# Patient Record
Sex: MALE | Race: BLACK OR AFRICAN AMERICAN | NOT HISPANIC OR LATINO | Employment: FULL TIME | URBAN - METROPOLITAN AREA
[De-identification: names, ages, dates, MRNs, and addresses within clinical notes are randomized per-mention and may not be internally consistent; named-entity substitution may affect disease eponyms.]

---

## 2017-01-11 ENCOUNTER — LAB VISIT (OUTPATIENT)
Dept: LAB | Facility: HOSPITAL | Age: 62
End: 2017-01-11
Attending: FAMILY MEDICINE
Payer: COMMERCIAL

## 2017-01-11 ENCOUNTER — OFFICE VISIT (OUTPATIENT)
Dept: FAMILY MEDICINE | Facility: CLINIC | Age: 62
End: 2017-01-11
Payer: COMMERCIAL

## 2017-01-11 VITALS
HEART RATE: 55 BPM | SYSTOLIC BLOOD PRESSURE: 130 MMHG | BODY MASS INDEX: 26.2 KG/M2 | DIASTOLIC BLOOD PRESSURE: 70 MMHG | WEIGHT: 204.13 LBS | OXYGEN SATURATION: 98 % | HEIGHT: 74 IN

## 2017-01-11 DIAGNOSIS — Z12.5 SCREENING PSA (PROSTATE SPECIFIC ANTIGEN): ICD-10-CM

## 2017-01-11 DIAGNOSIS — E78.5 HYPERLIPIDEMIA, UNSPECIFIED HYPERLIPIDEMIA TYPE: ICD-10-CM

## 2017-01-11 DIAGNOSIS — E55.9 VITAMIN D DEFICIENCY DISEASE: ICD-10-CM

## 2017-01-11 DIAGNOSIS — N52.9 ERECTILE DYSFUNCTION, UNSPECIFIED ERECTILE DYSFUNCTION TYPE: ICD-10-CM

## 2017-01-11 DIAGNOSIS — I10 ESSENTIAL HYPERTENSION: ICD-10-CM

## 2017-01-11 DIAGNOSIS — Z11.59 NEED FOR HEPATITIS C SCREENING TEST: ICD-10-CM

## 2017-01-11 DIAGNOSIS — E11.9 TYPE 2 DIABETES MELLITUS WITHOUT COMPLICATION, WITHOUT LONG-TERM CURRENT USE OF INSULIN: ICD-10-CM

## 2017-01-11 DIAGNOSIS — J01.01 ACUTE RECURRENT MAXILLARY SINUSITIS: ICD-10-CM

## 2017-01-11 DIAGNOSIS — E11.9 CONTROLLED TYPE 2 DIABETES MELLITUS WITHOUT COMPLICATION, WITHOUT LONG-TERM CURRENT USE OF INSULIN: Primary | ICD-10-CM

## 2017-01-11 DIAGNOSIS — E11.9 CONTROLLED TYPE 2 DIABETES MELLITUS WITHOUT COMPLICATION, WITHOUT LONG-TERM CURRENT USE OF INSULIN: ICD-10-CM

## 2017-01-11 LAB
25(OH)D3+25(OH)D2 SERPL-MCNC: 31 NG/ML
ALBUMIN SERPL BCP-MCNC: 3.8 G/DL
ALP SERPL-CCNC: 50 U/L
ALT SERPL W/O P-5'-P-CCNC: 10 U/L
ANION GAP SERPL CALC-SCNC: 5 MMOL/L
AST SERPL-CCNC: 15 U/L
BASOPHILS # BLD AUTO: 0.03 K/UL
BASOPHILS NFR BLD: 0.5 %
BILIRUB SERPL-MCNC: 0.6 MG/DL
BUN SERPL-MCNC: 18 MG/DL
CALCIUM SERPL-MCNC: 9.4 MG/DL
CHLORIDE SERPL-SCNC: 108 MMOL/L
CHOLEST/HDLC SERPL: 3.3 {RATIO}
CO2 SERPL-SCNC: 28 MMOL/L
COMPLEXED PSA SERPL-MCNC: 3.3 NG/ML
CREAT SERPL-MCNC: 1.4 MG/DL
DIFFERENTIAL METHOD: ABNORMAL
EOSINOPHIL # BLD AUTO: 0.2 K/UL
EOSINOPHIL NFR BLD: 4.1 %
ERYTHROCYTE [DISTWIDTH] IN BLOOD BY AUTOMATED COUNT: 14.5 %
EST. GFR  (AFRICAN AMERICAN): >60 ML/MIN/1.73 M^2
EST. GFR  (NON AFRICAN AMERICAN): 53 ML/MIN/1.73 M^2
GLUCOSE SERPL-MCNC: 122 MG/DL
HCT VFR BLD AUTO: 37.4 %
HDL/CHOLESTEROL RATIO: 29.9 %
HDLC SERPL-MCNC: 144 MG/DL
HDLC SERPL-MCNC: 43 MG/DL
HGB BLD-MCNC: 12.5 G/DL
LDLC SERPL CALC-MCNC: 90 MG/DL
LYMPHOCYTES # BLD AUTO: 1.9 K/UL
LYMPHOCYTES NFR BLD: 33.3 %
MCH RBC QN AUTO: 28.7 PG
MCHC RBC AUTO-ENTMCNC: 33.4 %
MCV RBC AUTO: 86 FL
MONOCYTES # BLD AUTO: 0.5 K/UL
MONOCYTES NFR BLD: 8.5 %
NEUTROPHILS # BLD AUTO: 3 K/UL
NEUTROPHILS NFR BLD: 53.4 %
NONHDLC SERPL-MCNC: 101 MG/DL
PLATELET # BLD AUTO: 257 K/UL
PMV BLD AUTO: 10.4 FL
POTASSIUM SERPL-SCNC: 4.7 MMOL/L
PROT SERPL-MCNC: 6.9 G/DL
RBC # BLD AUTO: 4.36 M/UL
SODIUM SERPL-SCNC: 141 MMOL/L
TRIGL SERPL-MCNC: 55 MG/DL
WBC # BLD AUTO: 5.67 K/UL

## 2017-01-11 PROCEDURE — 3044F HG A1C LEVEL LT 7.0%: CPT | Mod: S$GLB,,, | Performed by: FAMILY MEDICINE

## 2017-01-11 PROCEDURE — 3078F DIAST BP <80 MM HG: CPT | Mod: S$GLB,,, | Performed by: FAMILY MEDICINE

## 2017-01-11 PROCEDURE — 36415 COLL VENOUS BLD VENIPUNCTURE: CPT

## 2017-01-11 PROCEDURE — 99214 OFFICE O/P EST MOD 30 MIN: CPT | Mod: S$GLB,,, | Performed by: FAMILY MEDICINE

## 2017-01-11 PROCEDURE — 4010F ACE/ARB THERAPY RXD/TAKEN: CPT | Mod: S$GLB,,, | Performed by: FAMILY MEDICINE

## 2017-01-11 PROCEDURE — 83036 HEMOGLOBIN GLYCOSYLATED A1C: CPT

## 2017-01-11 PROCEDURE — 84153 ASSAY OF PSA TOTAL: CPT

## 2017-01-11 PROCEDURE — 85025 COMPLETE CBC W/AUTO DIFF WBC: CPT

## 2017-01-11 PROCEDURE — 3075F SYST BP GE 130 - 139MM HG: CPT | Mod: S$GLB,,, | Performed by: FAMILY MEDICINE

## 2017-01-11 PROCEDURE — 80053 COMPREHEN METABOLIC PANEL: CPT

## 2017-01-11 PROCEDURE — 86803 HEPATITIS C AB TEST: CPT

## 2017-01-11 PROCEDURE — 99999 PR PBB SHADOW E&M-EST. PATIENT-LVL III: CPT | Mod: PBBFAC,,, | Performed by: FAMILY MEDICINE

## 2017-01-11 PROCEDURE — 80061 LIPID PANEL: CPT

## 2017-01-11 PROCEDURE — 2022F DILAT RTA XM EVC RTNOPTHY: CPT | Mod: S$GLB,,, | Performed by: FAMILY MEDICINE

## 2017-01-11 PROCEDURE — 82306 VITAMIN D 25 HYDROXY: CPT

## 2017-01-11 PROCEDURE — 1159F MED LIST DOCD IN RCRD: CPT | Mod: S$GLB,,, | Performed by: FAMILY MEDICINE

## 2017-01-11 RX ORDER — ATORVASTATIN CALCIUM 40 MG/1
40 TABLET, FILM COATED ORAL DAILY
Qty: 90 TABLET | Refills: 3 | Status: SHIPPED | OUTPATIENT
Start: 2017-01-11 | End: 2017-04-03 | Stop reason: SDUPTHER

## 2017-01-11 RX ORDER — LISINOPRIL 10 MG/1
10 TABLET ORAL DAILY
Qty: 90 TABLET | Refills: 3 | Status: SHIPPED | OUTPATIENT
Start: 2017-01-11 | End: 2017-04-03 | Stop reason: SDUPTHER

## 2017-01-11 RX ORDER — METFORMIN HYDROCHLORIDE 1000 MG/1
1000 TABLET ORAL 2 TIMES DAILY
Qty: 180 TABLET | Refills: 3 | Status: SHIPPED | OUTPATIENT
Start: 2017-01-11 | End: 2017-04-03 | Stop reason: SDUPTHER

## 2017-01-11 RX ORDER — TADALAFIL 20 MG/1
20 TABLET ORAL DAILY
Qty: 30 TABLET | Refills: 4 | Status: SHIPPED | OUTPATIENT
Start: 2017-01-11 | End: 2018-08-28

## 2017-01-11 RX ORDER — FLUTICASONE PROPIONATE 50 MCG
1 SPRAY, SUSPENSION (ML) NASAL DAILY
Qty: 1 BOTTLE | Refills: 1 | Status: SHIPPED | OUTPATIENT
Start: 2017-01-11 | End: 2017-04-03

## 2017-01-11 RX ORDER — AMOXICILLIN 875 MG/1
875 TABLET, FILM COATED ORAL 2 TIMES DAILY
Qty: 20 TABLET | Refills: 0 | Status: SHIPPED | OUTPATIENT
Start: 2017-01-11 | End: 2017-01-21

## 2017-01-11 NOTE — PROGRESS NOTES
Subjective:       Patient ID: Addy Redding is a 61 y.o. male.    Chief Complaint: No chief complaint on file.    HPI Comments: 61 yr old black male with DM II, HTN, HLD, GERD, presents today for 3-6 month follow up, medication refill. C/o sinus congestion. Has not seen MD since 2/16.    DM II - improved -   HGBA1C                   6.3 (H)             02/08/2016             - due for today - brought log and it shows uncontrolled sugars fasting ranging from 150-220 - denies any hypoglycemic symptoms - on metformin - not  up to date with eye and foot screen - stopped glipizide since itw as making his sugars low    HTN - uncontrolled but did not took med today - on lisinopril 5 mg daily - no side effects    HLD - uncontrolled but improving -    LDLCALC                  79.0                08/25/2015                 - on statin - compliant - need refill - not fully compliant with diet    GERD - stable - takes prilosec as needed    Health maintenance  -Flu and Pneumovax - up to date  -adacel reports up to date 2005  -colonoscopy due  -PSA due      Medication Refill   This is a chronic problem. The current episode started more than 1 year ago. The problem occurs constantly. The problem has been gradually improving. Associated symptoms include congestion, coughing and headaches. Pertinent negatives include no abdominal pain, anorexia, arthralgias, change in bowel habit, chest pain, chills, diaphoresis, fatigue, fever, joint swelling, myalgias, nausea, neck pain, numbness, rash, sore throat, swollen glands, urinary symptoms, vertigo, visual change, vomiting or weakness.   Diabetes   Hypoglycemia symptoms include headaches. Pertinent negatives for hypoglycemia include no dizziness, nervousness/anxiousness, speech difficulty or sweats. Pertinent negatives for diabetes include no blurred vision, no chest pain, no fatigue, no polyuria, no visual change and no weakness. Pertinent negatives for diabetic complications include no  CVA, PVD or retinopathy.   Hypertension   This is a chronic problem. The current episode started more than 1 year ago. The problem has been gradually worsening since onset. The problem is uncontrolled. Associated symptoms include headaches. Pertinent negatives include no anxiety, blurred vision, chest pain, malaise/fatigue, neck pain, orthopnea, palpitations, peripheral edema, shortness of breath or sweats. There are no associated agents to hypertension. Risk factors for coronary artery disease include dyslipidemia, diabetes mellitus, male gender and obesity. Past treatments include ACE inhibitors. The current treatment provides no improvement. There are no compliance problems.  There is no history of angina, CAD/MI, CVA, left ventricular hypertrophy, PVD, renovascular disease, retinopathy or a thyroid problem. There is no history of chronic renal disease, coarctation of the aorta, hypercortisolism or pheochromocytoma.   Hyperlipidemia   This is a chronic problem. The current episode started more than 1 year ago. The problem is uncontrolled. Recent lipid tests were reviewed and are high. Exacerbating diseases include diabetes and obesity. He has no history of chronic renal disease, liver disease or nephrotic syndrome. There are no known factors aggravating his hyperlipidemia. Pertinent negatives include no chest pain, focal sensory loss, focal weakness, leg pain, myalgias or shortness of breath. Current antihyperlipidemic treatment includes statins. There are no compliance problems.  Risk factors for coronary artery disease include diabetes mellitus, dyslipidemia, male sex and hypertension.   Sinusitis   This is a new problem. The current episode started more than 1 month ago. The problem is unchanged. His pain is at a severity of 3/10. The pain is mild. Associated symptoms include congestion, coughing, headaches and sinus pressure. Pertinent negatives include no chills, diaphoresis, ear pain, neck pain, shortness  of breath, sore throat or swollen glands. Past treatments include acetaminophen and oral decongestants. The treatment provided no relief.     Review of Systems   Constitutional: Negative.  Negative for activity change, chills, diaphoresis, fatigue, fever, malaise/fatigue and unexpected weight change.   HENT: Positive for congestion, postnasal drip and sinus pressure. Negative for ear pain, mouth sores, rhinorrhea, sore throat and voice change.    Eyes: Negative.  Negative for blurred vision, pain, discharge and visual disturbance.   Respiratory: Positive for cough. Negative for apnea, shortness of breath and wheezing.    Cardiovascular: Negative.  Negative for chest pain, palpitations and orthopnea.   Gastrointestinal: Negative.  Negative for abdominal distention, abdominal pain, anal bleeding, anorexia, change in bowel habit, diarrhea, nausea and vomiting.   Endocrine: Negative.  Negative for cold intolerance and polyuria.   Genitourinary: Negative.  Negative for decreased urine volume, difficulty urinating, discharge, frequency and scrotal swelling.   Musculoskeletal: Negative.  Negative for arthralgias, back pain, joint swelling, myalgias, neck pain and neck stiffness.   Skin: Negative.  Negative for color change and rash.   Allergic/Immunologic: Negative.  Negative for environmental allergies and immunocompromised state.   Neurological: Positive for headaches. Negative for dizziness, vertigo, focal weakness, speech difficulty, weakness, light-headedness and numbness.   Hematological: Negative.    Psychiatric/Behavioral: Negative.  Negative for agitation, dysphoric mood and suicidal ideas. The patient is not nervous/anxious.        PMH/PSH/FH/SH/MED/ALLERGY reviewed    Objective:       Vitals:    01/11/17 0843   BP: 130/70   Pulse:        Physical Exam   Constitutional: He is oriented to person, place, and time. He appears well-developed and well-nourished. No distress.   HENT:   Head: Normocephalic and  atraumatic.   Right Ear: External ear normal.   Left Ear: External ear normal.   Nose: Mucosal edema and rhinorrhea present. Right sinus exhibits maxillary sinus tenderness. Right sinus exhibits no frontal sinus tenderness. Left sinus exhibits maxillary sinus tenderness. Left sinus exhibits no frontal sinus tenderness.   Mouth/Throat: Oropharynx is clear and moist. No oropharyngeal exudate.   Eyes: Conjunctivae and EOM are normal. Pupils are equal, round, and reactive to light. Right eye exhibits no discharge. Left eye exhibits no discharge. No scleral icterus.   Neck: Normal range of motion. Neck supple. No JVD present. No tracheal deviation present. No thyromegaly present.   Cardiovascular: Normal rate, regular rhythm, normal heart sounds and intact distal pulses.  Exam reveals no gallop and no friction rub.    No murmur heard.  Pulmonary/Chest: Effort normal and breath sounds normal. No stridor. No respiratory distress. He has no wheezes. He has no rales. He exhibits no tenderness.   Abdominal: Soft. Bowel sounds are normal. He exhibits no distension and no mass. There is no tenderness. There is no rebound and no guarding. No hernia.   Musculoskeletal: Normal range of motion. He exhibits no edema or tenderness.   Lymphadenopathy:     He has no cervical adenopathy.   Neurological: He is alert and oriented to person, place, and time. He has normal reflexes. He displays normal reflexes. No cranial nerve deficit. He exhibits normal muscle tone. Coordination normal.   Skin: Skin is warm and dry. No rash noted. He is not diaphoretic. No erythema. No pallor.   Psychiatric: He has a normal mood and affect. His behavior is normal. Judgment and thought content normal.       Protective Sensation (w/ 10 gram monofilament):  Right: Intact  Left: Intact    Visual Inspection:  Normal -  Bilateral    Pedal Pulses:   Right: Present  Left: Present    Posterior tibialis:   Right:Present  Left: Present  ,  Assessment:       1.  Controlled type 2 diabetes mellitus without complication, without long-term current use of insulin    2. Hyperlipidemia, unspecified hyperlipidemia type    3. Erectile dysfunction, unspecified erectile dysfunction type    4. Essential hypertension    5. Need for hepatitis C screening test    6. Vitamin D deficiency disease    7. Type 2 diabetes mellitus without complication, without long-term current use of insulin    8. Acute recurrent maxillary sinusitis        Plan:       Diagnoses and all orders for this visit:    Controlled type 2 diabetes mellitus without complication, without long-term current use of insulin  -     CBC auto differential; Future  -     Comprehensive metabolic panel; Future  -     Lipid panel; Future  -     Hemoglobin A1c; Future  -     Microalbumin/creatinine urine ratio; Future  -     metformin (GLUCOPHAGE) 1000 MG tablet; Take 1 tablet (1,000 mg total) by mouth 2 (two) times daily.    Hyperlipidemia, unspecified hyperlipidemia type  -     CBC auto differential; Future  -     Comprehensive metabolic panel; Future  -     Lipid panel; Future  -     atorvastatin (LIPITOR) 40 MG tablet; Take 1 tablet (40 mg total) by mouth once daily.    Erectile dysfunction, unspecified erectile dysfunction type  -     tadalafil (CIALIS) 20 MG Tab; Take 1 tablet (20 mg total) by mouth once daily.    Essential hypertension  -     CBC auto differential; Future  -     Comprehensive metabolic panel; Future  -     Lipid panel; Future  -     lisinopril 10 MG tablet; Take 1 tablet (10 mg total) by mouth once daily.    Need for hepatitis C screening test  -     HEPATITIS C ANTIBODY; Future    Vitamin D deficiency disease  -     Vitamin D; Future    Type 2 diabetes mellitus without complication, without long-term current use of insulin  -     metformin (GLUCOPHAGE) 1000 MG tablet; Take 1 tablet (1,000 mg total) by mouth 2 (two) times daily.    Acute recurrent maxillary sinusitis  -     fluticasone (FLONASE) 50  mcg/actuation nasal spray; 1 spray by Each Nare route once daily.  -     amoxicillin (AMOXIL) 875 MG tablet; Take 1 tablet (875 mg total) by mouth 2 (two) times daily.      Spent adequate time in obtaining history and explaining differentials     HTN   -not at goal  -increase lisinopril to 10 mg/day    DM II   -controlled   -continue metformin and labs    HLD   -improving  -increased lipitor to 40     GERD   -stable   -takes OTC PPI     Ac sinusitis  -Advised saline irrigation, warm humidifier, steam inhalation, vicks vaporub, claritin D for congestion  -z ashok and flonase as directed    Colon and prostate ca screening due     40 minutes spent during this visit of which greater than 50% devoted to face-face counseling and coordination of care regarding diagnosis and management plan     RTC 3 months

## 2017-01-11 NOTE — MR AVS SNAPSHOT
[address]  [place] [address]  Phone: [phone]  Fax: [phone]                  Addy Redding   2017 9:40 AM   Office Visit    Description:  Male : 1955   Provider:  Stephan Ramey MD   Department:  [facility]           Diagnoses this Visit        Comments    Controlled type 2 diabetes mellitus without complication, without long-term current use of insulin    -  Primary     Hyperlipidemia, unspecified hyperlipidemia type         Erectile dysfunction, unspecified erectile dysfunction type         Essential hypertension         Need for hepatitis C screening test         Vitamin D deficiency disease                To Do List           Future Appointments        Provider Department Dept Phone    2017 9:40 AM Stephan Ramey MD [facility] [phone]      Goals (5 Years of Data)     None      Follow-Up and Disposition     Return in about 3 months (around 2017), or if symptoms worsen or fail to improve.      [facility]     [facility] -  Assistance  Registered nurses in the [facility] provide clinical advisement, health education, appointment booking, and other advisory services.  Call for this free service at [phone].             Medications           Message regarding Medications     Verify the changes and/or additions to your medication regime listed below are the same as discussed with your clinician today.  If any of these changes or additions are incorrect, please notify your healthcare provider.             Verify that the below list of medications is an accurate representation of the medications you are currently taking.  If none reported, the list may be blank. If incorrect, please contact your healthcare provider. Carry this list with you in case of emergency.           Current Medications     atorvastatin (LIPITOR) 40 MG tablet TAKE ONE TABLET BY MOUTH  "ONCE DAILY.    BLOOD GLUCOSE TEST test strip USE TO TEST BLOOD SUGAR 4 TIMES DAILY.    hydrocodone-acetaminophen 5-325mg (NORCO) 5-325 mg per tablet Take 1 tablet by mouth 2 (two) times daily as needed for Pain.    INSULIN DETEMIR (LEVEMIR FLEXTOUCH SUBQ) Inject 10 Units into the skin every evening.    insulin needles, disposable, (BD ULTRA-FINE ARIA PEN NEEDLES) 32 x 5/32 " Ndle Use with flex pens    lisinopril (PRINIVIL,ZESTRIL) 5 MG tablet Take 1 tablet (5 mg total) by mouth once daily.    metformin (GLUCOPHAGE) 1000 MG tablet TAKE ONE TABLET BY MOUTH TWICE DAILY    sildenafil (VIAGRA) 50 MG tablet Take 1 tablet (50 mg total) by mouth daily as needed for Erectile Dysfunction.    aspirin (ECOTRIN) 81 MG EC tablet Take 81 mg by mouth once daily.      atorvastatin (LIPITOR) 40 MG tablet TAKE ONE TABLET BY MOUTH ONCE DAILY.    atorvastatin (LIPITOR) 80 MG tablet Take 1 tablet (80 mg total) by mouth once daily.    blood-glucose meter kit Use as instructed    lisinopril (PRINIVIL,ZESTRIL) 5 MG tablet TAKE ONE TABLET BY MOUTH ONCE DAILY.    lisinopril (PRINIVIL,ZESTRIL) 5 MG tablet TAKE ONE TABLET BY MOUTH ONCE DAILY.    lisinopril (PRINIVIL,ZESTRIL) 5 MG tablet TAKE ONE TABLET BY MOUTH ONCE DAILY.    lisinopril (PRINIVIL,ZESTRIL) 5 MG tablet TAKE ONE TABLET BY MOUTH ONCE DAILY.    lisinopril (PRINIVIL,ZESTRIL) 5 MG tablet TAKE ONE TABLET BY MOUTH ONCE DAILY.    lisinopril (PRINIVIL,ZESTRIL) 5 MG tablet TAKE ONE TABLET BY MOUTH ONCE DAILY.    metformin (GLUCOPHAGE) 1000 MG tablet TAKE ONE TABLET BY MOUTH TWICE DAILY    metformin (GLUCOPHAGE) 1000 MG tablet TAKE ONE TABLET BY MOUTH TWICE DAILY    metformin (GLUCOPHAGE) 1000 MG tablet TAKE ONE TABLET BY MOUTH TWICE DAILY    metformin (GLUCOPHAGE) 1000 MG tablet TAKE ONE TABLET BY MOUTH TWICE DAILY    metformin (GLUCOPHAGE) 1000 MG tablet TAKE ONE TABLET BY MOUTH TWICE DAILY           Clinical Reference Information           Vital Signs - Last Recorded  Most recent update: " "1/11/2017  8:21 AM by Danica Carlos MA    BP Pulse Ht Wt SpO2 BMI    (!) 154/72 (!) 55 6' 2" (1.88 m) 92.6 kg (204 lb 2.3 oz) 98% 26.21 kg/m2      Blood Pressure          Most Recent Value    BP  (!)  154/72      Allergies as of 1/11/2017     No Known Allergies      Immunizations Administered on Date of Encounter - 1/11/2017     None      Orders Placed During Today's Visit     Future Labs/Procedures Expected by Expires    CBC auto differential  1/11/2017 3/12/2018    Comprehensive metabolic panel  1/11/2017 3/12/2018    Hemoglobin A1c  1/11/2017 3/12/2018    HEPATITIS C ANTIBODY  1/11/2017 3/12/2018    Lipid panel  1/11/2017 3/12/2018    Microalbumin/creatinine urine ratio  1/11/2017 3/12/2018    Vitamin D  1/11/2017 3/12/2018      "

## 2017-01-12 LAB
ESTIMATED AVG GLUCOSE: 140 MG/DL
HBA1C MFR BLD HPLC: 6.5 %
HCV AB SERPL QL IA: NEGATIVE

## 2017-01-28 RX ORDER — LISINOPRIL 5 MG/1
TABLET ORAL
Qty: 30 TABLET | Refills: 0 | Status: SHIPPED | OUTPATIENT
Start: 2017-01-28 | End: 2017-04-03

## 2017-04-03 ENCOUNTER — OFFICE VISIT (OUTPATIENT)
Dept: FAMILY MEDICINE | Facility: CLINIC | Age: 62
End: 2017-04-03
Payer: COMMERCIAL

## 2017-04-03 ENCOUNTER — LAB VISIT (OUTPATIENT)
Dept: LAB | Facility: HOSPITAL | Age: 62
End: 2017-04-03
Attending: FAMILY MEDICINE
Payer: COMMERCIAL

## 2017-04-03 VITALS
OXYGEN SATURATION: 98 % | HEART RATE: 50 BPM | SYSTOLIC BLOOD PRESSURE: 125 MMHG | BODY MASS INDEX: 25.8 KG/M2 | HEIGHT: 74 IN | WEIGHT: 201.06 LBS | DIASTOLIC BLOOD PRESSURE: 67 MMHG

## 2017-04-03 DIAGNOSIS — Z23 IMMUNIZATION DUE: ICD-10-CM

## 2017-04-03 DIAGNOSIS — E78.5 HYPERLIPIDEMIA, UNSPECIFIED HYPERLIPIDEMIA TYPE: ICD-10-CM

## 2017-04-03 DIAGNOSIS — N52.9 ERECTILE DYSFUNCTION, UNSPECIFIED ERECTILE DYSFUNCTION TYPE: ICD-10-CM

## 2017-04-03 DIAGNOSIS — I10 ESSENTIAL HYPERTENSION: ICD-10-CM

## 2017-04-03 DIAGNOSIS — E11.9 TYPE 2 DIABETES MELLITUS WITHOUT COMPLICATION, WITHOUT LONG-TERM CURRENT USE OF INSULIN: ICD-10-CM

## 2017-04-03 DIAGNOSIS — E11.9 CONTROLLED TYPE 2 DIABETES MELLITUS WITHOUT COMPLICATION, WITHOUT LONG-TERM CURRENT USE OF INSULIN: Primary | ICD-10-CM

## 2017-04-03 DIAGNOSIS — E11.9 CONTROLLED TYPE 2 DIABETES MELLITUS WITHOUT COMPLICATION, WITHOUT LONG-TERM CURRENT USE OF INSULIN: ICD-10-CM

## 2017-04-03 LAB
ALBUMIN SERPL BCP-MCNC: 3.8 G/DL
ALP SERPL-CCNC: 41 U/L
ALT SERPL W/O P-5'-P-CCNC: 11 U/L
ANION GAP SERPL CALC-SCNC: 6 MMOL/L
AST SERPL-CCNC: 16 U/L
BILIRUB SERPL-MCNC: 0.4 MG/DL
BUN SERPL-MCNC: 23 MG/DL
CALCIUM SERPL-MCNC: 9.3 MG/DL
CHLORIDE SERPL-SCNC: 107 MMOL/L
CO2 SERPL-SCNC: 26 MMOL/L
CREAT SERPL-MCNC: 1.3 MG/DL
EST. GFR  (AFRICAN AMERICAN): >60 ML/MIN/1.73 M^2
EST. GFR  (NON AFRICAN AMERICAN): 58 ML/MIN/1.73 M^2
GLUCOSE SERPL-MCNC: 127 MG/DL
POTASSIUM SERPL-SCNC: 4.5 MMOL/L
PROT SERPL-MCNC: 7.2 G/DL
SODIUM SERPL-SCNC: 139 MMOL/L

## 2017-04-03 PROCEDURE — 3074F SYST BP LT 130 MM HG: CPT | Mod: S$GLB,,, | Performed by: FAMILY MEDICINE

## 2017-04-03 PROCEDURE — 99999 PR PBB SHADOW E&M-EST. PATIENT-LVL III: CPT | Mod: PBBFAC,,, | Performed by: FAMILY MEDICINE

## 2017-04-03 PROCEDURE — 83036 HEMOGLOBIN GLYCOSYLATED A1C: CPT

## 2017-04-03 PROCEDURE — 4010F ACE/ARB THERAPY RXD/TAKEN: CPT | Mod: S$GLB,,, | Performed by: FAMILY MEDICINE

## 2017-04-03 PROCEDURE — 1160F RVW MEDS BY RX/DR IN RCRD: CPT | Mod: S$GLB,,, | Performed by: FAMILY MEDICINE

## 2017-04-03 PROCEDURE — 99214 OFFICE O/P EST MOD 30 MIN: CPT | Mod: 25,S$GLB,, | Performed by: FAMILY MEDICINE

## 2017-04-03 PROCEDURE — 36415 COLL VENOUS BLD VENIPUNCTURE: CPT

## 2017-04-03 PROCEDURE — 90471 IMMUNIZATION ADMIN: CPT | Mod: S$GLB,,, | Performed by: FAMILY MEDICINE

## 2017-04-03 PROCEDURE — 3078F DIAST BP <80 MM HG: CPT | Mod: S$GLB,,, | Performed by: FAMILY MEDICINE

## 2017-04-03 PROCEDURE — 80053 COMPREHEN METABOLIC PANEL: CPT

## 2017-04-03 PROCEDURE — 90715 TDAP VACCINE 7 YRS/> IM: CPT | Mod: S$GLB,,, | Performed by: FAMILY MEDICINE

## 2017-04-03 PROCEDURE — 3044F HG A1C LEVEL LT 7.0%: CPT | Mod: S$GLB,,, | Performed by: FAMILY MEDICINE

## 2017-04-03 RX ORDER — ATORVASTATIN CALCIUM 40 MG/1
40 TABLET, FILM COATED ORAL DAILY
Qty: 90 TABLET | Refills: 3 | Status: SHIPPED | OUTPATIENT
Start: 2017-04-03 | End: 2018-04-06 | Stop reason: SDUPTHER

## 2017-04-03 RX ORDER — METFORMIN HYDROCHLORIDE 1000 MG/1
1000 TABLET ORAL 2 TIMES DAILY
Qty: 180 TABLET | Refills: 3 | Status: SHIPPED | OUTPATIENT
Start: 2017-04-03 | End: 2018-04-06 | Stop reason: SDUPTHER

## 2017-04-03 RX ORDER — LISINOPRIL 10 MG/1
10 TABLET ORAL DAILY
Qty: 90 TABLET | Refills: 3 | Status: SHIPPED | OUTPATIENT
Start: 2017-04-03 | End: 2018-04-06 | Stop reason: SDUPTHER

## 2017-04-03 NOTE — MR AVS SNAPSHOT
[facility]  [address]  [place] [address]  Phone: [phone]  Fax: [phone]                  Addy Redding   4/3/2017 8:20 AM   Office Visit    Description:  Male : 1955   Provider:  Stephan Ramey MD   Department:  [facility]           Reason for Visit     Follow-up           Diagnoses this Visit        Comments    Controlled type 2 diabetes mellitus without complication, without long-term current use of insulin    -  Primary     Essential hypertension         Hyperlipidemia, unspecified hyperlipidemia type         Erectile dysfunction, unspecified erectile dysfunction type         Type 2 diabetes mellitus without complication, without long-term current use of insulin         Immunization due                To Do List           Future Appointments        Provider Department Dept Phone    4/3/2017 8:50 AM APPOINTMENT LAB, [place] [facility] [phone]    2017 8:20 AM Stephan Ramey MD [facility] [phone]      Goals (5 Years of Data)     None      Follow-Up and Disposition     Return in about 3 months (around 7/3/2017), or if symptoms worsen or fail to improve.       These Medications        Disp Refills Start End    metformin (GLUCOPHAGE) 1000 MG tablet 180 tablet 3 4/3/2017     Take 1 tablet (1,000 mg total) by mouth 2 (two) times daily. - Oral    Pharmacy: [facility]/pharmacy #[id number] - [place]  [address] AT [facility] Ph #: [phone]       lisinopril 10 MG tablet 90 tablet 3 4/3/2017     Take 1 tablet (10 mg total) by mouth once daily. - Oral    Pharmacy: [facility]/pharmacy #[id number] - [place]  [address] AT [facility] Ph #: [phone]       atorvastatin (LIPITOR) 40 MG tablet 90 tablet 3 4/3/2017     Take 1 tablet (40 mg total) by mouth once daily. - Oral    Pharmacy: [facility]/pharmacy #[id number] - [place]  [address] AT [facility] #: [phone]       blood sugar diagnostic (BLOOD GLUCOSE TEST) Strp 100 each 9 4/3/2017     USE TO TEST BLOOD SUGAR 4 TIMES DAILY.    Pharmacy: [facility]/pharmacy #[id number] - [place] [address] AT [facility] [address]  #: [phone]         [facility]     [facility] On Call Nurse Care Line - 24/7 Assistance  Unless otherwise directed by your provider, please contact [facility], our nurse care line that is available for 24/7 assistance.     Registered nurses in the [facility] provide: appointment scheduling, clinical advisement, health education, and other advisory services.  Call: [phone] (toll free)               Medications           Message regarding Medications     Verify the changes and/or additions to your medication regime listed below are the same as discussed with your clinician today.  If any of these changes or additions are incorrect, please notify your healthcare provider.        CHANGE how you are taking these medications     Start Taking Instead of    blood sugar diagnostic (BLOOD GLUCOSE TEST) Strp BLOOD GLUCOSE TEST test strip    Dosage:  USE TO TEST BLOOD SUGAR 4 TIMES DAILY. Dosage:  USE TO TEST BLOOD SUGAR 4 TIMES DAILY.    Reason for Change:  Reorder       STOP taking these medications     fluticasone (FLONASE) 50 mcg/actuation nasal spray 1 spray by Each Nare route once daily.    hydrocodone-acetaminophen 5-325mg (NORCO) 5-325 mg per tablet Take 1 tablet by mouth 2 (two) times daily as needed for Pain.    INSULIN DETEMIR (LEVEMIR FLEXTOUCH SUBQ) Inject 10 Units into the skin every evening.    sildenafil (VIAGRA) 50 MG tablet Take 1 tablet (50 mg total) by mouth daily as needed for Erectile Dysfunction.           Verify that the below list of medications is an accurate representation of the medications you are currently taking.  If none reported, the list may be blank. If incorrect, please contact your healthcare provider. Carry this list  "with you in case of emergency.           Current Medications     aspirin (ECOTRIN) 81 MG EC tablet Take 81 mg by mouth once daily.      atorvastatin (LIPITOR) 40 MG tablet Take 1 tablet (40 mg total) by mouth once daily.    blood sugar diagnostic (BLOOD GLUCOSE TEST) Strp USE TO TEST BLOOD SUGAR 4 TIMES DAILY.    insulin needles, disposable, (BD ULTRA-FINE ARIA PEN NEEDLES) 32 x 5/32 " Ndle Use with flex pens    lisinopril 10 MG tablet Take 1 tablet (10 mg total) by mouth once daily.    metformin (GLUCOPHAGE) 1000 MG tablet Take 1 tablet (1,000 mg total) by mouth 2 (two) times daily.    tadalafil (CIALIS) 20 MG Tab Take 1 tablet (20 mg total) by mouth once daily.    blood-glucose meter kit Use as instructed           Clinical Reference Information           Your Vitals Were     BP Pulse Height Weight SpO2 BMI    125/67 50 6' 2" (1.88 m) 91.2 kg (201 lb 1 oz) 98% 25.81 kg/m2      Blood Pressure          Most Recent Value    BP  125/67      Allergies as of 4/3/2017     No Known Allergies      Immunizations Administered on Date of Encounter - 4/3/2017     Name Date Dose VIS Date Route    TDAP  Incomplete 0.5 mL 2/24/2015 Intramuscular      Orders Placed During Today's Visit      Normal Orders This Visit    Tdap Vaccine     Future Labs/Procedures Expected by Expires    Comprehensive metabolic panel  4/3/2017 6/2/2018    Hemoglobin A1c  4/3/2017 6/2/2018      Language Assistance Services     ATTENTION: Language assistance services are available, free of charge. Please call [phone].      ATENCIÓN: Si habla español, tiene a villa disposición servicios gratuitos de asistencia lingüística. Llame al [phone].     [facility] Ý: N?u b?n nói Ti?ng Vi?t, có các d?ch v? h? tr? ngôn ng? mi?n phí dành cho b?n. G?i s? [phone].         [facility] complies with applicable Federal civil rights laws and does not discriminate on the basis of race, color, national origin, age, disability, or sex.        "

## 2017-04-03 NOTE — PROGRESS NOTES
Subjective:       Patient ID: Addy Redding is a 61 y.o. male.    Chief Complaint: Follow-up    HPI Comments: 61 yr old black male with DM II, HTN, HLD, GERD, presents today for 3 month follow up, medication refill. No new complaints today.    DM II - improved -   HGBA1C                   6.5 (H)             01/11/2017          - denies any hypoglycemic symptoms - on metformin - up to date with eye and foot screen - stopped glipizide since itw as making his sugars low    HTN - uncontrolled but did not took med today - on lisinopril 5 mg daily - no side effects    HLD - controlled and improving -     LDLCALC                  90.0                01/11/2017                       - on statin - compliant - need refill - not fully compliant with diet    GERD - stable - takes prilosec as needed    Health maintenance  -Flu and Pneumovax - up to date  -adacel due and done today  -colonoscopy due - wants to wait  -PSA UTD      Medication Refill   This is a chronic problem. The current episode started more than 1 year ago. The problem occurs constantly. The problem has been gradually improving. Pertinent negatives include no abdominal pain, anorexia, arthralgias, change in bowel habit, chest pain, fatigue, fever, joint swelling, myalgias, nausea, numbness, rash, urinary symptoms, vertigo, visual change, vomiting or weakness.   Diabetes   Pertinent negatives for hypoglycemia include no dizziness, nervousness/anxiousness, speech difficulty or sweats. Pertinent negatives for diabetes include no blurred vision, no chest pain, no fatigue, no polyuria, no visual change and no weakness. Pertinent negatives for diabetic complications include no CVA, PVD or retinopathy.   Hypertension   This is a chronic problem. The current episode started more than 1 year ago. The problem has been gradually worsening since onset. The problem is uncontrolled. Pertinent negatives include no anxiety, blurred vision, chest pain, malaise/fatigue, orthopnea,  palpitations, peripheral edema or sweats. There are no associated agents to hypertension. Risk factors for coronary artery disease include dyslipidemia, diabetes mellitus, male gender and obesity. Past treatments include ACE inhibitors. The current treatment provides no improvement. There are no compliance problems.  There is no history of angina, CAD/MI, CVA, left ventricular hypertrophy, PVD, renovascular disease, retinopathy or a thyroid problem. There is no history of chronic renal disease, coarctation of the aorta, hypercortisolism or pheochromocytoma.   Hyperlipidemia   This is a chronic problem. The current episode started more than 1 year ago. The problem is uncontrolled. Recent lipid tests were reviewed and are high. Exacerbating diseases include diabetes and obesity. He has no history of chronic renal disease, liver disease or nephrotic syndrome. There are no known factors aggravating his hyperlipidemia. Pertinent negatives include no chest pain, focal sensory loss, focal weakness, leg pain or myalgias. Current antihyperlipidemic treatment includes statins. There are no compliance problems.  Risk factors for coronary artery disease include diabetes mellitus, dyslipidemia, male sex and hypertension.     Review of Systems   Constitutional: Negative.  Negative for activity change, fatigue, fever, malaise/fatigue and unexpected weight change.   HENT: Negative for mouth sores, rhinorrhea and voice change.    Eyes: Negative.  Negative for blurred vision, pain, discharge and visual disturbance.   Respiratory: Negative for apnea and wheezing.    Cardiovascular: Negative.  Negative for chest pain, palpitations and orthopnea.   Gastrointestinal: Negative.  Negative for abdominal distention, abdominal pain, anal bleeding, anorexia, change in bowel habit, diarrhea, nausea and vomiting.   Endocrine: Negative.  Negative for cold intolerance and polyuria.   Genitourinary: Negative.  Negative for decreased urine volume,  difficulty urinating, discharge, frequency and scrotal swelling.   Musculoskeletal: Negative.  Negative for arthralgias, back pain, joint swelling, myalgias and neck stiffness.   Skin: Negative.  Negative for color change and rash.   Allergic/Immunologic: Negative.  Negative for environmental allergies and immunocompromised state.   Neurological: Negative for dizziness, vertigo, focal weakness, speech difficulty, weakness, light-headedness and numbness.   Hematological: Negative.    Psychiatric/Behavioral: Negative.  Negative for agitation, dysphoric mood and suicidal ideas. The patient is not nervous/anxious.        PMH/PSH/FH/SH/MED/ALLERGY reviewed    Objective:       Vitals:    04/03/17 0812   BP: 125/67   Pulse: (!) 50       Physical Exam   Constitutional: He is oriented to person, place, and time. He appears well-developed and well-nourished.   HENT:   Head: Normocephalic and atraumatic.   Right Ear: External ear normal.   Left Ear: External ear normal.   Nose: Nose normal.   Mouth/Throat: Oropharynx is clear and moist. No oropharyngeal exudate.   Eyes: Conjunctivae and EOM are normal. Pupils are equal, round, and reactive to light. Right eye exhibits no discharge. Left eye exhibits no discharge. No scleral icterus.   Neck: Normal range of motion. Neck supple. No JVD present. No tracheal deviation present. No thyromegaly present.   Cardiovascular: Normal rate, regular rhythm, normal heart sounds and intact distal pulses.  Exam reveals no gallop and no friction rub.    No murmur heard.  Pulmonary/Chest: Effort normal and breath sounds normal. No stridor. No respiratory distress. He has no wheezes. He has no rales. He exhibits no tenderness.   Abdominal: Soft. Bowel sounds are normal. He exhibits no distension and no mass. There is no tenderness. There is no rebound and no guarding. No hernia.   Musculoskeletal: Normal range of motion. He exhibits no edema or tenderness.   Lymphadenopathy:     He has no cervical  adenopathy.   Neurological: He is alert and oriented to person, place, and time. He has normal reflexes. He displays normal reflexes. No cranial nerve deficit. He exhibits normal muscle tone. Coordination normal.   Skin: Skin is warm and dry. No rash noted. No erythema. No pallor.   Psychiatric: He has a normal mood and affect. His behavior is normal. Judgment and thought content normal.       Assessment:       1. Controlled type 2 diabetes mellitus without complication, without long-term current use of insulin    2. Essential hypertension    3. Hyperlipidemia, unspecified hyperlipidemia type    4. Erectile dysfunction, unspecified erectile dysfunction type    5. Type 2 diabetes mellitus without complication, without long-term current use of insulin    6. Immunization due        Plan:       Addy was seen today for follow-up.    Diagnoses and all orders for this visit:    Controlled type 2 diabetes mellitus without complication, without long-term current use of insulin  -     Comprehensive metabolic panel; Future  -     Hemoglobin A1c; Future  -     metformin (GLUCOPHAGE) 1000 MG tablet; Take 1 tablet (1,000 mg total) by mouth 2 (two) times daily.    Essential hypertension  -     Hemoglobin A1c; Future  -     lisinopril 10 MG tablet; Take 1 tablet (10 mg total) by mouth once daily.    Hyperlipidemia, unspecified hyperlipidemia type  -     Hemoglobin A1c; Future  -     atorvastatin (LIPITOR) 40 MG tablet; Take 1 tablet (40 mg total) by mouth once daily.    Erectile dysfunction, unspecified erectile dysfunction type    Type 2 diabetes mellitus without complication, without long-term current use of insulin  -     Comprehensive metabolic panel; Future  -     Hemoglobin A1c; Future  -     metformin (GLUCOPHAGE) 1000 MG tablet; Take 1 tablet (1,000 mg total) by mouth 2 (two) times daily.    Immunization due  -     Tdap Vaccine    Other orders  -     blood sugar diagnostic (BLOOD GLUCOSE TEST) Strp; USE TO TEST BLOOD SUGAR  4 TIMES DAILY.        Spent adequate time in obtaining history and explaining differentials     HTN   -at goal  -continue lisinopril to 10 mg/day    DM II   -controlled   -continue metformin and labs    HLD   -improving  -continue lipitor    GERD   -stable   -takes OTC PPI     Mild renal insufficiency  -labs today      40 minutes spent during this visit of which greater than 50% devoted to face-face counseling and coordination of care regarding diagnosis and management plan     RTC 3 months

## 2017-04-04 LAB
ESTIMATED AVG GLUCOSE: 140 MG/DL
HBA1C MFR BLD HPLC: 6.5 %

## 2017-08-22 DIAGNOSIS — Z12.11 COLON CANCER SCREENING: ICD-10-CM

## 2017-09-27 ENCOUNTER — OFFICE VISIT (OUTPATIENT)
Dept: FAMILY MEDICINE | Facility: CLINIC | Age: 62
End: 2017-09-27
Attending: FAMILY MEDICINE
Payer: COMMERCIAL

## 2017-09-27 ENCOUNTER — LAB VISIT (OUTPATIENT)
Dept: LAB | Facility: HOSPITAL | Age: 62
End: 2017-09-27
Attending: FAMILY MEDICINE
Payer: COMMERCIAL

## 2017-09-27 VITALS
HEIGHT: 74 IN | SYSTOLIC BLOOD PRESSURE: 126 MMHG | HEART RATE: 60 BPM | BODY MASS INDEX: 26.31 KG/M2 | OXYGEN SATURATION: 98 % | WEIGHT: 205 LBS | DIASTOLIC BLOOD PRESSURE: 77 MMHG

## 2017-09-27 DIAGNOSIS — N52.9 ERECTILE DYSFUNCTION, UNSPECIFIED ERECTILE DYSFUNCTION TYPE: ICD-10-CM

## 2017-09-27 DIAGNOSIS — I10 ESSENTIAL HYPERTENSION: ICD-10-CM

## 2017-09-27 DIAGNOSIS — J32.0 CHRONIC MAXILLARY SINUSITIS: ICD-10-CM

## 2017-09-27 DIAGNOSIS — Z12.11 SCREEN FOR COLON CANCER: ICD-10-CM

## 2017-09-27 DIAGNOSIS — E78.5 HYPERLIPIDEMIA, UNSPECIFIED HYPERLIPIDEMIA TYPE: ICD-10-CM

## 2017-09-27 DIAGNOSIS — E11.9 CONTROLLED TYPE 2 DIABETES MELLITUS WITHOUT COMPLICATION, WITHOUT LONG-TERM CURRENT USE OF INSULIN: ICD-10-CM

## 2017-09-27 DIAGNOSIS — E11.9 CONTROLLED TYPE 2 DIABETES MELLITUS WITHOUT COMPLICATION, WITHOUT LONG-TERM CURRENT USE OF INSULIN: Primary | ICD-10-CM

## 2017-09-27 DIAGNOSIS — G44.209 TENSION HEADACHE: ICD-10-CM

## 2017-09-27 LAB
ALBUMIN SERPL BCP-MCNC: 3.5 G/DL
ALP SERPL-CCNC: 61 U/L
ALT SERPL W/O P-5'-P-CCNC: 10 U/L
ANION GAP SERPL CALC-SCNC: 7 MMOL/L
AST SERPL-CCNC: 14 U/L
BASOPHILS # BLD AUTO: 0.02 K/UL
BASOPHILS NFR BLD: 0.3 %
BILIRUB SERPL-MCNC: 0.5 MG/DL
BUN SERPL-MCNC: 15 MG/DL
CALCIUM SERPL-MCNC: 9.3 MG/DL
CHLORIDE SERPL-SCNC: 105 MMOL/L
CHOLEST SERPL-MCNC: 144 MG/DL
CHOLEST/HDLC SERPL: 3.4 {RATIO}
CO2 SERPL-SCNC: 28 MMOL/L
CREAT SERPL-MCNC: 1.4 MG/DL
DIFFERENTIAL METHOD: ABNORMAL
EOSINOPHIL # BLD AUTO: 0.2 K/UL
EOSINOPHIL NFR BLD: 3.6 %
ERYTHROCYTE [DISTWIDTH] IN BLOOD BY AUTOMATED COUNT: 14.1 %
EST. GFR  (AFRICAN AMERICAN): >60 ML/MIN/1.73 M^2
EST. GFR  (NON AFRICAN AMERICAN): 53 ML/MIN/1.73 M^2
ESTIMATED AVG GLUCOSE: 137 MG/DL
GLUCOSE SERPL-MCNC: 123 MG/DL
HBA1C MFR BLD HPLC: 6.4 %
HCT VFR BLD AUTO: 36.7 %
HDLC SERPL-MCNC: 42 MG/DL
HDLC SERPL: 29.2 %
HGB BLD-MCNC: 12.4 G/DL
LDLC SERPL CALC-MCNC: 90.4 MG/DL
LYMPHOCYTES # BLD AUTO: 1.9 K/UL
LYMPHOCYTES NFR BLD: 28.8 %
MCH RBC QN AUTO: 28.9 PG
MCHC RBC AUTO-ENTMCNC: 33.8 G/DL
MCV RBC AUTO: 86 FL
MONOCYTES # BLD AUTO: 0.5 K/UL
MONOCYTES NFR BLD: 7.1 %
NEUTROPHILS # BLD AUTO: 4 K/UL
NEUTROPHILS NFR BLD: 60 %
NONHDLC SERPL-MCNC: 102 MG/DL
PLATELET # BLD AUTO: 267 K/UL
PMV BLD AUTO: 9.9 FL
POTASSIUM SERPL-SCNC: 4 MMOL/L
PROT SERPL-MCNC: 7.1 G/DL
RBC # BLD AUTO: 4.29 M/UL
SODIUM SERPL-SCNC: 140 MMOL/L
TRIGL SERPL-MCNC: 58 MG/DL
WBC # BLD AUTO: 6.63 K/UL

## 2017-09-27 PROCEDURE — 80061 LIPID PANEL: CPT

## 2017-09-27 PROCEDURE — 80053 COMPREHEN METABOLIC PANEL: CPT

## 2017-09-27 PROCEDURE — 3074F SYST BP LT 130 MM HG: CPT | Mod: S$GLB,,, | Performed by: FAMILY MEDICINE

## 2017-09-27 PROCEDURE — 36415 COLL VENOUS BLD VENIPUNCTURE: CPT

## 2017-09-27 PROCEDURE — 3008F BODY MASS INDEX DOCD: CPT | Mod: S$GLB,,, | Performed by: FAMILY MEDICINE

## 2017-09-27 PROCEDURE — 99999 PR PBB SHADOW E&M-EST. PATIENT-LVL III: CPT | Mod: PBBFAC,,, | Performed by: FAMILY MEDICINE

## 2017-09-27 PROCEDURE — 3078F DIAST BP <80 MM HG: CPT | Mod: S$GLB,,, | Performed by: FAMILY MEDICINE

## 2017-09-27 PROCEDURE — 99214 OFFICE O/P EST MOD 30 MIN: CPT | Mod: S$GLB,,, | Performed by: FAMILY MEDICINE

## 2017-09-27 PROCEDURE — 83036 HEMOGLOBIN GLYCOSYLATED A1C: CPT

## 2017-09-27 PROCEDURE — 85025 COMPLETE CBC W/AUTO DIFF WBC: CPT

## 2017-09-27 RX ORDER — BUTALBITAL, ACETAMINOPHEN AND CAFFEINE 50; 325; 40 MG/1; MG/1; MG/1
1 TABLET ORAL EVERY 6 HOURS PRN
Qty: 20 TABLET | Refills: 0 | Status: SHIPPED | OUTPATIENT
Start: 2017-09-27 | End: 2017-10-27

## 2017-09-27 NOTE — PROGRESS NOTES
Subjective:       Patient ID: Addy Redding is a 61 y.o. male.    Chief Complaint: Headache; Sinusitis; Shoulder Pain (Left shoulder); and Hypertension    61 yr old black male with DM II, HTN, HLD, GERD, presents today for 3 month follow up, medication refill. C/o daily headaches x 1 month. He is very stressful at work and the headache is going in neck as well. Tried NSAIDs and did not help. He also suffers from sinus congestion for years. Never saw ENT.      DM II - improved -  HGBA1C                   6.5 (H)             04/03/2017               - denies any hypoglycemic symptoms - on metformin - up to date with eye and foot screen - stopped glipizide since itw as making his sugars low    HTN - controlled  - on lisinopril 10 mg daily - no side effects    HLD - controlled and improving -     LDLCALC                  90.0                01/11/2017                       - on statin - compliant - need refill - not fully compliant with diet    GERD - stable - takes prilosec as needed    Health maintenance  -Flu and Pneumovax - up to date  -adacel due and done today  -colonoscopy due - wants to wait  -PSA UTD        Headache    This is a new problem. The current episode started more than 1 month ago. The problem occurs constantly. The problem has been unchanged. The pain is located in the bilateral region. The pain does not radiate. The pain quality is not similar to prior headaches. The quality of the pain is described as aching. The pain is at a severity of 7/10. The pain is moderate. Associated symptoms include back pain and neck pain. Pertinent negatives include no abdominal pain, anorexia, blurred vision, coughing, dizziness, ear pain, eye pain, eye watering, facial sweating, fever, hearing loss, nausea, numbness, rhinorrhea, sore throat, tingling, visual change, vomiting, weakness or weight loss. Nothing aggravates the symptoms. He has tried NSAIDs for the symptoms. The treatment provided no relief. His past medical  history is significant for hypertension and obesity. There is no history of cancer, immunosuppression, migraines in the family, pseudotumor cerebri, recent head traumas or TMJ.   Medication Refill   This is a chronic problem. The current episode started more than 1 year ago. The problem occurs constantly. The problem has been gradually improving. Associated symptoms include arthralgias, headaches, myalgias and neck pain. Pertinent negatives include no abdominal pain, anorexia, change in bowel habit, chest pain, congestion, coughing, diaphoresis, fatigue, fever, joint swelling, nausea, numbness, rash, sore throat, urinary symptoms, vertigo, visual change, vomiting or weakness.   Diabetes   Hypoglycemia symptoms include headaches. Pertinent negatives for hypoglycemia include no confusion, dizziness, nervousness/anxiousness, speech difficulty or sweats. Pertinent negatives for diabetes include no blurred vision, no chest pain, no fatigue, no polydipsia, no polyuria, no visual change, no weakness and no weight loss. Pertinent negatives for diabetic complications include no CVA, PVD or retinopathy.   Hypertension   This is a chronic problem. The current episode started more than 1 year ago. The problem has been gradually worsening since onset. The problem is uncontrolled. Associated symptoms include headaches and neck pain. Pertinent negatives include no anxiety, blurred vision, chest pain, malaise/fatigue, orthopnea, palpitations, peripheral edema or sweats. There are no associated agents to hypertension. Risk factors for coronary artery disease include dyslipidemia, diabetes mellitus, male gender and obesity. Past treatments include ACE inhibitors. The current treatment provides no improvement. There are no compliance problems.  There is no history of angina, CAD/MI, CVA, left ventricular hypertrophy, PVD, renovascular disease, retinopathy or a thyroid problem. There is no history of chronic renal disease, coarctation  of the aorta, hypercortisolism or pheochromocytoma.   Hyperlipidemia   This is a chronic problem. The current episode started more than 1 year ago. The problem is uncontrolled. Recent lipid tests were reviewed and are high. Exacerbating diseases include diabetes and obesity. He has no history of chronic renal disease, liver disease or nephrotic syndrome. There are no known factors aggravating his hyperlipidemia. Associated symptoms include myalgias. Pertinent negatives include no chest pain, focal sensory loss, focal weakness or leg pain. Current antihyperlipidemic treatment includes statins. There are no compliance problems.  Risk factors for coronary artery disease include diabetes mellitus, dyslipidemia, male sex and hypertension.     Review of Systems   Constitutional: Negative.  Negative for activity change, diaphoresis, fatigue, fever, malaise/fatigue, unexpected weight change and weight loss.   HENT: Negative.  Negative for congestion, ear pain, hearing loss, mouth sores, rhinorrhea, sore throat, trouble swallowing and voice change.    Eyes: Negative.  Negative for blurred vision, pain, discharge and visual disturbance.   Respiratory: Negative.  Negative for apnea, cough, chest tightness and wheezing.    Cardiovascular: Negative.  Negative for chest pain, palpitations and orthopnea.   Gastrointestinal: Negative.  Negative for abdominal distention, abdominal pain, anal bleeding, anorexia, blood in stool, change in bowel habit, constipation, diarrhea, nausea and vomiting.   Endocrine: Negative.  Negative for cold intolerance, polydipsia and polyuria.   Genitourinary: Negative.  Negative for decreased urine volume, difficulty urinating, discharge, frequency, hematuria and scrotal swelling.   Musculoskeletal: Positive for arthralgias, back pain, myalgias and neck pain. Negative for joint swelling and neck stiffness.   Skin: Negative.  Negative for color change and rash.   Allergic/Immunologic: Negative.  Negative  for environmental allergies and immunocompromised state.   Neurological: Positive for headaches. Negative for dizziness, vertigo, tingling, focal weakness, speech difficulty, weakness, light-headedness and numbness.   Hematological: Negative.    Psychiatric/Behavioral: Negative.  Negative for agitation, confusion, dysphoric mood and suicidal ideas. The patient is not nervous/anxious.        PMH/PSH/FH/SH/MED/ALLERGY reviewed    Objective:       Vitals:    09/27/17 0811   BP: 126/77   Pulse: 60       Physical Exam   Constitutional: He is oriented to person, place, and time. He appears well-developed and well-nourished.   HENT:   Head: Normocephalic and atraumatic.   Right Ear: External ear normal.   Left Ear: External ear normal.   Nose: Nose normal.   Mouth/Throat: Oropharynx is clear and moist. No oropharyngeal exudate.   Eyes: Conjunctivae and EOM are normal. Pupils are equal, round, and reactive to light. Right eye exhibits no discharge. Left eye exhibits no discharge. No scleral icterus.   Neck: Normal range of motion. Neck supple. No JVD present. No tracheal deviation present. No thyromegaly present.   Cardiovascular: Normal rate, regular rhythm, normal heart sounds and intact distal pulses.  Exam reveals no gallop and no friction rub.    No murmur heard.  Pulmonary/Chest: Effort normal and breath sounds normal. No stridor. No respiratory distress. He has no wheezes. He has no rales. He exhibits no tenderness.   Abdominal: Soft. Bowel sounds are normal. He exhibits no distension and no mass. There is no tenderness. There is no rebound and no guarding. No hernia.   Musculoskeletal: Normal range of motion. He exhibits no edema or tenderness.   Lymphadenopathy:     He has no cervical adenopathy.   Neurological: He is alert and oriented to person, place, and time. He has normal reflexes. He displays normal reflexes. No cranial nerve deficit. He exhibits normal muscle tone. Coordination normal.   Skin: Skin is warm  and dry. No rash noted. No erythema. No pallor.   Psychiatric: He has a normal mood and affect. His behavior is normal. Judgment and thought content normal.       Assessment:       1. Controlled type 2 diabetes mellitus without complication, without long-term current use of insulin    2. Essential hypertension    3. Hyperlipidemia, unspecified hyperlipidemia type    4. Screen for colon cancer    5. Chronic maxillary sinusitis    6. Erectile dysfunction, unspecified erectile dysfunction type    7. Tension headache        Plan:       Addy was seen today for headache, sinusitis, shoulder pain and hypertension.    Diagnoses and all orders for this visit:    Controlled type 2 diabetes mellitus without complication, without long-term current use of insulin  -     CBC auto differential; Future  -     Comprehensive metabolic panel; Future  -     Lipid panel; Future  -     Hemoglobin A1c; Future    Essential hypertension  -     CBC auto differential; Future  -     Comprehensive metabolic panel; Future  -     Lipid panel; Future    Hyperlipidemia, unspecified hyperlipidemia type  -     CBC auto differential; Future  -     Comprehensive metabolic panel; Future  -     Lipid panel; Future    Screen for colon cancer  -     Case request GI: COLONOSCOPY    Chronic maxillary sinusitis  -     Ambulatory referral to ENT    Erectile dysfunction, unspecified erectile dysfunction type    Tension headache  -     butalbital-acetaminophen-caffeine -40 mg (FIORICET, ESGIC) -40 mg per tablet; Take 1 tablet by mouth every 6 (six) hours as needed for Pain.      Spent adequate time in obtaining history and explaining differentials     HTN   -at goal  -continue lisinopril to 10 mg/day    DM II   -controlled   -continue metformin and labs    HLD   -improving  -continue lipitor    GERD   -stable   -takes OTC PPI     Mild renal insufficiency  -labs today    Tension headache  -fioricet prn  -lifestyle modification and heat  pads          40 minutes spent during this visit of which greater than 50% devoted to face-face counseling and coordination of care regarding diagnosis and management plan     RTC 3 months

## 2018-04-05 DIAGNOSIS — E11.9 TYPE 2 DIABETES MELLITUS WITHOUT COMPLICATION: ICD-10-CM

## 2018-04-06 DIAGNOSIS — I10 ESSENTIAL HYPERTENSION: ICD-10-CM

## 2018-04-06 DIAGNOSIS — E11.9 CONTROLLED TYPE 2 DIABETES MELLITUS WITHOUT COMPLICATION, WITHOUT LONG-TERM CURRENT USE OF INSULIN: ICD-10-CM

## 2018-04-06 DIAGNOSIS — E78.5 HYPERLIPIDEMIA, UNSPECIFIED HYPERLIPIDEMIA TYPE: ICD-10-CM

## 2018-04-06 DIAGNOSIS — E11.9 TYPE 2 DIABETES MELLITUS WITHOUT COMPLICATION, WITHOUT LONG-TERM CURRENT USE OF INSULIN: ICD-10-CM

## 2018-04-06 RX ORDER — METFORMIN HYDROCHLORIDE 1000 MG/1
1000 TABLET ORAL 2 TIMES DAILY
Qty: 180 TABLET | Refills: 3 | Status: SHIPPED | OUTPATIENT
Start: 2018-04-06 | End: 2019-03-29 | Stop reason: SDUPTHER

## 2018-04-06 RX ORDER — LISINOPRIL 10 MG/1
10 TABLET ORAL DAILY
Qty: 90 TABLET | Refills: 3 | Status: SHIPPED | OUTPATIENT
Start: 2018-04-06 | End: 2019-03-29 | Stop reason: SDUPTHER

## 2018-04-06 RX ORDER — ATORVASTATIN CALCIUM 40 MG/1
40 TABLET, FILM COATED ORAL DAILY
Qty: 90 TABLET | Refills: 3 | Status: SHIPPED | OUTPATIENT
Start: 2018-04-06 | End: 2019-03-29 | Stop reason: SDUPTHER

## 2018-04-16 ENCOUNTER — TELEPHONE (OUTPATIENT)
Dept: ADMINISTRATIVE | Facility: HOSPITAL | Age: 63
End: 2018-04-16

## 2018-06-04 ENCOUNTER — TELEPHONE (OUTPATIENT)
Dept: FAMILY MEDICINE | Facility: CLINIC | Age: 63
End: 2018-06-04

## 2018-06-04 NOTE — TELEPHONE ENCOUNTER
Spoke to pt's wife and scheduled an appt for pt for weight loss and diabetes on 6/18. Pt requested 6/18. Appt was scheduled with Dr. Mansfield on 6/18.

## 2018-06-04 NOTE — TELEPHONE ENCOUNTER
----- Message from Irma Patton sent at 6/4/2018  7:46 AM CDT -----  Contact: Self [phone]  Patient is requesting to be seen on June 18 for a 6 month follow up. Please advice

## 2018-06-15 DIAGNOSIS — Z13.5 DIABETIC RETINOPATHY SCREENING: ICD-10-CM

## 2018-07-13 RX ORDER — IBUPROFEN 200 MG
CAPSULE ORAL
Qty: 100 EACH | Refills: 8 | Status: SHIPPED | OUTPATIENT
Start: 2018-07-13 | End: 2019-12-10 | Stop reason: SDUPTHER

## 2018-07-13 RX ORDER — SENNOSIDES/DOCUSATE SODIUM 8.6MG-50MG
TABLET ORAL
Qty: 100 STRIP | Refills: 0 | Status: SHIPPED | OUTPATIENT
Start: 2018-07-13

## 2018-07-16 RX ORDER — SENNOSIDES/DOCUSATE SODIUM 8.6MG-50MG
TABLET ORAL
Qty: 100 STRIP | Refills: 0 | Status: SHIPPED | OUTPATIENT
Start: 2018-07-16 | End: 2019-04-02 | Stop reason: SDUPTHER

## 2018-07-17 RX ORDER — SENNOSIDES/DOCUSATE SODIUM 8.6MG-50MG
TABLET ORAL
Qty: 100 STRIP | Refills: 0 | Status: SHIPPED | OUTPATIENT
Start: 2018-07-17 | End: 2019-04-02 | Stop reason: SDUPTHER

## 2018-07-18 RX ORDER — SENNOSIDES/DOCUSATE SODIUM 8.6MG-50MG
TABLET ORAL
Qty: 100 STRIP | Refills: 0 | Status: SHIPPED | OUTPATIENT
Start: 2018-07-18 | End: 2019-04-02 | Stop reason: SDUPTHER

## 2018-08-28 ENCOUNTER — OFFICE VISIT (OUTPATIENT)
Dept: FAMILY MEDICINE | Facility: CLINIC | Age: 63
End: 2018-08-28
Attending: FAMILY MEDICINE
Payer: COMMERCIAL

## 2018-08-28 ENCOUNTER — LAB VISIT (OUTPATIENT)
Dept: LAB | Facility: HOSPITAL | Age: 63
End: 2018-08-28
Attending: FAMILY MEDICINE
Payer: COMMERCIAL

## 2018-08-28 VITALS
HEART RATE: 55 BPM | DIASTOLIC BLOOD PRESSURE: 70 MMHG | HEIGHT: 74 IN | OXYGEN SATURATION: 98 % | BODY MASS INDEX: 25.92 KG/M2 | SYSTOLIC BLOOD PRESSURE: 130 MMHG | WEIGHT: 201.94 LBS

## 2018-08-28 DIAGNOSIS — M54.50 CHRONIC BILATERAL LOW BACK PAIN WITHOUT SCIATICA: ICD-10-CM

## 2018-08-28 DIAGNOSIS — E78.5 HYPERLIPIDEMIA, UNSPECIFIED HYPERLIPIDEMIA TYPE: ICD-10-CM

## 2018-08-28 DIAGNOSIS — Z12.5 ENCOUNTER FOR SCREENING FOR MALIGNANT NEOPLASM OF PROSTATE: ICD-10-CM

## 2018-08-28 DIAGNOSIS — N52.9 ERECTILE DYSFUNCTION, UNSPECIFIED ERECTILE DYSFUNCTION TYPE: ICD-10-CM

## 2018-08-28 DIAGNOSIS — I10 ESSENTIAL HYPERTENSION: ICD-10-CM

## 2018-08-28 DIAGNOSIS — Z12.11 ENCOUNTER FOR FIT (FECAL IMMUNOCHEMICAL TEST) SCREENING: ICD-10-CM

## 2018-08-28 DIAGNOSIS — E11.9 CONTROLLED TYPE 2 DIABETES MELLITUS WITHOUT COMPLICATION, WITHOUT LONG-TERM CURRENT USE OF INSULIN: Primary | ICD-10-CM

## 2018-08-28 DIAGNOSIS — E11.9 CONTROLLED TYPE 2 DIABETES MELLITUS WITHOUT COMPLICATION, WITHOUT LONG-TERM CURRENT USE OF INSULIN: ICD-10-CM

## 2018-08-28 DIAGNOSIS — G89.29 CHRONIC BILATERAL LOW BACK PAIN WITHOUT SCIATICA: ICD-10-CM

## 2018-08-28 LAB
ALBUMIN/CREAT UR: 2.7 UG/MG
BILIRUB UR QL STRIP: NEGATIVE
CLARITY UR: CLEAR
COLOR UR: YELLOW
CREAT UR-MCNC: 259 MG/DL
GLUCOSE UR QL STRIP: NEGATIVE
HGB UR QL STRIP: ABNORMAL
KETONES UR QL STRIP: NEGATIVE
LEUKOCYTE ESTERASE UR QL STRIP: NEGATIVE
MICROALBUMIN UR DL<=1MG/L-MCNC: 7 UG/ML
NITRITE UR QL STRIP: NEGATIVE
PH UR STRIP: 6 [PH] (ref 5–8)
PROT UR QL STRIP: NEGATIVE
SP GR UR STRIP: >=1.03 (ref 1–1.03)
URN SPEC COLLECT METH UR: ABNORMAL
UROBILINOGEN UR STRIP-ACNC: NEGATIVE EU/DL

## 2018-08-28 PROCEDURE — 3008F BODY MASS INDEX DOCD: CPT | Mod: CPTII,S$GLB,, | Performed by: FAMILY MEDICINE

## 2018-08-28 PROCEDURE — 99999 PR PBB SHADOW E&M-EST. PATIENT-LVL III: CPT | Mod: PBBFAC,,, | Performed by: FAMILY MEDICINE

## 2018-08-28 PROCEDURE — 3075F SYST BP GE 130 - 139MM HG: CPT | Mod: CPTII,S$GLB,, | Performed by: FAMILY MEDICINE

## 2018-08-28 PROCEDURE — 3044F HG A1C LEVEL LT 7.0%: CPT | Mod: CPTII,S$GLB,, | Performed by: FAMILY MEDICINE

## 2018-08-28 PROCEDURE — 82043 UR ALBUMIN QUANTITATIVE: CPT

## 2018-08-28 PROCEDURE — 3078F DIAST BP <80 MM HG: CPT | Mod: CPTII,S$GLB,, | Performed by: FAMILY MEDICINE

## 2018-08-28 PROCEDURE — 81003 URINALYSIS AUTO W/O SCOPE: CPT

## 2018-08-28 PROCEDURE — 99214 OFFICE O/P EST MOD 30 MIN: CPT | Mod: S$GLB,,, | Performed by: FAMILY MEDICINE

## 2018-08-28 RX ORDER — SILDENAFIL CITRATE 20 MG/1
20 TABLET ORAL DAILY PRN
Qty: 30 TABLET | Refills: 2 | Status: SHIPPED | OUTPATIENT
Start: 2018-08-28 | End: 2019-11-25

## 2018-08-28 NOTE — PROGRESS NOTES
Subjective:       Patient ID: Addy Redding is a 62 y.o. male.    Chief Complaint: Follow-up (diabetic follow up) and Medication Refill (requesting paper copy)    62 yr old black male with DM II, HTN, HLD, GERD, presents today for 3 month follow up, medication refill. No new concerns today.      DM II - improved -  A1C                   6.4 (H)             09/27/2017                       - denies any hypoglycemic symptoms - on metformin - up to date with eye and foot screen - stopped glipizide since itw as making his sugars low    HTN - controlled  - on lisinopril 10 mg daily - no side effects    HLD - controlled and improving - LDLCALC                  90.4                09/27/2017                             - on statin - compliant - need refill - not fully compliant with diet    GERD - stable - takes prilosec as needed    ED - stable - takes viagra as needed    Health maintenance  -labs due  -Flu and Pneumovax - up to date  -adacel due and done today  -colonoscopy due - wants to wait  -PSA due        Medication Refill   This is a chronic problem. The current episode started more than 1 year ago. The problem occurs constantly. The problem has been gradually improving. Associated symptoms include arthralgias and myalgias. Pertinent negatives include no change in bowel habit, chest pain, congestion, diaphoresis, fatigue, joint swelling, rash, urinary symptoms or vertigo.   Diabetes   Pertinent negatives for hypoglycemia include no confusion, nervousness/anxiousness, speech difficulty or sweats. Pertinent negatives for diabetes include no chest pain, no fatigue, no polydipsia and no polyuria. Pertinent negatives for diabetic complications include no CVA, PVD or retinopathy.   Hypertension   This is a chronic problem. The current episode started more than 1 year ago. The problem has been gradually worsening since onset. The problem is uncontrolled. Pertinent negatives include no anxiety, chest pain, malaise/fatigue,  orthopnea, palpitations, peripheral edema or sweats. There are no associated agents to hypertension. Risk factors for coronary artery disease include dyslipidemia, diabetes mellitus, male gender and obesity. Past treatments include ACE inhibitors. The current treatment provides no improvement. There are no compliance problems.  There is no history of angina, CAD/MI, CVA, left ventricular hypertrophy, PVD or retinopathy. There is no history of chronic renal disease, coarctation of the aorta, hypercortisolism, pheochromocytoma, renovascular disease or a thyroid problem.   Hyperlipidemia   This is a chronic problem. The current episode started more than 1 year ago. The problem is uncontrolled. Recent lipid tests were reviewed and are high. Exacerbating diseases include diabetes. He has no history of chronic renal disease, liver disease or nephrotic syndrome. There are no known factors aggravating his hyperlipidemia. Associated symptoms include myalgias. Pertinent negatives include no chest pain, focal sensory loss, focal weakness or leg pain. Current antihyperlipidemic treatment includes statins. There are no compliance problems.  Risk factors for coronary artery disease include diabetes mellitus, dyslipidemia, male sex and hypertension.     Review of Systems   Constitutional: Negative.  Negative for activity change, diaphoresis, fatigue, malaise/fatigue and unexpected weight change.   HENT: Negative.  Negative for congestion, mouth sores and voice change.    Eyes: Negative.  Negative for discharge and visual disturbance.   Respiratory: Negative.  Negative for apnea and wheezing.    Cardiovascular: Negative.  Negative for chest pain, palpitations and orthopnea.   Gastrointestinal: Negative.  Negative for abdominal distention, anal bleeding, change in bowel habit and diarrhea.   Endocrine: Negative.  Negative for cold intolerance, polydipsia and polyuria.   Genitourinary: Negative.  Negative for decreased urine volume,  difficulty urinating, discharge, frequency and scrotal swelling.   Musculoskeletal: Positive for arthralgias and myalgias. Negative for joint swelling and neck stiffness.   Skin: Negative.  Negative for color change and rash.   Allergic/Immunologic: Negative.  Negative for environmental allergies and immunocompromised state.   Neurological: Negative for vertigo, focal weakness, speech difficulty and light-headedness.   Hematological: Negative.    Psychiatric/Behavioral: Negative.  Negative for agitation, confusion, dysphoric mood and suicidal ideas. The patient is not nervous/anxious.        Active Ambulatory Problems     Diagnosis Date Noted    Hyperlipidemia     HTN (hypertension) 11/04/2014    ED (erectile dysfunction)     Diabetes type 2, controlled 02/22/2016    Bilateral low back pain without sciatica 02/22/2016     Resolved Ambulatory Problems     Diagnosis Date Noted    Type II or unspecified type diabetes mellitus without mention of complication, not stated as uncontrolled     GERD (gastroesophageal reflux disease)     Costochondritis 11/05/2012    Atypical chest pain 11/05/2012    Chest pain 11/05/2012    Colon cancer screening 10/23/2013    Prostate cancer screening 10/23/2013    Renal insufficiency 10/23/2013    Need for pneumococcal vaccination 10/23/2013    Influenza vaccine needed 10/23/2013    Diabetes mellitus, type 2 11/04/2014    Type II or unspecified type diabetes mellitus with renal manifestations, uncontrolled(250.42)     Diabetic nephropathy associated with type 2 diabetes mellitus     BMI 27.0-27.9,adult 02/22/2016     Past Medical History:   Diagnosis Date    Costochondritis     Diabetic nephropathy associated with type 2 diabetes mellitus     Diabetic retinopathy     ED (erectile dysfunction)     GERD (gastroesophageal reflux disease)     Hyperlipidemia     Hypertension     Type II or unspecified type diabetes mellitus with renal manifestations,  uncontrolled(250.42)      Past Surgical History:   Procedure Laterality Date    Bone biopsy right femur       Family History   Problem Relation Age of Onset    Hypertension Mother     Diabetes Mother     Hypertension Sister     Hypertension Brother     Diabetes Sister     Diabetes Brother     Lung cancer Brother     Stomach cancer Sister     Coronary artery disease Father          of an MI at age 60     Social History     Socioeconomic History    Marital status:      Spouse name: Not on file    Number of children: Not on file    Years of education: Not on file    Highest education level: Not on file   Social Needs    Financial resource strain: Not on file    Food insecurity - worry: Not on file    Food insecurity - inability: Not on file    Transportation needs - medical: Not on file    Transportation needs - non-medical: Not on file   Occupational History    Not on file   Tobacco Use    Smoking status: Former Smoker     Packs/day: 0.50     Years: 12.00     Pack years: 6.00     Types: Cigarettes    Smokeless tobacco: Never Used   Substance and Sexual Activity    Alcohol use: No    Drug use: No    Sexual activity: Yes     Partners: Female   Other Topics Concern    Not on file   Social History Narrative    Not on file     Review of patient's allergies indicates:  No Known Allergies  Current Outpatient Medications on File Prior to Visit   Medication Sig Dispense Refill    ADVANCED GLUC METER TEST STRIP Strp USE TO TEST BLOOD SUGAR 4 TIMES DAILY. 100 strip 0    ADVANCED GLUC METER TEST STRIP Strp USE TO TEST BLOOD SUGAR 4 TIMES DAILY. 100 strip 0    ADVANCED GLUC METER TEST STRIP Strp USE TO TEST BLOOD SUGAR 4 TIMES DAILY. 100 strip 0    ADVANCED GLUC METER TEST STRIP Strp USE TO TEST BLOOD SUGAR 4 TIMES DAILY. 100 strip 0    aspirin (ECOTRIN) 81 MG EC tablet Take 81 mg by mouth once daily.        atorvastatin (LIPITOR) 40 MG tablet TAKE 1 TABLET (40 MG TOTAL) BY MOUTH ONCE  "DAILY. 90 tablet 3    BLOOD GLUCOSE TEST test strip USE TO TEST BLOOD SUGAR 4 TIMES DAILY. 100 each 8    blood-glucose meter kit Use as instructed      insulin needles, disposable, (BD ULTRA-FINE ARIA PEN NEEDLES) 32 x 5/32 " Ndle Use with flex pens 100 each 3    lisinopril 10 MG tablet TAKE 1 TABLET (10 MG TOTAL) BY MOUTH ONCE DAILY. 90 tablet 3    metFORMIN (GLUCOPHAGE) 1000 MG tablet TAKE 1 TABLET (1,000 MG TOTAL) BY MOUTH 2 (TWO) TIMES DAILY. 180 tablet 3    [DISCONTINUED] tadalafil (CIALIS) 20 MG Tab Take 1 tablet (20 mg total) by mouth once daily. 30 tablet 4     No current facility-administered medications on file prior to visit.        Objective:       Vitals:    08/28/18 0811   BP: 130/70   Pulse: (!) 55       Physical Exam   Constitutional: He is oriented to person, place, and time. He appears well-developed and well-nourished.   HENT:   Head: Normocephalic and atraumatic.   Right Ear: External ear normal.   Left Ear: External ear normal.   Nose: Nose normal.   Mouth/Throat: Oropharynx is clear and moist. No oropharyngeal exudate.   Eyes: Conjunctivae and EOM are normal. Pupils are equal, round, and reactive to light. Right eye exhibits no discharge. Left eye exhibits no discharge. No scleral icterus.   Neck: Normal range of motion. Neck supple. No JVD present. No tracheal deviation present. No thyromegaly present.   Cardiovascular: Normal rate, regular rhythm, normal heart sounds and intact distal pulses. Exam reveals no gallop and no friction rub.   No murmur heard.  Pulmonary/Chest: Effort normal and breath sounds normal. No stridor. No respiratory distress. He has no wheezes. He has no rales. He exhibits no tenderness.   Abdominal: Soft. Bowel sounds are normal. He exhibits no distension and no mass. There is no tenderness. There is no rebound and no guarding. No hernia.   Musculoskeletal: Normal range of motion. He exhibits no edema or tenderness.   Lymphadenopathy:     He has no cervical " adenopathy.   Neurological: He is alert and oriented to person, place, and time. He has normal reflexes. He displays normal reflexes. No cranial nerve deficit. He exhibits normal muscle tone. Coordination normal.   Skin: Skin is warm and dry. No rash noted. No erythema. No pallor.   Psychiatric: He has a normal mood and affect. His behavior is normal. Judgment and thought content normal.       Protective Sensation (w/ 10 gram monofilament):  Right: Intact  Left: Intact    Visual Inspection:  Normal -  Bilateral    Pedal Pulses:   Right: Present  Left: Present    Posterior tibialis:   Right:Present  Left: Present      Assessment:       1. Controlled type 2 diabetes mellitus without complication, without long-term current use of insulin    2. Essential hypertension    3. Hyperlipidemia, unspecified hyperlipidemia type    4. Erectile dysfunction, unspecified erectile dysfunction type    5. Chronic bilateral low back pain without sciatica        Plan:       Addy was seen today for follow-up and medication refill.    Diagnoses and all orders for this visit:    Controlled type 2 diabetes mellitus without complication, without long-term current use of insulin  -     CBC auto differential; Future  -     Comprehensive metabolic panel; Future  -     Lipid panel; Future  -     Hemoglobin A1c; Future  -     Urinalysis; Future  -     Microalbumin/creatinine urine ratio; Future    Essential hypertension  -     CBC auto differential; Future  -     Comprehensive metabolic panel; Future  -     Lipid panel; Future  -     Urinalysis; Future    Hyperlipidemia, unspecified hyperlipidemia type  -     CBC auto differential; Future  -     Comprehensive metabolic panel; Future  -     Lipid panel; Future    Erectile dysfunction, unspecified erectile dysfunction type  -     sildenafil (REVATIO) 20 mg Tab; Take 1 tablet (20 mg total) by mouth daily as needed.    Chronic bilateral low back pain without sciatica    Encounter for screening for  malignant neoplasm of prostate  -     PSA, Screening; Future    Encounter for FIT (fecal immunochemical test) screening  -     Fecal Immunochemical Test (iFOBT); Future      HTN   -at goal  -continue lisinopril to 10 mg/day    DM II   -controlled   -continue metformin and labs    HLD   -improving  -continue lipitor    GERD   -stable   -takes OTC PPI     ED'  -controlled      Spent adequate time in obtaining history and explaining differentials      40 minutes spent during this visit of which greater than 50% devoted to face-face counseling and coordination of care regarding diagnosis and management plan       Follow-up in about 6 months (around 2/28/2019), or if symptoms worsen or fail to improve.

## 2018-08-29 ENCOUNTER — TELEPHONE (OUTPATIENT)
Dept: FAMILY MEDICINE | Facility: CLINIC | Age: 63
End: 2018-08-29

## 2018-08-29 DIAGNOSIS — R97.20 ELEVATED PSA: Primary | ICD-10-CM

## 2018-08-29 NOTE — TELEPHONE ENCOUNTER
Left msg for CB to inform pt his labs fine except slightly elevated PSA which may be normal however since the jump happened recently - I do recommend second opinion from urology and referral will be done

## 2018-08-29 NOTE — TELEPHONE ENCOUNTER
Informed pt his labs were fine, except slightly elevated PSA which may be normal. However, since the jump happened recently. Dr. Ramey recommends a second opinion from urology and referral will be done. Pt verbalized understanding.

## 2018-08-29 NOTE — TELEPHONE ENCOUNTER
Labs fine except slightly elevated PSA which may be normal however since the jump happened recently - I do recommend second opinion from urology and referral will be done

## 2018-09-04 ENCOUNTER — LAB VISIT (OUTPATIENT)
Dept: LAB | Facility: HOSPITAL | Age: 63
End: 2018-09-04
Attending: FAMILY MEDICINE
Payer: COMMERCIAL

## 2018-09-04 DIAGNOSIS — Z12.11 ENCOUNTER FOR FIT (FECAL IMMUNOCHEMICAL TEST) SCREENING: ICD-10-CM

## 2018-09-04 PROCEDURE — 82274 ASSAY TEST FOR BLOOD FECAL: CPT

## 2018-09-05 LAB — HEMOCCULT STL QL IA: NEGATIVE

## 2018-09-11 ENCOUNTER — OFFICE VISIT (OUTPATIENT)
Dept: UROLOGY | Facility: CLINIC | Age: 63
End: 2018-09-11
Attending: FAMILY MEDICINE
Payer: COMMERCIAL

## 2018-09-11 VITALS
DIASTOLIC BLOOD PRESSURE: 70 MMHG | TEMPERATURE: 98 F | SYSTOLIC BLOOD PRESSURE: 156 MMHG | HEART RATE: 53 BPM | WEIGHT: 202.38 LBS | HEIGHT: 74 IN | BODY MASS INDEX: 25.97 KG/M2

## 2018-09-11 DIAGNOSIS — R31.29 MICROSCOPIC HEMATURIA: ICD-10-CM

## 2018-09-11 DIAGNOSIS — R97.20 ELEVATED PSA: Primary | ICD-10-CM

## 2018-09-11 LAB
BACTERIA #/AREA URNS AUTO: NORMAL /HPF
BILIRUB SERPL-MCNC: ABNORMAL MG/DL
BILIRUB UR QL STRIP: NEGATIVE
BLOOD URINE, POC: ABNORMAL
CLARITY UR REFRACT.AUTO: CLEAR
COLOR UR AUTO: YELLOW
COLOR, POC UA: YELLOW
GLUCOSE UR QL STRIP: NEGATIVE
GLUCOSE UR QL STRIP: NORMAL
HGB UR QL STRIP: ABNORMAL
KETONES UR QL STRIP: ABNORMAL
KETONES UR QL STRIP: NEGATIVE
LEUKOCYTE ESTERASE UR QL STRIP: NEGATIVE
LEUKOCYTE ESTERASE URINE, POC: ABNORMAL
MICROSCOPIC COMMENT: NORMAL
NITRITE UR QL STRIP: NEGATIVE
NITRITE, POC UA: ABNORMAL
PH UR STRIP: 5 [PH] (ref 5–8)
PH, POC UA: 5
PROT UR QL STRIP: ABNORMAL
PROTEIN, POC: ABNORMAL
RBC #/AREA URNS AUTO: 1 /HPF (ref 0–4)
SP GR UR STRIP: 1.02 (ref 1–1.03)
SPECIFIC GRAVITY, POC UA: 1.01
SQUAMOUS #/AREA URNS AUTO: 0 /HPF
URN SPEC COLLECT METH UR: ABNORMAL
UROBILINOGEN UR STRIP-ACNC: NEGATIVE EU/DL
UROBILINOGEN, POC UA: NORMAL
WBC #/AREA URNS AUTO: 1 /HPF (ref 0–5)

## 2018-09-11 PROCEDURE — 3077F SYST BP >= 140 MM HG: CPT | Mod: CPTII,S$GLB,, | Performed by: STUDENT IN AN ORGANIZED HEALTH CARE EDUCATION/TRAINING PROGRAM

## 2018-09-11 PROCEDURE — 87086 URINE CULTURE/COLONY COUNT: CPT

## 2018-09-11 PROCEDURE — 3008F BODY MASS INDEX DOCD: CPT | Mod: CPTII,S$GLB,, | Performed by: STUDENT IN AN ORGANIZED HEALTH CARE EDUCATION/TRAINING PROGRAM

## 2018-09-11 PROCEDURE — 99999 PR PBB SHADOW E&M-EST. PATIENT-LVL IV: CPT | Mod: PBBFAC,,, | Performed by: STUDENT IN AN ORGANIZED HEALTH CARE EDUCATION/TRAINING PROGRAM

## 2018-09-11 PROCEDURE — 99204 OFFICE O/P NEW MOD 45 MIN: CPT | Mod: 25,S$GLB,, | Performed by: STUDENT IN AN ORGANIZED HEALTH CARE EDUCATION/TRAINING PROGRAM

## 2018-09-11 PROCEDURE — 81002 URINALYSIS NONAUTO W/O SCOPE: CPT | Mod: S$GLB,,, | Performed by: STUDENT IN AN ORGANIZED HEALTH CARE EDUCATION/TRAINING PROGRAM

## 2018-09-11 PROCEDURE — 3078F DIAST BP <80 MM HG: CPT | Mod: CPTII,S$GLB,, | Performed by: STUDENT IN AN ORGANIZED HEALTH CARE EDUCATION/TRAINING PROGRAM

## 2018-09-11 PROCEDURE — 81001 URINALYSIS AUTO W/SCOPE: CPT

## 2018-09-11 RX ORDER — CIPROFLOXACIN 500 MG/1
500 TABLET ORAL EVERY 12 HOURS
Qty: 6 TABLET | Refills: 0 | Status: SHIPPED | OUTPATIENT
Start: 2018-09-11 | End: 2018-09-14

## 2018-09-11 NOTE — PROGRESS NOTES
Subjective:       Patient ID: Addy Redding is a 62 y.o. male.    Chief Complaint: Elevated PSA  This is a 62 y.o.  male patient that is new to me.  The patient is referred to me by his PCP Dr. Ramey for an elevated PSA. He states he was unaware of the reason for the visit until it was explained to him this morning. I showed him the PSA blood tests on the screen.     Race:   Age: 62 y.o.   Previously abnormal PSA: no  Previous biopsy or recommended a biopsy: no  Previous abnormal MARGARITA: no  Family history of prostate cancer no    LAST PSA  Lab Results   Component Value Date    PSA 4.7 (H) 2018    PSA 3.3 2017    PSA 1.6 2014    PSA 1.7 10/23/2013    PSA 1.5 2012    PSA 0.68 2009    PSA 0.6 2007       Lab Results   Component Value Date    CREATININE 1.5 (H) 2018          ---  Past Medical History:   Diagnosis Date    Costochondritis     Diabetic nephropathy associated with type 2 diabetes mellitus     Diabetic retinopathy     ED (erectile dysfunction)     GERD (gastroesophageal reflux disease)     Hyperlipidemia     Hypertension     Type II or unspecified type diabetes mellitus with renal manifestations, uncontrolled(250.42)        Past Surgical History:   Procedure Laterality Date    Bone biopsy right femur         Family History   Problem Relation Age of Onset    Hypertension Mother     Diabetes Mother     Hypertension Sister     Hypertension Brother     Diabetes Sister     Diabetes Brother     Lung cancer Brother     Stomach cancer Sister     Coronary artery disease Father          of an MI at age 60       Social History     Tobacco Use    Smoking status: Former Smoker     Packs/day: 0.50     Years: 12.00     Pack years: 6.00     Types: Cigarettes    Smokeless tobacco: Never Used   Substance Use Topics    Alcohol use: No    Drug use: No       Current Outpatient Medications on File Prior to Visit   Medication Sig Dispense Refill    ADVANCED GLUC  "METER TEST STRIP Strp USE TO TEST BLOOD SUGAR 4 TIMES DAILY. 100 strip 0    ADVANCED GLUC METER TEST STRIP Strp USE TO TEST BLOOD SUGAR 4 TIMES DAILY. 100 strip 0    ADVANCED GLUC METER TEST STRIP Strp USE TO TEST BLOOD SUGAR 4 TIMES DAILY. 100 strip 0    ADVANCED GLUC METER TEST STRIP Strp USE TO TEST BLOOD SUGAR 4 TIMES DAILY. 100 strip 0    aspirin (ECOTRIN) 81 MG EC tablet Take 81 mg by mouth once daily.        atorvastatin (LIPITOR) 40 MG tablet TAKE 1 TABLET (40 MG TOTAL) BY MOUTH ONCE DAILY. 90 tablet 3    BLOOD GLUCOSE TEST test strip USE TO TEST BLOOD SUGAR 4 TIMES DAILY. 100 each 8    blood-glucose meter kit Use as instructed      insulin needles, disposable, (BD ULTRA-FINE ARIA PEN NEEDLES) 32 x 5/32 " Ndle Use with flex pens 100 each 3    lisinopril 10 MG tablet TAKE 1 TABLET (10 MG TOTAL) BY MOUTH ONCE DAILY. 90 tablet 3    metFORMIN (GLUCOPHAGE) 1000 MG tablet TAKE 1 TABLET (1,000 MG TOTAL) BY MOUTH 2 (TWO) TIMES DAILY. 180 tablet 3    sildenafil (REVATIO) 20 mg Tab Take 1 tablet (20 mg total) by mouth daily as needed. 30 tablet 2     No current facility-administered medications on file prior to visit.        Review of patient's allergies indicates:  No Known Allergies    Review of Systems   Constitutional: Negative for chills.   HENT: Negative for congestion.    Eyes: Negative for visual disturbance.   Respiratory: Negative for shortness of breath.    Cardiovascular: Negative for chest pain.   Gastrointestinal: Negative for abdominal distention.   Musculoskeletal: Negative for gait problem.   Skin: Negative for color change.   Neurological: Negative for dizziness.   Psychiatric/Behavioral: Negative for agitation.       Objective:      Physical Exam   Constitutional: He appears well-developed and well-nourished.   HENT:   Head: Normocephalic.   Eyes: Pupils are equal, round, and reactive to light.   Neck: Normal range of motion.   Cardiovascular: Intact distal pulses.   Pulmonary/Chest: " Effort normal.   Abdominal: Soft.   Genitourinary:   Genitourinary Comments: Will perform MARGARITA at TRUS biopsy   Musculoskeletal: Normal range of motion.   Neurological: He is alert.   Skin: Skin is warm and dry.   Psychiatric: He has a normal mood and affect.       Assessment:       1. Elevated PSA    2. Microscopic hematuria        Plan:         1. Elevated PSA -The patient was counseled on the implications of an elevated PSA and velocity. It was explained in detail that this is a screening test and does not indicate any known presence of prostate cancer but that he may be at an increased risk given that he has an elevated PSA. The PSA is a nonspecific test and may be elevated due to prostatitis, BPH, recent urologic instrumentation, or prostate cancer.      The patient will be scheduled for a prostate biopsy.  The risks and benefits of the procedure were discussed with the patient in detail.  The risks include but are not limited to bleeding, infection (including rare incidence of severe infection and sepsis, pain, erectile dysfunction, urinary retention, and the need for further procedures.  The patient was told to stop all blood thinners at least one week prior to the procedure.  The patient will do a fleets enema the AM of the biopsy and begin a course of ciprofloxacin the evening prior to the procedure and complete as prescribed.  All questions were answered by me fully.   2. He was informed he must stop aspirin 7 days prior.  3. Fleets and cipro sent to pharmacy.  4. Microscopic hematuria - will send urine for UA and culture. If clinically significant hematuria in the absence of infection, will message patient and recommend CT urogram and flexible cystoscopy.         Elevated PSA  -     POCT URINE DIPSTICK WITHOUT MICROSCOPE  -     Urinalysis  -     Urinalysis Microscopic  -     Urine culture  -     Transrectal Ultrasound w/ Biopsy; Future; Expected date: 09/11/2018    Microscopic hematuria    Other orders  -      ciprofloxacin HCl (CIPRO) 500 MG tablet; Take 1 tablet (500 mg total) by mouth every 12 (twelve) hours. for 3 days  Dispense: 6 tablet; Refill: 0  -     bisacodyl (FLEET) 10 mg/30 mL Enem; Place 30 mLs (10 mg total) rectally once. for 1 dose  Dispense: 1 enema; Refill: 0

## 2018-09-11 NOTE — LETTER
September 11, 2018      Stephan Ramey MD  [address]  [address]  [facility] [address]           [place] [facility]  [address]  [place] [address]  Phone: [phone]          Patient: Addy Redding   MR Number: [id number]   YOB: 1955   Date of Visit: 9/11/2018       Dear Dr. Stephan Ramey:    Thank you for referring Addy Redding to me for evaluation. Attached you will find relevant portions of my assessment and plan of care.    If you have questions, please do not hesitate to call me. I look forward to following Addy Redding along with you.    Sincerely,    Janice Knet MD    Enclosure  CC:  No Recipients    If you would like to receive this communication electronically, please contact [email or web address] or [phone] to request more information on [employer] access.    For providers and/or their staff who would like to refer a patient to [facility], please contact us through our one-stop-shop provider referral line, [facility] , at [phone].    If you feel you have received this communication in error or would no longer like to receive these types of communications, please e-mail [email or web address]

## 2018-09-12 LAB — BACTERIA UR CULT: NO GROWTH

## 2018-09-18 ENCOUNTER — PROCEDURE VISIT (OUTPATIENT)
Dept: UROLOGY | Facility: CLINIC | Age: 63
End: 2018-09-18
Payer: COMMERCIAL

## 2018-09-18 VITALS
BODY MASS INDEX: 25.93 KG/M2 | HEIGHT: 74 IN | WEIGHT: 202 LBS | HEART RATE: 50 BPM | DIASTOLIC BLOOD PRESSURE: 82 MMHG | SYSTOLIC BLOOD PRESSURE: 144 MMHG | TEMPERATURE: 99 F

## 2018-09-18 DIAGNOSIS — R97.20 ELEVATED PSA: ICD-10-CM

## 2018-09-18 PROCEDURE — 88342 IMHCHEM/IMCYTCHM 1ST ANTB: CPT | Mod: 26,,, | Performed by: PATHOLOGY

## 2018-09-18 PROCEDURE — 76872 US TRANSRECTAL: CPT | Mod: S$GLB,,, | Performed by: STUDENT IN AN ORGANIZED HEALTH CARE EDUCATION/TRAINING PROGRAM

## 2018-09-18 PROCEDURE — 88341 IMHCHEM/IMCYTCHM EA ADD ANTB: CPT | Mod: 26,,, | Performed by: PATHOLOGY

## 2018-09-18 PROCEDURE — 88305 TISSUE EXAM BY PATHOLOGIST: CPT | Mod: 26,,, | Performed by: PATHOLOGY

## 2018-09-18 PROCEDURE — 76942 ECHO GUIDE FOR BIOPSY: CPT | Mod: 59,S$GLB,, | Performed by: STUDENT IN AN ORGANIZED HEALTH CARE EDUCATION/TRAINING PROGRAM

## 2018-09-18 PROCEDURE — 55700 PR BIOPSY OF PROSTATE,NEEDLE/PUNCH: CPT | Mod: S$GLB,,, | Performed by: STUDENT IN AN ORGANIZED HEALTH CARE EDUCATION/TRAINING PROGRAM

## 2018-09-18 PROCEDURE — 88342 IMHCHEM/IMCYTCHM 1ST ANTB: CPT | Performed by: PATHOLOGY

## 2018-09-18 PROCEDURE — 88305 TISSUE EXAM BY PATHOLOGIST: CPT | Performed by: PATHOLOGY

## 2018-09-18 NOTE — PROCEDURES
Prostate Biopsy Procedure Note  Date of procedure:  09/18/2018    Pre-procedure diagnosis: Elevated PSA  Lab Results   Component Value Date    PSA 4.7 (H) 08/28/2018    PSA 3.3 01/11/2017    PSA 1.6 11/04/2014    PSA 1.7 10/23/2013    PSA 1.5 08/07/2012    PSA 0.68 07/25/2009    PSA 0.6 12/27/2007       Post-procedure diagnosis: same    Procedure: Transrectal ultrasound of the prostate, pudendal nerve injection for numbing, ultrasound guided needle biopsy of prostate      Complications: none    Blood loss: minimal    Surgeon: Janice Kent MD    Anesthesia: 10cc lidocaine jelly, 10cc 1% lidocaine for prostatic block    MARGARITA findings: 35-40  TRUS size: 43.6cc    Procedure: The risks and benefits of the procedure were explained to the patient and consent was obtained.  The patient laid in the lateral decubitus position.  10cc of lidocaine jelly was used for local analgesia in the rectum.  To perform the transrectal ultrasound, the ultrasound probe was advanced into the rectum. The bladder was noted to be partially full.  The prostate was visualized. A median lobe was present. Mildly enlarged    10cc of 1% lidocaine without epi was used for a prostatic nerve block by injecting the numbing medication into the pudendal nerve. Using the ultrasound image as guidance, 14 biopsies were then taken, 2 from the lateral and mid apex, mid, base, and transition zones from the right and left side using a needle punch to perform the biopsies.     The patient tolerated the procedure well. He will finish up the antibiotics today and tomorrow. He will follow-up in 2 weeks for a discussion of the pathology results.

## 2018-10-04 ENCOUNTER — OFFICE VISIT (OUTPATIENT)
Dept: UROLOGY | Facility: CLINIC | Age: 63
End: 2018-10-04
Payer: COMMERCIAL

## 2018-10-04 VITALS
TEMPERATURE: 99 F | DIASTOLIC BLOOD PRESSURE: 71 MMHG | WEIGHT: 202 LBS | HEART RATE: 54 BPM | SYSTOLIC BLOOD PRESSURE: 177 MMHG | BODY MASS INDEX: 25.93 KG/M2 | HEIGHT: 74 IN

## 2018-10-04 DIAGNOSIS — R97.20 ELEVATED PSA: Primary | ICD-10-CM

## 2018-10-04 LAB
BILIRUB SERPL-MCNC: ABNORMAL MG/DL
BILIRUB UR QL STRIP: NEGATIVE
BLOOD URINE, POC: ABNORMAL
CLARITY UR REFRACT.AUTO: CLEAR
COLOR UR AUTO: ABNORMAL
COLOR, POC UA: YELLOW
GLUCOSE UR QL STRIP: NEGATIVE
GLUCOSE UR QL STRIP: NORMAL
HGB UR QL STRIP: ABNORMAL
KETONES UR QL STRIP: ABNORMAL
KETONES UR QL STRIP: NEGATIVE
LEUKOCYTE ESTERASE UR QL STRIP: NEGATIVE
LEUKOCYTE ESTERASE URINE, POC: ABNORMAL
MICROSCOPIC COMMENT: NORMAL
NITRITE UR QL STRIP: NEGATIVE
NITRITE, POC UA: ABNORMAL
PH UR STRIP: 6 [PH] (ref 5–8)
PH, POC UA: 5
PROT UR QL STRIP: NEGATIVE
PROTEIN, POC: ABNORMAL
RBC #/AREA URNS AUTO: 2 /HPF (ref 0–4)
SP GR UR STRIP: 1 (ref 1–1.03)
SPECIFIC GRAVITY, POC UA: 1.01
URN SPEC COLLECT METH UR: ABNORMAL
UROBILINOGEN UR STRIP-ACNC: NEGATIVE EU/DL
UROBILINOGEN, POC UA: NORMAL
WBC #/AREA URNS AUTO: 1 /HPF (ref 0–5)

## 2018-10-04 PROCEDURE — 99213 OFFICE O/P EST LOW 20 MIN: CPT | Mod: 25,S$GLB,, | Performed by: STUDENT IN AN ORGANIZED HEALTH CARE EDUCATION/TRAINING PROGRAM

## 2018-10-04 PROCEDURE — 3008F BODY MASS INDEX DOCD: CPT | Mod: CPTII,S$GLB,, | Performed by: STUDENT IN AN ORGANIZED HEALTH CARE EDUCATION/TRAINING PROGRAM

## 2018-10-04 PROCEDURE — 87086 URINE CULTURE/COLONY COUNT: CPT

## 2018-10-04 PROCEDURE — 99999 PR PBB SHADOW E&M-EST. PATIENT-LVL IV: CPT | Mod: PBBFAC,,, | Performed by: STUDENT IN AN ORGANIZED HEALTH CARE EDUCATION/TRAINING PROGRAM

## 2018-10-04 PROCEDURE — 3078F DIAST BP <80 MM HG: CPT | Mod: CPTII,S$GLB,, | Performed by: STUDENT IN AN ORGANIZED HEALTH CARE EDUCATION/TRAINING PROGRAM

## 2018-10-04 PROCEDURE — 81001 URINALYSIS AUTO W/SCOPE: CPT

## 2018-10-04 PROCEDURE — 3077F SYST BP >= 140 MM HG: CPT | Mod: CPTII,S$GLB,, | Performed by: STUDENT IN AN ORGANIZED HEALTH CARE EDUCATION/TRAINING PROGRAM

## 2018-10-04 PROCEDURE — 81002 URINALYSIS NONAUTO W/O SCOPE: CPT | Mod: S$GLB,,, | Performed by: STUDENT IN AN ORGANIZED HEALTH CARE EDUCATION/TRAINING PROGRAM

## 2018-10-04 RX ORDER — SULFAMETHOXAZOLE AND TRIMETHOPRIM 800; 160 MG/1; MG/1
1 TABLET ORAL 2 TIMES DAILY
Qty: 60 TABLET | Refills: 0 | Status: SHIPPED | OUTPATIENT
Start: 2018-10-04 | End: 2018-11-03

## 2018-10-04 NOTE — PROGRESS NOTES
Subjective:       Patient ID: Addy Redding is a 62 y.o. male.    Chief Complaint: Follow-up (Bx results)  This is a 62 y.o.  male patient that is an established patient of mine.  The patient is referred to me by his PCP Dr. Ramey for an elevated PSA. He states he was unaware of the reason for the visit until it was explained to him this morning. I showed him the PSA blood tests on the screen.     Race:   Age: 62 y.o.   Previously abnormal PSA: no  Previous biopsy or recommended a biopsy: no  Previous abnormal MARGARITA: no  Family history of prostate cancer no     He is s/p a TRUS biopsy with me on 18. MARGARITA findings: 35-40. TRUS size: 43.6cc. Biopsy results demonstrated no cancer. He has noticed blood in the urine and semen as well as recent urinary hesitancy. He denies fevers chills or any other symptoms.   LAST PSA  Lab Results   Component Value Date    PSA 4.7 (H) 2018    PSA 3.3 2017    PSA 1.6 2014    PSA 1.7 10/23/2013    PSA 1.5 2012    PSA 0.68 2009    PSA 0.6 2007       Lab Results   Component Value Date    CREATININE 1.5 (H) 2018      ---  Past Medical History:   Diagnosis Date    Costochondritis     Diabetic nephropathy associated with type 2 diabetes mellitus     Diabetic retinopathy     ED (erectile dysfunction)     GERD (gastroesophageal reflux disease)     Hyperlipidemia     Hypertension     Type II or unspecified type diabetes mellitus with renal manifestations, uncontrolled(250.42)        Past Surgical History:   Procedure Laterality Date    Bone biopsy right femur         Family History   Problem Relation Age of Onset    Hypertension Mother     Diabetes Mother     Hypertension Sister     Hypertension Brother     Diabetes Sister     Diabetes Brother     Lung cancer Brother     Stomach cancer Sister     Coronary artery disease Father          of an MI at age 60       Social History     Tobacco Use    Smoking status: Former Smoker      "Packs/day: 0.50     Years: 12.00     Pack years: 6.00     Types: Cigarettes    Smokeless tobacco: Never Used   Substance Use Topics    Alcohol use: No    Drug use: No       Current Outpatient Medications on File Prior to Visit   Medication Sig Dispense Refill    ADVANCED GLUC METER TEST STRIP Strp USE TO TEST BLOOD SUGAR 4 TIMES DAILY. 100 strip 0    ADVANCED GLUC METER TEST STRIP Strp USE TO TEST BLOOD SUGAR 4 TIMES DAILY. 100 strip 0    ADVANCED GLUC METER TEST STRIP Strp USE TO TEST BLOOD SUGAR 4 TIMES DAILY. 100 strip 0    ADVANCED GLUC METER TEST STRIP Strp USE TO TEST BLOOD SUGAR 4 TIMES DAILY. 100 strip 0    aspirin (ECOTRIN) 81 MG EC tablet Take 81 mg by mouth once daily.        atorvastatin (LIPITOR) 40 MG tablet TAKE 1 TABLET (40 MG TOTAL) BY MOUTH ONCE DAILY. 90 tablet 3    BLOOD GLUCOSE TEST test strip USE TO TEST BLOOD SUGAR 4 TIMES DAILY. 100 each 8    insulin needles, disposable, ([employer] ULTRA-FINE ARIA PEN NEEDLES) 32 x 5/32 " Ndle Use with flex pens 100 each 3    lisinopril 10 MG tablet TAKE 1 TABLET (10 MG TOTAL) BY MOUTH ONCE DAILY. 90 tablet 3    metFORMIN (GLUCOPHAGE) 1000 MG tablet TAKE 1 TABLET (1,000 MG TOTAL) BY MOUTH 2 (TWO) TIMES DAILY. 180 tablet 3    sildenafil (REVATIO) 20 mg Tab Take 1 tablet (20 mg total) by mouth daily as needed. 30 tablet 2    blood-glucose meter kit Use as instructed       No current facility-administered medications on file prior to visit.        Review of patient's allergies indicates:  No Known Allergies    Review of Systems   Constitutional: Negative for chills.   HENT: Negative for congestion.    Eyes: Negative for visual disturbance.   Respiratory: Negative for shortness of breath.    Cardiovascular: Negative for chest pain.   Gastrointestinal: Negative for abdominal distention.   Musculoskeletal: Negative for gait problem.   Skin: Negative for color change.   Neurological: Negative for dizziness.   Psychiatric/Behavioral: Negative for agitation. "       Objective:      Physical Exam   Constitutional: He appears well-developed and well-nourished.   HENT:   Head: Normocephalic.   Eyes: Pupils are equal, round, and reactive to light.   Neck: Normal range of motion.   Cardiovascular: Intact distal pulses.   Pulmonary/Chest: Effort normal.   Abdominal: Soft. He exhibits no distension. There is no tenderness.   Musculoskeletal: Normal range of motion.   Neurological: He is alert.   Skin: Skin is warm and dry.   Psychiatric: He has a normal mood and affect.       Assessment:       1. Elevated PSA        Plan:       1. Prostate biopsy results demonstrated no cancer. We will continue to follow the PSA q6 months. Will send patient a reminder to see me in 6 months for MARGARITA with a PSA checked prior.  2. Dysuria, hematuria, hematospermia - counseled these are normal sequelae of the prostate biopsy. He will continue to hydrate. Will send urine for culture and UA and start him on empiric bactrim for UTI/prostatitis x 30 days.        Elevated PSA  -     POCT URINE DIPSTICK WITHOUT MICROSCOPE  -     Urinalysis  -     Urinalysis Microscopic  -     Urine culture  -     PSA, Screening; Future; Expected date: 04/04/2019    Other orders  -     sulfamethoxazole-trimethoprim 800-160mg (BACTRIM DS) 800-160 mg Tab; Take 1 tablet by mouth 2 (two) times daily.  Dispense: 60 tablet; Refill: 0

## 2018-10-06 LAB — BACTERIA UR CULT: NO GROWTH

## 2018-12-31 ENCOUNTER — HOSPITAL ENCOUNTER (EMERGENCY)
Facility: HOSPITAL | Age: 63
Discharge: HOME | End: 2018-12-31
Attending: EMERGENCY MEDICINE
Payer: MEDICARE

## 2018-12-31 VITALS
HEIGHT: 74 IN | WEIGHT: 204 LBS | DIASTOLIC BLOOD PRESSURE: 89 MMHG | TEMPERATURE: 98 F | OXYGEN SATURATION: 98 % | HEART RATE: 84 BPM | RESPIRATION RATE: 16 BRPM | SYSTOLIC BLOOD PRESSURE: 187 MMHG | BODY MASS INDEX: 26.18 KG/M2

## 2018-12-31 DIAGNOSIS — E11.8 TYPE 2 DIABETES MELLITUS WITH COMPLICATION, UNSPECIFIED WHETHER LONG TERM INSULIN USE: ICD-10-CM

## 2018-12-31 DIAGNOSIS — I10 HYPERTENSION, UNSPECIFIED TYPE: ICD-10-CM

## 2018-12-31 DIAGNOSIS — J02.9 PHARYNGITIS, UNSPECIFIED ETIOLOGY: Primary | ICD-10-CM

## 2018-12-31 LAB
DEPRECATED S PYO AG THROAT QL EIA: NEGATIVE
FLUAV AG SPEC QL IA: NEGATIVE
FLUBV AG SPEC QL IA: NEGATIVE
SPECIMEN SOURCE: NORMAL

## 2018-12-31 PROCEDURE — 99283 EMERGENCY DEPT VISIT LOW MDM: CPT

## 2018-12-31 PROCEDURE — 87081 CULTURE SCREEN ONLY: CPT

## 2018-12-31 PROCEDURE — 63600175 PHARM REV CODE 636 W HCPCS: Performed by: EMERGENCY MEDICINE

## 2018-12-31 PROCEDURE — 87400 INFLUENZA A/B EACH AG IA: CPT

## 2018-12-31 PROCEDURE — 87880 STREP A ASSAY W/OPTIC: CPT

## 2018-12-31 RX ORDER — DEXAMETHASONE 4 MG/1
4 TABLET ORAL EVERY 12 HOURS
Status: DISCONTINUED | OUTPATIENT
Start: 2018-12-31 | End: 2018-12-31 | Stop reason: HOSPADM

## 2018-12-31 RX ORDER — CETIRIZINE HYDROCHLORIDE 10 MG/1
10 TABLET ORAL DAILY
Qty: 30 TABLET | Refills: 0 | Status: SHIPPED | OUTPATIENT
Start: 2018-12-31 | End: 2019-01-07

## 2018-12-31 RX ADMIN — DEXAMETHASONE 4 MG: 4 TABLET ORAL at 08:12

## 2019-01-03 LAB — BACTERIA THROAT CULT: NORMAL

## 2019-01-07 ENCOUNTER — OFFICE VISIT (OUTPATIENT)
Dept: INTERNAL MEDICINE | Facility: CLINIC | Age: 64
End: 2019-01-07
Payer: MEDICARE

## 2019-01-07 VITALS
WEIGHT: 204.56 LBS | DIASTOLIC BLOOD PRESSURE: 70 MMHG | OXYGEN SATURATION: 98 % | BODY MASS INDEX: 26.25 KG/M2 | TEMPERATURE: 98 F | SYSTOLIC BLOOD PRESSURE: 112 MMHG | HEART RATE: 60 BPM | HEIGHT: 74 IN

## 2019-01-07 DIAGNOSIS — E11.9 TYPE 2 DIABETES MELLITUS WITHOUT COMPLICATION, WITHOUT LONG-TERM CURRENT USE OF INSULIN: ICD-10-CM

## 2019-01-07 DIAGNOSIS — J40 BRONCHITIS: Primary | ICD-10-CM

## 2019-01-07 DIAGNOSIS — I15.2 HYPERTENSION ASSOCIATED WITH DIABETES: ICD-10-CM

## 2019-01-07 DIAGNOSIS — R51.9 NONINTRACTABLE HEADACHE, UNSPECIFIED CHRONICITY PATTERN, UNSPECIFIED HEADACHE TYPE: ICD-10-CM

## 2019-01-07 DIAGNOSIS — E11.59 HYPERTENSION ASSOCIATED WITH DIABETES: ICD-10-CM

## 2019-01-07 PROCEDURE — 99214 OFFICE O/P EST MOD 30 MIN: CPT | Mod: PBBFAC,PO | Performed by: INTERNAL MEDICINE

## 2019-01-07 PROCEDURE — 99999 PR PBB SHADOW E&M-EST. PATIENT-LVL IV: ICD-10-PCS | Mod: PBBFAC,,, | Performed by: INTERNAL MEDICINE

## 2019-01-07 PROCEDURE — 99214 OFFICE O/P EST MOD 30 MIN: CPT | Mod: S$GLB,,, | Performed by: INTERNAL MEDICINE

## 2019-01-07 PROCEDURE — 99999 PR PBB SHADOW E&M-EST. PATIENT-LVL IV: CPT | Mod: PBBFAC,,, | Performed by: INTERNAL MEDICINE

## 2019-01-07 PROCEDURE — 99214 PR OFFICE/OUTPT VISIT, EST, LEVL IV, 30-39 MIN: ICD-10-PCS | Mod: S$GLB,,, | Performed by: INTERNAL MEDICINE

## 2019-01-07 RX ORDER — AZITHROMYCIN 1 G/1
1 POWDER, FOR SUSPENSION ORAL ONCE
COMMUNITY
End: 2019-04-02

## 2019-01-07 RX ORDER — PROMETHAZINE HYDROCHLORIDE AND CODEINE PHOSPHATE 6.25; 1 MG/5ML; MG/5ML
5 SOLUTION ORAL 2 TIMES DAILY PRN
Qty: 118 ML | Refills: 0 | Status: SHIPPED | OUTPATIENT
Start: 2019-01-07 | End: 2019-01-17

## 2019-01-07 RX ORDER — BENZONATATE 200 MG/1
200 CAPSULE ORAL 3 TIMES DAILY PRN
COMMUNITY
End: 2019-01-07 | Stop reason: ALTCHOICE

## 2019-01-07 RX ORDER — PREDNISONE 20 MG/1
20 TABLET ORAL DAILY
COMMUNITY
End: 2019-04-02

## 2019-01-07 RX ORDER — FLUTICASONE PROPIONATE 50 MCG
1 SPRAY, SUSPENSION (ML) NASAL DAILY PRN
Qty: 1 BOTTLE | Refills: 0 | Status: SHIPPED | OUTPATIENT
Start: 2019-01-07 | End: 2019-04-02

## 2019-01-07 NOTE — LETTER
January 7, 2019    Addy Redding  [address]  [place] [address]         [facility]  [address]  [place] [address]  Phone: [phone]  Fax: [phone] January 7, 2019     Patient: Addy Redding   YOB: 1955   Date of Visit: 1/7/2019       To Whom It May Concern:    It is my medical opinion that Addy Redding should be excused from work on 1/7/18 and 1/18/18. If you have any questions or concerns, please don't hesitate to call.    Sincerely,        Jonah Beebe MD

## 2019-01-07 NOTE — PROGRESS NOTES
Pt. ID: Addy Redding is a 63 y.o. male      Chief complaint:   Chief Complaint   Patient presents with    Cough     x one week    Nasal Congestion     x one week    Headache     x one week    Sore Throat     x one week        HPI: pt. Here for cough, headache and congestion for past 1 week; he has had flu shot; he went to ER 12/31/18 and flu screen was negative; rapid strep was negative and he was given oral steroids and released; he then went to [facility] urgent care and was given z-pack and tessalon which he is taking; tessalon is not helping and he would like something stronger; he is compliant with meds and HGBA1C dated 8/28/18 was 6.3      Review of Systems   Constitutional: Negative for chills and fever.   HENT: Positive for congestion. Negative for sore throat.    Respiratory: Positive for cough. Negative for shortness of breath.    Cardiovascular: Negative for chest pain.   Gastrointestinal: Negative for nausea and vomiting.   Genitourinary: Negative for dysuria.   Neurological: Positive for headaches.         Objective:    Physical Exam   Constitutional: He is oriented to person, place, and time.   HENT:   Mouth/Throat: Oropharynx is clear and moist.   Eyes: EOM are normal.   Neck: Normal range of motion.   Cardiovascular: Normal rate, regular rhythm and normal heart sounds.   Pulmonary/Chest: Effort normal and breath sounds normal. No respiratory distress. He has no wheezes. He has no rales.   Abdominal: Soft. There is no tenderness. There is no rebound and no guarding.   Musculoskeletal: Normal range of motion.   Neurological: He is alert and oriented to person, place, and time.   Skin: No rash noted.   Vitals reviewed.        Health Maintenance   Topic Date Due    Zoster Vaccine  11/22/2013    Influenza Vaccine  08/01/2018    Pneumococcal Vaccine (65+ Low/Medium Risk) (1 of 2 - PCV13) 11/22/2018    Abdominal Aortic Aneurysm Screening  11/22/2018    Hemoglobin A1c  02/28/2019    Foot Exam  08/28/2019     Lipid Panel  08/28/2019    Eye Exam  08/28/2019    Fecal Occult Blood Test (FOBT)/[employer]  09/05/2019    TETANUS VACCINE  04/03/2027    Hepatitis C Screening  Completed         Assessment:     1. Bronchitis Active   2. Nonintractable headache, unspecified chronicity pattern, unspecified headache type Active   3. Type 2 diabetes mellitus without complication, without long-term current use of insulin Well controlled   4. Hypertension associated with diabetes Well controlled         Plan: Bronchitis  Comments:  continue z-pack and change tessalon to phenergan with codeine; complete tapering steroids   Orders:  -     fluticasone (FLONASE) 50 mcg/actuation nasal spray; 1 spray (50 mcg total) by Each Nare route daily as needed.  Dispense: 1 Bottle; Refill: 0  -     promethazine-codeine 6.25-10 mg/5 ml (PHENERGAN WITH CODEINE) 6.25-10 mg/5 mL syrup; Take 5 mLs by mouth 2 (two) times daily as needed for Cough.  Dispense: 118 mL; Refill: 0    Nonintractable headache, unspecified chronicity pattern, unspecified headache type  Comments:  no focal neurologic deficits; tyelenol prn     Type 2 diabetes mellitus without complication, without long-term current use of insulin  Comments:  continue current regimen and encouraged ADA diet     Hypertension associated with diabetes  Comments:  continue current regimen and encouraged low Na diet         Problem List Items Addressed This Visit        Neuro    Nonintractable headache       Pulmonary    Bronchitis - Primary    Relevant Medications    fluticasone (FLONASE) 50 mcg/actuation nasal spray    promethazine-codeine 6.25-10 mg/5 ml (PHENERGAN WITH CODEINE) 6.25-10 mg/5 mL syrup       Cardiac/Vascular    Hypertension associated with diabetes       Endocrine    Type 2 diabetes mellitus without complication, without long-term current use of insulin

## 2019-02-25 ENCOUNTER — TELEPHONE (OUTPATIENT)
Dept: UROLOGY | Facility: CLINIC | Age: 64
End: 2019-02-25

## 2019-02-25 NOTE — TELEPHONE ENCOUNTER
Returned call, spoke with wife.  Informed recall letter is for follow up in April.  Offered to make appointment, she stated she will call back after checking patient's work schedule.

## 2019-02-25 NOTE — TELEPHONE ENCOUNTER
----- Message from Sarah Batres sent at 2/25/2019  7:15 AM CST -----  Patient's wife, Carmelita, called.   [phone]    Patient received a letter to make an appointment for a follow up.  Patient would like to come in today for 10:00.   Please call.

## 2019-03-29 DIAGNOSIS — E11.9 CONTROLLED TYPE 2 DIABETES MELLITUS WITHOUT COMPLICATION, WITHOUT LONG-TERM CURRENT USE OF INSULIN: ICD-10-CM

## 2019-03-29 DIAGNOSIS — E78.5 HYPERLIPIDEMIA, UNSPECIFIED HYPERLIPIDEMIA TYPE: ICD-10-CM

## 2019-03-29 DIAGNOSIS — I10 ESSENTIAL HYPERTENSION: ICD-10-CM

## 2019-03-29 DIAGNOSIS — E11.9 TYPE 2 DIABETES MELLITUS WITHOUT COMPLICATION, WITHOUT LONG-TERM CURRENT USE OF INSULIN: ICD-10-CM

## 2019-03-29 RX ORDER — METFORMIN HYDROCHLORIDE 1000 MG/1
1000 TABLET ORAL 2 TIMES DAILY
Qty: 180 TABLET | Refills: 3 | Status: SHIPPED | OUTPATIENT
Start: 2019-03-29 | End: 2019-04-03 | Stop reason: SDUPTHER

## 2019-03-29 RX ORDER — LISINOPRIL 10 MG/1
10 TABLET ORAL DAILY
Qty: 90 TABLET | Refills: 3 | Status: SHIPPED | OUTPATIENT
Start: 2019-03-29 | End: 2019-04-03 | Stop reason: SDUPTHER

## 2019-03-29 RX ORDER — ATORVASTATIN CALCIUM 40 MG/1
40 TABLET, FILM COATED ORAL DAILY
Qty: 90 TABLET | Refills: 3 | Status: SHIPPED | OUTPATIENT
Start: 2019-03-29 | End: 2019-04-03 | Stop reason: SDUPTHER

## 2019-04-02 ENCOUNTER — OFFICE VISIT (OUTPATIENT)
Dept: FAMILY MEDICINE | Facility: CLINIC | Age: 64
End: 2019-04-02
Attending: FAMILY MEDICINE
Payer: MEDICARE

## 2019-04-02 ENCOUNTER — LAB VISIT (OUTPATIENT)
Dept: LAB | Facility: HOSPITAL | Age: 64
End: 2019-04-02
Attending: FAMILY MEDICINE
Payer: MEDICARE

## 2019-04-02 ENCOUNTER — OFFICE VISIT (OUTPATIENT)
Dept: UROLOGY | Facility: CLINIC | Age: 64
End: 2019-04-02
Payer: MEDICARE

## 2019-04-02 VITALS
OXYGEN SATURATION: 98 % | HEIGHT: 74 IN | BODY MASS INDEX: 26.28 KG/M2 | WEIGHT: 204.81 LBS | SYSTOLIC BLOOD PRESSURE: 130 MMHG | DIASTOLIC BLOOD PRESSURE: 74 MMHG | HEART RATE: 54 BPM

## 2019-04-02 VITALS
BODY MASS INDEX: 26.18 KG/M2 | TEMPERATURE: 98 F | WEIGHT: 204 LBS | HEIGHT: 74 IN | DIASTOLIC BLOOD PRESSURE: 80 MMHG | SYSTOLIC BLOOD PRESSURE: 151 MMHG | HEART RATE: 79 BPM

## 2019-04-02 DIAGNOSIS — I15.2 HYPERTENSION ASSOCIATED WITH DIABETES: ICD-10-CM

## 2019-04-02 DIAGNOSIS — E78.5 DYSLIPIDEMIA ASSOCIATED WITH TYPE 2 DIABETES MELLITUS: ICD-10-CM

## 2019-04-02 DIAGNOSIS — R97.20 ELEVATED PSA: Primary | ICD-10-CM

## 2019-04-02 DIAGNOSIS — Z00.00 ROUTINE GENERAL MEDICAL EXAMINATION AT A HEALTH CARE FACILITY: Primary | ICD-10-CM

## 2019-04-02 DIAGNOSIS — E11.9 TYPE 2 DIABETES MELLITUS WITHOUT COMPLICATION, WITHOUT LONG-TERM CURRENT USE OF INSULIN: ICD-10-CM

## 2019-04-02 DIAGNOSIS — R97.20 ABNORMAL PSA: ICD-10-CM

## 2019-04-02 DIAGNOSIS — Z12.5 ENCOUNTER FOR SCREENING FOR MALIGNANT NEOPLASM OF PROSTATE: ICD-10-CM

## 2019-04-02 DIAGNOSIS — Z00.00 ROUTINE GENERAL MEDICAL EXAMINATION AT A HEALTH CARE FACILITY: ICD-10-CM

## 2019-04-02 DIAGNOSIS — E11.59 HYPERTENSION ASSOCIATED WITH DIABETES: ICD-10-CM

## 2019-04-02 DIAGNOSIS — E11.69 DYSLIPIDEMIA ASSOCIATED WITH TYPE 2 DIABETES MELLITUS: ICD-10-CM

## 2019-04-02 DIAGNOSIS — K64.9 HEMORRHOIDS, UNSPECIFIED HEMORRHOID TYPE: ICD-10-CM

## 2019-04-02 DIAGNOSIS — Z13.6 SCREENING FOR AAA (ABDOMINAL AORTIC ANEURYSM): ICD-10-CM

## 2019-04-02 LAB
ALBUMIN SERPL BCP-MCNC: 4 G/DL (ref 3.5–5.2)
ALP SERPL-CCNC: 58 U/L (ref 55–135)
ALT SERPL W/O P-5'-P-CCNC: 9 U/L (ref 10–44)
ANION GAP SERPL CALC-SCNC: 7 MMOL/L (ref 8–16)
AST SERPL-CCNC: 14 U/L (ref 10–40)
BILIRUB SERPL-MCNC: 0.6 MG/DL (ref 0.1–1)
BILIRUB SERPL-MCNC: ABNORMAL MG/DL
BLOOD URINE, POC: ABNORMAL
BUN SERPL-MCNC: 17 MG/DL (ref 8–23)
CALCIUM SERPL-MCNC: 9.8 MG/DL (ref 8.7–10.5)
CHLORIDE SERPL-SCNC: 105 MMOL/L (ref 95–110)
CHOLEST SERPL-MCNC: 146 MG/DL (ref 120–199)
CHOLEST/HDLC SERPL: 3.6 {RATIO} (ref 2–5)
CO2 SERPL-SCNC: 27 MMOL/L (ref 23–29)
COLOR, POC UA: YELLOW
CREAT SERPL-MCNC: 1.5 MG/DL (ref 0.5–1.4)
EST. GFR  (AFRICAN AMERICAN): 56 ML/MIN/1.73 M^2
EST. GFR  (NON AFRICAN AMERICAN): 48 ML/MIN/1.73 M^2
ESTIMATED AVG GLUCOSE: 148 MG/DL (ref 68–131)
GLUCOSE SERPL-MCNC: 126 MG/DL (ref 70–110)
GLUCOSE UR QL STRIP: ABNORMAL
HBA1C MFR BLD HPLC: 6.8 % (ref 4–5.6)
HDLC SERPL-MCNC: 41 MG/DL (ref 40–75)
HDLC SERPL: 28.1 % (ref 20–50)
KETONES UR QL STRIP: ABNORMAL
LDLC SERPL CALC-MCNC: 88 MG/DL (ref 63–159)
LEUKOCYTE ESTERASE URINE, POC: ABNORMAL
NITRITE, POC UA: ABNORMAL
NONHDLC SERPL-MCNC: 105 MG/DL
PH, POC UA: 5
POTASSIUM SERPL-SCNC: 4.1 MMOL/L (ref 3.5–5.1)
PROT SERPL-MCNC: 7.1 G/DL (ref 6–8.4)
PROTEIN, POC: ABNORMAL
SODIUM SERPL-SCNC: 139 MMOL/L (ref 136–145)
SPECIFIC GRAVITY, POC UA: 1.02
TRIGL SERPL-MCNC: 85 MG/DL (ref 30–150)
UROBILINOGEN, POC UA: ABNORMAL

## 2019-04-02 PROCEDURE — 3044F HG A1C LEVEL LT 7.0%: CPT | Mod: CPTII,S$GLB,, | Performed by: FAMILY MEDICINE

## 2019-04-02 PROCEDURE — 99214 PR OFFICE/OUTPT VISIT, EST, LEVL IV, 30-39 MIN: ICD-10-PCS | Mod: 25,S$GLB,, | Performed by: STUDENT IN AN ORGANIZED HEALTH CARE EDUCATION/TRAINING PROGRAM

## 2019-04-02 PROCEDURE — 83036 HEMOGLOBIN GLYCOSYLATED A1C: CPT

## 2019-04-02 PROCEDURE — 3074F PR MOST RECENT SYSTOLIC BLOOD PRESSURE < 130 MM HG: ICD-10-PCS | Mod: CPTII,S$GLB,, | Performed by: STUDENT IN AN ORGANIZED HEALTH CARE EDUCATION/TRAINING PROGRAM

## 2019-04-02 PROCEDURE — 99999 PR PBB SHADOW E&M-EST. PATIENT-LVL IV: CPT | Mod: PBBFAC,,, | Performed by: STUDENT IN AN ORGANIZED HEALTH CARE EDUCATION/TRAINING PROGRAM

## 2019-04-02 PROCEDURE — 3078F DIAST BP <80 MM HG: CPT | Mod: CPTII,S$GLB,, | Performed by: FAMILY MEDICINE

## 2019-04-02 PROCEDURE — 81002 POCT URINE DIPSTICK WITHOUT MICROSCOPE: ICD-10-PCS | Mod: S$GLB,,, | Performed by: STUDENT IN AN ORGANIZED HEALTH CARE EDUCATION/TRAINING PROGRAM

## 2019-04-02 PROCEDURE — 3078F PR MOST RECENT DIASTOLIC BLOOD PRESSURE < 80 MM HG: ICD-10-PCS | Mod: CPTII,S$GLB,, | Performed by: FAMILY MEDICINE

## 2019-04-02 PROCEDURE — 3075F SYST BP GE 130 - 139MM HG: CPT | Mod: CPTII,S$GLB,, | Performed by: FAMILY MEDICINE

## 2019-04-02 PROCEDURE — 1101F PT FALLS ASSESS-DOCD LE1/YR: CPT | Mod: CPTII,S$GLB,, | Performed by: STUDENT IN AN ORGANIZED HEALTH CARE EDUCATION/TRAINING PROGRAM

## 2019-04-02 PROCEDURE — 3008F BODY MASS INDEX DOCD: CPT | Mod: CPTII,S$GLB,, | Performed by: STUDENT IN AN ORGANIZED HEALTH CARE EDUCATION/TRAINING PROGRAM

## 2019-04-02 PROCEDURE — 84154 ASSAY OF PSA FREE: CPT

## 2019-04-02 PROCEDURE — 99214 OFFICE O/P EST MOD 30 MIN: CPT | Mod: 25,S$GLB,, | Performed by: STUDENT IN AN ORGANIZED HEALTH CARE EDUCATION/TRAINING PROGRAM

## 2019-04-02 PROCEDURE — 1101F PR PT FALLS ASSESS DOC 0-1 FALLS W/OUT INJ PAST YR: ICD-10-PCS | Mod: CPTII,S$GLB,, | Performed by: STUDENT IN AN ORGANIZED HEALTH CARE EDUCATION/TRAINING PROGRAM

## 2019-04-02 PROCEDURE — 99214 PR OFFICE/OUTPT VISIT, EST, LEVL IV, 30-39 MIN: ICD-10-PCS | Mod: S$GLB,,, | Performed by: FAMILY MEDICINE

## 2019-04-02 PROCEDURE — 3075F PR MOST RECENT SYSTOLIC BLOOD PRESS GE 130-139MM HG: ICD-10-PCS | Mod: CPTII,S$GLB,, | Performed by: FAMILY MEDICINE

## 2019-04-02 PROCEDURE — 99499 UNLISTED E&M SERVICE: CPT | Mod: S$GLB,,, | Performed by: FAMILY MEDICINE

## 2019-04-02 PROCEDURE — 99999 PR PBB SHADOW E&M-EST. PATIENT-LVL III: CPT | Mod: PBBFAC,,, | Performed by: FAMILY MEDICINE

## 2019-04-02 PROCEDURE — 99499 RISK ADDL DX/OHS AUDIT: ICD-10-PCS | Mod: S$GLB,,, | Performed by: FAMILY MEDICINE

## 2019-04-02 PROCEDURE — 3079F PR MOST RECENT DIASTOLIC BLOOD PRESSURE 80-89 MM HG: ICD-10-PCS | Mod: CPTII,S$GLB,, | Performed by: STUDENT IN AN ORGANIZED HEALTH CARE EDUCATION/TRAINING PROGRAM

## 2019-04-02 PROCEDURE — 80053 COMPREHEN METABOLIC PANEL: CPT

## 2019-04-02 PROCEDURE — 99999 PR PBB SHADOW E&M-EST. PATIENT-LVL IV: ICD-10-PCS | Mod: PBBFAC,,, | Performed by: STUDENT IN AN ORGANIZED HEALTH CARE EDUCATION/TRAINING PROGRAM

## 2019-04-02 PROCEDURE — 80061 LIPID PANEL: CPT

## 2019-04-02 PROCEDURE — 3074F SYST BP LT 130 MM HG: CPT | Mod: CPTII,S$GLB,, | Performed by: STUDENT IN AN ORGANIZED HEALTH CARE EDUCATION/TRAINING PROGRAM

## 2019-04-02 PROCEDURE — 3044F PR MOST RECENT HEMOGLOBIN A1C LEVEL <7.0%: ICD-10-PCS | Mod: CPTII,S$GLB,, | Performed by: FAMILY MEDICINE

## 2019-04-02 PROCEDURE — 99999 PR PBB SHADOW E&M-EST. PATIENT-LVL III: ICD-10-PCS | Mod: PBBFAC,,, | Performed by: FAMILY MEDICINE

## 2019-04-02 PROCEDURE — 3079F DIAST BP 80-89 MM HG: CPT | Mod: CPTII,S$GLB,, | Performed by: STUDENT IN AN ORGANIZED HEALTH CARE EDUCATION/TRAINING PROGRAM

## 2019-04-02 PROCEDURE — 36415 COLL VENOUS BLD VENIPUNCTURE: CPT

## 2019-04-02 PROCEDURE — 3008F PR BODY MASS INDEX (BMI) DOCUMENTED: ICD-10-PCS | Mod: CPTII,S$GLB,, | Performed by: STUDENT IN AN ORGANIZED HEALTH CARE EDUCATION/TRAINING PROGRAM

## 2019-04-02 PROCEDURE — 81002 URINALYSIS NONAUTO W/O SCOPE: CPT | Mod: S$GLB,,, | Performed by: STUDENT IN AN ORGANIZED HEALTH CARE EDUCATION/TRAINING PROGRAM

## 2019-04-02 PROCEDURE — 99214 OFFICE O/P EST MOD 30 MIN: CPT | Mod: S$GLB,,, | Performed by: FAMILY MEDICINE

## 2019-04-02 NOTE — PROGRESS NOTES
Subjective:       Patient ID: Addy Redding is a 63 y.o. male.    Chief Complaint: Follow-up (6 month ) and Medication Refill    63 yr old black male with DM II, HTN, HLD, GERD, presents today for his annual wellness check, lab work, 3 month follow up, medication refill. No new concerns today.      DM II - improved - A1C                   6.3 (H)             08/28/2018                   - denies any hypoglycemic symptoms - on metformin - up to date with eye and foot screen - stopped glipizide since itw as making his sugars low    HTN - controlled  - on lisinopril 10 mg daily - no side effects    HLD - controlled and improving -   LDLCALC                  76.4                08/28/2018                                  - on statin - compliant - need refill - not fully compliant with diet    GERD - stable - takes prilosec as needed    ED - stable - takes viagra as needed    Health maintenance  -labs due  -Flu and Pneumovax - up to date  -adacel due and done today  -colonoscopy due - wants to wait  -PSA due      Medication Refill   This is a chronic problem. The current episode started more than 1 year ago. The problem occurs constantly. The problem has been gradually improving. Associated symptoms include arthralgias and myalgias. Pertinent negatives include no change in bowel habit, chest pain, congestion, coughing, diaphoresis, fatigue, joint swelling, rash, urinary symptoms, vertigo, vomiting or weakness.   Diabetes   Pertinent negatives for hypoglycemia include no confusion, dizziness, nervousness/anxiousness, speech difficulty or sweats. Pertinent negatives for diabetes include no chest pain, no fatigue, no polydipsia, no polyuria and no weakness. Pertinent negatives for diabetic complications include no CVA, PVD or retinopathy.   Hypertension   This is a chronic problem. The current episode started more than 1 year ago. The problem has been gradually worsening since onset. The problem is uncontrolled. Pertinent  negatives include no anxiety, chest pain, malaise/fatigue, orthopnea, palpitations, peripheral edema or sweats. There are no associated agents to hypertension. Risk factors for coronary artery disease include dyslipidemia, diabetes mellitus, male gender and obesity. Past treatments include ACE inhibitors. The current treatment provides no improvement. There are no compliance problems.  There is no history of angina, CAD/MI, CVA, left ventricular hypertrophy, PVD or retinopathy. There is no history of chronic renal disease, coarctation of the aorta, hypercortisolism, pheochromocytoma, renovascular disease or a thyroid problem.   Hyperlipidemia   This is a chronic problem. The current episode started more than 1 year ago. The problem is uncontrolled. Recent lipid tests were reviewed and are high. Exacerbating diseases include diabetes. He has no history of chronic renal disease, liver disease or nephrotic syndrome. There are no known factors aggravating his hyperlipidemia. Associated symptoms include myalgias. Pertinent negatives include no chest pain, focal sensory loss, focal weakness or leg pain. Current antihyperlipidemic treatment includes statins. There are no compliance problems.  Risk factors for coronary artery disease include diabetes mellitus, dyslipidemia, male sex and hypertension.     Review of Systems   Constitutional: Negative.  Negative for activity change, diaphoresis, fatigue, malaise/fatigue and unexpected weight change.   HENT: Negative.  Negative for congestion, ear pain, mouth sores, rhinorrhea and voice change.    Eyes: Negative.  Negative for pain, discharge and visual disturbance.   Respiratory: Negative.  Negative for apnea, cough and wheezing.    Cardiovascular: Negative.  Negative for chest pain, palpitations and orthopnea.   Gastrointestinal: Negative.  Negative for abdominal distention, anal bleeding, change in bowel habit, diarrhea and vomiting.   Endocrine: Negative.  Negative for cold  intolerance, polydipsia and polyuria.   Genitourinary: Negative.  Negative for decreased urine volume, difficulty urinating, discharge, frequency and scrotal swelling.   Musculoskeletal: Positive for arthralgias and myalgias. Negative for back pain, joint swelling and neck stiffness.   Skin: Negative.  Negative for color change and rash.   Allergic/Immunologic: Negative.  Negative for environmental allergies and immunocompromised state.   Neurological: Negative.  Negative for dizziness, vertigo, focal weakness, speech difficulty, weakness and light-headedness.   Hematological: Negative.    Psychiatric/Behavioral: Negative.  Negative for agitation, confusion, dysphoric mood and suicidal ideas. The patient is not nervous/anxious.        PMH/PSH/FH/SH/MED/ALLERGY reviewed    Objective:       Vitals:    04/02/19 1112   BP: 130/74   Pulse: (!) 54       Physical Exam   Constitutional: He is oriented to person, place, and time. He appears well-developed and well-nourished.   HENT:   Head: Normocephalic and atraumatic.   Right Ear: External ear normal.   Left Ear: External ear normal.   Nose: Nose normal.   Mouth/Throat: Oropharynx is clear and moist. No oropharyngeal exudate.   Eyes: Pupils are equal, round, and reactive to light. Conjunctivae and EOM are normal. Right eye exhibits no discharge. Left eye exhibits no discharge. No scleral icterus.   Neck: Normal range of motion. Neck supple. No JVD present. No tracheal deviation present. No thyromegaly present.   Cardiovascular: Normal rate, regular rhythm, normal heart sounds and intact distal pulses. Exam reveals no gallop and no friction rub.   No murmur heard.  Pulmonary/Chest: Effort normal and breath sounds normal. No stridor. No respiratory distress. He has no wheezes. He has no rales. He exhibits no tenderness.   Abdominal: Soft. Bowel sounds are normal. He exhibits no distension and no mass. There is no tenderness. There is no rebound and no guarding. No hernia.    Musculoskeletal: Normal range of motion. He exhibits no edema or tenderness.   Lymphadenopathy:     He has no cervical adenopathy.   Neurological: He is alert and oriented to person, place, and time. He has normal reflexes. He displays normal reflexes. No cranial nerve deficit. He exhibits normal muscle tone. Coordination normal.   Skin: Skin is warm and dry. No rash noted. No erythema. No pallor.   Psychiatric: He has a normal mood and affect. His behavior is normal. Judgment and thought content normal.       Assessment:       1. Routine general medical examination at a health care facility    2. Hypertension associated with diabetes    3. Type 2 diabetes mellitus without complication, without long-term current use of insulin    4. Dyslipidemia associated with type 2 diabetes mellitus    5. Abnormal PSA    6. Screening for AAA (abdominal aortic aneurysm)        Plan:           Addy was seen today for follow-up and medication refill.    Diagnoses and all orders for this visit:    Routine general medical examination at a health care facility  -     Comprehensive metabolic panel; Future  -     Lipid panel; Future    Hypertension associated with diabetes  -     Comprehensive metabolic panel; Future  -     Lipid panel; Future    Type 2 diabetes mellitus without complication, without long-term current use of insulin  -     Comprehensive metabolic panel; Future  -     Hemoglobin A1c; Future  -     Lipid panel; Future    Dyslipidemia associated with type 2 diabetes mellitus  -     Comprehensive metabolic panel; Future  -     Lipid panel; Future    Abnormal PSA  -     PSA, total and free; Future    Screening for AAA (abdominal aortic aneurysm)  -     US AAA Screening; Future      Wellness check  -normal   Labs      HTN   -at goal  -continue lisinopril to 10 mg/day    DM II   -controlled   -continue metformin and labs    HLD   -improving  -continue lipitor    GERD   -stable   -takes OTC PPI     ED'  -controlled      Spent  adequate time in obtaining history and explaining differentials      40 minutes spent during this visit of which greater than 50% devoted to face-face counseling and coordination of care regarding diagnosis and management plan       RTC 6 MONTHS

## 2019-04-02 NOTE — Clinical Note
Placed referral for GI per patient request for hemorrhoids.Would you mind letting me know when Mr. Daly's PSA results? Thank you!

## 2019-04-02 NOTE — PROGRESS NOTES
"Subjective:       Patient ID: Addy Redding is a 63 y.o. male.    Chief Complaint: Follow-up  This is a 63 y.o.  male patient that is an established patient of mine.  The patient is referred to me by his PCP Dr. Ramey for an elevated PSA. He states he was unaware of the reason for the visit until it was explained to him this morning. I showed him the PSA blood tests on the screen.   Race:   Age: 62 y.o.   Previously abnormal PSA: no  Previous biopsy or recommended a biopsy: no  Previous abnormal MARGARITA: no  Family history of prostate cancer no     He is s/p a TRUS biopsy with me on 9/18/18. MARGARITA findings: 35-40. TRUS size: 43.6cc. Biopsy results demonstrated no cancer. He has noticed blood in the urine and semen as well as recent urinary hesitancy. He denies fevers chills or any other symptoms.     4/2/19  He returns back today for a followup. His PSA was drawn this morning and was pending. He notes no changes in his urination, denies any hospitalizations. Denies seeing blood in stool. He is concerned about possible hemorrhoids as he has felt extra "tissue" near his anus when he wipes after a bowel movement.     LAST PSA  Lab Results   Component Value Date    PSA 4.7 (H) 08/28/2018    PSA 3.3 01/11/2017    PSA 1.6 11/04/2014    PSA 1.7 10/23/2013    PSA 1.5 08/07/2012    PSA 0.68 07/25/2009    PSA 0.6 12/27/2007       Lab Results   Component Value Date    CREATININE 1.5 (H) 04/02/2019        ---  Past Medical History:   Diagnosis Date    Costochondritis     Diabetic nephropathy associated with type 2 diabetes mellitus     Diabetic retinopathy     ED (erectile dysfunction)     GERD (gastroesophageal reflux disease)     Hyperlipidemia     Hypertension     Type II or unspecified type diabetes mellitus with renal manifestations, uncontrolled(250.42)        Past Surgical History:   Procedure Laterality Date    Bone biopsy right femur         Family History   Problem Relation Age of Onset    Hypertension Mother  " "   Diabetes Mother     Hypertension Sister     Hypertension Brother     Diabetes Sister     Diabetes Brother     Lung cancer Brother     Stomach cancer Sister     Coronary artery disease Father          of an MI at age 60       Social History     Tobacco Use    Smoking status: Former Smoker     Packs/day: 0.50     Years: 12.00     Pack years: 6.00     Types: Cigarettes    Smokeless tobacco: Never Used   Substance Use Topics    Alcohol use: No    Drug use: No       Current Outpatient Medications on File Prior to Visit   Medication Sig Dispense Refill    ADVANCED GLUC METER TEST STRIP Strp USE TO TEST BLOOD SUGAR 4 TIMES DAILY. 100 strip 0    atorvastatin (LIPITOR) 40 MG tablet TAKE 1 TABLET (40 MG TOTAL) BY MOUTH ONCE DAILY. 90 tablet 3    BLOOD GLUCOSE TEST test strip USE TO TEST BLOOD SUGAR 4 TIMES DAILY. 100 each 8    blood-glucose meter kit Use as instructed      lisinopril 10 MG tablet TAKE 1 TABLET (10 MG TOTAL) BY MOUTH ONCE DAILY. 90 tablet 3    metFORMIN (GLUCOPHAGE) 1000 MG tablet TAKE 1 TABLET (1,000 MG TOTAL) BY MOUTH 2 (TWO) TIMES DAILY. 180 tablet 3    [DISCONTINUED] ADVANCED GLUC METER TEST STRIP Strp USE TO TEST BLOOD SUGAR 4 TIMES DAILY. 100 strip 0    [DISCONTINUED] ADVANCED GLUC METER TEST STRIP Strp USE TO TEST BLOOD SUGAR 4 TIMES DAILY. 100 strip 0    [DISCONTINUED] ADVANCED GLUC METER TEST STRIP Strp USE TO TEST BLOOD SUGAR 4 TIMES DAILY. 100 strip 0    [DISCONTINUED] aspirin (ECOTRIN) 81 MG EC tablet Take 81 mg by mouth once daily.        [DISCONTINUED] azithromycin (ZITHROMAX) 1 gram Pack Take 1 g by mouth once.      [DISCONTINUED] fluticasone (FLONASE) 50 mcg/actuation nasal spray 1 spray (50 mcg total) by Each Nare route daily as needed. 1 Bottle 0    [DISCONTINUED] insulin needles, disposable, (BD ULTRA-FINE ARIA PEN NEEDLES) 32 x 5/32 " Ndle Use with flex pens 100 each 3    [DISCONTINUED] predniSONE (DELTASONE) 20 MG tablet Take 20 mg by mouth once daily.   "     No current facility-administered medications on file prior to visit.        Review of patient's allergies indicates:  No Known Allergies    Review of Systems   Constitutional: Negative for chills.   HENT: Negative for congestion.    Eyes: Negative for visual disturbance.   Respiratory: Negative for shortness of breath.    Cardiovascular: Negative for chest pain.   Gastrointestinal: Negative for abdominal distention.   Musculoskeletal: Negative for gait problem.   Skin: Negative for color change.   Neurological: Negative for dizziness.   Psychiatric/Behavioral: Negative for agitation.       Objective:      Physical Exam   Constitutional: He appears well-developed and well-nourished.   HENT:   Head: Normocephalic.   Eyes: Pupils are equal, round, and reactive to light.   Neck: Normal range of motion.   Cardiovascular: Intact distal pulses.   Pulmonary/Chest: Effort normal.   Abdominal: Soft. He exhibits no distension.   Genitourinary:   Genitourinary Comments: MARGARITA - 40g smooth benign, no hard nodules  External inspection, possible external hemorrhoid at the 6 o'clock position; no bleeding noted   Musculoskeletal: Normal range of motion.   Neurological: He is alert.   Skin: Skin is warm and dry.   Psychiatric: He has a normal mood and affect.       Assessment:       1. Elevated PSA    2. Encounter for screening for malignant neoplasm of prostate     3. Hemorrhoids, unspecified hemorrhoid type        Plan:       1. Will message Dr. Ramey his PCP to alert me with PSA results from earlier today, currently pending. Patient desires phone call after PSA results are reviewed.  2. Will check next PSA in 6 months. Will set up reminder for patient to get next PSA drawn before coming to see me in clinic in 6 months.   3. External hemorrhoids - Will place referral for GI doctor, also will alert Dr. Ramey.    Elevated PSA  -     POCT URINE DIPSTICK WITHOUT MICROSCOPE  -     PSA, Screening; Future; Expected date:  10/02/2019    Encounter for screening for malignant neoplasm of prostate   -     PSA, Screening; Future; Expected date: 10/02/2019    Hemorrhoids, unspecified hemorrhoid type  -     Ambulatory Referral to Gastroenterology

## 2019-04-03 DIAGNOSIS — E11.9 CONTROLLED TYPE 2 DIABETES MELLITUS WITHOUT COMPLICATION, WITHOUT LONG-TERM CURRENT USE OF INSULIN: ICD-10-CM

## 2019-04-03 DIAGNOSIS — I10 ESSENTIAL HYPERTENSION: ICD-10-CM

## 2019-04-03 DIAGNOSIS — E11.9 TYPE 2 DIABETES MELLITUS WITHOUT COMPLICATION, WITHOUT LONG-TERM CURRENT USE OF INSULIN: ICD-10-CM

## 2019-04-03 DIAGNOSIS — E78.5 HYPERLIPIDEMIA, UNSPECIFIED HYPERLIPIDEMIA TYPE: ICD-10-CM

## 2019-04-03 LAB
PROSTATE SPECIFIC ANTIGEN, TOTAL: 5.6 NG/ML (ref 0–4)
PSA FREE MFR SERPL: 10.18 %
PSA FREE SERPL-MCNC: 0.57 NG/ML (ref 0.01–1.5)

## 2019-04-03 RX ORDER — METFORMIN HYDROCHLORIDE 1000 MG/1
1000 TABLET ORAL 2 TIMES DAILY
Qty: 180 TABLET | Refills: 0 | Status: SHIPPED | OUTPATIENT
Start: 2019-04-03 | End: 2019-07-03 | Stop reason: SDUPTHER

## 2019-04-03 RX ORDER — LISINOPRIL 10 MG/1
10 TABLET ORAL DAILY
Qty: 90 TABLET | Refills: 0 | Status: SHIPPED | OUTPATIENT
Start: 2019-04-03 | End: 2019-07-03 | Stop reason: SDUPTHER

## 2019-04-03 RX ORDER — ATORVASTATIN CALCIUM 40 MG/1
40 TABLET, FILM COATED ORAL DAILY
Qty: 90 TABLET | Refills: 0 | Status: SHIPPED | OUTPATIENT
Start: 2019-04-03 | End: 2019-07-03 | Stop reason: SDUPTHER

## 2019-04-03 NOTE — TELEPHONE ENCOUNTER
----- Message from Ember Fernandez sent at 4/3/2019  1:30 PM CDT -----  Contact: self / [phone]  Patient is requesting a refill on the below. Please advise          metFORMIN (GLUCOPHAGE) 1000 MG tablet  atorvastatin (LIPITOR) 40 MG tablet  lisinopril 10 MG tablet      Pharmacy     [id number] - [place], [place] - [phone]

## 2019-04-04 ENCOUNTER — TELEPHONE (OUTPATIENT)
Dept: FAMILY MEDICINE | Facility: CLINIC | Age: 64
End: 2019-04-04

## 2019-04-05 ENCOUNTER — TELEPHONE (OUTPATIENT)
Dept: FAMILY MEDICINE | Facility: CLINIC | Age: 64
End: 2019-04-05

## 2019-04-05 NOTE — TELEPHONE ENCOUNTER
Called patient to notify Lab showed slightly elevated PSA and diabetes - follow urology and strict diabetes diet.  No answer, left voicemail.

## 2019-04-16 ENCOUNTER — PES CALL (OUTPATIENT)
Dept: ADMINISTRATIVE | Facility: CLINIC | Age: 64
End: 2019-04-16

## 2019-04-23 ENCOUNTER — HOSPITAL ENCOUNTER (OUTPATIENT)
Dept: RADIOLOGY | Facility: HOSPITAL | Age: 64
Discharge: HOME | End: 2019-04-23
Attending: FAMILY MEDICINE
Payer: MEDICARE

## 2019-04-23 DIAGNOSIS — Z13.6 SCREENING FOR AAA (ABDOMINAL AORTIC ANEURYSM): ICD-10-CM

## 2019-04-23 PROCEDURE — 76706 US ABDL AORTA SCREEN AAA: CPT | Mod: 26,,, | Performed by: RADIOLOGY

## 2019-04-23 PROCEDURE — 76706 US AAA SCREENING: ICD-10-PCS | Mod: 26,,, | Performed by: RADIOLOGY

## 2019-04-23 PROCEDURE — 76706 US ABDL AORTA SCREEN AAA: CPT | Mod: TC

## 2019-05-11 ENCOUNTER — OFFICE VISIT (OUTPATIENT)
Dept: FAMILY MEDICINE | Facility: CLINIC | Age: 64
End: 2019-05-11
Payer: MEDICARE

## 2019-05-11 VITALS
HEIGHT: 74 IN | WEIGHT: 203.69 LBS | HEART RATE: 60 BPM | SYSTOLIC BLOOD PRESSURE: 138 MMHG | TEMPERATURE: 98 F | OXYGEN SATURATION: 99 % | BODY MASS INDEX: 26.14 KG/M2 | DIASTOLIC BLOOD PRESSURE: 62 MMHG

## 2019-05-11 DIAGNOSIS — E11.59 HYPERTENSION ASSOCIATED WITH DIABETES: ICD-10-CM

## 2019-05-11 DIAGNOSIS — E11.9 TYPE 2 DIABETES MELLITUS WITHOUT COMPLICATION, WITHOUT LONG-TERM CURRENT USE OF INSULIN: ICD-10-CM

## 2019-05-11 DIAGNOSIS — I15.2 HYPERTENSION ASSOCIATED WITH DIABETES: ICD-10-CM

## 2019-05-11 DIAGNOSIS — J01.90 ACUTE BACTERIAL SINUSITIS: ICD-10-CM

## 2019-05-11 DIAGNOSIS — B96.89 ACUTE BACTERIAL SINUSITIS: ICD-10-CM

## 2019-05-11 DIAGNOSIS — J06.9 UPPER RESPIRATORY INFECTION WITH COUGH AND CONGESTION: Primary | ICD-10-CM

## 2019-05-11 PROCEDURE — 3008F PR BODY MASS INDEX (BMI) DOCUMENTED: ICD-10-PCS | Mod: CPTII,S$GLB,, | Performed by: FAMILY MEDICINE

## 2019-05-11 PROCEDURE — 3044F HG A1C LEVEL LT 7.0%: CPT | Mod: CPTII,S$GLB,, | Performed by: FAMILY MEDICINE

## 2019-05-11 PROCEDURE — 99214 OFFICE O/P EST MOD 30 MIN: CPT | Mod: 25,S$GLB,, | Performed by: FAMILY MEDICINE

## 2019-05-11 PROCEDURE — 3075F PR MOST RECENT SYSTOLIC BLOOD PRESS GE 130-139MM HG: ICD-10-PCS | Mod: CPTII,S$GLB,, | Performed by: FAMILY MEDICINE

## 2019-05-11 PROCEDURE — 3078F PR MOST RECENT DIASTOLIC BLOOD PRESSURE < 80 MM HG: ICD-10-PCS | Mod: CPTII,S$GLB,, | Performed by: FAMILY MEDICINE

## 2019-05-11 PROCEDURE — 1101F PR PT FALLS ASSESS DOC 0-1 FALLS W/OUT INJ PAST YR: ICD-10-PCS | Mod: CPTII,S$GLB,, | Performed by: FAMILY MEDICINE

## 2019-05-11 PROCEDURE — 1101F PT FALLS ASSESS-DOCD LE1/YR: CPT | Mod: CPTII,S$GLB,, | Performed by: FAMILY MEDICINE

## 2019-05-11 PROCEDURE — 96372 PR INJECTION,THERAP/PROPH/DIAG2ST, IM OR SUBCUT: ICD-10-PCS | Mod: 59,S$GLB,, | Performed by: FAMILY MEDICINE

## 2019-05-11 PROCEDURE — 99999 PR PBB SHADOW E&M-EST. PATIENT-LVL III: ICD-10-PCS | Mod: PBBFAC,,, | Performed by: FAMILY MEDICINE

## 2019-05-11 PROCEDURE — 3008F BODY MASS INDEX DOCD: CPT | Mod: CPTII,S$GLB,, | Performed by: FAMILY MEDICINE

## 2019-05-11 PROCEDURE — 3078F DIAST BP <80 MM HG: CPT | Mod: CPTII,S$GLB,, | Performed by: FAMILY MEDICINE

## 2019-05-11 PROCEDURE — 99999 PR PBB SHADOW E&M-EST. PATIENT-LVL III: CPT | Mod: PBBFAC,,, | Performed by: FAMILY MEDICINE

## 2019-05-11 PROCEDURE — 99499 UNLISTED E&M SERVICE: CPT | Mod: S$GLB,,, | Performed by: FAMILY MEDICINE

## 2019-05-11 PROCEDURE — 99499 RISK ADDL DX/OHS AUDIT: ICD-10-PCS | Mod: S$GLB,,, | Performed by: FAMILY MEDICINE

## 2019-05-11 PROCEDURE — 3075F SYST BP GE 130 - 139MM HG: CPT | Mod: CPTII,S$GLB,, | Performed by: FAMILY MEDICINE

## 2019-05-11 PROCEDURE — 96372 THER/PROPH/DIAG INJ SC/IM: CPT | Mod: 59,S$GLB,, | Performed by: FAMILY MEDICINE

## 2019-05-11 PROCEDURE — 3044F PR MOST RECENT HEMOGLOBIN A1C LEVEL <7.0%: ICD-10-PCS | Mod: CPTII,S$GLB,, | Performed by: FAMILY MEDICINE

## 2019-05-11 PROCEDURE — 99214 PR OFFICE/OUTPT VISIT, EST, LEVL IV, 30-39 MIN: ICD-10-PCS | Mod: 25,S$GLB,, | Performed by: FAMILY MEDICINE

## 2019-05-11 RX ORDER — DOXYCYCLINE 100 MG/1
100 CAPSULE ORAL EVERY 12 HOURS
Qty: 14 CAPSULE | Refills: 0 | Status: SHIPPED | OUTPATIENT
Start: 2019-05-11 | End: 2019-05-18

## 2019-05-11 RX ORDER — CODEINE PHOSPHATE AND GUAIFENESIN 10; 100 MG/5ML; MG/5ML
10 SOLUTION ORAL NIGHTLY PRN
Qty: 118 ML | Refills: 0 | Status: SHIPPED | OUTPATIENT
Start: 2019-05-11 | End: 2019-05-21

## 2019-05-11 RX ORDER — CODEINE PHOSPHATE AND GUAIFENESIN 10; 100 MG/5ML; MG/5ML
10 SOLUTION ORAL NIGHTLY PRN
Qty: 118 ML | Refills: 0 | Status: SHIPPED | OUTPATIENT
Start: 2019-05-11 | End: 2019-05-11 | Stop reason: SDUPTHER

## 2019-05-11 RX ORDER — TRIAMCINOLONE ACETONIDE 40 MG/ML
60 INJECTION, SUSPENSION INTRA-ARTICULAR; INTRAMUSCULAR ONCE
Status: COMPLETED | OUTPATIENT
Start: 2019-05-11 | End: 2019-05-11

## 2019-05-11 RX ORDER — CETIRIZINE HYDROCHLORIDE 10 MG/1
10 TABLET ORAL DAILY
Refills: 0 | COMMUNITY
Start: 2019-05-11 | End: 2020-11-09

## 2019-05-11 RX ORDER — GUAIFENESIN 1200 MG/1
1200 TABLET, EXTENDED RELEASE ORAL 2 TIMES DAILY
Qty: 30 TABLET | Refills: 1 | COMMUNITY
Start: 2019-05-11 | End: 2019-05-21

## 2019-05-11 RX ADMIN — TRIAMCINOLONE ACETONIDE 60 MG: 40 INJECTION, SUSPENSION INTRA-ARTICULAR; INTRAMUSCULAR at 10:05

## 2019-05-11 NOTE — PROGRESS NOTES
Office Visit    Patient Name: Addy Redding    : 1955  MRN: [id number]    Subjective:  Addy is a 63 y.o. male who presents today for:    Sore Throat (x5d); Cough (x5d); and Headache (x5d)    64 yo pt of Dr Ramey's with controlled type 2 diabetes and controlled htn and no h/o lung disease or current tobacco abuse here with sore throat, cough, HA for 5 days. Generally tends to get sinus issues around this time of year-- wonders if he has allergies.     Seen at  3 days ago and treated with prednisone burst 40 mg daily for 3 day, ipratropium nasal spray and tessalon perles with no relief.     His sinus pressure is worsening and headaches are worse, mucus is thicker, and cough is worsening and productive. No fevers/chills no shortness of breath.     His grandson was recently ill with upper resp symptoms-- kept him last week.     Past Medical History  Past Medical History:   Diagnosis Date    Costochondritis     Diabetic nephropathy associated with type 2 diabetes mellitus     Diabetic retinopathy     ED (erectile dysfunction)     GERD (gastroesophageal reflux disease)     Hyperlipidemia     Hypertension     Type II or unspecified type diabetes mellitus with renal manifestations, uncontrolled(250.42)        Past Surgical History  Past Surgical History:   Procedure Laterality Date    Bone biopsy right femur         Family History  Family History   Problem Relation Age of Onset    Hypertension Mother     Diabetes Mother     Hypertension Sister     Hypertension Brother     Diabetes Sister     Diabetes Brother     Lung cancer Brother     Stomach cancer Sister     Coronary artery disease Father          of an MI at age 60       Social History  Social History     Socioeconomic History    Marital status:      Spouse name: Not on file    Number of children: Not on file    Years of education: Not on file    Highest education level: Not on file   Occupational History    Not on file   Social  "Needs    Financial resource strain: Not on file    Food insecurity:     Worry: Not on file     Inability: Not on file    Transportation needs:     Medical: Not on file     Non-medical: Not on file   Tobacco Use    Smoking status: Former Smoker     Packs/day: 0.50     Years: 12.00     Pack years: 6.00     Types: Cigarettes    Smokeless tobacco: Never Used   Substance and Sexual Activity    Alcohol use: No    Drug use: No    Sexual activity: Yes     Partners: Female   Lifestyle    Physical activity:     Days per week: Not on file     Minutes per session: Not on file    Stress: Not on file   Relationships    Social connections:     Talks on phone: Not on file     Gets together: Not on file     Attends Jainism service: Not on file     Active member of club or organization: Not on file     Attends meetings of clubs or organizations: Not on file     Relationship status: Not on file   Other Topics Concern    Not on file   Social History Narrative    Not on file       Current Medications  Medications reviewed and updated.     Allergies   Review of patient's allergies indicates:  No Known Allergies    Review of Systems (Pertinent positives)  Review of Systems   Constitutional: Negative for fever.   HENT: Positive for congestion and sore throat (scratchy). Negative for sinus pressure and sinus pain.    Respiratory: Positive for cough. Negative for shortness of breath.        /62 (BP Location: Left arm, Patient Position: Sitting, BP Method: Large (Manual))   Pulse 60   Temp 98.2 °F (36.8 °C) (Oral)   Ht 6' 2" (1.88 m)   Wt 92.4 kg (203 lb 11.3 oz)   SpO2 99%   BMI 26.15 kg/m²     Physical Exam   Constitutional: He is oriented to person, place, and time. He appears well-developed and well-nourished. No distress.   HENT:   Right Ear: Tympanic membrane is not erythematous and not bulging.   Left Ear: Tympanic membrane is not erythematous and not bulging.   Nose: Mucosal edema and rhinorrhea present. " Right sinus exhibits maxillary sinus tenderness. Left sinus exhibits maxillary sinus tenderness.   Mouth/Throat: Posterior oropharyngeal erythema present. No oropharyngeal exudate or posterior oropharyngeal edema.   Cardiovascular: Normal rate, regular rhythm and normal heart sounds.   Pulmonary/Chest: Effort normal and breath sounds normal.   Musculoskeletal: He exhibits no edema.   Neurological: He is alert and oriented to person, place, and time.   Psychiatric: He has a normal mood and affect.   Vitals reviewed.        Assessment/Plan:  Addy Redding is a 63 y.o. male who presents today for :    Addy was seen today for sore throat, cough and headache.    Diagnoses and all orders for this visit:    Upper respiratory infection with cough and congestion  -     cetirizine (ZYRTEC) 10 MG tablet; Take 1 tablet (10 mg total) by mouth once daily.  -     guaiFENesin (MUCINEX) 1,200 mg Ta12; Take 1,200 mg by mouth 2 (two) times daily. for 10 days  -     triamcinolone acetonide injection 60 mg  -     Discontinue: guaifenesin-codeine 100-10 mg/5 ml (TUSSI-ORGANIDIN NR)  mg/5 mL syrup; Take 10 mLs by mouth nightly as needed for Cough.  -     guaifenesin-codeine 100-10 mg/5 ml (TUSSI-ORGANIDIN NR)  mg/5 mL syrup; Take 10 mLs by mouth nightly as needed for Cough.    Acute bacterial sinusitis  Comments:  worsening sinus pressure and congestion despite conservative tx for about 1 week. maxillary pressure. treat with doxycycline ABX, steroids  Orders:  -     guaiFENesin (MUCINEX) 1,200 mg Ta12; Take 1,200 mg by mouth 2 (two) times daily. for 10 days  -     triamcinolone acetonide injection 60 mg  -     doxycycline (VIBRAMYCIN) 100 MG Cap; Take 1 capsule (100 mg total) by mouth every 12 (twelve) hours. for 7 days    Hypertension associated with diabetes  Comments:  controlled    Type 2 diabetes mellitus without complication, without long-term current use of insulin  Comments:  controlled on Metfomin. advised monitoring of  sugars post steroid injection--- given 2/2 no improvement s/p 3 days oral steroids            ICD-10-CM ICD-9-CM    1. Upper respiratory infection with cough and congestion J06.9 465.9 cetirizine (ZYRTEC) 10 MG tablet      guaiFENesin (MUCINEX) 1,200 mg Ta12      triamcinolone acetonide injection 60 mg      guaifenesin-codeine 100-10 mg/5 ml (TUSSI-ORGANIDIN NR)  mg/5 mL syrup      DISCONTINUED: guaifenesin-codeine 100-10 mg/5 ml (TUSSI-ORGANIDIN NR)  mg/5 mL syrup   2. Acute bacterial sinusitis J01.90 461.9 guaiFENesin (MUCINEX) 1,200 mg Ta12    B96.89  triamcinolone acetonide injection 60 mg      doxycycline (VIBRAMYCIN) 100 MG Cap    worsening sinus pressure and congestion despite conservative tx for about 1 week. maxillary pressure. treat with doxycycline ABX, steroids   3. Hypertension associated with diabetes E11.59 250.80     I10 401.9     controlled   4. Type 2 diabetes mellitus without complication, without long-term current use of insulin E11.9 250.00     controlled on Metfomin. advised monitoring of sugars post steroid injection--- given 2/2 no improvement s/p 3 days oral steroids       There are no Patient Instructions on file for this visit.      Follow up for return as needed for new concerns.

## 2019-06-06 ENCOUNTER — OFFICE VISIT (OUTPATIENT)
Dept: FAMILY MEDICINE | Facility: CLINIC | Age: 64
End: 2019-06-06
Payer: MEDICARE

## 2019-06-06 VITALS
OXYGEN SATURATION: 98 % | SYSTOLIC BLOOD PRESSURE: 124 MMHG | HEART RATE: 50 BPM | WEIGHT: 199.31 LBS | BODY MASS INDEX: 25.58 KG/M2 | TEMPERATURE: 98 F | DIASTOLIC BLOOD PRESSURE: 66 MMHG | HEIGHT: 74 IN

## 2019-06-06 DIAGNOSIS — E78.5 DYSLIPIDEMIA ASSOCIATED WITH TYPE 2 DIABETES MELLITUS: ICD-10-CM

## 2019-06-06 DIAGNOSIS — E11.69 DYSLIPIDEMIA ASSOCIATED WITH TYPE 2 DIABETES MELLITUS: ICD-10-CM

## 2019-06-06 DIAGNOSIS — N18.30 TYPE 2 DIABETES MELLITUS WITH STAGE 3 CHRONIC KIDNEY DISEASE, WITHOUT LONG-TERM CURRENT USE OF INSULIN: ICD-10-CM

## 2019-06-06 DIAGNOSIS — N52.9 ERECTILE DYSFUNCTION, UNSPECIFIED ERECTILE DYSFUNCTION TYPE: ICD-10-CM

## 2019-06-06 DIAGNOSIS — I15.2 HYPERTENSION ASSOCIATED WITH DIABETES: ICD-10-CM

## 2019-06-06 DIAGNOSIS — Z00.00 ENCOUNTER FOR PREVENTIVE HEALTH EXAMINATION: Primary | ICD-10-CM

## 2019-06-06 DIAGNOSIS — E11.59 HYPERTENSION ASSOCIATED WITH DIABETES: ICD-10-CM

## 2019-06-06 DIAGNOSIS — E11.22 TYPE 2 DIABETES MELLITUS WITH STAGE 3 CHRONIC KIDNEY DISEASE, WITHOUT LONG-TERM CURRENT USE OF INSULIN: ICD-10-CM

## 2019-06-06 PROCEDURE — 3074F SYST BP LT 130 MM HG: CPT | Mod: CPTII,S$GLB,, | Performed by: NURSE PRACTITIONER

## 2019-06-06 PROCEDURE — 99499 RISK ADDL DX/OHS AUDIT: ICD-10-PCS | Mod: S$GLB,,, | Performed by: NURSE PRACTITIONER

## 2019-06-06 PROCEDURE — 3074F PR MOST RECENT SYSTOLIC BLOOD PRESSURE < 130 MM HG: ICD-10-PCS | Mod: CPTII,S$GLB,, | Performed by: NURSE PRACTITIONER

## 2019-06-06 PROCEDURE — 3044F HG A1C LEVEL LT 7.0%: CPT | Mod: CPTII,S$GLB,, | Performed by: NURSE PRACTITIONER

## 2019-06-06 PROCEDURE — 3078F PR MOST RECENT DIASTOLIC BLOOD PRESSURE < 80 MM HG: ICD-10-PCS | Mod: CPTII,S$GLB,, | Performed by: NURSE PRACTITIONER

## 2019-06-06 PROCEDURE — 99999 PR PBB SHADOW E&M-EST. PATIENT-LVL IV: CPT | Mod: PBBFAC,,, | Performed by: NURSE PRACTITIONER

## 2019-06-06 PROCEDURE — G0439 PR MEDICARE ANNUAL WELLNESS SUBSEQUENT VISIT: ICD-10-PCS | Mod: S$GLB,,, | Performed by: NURSE PRACTITIONER

## 2019-06-06 PROCEDURE — 99999 PR PBB SHADOW E&M-EST. PATIENT-LVL IV: ICD-10-PCS | Mod: PBBFAC,,, | Performed by: NURSE PRACTITIONER

## 2019-06-06 PROCEDURE — 3078F DIAST BP <80 MM HG: CPT | Mod: CPTII,S$GLB,, | Performed by: NURSE PRACTITIONER

## 2019-06-06 PROCEDURE — 3044F PR MOST RECENT HEMOGLOBIN A1C LEVEL <7.0%: ICD-10-PCS | Mod: CPTII,S$GLB,, | Performed by: NURSE PRACTITIONER

## 2019-06-06 PROCEDURE — G0439 PPPS, SUBSEQ VISIT: HCPCS | Mod: S$GLB,,, | Performed by: NURSE PRACTITIONER

## 2019-06-06 PROCEDURE — 99499 UNLISTED E&M SERVICE: CPT | Mod: S$GLB,,, | Performed by: NURSE PRACTITIONER

## 2019-06-06 NOTE — PATIENT INSTRUCTIONS
Long-Term Complications of Diabetes    Diabetes can cause health problems over time. These are called complications. They are more likely to happen if your blood sugar is often too high. Over time, high blood sugar can damage blood vessels in your body. It is important to keep your blood sugar in your target range. This can help prevent or delay complications from diabetes.  Possible complications  Complications of diabetes include:  · Eye problems, including damage to the blood vessels in the eyes (retinopathy), pressure in the eye (glaucoma), and clouding of the eyes lens (a cataract). Eye problems can eventually lead to irreversible blindness.   · Tooth and gum problems (periodontal disease), causing loss of teeth and bone  · Blood vessel (vascular) disease leading to circulation problems, heart attack or stroke, or a need for amputation of a limb   · Problems with sexual function leading to erectile dysfunction in men and sexual discomfort in women   · Kidney disease (nephropathy) can eventually lead to kidney failure, which may require dialysis or kidney transplant   · Nerve problems (neuropathy), causing pain or loss of feeling in your feet and other parts of your body, potentially leading to an amputation of a limb   · High blood pressure (hypertension), putting strain on your heart and blood vessels  · Serious infections, possibly leading to loss of toes, feet, or limbs  How to avoid complications  The serious consequences of these complications may be avoidable for most people with diabetes by managing your blood glucose, blood pressure, and cholesterol levels. This can help you feel better and stay healthy. You can manage diabetes by tracking your blood sugar. You can also eat healthy and exercise to avoid gaining weight. And you should take medicine if directed by your healthcare provider.  Date Last Reviewed: 5/1/2016  © 4921-5460 [employer]. [address] [place] [address], [place] [address].  All rights reserved. This information is not intended as a substitute for professional medical care. Always follow your healthcare professional's instructions.        Counseling and Referral of Other Preventative  (Italic type indicates deductible and co-insurance are waived)    Patient Name: Addy Redding  Today's Date: 6/6/2019    Health Maintenance       Date Due Completion Date    Shingles Vaccine (1 of 2) 11/22/2003 ---    Pneumococcal Vaccine (65+ Low/Medium Risk) (1 of 2 - PCV13) 11/22/2018 10/23/2013    Influenza Vaccine 08/01/2019 10/9/2018    Override on 10/3/2016: Done    Foot Exam 08/28/2019 8/28/2018    Override on 8/28/2018: Done    Override on 1/11/2017: Done    Override on 5/27/2015: Done    Eye Exam 08/28/2019 8/28/2018 (Done)    Override on 8/28/2018: Done    Override on 12/23/2016: Done    Override on 9/23/2013: Done    Fecal Occult Blood Test (FOBT)/[employer]Kit 09/05/2019 9/5/2018 (Done)    Override on 9/5/2018: Done    Hemoglobin A1c 10/02/2019 4/2/2019    Lipid Panel 04/02/2020 4/2/2019    Low Dose Statin 06/06/2020 6/6/2019    TETANUS VACCINE 04/03/2027 4/3/2017        No orders of the defined types were placed in this encounter.    The following information is provided to all patients.  This information is to help you find resources for any of the problems found today that may be affecting your health:                Living healthy guide: www.[facility].[place].gov      Understanding Diabetes: www.diabetes.org      Eating healthy: www.cdc.gov/healthyweight      CDC home safety checklist: www.cdc.gov/steadi/patient.html      Agency on Aging: www.goea.[place].gov      Alcoholics anonymous (AA): www.aa.org      Physical Activity: www.adeola.nih.gov/ul2gfap      Tobacco use: www.quitwithusla.org

## 2019-06-06 NOTE — PROGRESS NOTES
"Addy Redding presented for a  Medicare AWV and comprehensive Health Risk Assessment today. The following components were reviewed and updated:    · Medical history  · Family History  · Social history  · Allergies and Current Medications  · Health Risk Assessment  · Health Maintenance  · Care Team     ** See Completed Assessments for Annual Wellness Visit within the encounter summary.**       The following assessments were completed:  · Living Situation  · CAGE  · Depression Screening  · Timed Get Up and Go  · Whisper Test  · Cognitive Function Screening  · Nutrition Screening  · ADL Screening  · PAQ Screening    Vitals:    06/06/19 0809   BP: 124/66   Pulse: (!) 50   Temp: 98.2 °F (36.8 °C)   SpO2: 98%   Weight: 90.4 kg (199 lb 4.7 oz)   Height: 6' 2" (1.88 m)     Body mass index is 25.59 kg/m².  Physical Exam   Constitutional: He is oriented to person, place, and time. He appears well-developed and well-nourished. No distress.   HENT:   Head: Normocephalic.   Right Ear: Tympanic membrane and ear canal normal.   Left Ear: Tympanic membrane and ear canal normal.   Nose: Nose normal.   Mouth/Throat: Uvula is midline and oropharynx is clear and moist.   Eyes: Pupils are equal, round, and reactive to light. Conjunctivae are normal.   Neck: Normal range of motion. Neck supple. No thyromegaly present.   Cardiovascular: Normal rate and regular rhythm.   No murmur heard.  Pulmonary/Chest: Effort normal and breath sounds normal.   Abdominal: Soft. Bowel sounds are normal. There is no tenderness.   Musculoskeletal: Normal range of motion.   Neurological: He is alert and oriented to person, place, and time. He has normal strength. Gait normal.   Skin: Skin is warm and dry. Capillary refill takes less than 2 seconds.   Psychiatric: He has a normal mood and affect. His behavior is normal.             Diagnoses and health risks identified today and associated recommendations/orders:    1. Encounter for preventive health " examination  -referred patient to local pharmacy for pneumococcal and shingles vaccination    2. Hypertension associated with diabetes  - Chronic, stable, continue current medication therapy  - Followed by PCP    3. Type 2 diabetes mellitus with stage 3 chronic kidney disease, without long-term current use of insulin  - Chronic, stable, continue current medication therapy  - Followed by PCP    4. Dyslipidemia associated with type 2 diabetes mellitus  - Chronic, stable, continue current medication therapy  - Followed by PCP    5. Erectile dysfunction, unspecified erectile dysfunction type  - Chronic, stable, continue current medication therapy  - Followed by PCP      Provided Addy with a 5-10 year written screening schedule and personal prevention plan. Recommendations were developed using the USPSTF age appropriate recommendations. Education, counseling, and referrals were provided as needed. After Visit Summary printed and given to patient which includes a list of additional screenings\tests needed.    Follow up with PCP in October 2019 for 6 month chronic disease management visit.    Follow up for as scheduled with PCP and Annual Medicare Wellness exam in 1 year.    DOMINGO Howe  I offered to discuss end of life issues, including information on how to make advance directives that the patient could use to name someone who would make medical decisions on their behalf if they became too ill to make themselves.    ___Patient declined  _X_Patient is interested, I provided paper work and offered to discuss.

## 2019-06-07 ENCOUNTER — IMMUNIZATION (OUTPATIENT)
Dept: PHARMACY | Facility: CLINIC | Age: 64
End: 2019-06-07
Payer: MEDICARE

## 2019-07-03 DIAGNOSIS — I10 ESSENTIAL HYPERTENSION: ICD-10-CM

## 2019-07-03 DIAGNOSIS — E78.5 HYPERLIPIDEMIA, UNSPECIFIED HYPERLIPIDEMIA TYPE: ICD-10-CM

## 2019-07-03 DIAGNOSIS — E11.9 CONTROLLED TYPE 2 DIABETES MELLITUS WITHOUT COMPLICATION, WITHOUT LONG-TERM CURRENT USE OF INSULIN: ICD-10-CM

## 2019-07-03 DIAGNOSIS — E11.9 TYPE 2 DIABETES MELLITUS WITHOUT COMPLICATION, WITHOUT LONG-TERM CURRENT USE OF INSULIN: ICD-10-CM

## 2019-07-03 RX ORDER — LISINOPRIL 10 MG/1
10 TABLET ORAL DAILY
Qty: 90 TABLET | Refills: 0 | Status: SHIPPED | OUTPATIENT
Start: 2019-07-03 | End: 2019-09-29 | Stop reason: SDUPTHER

## 2019-07-03 RX ORDER — ATORVASTATIN CALCIUM 40 MG/1
40 TABLET, FILM COATED ORAL DAILY
Qty: 90 TABLET | Refills: 0 | Status: SHIPPED | OUTPATIENT
Start: 2019-07-03 | End: 2019-09-29 | Stop reason: SDUPTHER

## 2019-07-03 RX ORDER — METFORMIN HYDROCHLORIDE 1000 MG/1
1000 TABLET ORAL 2 TIMES DAILY
Qty: 180 TABLET | Refills: 0 | Status: SHIPPED | OUTPATIENT
Start: 2019-07-03 | End: 2019-09-29 | Stop reason: SDUPTHER

## 2019-07-29 ENCOUNTER — IMMUNIZATION (OUTPATIENT)
Dept: PHARMACY | Facility: CLINIC | Age: 64
End: 2019-07-29
Payer: MEDICARE

## 2019-09-29 DIAGNOSIS — E11.9 CONTROLLED TYPE 2 DIABETES MELLITUS WITHOUT COMPLICATION, WITHOUT LONG-TERM CURRENT USE OF INSULIN: ICD-10-CM

## 2019-09-29 DIAGNOSIS — E11.9 TYPE 2 DIABETES MELLITUS WITHOUT COMPLICATION, WITHOUT LONG-TERM CURRENT USE OF INSULIN: ICD-10-CM

## 2019-09-29 DIAGNOSIS — I10 ESSENTIAL HYPERTENSION: ICD-10-CM

## 2019-09-29 DIAGNOSIS — E78.5 HYPERLIPIDEMIA, UNSPECIFIED HYPERLIPIDEMIA TYPE: ICD-10-CM

## 2019-09-29 RX ORDER — METFORMIN HYDROCHLORIDE 1000 MG/1
TABLET ORAL
Qty: 180 TABLET | Refills: 0 | Status: SHIPPED | OUTPATIENT
Start: 2019-09-29 | End: 2019-12-10 | Stop reason: SDUPTHER

## 2019-09-29 RX ORDER — ATORVASTATIN CALCIUM 40 MG/1
TABLET, FILM COATED ORAL
Qty: 90 TABLET | Refills: 0 | Status: SHIPPED | OUTPATIENT
Start: 2019-09-29 | End: 2019-12-10 | Stop reason: SDUPTHER

## 2019-09-29 RX ORDER — LISINOPRIL 10 MG/1
TABLET ORAL
Qty: 90 TABLET | Refills: 0 | Status: SHIPPED | OUTPATIENT
Start: 2019-09-29 | End: 2019-12-10

## 2019-10-08 ENCOUNTER — IMMUNIZATION (OUTPATIENT)
Dept: PHARMACY | Facility: CLINIC | Age: 64
End: 2019-10-08

## 2019-10-08 ENCOUNTER — IMMUNIZATION (OUTPATIENT)
Dept: PHARMACY | Facility: CLINIC | Age: 64
End: 2019-10-08
Payer: MEDICARE

## 2019-10-18 DIAGNOSIS — E11.9 TYPE 2 DIABETES MELLITUS WITHOUT COMPLICATION: ICD-10-CM

## 2019-10-22 DIAGNOSIS — Z12.11 COLON CANCER SCREENING: ICD-10-CM

## 2019-10-31 ENCOUNTER — PATIENT OUTREACH (OUTPATIENT)
Dept: ADMINISTRATIVE | Facility: HOSPITAL | Age: 64
End: 2019-10-31

## 2019-11-25 DIAGNOSIS — N52.9 ERECTILE DYSFUNCTION, UNSPECIFIED ERECTILE DYSFUNCTION TYPE: ICD-10-CM

## 2019-11-25 RX ORDER — SILDENAFIL CITRATE 20 MG/1
20 TABLET ORAL DAILY PRN
Qty: 30 TABLET | Refills: 0 | Status: SHIPPED | OUTPATIENT
Start: 2019-11-25 | End: 2020-06-15

## 2019-12-02 ENCOUNTER — PATIENT OUTREACH (OUTPATIENT)
Dept: ADMINISTRATIVE | Facility: OTHER | Age: 64
End: 2019-12-02

## 2019-12-04 ENCOUNTER — HOSPITAL ENCOUNTER (EMERGENCY)
Facility: HOSPITAL | Age: 64
Discharge: HOME | End: 2019-12-04
Attending: EMERGENCY MEDICINE
Payer: MEDICARE

## 2019-12-04 VITALS
DIASTOLIC BLOOD PRESSURE: 69 MMHG | RESPIRATION RATE: 16 BRPM | HEIGHT: 74 IN | SYSTOLIC BLOOD PRESSURE: 137 MMHG | WEIGHT: 108 LBS | TEMPERATURE: 99 F | HEART RATE: 57 BPM | OXYGEN SATURATION: 100 % | BODY MASS INDEX: 13.86 KG/M2

## 2019-12-04 DIAGNOSIS — R03.0 ELEVATED BLOOD PRESSURE READING: ICD-10-CM

## 2019-12-04 DIAGNOSIS — S39.012A LUMBAR STRAIN, INITIAL ENCOUNTER: Primary | ICD-10-CM

## 2019-12-04 LAB — POCT GLUCOSE: 95 MG/DL (ref 70–110)

## 2019-12-04 PROCEDURE — 25000003 PHARM REV CODE 250: Performed by: NURSE PRACTITIONER

## 2019-12-04 PROCEDURE — 82962 GLUCOSE BLOOD TEST: CPT

## 2019-12-04 PROCEDURE — 99284 EMERGENCY DEPT VISIT MOD MDM: CPT

## 2019-12-04 RX ORDER — METHOCARBAMOL 500 MG/1
1000 TABLET, FILM COATED ORAL 3 TIMES DAILY
Qty: 30 TABLET | Refills: 0 | Status: SHIPPED | OUTPATIENT
Start: 2019-12-04 | End: 2019-12-09

## 2019-12-04 RX ORDER — ACETAMINOPHEN 325 MG/1
650 TABLET ORAL
Status: COMPLETED | OUTPATIENT
Start: 2019-12-04 | End: 2019-12-04

## 2019-12-04 RX ORDER — DOCUSATE SODIUM 100 MG/1
100 CAPSULE, LIQUID FILLED ORAL 2 TIMES DAILY
Qty: 10 CAPSULE | Refills: 0 | Status: SHIPPED | OUTPATIENT
Start: 2019-12-04 | End: 2020-06-15

## 2019-12-04 RX ORDER — TRAMADOL HYDROCHLORIDE 50 MG/1
50 TABLET ORAL EVERY 8 HOURS PRN
Qty: 8 TABLET | Refills: 0 | Status: SHIPPED | OUTPATIENT
Start: 2019-12-04 | End: 2019-12-10 | Stop reason: SDUPTHER

## 2019-12-04 RX ADMIN — ACETAMINOPHEN 650 MG: 325 TABLET ORAL at 03:12

## 2019-12-04 NOTE — ED NOTES
APPEARANCE: Alert, oriented and in no acute distress.  CARDIAC: Normal rate and rhythm, no murmur heard.   PERIPHERAL VASCULAR: peripheral pulses present. Normal cap refill. No edema. Warm to touch.    RESPIRATORY:Normal rate and effort, breath sounds clear bilaterally throughout chest. Respirations are equal and unlabored no obvious signs of distress.  GASTRO: soft, bowel sounds normal, no tenderness, no abdominal distention.  MUSC: Full ROM. No bony tenderness or soft tissue tenderness. No obvious deformity.  SKIN: Skin is warm and dry, normal skin turgor, mucous membranes moist.  NEURO: 5/5 strength major flexors/extensors bilaterally. Sensory intact to light touch bilaterally. [place] coma scale: eyes open spontaneously-4, oriented & converses-5, obeys commands-6. No neurological abnormalities.   MENTAL STATUS: awake, alert and aware of environment.  EYE: PERRL, both eyes: pupils brisk and reactive to light. Normal size.  ENT: EARS: no obvious drainage. NOSE: no active bleeding.   .

## 2019-12-04 NOTE — ED PROVIDER NOTES
"Encounter Date: 2019       History     Chief Complaint   Patient presents with    Back Pain     lifting something heavy  flet something pull to low mid back denies pain radiation.     65yo male here for low back pain after lifting a heavy object on .  Pt reports that he felt something "pull' in his low back upon lifting.  Pt denies urinary symptoms, abd pain, weakness, numbness/tinging, CP, SOB, loss of bowel or bladder control, and saddle anesthesia.  He has had no medications to help with pain today.  Pt drove himself and was ambulatory in to the ED.  He denies history of cancer and IV drug use.  Pain is worse with movements such as bending forward and certain positions.  No other complaints at this time.      The history is provided by the patient.     Review of patient's allergies indicates:  No Known Allergies  Past Medical History:   Diagnosis Date    Costochondritis     Diabetic nephropathy associated with type 2 diabetes mellitus     Diabetic retinopathy     ED (erectile dysfunction)     GERD (gastroesophageal reflux disease)     Hyperlipidemia     Hypertension     Type II or unspecified type diabetes mellitus with renal manifestations, uncontrolled(250.42)      Past Surgical History:   Procedure Laterality Date    Bone biopsy right femur       Family History   Problem Relation Age of Onset    Hypertension Mother     Diabetes Mother     Hypertension Sister     Hypertension Brother     Diabetes Sister     Diabetes Brother     Lung cancer Brother     Stomach cancer Sister     Coronary artery disease Father          of an MI at age 60     Social History     Tobacco Use    Smoking status: Former Smoker     Packs/day: 0.50     Years: 12.00     Pack years: 6.00     Types: Cigarettes    Smokeless tobacco: Never Used   Substance Use Topics    Alcohol use: No    Drug use: No     Review of Systems   Constitutional: Negative for activity change, fatigue and fever.   HENT: " Negative for facial swelling, nosebleeds and trouble swallowing.    Respiratory: Negative for chest tightness and shortness of breath.    Cardiovascular: Negative for chest pain.   Gastrointestinal: Negative for abdominal pain, diarrhea, nausea and vomiting.   Genitourinary: Negative for difficulty urinating and dysuria.   Musculoskeletal: Positive for back pain. Negative for joint swelling, myalgias and neck pain.   Skin: Negative.    Neurological: Negative for weakness and headaches.   Psychiatric/Behavioral: Negative for confusion.   All other systems reviewed and are negative.      Physical Exam     Initial Vitals   BP Pulse Resp Temp SpO2   -- -- -- -- --      MAP       --         Physical Exam    Nursing note and vitals reviewed.  Constitutional: He appears well-developed and well-nourished. He is active and cooperative. He is easily aroused.  Non-toxic appearance. He does not have a sickly appearance. He does not appear ill. No distress.   HENT:   Head: Normocephalic and atraumatic.   Eyes: Conjunctivae are normal.   Neck: Normal range of motion.   Cardiovascular: Normal rate, regular rhythm and normal heart sounds.   Pulses:       Posterior tibial pulses are 2+ on the right side, and 2+ on the left side.   Pulmonary/Chest: Effort normal and breath sounds normal.   Abdominal: Soft. Normal appearance and bowel sounds are normal. He exhibits no distension. There is no tenderness. There is no rigidity, no guarding and no CVA tenderness.   Musculoskeletal:        Cervical back: Normal.        Thoracic back: Normal.        Lumbar back: He exhibits decreased range of motion (in flexion due to pain), tenderness, pain and spasm. He exhibits no bony tenderness, no swelling, no edema, no deformity, no laceration and normal pulse.        Back:    No midline tenderness. No deformity or crepitus.    Neurological: He is alert, oriented to person, place, and time and easily aroused. He has normal strength. Coordination and  gait normal. GCS eye subscore is 4. GCS verbal subscore is 5. GCS motor subscore is 6.   SLR positive on left. 5/5 strength BLE.  Pt walks with steady gait.     Skin: Skin is warm, dry and intact. No rash noted.   Psychiatric: He has a normal mood and affect. His speech is normal and behavior is normal. Judgment and thought content normal. Cognition and memory are normal.         ED Course   Procedures  Labs Reviewed - No data to display       Imaging Results    None          Medical Decision Making:   History:   Old Medical Records: I decided to obtain old medical records.  Old Records Summarized: other records.       <> Summary of Records: Creat 1.5 and GFR 56 on 4/2/19  ED Management:  PO tylenol  SUBJECTIVE:   Addy Redding is a 64 y.o. male who complains of an injury causing low back pain after lifting heavy objects on Sunday. The pain is positional with bending or lifting,without radiation down the legs.  Symptoms have been constant since that time. Prior history of back problems: recurrent self limited episodes of low back pain in the past. There is no numbness in the legs.    OBJECTIVE:  Vital signs as noted above. Patient appears to be in mild to moderate pain, antalgic gait noted. Lumbosacral spine area reveals no local tenderness or mass. Painful and reduced LS ROM noted. Straight leg raise is positive at 30 degrees on left. DTR's, motor strength and sensation normal, including heel and toe gait.  Peripheral pulses are palpable. Lumbar spine X-Ray: not indicated.     ASSESSMENT:   lumbar strain    PLAN:  For acute pain, rest, intermittent application of cold packs (later, may switch to heat, but do not sleep on heating pad), analgesics and muscle relaxants are recommended. Pt's GFR on previous CMP mildly decreased and therefore NSAIDs deferred.  Discussed longer term treatment plan of prn NSAID's and discussed a home back care exercise program with flexion exercise routine. Proper lifting with avoidance of  heavy lifting discussed. Consider Physical Therapy and XRay studies if not improving. Call or return to ED prn if these symptoms worsen or fail to improve as anticipated.  The patient's blood pressure was noted to be elevated while in the ED today.  The patient has no associated signs or symptoms of hypertension.  Patient's blood pressure is likely elevated due to situation.  Advised blood pressure recheck by PCP within 1 week.                                   Clinical Impression:       ICD-10-CM ICD-9-CM   1. Lumbar strain, initial encounter S39.012A 847.2   2. Elevated blood pressure reading R03.0 796.2                             DOMINGO Simmons  12/04/19 1536       [facility]  12/04/19 1537

## 2019-12-10 ENCOUNTER — LAB VISIT (OUTPATIENT)
Dept: LAB | Facility: HOSPITAL | Age: 64
End: 2019-12-10
Attending: FAMILY MEDICINE
Payer: MEDICARE

## 2019-12-10 ENCOUNTER — OFFICE VISIT (OUTPATIENT)
Dept: FAMILY MEDICINE | Facility: CLINIC | Age: 64
End: 2019-12-10
Attending: FAMILY MEDICINE
Payer: MEDICARE

## 2019-12-10 VITALS
TEMPERATURE: 98 F | SYSTOLIC BLOOD PRESSURE: 130 MMHG | OXYGEN SATURATION: 99 % | HEIGHT: 74 IN | BODY MASS INDEX: 25.29 KG/M2 | HEART RATE: 67 BPM | DIASTOLIC BLOOD PRESSURE: 80 MMHG | WEIGHT: 197.06 LBS

## 2019-12-10 DIAGNOSIS — N18.30 TYPE 2 DIABETES MELLITUS WITH STAGE 3 CHRONIC KIDNEY DISEASE, WITHOUT LONG-TERM CURRENT USE OF INSULIN: ICD-10-CM

## 2019-12-10 DIAGNOSIS — I10 ESSENTIAL HYPERTENSION: ICD-10-CM

## 2019-12-10 DIAGNOSIS — Z12.11 ENCOUNTER FOR FIT (FECAL IMMUNOCHEMICAL TEST) SCREENING: ICD-10-CM

## 2019-12-10 DIAGNOSIS — E11.9 CONTROLLED TYPE 2 DIABETES MELLITUS WITHOUT COMPLICATION, WITHOUT LONG-TERM CURRENT USE OF INSULIN: ICD-10-CM

## 2019-12-10 DIAGNOSIS — I15.2 HYPERTENSION ASSOCIATED WITH DIABETES: ICD-10-CM

## 2019-12-10 DIAGNOSIS — R97.20 ELEVATED PSA: ICD-10-CM

## 2019-12-10 DIAGNOSIS — E11.22 TYPE 2 DIABETES MELLITUS WITH STAGE 3 CHRONIC KIDNEY DISEASE, WITHOUT LONG-TERM CURRENT USE OF INSULIN: ICD-10-CM

## 2019-12-10 DIAGNOSIS — E78.5 DYSLIPIDEMIA ASSOCIATED WITH TYPE 2 DIABETES MELLITUS: ICD-10-CM

## 2019-12-10 DIAGNOSIS — E78.5 HYPERLIPIDEMIA, UNSPECIFIED HYPERLIPIDEMIA TYPE: ICD-10-CM

## 2019-12-10 DIAGNOSIS — E11.9 TYPE 2 DIABETES MELLITUS WITHOUT COMPLICATION, WITHOUT LONG-TERM CURRENT USE OF INSULIN: ICD-10-CM

## 2019-12-10 DIAGNOSIS — E11.69 DYSLIPIDEMIA ASSOCIATED WITH TYPE 2 DIABETES MELLITUS: ICD-10-CM

## 2019-12-10 DIAGNOSIS — E11.59 HYPERTENSION ASSOCIATED WITH DIABETES: ICD-10-CM

## 2019-12-10 DIAGNOSIS — M54.42 ACUTE LEFT-SIDED LOW BACK PAIN WITH LEFT-SIDED SCIATICA: Primary | ICD-10-CM

## 2019-12-10 LAB
ALBUMIN/CREAT UR: 11.8 UG/MG (ref 0–30)
BILIRUB UR QL STRIP: NEGATIVE
CLARITY UR: CLEAR
COLOR UR: YELLOW
CREAT UR-MCNC: 348 MG/DL (ref 23–375)
GLUCOSE UR QL STRIP: NEGATIVE
HGB UR QL STRIP: ABNORMAL
KETONES UR QL STRIP: NEGATIVE
LEUKOCYTE ESTERASE UR QL STRIP: NEGATIVE
MICROALBUMIN UR DL<=1MG/L-MCNC: 41 UG/ML
MICROSCOPIC COMMENT: NORMAL
NITRITE UR QL STRIP: NEGATIVE
PH UR STRIP: 6 [PH] (ref 5–8)
PROT UR QL STRIP: NEGATIVE
RBC #/AREA URNS HPF: 3 /HPF (ref 0–4)
SP GR UR STRIP: >=1.03 (ref 1–1.03)
URN SPEC COLLECT METH UR: ABNORMAL
UROBILINOGEN UR STRIP-ACNC: NEGATIVE EU/DL

## 2019-12-10 PROCEDURE — 82043 UR ALBUMIN QUANTITATIVE: CPT

## 2019-12-10 PROCEDURE — 99499 RISK ADDL DX/OHS AUDIT: ICD-10-PCS | Mod: S$GLB,,, | Performed by: FAMILY MEDICINE

## 2019-12-10 PROCEDURE — 1159F MED LIST DOCD IN RCRD: CPT | Mod: S$GLB,,, | Performed by: FAMILY MEDICINE

## 2019-12-10 PROCEDURE — 1101F PT FALLS ASSESS-DOCD LE1/YR: CPT | Mod: CPTII,S$GLB,, | Performed by: FAMILY MEDICINE

## 2019-12-10 PROCEDURE — 3075F PR MOST RECENT SYSTOLIC BLOOD PRESS GE 130-139MM HG: ICD-10-PCS | Mod: CPTII,S$GLB,, | Performed by: FAMILY MEDICINE

## 2019-12-10 PROCEDURE — 3075F SYST BP GE 130 - 139MM HG: CPT | Mod: CPTII,S$GLB,, | Performed by: FAMILY MEDICINE

## 2019-12-10 PROCEDURE — 99499 UNLISTED E&M SERVICE: CPT | Mod: S$GLB,,, | Performed by: FAMILY MEDICINE

## 2019-12-10 PROCEDURE — 1101F PR PT FALLS ASSESS DOC 0-1 FALLS W/OUT INJ PAST YR: ICD-10-PCS | Mod: CPTII,S$GLB,, | Performed by: FAMILY MEDICINE

## 2019-12-10 PROCEDURE — 99999 PR PBB SHADOW E&M-EST. PATIENT-LVL IV: CPT | Mod: PBBFAC,,, | Performed by: FAMILY MEDICINE

## 2019-12-10 PROCEDURE — 3044F PR MOST RECENT HEMOGLOBIN A1C LEVEL <7.0%: ICD-10-PCS | Mod: CPTII,S$GLB,, | Performed by: FAMILY MEDICINE

## 2019-12-10 PROCEDURE — 3079F DIAST BP 80-89 MM HG: CPT | Mod: CPTII,S$GLB,, | Performed by: FAMILY MEDICINE

## 2019-12-10 PROCEDURE — 81000 URINALYSIS NONAUTO W/SCOPE: CPT

## 2019-12-10 PROCEDURE — 99214 OFFICE O/P EST MOD 30 MIN: CPT | Mod: S$GLB,,, | Performed by: FAMILY MEDICINE

## 2019-12-10 PROCEDURE — 1159F PR MEDICATION LIST DOCUMENTED IN MEDICAL RECORD: ICD-10-PCS | Mod: S$GLB,,, | Performed by: FAMILY MEDICINE

## 2019-12-10 PROCEDURE — 99999 PR PBB SHADOW E&M-EST. PATIENT-LVL IV: ICD-10-PCS | Mod: PBBFAC,,, | Performed by: FAMILY MEDICINE

## 2019-12-10 PROCEDURE — 3044F HG A1C LEVEL LT 7.0%: CPT | Mod: CPTII,S$GLB,, | Performed by: FAMILY MEDICINE

## 2019-12-10 PROCEDURE — 3079F PR MOST RECENT DIASTOLIC BLOOD PRESSURE 80-89 MM HG: ICD-10-PCS | Mod: CPTII,S$GLB,, | Performed by: FAMILY MEDICINE

## 2019-12-10 PROCEDURE — 99214 PR OFFICE/OUTPT VISIT, EST, LEVL IV, 30-39 MIN: ICD-10-PCS | Mod: S$GLB,,, | Performed by: FAMILY MEDICINE

## 2019-12-10 RX ORDER — METHOCARBAMOL 500 MG/1
500 TABLET, FILM COATED ORAL
COMMUNITY
Start: 2019-12-09 | End: 2019-12-19

## 2019-12-10 RX ORDER — TRAMADOL HYDROCHLORIDE 50 MG/1
50 TABLET ORAL EVERY 12 HOURS PRN
Qty: 14 TABLET | Refills: 0 | Status: SHIPPED | OUTPATIENT
Start: 2019-12-10 | End: 2020-06-15

## 2019-12-10 RX ORDER — METFORMIN HYDROCHLORIDE 1000 MG/1
1000 TABLET ORAL 2 TIMES DAILY
Qty: 180 TABLET | Refills: 3 | Status: SHIPPED | OUTPATIENT
Start: 2019-12-10 | End: 2019-12-30

## 2019-12-10 RX ORDER — ATORVASTATIN CALCIUM 40 MG/1
40 TABLET, FILM COATED ORAL DAILY
Qty: 90 TABLET | Refills: 3 | Status: SHIPPED | OUTPATIENT
Start: 2019-12-10 | End: 2019-12-30

## 2019-12-10 RX ORDER — LISINOPRIL 20 MG/1
20 TABLET ORAL DAILY
Qty: 90 TABLET | Refills: 3 | Status: SHIPPED | OUTPATIENT
Start: 2019-12-10 | End: 2020-11-09

## 2019-12-10 NOTE — PROGRESS NOTES
Subjective:       Patient ID: Addy Redding is a 64 y.o. male.    Chief Complaint: Hospital Follow Up    64 yr old black male with DM II, HTN, HLD, GERD, presents today for his annual wellness check, lab work, 3 month follow up, medication refill. C/o low back pain from work and also had ER visit.      DM II - improved - A1C                   6.8 (H)             04/02/2019                   - denies any hypoglycemic symptoms - on metformin - up to date with eye and foot screen - stopped glipizide since itw as making his sugars low    HTN - controlled  - on lisinopril 10 mg daily - no side effects    HLD - controlled and improving -   LDLCALC                  76.4                08/28/2018                                  - on statin - compliant - need refill - not fully compliant with diet    GERD - stable - takes prilosec as needed    ED - stable - takes viagra as needed    Health maintenance  -labs due  -Flu and Pneumovax - up to date  -adacel due and done today  -colonoscopy due - wants to wait  -PSA due      Medication Refill   This is a chronic problem. The current episode started more than 1 year ago. The problem occurs constantly. The problem has been gradually improving. Associated symptoms include arthralgias and myalgias. Pertinent negatives include no change in bowel habit, chest pain, congestion, coughing, diaphoresis, fatigue, joint swelling, rash, urinary symptoms, vertigo, vomiting or weakness.   Diabetes   Pertinent negatives for hypoglycemia include no confusion, dizziness, nervousness/anxiousness, speech difficulty or sweats. Pertinent negatives for diabetes include no chest pain, no fatigue, no polydipsia, no polyuria and no weakness. Pertinent negatives for diabetic complications include no CVA, PVD or retinopathy.   Hypertension   This is a chronic problem. The current episode started more than 1 year ago. The problem has been gradually worsening since onset. The problem is uncontrolled. Pertinent  negatives include no anxiety, chest pain, malaise/fatigue, orthopnea, palpitations, peripheral edema or sweats. There are no associated agents to hypertension. Risk factors for coronary artery disease include dyslipidemia, diabetes mellitus, male gender and obesity. Past treatments include ACE inhibitors. The current treatment provides no improvement. There are no compliance problems.  There is no history of angina, CAD/MI, CVA, left ventricular hypertrophy, PVD or retinopathy. There is no history of chronic renal disease, coarctation of the aorta, hypercortisolism, pheochromocytoma, renovascular disease or a thyroid problem.   Hyperlipidemia   This is a chronic problem. The current episode started more than 1 year ago. The problem is uncontrolled. Recent lipid tests were reviewed and are high. Exacerbating diseases include diabetes. He has no history of chronic renal disease, liver disease or nephrotic syndrome. There are no known factors aggravating his hyperlipidemia. Associated symptoms include myalgias. Pertinent negatives include no chest pain, focal sensory loss, focal weakness or leg pain. Current antihyperlipidemic treatment includes statins. There are no compliance problems.  Risk factors for coronary artery disease include diabetes mellitus, dyslipidemia, male sex and hypertension.     Review of Systems   Constitutional: Negative.  Negative for activity change, diaphoresis, fatigue, malaise/fatigue and unexpected weight change.   HENT: Negative.  Negative for congestion, ear pain, mouth sores, rhinorrhea and voice change.    Eyes: Negative.  Negative for pain, discharge and visual disturbance.   Respiratory: Negative.  Negative for apnea, cough and wheezing.    Cardiovascular: Negative.  Negative for chest pain, palpitations and orthopnea.   Gastrointestinal: Negative.  Negative for abdominal distention, anal bleeding, change in bowel habit, diarrhea and vomiting.   Endocrine: Negative.  Negative for cold  intolerance, polydipsia and polyuria.   Genitourinary: Negative.  Negative for decreased urine volume, difficulty urinating, discharge, frequency and scrotal swelling.   Musculoskeletal: Positive for arthralgias and myalgias. Negative for back pain, joint swelling and neck stiffness.   Skin: Negative.  Negative for color change and rash.   Allergic/Immunologic: Negative.  Negative for environmental allergies and immunocompromised state.   Neurological: Negative.  Negative for dizziness, vertigo, focal weakness, speech difficulty, weakness and light-headedness.   Hematological: Negative.    Psychiatric/Behavioral: Negative.  Negative for agitation, confusion, dysphoric mood and suicidal ideas. The patient is not nervous/anxious.        PMH/PSH/FH/SH/MED/ALLERGY reviewed    Objective:       Vitals:    12/10/19 1008   BP: 130/80   Pulse: 67   Temp: 98.3 °F (36.8 °C)       Physical Exam   Constitutional: He is oriented to person, place, and time. He appears well-developed and well-nourished.   HENT:   Head: Normocephalic and atraumatic.   Right Ear: External ear normal.   Left Ear: External ear normal.   Nose: Nose normal.   Mouth/Throat: Oropharynx is clear and moist. No oropharyngeal exudate.   Eyes: Pupils are equal, round, and reactive to light. Conjunctivae and EOM are normal. Right eye exhibits no discharge. Left eye exhibits no discharge. No scleral icterus.   Neck: Normal range of motion. Neck supple. No JVD present. No tracheal deviation present. No thyromegaly present.   Cardiovascular: Normal rate, regular rhythm, normal heart sounds and intact distal pulses. Exam reveals no gallop and no friction rub.   No murmur heard.  Pulmonary/Chest: Effort normal and breath sounds normal. No stridor. No respiratory distress. He has no wheezes. He has no rales. He exhibits no tenderness.   Abdominal: Soft. Bowel sounds are normal. He exhibits no distension and no mass. There is no tenderness. There is no rebound and no  guarding. No hernia.   Musculoskeletal: Normal range of motion. He exhibits tenderness (TTP LEFT PARALUMBAR AND si JOINT. slrt NEGATIVE.). He exhibits no edema.   Lymphadenopathy:     He has no cervical adenopathy.   Neurological: He is alert and oriented to person, place, and time. He has normal reflexes. He displays normal reflexes. No cranial nerve deficit. He exhibits normal muscle tone. Coordination normal.   Skin: Skin is warm and dry. No rash noted. No erythema. No pallor.   Psychiatric: He has a normal mood and affect. His behavior is normal. Judgment and thought content normal.       Assessment:       1. Acute left-sided low back pain with left-sided sciatica    2. Hypertension associated with diabetes    3. Type 2 diabetes mellitus with stage 3 chronic kidney disease, without long-term current use of insulin    4. Dyslipidemia associated with type 2 diabetes mellitus    5. Essential hypertension    6. Hyperlipidemia, unspecified hyperlipidemia type    7. Type 2 diabetes mellitus without complication, without long-term current use of insulin    8. Controlled type 2 diabetes mellitus without complication, without long-term current use of insulin    9. Encounter for FIT (fecal immunochemical test) screening    10. Elevated PSA        Plan:           Addy was seen today for hospital follow up.    Diagnoses and all orders for this visit:    Acute left-sided low back pain with left-sided sciatica    Hypertension associated with diabetes  -     CBC auto differential; Future  -     Comprehensive metabolic panel; Future    Type 2 diabetes mellitus with stage 3 chronic kidney disease, without long-term current use of insulin  -     blood sugar diagnostic (BLOOD GLUCOSE TEST) Strp; Check sugar once daily  -     CBC auto differential; Future  -     Comprehensive metabolic panel; Future  -     Hemoglobin A1c; Future  -     Urinalysis; Future  -     Microalbumin/creatinine urine ratio; Future    Dyslipidemia associated  with type 2 diabetes mellitus    Essential hypertension  -     lisinopril (PRINIVIL,ZESTRIL) 20 MG tablet; Take 1 tablet (20 mg total) by mouth once daily.    Hyperlipidemia, unspecified hyperlipidemia type  -     atorvastatin (LIPITOR) 40 MG tablet; Take 1 tablet (40 mg total) by mouth once daily.  -     CBC auto differential; Future  -     Comprehensive metabolic panel; Future    Type 2 diabetes mellitus without complication, without long-term current use of insulin  -     metFORMIN (GLUCOPHAGE) 1000 MG tablet; Take 1 tablet (1,000 mg total) by mouth 2 (two) times daily.  -     CBC auto differential; Future  -     Comprehensive metabolic panel; Future    Controlled type 2 diabetes mellitus without complication, without long-term current use of insulin  -     metFORMIN (GLUCOPHAGE) 1000 MG tablet; Take 1 tablet (1,000 mg total) by mouth 2 (two) times daily.  -     CBC auto differential; Future  -     Comprehensive metabolic panel; Future  -     Hemoglobin A1c; Future  -     Urinalysis; Future  -     Microalbumin/creatinine urine ratio; Future    Encounter for FIT (fecal immunochemical test) screening  -     Fecal Immunochemical Test (iFOBT); Future    Elevated PSA  -     Ambulatory referral to Urology  -     PSA, total and free; Future    Other orders  -     traMADol (ULTRAM) 50 mg tablet; Take 1 tablet (50 mg total) by mouth every 12 (twelve) hours as needed for Pain (No driving. May be sedating. Do not use at work.).        LOW BACK PAIN   -HEAT PADS  -NSAIDS AND TRAMADOL PRN      HTN   -at goal  -continue lisinopril to 10 mg/day    DM II   -controlled   -continue metformin and labs    HLD   -improving  -continue lipitor    GERD   -stable   -takes OTC PPI     ED'  -controlled      Spent adequate time in obtaining history and explaining differentials      25 minutes spent during this visit of which greater than 50% devoted to face-face counseling and coordination of care regarding diagnosis and management plan      Follow up in about 4 months (around 4/10/2020), or if symptoms worsen or fail to improve.

## 2019-12-11 ENCOUNTER — LAB VISIT (OUTPATIENT)
Dept: LAB | Facility: HOSPITAL | Age: 64
End: 2019-12-11
Attending: FAMILY MEDICINE
Payer: MEDICARE

## 2019-12-11 DIAGNOSIS — Z12.11 ENCOUNTER FOR FIT (FECAL IMMUNOCHEMICAL TEST) SCREENING: ICD-10-CM

## 2019-12-11 PROCEDURE — 82274 ASSAY TEST FOR BLOOD FECAL: CPT

## 2019-12-17 LAB — HEMOCCULT STL QL IA: NEGATIVE

## 2019-12-23 ENCOUNTER — PATIENT OUTREACH (OUTPATIENT)
Dept: ADMINISTRATIVE | Facility: OTHER | Age: 64
End: 2019-12-23

## 2019-12-27 ENCOUNTER — TELEPHONE (OUTPATIENT)
Dept: FAMILY MEDICINE | Facility: CLINIC | Age: 64
End: 2019-12-27

## 2019-12-27 ENCOUNTER — OFFICE VISIT (OUTPATIENT)
Dept: UROLOGY | Facility: CLINIC | Age: 64
End: 2019-12-27
Payer: MEDICARE

## 2019-12-27 VITALS
SYSTOLIC BLOOD PRESSURE: 145 MMHG | WEIGHT: 197 LBS | HEART RATE: 104 BPM | DIASTOLIC BLOOD PRESSURE: 76 MMHG | BODY MASS INDEX: 25.28 KG/M2 | HEIGHT: 74 IN

## 2019-12-27 DIAGNOSIS — N18.30 TYPE 2 DIABETES MELLITUS WITH STAGE 3 CHRONIC KIDNEY DISEASE, WITHOUT LONG-TERM CURRENT USE OF INSULIN: ICD-10-CM

## 2019-12-27 DIAGNOSIS — R97.20 ELEVATED PSA: Primary | ICD-10-CM

## 2019-12-27 DIAGNOSIS — R31.29 MICROSCOPIC HEMATURIA: ICD-10-CM

## 2019-12-27 DIAGNOSIS — Z12.5 ENCOUNTER FOR PROSTATE CANCER SCREENING: ICD-10-CM

## 2019-12-27 DIAGNOSIS — E11.22 TYPE 2 DIABETES MELLITUS WITH STAGE 3 CHRONIC KIDNEY DISEASE, WITHOUT LONG-TERM CURRENT USE OF INSULIN: ICD-10-CM

## 2019-12-27 PROCEDURE — 99214 PR OFFICE/OUTPT VISIT, EST, LEVL IV, 30-39 MIN: ICD-10-PCS | Mod: S$GLB,,, | Performed by: STUDENT IN AN ORGANIZED HEALTH CARE EDUCATION/TRAINING PROGRAM

## 2019-12-27 PROCEDURE — 99999 PR PBB SHADOW E&M-EST. PATIENT-LVL IV: ICD-10-PCS | Mod: PBBFAC,,, | Performed by: STUDENT IN AN ORGANIZED HEALTH CARE EDUCATION/TRAINING PROGRAM

## 2019-12-27 PROCEDURE — 1101F PR PT FALLS ASSESS DOC 0-1 FALLS W/OUT INJ PAST YR: ICD-10-PCS | Mod: CPTII,S$GLB,, | Performed by: STUDENT IN AN ORGANIZED HEALTH CARE EDUCATION/TRAINING PROGRAM

## 2019-12-27 PROCEDURE — 3077F PR MOST RECENT SYSTOLIC BLOOD PRESSURE >= 140 MM HG: ICD-10-PCS | Mod: CPTII,S$GLB,, | Performed by: STUDENT IN AN ORGANIZED HEALTH CARE EDUCATION/TRAINING PROGRAM

## 2019-12-27 PROCEDURE — 1101F PT FALLS ASSESS-DOCD LE1/YR: CPT | Mod: CPTII,S$GLB,, | Performed by: STUDENT IN AN ORGANIZED HEALTH CARE EDUCATION/TRAINING PROGRAM

## 2019-12-27 PROCEDURE — 1159F MED LIST DOCD IN RCRD: CPT | Mod: S$GLB,,, | Performed by: STUDENT IN AN ORGANIZED HEALTH CARE EDUCATION/TRAINING PROGRAM

## 2019-12-27 PROCEDURE — 3078F DIAST BP <80 MM HG: CPT | Mod: CPTII,S$GLB,, | Performed by: STUDENT IN AN ORGANIZED HEALTH CARE EDUCATION/TRAINING PROGRAM

## 2019-12-27 PROCEDURE — 99214 OFFICE O/P EST MOD 30 MIN: CPT | Mod: S$GLB,,, | Performed by: STUDENT IN AN ORGANIZED HEALTH CARE EDUCATION/TRAINING PROGRAM

## 2019-12-27 PROCEDURE — 3078F PR MOST RECENT DIASTOLIC BLOOD PRESSURE < 80 MM HG: ICD-10-PCS | Mod: CPTII,S$GLB,, | Performed by: STUDENT IN AN ORGANIZED HEALTH CARE EDUCATION/TRAINING PROGRAM

## 2019-12-27 PROCEDURE — 1126F AMNT PAIN NOTED NONE PRSNT: CPT | Mod: S$GLB,,, | Performed by: STUDENT IN AN ORGANIZED HEALTH CARE EDUCATION/TRAINING PROGRAM

## 2019-12-27 PROCEDURE — 99999 PR PBB SHADOW E&M-EST. PATIENT-LVL IV: CPT | Mod: PBBFAC,,, | Performed by: STUDENT IN AN ORGANIZED HEALTH CARE EDUCATION/TRAINING PROGRAM

## 2019-12-27 PROCEDURE — 1126F PR PAIN SEVERITY QUANTIFIED, NO PAIN PRESENT: ICD-10-PCS | Mod: S$GLB,,, | Performed by: STUDENT IN AN ORGANIZED HEALTH CARE EDUCATION/TRAINING PROGRAM

## 2019-12-27 PROCEDURE — 3077F SYST BP >= 140 MM HG: CPT | Mod: CPTII,S$GLB,, | Performed by: STUDENT IN AN ORGANIZED HEALTH CARE EDUCATION/TRAINING PROGRAM

## 2019-12-27 PROCEDURE — 1159F PR MEDICATION LIST DOCUMENTED IN MEDICAL RECORD: ICD-10-PCS | Mod: S$GLB,,, | Performed by: STUDENT IN AN ORGANIZED HEALTH CARE EDUCATION/TRAINING PROGRAM

## 2019-12-27 RX ORDER — ETODOLAC 200 MG/1
200 CAPSULE ORAL EVERY 8 HOURS
COMMUNITY
Start: 2019-12-17 | End: 2020-06-15

## 2019-12-27 RX ORDER — METHOCARBAMOL 500 MG/1
TABLET, FILM COATED ORAL
COMMUNITY
Start: 2019-12-17 | End: 2020-06-15

## 2019-12-27 NOTE — LETTER
December 27, 2019      Stephan Ramey MD  [address]  [address]  [facility] [address]           [facility]  [address]  [facility] [address]  Phone: [phone]          Patient: Addy Redding   MR Number: [id number]   YOB: 1955   Date of Visit: 12/27/2019       Dear Dr. Stephan Ramey:    Thank you for referring Addy Redding to me for evaluation. Attached you will find relevant portions of my assessment and plan of care.    If you have questions, please do not hesitate to call me. I look forward to following Addy Redding along with you.    Sincerely,    Janice Kent MD    Enclosure  CC:  No Recipients    If you would like to receive this communication electronically, please contact [email or web address] or [phone] to request more information on [employer] Link access.    For providers and/or their staff who would like to refer a patient to [facility], please contact us through our one-stop-shop provider referral line, [facility] , at [phone].    If you feel you have received this communication in error or would no longer like to receive these types of communications, please e-mail [email or web address]

## 2019-12-27 NOTE — TELEPHONE ENCOUNTER
Spoke to Lesley at [email or web address] and stated that [facility] need a prescription for pt's diabetic strips. Informed Lesley that Dr. Ramey was out of the office until January 6th. Informed her that the message will be sent to Dr. Ramey's inbox.

## 2019-12-27 NOTE — TELEPHONE ENCOUNTER
----- Message from Irma Howe sent at 12/27/2019  1:35 PM CST -----  Contact: Carmelita Calling from [facility] [phone]  Patient is calling to talk to nurse in regards to getting patients diabetics supplies and needs a medical necessity form and current clinic chart notes faxed to [phone]

## 2019-12-27 NOTE — PROGRESS NOTES
Subjective:       Patient ID: Addy Redding is a 64 y.o. male.    Chief Complaint:  followup / elevated PSA  This is a 64 y.o.  male patient that is an established patient of mine.  The patient is referred to me by his PCP Dr. Ramey for an elevated PSA. He states he was unaware of the reason for the visit until it was explained to him this morning. I showed him the PSA blood tests on the screen.   Race:   Age: 62 y.o.   Previously abnormal PSA: no  Previous biopsy or recommended a biopsy: no  Previous abnormal MARGARITA: no  Family history of prostate cancer no     He is s/p a TRUS biopsy with me on 9/18/18 for a PSA of 4.7. MARGARITA findings: 35-40. TRUS size: 43.6cc. Biopsy results demonstrated no cancer. We have been monitoring his PSA. His PSA in 2019 has demonstrated a rise to 5.6 on 4/2/19 and then again to 6.4 on 12/10/19 more recently.  Also on a routine urinalysis on 12/10/19 obtained in his PCP clinic demonstrated 3 RBCs on micro.    Recently injured on the job - going to undergo ortho workup.    LAST PSA  Lab Results   Component Value Date    PSA 4.7 (H) 08/28/2018    PSA 3.3 01/11/2017    PSA 1.6 11/04/2014    PSA 1.7 10/23/2013    PSA 1.5 08/07/2012    PSA 0.68 07/25/2009    PSA 0.6 12/27/2007    PSATOTAL 6.4 (H) 12/10/2019    PSATOTAL 5.6 (H) 04/02/2019    PSAFREE 0.63 12/10/2019    PSAFREE 0.57 04/02/2019       Lab Results   Component Value Date    CREATININE 1.5 (H) 12/10/2019     ---  Past Medical History:   Diagnosis Date    Costochondritis     Diabetic nephropathy associated with type 2 diabetes mellitus     Diabetic retinopathy     ED (erectile dysfunction)     GERD (gastroesophageal reflux disease)     Hyperlipidemia     Hypertension     Type II or unspecified type diabetes mellitus with renal manifestations, uncontrolled(250.42)        Past Surgical History:   Procedure Laterality Date    Bone biopsy right femur         Family History   Problem Relation Age of Onset    Hypertension Mother      Diabetes Mother     Hypertension Sister     Hypertension Brother     Diabetes Sister     Diabetes Brother     Lung cancer Brother     Stomach cancer Sister     Coronary artery disease Father          of an MI at age 60       Social History     Tobacco Use    Smoking status: Former Smoker     Packs/day: 0.50     Years: 12.00     Pack years: 6.00     Types: Cigarettes    Smokeless tobacco: Never Used   Substance Use Topics    Alcohol use: No    Drug use: No       Current Outpatient Medications on File Prior to Visit   Medication Sig Dispense Refill    ADVANCED GLUC METER TEST STRIP Strp USE TO TEST BLOOD SUGAR 4 TIMES DAILY. 100 strip 0    atorvastatin (LIPITOR) 40 MG tablet Take 1 tablet (40 mg total) by mouth once daily. 90 tablet 3    blood sugar diagnostic (BLOOD GLUCOSE TEST) Strp Check sugar once daily 100 each 8    cetirizine (ZYRTEC) 10 MG tablet Take 1 tablet (10 mg total) by mouth once daily.  0    docusate sodium (COLACE) 100 MG capsule Take 1 capsule (100 mg total) by mouth 2 (two) times daily. 10 capsule 0    etodolac (LODINE) 200 MG Cap Take 200 mg by mouth every 8 (eight) hours.      lisinopril (PRINIVIL,ZESTRIL) 20 MG tablet Take 1 tablet (20 mg total) by mouth once daily. 90 tablet 3    metFORMIN (GLUCOPHAGE) 1000 MG tablet Take 1 tablet (1,000 mg total) by mouth 2 (two) times daily. 180 tablet 3    methocarbamol (ROBAXIN) 500 MG Tab 1-2 po bid prn spasm      sildenafil (REVATIO) 20 mg Tab Take 1 tablet (20 mg total) by mouth daily as needed. 30 tablet 0    traMADol (ULTRAM) 50 mg tablet Take 1 tablet (50 mg total) by mouth every 12 (twelve) hours as needed for Pain (No driving. May be sedating. Do not use at work.). 14 tablet 0    blood-glucose meter kit Use as instructed       No current facility-administered medications on file prior to visit.        Review of patient's allergies indicates:  No Known Allergies    Review of Systems   Constitutional: Negative for  chills.   HENT: Negative for congestion.    Eyes: Negative for visual disturbance.   Respiratory: Negative for shortness of breath.    Cardiovascular: Negative for chest pain.   Gastrointestinal: Negative for abdominal distention.   Musculoskeletal: Negative for gait problem.   Skin: Negative for color change.   Neurological: Negative for dizziness.   Psychiatric/Behavioral: Negative for agitation.       Objective:      Physical Exam   Constitutional: He appears well-developed and well-nourished.   HENT:   Head: Normocephalic.   Eyes: Pupils are equal, round, and reactive to light.   Neck: Normal range of motion.   Cardiovascular: Intact distal pulses.   Pulmonary/Chest: Effort normal.   Abdominal: Soft.   Musculoskeletal: Normal range of motion.   Neurological: He is alert.   Skin: Skin is warm and dry.   Psychiatric: He has a normal mood and affect.       Assessment:       1. Elevated PSA    2. Microscopic hematuria    3. Encounter for prostate cancer screening        Plan:       1. Elevated PSA - he is s/p a neg TRUS biopsy but the two PSA values checked afterwards has demonstrated persistent rises. Will obtain an MRI and BMP first. If PIRADS 3 lesion or greater, will refer to my colleague Dr. Frias for uronav biopsy.  2. The patient has microscopic hematuria  I counseled the patient on the American Urology Guidelines for a hematuria workup.  The criteria for microscopic hematuria is 3 red blood cells on microscopic urinalysis and the workup is recommended for any patient experiencing gross hematuria. The workup includes a CT urogram and a flexible cystoscopy performed here in the clinic. After answering all of the questions about the workup the patient was not amenable in proceeding. He would like to have the MRI workup first and hold off on microscopic hematuria workup.     Elevated PSA  -     Basic metabolic panel; Future; Expected date: 12/27/2019  -     MRI Prostate W W/O Contrast; Future; Expected date:  12/27/2019    Microscopic hematuria    Encounter for prostate cancer screening

## 2019-12-30 DIAGNOSIS — E78.5 HYPERLIPIDEMIA, UNSPECIFIED HYPERLIPIDEMIA TYPE: ICD-10-CM

## 2019-12-30 DIAGNOSIS — E11.9 TYPE 2 DIABETES MELLITUS WITHOUT COMPLICATION, WITHOUT LONG-TERM CURRENT USE OF INSULIN: ICD-10-CM

## 2019-12-30 DIAGNOSIS — E11.9 CONTROLLED TYPE 2 DIABETES MELLITUS WITHOUT COMPLICATION, WITHOUT LONG-TERM CURRENT USE OF INSULIN: ICD-10-CM

## 2019-12-30 RX ORDER — METFORMIN HYDROCHLORIDE 1000 MG/1
TABLET ORAL
Qty: 180 TABLET | Refills: 0 | Status: SHIPPED | OUTPATIENT
Start: 2019-12-30 | End: 2020-06-15 | Stop reason: SDUPTHER

## 2019-12-30 RX ORDER — LISINOPRIL 10 MG/1
TABLET ORAL
Qty: 90 TABLET | Refills: 0 | OUTPATIENT
Start: 2019-12-30

## 2019-12-30 RX ORDER — ATORVASTATIN CALCIUM 40 MG/1
TABLET, FILM COATED ORAL
Qty: 90 TABLET | Refills: 0 | Status: SHIPPED | OUTPATIENT
Start: 2019-12-30 | End: 2020-06-15 | Stop reason: SDUPTHER

## 2019-12-31 ENCOUNTER — PATIENT MESSAGE (OUTPATIENT)
Dept: FAMILY MEDICINE | Facility: CLINIC | Age: 64
End: 2019-12-31

## 2019-12-31 NOTE — TELEPHONE ENCOUNTER
----- Message from Irma Howe sent at 12/31/2019  8:13 AM CST -----  Contact: [phone]  [facility] representative is calling to talk to nurse in regards to patients diabetic supplies. Please advice

## 2019-12-31 NOTE — TELEPHONE ENCOUNTER
Spoke to pt and states that he's having lower back pain and requesting a MRI. Pt stated that he was hurt at the job. Informed pt that this is a Workman's Comp injury and he would have to see his company doctor. Pt verbalized understanding.

## 2020-01-03 ENCOUNTER — LAB VISIT (OUTPATIENT)
Dept: LAB | Facility: HOSPITAL | Age: 65
End: 2020-01-03
Attending: STUDENT IN AN ORGANIZED HEALTH CARE EDUCATION/TRAINING PROGRAM
Payer: MEDICARE

## 2020-01-03 DIAGNOSIS — R97.20 ELEVATED PSA: ICD-10-CM

## 2020-01-03 LAB
ANION GAP SERPL CALC-SCNC: 10 MMOL/L (ref 8–16)
BUN SERPL-MCNC: 19 MG/DL (ref 8–23)
CALCIUM SERPL-MCNC: 9.2 MG/DL (ref 8.7–10.5)
CHLORIDE SERPL-SCNC: 106 MMOL/L (ref 95–110)
CO2 SERPL-SCNC: 25 MMOL/L (ref 23–29)
CREAT SERPL-MCNC: 1.6 MG/DL (ref 0.5–1.4)
EST. GFR  (AFRICAN AMERICAN): 51 ML/MIN/1.73 M^2
EST. GFR  (NON AFRICAN AMERICAN): 44 ML/MIN/1.73 M^2
GLUCOSE SERPL-MCNC: 142 MG/DL (ref 70–110)
POTASSIUM SERPL-SCNC: 4.2 MMOL/L (ref 3.5–5.1)
SODIUM SERPL-SCNC: 141 MMOL/L (ref 136–145)

## 2020-01-03 PROCEDURE — 80048 BASIC METABOLIC PNL TOTAL CA: CPT

## 2020-01-03 PROCEDURE — 36415 COLL VENOUS BLD VENIPUNCTURE: CPT

## 2020-01-06 NOTE — TELEPHONE ENCOUNTER
----- Message from Sally Ibarra sent at 1/6/2020 12:13 PM CST -----  Contact: [email or web address]-[phone]  [email or web address]-[phone] is requesting a callback regarding needing a prescription for Diabetic Lancets. Please call

## 2020-01-07 RX ORDER — LANCETS
1 EACH MISCELLANEOUS 2 TIMES DAILY
Qty: 200 EACH | Refills: 1 | Status: SHIPPED | OUTPATIENT
Start: 2020-01-07 | End: 2024-03-05

## 2020-01-28 ENCOUNTER — HOSPITAL ENCOUNTER (OUTPATIENT)
Dept: RADIOLOGY | Facility: HOSPITAL | Age: 65
Discharge: HOME | End: 2020-01-28
Attending: STUDENT IN AN ORGANIZED HEALTH CARE EDUCATION/TRAINING PROGRAM
Payer: MEDICARE

## 2020-01-28 DIAGNOSIS — R97.20 ELEVATED PSA: ICD-10-CM

## 2020-01-28 PROCEDURE — 72197 MRI PELVIS W/O & W/DYE: CPT | Mod: TC

## 2020-01-28 PROCEDURE — 72197 MRI PROSTATE W W/O CONTRAST: ICD-10-PCS | Mod: 26,,, | Performed by: RADIOLOGY

## 2020-01-28 PROCEDURE — 72197 MRI PELVIS W/O & W/DYE: CPT | Mod: 26,,, | Performed by: RADIOLOGY

## 2020-01-28 PROCEDURE — 25500020 PHARM REV CODE 255: Performed by: STUDENT IN AN ORGANIZED HEALTH CARE EDUCATION/TRAINING PROGRAM

## 2020-01-28 PROCEDURE — A9585 GADOBUTROL INJECTION: HCPCS | Performed by: STUDENT IN AN ORGANIZED HEALTH CARE EDUCATION/TRAINING PROGRAM

## 2020-01-28 RX ORDER — GADOBUTROL 604.72 MG/ML
10 INJECTION INTRAVENOUS
Status: COMPLETED | OUTPATIENT
Start: 2020-01-28 | End: 2020-01-28

## 2020-01-28 RX ADMIN — GADOBUTROL 10 ML: 604.72 INJECTION INTRAVENOUS at 11:01

## 2020-02-04 ENCOUNTER — TELEPHONE (OUTPATIENT)
Dept: UROLOGY | Facility: CLINIC | Age: 65
End: 2020-02-04

## 2020-02-04 NOTE — TELEPHONE ENCOUNTER
Spoke with patient, he would like to see Dr Kent and discuss results further before being referred to Dr Frias.  He stated the day he is available would be 2/18, which his wife scheduled the appointment via the portal.

## 2020-02-04 NOTE — TELEPHONE ENCOUNTER
----- Message from Ember Fernandez sent at 2/4/2020  8:32 AM CST -----  Contact: self / [phone]  Patient is returning a call from your office. Please advise

## 2020-02-17 ENCOUNTER — PATIENT OUTREACH (OUTPATIENT)
Dept: ADMINISTRATIVE | Facility: OTHER | Age: 65
End: 2020-02-17

## 2020-02-18 ENCOUNTER — OFFICE VISIT (OUTPATIENT)
Dept: UROLOGY | Facility: CLINIC | Age: 65
End: 2020-02-18
Payer: MEDICARE

## 2020-02-18 VITALS
HEIGHT: 74 IN | SYSTOLIC BLOOD PRESSURE: 142 MMHG | HEART RATE: 60 BPM | DIASTOLIC BLOOD PRESSURE: 65 MMHG | TEMPERATURE: 99 F | BODY MASS INDEX: 25.28 KG/M2 | WEIGHT: 197 LBS

## 2020-02-18 DIAGNOSIS — Z12.5 ENCOUNTER FOR PROSTATE CANCER SCREENING: ICD-10-CM

## 2020-02-18 DIAGNOSIS — Z12.5 ENCOUNTER FOR SCREENING FOR MALIGNANT NEOPLASM OF PROSTATE: ICD-10-CM

## 2020-02-18 DIAGNOSIS — R97.20 ELEVATED PSA: Primary | ICD-10-CM

## 2020-02-18 LAB
BILIRUB SERPL-MCNC: ABNORMAL MG/DL
BLOOD URINE, POC: ABNORMAL
COLOR, POC UA: YELLOW
GLUCOSE UR QL STRIP: 50
KETONES UR QL STRIP: ABNORMAL
LEUKOCYTE ESTERASE URINE, POC: ABNORMAL
NITRITE, POC UA: ABNORMAL
PH, POC UA: 5
PROTEIN, POC: ABNORMAL
SPECIFIC GRAVITY, POC UA: 1.01
UROBILINOGEN, POC UA: ABNORMAL

## 2020-02-18 PROCEDURE — 99999 PR PBB SHADOW E&M-EST. PATIENT-LVL III: ICD-10-PCS | Mod: PBBFAC,,, | Performed by: STUDENT IN AN ORGANIZED HEALTH CARE EDUCATION/TRAINING PROGRAM

## 2020-02-18 PROCEDURE — 81002 URINALYSIS NONAUTO W/O SCOPE: CPT | Mod: S$GLB,,, | Performed by: STUDENT IN AN ORGANIZED HEALTH CARE EDUCATION/TRAINING PROGRAM

## 2020-02-18 PROCEDURE — 3077F PR MOST RECENT SYSTOLIC BLOOD PRESSURE >= 140 MM HG: ICD-10-PCS | Mod: CPTII,S$GLB,, | Performed by: STUDENT IN AN ORGANIZED HEALTH CARE EDUCATION/TRAINING PROGRAM

## 2020-02-18 PROCEDURE — 99214 OFFICE O/P EST MOD 30 MIN: CPT | Mod: 25,S$GLB,, | Performed by: STUDENT IN AN ORGANIZED HEALTH CARE EDUCATION/TRAINING PROGRAM

## 2020-02-18 PROCEDURE — 99214 PR OFFICE/OUTPT VISIT, EST, LEVL IV, 30-39 MIN: ICD-10-PCS | Mod: 25,S$GLB,, | Performed by: STUDENT IN AN ORGANIZED HEALTH CARE EDUCATION/TRAINING PROGRAM

## 2020-02-18 PROCEDURE — 1159F PR MEDICATION LIST DOCUMENTED IN MEDICAL RECORD: ICD-10-PCS | Mod: S$GLB,,, | Performed by: STUDENT IN AN ORGANIZED HEALTH CARE EDUCATION/TRAINING PROGRAM

## 2020-02-18 PROCEDURE — 3078F DIAST BP <80 MM HG: CPT | Mod: CPTII,S$GLB,, | Performed by: STUDENT IN AN ORGANIZED HEALTH CARE EDUCATION/TRAINING PROGRAM

## 2020-02-18 PROCEDURE — 3077F SYST BP >= 140 MM HG: CPT | Mod: CPTII,S$GLB,, | Performed by: STUDENT IN AN ORGANIZED HEALTH CARE EDUCATION/TRAINING PROGRAM

## 2020-02-18 PROCEDURE — 1126F AMNT PAIN NOTED NONE PRSNT: CPT | Mod: S$GLB,,, | Performed by: STUDENT IN AN ORGANIZED HEALTH CARE EDUCATION/TRAINING PROGRAM

## 2020-02-18 PROCEDURE — 1101F PR PT FALLS ASSESS DOC 0-1 FALLS W/OUT INJ PAST YR: ICD-10-PCS | Mod: CPTII,S$GLB,, | Performed by: STUDENT IN AN ORGANIZED HEALTH CARE EDUCATION/TRAINING PROGRAM

## 2020-02-18 PROCEDURE — 1126F PR PAIN SEVERITY QUANTIFIED, NO PAIN PRESENT: ICD-10-PCS | Mod: S$GLB,,, | Performed by: STUDENT IN AN ORGANIZED HEALTH CARE EDUCATION/TRAINING PROGRAM

## 2020-02-18 PROCEDURE — 1101F PT FALLS ASSESS-DOCD LE1/YR: CPT | Mod: CPTII,S$GLB,, | Performed by: STUDENT IN AN ORGANIZED HEALTH CARE EDUCATION/TRAINING PROGRAM

## 2020-02-18 PROCEDURE — 3078F PR MOST RECENT DIASTOLIC BLOOD PRESSURE < 80 MM HG: ICD-10-PCS | Mod: CPTII,S$GLB,, | Performed by: STUDENT IN AN ORGANIZED HEALTH CARE EDUCATION/TRAINING PROGRAM

## 2020-02-18 PROCEDURE — 81002 POCT URINE DIPSTICK WITHOUT MICROSCOPE: ICD-10-PCS | Mod: S$GLB,,, | Performed by: STUDENT IN AN ORGANIZED HEALTH CARE EDUCATION/TRAINING PROGRAM

## 2020-02-18 PROCEDURE — 99999 PR PBB SHADOW E&M-EST. PATIENT-LVL III: CPT | Mod: PBBFAC,,, | Performed by: STUDENT IN AN ORGANIZED HEALTH CARE EDUCATION/TRAINING PROGRAM

## 2020-02-18 PROCEDURE — 1159F MED LIST DOCD IN RCRD: CPT | Mod: S$GLB,,, | Performed by: STUDENT IN AN ORGANIZED HEALTH CARE EDUCATION/TRAINING PROGRAM

## 2020-02-18 NOTE — PROGRESS NOTES
Subjective:       Patient ID: Addy Redding is a 64 y.o. male.    Chief Complaint: Follow-up; Other (test results); and Elevated PSA  This is a 64 y.o.  male patient that is an established patient of mine. The patient is referred to me by his PCP Dr. Ramey for an elevated PSA.   Race:   Age: 62 y.o.   Previously abnormal PSA: no  Previous biopsy or recommended a biopsy: no  Previous abnormal MARGARITA: no  Family history of prostate cancer no     He is s/p a TRUS biopsy with me on 9/18/18 for a PSA of 4.7. MARGARITA findings: 35-40. TRUS size: 43.6cc. Biopsy results demonstrated no cancer. We have been monitoring his PSA. His PSA in 2019 has demonstrated a rise to 5.6 on 4/2/19 and then again to 6.4 on 12/10/19 more recently. For the two persistent rises, we investigated further with an MRI. MRI was performed on 1/28/20 - Prostate: 5.6 x 3.5 x 3.4 cm corresponding to a computed volume of 32.7 cc. Left posterior transition zone PIRADS 3 lesion (equivocal for cancer), as above.    Recently injured on the job - going to undergo ortho workup.    LAST PSA  Lab Results   Component Value Date    PSA 4.7 (H) 08/28/2018    PSA 3.3 01/11/2017    PSA 1.6 11/04/2014    PSA 1.7 10/23/2013    PSA 1.5 08/07/2012    PSA 0.68 07/25/2009    PSA 0.6 12/27/2007    PSATOTAL 6.4 (H) 12/10/2019    PSATOTAL 5.6 (H) 04/02/2019    PSAFREE 0.63 12/10/2019    PSAFREE 0.57 04/02/2019       Lab Results   Component Value Date    CREATININE 1.6 (H) 01/03/2020      ---  Past Medical History:   Diagnosis Date    Costochondritis     Diabetic nephropathy associated with type 2 diabetes mellitus     Diabetic retinopathy     ED (erectile dysfunction)     GERD (gastroesophageal reflux disease)     Hyperlipidemia     Hypertension     Type II or unspecified type diabetes mellitus with renal manifestations, uncontrolled(250.42)        Past Surgical History:   Procedure Laterality Date    Bone biopsy right femur         Family History   Problem Relation Age  of Onset    Hypertension Mother     Diabetes Mother     Hypertension Sister     Hypertension Brother     Diabetes Sister     Diabetes Brother     Lung cancer Brother     Stomach cancer Sister     Coronary artery disease Father          of an MI at age 60       Social History     Tobacco Use    Smoking status: Former Smoker     Packs/day: 0.50     Years: 12.00     Pack years: 6.00     Types: Cigarettes    Smokeless tobacco: Never Used   Substance Use Topics    Alcohol use: No    Drug use: No       Current Outpatient Medications on File Prior to Visit   Medication Sig Dispense Refill    ADVANCED GLUC METER TEST STRIP Strp USE TO TEST BLOOD SUGAR 4 TIMES DAILY. 100 strip 0    atorvastatin (LIPITOR) 40 MG tablet TAKE ONE TABLET BY MOUTH ONCE DAILY. 90 tablet 0    blood sugar diagnostic (BLOOD GLUCOSE TEST) Strp Check sugar once daily 100 each 11    cetirizine (ZYRTEC) 10 MG tablet Take 1 tablet (10 mg total) by mouth once daily.  0    docusate sodium (COLACE) 100 MG capsule Take 1 capsule (100 mg total) by mouth 2 (two) times daily. 10 capsule 0    etodolac (LODINE) 200 MG Cap Take 200 mg by mouth every 8 (eight) hours.      lancets (ACCU-CHEK SOFTCLIX LANCETS) Misc 1 lancet by Misc.(Non-Drug; Combo Route) route 2 (two) times daily. 200 each 1    lisinopril (PRINIVIL,ZESTRIL) 20 MG tablet Take 1 tablet (20 mg total) by mouth once daily. 90 tablet 3    metFORMIN (GLUCOPHAGE) 1000 MG tablet TAKE ONE TABLET BY MOUTH TWICE DAILY 180 tablet 0    methocarbamol (ROBAXIN) 500 MG Tab 1-2 po bid prn spasm      sildenafil (REVATIO) 20 mg Tab Take 1 tablet (20 mg total) by mouth daily as needed. 30 tablet 0    traMADol (ULTRAM) 50 mg tablet Take 1 tablet (50 mg total) by mouth every 12 (twelve) hours as needed for Pain (No driving. May be sedating. Do not use at work.). 14 tablet 0    blood-glucose meter kit Use as instructed       No current facility-administered medications on file prior to visit.         Review of patient's allergies indicates:  No Known Allergies    Review of Systems   Constitutional: Negative for chills.   HENT: Negative for congestion.    Eyes: Negative for visual disturbance.   Respiratory: Negative for shortness of breath.    Cardiovascular: Negative for chest pain.   Gastrointestinal: Negative for abdominal distention.   Musculoskeletal: Negative for gait problem.   Skin: Negative for color change.   Neurological: Negative for dizziness.   Psychiatric/Behavioral: Negative for agitation.       Objective:      Physical Exam   Constitutional: He appears well-developed and well-nourished.   HENT:   Head: Normocephalic.   Eyes: Pupils are equal, round, and reactive to light.   Neck: Normal range of motion.   Cardiovascular: Intact distal pulses.   Pulmonary/Chest: Effort normal.   Abdominal: Soft. He exhibits no distension. There is no tenderness.   Musculoskeletal: Normal range of motion.   Neurological: He is alert.   Skin: Skin is warm and dry.   Psychiatric: He has a normal mood and affect.       Assessment:       1. Elevated PSA    2. Encounter for screening for malignant neoplasm of prostate     3. Encounter for prostate cancer screening        Plan:         1. MRI was performed on 1/28/20 - Prostate: 5.6 x 3.5 x 3.4 cm corresponding to a computed volume of 32.7 cc. Left posterior transition zone PIRADS 3 lesion (equivocal for cancer), as above.  2. Discussed referring for a uronav biopsy based off of the MRI results. Discussed that PIRADS 3 is suspicious but it is not diagnostic of cancer. He would like to monitor his PSA again in 6 months. If still persistently elevated at 6.4 or above in 6 months, he will proceed with uronav biopsy.        Elevated PSA  -     POCT URINE DIPSTICK WITHOUT MICROSCOPE  -     PSA, Screening; Future; Expected date: 08/18/2020    Encounter for screening for malignant neoplasm of prostate   -     PSA, Screening; Future; Expected date: 08/18/2020    Encounter  for prostate cancer screening

## 2020-02-26 LAB
LEFT EYE DM RETINOPATHY: NEGATIVE
RIGHT EYE DM RETINOPATHY: NEGATIVE

## 2020-03-30 ENCOUNTER — PATIENT OUTREACH (OUTPATIENT)
Dept: ADMINISTRATIVE | Facility: HOSPITAL | Age: 65
End: 2020-03-30

## 2020-04-08 ENCOUNTER — TELEPHONE (OUTPATIENT)
Dept: FAMILY MEDICINE | Facility: CLINIC | Age: 65
End: 2020-04-08

## 2020-04-08 NOTE — TELEPHONE ENCOUNTER
----- Message from Sally Ibarra sent at 4/8/2020  1:02 PM CDT -----  Contact: [phone]-self  Patient is requesting a call back concerning pt would like to know about his appt on 4/13, would like to know if the Dr needs to see him in the office or can he schedule a Virtual appt. Please call

## 2020-05-08 DIAGNOSIS — E11.9 TYPE 2 DIABETES MELLITUS WITHOUT COMPLICATION: ICD-10-CM

## 2020-06-15 ENCOUNTER — OFFICE VISIT (OUTPATIENT)
Dept: FAMILY MEDICINE | Facility: CLINIC | Age: 65
End: 2020-06-15
Attending: FAMILY MEDICINE
Payer: MEDICARE

## 2020-06-15 ENCOUNTER — LAB VISIT (OUTPATIENT)
Dept: LAB | Facility: HOSPITAL | Age: 65
End: 2020-06-15
Attending: FAMILY MEDICINE
Payer: MEDICARE

## 2020-06-15 VITALS
SYSTOLIC BLOOD PRESSURE: 130 MMHG | BODY MASS INDEX: 25.69 KG/M2 | HEIGHT: 74 IN | WEIGHT: 200.19 LBS | HEART RATE: 50 BPM | DIASTOLIC BLOOD PRESSURE: 68 MMHG | TEMPERATURE: 99 F | OXYGEN SATURATION: 99 %

## 2020-06-15 DIAGNOSIS — E11.9 TYPE 2 DIABETES MELLITUS WITHOUT COMPLICATION, WITHOUT LONG-TERM CURRENT USE OF INSULIN: ICD-10-CM

## 2020-06-15 DIAGNOSIS — E78.5 HYPERLIPIDEMIA, UNSPECIFIED HYPERLIPIDEMIA TYPE: ICD-10-CM

## 2020-06-15 DIAGNOSIS — E11.22 TYPE 2 DIABETES MELLITUS WITH STAGE 3 CHRONIC KIDNEY DISEASE, WITHOUT LONG-TERM CURRENT USE OF INSULIN: ICD-10-CM

## 2020-06-15 DIAGNOSIS — E11.59 HYPERTENSION ASSOCIATED WITH DIABETES: ICD-10-CM

## 2020-06-15 DIAGNOSIS — N18.30 TYPE 2 DIABETES MELLITUS WITH STAGE 3 CHRONIC KIDNEY DISEASE, WITHOUT LONG-TERM CURRENT USE OF INSULIN: ICD-10-CM

## 2020-06-15 DIAGNOSIS — E11.69 DYSLIPIDEMIA ASSOCIATED WITH TYPE 2 DIABETES MELLITUS: Primary | ICD-10-CM

## 2020-06-15 DIAGNOSIS — I15.2 HYPERTENSION ASSOCIATED WITH DIABETES: ICD-10-CM

## 2020-06-15 DIAGNOSIS — E11.9 CONTROLLED TYPE 2 DIABETES MELLITUS WITHOUT COMPLICATION, WITHOUT LONG-TERM CURRENT USE OF INSULIN: ICD-10-CM

## 2020-06-15 DIAGNOSIS — Z12.5 ENCOUNTER FOR SCREENING FOR MALIGNANT NEOPLASM OF PROSTATE: ICD-10-CM

## 2020-06-15 DIAGNOSIS — E78.5 DYSLIPIDEMIA ASSOCIATED WITH TYPE 2 DIABETES MELLITUS: Primary | ICD-10-CM

## 2020-06-15 DIAGNOSIS — N52.9 ERECTILE DYSFUNCTION, UNSPECIFIED ERECTILE DYSFUNCTION TYPE: ICD-10-CM

## 2020-06-15 DIAGNOSIS — E78.5 DYSLIPIDEMIA ASSOCIATED WITH TYPE 2 DIABETES MELLITUS: ICD-10-CM

## 2020-06-15 DIAGNOSIS — E11.69 DYSLIPIDEMIA ASSOCIATED WITH TYPE 2 DIABETES MELLITUS: ICD-10-CM

## 2020-06-15 LAB
ALBUMIN SERPL BCP-MCNC: 3.6 G/DL (ref 3.5–5.2)
ALP SERPL-CCNC: 60 U/L (ref 55–135)
ALT SERPL W/O P-5'-P-CCNC: 7 U/L (ref 10–44)
ANION GAP SERPL CALC-SCNC: 5 MMOL/L (ref 8–16)
AST SERPL-CCNC: 13 U/L (ref 10–40)
BASOPHILS # BLD AUTO: 0.04 K/UL (ref 0–0.2)
BASOPHILS NFR BLD: 0.6 % (ref 0–1.9)
BILIRUB SERPL-MCNC: 0.4 MG/DL (ref 0.1–1)
BUN SERPL-MCNC: 16 MG/DL (ref 8–23)
CALCIUM SERPL-MCNC: 9.6 MG/DL (ref 8.7–10.5)
CHLORIDE SERPL-SCNC: 108 MMOL/L (ref 95–110)
CHOLEST SERPL-MCNC: 159 MG/DL (ref 120–199)
CHOLEST/HDLC SERPL: 3.9 {RATIO} (ref 2–5)
CO2 SERPL-SCNC: 27 MMOL/L (ref 23–29)
COMPLEXED PSA SERPL-MCNC: 7.2 NG/ML (ref 0–4)
CREAT SERPL-MCNC: 1.5 MG/DL (ref 0.5–1.4)
DIFFERENTIAL METHOD: ABNORMAL
EOSINOPHIL # BLD AUTO: 0.3 K/UL (ref 0–0.5)
EOSINOPHIL NFR BLD: 4.2 % (ref 0–8)
ERYTHROCYTE [DISTWIDTH] IN BLOOD BY AUTOMATED COUNT: 13.5 % (ref 11.5–14.5)
EST. GFR  (AFRICAN AMERICAN): 55 ML/MIN/1.73 M^2
EST. GFR  (NON AFRICAN AMERICAN): 48 ML/MIN/1.73 M^2
ESTIMATED AVG GLUCOSE: 146 MG/DL (ref 68–131)
GLUCOSE SERPL-MCNC: 136 MG/DL (ref 70–110)
HBA1C MFR BLD HPLC: 6.7 % (ref 4–5.6)
HCT VFR BLD AUTO: 37.3 % (ref 40–54)
HDLC SERPL-MCNC: 41 MG/DL (ref 40–75)
HDLC SERPL: 25.8 % (ref 20–50)
HGB BLD-MCNC: 12.4 G/DL (ref 14–18)
IMM GRANULOCYTES # BLD AUTO: 0.01 K/UL (ref 0–0.04)
IMM GRANULOCYTES NFR BLD AUTO: 0.1 % (ref 0–0.5)
LDLC SERPL CALC-MCNC: 99.6 MG/DL (ref 63–159)
LYMPHOCYTES # BLD AUTO: 1.7 K/UL (ref 1–4.8)
LYMPHOCYTES NFR BLD: 23.6 % (ref 18–48)
MCH RBC QN AUTO: 29 PG (ref 27–31)
MCHC RBC AUTO-ENTMCNC: 33.2 G/DL (ref 32–36)
MCV RBC AUTO: 87 FL (ref 82–98)
MONOCYTES # BLD AUTO: 0.7 K/UL (ref 0.3–1)
MONOCYTES NFR BLD: 9.9 % (ref 4–15)
NEUTROPHILS # BLD AUTO: 4.4 K/UL (ref 1.8–7.7)
NEUTROPHILS NFR BLD: 61.6 % (ref 38–73)
NONHDLC SERPL-MCNC: 118 MG/DL
NRBC BLD-RTO: 0 /100 WBC
PLATELET # BLD AUTO: 282 K/UL (ref 150–350)
PMV BLD AUTO: 10 FL (ref 9.2–12.9)
POTASSIUM SERPL-SCNC: 4.4 MMOL/L (ref 3.5–5.1)
PROT SERPL-MCNC: 7.3 G/DL (ref 6–8.4)
RBC # BLD AUTO: 4.28 M/UL (ref 4.6–6.2)
SODIUM SERPL-SCNC: 140 MMOL/L (ref 136–145)
TRIGL SERPL-MCNC: 92 MG/DL (ref 30–150)
WBC # BLD AUTO: 7.08 K/UL (ref 3.9–12.7)

## 2020-06-15 PROCEDURE — 85025 COMPLETE CBC W/AUTO DIFF WBC: CPT | Mod: HCNC

## 2020-06-15 PROCEDURE — 3075F SYST BP GE 130 - 139MM HG: CPT | Mod: HCNC,CPTII,S$GLB, | Performed by: FAMILY MEDICINE

## 2020-06-15 PROCEDURE — 36415 COLL VENOUS BLD VENIPUNCTURE: CPT | Mod: HCNC

## 2020-06-15 PROCEDURE — 80061 LIPID PANEL: CPT | Mod: HCNC

## 2020-06-15 PROCEDURE — 1159F PR MEDICATION LIST DOCUMENTED IN MEDICAL RECORD: ICD-10-PCS | Mod: HCNC,S$GLB,, | Performed by: FAMILY MEDICINE

## 2020-06-15 PROCEDURE — 99214 PR OFFICE/OUTPT VISIT, EST, LEVL IV, 30-39 MIN: ICD-10-PCS | Mod: HCNC,S$GLB,, | Performed by: FAMILY MEDICINE

## 2020-06-15 PROCEDURE — 3078F DIAST BP <80 MM HG: CPT | Mod: HCNC,CPTII,S$GLB, | Performed by: FAMILY MEDICINE

## 2020-06-15 PROCEDURE — 3075F PR MOST RECENT SYSTOLIC BLOOD PRESS GE 130-139MM HG: ICD-10-PCS | Mod: HCNC,CPTII,S$GLB, | Performed by: FAMILY MEDICINE

## 2020-06-15 PROCEDURE — 1126F AMNT PAIN NOTED NONE PRSNT: CPT | Mod: HCNC,S$GLB,, | Performed by: FAMILY MEDICINE

## 2020-06-15 PROCEDURE — 99999 PR PBB SHADOW E&M-EST. PATIENT-LVL III: ICD-10-PCS | Mod: PBBFAC,HCNC,, | Performed by: FAMILY MEDICINE

## 2020-06-15 PROCEDURE — 1101F PT FALLS ASSESS-DOCD LE1/YR: CPT | Mod: HCNC,CPTII,S$GLB, | Performed by: FAMILY MEDICINE

## 2020-06-15 PROCEDURE — 1159F MED LIST DOCD IN RCRD: CPT | Mod: HCNC,S$GLB,, | Performed by: FAMILY MEDICINE

## 2020-06-15 PROCEDURE — 3044F PR MOST RECENT HEMOGLOBIN A1C LEVEL <7.0%: ICD-10-PCS | Mod: HCNC,CPTII,S$GLB, | Performed by: FAMILY MEDICINE

## 2020-06-15 PROCEDURE — 1101F PR PT FALLS ASSESS DOC 0-1 FALLS W/OUT INJ PAST YR: ICD-10-PCS | Mod: HCNC,CPTII,S$GLB, | Performed by: FAMILY MEDICINE

## 2020-06-15 PROCEDURE — 84153 ASSAY OF PSA TOTAL: CPT | Mod: HCNC

## 2020-06-15 PROCEDURE — 99214 OFFICE O/P EST MOD 30 MIN: CPT | Mod: HCNC,S$GLB,, | Performed by: FAMILY MEDICINE

## 2020-06-15 PROCEDURE — 3078F PR MOST RECENT DIASTOLIC BLOOD PRESSURE < 80 MM HG: ICD-10-PCS | Mod: HCNC,CPTII,S$GLB, | Performed by: FAMILY MEDICINE

## 2020-06-15 PROCEDURE — 99999 PR PBB SHADOW E&M-EST. PATIENT-LVL III: CPT | Mod: PBBFAC,HCNC,, | Performed by: FAMILY MEDICINE

## 2020-06-15 PROCEDURE — 3044F HG A1C LEVEL LT 7.0%: CPT | Mod: HCNC,CPTII,S$GLB, | Performed by: FAMILY MEDICINE

## 2020-06-15 PROCEDURE — 83036 HEMOGLOBIN GLYCOSYLATED A1C: CPT | Mod: HCNC

## 2020-06-15 PROCEDURE — 1126F PR PAIN SEVERITY QUANTIFIED, NO PAIN PRESENT: ICD-10-PCS | Mod: HCNC,S$GLB,, | Performed by: FAMILY MEDICINE

## 2020-06-15 PROCEDURE — 80053 COMPREHEN METABOLIC PANEL: CPT | Mod: HCNC

## 2020-06-15 RX ORDER — METFORMIN HYDROCHLORIDE 1000 MG/1
1000 TABLET ORAL 2 TIMES DAILY
Qty: 180 TABLET | Refills: 3 | Status: SHIPPED | OUTPATIENT
Start: 2020-06-15 | End: 2021-06-04 | Stop reason: SDUPTHER

## 2020-06-15 RX ORDER — ATORVASTATIN CALCIUM 40 MG/1
40 TABLET, FILM COATED ORAL DAILY
Qty: 90 TABLET | Refills: 3 | Status: SHIPPED | OUTPATIENT
Start: 2020-06-15 | End: 2021-06-04 | Stop reason: SDUPTHER

## 2020-06-15 RX ORDER — SILDENAFIL CITRATE 20 MG/1
20 TABLET ORAL DAILY PRN
Qty: 30 TABLET | Refills: 2 | Status: SHIPPED | OUTPATIENT
Start: 2020-06-15 | End: 2020-11-09 | Stop reason: SDUPTHER

## 2020-06-15 NOTE — PROGRESS NOTES
Subjective:       Patient ID: Addy Redding is a 64 y.o. male.    Chief Complaint: Follow-up    64 yr old black male with DM II, HTN, HLD, GERD, presents today for his annual wellness check, lab work, 3 month follow up, medication refill. No new complaints today.      DM II - improved -A1C                   6.4 (H)             12/10/2019                       - denies any hypoglycemic symptoms - on metformin - up to date with eye and foot screen - stopped glipizide since itw as making his sugars low    HTN - controlled  - on lisinopril 10 mg daily - no side effects    HLD - controlled and improving -     LDLCALC                  88.0                04/02/2019                                - on statin - compliant - need refill - not fully compliant with diet    GERD - stable - takes prilosec as needed    ED - stable - takes viagra as needed    Health maintenance  -labs due  -Flu and Pneumovax - up to date  -adacel due and done today  -colonoscopy due - wants to wait  -PSA due      Follow-up  Associated symptoms include arthralgias and myalgias. Pertinent negatives include no change in bowel habit, chest pain, congestion, coughing, diaphoresis, fatigue, joint swelling, rash, urinary symptoms, vertigo, vomiting or weakness.   Medication Refill  This is a chronic problem. The current episode started more than 1 year ago. The problem occurs constantly. The problem has been gradually improving. Associated symptoms include arthralgias and myalgias. Pertinent negatives include no change in bowel habit, chest pain, congestion, coughing, diaphoresis, fatigue, joint swelling, rash, urinary symptoms, vertigo, vomiting or weakness.   Diabetes  Pertinent negatives for hypoglycemia include no confusion, dizziness, nervousness/anxiousness, speech difficulty or sweats. Pertinent negatives for diabetes include no chest pain, no fatigue, no polydipsia, no polyuria and no weakness. Pertinent negatives for diabetic complications include  no CVA, PVD or retinopathy.   Hypertension  This is a chronic problem. The current episode started more than 1 year ago. The problem has been gradually worsening since onset. The problem is uncontrolled. Pertinent negatives include no anxiety, chest pain, malaise/fatigue, orthopnea, palpitations, peripheral edema or sweats. There are no associated agents to hypertension. Risk factors for coronary artery disease include dyslipidemia, diabetes mellitus, male gender and obesity. Past treatments include ACE inhibitors. The current treatment provides no improvement. There are no compliance problems.  There is no history of angina, CAD/MI, CVA, left ventricular hypertrophy, PVD or retinopathy. There is no history of chronic renal disease, coarctation of the aorta, hypercortisolism, pheochromocytoma, renovascular disease or a thyroid problem.   Hyperlipidemia  This is a chronic problem. The current episode started more than 1 year ago. The problem is uncontrolled. Recent lipid tests were reviewed and are high. Exacerbating diseases include diabetes. He has no history of chronic renal disease, liver disease or nephrotic syndrome. There are no known factors aggravating his hyperlipidemia. Associated symptoms include myalgias. Pertinent negatives include no chest pain, focal sensory loss, focal weakness or leg pain. Current antihyperlipidemic treatment includes statins. There are no compliance problems.  Risk factors for coronary artery disease include diabetes mellitus, dyslipidemia, male sex and hypertension.     Review of Systems   Constitutional: Negative.  Negative for activity change, diaphoresis, fatigue, malaise/fatigue and unexpected weight change.   HENT: Negative.  Negative for congestion, ear pain, mouth sores, rhinorrhea and voice change.    Eyes: Negative.  Negative for pain, discharge and visual disturbance.   Respiratory: Negative.  Negative for apnea, cough and wheezing.    Cardiovascular: Negative.  Negative  for chest pain, palpitations and orthopnea.   Gastrointestinal: Negative.  Negative for abdominal distention, anal bleeding, change in bowel habit, diarrhea and vomiting.   Endocrine: Negative.  Negative for cold intolerance, polydipsia and polyuria.   Genitourinary: Negative.  Negative for decreased urine volume, difficulty urinating, discharge, frequency and scrotal swelling.   Musculoskeletal: Positive for arthralgias and myalgias. Negative for back pain, joint swelling and neck stiffness.   Skin: Negative.  Negative for color change and rash.   Allergic/Immunologic: Negative.  Negative for environmental allergies and immunocompromised state.   Neurological: Negative.  Negative for dizziness, vertigo, focal weakness, speech difficulty, weakness and light-headedness.   Hematological: Negative.    Psychiatric/Behavioral: Negative.  Negative for agitation, confusion, dysphoric mood and suicidal ideas. The patient is not nervous/anxious.        PMH/PSH/FH/SH/MED/ALLERGY reviewed    Objective:       Vitals:    06/15/20 0827   BP: 130/68   Pulse: (!) 50   Temp: 98.5 °F (36.9 °C)       Physical Exam  Constitutional:       Appearance: He is well-developed.   HENT:      Head: Normocephalic and atraumatic.      Right Ear: External ear normal.      Left Ear: External ear normal.      Nose: Nose normal.      Mouth/Throat:      Pharynx: No oropharyngeal exudate.   Eyes:      General: No scleral icterus.        Right eye: No discharge.         Left eye: No discharge.      Conjunctiva/sclera: Conjunctivae normal.      Pupils: Pupils are equal, round, and reactive to light.   Neck:      Musculoskeletal: Normal range of motion and neck supple.      Thyroid: No thyromegaly.      Vascular: No JVD.      Trachea: No tracheal deviation.   Cardiovascular:      Rate and Rhythm: Normal rate and regular rhythm.      Heart sounds: Normal heart sounds. No murmur. No friction rub. No gallop.    Pulmonary:      Effort: Pulmonary effort is  normal. No respiratory distress.      Breath sounds: Normal breath sounds. No stridor. No wheezing or rales.   Chest:      Chest wall: No tenderness.   Abdominal:      General: Bowel sounds are normal. There is no distension.      Palpations: Abdomen is soft. There is no mass.      Tenderness: There is no abdominal tenderness. There is no guarding or rebound.      Hernia: No hernia is present.   Musculoskeletal: Normal range of motion.         General: No tenderness.   Lymphadenopathy:      Cervical: No cervical adenopathy.   Skin:     General: Skin is warm and dry.      Coloration: Skin is not pale.      Findings: No erythema or rash.   Neurological:      Mental Status: He is alert and oriented to person, place, and time.      Cranial Nerves: No cranial nerve deficit.      Motor: No abnormal muscle tone.      Coordination: Coordination normal.      Deep Tendon Reflexes: Reflexes are normal and symmetric. Reflexes normal.   Psychiatric:         Behavior: Behavior normal.         Thought Content: Thought content normal.         Judgment: Judgment normal.         Assessment:       1. Dyslipidemia associated with type 2 diabetes mellitus    2. Hypertension associated with diabetes    3. Type 2 diabetes mellitus with stage 3 chronic kidney disease, without long-term current use of insulin    4. Encounter for screening for malignant neoplasm of prostate    5. Type 2 diabetes mellitus without complication, without long-term current use of insulin    6. Controlled type 2 diabetes mellitus without complication, without long-term current use of insulin    7. Hyperlipidemia, unspecified hyperlipidemia type    8. Erectile dysfunction, unspecified erectile dysfunction type        Plan:           Addy was seen today for follow-up.    Diagnoses and all orders for this visit:    Dyslipidemia associated with type 2 diabetes mellitus  -     CBC auto differential; Future  -     Comprehensive metabolic panel; Future  -     Lipid  Panel; Future    Hypertension associated with diabetes  -     CBC auto differential; Future  -     Comprehensive metabolic panel; Future  -     Lipid Panel; Future    Type 2 diabetes mellitus with stage 3 chronic kidney disease, without long-term current use of insulin  -     CBC auto differential; Future  -     Comprehensive metabolic panel; Future  -     Lipid Panel; Future  -     Hemoglobin A1C; Future    Encounter for screening for malignant neoplasm of prostate  -     PSA, Screening; Future    Type 2 diabetes mellitus without complication, without long-term current use of insulin  -     metFORMIN (GLUCOPHAGE) 1000 MG tablet; Take 1 tablet (1,000 mg total) by mouth 2 (two) times daily.    Controlled type 2 diabetes mellitus without complication, without long-term current use of insulin  -     metFORMIN (GLUCOPHAGE) 1000 MG tablet; Take 1 tablet (1,000 mg total) by mouth 2 (two) times daily.    Hyperlipidemia, unspecified hyperlipidemia type  -     atorvastatin (LIPITOR) 40 MG tablet; Take 1 tablet (40 mg total) by mouth once daily.    Erectile dysfunction, unspecified erectile dysfunction type  -     sildenafil (REVATIO) 20 mg Tab; Take 1 tablet (20 mg total) by mouth daily as needed.    HTN   -at goal  -continue lisinopril to 10 mg/day    DM II   -controlled   -continue metformin and labs    HLD   -improving  -continue lipitor    GERD   -stable   -takes OTC PPI     ED'  -controlled      Spent adequate time in obtaining history and explaining differentials      25 minutes spent during this visit of which greater than 50% devoted to face-face counseling and coordination of care regarding diagnosis and management plan       RTC 6 MONTHS

## 2020-09-29 ENCOUNTER — PATIENT MESSAGE (OUTPATIENT)
Dept: OTHER | Facility: OTHER | Age: 65
End: 2020-09-29

## 2020-11-09 ENCOUNTER — OFFICE VISIT (OUTPATIENT)
Dept: FAMILY MEDICINE | Facility: CLINIC | Age: 65
End: 2020-11-09
Attending: FAMILY MEDICINE
Payer: MEDICARE

## 2020-11-09 DIAGNOSIS — N18.31 TYPE 2 DIABETES MELLITUS WITH STAGE 3A CHRONIC KIDNEY DISEASE, WITHOUT LONG-TERM CURRENT USE OF INSULIN: ICD-10-CM

## 2020-11-09 DIAGNOSIS — N52.9 ERECTILE DYSFUNCTION, UNSPECIFIED ERECTILE DYSFUNCTION TYPE: ICD-10-CM

## 2020-11-09 DIAGNOSIS — E78.5 DYSLIPIDEMIA ASSOCIATED WITH TYPE 2 DIABETES MELLITUS: ICD-10-CM

## 2020-11-09 DIAGNOSIS — I15.2 HYPERTENSION ASSOCIATED WITH DIABETES: ICD-10-CM

## 2020-11-09 DIAGNOSIS — K21.9 GASTROESOPHAGEAL REFLUX DISEASE, UNSPECIFIED WHETHER ESOPHAGITIS PRESENT: Primary | ICD-10-CM

## 2020-11-09 DIAGNOSIS — E11.22 TYPE 2 DIABETES MELLITUS WITH STAGE 3A CHRONIC KIDNEY DISEASE, WITHOUT LONG-TERM CURRENT USE OF INSULIN: ICD-10-CM

## 2020-11-09 DIAGNOSIS — I10 ESSENTIAL HYPERTENSION: ICD-10-CM

## 2020-11-09 DIAGNOSIS — E11.59 HYPERTENSION ASSOCIATED WITH DIABETES: ICD-10-CM

## 2020-11-09 DIAGNOSIS — E11.69 DYSLIPIDEMIA ASSOCIATED WITH TYPE 2 DIABETES MELLITUS: ICD-10-CM

## 2020-11-09 PROCEDURE — 3079F DIAST BP 80-89 MM HG: CPT | Mod: HCNC,CPTII,S$GLB, | Performed by: FAMILY MEDICINE

## 2020-11-09 PROCEDURE — 99214 PR OFFICE/OUTPT VISIT, EST, LEVL IV, 30-39 MIN: ICD-10-PCS | Mod: HCNC,S$GLB,, | Performed by: FAMILY MEDICINE

## 2020-11-09 PROCEDURE — 3008F PR BODY MASS INDEX (BMI) DOCUMENTED: ICD-10-PCS | Mod: HCNC,CPTII,S$GLB, | Performed by: FAMILY MEDICINE

## 2020-11-09 PROCEDURE — 3044F HG A1C LEVEL LT 7.0%: CPT | Mod: HCNC,CPTII,S$GLB, | Performed by: FAMILY MEDICINE

## 2020-11-09 PROCEDURE — 3044F PR MOST RECENT HEMOGLOBIN A1C LEVEL <7.0%: ICD-10-PCS | Mod: HCNC,CPTII,S$GLB, | Performed by: FAMILY MEDICINE

## 2020-11-09 PROCEDURE — 99499 RISK ADDL DX/OHS AUDIT: ICD-10-PCS | Mod: S$GLB,,, | Performed by: FAMILY MEDICINE

## 2020-11-09 PROCEDURE — 1101F PT FALLS ASSESS-DOCD LE1/YR: CPT | Mod: HCNC,CPTII,S$GLB, | Performed by: FAMILY MEDICINE

## 2020-11-09 PROCEDURE — 3008F BODY MASS INDEX DOCD: CPT | Mod: HCNC,CPTII,S$GLB, | Performed by: FAMILY MEDICINE

## 2020-11-09 PROCEDURE — 99214 OFFICE O/P EST MOD 30 MIN: CPT | Mod: HCNC,S$GLB,, | Performed by: FAMILY MEDICINE

## 2020-11-09 PROCEDURE — 99999 PR PBB SHADOW E&M-EST. PATIENT-LVL III: CPT | Mod: PBBFAC,HCNC,, | Performed by: FAMILY MEDICINE

## 2020-11-09 PROCEDURE — 1101F PR PT FALLS ASSESS DOC 0-1 FALLS W/OUT INJ PAST YR: ICD-10-PCS | Mod: HCNC,CPTII,S$GLB, | Performed by: FAMILY MEDICINE

## 2020-11-09 PROCEDURE — 99999 PR PBB SHADOW E&M-EST. PATIENT-LVL III: ICD-10-PCS | Mod: PBBFAC,HCNC,, | Performed by: FAMILY MEDICINE

## 2020-11-09 PROCEDURE — 3075F SYST BP GE 130 - 139MM HG: CPT | Mod: HCNC,CPTII,S$GLB, | Performed by: FAMILY MEDICINE

## 2020-11-09 PROCEDURE — 1159F PR MEDICATION LIST DOCUMENTED IN MEDICAL RECORD: ICD-10-PCS | Mod: HCNC,S$GLB,, | Performed by: FAMILY MEDICINE

## 2020-11-09 PROCEDURE — 3075F PR MOST RECENT SYSTOLIC BLOOD PRESS GE 130-139MM HG: ICD-10-PCS | Mod: HCNC,CPTII,S$GLB, | Performed by: FAMILY MEDICINE

## 2020-11-09 PROCEDURE — 99499 UNLISTED E&M SERVICE: CPT | Mod: S$GLB,,, | Performed by: FAMILY MEDICINE

## 2020-11-09 PROCEDURE — 3079F PR MOST RECENT DIASTOLIC BLOOD PRESSURE 80-89 MM HG: ICD-10-PCS | Mod: HCNC,CPTII,S$GLB, | Performed by: FAMILY MEDICINE

## 2020-11-09 PROCEDURE — 1159F MED LIST DOCD IN RCRD: CPT | Mod: HCNC,S$GLB,, | Performed by: FAMILY MEDICINE

## 2020-11-09 RX ORDER — SILDENAFIL CITRATE 20 MG/1
20 TABLET ORAL DAILY PRN
Qty: 30 TABLET | Refills: 2 | Status: SHIPPED | OUTPATIENT
Start: 2020-11-09 | End: 2021-02-04

## 2020-11-09 RX ORDER — LISINOPRIL 20 MG/1
20 TABLET ORAL 2 TIMES DAILY
Qty: 180 TABLET | Refills: 3 | Status: SHIPPED | OUTPATIENT
Start: 2020-11-09 | End: 2021-12-08 | Stop reason: SDUPTHER

## 2020-11-09 RX ORDER — PANTOPRAZOLE SODIUM 40 MG/1
40 TABLET, DELAYED RELEASE ORAL DAILY
Qty: 30 TABLET | Refills: 2 | Status: SHIPPED | OUTPATIENT
Start: 2020-11-09 | End: 2022-03-21 | Stop reason: SDUPTHER

## 2020-11-09 NOTE — PROGRESS NOTES
Subjective:       Patient ID: Addy Redding is a 64 y.o. male.    Chief Complaint: Gastroesophageal Reflux    64 yr old black male with DM II, HTN, HLD, GERD, presents today for follow up and c/o GERD symptoms x 1 month. Heartburn and chest discomfort - worse with empty stomach.      DM II - improved -  HGBA1C                   6.7 (H)             06/15/2020                         - denies any hypoglycemic symptoms - on metformin - up to date with eye and foot screen - stopped glipizide since itw as making his sugars low    HTN - controlled  - on lisinopril 10 mg daily - no side effects    HLD - controlled and improving -      LDLCALC                  99.6                06/15/2020                                 - on statin - compliant - need refill - not fully compliant with diet    GERD - stable - takes prilosec as needed    ED - stable - takes viagra as needed    Health maintenance  -labs due  -Flu and Pneumovax - up to date  -adacel due and done today  -colonoscopy due - wants to wait  -PSA due      Gastroesophageal Reflux  He complains of abdominal pain and heartburn. He reports no chest pain, no coughing or no wheezing. This is a new problem. The current episode started 1 to 4 weeks ago. The problem occurs constantly. The problem has been unchanged. The heartburn duration is several minutes. The heartburn is located in the substernum. The symptoms are aggravated by certain foods. Pertinent negatives include no anemia, fatigue or melena. There are no known risk factors. He has tried nothing for the symptoms. The treatment provided mild relief. Past procedures do not include an abdominal ultrasound, esophageal pH monitoring or a UGI. Past invasive treatments do not include gastroplasty or reflux surgery.   Follow-up  Associated symptoms include abdominal pain, arthralgias and myalgias. Pertinent negatives include no change in bowel habit, chest pain, congestion, coughing, diaphoresis, fatigue, joint swelling,  rash, urinary symptoms, vertigo, vomiting or weakness.   Medication Refill  This is a chronic problem. The current episode started more than 1 year ago. The problem occurs constantly. The problem has been gradually improving. Associated symptoms include abdominal pain, arthralgias and myalgias. Pertinent negatives include no change in bowel habit, chest pain, congestion, coughing, diaphoresis, fatigue, joint swelling, rash, urinary symptoms, vertigo, vomiting or weakness.   Diabetes  Pertinent negatives for hypoglycemia include no confusion, dizziness, nervousness/anxiousness, speech difficulty or sweats. Pertinent negatives for diabetes include no chest pain, no fatigue, no polydipsia, no polyuria and no weakness. Pertinent negatives for diabetic complications include no CVA, PVD or retinopathy.   Hypertension  This is a chronic problem. The current episode started more than 1 year ago. The problem has been gradually worsening since onset. The problem is uncontrolled. Pertinent negatives include no anxiety, chest pain, malaise/fatigue, orthopnea, palpitations, peripheral edema or sweats. There are no associated agents to hypertension. Risk factors for coronary artery disease include dyslipidemia, diabetes mellitus, male gender and obesity. Past treatments include ACE inhibitors. The current treatment provides no improvement. There are no compliance problems.  There is no history of angina, CAD/MI, CVA, left ventricular hypertrophy, PVD or retinopathy. There is no history of chronic renal disease, coarctation of the aorta, hypercortisolism, pheochromocytoma, renovascular disease or a thyroid problem.   Hyperlipidemia  This is a chronic problem. The current episode started more than 1 year ago. The problem is uncontrolled. Recent lipid tests were reviewed and are high. Exacerbating diseases include diabetes. He has no history of chronic renal disease, liver disease or nephrotic syndrome. There are no known factors  aggravating his hyperlipidemia. Associated symptoms include myalgias. Pertinent negatives include no chest pain, focal sensory loss, focal weakness or leg pain. Current antihyperlipidemic treatment includes statins. There are no compliance problems.  Risk factors for coronary artery disease include diabetes mellitus, dyslipidemia, male sex and hypertension.     Review of Systems   Constitutional: Negative.  Negative for activity change, diaphoresis, fatigue, malaise/fatigue and unexpected weight change.   HENT: Negative.  Negative for nasal congestion, ear pain, mouth sores, rhinorrhea and voice change.    Eyes: Negative.  Negative for pain, discharge and visual disturbance.   Respiratory: Negative.  Negative for apnea, cough and wheezing.    Cardiovascular: Negative.  Negative for chest pain, palpitations and orthopnea.   Gastrointestinal: Positive for abdominal pain and heartburn. Negative for abdominal distention, anal bleeding, change in bowel habit, diarrhea, melena, vomiting and change in bowel habit.   Endocrine: Negative.  Negative for cold intolerance, polydipsia and polyuria.   Genitourinary: Negative.  Negative for decreased urine volume, difficulty urinating, discharge, frequency and scrotal swelling.   Musculoskeletal: Positive for arthralgias and myalgias. Negative for back pain, joint swelling, leg pain and neck stiffness.   Integumentary:  Negative for color change and rash. Negative.   Allergic/Immunologic: Negative.  Negative for environmental allergies.   Neurological: Negative.  Negative for dizziness, vertigo, focal weakness, speech difficulty, weakness and light-headedness.   Hematological: Negative.    Psychiatric/Behavioral: Negative.  Negative for agitation, confusion, dysphoric mood and suicidal ideas. The patient is not nervous/anxious.          PMH/PSH/FH/SH/MED/ALLERGY reviewed    Objective:       Vitals:    11/09/20 1541   BP: 130/80   Pulse: (!) 55   Temp: 98.5 °F (36.9 °C)        Physical Exam  Constitutional:       Appearance: He is well-developed.   HENT:      Head: Normocephalic and atraumatic.      Right Ear: External ear normal.      Left Ear: External ear normal.      Nose: Nose normal.      Mouth/Throat:      Pharynx: No oropharyngeal exudate.   Eyes:      General: No scleral icterus.        Right eye: No discharge.         Left eye: No discharge.      Conjunctiva/sclera: Conjunctivae normal.      Pupils: Pupils are equal, round, and reactive to light.   Neck:      Musculoskeletal: Normal range of motion and neck supple.      Thyroid: No thyromegaly.      Vascular: No JVD.      Trachea: No tracheal deviation.   Cardiovascular:      Rate and Rhythm: Normal rate and regular rhythm.      Heart sounds: Normal heart sounds. No murmur. No friction rub. No gallop.    Pulmonary:      Effort: Pulmonary effort is normal. No respiratory distress.      Breath sounds: Normal breath sounds. No stridor. No wheezing or rales.   Chest:      Chest wall: No tenderness.   Abdominal:      General: Bowel sounds are normal. There is no distension.      Palpations: Abdomen is soft. There is no mass.      Tenderness: There is no abdominal tenderness. There is no guarding or rebound.      Hernia: No hernia is present.   Musculoskeletal: Normal range of motion.         General: No tenderness.   Lymphadenopathy:      Cervical: No cervical adenopathy.   Skin:     General: Skin is warm and dry.      Coloration: Skin is not pale.      Findings: No erythema or rash.   Neurological:      Mental Status: He is alert and oriented to person, place, and time.      Cranial Nerves: No cranial nerve deficit.      Motor: No abnormal muscle tone.      Coordination: Coordination normal.      Deep Tendon Reflexes: Reflexes are normal and symmetric. Reflexes normal.   Psychiatric:         Behavior: Behavior normal.         Thought Content: Thought content normal.         Judgment: Judgment normal.         Assessment:        1. Gastroesophageal reflux disease, unspecified whether esophagitis present    2. Dyslipidemia associated with type 2 diabetes mellitus    3. Hypertension associated with diabetes    4. Type 2 diabetes mellitus with stage 3a chronic kidney disease, without long-term current use of insulin    5. Essential hypertension    6. Erectile dysfunction, unspecified erectile dysfunction type        Plan:           Addy was seen today for gastroesophageal reflux.    Diagnoses and all orders for this visit:    Gastroesophageal reflux disease, unspecified whether esophagitis present  -     pantoprazole (PROTONIX) 40 MG tablet; Take 1 tablet (40 mg total) by mouth once daily.    Dyslipidemia associated with type 2 diabetes mellitus  -     Comprehensive Metabolic Panel; Future    Hypertension associated with diabetes  -     Comprehensive Metabolic Panel; Future    Type 2 diabetes mellitus with stage 3a chronic kidney disease, without long-term current use of insulin  -     Comprehensive Metabolic Panel; Future  -     Hemoglobin A1C; Future    Essential hypertension  -     lisinopriL (PRINIVIL,ZESTRIL) 20 MG tablet; Take 1 tablet (20 mg total) by mouth 2 (two) times daily.    Erectile dysfunction, unspecified erectile dysfunction type  -     sildenafil (REVATIO) 20 mg Tab; Take 1 tablet (20 mg total) by mouth daily as needed.      HTN   -at goal  -continue lisinopril to 10 mg/day    DM II   -controlled   -continue metformin and labs    HLD   -improving  -continue lipitor    GERD   -stable   -takes OTC PPI     ED'  -controlled      Spent adequate time in obtaining history and explaining differentials      25 minutes spent during this visit of which greater than 50% devoted to face-face counseling and coordination of care regarding diagnosis and management plan       Follow up in about 5 weeks (around 12/15/2020), or if symptoms worsen or fail to improve.

## 2020-11-10 VITALS
BODY MASS INDEX: 24.84 KG/M2 | DIASTOLIC BLOOD PRESSURE: 80 MMHG | SYSTOLIC BLOOD PRESSURE: 130 MMHG | HEIGHT: 74 IN | TEMPERATURE: 99 F | WEIGHT: 193.56 LBS | OXYGEN SATURATION: 98 % | HEART RATE: 55 BPM

## 2020-12-15 ENCOUNTER — PATIENT MESSAGE (OUTPATIENT)
Dept: FAMILY MEDICINE | Facility: CLINIC | Age: 65
End: 2020-12-15

## 2020-12-15 ENCOUNTER — LAB VISIT (OUTPATIENT)
Dept: LAB | Facility: HOSPITAL | Age: 65
End: 2020-12-15
Attending: FAMILY MEDICINE
Payer: MEDICARE

## 2020-12-15 ENCOUNTER — OFFICE VISIT (OUTPATIENT)
Dept: FAMILY MEDICINE | Facility: CLINIC | Age: 65
End: 2020-12-15
Attending: FAMILY MEDICINE
Payer: MEDICARE

## 2020-12-15 VITALS
BODY MASS INDEX: 24.62 KG/M2 | TEMPERATURE: 98 F | SYSTOLIC BLOOD PRESSURE: 130 MMHG | WEIGHT: 191.81 LBS | OXYGEN SATURATION: 99 % | HEART RATE: 52 BPM | HEIGHT: 74 IN | DIASTOLIC BLOOD PRESSURE: 70 MMHG

## 2020-12-15 DIAGNOSIS — R97.20 ELEVATED PSA: ICD-10-CM

## 2020-12-15 DIAGNOSIS — E11.22 TYPE 2 DIABETES MELLITUS WITH STAGE 3A CHRONIC KIDNEY DISEASE, WITHOUT LONG-TERM CURRENT USE OF INSULIN: ICD-10-CM

## 2020-12-15 DIAGNOSIS — R31.29 MICROSCOPIC HEMATURIA: Primary | ICD-10-CM

## 2020-12-15 DIAGNOSIS — N18.31 TYPE 2 DIABETES MELLITUS WITH STAGE 3A CHRONIC KIDNEY DISEASE, WITHOUT LONG-TERM CURRENT USE OF INSULIN: ICD-10-CM

## 2020-12-15 DIAGNOSIS — E11.59 HYPERTENSION ASSOCIATED WITH DIABETES: ICD-10-CM

## 2020-12-15 DIAGNOSIS — E78.5 DYSLIPIDEMIA ASSOCIATED WITH TYPE 2 DIABETES MELLITUS: ICD-10-CM

## 2020-12-15 DIAGNOSIS — I15.2 HYPERTENSION ASSOCIATED WITH DIABETES: ICD-10-CM

## 2020-12-15 DIAGNOSIS — E11.69 DYSLIPIDEMIA ASSOCIATED WITH TYPE 2 DIABETES MELLITUS: ICD-10-CM

## 2020-12-15 DIAGNOSIS — I10 ESSENTIAL HYPERTENSION: ICD-10-CM

## 2020-12-15 DIAGNOSIS — Z00.00 ROUTINE GENERAL MEDICAL EXAMINATION AT HEALTH CARE FACILITY: Primary | ICD-10-CM

## 2020-12-15 LAB
ALBUMIN SERPL BCP-MCNC: 4 G/DL (ref 3.5–5.2)
ALP SERPL-CCNC: 44 U/L (ref 55–135)
ALT SERPL W/O P-5'-P-CCNC: 8 U/L (ref 10–44)
ANION GAP SERPL CALC-SCNC: 7 MMOL/L (ref 8–16)
AST SERPL-CCNC: 15 U/L (ref 10–40)
BILIRUB SERPL-MCNC: 0.4 MG/DL (ref 0.1–1)
BUN SERPL-MCNC: 19 MG/DL (ref 8–23)
CALCIUM SERPL-MCNC: 8.9 MG/DL (ref 8.7–10.5)
CHLORIDE SERPL-SCNC: 108 MMOL/L (ref 95–110)
CO2 SERPL-SCNC: 26 MMOL/L (ref 23–29)
CREAT SERPL-MCNC: 1.5 MG/DL (ref 0.5–1.4)
EST. GFR  (AFRICAN AMERICAN): 55 ML/MIN/1.73 M^2
EST. GFR  (NON AFRICAN AMERICAN): 47 ML/MIN/1.73 M^2
ESTIMATED AVG GLUCOSE: 131 MG/DL (ref 68–131)
GLUCOSE SERPL-MCNC: 98 MG/DL (ref 70–110)
HBA1C MFR BLD HPLC: 6.2 % (ref 4–5.6)
POTASSIUM SERPL-SCNC: 4.5 MMOL/L (ref 3.5–5.1)
PROT SERPL-MCNC: 6.9 G/DL (ref 6–8.4)
SODIUM SERPL-SCNC: 141 MMOL/L (ref 136–145)

## 2020-12-15 PROCEDURE — 3008F BODY MASS INDEX DOCD: CPT | Mod: HCNC,CPTII,S$GLB, | Performed by: FAMILY MEDICINE

## 2020-12-15 PROCEDURE — 1126F PR PAIN SEVERITY QUANTIFIED, NO PAIN PRESENT: ICD-10-PCS | Mod: HCNC,S$GLB,, | Performed by: FAMILY MEDICINE

## 2020-12-15 PROCEDURE — 1126F AMNT PAIN NOTED NONE PRSNT: CPT | Mod: HCNC,S$GLB,, | Performed by: FAMILY MEDICINE

## 2020-12-15 PROCEDURE — 3044F PR MOST RECENT HEMOGLOBIN A1C LEVEL <7.0%: ICD-10-PCS | Mod: HCNC,CPTII,S$GLB, | Performed by: FAMILY MEDICINE

## 2020-12-15 PROCEDURE — 99397 PER PM REEVAL EST PAT 65+ YR: CPT | Mod: HCNC,S$GLB,, | Performed by: FAMILY MEDICINE

## 2020-12-15 PROCEDURE — 80053 COMPREHEN METABOLIC PANEL: CPT | Mod: HCNC

## 2020-12-15 PROCEDURE — 99999 PR PBB SHADOW E&M-EST. PATIENT-LVL IV: CPT | Mod: PBBFAC,HCNC,, | Performed by: FAMILY MEDICINE

## 2020-12-15 PROCEDURE — 3008F PR BODY MASS INDEX (BMI) DOCUMENTED: ICD-10-PCS | Mod: HCNC,CPTII,S$GLB, | Performed by: FAMILY MEDICINE

## 2020-12-15 PROCEDURE — 1101F PR PT FALLS ASSESS DOC 0-1 FALLS W/OUT INJ PAST YR: ICD-10-PCS | Mod: HCNC,CPTII,S$GLB, | Performed by: FAMILY MEDICINE

## 2020-12-15 PROCEDURE — 3078F DIAST BP <80 MM HG: CPT | Mod: HCNC,CPTII,S$GLB, | Performed by: FAMILY MEDICINE

## 2020-12-15 PROCEDURE — 1101F PT FALLS ASSESS-DOCD LE1/YR: CPT | Mod: HCNC,CPTII,S$GLB, | Performed by: FAMILY MEDICINE

## 2020-12-15 PROCEDURE — 99999 PR PBB SHADOW E&M-EST. PATIENT-LVL IV: ICD-10-PCS | Mod: PBBFAC,HCNC,, | Performed by: FAMILY MEDICINE

## 2020-12-15 PROCEDURE — 36415 COLL VENOUS BLD VENIPUNCTURE: CPT | Mod: HCNC

## 2020-12-15 PROCEDURE — 3078F PR MOST RECENT DIASTOLIC BLOOD PRESSURE < 80 MM HG: ICD-10-PCS | Mod: HCNC,CPTII,S$GLB, | Performed by: FAMILY MEDICINE

## 2020-12-15 PROCEDURE — 3075F PR MOST RECENT SYSTOLIC BLOOD PRESS GE 130-139MM HG: ICD-10-PCS | Mod: HCNC,CPTII,S$GLB, | Performed by: FAMILY MEDICINE

## 2020-12-15 PROCEDURE — 3288F PR FALLS RISK ASSESSMENT DOCUMENTED: ICD-10-PCS | Mod: HCNC,CPTII,S$GLB, | Performed by: FAMILY MEDICINE

## 2020-12-15 PROCEDURE — 83036 HEMOGLOBIN GLYCOSYLATED A1C: CPT | Mod: HCNC

## 2020-12-15 PROCEDURE — 99397 PR PREVENTIVE VISIT,EST,65 & OVER: ICD-10-PCS | Mod: HCNC,S$GLB,, | Performed by: FAMILY MEDICINE

## 2020-12-15 PROCEDURE — 3288F FALL RISK ASSESSMENT DOCD: CPT | Mod: HCNC,CPTII,S$GLB, | Performed by: FAMILY MEDICINE

## 2020-12-15 PROCEDURE — 3044F HG A1C LEVEL LT 7.0%: CPT | Mod: HCNC,CPTII,S$GLB, | Performed by: FAMILY MEDICINE

## 2020-12-15 PROCEDURE — 3075F SYST BP GE 130 - 139MM HG: CPT | Mod: HCNC,CPTII,S$GLB, | Performed by: FAMILY MEDICINE

## 2020-12-15 NOTE — PROGRESS NOTES
Subjective:       Patient ID: Addy Redding is a 65 y.o. male.    Chief Complaint: Annual check up    64 yr old black male with DM II, HTN, HLD, GERD, presents today for follow up and med refills.      DM II - improved -  HGBA1C                   6.7 (H)             06/15/2020                         - denies any hypoglycemic symptoms - on metformin - up to date with eye and foot screen - stopped glipizide since itw as making his sugars low    HTN - controlled  - on lisinopril 10 mg daily - no side effects    HLD - controlled and improving -      LDLCALC                  99.6                06/15/2020                                 - on statin - compliant - need refill - not fully compliant with diet    GERD - stable - takes prilosec as needed    ED - stable - takes viagra as needed    Health maintenance  -labs due  -Flu and Pneumovax - up to date  -adacel due and done today  -colonoscopy due - wants to wait  -PSA UTD      Gastroesophageal Reflux  He complains of heartburn. He reports no abdominal pain, no chest pain, no coughing or no wheezing. This is a new problem. The current episode started 1 to 4 weeks ago. The problem occurs constantly. The problem has been unchanged. The heartburn duration is several minutes. The heartburn is located in the substernum. The symptoms are aggravated by certain foods. Pertinent negatives include no anemia, fatigue or melena. There are no known risk factors. He has tried nothing for the symptoms. The treatment provided mild relief. Past procedures do not include an abdominal ultrasound, esophageal pH monitoring or a UGI. Past invasive treatments do not include gastroplasty or reflux surgery.   Follow-up  Associated symptoms include arthralgias and myalgias. Pertinent negatives include no abdominal pain, change in bowel habit, chest pain, congestion, coughing, diaphoresis, fatigue, joint swelling, rash, urinary symptoms, vertigo, vomiting or weakness.   Medication Refill  This is a  chronic problem. The current episode started more than 1 year ago. The problem occurs constantly. The problem has been gradually improving. Associated symptoms include arthralgias and myalgias. Pertinent negatives include no abdominal pain, change in bowel habit, chest pain, congestion, coughing, diaphoresis, fatigue, joint swelling, rash, urinary symptoms, vertigo, vomiting or weakness.   Diabetes  Pertinent negatives for hypoglycemia include no confusion, dizziness, nervousness/anxiousness, speech difficulty or sweats. Pertinent negatives for diabetes include no chest pain, no fatigue, no polydipsia, no polyuria and no weakness. Pertinent negatives for diabetic complications include no CVA, PVD or retinopathy.   Hypertension  This is a chronic problem. The current episode started more than 1 year ago. The problem has been gradually worsening since onset. The problem is uncontrolled. Pertinent negatives include no anxiety, chest pain, malaise/fatigue, orthopnea, palpitations, peripheral edema or sweats. There are no associated agents to hypertension. Risk factors for coronary artery disease include dyslipidemia, diabetes mellitus, male gender and obesity. Past treatments include ACE inhibitors. The current treatment provides no improvement. There are no compliance problems.  There is no history of angina, CAD/MI, CVA, left ventricular hypertrophy, PVD or retinopathy. There is no history of chronic renal disease, coarctation of the aorta, hypercortisolism, pheochromocytoma, renovascular disease or a thyroid problem.   Hyperlipidemia  This is a chronic problem. The current episode started more than 1 year ago. The problem is uncontrolled. Recent lipid tests were reviewed and are high. Exacerbating diseases include diabetes. He has no history of chronic renal disease, liver disease or nephrotic syndrome. There are no known factors aggravating his hyperlipidemia. Associated symptoms include myalgias. Pertinent  negatives include no chest pain, focal sensory loss, focal weakness or leg pain. Current antihyperlipidemic treatment includes statins. There are no compliance problems.  Risk factors for coronary artery disease include diabetes mellitus, dyslipidemia, male sex and hypertension.     Review of Systems   Constitutional: Negative.  Negative for activity change, diaphoresis, fatigue, malaise/fatigue and unexpected weight change.   HENT: Negative.  Negative for nasal congestion, ear pain, mouth sores, rhinorrhea and voice change.    Eyes: Negative.  Negative for pain, discharge and visual disturbance.   Respiratory: Negative.  Negative for apnea, cough and wheezing.    Cardiovascular: Negative.  Negative for chest pain, palpitations and orthopnea.   Gastrointestinal: Positive for heartburn. Negative for abdominal distention, abdominal pain, anal bleeding, change in bowel habit, diarrhea, melena, vomiting and change in bowel habit.   Endocrine: Negative.  Negative for cold intolerance, polydipsia and polyuria.   Genitourinary: Negative.  Negative for decreased urine volume, difficulty urinating, discharge, frequency and scrotal swelling.   Musculoskeletal: Positive for arthralgias and myalgias. Negative for back pain, joint swelling, leg pain and neck stiffness.   Integumentary:  Negative for color change and rash. Negative.   Allergic/Immunologic: Negative.  Negative for environmental allergies.   Neurological: Negative.  Negative for dizziness, vertigo, focal weakness, speech difficulty, weakness and light-headedness.   Hematological: Negative.    Psychiatric/Behavioral: Negative.  Negative for agitation, confusion, dysphoric mood and suicidal ideas. The patient is not nervous/anxious.          PMH/PSH/FH/SH/MED/ALLERGY reviewed    Objective:       Vitals:    12/15/20 0807   BP: 130/70   Pulse: (!) 52   Temp: 98 °F (36.7 °C)       Physical Exam  Constitutional:       Appearance: He is well-developed.   HENT:      Head:  Normocephalic and atraumatic.      Right Ear: External ear normal.      Left Ear: External ear normal.      Nose: Nose normal.      Mouth/Throat:      Pharynx: No oropharyngeal exudate.   Eyes:      General: No scleral icterus.        Right eye: No discharge.         Left eye: No discharge.      Conjunctiva/sclera: Conjunctivae normal.      Pupils: Pupils are equal, round, and reactive to light.   Neck:      Musculoskeletal: Normal range of motion and neck supple.      Thyroid: No thyromegaly.      Vascular: No JVD.      Trachea: No tracheal deviation.   Cardiovascular:      Rate and Rhythm: Normal rate and regular rhythm.      Heart sounds: Normal heart sounds. No murmur. No friction rub. No gallop.    Pulmonary:      Effort: Pulmonary effort is normal. No respiratory distress.      Breath sounds: Normal breath sounds. No stridor. No wheezing or rales.   Chest:      Chest wall: No tenderness.   Abdominal:      General: Bowel sounds are normal. There is no distension.      Palpations: Abdomen is soft. There is no mass.      Tenderness: There is no abdominal tenderness. There is no guarding or rebound.      Hernia: No hernia is present.   Musculoskeletal: Normal range of motion.         General: No tenderness.   Lymphadenopathy:      Cervical: No cervical adenopathy.   Skin:     General: Skin is warm and dry.      Coloration: Skin is not pale.      Findings: No erythema or rash.   Neurological:      Mental Status: He is alert and oriented to person, place, and time.      Cranial Nerves: No cranial nerve deficit.      Motor: No abnormal muscle tone.      Coordination: Coordination normal.      Deep Tendon Reflexes: Reflexes are normal and symmetric. Reflexes normal.   Psychiatric:         Behavior: Behavior normal.         Thought Content: Thought content normal.         Judgment: Judgment normal.         Assessment:       1. Routine general medical examination at health care facility    2. Hypertension associated  with diabetes    3. Type 2 diabetes mellitus with stage 3a chronic kidney disease, without long-term current use of insulin    4. Dyslipidemia associated with type 2 diabetes mellitus    5. Essential hypertension    6. Elevated PSA        Plan:           Addy was seen today for annual check up.    Diagnoses and all orders for this visit:    Routine general medical examination at health care facility    Hypertension associated with diabetes  -     Comprehensive Metabolic Panel; Future    Type 2 diabetes mellitus with stage 3a chronic kidney disease, without long-term current use of insulin  -     Comprehensive Metabolic Panel; Future  -     Hemoglobin A1C; Future  -     Urinalysis; Future  -     Microalbumin/Creatinine Ratio, Urine; Future    Dyslipidemia associated with type 2 diabetes mellitus  -     Comprehensive Metabolic Panel; Future    Essential hypertension  -     Comprehensive Metabolic Panel; Future    Elevated PSA  -     Ambulatory referral/consult to Urology; Future      HTN   -at goal  -continue lisinopril to 20 mg/ twice day    DM II   -controlled   -continue metformin and labs    HLD   -improving  -continue lipitor    GERD   -stable   -takes OTC PPI     ED'  -controlled      Spent adequate time in obtaining history and explaining differentials      25 minutes spent during this visit of which greater than 50% devoted to face-face counseling and coordination of care regarding diagnosis and management plan       Follow up in about 6 months (around 6/15/2021), or if symptoms worsen or fail to improve.

## 2020-12-15 NOTE — LETTER
December 15, 2020    Addy Redding  [address]  [place] [address]         [facility]  [address]  [facility] [address]  Phone: [phone]  Fax: [phone] December 15, 2020     Patient: Addy Redding   YOB: 1955   Date of Visit: 12/15/2020       To Whom It May Concern:    This is to certify that Mr Addy Redding is my patient and has controlled diabetes type 2 with A1C 6.7 and controlled blood pressure with readings 130/70. He is on appropriate treatment regimen.    It is my medical opinion that Addy Redding is medically cleared for dental work.    If you have any questions or concerns, please don't hesitate to call.    Sincerely,        Stephan Ramey MD

## 2020-12-16 ENCOUNTER — LAB VISIT (OUTPATIENT)
Dept: PRIMARY CARE CLINIC | Facility: OTHER | Age: 65
End: 2020-12-16
Attending: INTERNAL MEDICINE
Payer: MEDICARE

## 2020-12-16 DIAGNOSIS — Z03.818 ENCOUNTER FOR OBSERVATION FOR SUSPECTED EXPOSURE TO OTHER BIOLOGICAL AGENTS RULED OUT: ICD-10-CM

## 2020-12-16 PROCEDURE — U0003 INFECTIOUS AGENT DETECTION BY NUCLEIC ACID (DNA OR RNA); SEVERE ACUTE RESPIRATORY SYNDROME CORONAVIRUS 2 (SARS-COV-2) (CORONAVIRUS DISEASE [COVID-19]), AMPLIFIED PROBE TECHNIQUE, MAKING USE OF HIGH THROUGHPUT TECHNOLOGIES AS DESCRIBED BY CMS-2020-01-R: HCPCS | Mod: HCNC

## 2020-12-17 LAB — SARS-COV-2 RNA RESP QL NAA+PROBE: NOT DETECTED

## 2021-01-29 ENCOUNTER — PATIENT MESSAGE (OUTPATIENT)
Dept: ADMINISTRATIVE | Facility: HOSPITAL | Age: 66
End: 2021-01-29

## 2021-02-02 ENCOUNTER — PATIENT OUTREACH (OUTPATIENT)
Dept: ADMINISTRATIVE | Facility: OTHER | Age: 66
End: 2021-02-02

## 2021-02-02 DIAGNOSIS — Z12.11 ENCOUNTER FOR FIT (FECAL IMMUNOCHEMICAL TEST) SCREENING: Primary | ICD-10-CM

## 2021-02-04 ENCOUNTER — OFFICE VISIT (OUTPATIENT)
Dept: UROLOGY | Facility: CLINIC | Age: 66
End: 2021-02-04
Attending: FAMILY MEDICINE
Payer: MEDICARE

## 2021-02-04 ENCOUNTER — LAB VISIT (OUTPATIENT)
Dept: LAB | Facility: HOSPITAL | Age: 66
End: 2021-02-04
Attending: STUDENT IN AN ORGANIZED HEALTH CARE EDUCATION/TRAINING PROGRAM
Payer: MEDICARE

## 2021-02-04 VITALS — WEIGHT: 191.38 LBS | BODY MASS INDEX: 24.57 KG/M2

## 2021-02-04 DIAGNOSIS — Z12.11 SPECIAL SCREENING FOR MALIGNANT NEOPLASM OF COLON: Primary | ICD-10-CM

## 2021-02-04 DIAGNOSIS — Z12.5 ENCOUNTER FOR PROSTATE CANCER SCREENING: ICD-10-CM

## 2021-02-04 DIAGNOSIS — R31.29 MICROSCOPIC HEMATURIA: ICD-10-CM

## 2021-02-04 DIAGNOSIS — R97.20 ELEVATED PSA: Primary | ICD-10-CM

## 2021-02-04 DIAGNOSIS — R97.20 ELEVATED PSA: ICD-10-CM

## 2021-02-04 LAB
ANION GAP SERPL CALC-SCNC: 7 MMOL/L (ref 8–16)
BILIRUB SERPL-MCNC: ABNORMAL MG/DL
BLOOD URINE, POC: ABNORMAL
BUN SERPL-MCNC: 22 MG/DL (ref 8–23)
CALCIUM SERPL-MCNC: 8.9 MG/DL (ref 8.7–10.5)
CHLORIDE SERPL-SCNC: 107 MMOL/L (ref 95–110)
CLARITY, POC UA: ABNORMAL
CO2 SERPL-SCNC: 26 MMOL/L (ref 23–29)
COLOR, POC UA: YELLOW
COMPLEXED PSA SERPL-MCNC: 6.7 NG/ML (ref 0–4)
CREAT SERPL-MCNC: 1.5 MG/DL (ref 0.5–1.4)
EST. GFR  (AFRICAN AMERICAN): 55 ML/MIN/1.73 M^2
EST. GFR  (NON AFRICAN AMERICAN): 47 ML/MIN/1.73 M^2
GLUCOSE SERPL-MCNC: 95 MG/DL (ref 70–110)
GLUCOSE UR QL STRIP: ABNORMAL
KETONES UR QL STRIP: ABNORMAL
LEUKOCYTE ESTERASE URINE, POC: ABNORMAL
NITRITE, POC UA: ABNORMAL
PH, POC UA: 5
POTASSIUM SERPL-SCNC: 4.7 MMOL/L (ref 3.5–5.1)
PROTEIN, POC: ABNORMAL
SODIUM SERPL-SCNC: 140 MMOL/L (ref 136–145)
SPECIFIC GRAVITY, POC UA: 1.01
UROBILINOGEN, POC UA: ABNORMAL

## 2021-02-04 PROCEDURE — 99999 PR PBB SHADOW E&M-EST. PATIENT-LVL III: ICD-10-PCS | Mod: PBBFAC,,, | Performed by: STUDENT IN AN ORGANIZED HEALTH CARE EDUCATION/TRAINING PROGRAM

## 2021-02-04 PROCEDURE — 1126F PR PAIN SEVERITY QUANTIFIED, NO PAIN PRESENT: ICD-10-PCS | Mod: S$GLB,,, | Performed by: STUDENT IN AN ORGANIZED HEALTH CARE EDUCATION/TRAINING PROGRAM

## 2021-02-04 PROCEDURE — 3008F BODY MASS INDEX DOCD: CPT | Mod: CPTII,S$GLB,, | Performed by: STUDENT IN AN ORGANIZED HEALTH CARE EDUCATION/TRAINING PROGRAM

## 2021-02-04 PROCEDURE — 99999 PR PBB SHADOW E&M-EST. PATIENT-LVL III: CPT | Mod: PBBFAC,,, | Performed by: STUDENT IN AN ORGANIZED HEALTH CARE EDUCATION/TRAINING PROGRAM

## 2021-02-04 PROCEDURE — 99214 OFFICE O/P EST MOD 30 MIN: CPT | Mod: 25,S$GLB,, | Performed by: STUDENT IN AN ORGANIZED HEALTH CARE EDUCATION/TRAINING PROGRAM

## 2021-02-04 PROCEDURE — 1126F AMNT PAIN NOTED NONE PRSNT: CPT | Mod: S$GLB,,, | Performed by: STUDENT IN AN ORGANIZED HEALTH CARE EDUCATION/TRAINING PROGRAM

## 2021-02-04 PROCEDURE — 80048 BASIC METABOLIC PNL TOTAL CA: CPT

## 2021-02-04 PROCEDURE — 81002 URINALYSIS NONAUTO W/O SCOPE: CPT | Mod: S$GLB,,, | Performed by: STUDENT IN AN ORGANIZED HEALTH CARE EDUCATION/TRAINING PROGRAM

## 2021-02-04 PROCEDURE — 81002 POCT URINE DIPSTICK WITHOUT MICROSCOPE: ICD-10-PCS | Mod: S$GLB,,, | Performed by: STUDENT IN AN ORGANIZED HEALTH CARE EDUCATION/TRAINING PROGRAM

## 2021-02-04 PROCEDURE — 84153 ASSAY OF PSA TOTAL: CPT

## 2021-02-04 PROCEDURE — 1159F MED LIST DOCD IN RCRD: CPT | Mod: S$GLB,,, | Performed by: STUDENT IN AN ORGANIZED HEALTH CARE EDUCATION/TRAINING PROGRAM

## 2021-02-04 PROCEDURE — 1159F PR MEDICATION LIST DOCUMENTED IN MEDICAL RECORD: ICD-10-PCS | Mod: S$GLB,,, | Performed by: STUDENT IN AN ORGANIZED HEALTH CARE EDUCATION/TRAINING PROGRAM

## 2021-02-04 PROCEDURE — 99214 PR OFFICE/OUTPT VISIT, EST, LEVL IV, 30-39 MIN: ICD-10-PCS | Mod: 25,S$GLB,, | Performed by: STUDENT IN AN ORGANIZED HEALTH CARE EDUCATION/TRAINING PROGRAM

## 2021-02-04 PROCEDURE — 3008F PR BODY MASS INDEX (BMI) DOCUMENTED: ICD-10-PCS | Mod: CPTII,S$GLB,, | Performed by: STUDENT IN AN ORGANIZED HEALTH CARE EDUCATION/TRAINING PROGRAM

## 2021-02-04 PROCEDURE — 36415 COLL VENOUS BLD VENIPUNCTURE: CPT

## 2021-02-04 RX ORDER — A/SINGAPORE/GP1908/2015 IVR-180 (AN A/MICHIGAN/45/2015 (H1N1)PDM09-LIKE VIRUS, A/HONG KONG/4801/2014, NYMC X-263B (H3N2) (AN A/HONG KONG/4801/2014-LIKE VIRUS), AND B/BRISBANE/60/2008, WILD TYPE (A B/BRISBANE/60/2008-LIKE VIRUS) 15; 15; 15 UG/.5ML; UG/.5ML; UG/.5ML
INJECTION, SUSPENSION INTRAMUSCULAR
COMMUNITY
Start: 2020-10-28 | End: 2022-02-18

## 2021-02-05 DIAGNOSIS — R97.20 ELEVATED PSA: Primary | ICD-10-CM

## 2021-02-14 ENCOUNTER — PATIENT MESSAGE (OUTPATIENT)
Dept: UROLOGY | Facility: CLINIC | Age: 66
End: 2021-02-14

## 2021-02-15 ENCOUNTER — PES CALL (OUTPATIENT)
Dept: ADMINISTRATIVE | Facility: CLINIC | Age: 66
End: 2021-02-15

## 2021-02-24 ENCOUNTER — TELEPHONE (OUTPATIENT)
Dept: GASTROENTEROLOGY | Facility: CLINIC | Age: 66
End: 2021-02-24

## 2021-03-01 ENCOUNTER — PATIENT OUTREACH (OUTPATIENT)
Dept: ADMINISTRATIVE | Facility: HOSPITAL | Age: 66
End: 2021-03-01

## 2021-04-01 ENCOUNTER — PATIENT MESSAGE (OUTPATIENT)
Dept: ADMINISTRATIVE | Facility: HOSPITAL | Age: 66
End: 2021-04-01

## 2021-04-07 DIAGNOSIS — N52.9 ERECTILE DYSFUNCTION, UNSPECIFIED ERECTILE DYSFUNCTION TYPE: ICD-10-CM

## 2021-04-07 RX ORDER — SILDENAFIL CITRATE 20 MG/1
20 TABLET ORAL DAILY PRN
Qty: 30 TABLET | Refills: 2 | Status: SHIPPED | OUTPATIENT
Start: 2021-04-07 | End: 2021-07-06 | Stop reason: SDUPTHER

## 2021-04-16 ENCOUNTER — PATIENT OUTREACH (OUTPATIENT)
Dept: ADMINISTRATIVE | Facility: HOSPITAL | Age: 66
End: 2021-04-16

## 2021-04-29 ENCOUNTER — PATIENT OUTREACH (OUTPATIENT)
Dept: ADMINISTRATIVE | Facility: HOSPITAL | Age: 66
End: 2021-04-29

## 2021-05-19 DIAGNOSIS — E11.9 TYPE 2 DIABETES MELLITUS WITHOUT COMPLICATION, UNSPECIFIED WHETHER LONG TERM INSULIN USE: ICD-10-CM

## 2021-05-25 ENCOUNTER — OFFICE VISIT (OUTPATIENT)
Dept: URGENT CARE | Facility: CLINIC | Age: 66
End: 2021-05-25
Payer: MEDICARE

## 2021-05-25 VITALS
HEART RATE: 53 BPM | WEIGHT: 195 LBS | HEIGHT: 74 IN | DIASTOLIC BLOOD PRESSURE: 76 MMHG | BODY MASS INDEX: 25.03 KG/M2 | TEMPERATURE: 98 F | RESPIRATION RATE: 16 BRPM | OXYGEN SATURATION: 96 % | SYSTOLIC BLOOD PRESSURE: 162 MMHG

## 2021-05-25 DIAGNOSIS — J30.2 SEASONAL ALLERGIES: Primary | ICD-10-CM

## 2021-05-25 DIAGNOSIS — J02.9 SORE THROAT: ICD-10-CM

## 2021-05-25 LAB
CTP QC/QA: YES
CTP QC/QA: YES
MOLECULAR STREP A: NEGATIVE
SARS-COV-2 RDRP RESP QL NAA+PROBE: NEGATIVE

## 2021-05-25 PROCEDURE — 3008F PR BODY MASS INDEX (BMI) DOCUMENTED: ICD-10-PCS | Mod: CPTII,S$GLB,, | Performed by: PHYSICIAN ASSISTANT

## 2021-05-25 PROCEDURE — U0002 COVID-19 LAB TEST NON-CDC: HCPCS | Mod: QW,S$GLB,, | Performed by: PHYSICIAN ASSISTANT

## 2021-05-25 PROCEDURE — 99213 OFFICE O/P EST LOW 20 MIN: CPT | Mod: S$GLB,CS,, | Performed by: PHYSICIAN ASSISTANT

## 2021-05-25 PROCEDURE — 87651 POCT STREP A MOLECULAR: ICD-10-PCS | Mod: QW,S$GLB,, | Performed by: PHYSICIAN ASSISTANT

## 2021-05-25 PROCEDURE — U0002: ICD-10-PCS | Mod: QW,S$GLB,, | Performed by: PHYSICIAN ASSISTANT

## 2021-05-25 PROCEDURE — 3008F BODY MASS INDEX DOCD: CPT | Mod: CPTII,S$GLB,, | Performed by: PHYSICIAN ASSISTANT

## 2021-05-25 PROCEDURE — 99213 PR OFFICE/OUTPT VISIT, EST, LEVL III, 20-29 MIN: ICD-10-PCS | Mod: S$GLB,CS,, | Performed by: PHYSICIAN ASSISTANT

## 2021-05-25 PROCEDURE — 87651 STREP A DNA AMP PROBE: CPT | Mod: QW,S$GLB,, | Performed by: PHYSICIAN ASSISTANT

## 2021-05-25 RX ORDER — LEVOCETIRIZINE DIHYDROCHLORIDE 5 MG/1
5 TABLET, FILM COATED ORAL NIGHTLY
Qty: 30 TABLET | Refills: 0 | Status: SHIPPED | OUTPATIENT
Start: 2021-05-25 | End: 2022-02-18

## 2021-05-25 RX ORDER — AZELASTINE 1 MG/ML
1 SPRAY, METERED NASAL 2 TIMES DAILY
Qty: 30 ML | Refills: 0 | Status: SHIPPED | OUTPATIENT
Start: 2021-05-25 | End: 2022-02-18

## 2021-07-06 ENCOUNTER — OFFICE VISIT (OUTPATIENT)
Dept: FAMILY MEDICINE | Facility: CLINIC | Age: 66
End: 2021-07-06
Attending: FAMILY MEDICINE
Payer: MEDICARE

## 2021-07-06 ENCOUNTER — PATIENT MESSAGE (OUTPATIENT)
Dept: ADMINISTRATIVE | Facility: HOSPITAL | Age: 66
End: 2021-07-06

## 2021-07-06 VITALS
SYSTOLIC BLOOD PRESSURE: 118 MMHG | HEART RATE: 58 BPM | WEIGHT: 189.13 LBS | HEIGHT: 74 IN | BODY MASS INDEX: 24.27 KG/M2 | OXYGEN SATURATION: 99 % | DIASTOLIC BLOOD PRESSURE: 62 MMHG

## 2021-07-06 DIAGNOSIS — N18.31 TYPE 2 DIABETES MELLITUS WITH STAGE 3A CHRONIC KIDNEY DISEASE, WITHOUT LONG-TERM CURRENT USE OF INSULIN: ICD-10-CM

## 2021-07-06 DIAGNOSIS — I15.2 HYPERTENSION ASSOCIATED WITH DIABETES: Primary | ICD-10-CM

## 2021-07-06 DIAGNOSIS — E11.22 TYPE 2 DIABETES MELLITUS WITH STAGE 3A CHRONIC KIDNEY DISEASE, WITHOUT LONG-TERM CURRENT USE OF INSULIN: ICD-10-CM

## 2021-07-06 DIAGNOSIS — N52.9 ERECTILE DYSFUNCTION, UNSPECIFIED ERECTILE DYSFUNCTION TYPE: ICD-10-CM

## 2021-07-06 DIAGNOSIS — E11.69 DYSLIPIDEMIA ASSOCIATED WITH TYPE 2 DIABETES MELLITUS: ICD-10-CM

## 2021-07-06 DIAGNOSIS — M79.601 RIGHT ARM PAIN: ICD-10-CM

## 2021-07-06 DIAGNOSIS — E11.59 HYPERTENSION ASSOCIATED WITH DIABETES: Primary | ICD-10-CM

## 2021-07-06 DIAGNOSIS — E78.5 DYSLIPIDEMIA ASSOCIATED WITH TYPE 2 DIABETES MELLITUS: ICD-10-CM

## 2021-07-06 DIAGNOSIS — Z12.11 ENCOUNTER FOR FIT (FECAL IMMUNOCHEMICAL TEST) SCREENING: ICD-10-CM

## 2021-07-06 PROCEDURE — 3288F PR FALLS RISK ASSESSMENT DOCUMENTED: ICD-10-PCS | Mod: CPTII,S$GLB,, | Performed by: FAMILY MEDICINE

## 2021-07-06 PROCEDURE — 3078F PR MOST RECENT DIASTOLIC BLOOD PRESSURE < 80 MM HG: ICD-10-PCS | Mod: CPTII,S$GLB,, | Performed by: FAMILY MEDICINE

## 2021-07-06 PROCEDURE — 99999 PR PBB SHADOW E&M-EST. PATIENT-LVL IV: ICD-10-PCS | Mod: PBBFAC,,, | Performed by: FAMILY MEDICINE

## 2021-07-06 PROCEDURE — 3288F FALL RISK ASSESSMENT DOCD: CPT | Mod: CPTII,S$GLB,, | Performed by: FAMILY MEDICINE

## 2021-07-06 PROCEDURE — 1101F PR PT FALLS ASSESS DOC 0-1 FALLS W/OUT INJ PAST YR: ICD-10-PCS | Mod: CPTII,S$GLB,, | Performed by: FAMILY MEDICINE

## 2021-07-06 PROCEDURE — 3078F DIAST BP <80 MM HG: CPT | Mod: CPTII,S$GLB,, | Performed by: FAMILY MEDICINE

## 2021-07-06 PROCEDURE — 1125F PR PAIN SEVERITY QUANTIFIED, PAIN PRESENT: ICD-10-PCS | Mod: S$GLB,,, | Performed by: FAMILY MEDICINE

## 2021-07-06 PROCEDURE — 3074F PR MOST RECENT SYSTOLIC BLOOD PRESSURE < 130 MM HG: ICD-10-PCS | Mod: CPTII,S$GLB,, | Performed by: FAMILY MEDICINE

## 2021-07-06 PROCEDURE — 1159F MED LIST DOCD IN RCRD: CPT | Mod: S$GLB,,, | Performed by: FAMILY MEDICINE

## 2021-07-06 PROCEDURE — 99499 RISK ADDL DX/OHS AUDIT: ICD-10-PCS | Mod: S$GLB,,, | Performed by: FAMILY MEDICINE

## 2021-07-06 PROCEDURE — 99999 PR PBB SHADOW E&M-EST. PATIENT-LVL IV: CPT | Mod: PBBFAC,,, | Performed by: FAMILY MEDICINE

## 2021-07-06 PROCEDURE — 3074F SYST BP LT 130 MM HG: CPT | Mod: CPTII,S$GLB,, | Performed by: FAMILY MEDICINE

## 2021-07-06 PROCEDURE — 1159F PR MEDICATION LIST DOCUMENTED IN MEDICAL RECORD: ICD-10-PCS | Mod: S$GLB,,, | Performed by: FAMILY MEDICINE

## 2021-07-06 PROCEDURE — 99214 OFFICE O/P EST MOD 30 MIN: CPT | Mod: S$GLB,,, | Performed by: FAMILY MEDICINE

## 2021-07-06 PROCEDURE — 1101F PT FALLS ASSESS-DOCD LE1/YR: CPT | Mod: CPTII,S$GLB,, | Performed by: FAMILY MEDICINE

## 2021-07-06 PROCEDURE — 3008F BODY MASS INDEX DOCD: CPT | Mod: CPTII,S$GLB,, | Performed by: FAMILY MEDICINE

## 2021-07-06 PROCEDURE — 99499 UNLISTED E&M SERVICE: CPT | Mod: S$GLB,,, | Performed by: FAMILY MEDICINE

## 2021-07-06 PROCEDURE — 3008F PR BODY MASS INDEX (BMI) DOCUMENTED: ICD-10-PCS | Mod: CPTII,S$GLB,, | Performed by: FAMILY MEDICINE

## 2021-07-06 PROCEDURE — 1125F AMNT PAIN NOTED PAIN PRSNT: CPT | Mod: S$GLB,,, | Performed by: FAMILY MEDICINE

## 2021-07-06 PROCEDURE — 99214 PR OFFICE/OUTPT VISIT, EST, LEVL IV, 30-39 MIN: ICD-10-PCS | Mod: S$GLB,,, | Performed by: FAMILY MEDICINE

## 2021-07-06 RX ORDER — IBUPROFEN 600 MG/1
600 TABLET ORAL EVERY 8 HOURS PRN
Qty: 15 TABLET | Refills: 0 | Status: SHIPPED | OUTPATIENT
Start: 2021-07-06 | End: 2021-10-10 | Stop reason: SDUPTHER

## 2021-07-06 RX ORDER — SILDENAFIL CITRATE 20 MG/1
20 TABLET ORAL DAILY PRN
Qty: 30 TABLET | Refills: 2 | Status: SHIPPED | OUTPATIENT
Start: 2021-07-06 | End: 2022-02-07 | Stop reason: SDUPTHER

## 2021-07-07 ENCOUNTER — PATIENT MESSAGE (OUTPATIENT)
Dept: ADMINISTRATIVE | Facility: HOSPITAL | Age: 66
End: 2021-07-07

## 2021-07-07 ENCOUNTER — LAB VISIT (OUTPATIENT)
Dept: LAB | Facility: HOSPITAL | Age: 66
End: 2021-07-07
Attending: FAMILY MEDICINE
Payer: MEDICARE

## 2021-07-07 DIAGNOSIS — I15.2 HYPERTENSION ASSOCIATED WITH DIABETES: ICD-10-CM

## 2021-07-07 DIAGNOSIS — E11.69 DYSLIPIDEMIA ASSOCIATED WITH TYPE 2 DIABETES MELLITUS: ICD-10-CM

## 2021-07-07 DIAGNOSIS — E11.59 HYPERTENSION ASSOCIATED WITH DIABETES: ICD-10-CM

## 2021-07-07 DIAGNOSIS — E11.22 TYPE 2 DIABETES MELLITUS WITH STAGE 3A CHRONIC KIDNEY DISEASE, WITHOUT LONG-TERM CURRENT USE OF INSULIN: ICD-10-CM

## 2021-07-07 DIAGNOSIS — E78.5 DYSLIPIDEMIA ASSOCIATED WITH TYPE 2 DIABETES MELLITUS: ICD-10-CM

## 2021-07-07 DIAGNOSIS — N18.31 TYPE 2 DIABETES MELLITUS WITH STAGE 3A CHRONIC KIDNEY DISEASE, WITHOUT LONG-TERM CURRENT USE OF INSULIN: ICD-10-CM

## 2021-07-07 LAB
ALBUMIN SERPL BCP-MCNC: 3.8 G/DL (ref 3.5–5.2)
ALP SERPL-CCNC: 45 U/L (ref 55–135)
ALT SERPL W/O P-5'-P-CCNC: 12 U/L (ref 10–44)
ANION GAP SERPL CALC-SCNC: 9 MMOL/L (ref 8–16)
AST SERPL-CCNC: 14 U/L (ref 10–40)
BASOPHILS # BLD AUTO: 0.04 K/UL (ref 0–0.2)
BASOPHILS NFR BLD: 0.6 % (ref 0–1.9)
BILIRUB SERPL-MCNC: 0.2 MG/DL (ref 0.1–1)
BUN SERPL-MCNC: 26 MG/DL (ref 8–23)
CALCIUM SERPL-MCNC: 8.6 MG/DL (ref 8.7–10.5)
CHLORIDE SERPL-SCNC: 109 MMOL/L (ref 95–110)
CHOLEST SERPL-MCNC: 146 MG/DL (ref 120–199)
CHOLEST/HDLC SERPL: 3.5 {RATIO} (ref 2–5)
CO2 SERPL-SCNC: 23 MMOL/L (ref 23–29)
CREAT SERPL-MCNC: 1.6 MG/DL (ref 0.5–1.4)
DIFFERENTIAL METHOD: ABNORMAL
EOSINOPHIL # BLD AUTO: 0.3 K/UL (ref 0–0.5)
EOSINOPHIL NFR BLD: 5 % (ref 0–8)
ERYTHROCYTE [DISTWIDTH] IN BLOOD BY AUTOMATED COUNT: 14.4 % (ref 11.5–14.5)
EST. GFR  (AFRICAN AMERICAN): 51 ML/MIN/1.73 M^2
EST. GFR  (NON AFRICAN AMERICAN): 44 ML/MIN/1.73 M^2
ESTIMATED AVG GLUCOSE: 126 MG/DL (ref 68–131)
GLUCOSE SERPL-MCNC: 126 MG/DL (ref 70–110)
HBA1C MFR BLD: 6 % (ref 4–5.6)
HCT VFR BLD AUTO: 37 % (ref 40–54)
HDLC SERPL-MCNC: 42 MG/DL (ref 40–75)
HDLC SERPL: 28.8 % (ref 20–50)
HGB BLD-MCNC: 12.4 G/DL (ref 14–18)
IMM GRANULOCYTES # BLD AUTO: 0.01 K/UL (ref 0–0.04)
IMM GRANULOCYTES NFR BLD AUTO: 0.2 % (ref 0–0.5)
LDLC SERPL CALC-MCNC: 90 MG/DL (ref 63–159)
LYMPHOCYTES # BLD AUTO: 2.4 K/UL (ref 1–4.8)
LYMPHOCYTES NFR BLD: 37.3 % (ref 18–48)
MCH RBC QN AUTO: 29.6 PG (ref 27–31)
MCHC RBC AUTO-ENTMCNC: 33.5 G/DL (ref 32–36)
MCV RBC AUTO: 88 FL (ref 82–98)
MONOCYTES # BLD AUTO: 0.6 K/UL (ref 0.3–1)
MONOCYTES NFR BLD: 9.4 % (ref 4–15)
NEUTROPHILS # BLD AUTO: 3 K/UL (ref 1.8–7.7)
NEUTROPHILS NFR BLD: 47.5 % (ref 38–73)
NONHDLC SERPL-MCNC: 104 MG/DL
NRBC BLD-RTO: 0 /100 WBC
PLATELET # BLD AUTO: 246 K/UL (ref 150–450)
PMV BLD AUTO: 10.7 FL (ref 9.2–12.9)
POTASSIUM SERPL-SCNC: 4.6 MMOL/L (ref 3.5–5.1)
PROT SERPL-MCNC: 6.7 G/DL (ref 6–8.4)
RBC # BLD AUTO: 4.19 M/UL (ref 4.6–6.2)
SODIUM SERPL-SCNC: 141 MMOL/L (ref 136–145)
TRIGL SERPL-MCNC: 70 MG/DL (ref 30–150)
WBC # BLD AUTO: 6.36 K/UL (ref 3.9–12.7)

## 2021-07-07 PROCEDURE — 80061 LIPID PANEL: CPT | Performed by: FAMILY MEDICINE

## 2021-07-07 PROCEDURE — 83036 HEMOGLOBIN GLYCOSYLATED A1C: CPT | Performed by: FAMILY MEDICINE

## 2021-07-07 PROCEDURE — 80053 COMPREHEN METABOLIC PANEL: CPT | Performed by: FAMILY MEDICINE

## 2021-07-07 PROCEDURE — 36415 COLL VENOUS BLD VENIPUNCTURE: CPT | Performed by: FAMILY MEDICINE

## 2021-07-07 PROCEDURE — 85025 COMPLETE CBC W/AUTO DIFF WBC: CPT | Performed by: FAMILY MEDICINE

## 2021-07-10 ENCOUNTER — LAB VISIT (OUTPATIENT)
Dept: LAB | Facility: HOSPITAL | Age: 66
End: 2021-07-10
Attending: FAMILY MEDICINE
Payer: MEDICARE

## 2021-07-10 DIAGNOSIS — Z12.11 ENCOUNTER FOR FIT (FECAL IMMUNOCHEMICAL TEST) SCREENING: ICD-10-CM

## 2021-07-10 PROCEDURE — 82274 ASSAY TEST FOR BLOOD FECAL: CPT | Performed by: FAMILY MEDICINE

## 2021-07-16 LAB — HEMOCCULT STL QL IA: NEGATIVE

## 2021-10-10 ENCOUNTER — HOSPITAL ENCOUNTER (EMERGENCY)
Facility: HOSPITAL | Age: 66
Discharge: HOME | End: 2021-10-10
Attending: EMERGENCY MEDICINE
Payer: MEDICARE

## 2021-10-10 VITALS
HEART RATE: 49 BPM | DIASTOLIC BLOOD PRESSURE: 74 MMHG | OXYGEN SATURATION: 100 % | TEMPERATURE: 98 F | WEIGHT: 198 LBS | SYSTOLIC BLOOD PRESSURE: 159 MMHG | BODY MASS INDEX: 26.24 KG/M2 | HEIGHT: 73 IN | RESPIRATION RATE: 16 BRPM

## 2021-10-10 DIAGNOSIS — M62.838 NECK MUSCLE SPASM: Primary | ICD-10-CM

## 2021-10-10 DIAGNOSIS — M79.601 RIGHT ARM PAIN: ICD-10-CM

## 2021-10-10 PROCEDURE — 96375 TX/PRO/DX INJ NEW DRUG ADDON: CPT

## 2021-10-10 PROCEDURE — 96374 THER/PROPH/DIAG INJ IV PUSH: CPT

## 2021-10-10 PROCEDURE — 99284 EMERGENCY DEPT VISIT MOD MDM: CPT | Mod: 25

## 2021-10-10 PROCEDURE — 63600175 PHARM REV CODE 636 W HCPCS: Performed by: EMERGENCY MEDICINE

## 2021-10-10 RX ORDER — METHOCARBAMOL 500 MG/1
500 TABLET, FILM COATED ORAL 3 TIMES DAILY PRN
Qty: 20 TABLET | Refills: 0 | Status: SHIPPED | OUTPATIENT
Start: 2021-10-10 | End: 2021-10-15

## 2021-10-10 RX ORDER — METHOCARBAMOL 100 MG/ML
1000 INJECTION, SOLUTION INTRAMUSCULAR; INTRAVENOUS ONCE
Status: DISCONTINUED | OUTPATIENT
Start: 2021-10-10 | End: 2021-10-10

## 2021-10-10 RX ORDER — IBUPROFEN 600 MG/1
600 TABLET ORAL EVERY 8 HOURS PRN
Qty: 15 TABLET | Refills: 0 | Status: SHIPPED | OUTPATIENT
Start: 2021-10-10 | End: 2021-10-19

## 2021-10-10 RX ORDER — METHOCARBAMOL 100 MG/ML
1000 INJECTION, SOLUTION INTRAMUSCULAR; INTRAVENOUS ONCE
Status: COMPLETED | OUTPATIENT
Start: 2021-10-10 | End: 2021-10-10

## 2021-10-10 RX ORDER — KETOROLAC TROMETHAMINE 30 MG/ML
15 INJECTION, SOLUTION INTRAMUSCULAR; INTRAVENOUS
Status: COMPLETED | OUTPATIENT
Start: 2021-10-10 | End: 2021-10-10

## 2021-10-10 RX ADMIN — KETOROLAC TROMETHAMINE 15 MG: 30 INJECTION, SOLUTION INTRAMUSCULAR at 06:10

## 2021-10-10 RX ADMIN — METHOCARBAMOL 1000 MG: 100 INJECTION INTRAMUSCULAR; INTRAVENOUS at 07:10

## 2021-10-19 ENCOUNTER — OFFICE VISIT (OUTPATIENT)
Dept: FAMILY MEDICINE | Facility: CLINIC | Age: 66
End: 2021-10-19
Attending: FAMILY MEDICINE
Payer: MEDICARE

## 2021-10-19 VITALS
DIASTOLIC BLOOD PRESSURE: 78 MMHG | OXYGEN SATURATION: 97 % | WEIGHT: 191.81 LBS | BODY MASS INDEX: 25.42 KG/M2 | SYSTOLIC BLOOD PRESSURE: 139 MMHG | HEART RATE: 61 BPM | HEIGHT: 73 IN

## 2021-10-19 DIAGNOSIS — S16.1XXA ACUTE STRAIN OF NECK MUSCLE, INITIAL ENCOUNTER: Primary | ICD-10-CM

## 2021-10-19 DIAGNOSIS — M54.2 NECK PAIN ON RIGHT SIDE: ICD-10-CM

## 2021-10-19 PROCEDURE — 99214 PR OFFICE/OUTPT VISIT, EST, LEVL IV, 30-39 MIN: ICD-10-PCS | Mod: 25,S$GLB,, | Performed by: FAMILY MEDICINE

## 2021-10-19 PROCEDURE — 3008F BODY MASS INDEX DOCD: CPT | Mod: CPTII,S$GLB,, | Performed by: FAMILY MEDICINE

## 2021-10-19 PROCEDURE — 3075F PR MOST RECENT SYSTOLIC BLOOD PRESS GE 130-139MM HG: ICD-10-PCS | Mod: CPTII,S$GLB,, | Performed by: FAMILY MEDICINE

## 2021-10-19 PROCEDURE — 3078F DIAST BP <80 MM HG: CPT | Mod: CPTII,S$GLB,, | Performed by: FAMILY MEDICINE

## 2021-10-19 PROCEDURE — 4010F ACE/ARB THERAPY RXD/TAKEN: CPT | Mod: CPTII,S$GLB,, | Performed by: FAMILY MEDICINE

## 2021-10-19 PROCEDURE — 1125F PR PAIN SEVERITY QUANTIFIED, PAIN PRESENT: ICD-10-PCS | Mod: CPTII,S$GLB,, | Performed by: FAMILY MEDICINE

## 2021-10-19 PROCEDURE — 99214 OFFICE O/P EST MOD 30 MIN: CPT | Mod: 25,S$GLB,, | Performed by: FAMILY MEDICINE

## 2021-10-19 PROCEDURE — 3078F PR MOST RECENT DIASTOLIC BLOOD PRESSURE < 80 MM HG: ICD-10-PCS | Mod: CPTII,S$GLB,, | Performed by: FAMILY MEDICINE

## 2021-10-19 PROCEDURE — 1160F PR REVIEW ALL MEDS BY PRESCRIBER/CLIN PHARMACIST DOCUMENTED: ICD-10-PCS | Mod: CPTII,S$GLB,, | Performed by: FAMILY MEDICINE

## 2021-10-19 PROCEDURE — 1160F RVW MEDS BY RX/DR IN RCRD: CPT | Mod: CPTII,S$GLB,, | Performed by: FAMILY MEDICINE

## 2021-10-19 PROCEDURE — 3288F FALL RISK ASSESSMENT DOCD: CPT | Mod: CPTII,S$GLB,, | Performed by: FAMILY MEDICINE

## 2021-10-19 PROCEDURE — 3044F HG A1C LEVEL LT 7.0%: CPT | Mod: CPTII,S$GLB,, | Performed by: FAMILY MEDICINE

## 2021-10-19 PROCEDURE — 1125F AMNT PAIN NOTED PAIN PRSNT: CPT | Mod: CPTII,S$GLB,, | Performed by: FAMILY MEDICINE

## 2021-10-19 PROCEDURE — 1101F PT FALLS ASSESS-DOCD LE1/YR: CPT | Mod: CPTII,S$GLB,, | Performed by: FAMILY MEDICINE

## 2021-10-19 PROCEDURE — 1159F MED LIST DOCD IN RCRD: CPT | Mod: CPTII,S$GLB,, | Performed by: FAMILY MEDICINE

## 2021-10-19 PROCEDURE — 99999 PR PBB SHADOW E&M-EST. PATIENT-LVL V: ICD-10-PCS | Mod: PBBFAC,,, | Performed by: FAMILY MEDICINE

## 2021-10-19 PROCEDURE — 3044F PR MOST RECENT HEMOGLOBIN A1C LEVEL <7.0%: ICD-10-PCS | Mod: CPTII,S$GLB,, | Performed by: FAMILY MEDICINE

## 2021-10-19 PROCEDURE — 3075F SYST BP GE 130 - 139MM HG: CPT | Mod: CPTII,S$GLB,, | Performed by: FAMILY MEDICINE

## 2021-10-19 PROCEDURE — 96372 PR INJECTION,THERAP/PROPH/DIAG2ST, IM OR SUBCUT: ICD-10-PCS | Mod: S$GLB,,, | Performed by: FAMILY MEDICINE

## 2021-10-19 PROCEDURE — 96372 THER/PROPH/DIAG INJ SC/IM: CPT | Mod: S$GLB,,, | Performed by: FAMILY MEDICINE

## 2021-10-19 PROCEDURE — 99999 PR PBB SHADOW E&M-EST. PATIENT-LVL V: CPT | Mod: PBBFAC,,, | Performed by: FAMILY MEDICINE

## 2021-10-19 PROCEDURE — 4010F PR ACE/ARB THEARPY RXD/TAKEN: ICD-10-PCS | Mod: CPTII,S$GLB,, | Performed by: FAMILY MEDICINE

## 2021-10-19 PROCEDURE — 3288F PR FALLS RISK ASSESSMENT DOCUMENTED: ICD-10-PCS | Mod: CPTII,S$GLB,, | Performed by: FAMILY MEDICINE

## 2021-10-19 PROCEDURE — 1159F PR MEDICATION LIST DOCUMENTED IN MEDICAL RECORD: ICD-10-PCS | Mod: CPTII,S$GLB,, | Performed by: FAMILY MEDICINE

## 2021-10-19 PROCEDURE — 1101F PR PT FALLS ASSESS DOC 0-1 FALLS W/OUT INJ PAST YR: ICD-10-PCS | Mod: CPTII,S$GLB,, | Performed by: FAMILY MEDICINE

## 2021-10-19 PROCEDURE — 3008F PR BODY MASS INDEX (BMI) DOCUMENTED: ICD-10-PCS | Mod: CPTII,S$GLB,, | Performed by: FAMILY MEDICINE

## 2021-10-19 RX ORDER — IBUPROFEN 800 MG/1
800 TABLET ORAL EVERY 8 HOURS PRN
Qty: 30 TABLET | Refills: 2 | Status: SHIPPED | OUTPATIENT
Start: 2021-10-19 | End: 2022-11-08

## 2021-10-19 RX ORDER — TIZANIDINE 4 MG/1
4 TABLET ORAL EVERY 8 HOURS
Qty: 30 TABLET | Refills: 0 | Status: SHIPPED | OUTPATIENT
Start: 2021-10-19 | End: 2021-10-29

## 2021-10-19 RX ORDER — TRIAMCINOLONE ACETONIDE 40 MG/ML
40 INJECTION, SUSPENSION INTRA-ARTICULAR; INTRAMUSCULAR
Status: COMPLETED | OUTPATIENT
Start: 2021-10-19 | End: 2021-10-19

## 2021-10-19 RX ORDER — IBUPROFEN 800 MG/1
800 TABLET ORAL EVERY 8 HOURS PRN
Qty: 30 TABLET | Refills: 2 | Status: SHIPPED | OUTPATIENT
Start: 2021-10-19 | End: 2021-10-19 | Stop reason: SDUPTHER

## 2021-10-19 RX ADMIN — TRIAMCINOLONE ACETONIDE 40 MG: 40 INJECTION, SUSPENSION INTRA-ARTICULAR; INTRAMUSCULAR at 03:10

## 2021-10-22 ENCOUNTER — CLINICAL SUPPORT (OUTPATIENT)
Dept: REHABILITATION | Facility: HOSPITAL | Age: 66
End: 2021-10-22
Attending: FAMILY MEDICINE
Payer: MEDICARE

## 2021-10-22 DIAGNOSIS — M54.2 NECK PAIN ON RIGHT SIDE: ICD-10-CM

## 2021-10-22 DIAGNOSIS — M54.2 NECK PAIN: ICD-10-CM

## 2021-10-22 DIAGNOSIS — M25.60 DECREASED RANGE OF MOTION: ICD-10-CM

## 2021-10-22 DIAGNOSIS — S16.1XXA ACUTE STRAIN OF NECK MUSCLE, INITIAL ENCOUNTER: ICD-10-CM

## 2021-10-22 PROCEDURE — 97161 PT EVAL LOW COMPLEX 20 MIN: CPT | Mod: PN

## 2021-11-24 ENCOUNTER — PES CALL (OUTPATIENT)
Dept: ADMINISTRATIVE | Facility: CLINIC | Age: 66
End: 2021-11-24
Payer: MEDICARE

## 2021-12-02 ENCOUNTER — CLINICAL SUPPORT (OUTPATIENT)
Dept: REHABILITATION | Facility: HOSPITAL | Age: 66
End: 2021-12-02
Payer: MEDICARE

## 2021-12-02 ENCOUNTER — DOCUMENTATION ONLY (OUTPATIENT)
Dept: REHABILITATION | Facility: HOSPITAL | Age: 66
End: 2021-12-02
Payer: MEDICARE

## 2021-12-02 DIAGNOSIS — M25.60 DECREASED RANGE OF MOTION: ICD-10-CM

## 2021-12-02 DIAGNOSIS — M54.2 NECK PAIN: ICD-10-CM

## 2021-12-02 PROCEDURE — 97110 THERAPEUTIC EXERCISES: CPT | Mod: PN

## 2021-12-02 PROCEDURE — 97140 MANUAL THERAPY 1/> REGIONS: CPT | Mod: PN

## 2021-12-07 ENCOUNTER — TELEPHONE (OUTPATIENT)
Dept: REHABILITATION | Facility: HOSPITAL | Age: 66
End: 2021-12-07
Payer: MEDICARE

## 2021-12-13 ENCOUNTER — CLINICAL SUPPORT (OUTPATIENT)
Dept: REHABILITATION | Facility: HOSPITAL | Age: 66
End: 2021-12-13
Payer: MEDICARE

## 2021-12-13 DIAGNOSIS — M54.2 NECK PAIN: ICD-10-CM

## 2021-12-13 DIAGNOSIS — M25.60 DECREASED RANGE OF MOTION: ICD-10-CM

## 2021-12-13 PROCEDURE — 97140 MANUAL THERAPY 1/> REGIONS: CPT | Mod: PN

## 2021-12-13 PROCEDURE — 97110 THERAPEUTIC EXERCISES: CPT | Mod: PN

## 2021-12-15 ENCOUNTER — CLINICAL SUPPORT (OUTPATIENT)
Dept: REHABILITATION | Facility: HOSPITAL | Age: 66
End: 2021-12-15
Payer: MEDICARE

## 2021-12-15 DIAGNOSIS — M54.2 NECK PAIN: ICD-10-CM

## 2021-12-15 DIAGNOSIS — M25.60 DECREASED RANGE OF MOTION: ICD-10-CM

## 2021-12-15 PROCEDURE — 97110 THERAPEUTIC EXERCISES: CPT | Mod: PN,CQ

## 2021-12-15 PROCEDURE — 97140 MANUAL THERAPY 1/> REGIONS: CPT | Mod: PN,CQ

## 2021-12-30 DIAGNOSIS — N18.9 CHRONIC KIDNEY DISEASE, UNSPECIFIED CKD STAGE: ICD-10-CM

## 2022-01-04 ENCOUNTER — CLINICAL SUPPORT (OUTPATIENT)
Dept: REHABILITATION | Facility: HOSPITAL | Age: 67
End: 2022-01-04
Payer: MEDICARE

## 2022-01-04 DIAGNOSIS — M54.2 NECK PAIN: ICD-10-CM

## 2022-01-04 DIAGNOSIS — M25.60 DECREASED RANGE OF MOTION: ICD-10-CM

## 2022-01-04 PROCEDURE — 97140 MANUAL THERAPY 1/> REGIONS: CPT | Mod: PN,CQ

## 2022-01-04 PROCEDURE — 97110 THERAPEUTIC EXERCISES: CPT | Mod: PN,CQ

## 2022-01-04 NOTE — PROGRESS NOTES
[facility]   Physical Therapy Treatment Note     Name: [facility] Number: [id number]    Therapy Diagnosis:   Encounter Diagnoses   Name Primary?    Neck pain     Decreased range of motion      Physician: Stephan Ramey MD    Visit Date: 1/4/2022      Physician Orders: PT Eval and Treat Eval Only   Medical Diagnosis from Referral:   S16.1XXA (ICD-10-CM) - Acute strain of neck muscle, initial encounter   M54.2 (ICD-10-CM) - Neck pain on right side      Evaluation Date: 10/22/2021  Authorization Period Expiration: 10/19/2022  Plan of Care Expiration: 12/13/2021 to 1/13/2022  Visit # / Visits authorized: 5/ 12  FOTO#: 5/5 Completed 1/4/2022  PTA Visit #: 2/5     Time In: 7:30 am  Time Out: 8:15 am  Total Billable Time: 45 minutes (2TE, 1MT)    Precautions: Diabetes    SUBJECTIVE     Pt reports: pain with turning his head right<>left.  Headaches not as bad.  States he was able to do HEP while out of town.  Less difficulty driving.  He was compliant with home exercise program.  Response to previous treatment: no adverse effects.   Functional change: less difficulty driving    Pain: 5/10  Location: bilateral neck      OBJECTIVE     Objective Measures updated at progress report unless specified.    Treatment     Addy received the treatments listed below:      Addy received the following manual therapy techniques: Joint mobilizations, Manual traction, Myofacial release, Soft tissue Mobilization and Friction Massage were applied to the: Neck/shoulder for 18 minutes, including:    B up glide mobilization C3-6 Not today  AP glide of OA grade 2 Bilat  Not today  STM cervical paraspinals with elongation  Suboccipital release - reduced pain  STM/MFR B Upper trap   Lumbar lock manual thoracic rotation - x15 B  Thoracic CPA mobilization T1-T6  Not today    Addy received therapeutic exercises to develop strength, endurance, ROM, flexibility and posture for 25 minutes including:    Chin tuck -  "5"x20 - improved technique - minimal verbal cues  Quadruped serratus push: 1x10  Rotational SNAG - gaze follows head turn - x20 B - improved technique minimal verbal cues  Isometric cervical rotation:  5" hold x 5 B  Spotted cervical rotation seated - B 2 x 15 end range exhalation  Upper Trap seated stretch c hand behind back - B [id number]   Scapular retraction - 5"x20    Patient Education and Home Exercises     Home Exercises Provided and Patient Education Provided     Education provided:   - Pt educated on POC  - Pt educated on anatomy and physiology of current conditions as it relates to signs and symptoms  - Pt educated on HEP -was not performing correctly, educated on proper form   - Pt educated on importance and benefits of daily HEP for best outcome of therapy    Written Home Exercises Provided: Patient instructed to cont prior HEP. Exercises were reviewed and Addy was able to demonstrate them prior to the end of the session.  Addy demonstrated fair  understanding of the education provided. See EMR under Patient Instructions for exercises provided during therapy sessions    ASSESSMENT   Continued myofascial restrictions noted B cervical paraspinals and upper traps. Reports some relief after manual interventions.  He was able to demonstrate much improved technique with therex with only minimal verbal cues required today.  Subjective reports of compliance even when out of town. States decreased pain after treatment.  Rates "3/10".    Addy is progressing well towards his goals.   Pt prognosis is Good.     Pt will continue to benefit from skilled outpatient physical therapy to address the deficits listed in the problem list box on initial evaluation, provide pt/family education and to maximize pt's level of independence in the home and community environment.     Pt's spiritual, cultural and educational needs considered and pt agreeable to plan of care and goals.     Anticipated barriers to physical therapy: fear " avoidance    Goals:   STG (3 Weeks)  1. 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 Pt will reduce worse pain to 3/10 in order to drive car for work  3. Pt will be able to sleep for 6 hours without being woken up from pain in order to get rest at night  4. Pt will be independent c initial HEP in order to facilitate PT     LTG(6weeks)  1. Pt will improve FOTO limitation to 36% indicating overall improvement in function  2.Pt will be able to sleep for 8 hours without being woken up from pain in order to get rest at night  3.Pt will reduce worst pain to 1/10 in order to drive for work  4. Pt will be independent c final HEP in order to DC to home program     Previous Short Term Goals Status:   See above  New Short Term Goals Status:   No new STG  Long Term Goal Status:   continue per initial plan of care.  Reasons for Recertification of Therapy:   Limited cervical ROM and pain       PLAN     Continue PT per POC, progress as tolerated.    Shonna Henao, PTA

## 2022-01-06 ENCOUNTER — LAB VISIT (OUTPATIENT)
Dept: LAB | Facility: HOSPITAL | Age: 67
End: 2022-01-06
Attending: FAMILY MEDICINE
Payer: MEDICARE

## 2022-01-06 ENCOUNTER — CLINICAL SUPPORT (OUTPATIENT)
Dept: REHABILITATION | Facility: HOSPITAL | Age: 67
End: 2022-01-06
Payer: MEDICARE

## 2022-01-06 ENCOUNTER — OFFICE VISIT (OUTPATIENT)
Dept: FAMILY MEDICINE | Facility: CLINIC | Age: 67
End: 2022-01-06
Attending: FAMILY MEDICINE
Payer: MEDICARE

## 2022-01-06 ENCOUNTER — PES CALL (OUTPATIENT)
Dept: ADMINISTRATIVE | Facility: CLINIC | Age: 67
End: 2022-01-06
Payer: MEDICARE

## 2022-01-06 VITALS
TEMPERATURE: 98 F | HEART RATE: 45 BPM | SYSTOLIC BLOOD PRESSURE: 130 MMHG | DIASTOLIC BLOOD PRESSURE: 68 MMHG | OXYGEN SATURATION: 99 % | WEIGHT: 193.13 LBS | BODY MASS INDEX: 25.6 KG/M2 | HEIGHT: 73 IN

## 2022-01-06 DIAGNOSIS — Z00.00 ROUTINE GENERAL MEDICAL EXAMINATION AT HEALTH CARE FACILITY: Primary | ICD-10-CM

## 2022-01-06 DIAGNOSIS — M25.60 DECREASED RANGE OF MOTION: ICD-10-CM

## 2022-01-06 DIAGNOSIS — I15.2 HYPERTENSION ASSOCIATED WITH DIABETES: ICD-10-CM

## 2022-01-06 DIAGNOSIS — E78.5 DYSLIPIDEMIA ASSOCIATED WITH TYPE 2 DIABETES MELLITUS: ICD-10-CM

## 2022-01-06 DIAGNOSIS — E11.59 HYPERTENSION ASSOCIATED WITH DIABETES: ICD-10-CM

## 2022-01-06 DIAGNOSIS — N18.31 TYPE 2 DIABETES MELLITUS WITH STAGE 3A CHRONIC KIDNEY DISEASE, WITHOUT LONG-TERM CURRENT USE OF INSULIN: ICD-10-CM

## 2022-01-06 DIAGNOSIS — R00.1 BRADYCARDIA: ICD-10-CM

## 2022-01-06 DIAGNOSIS — I77.1 STRICTURE OF ARTERY: ICD-10-CM

## 2022-01-06 DIAGNOSIS — E11.22 TYPE 2 DIABETES MELLITUS WITH STAGE 3A CHRONIC KIDNEY DISEASE, WITHOUT LONG-TERM CURRENT USE OF INSULIN: ICD-10-CM

## 2022-01-06 DIAGNOSIS — E11.69 DYSLIPIDEMIA ASSOCIATED WITH TYPE 2 DIABETES MELLITUS: ICD-10-CM

## 2022-01-06 DIAGNOSIS — Z00.00 ROUTINE GENERAL MEDICAL EXAMINATION AT HEALTH CARE FACILITY: ICD-10-CM

## 2022-01-06 DIAGNOSIS — M54.2 NECK PAIN: ICD-10-CM

## 2022-01-06 LAB
ALBUMIN/CREAT UR: 6.1 UG/MG (ref 0–30)
BILIRUB UR QL STRIP: NEGATIVE
CLARITY UR: CLEAR
COLOR UR: YELLOW
CREAT UR-MCNC: 165 MG/DL (ref 23–375)
GLUCOSE UR QL STRIP: NEGATIVE
HGB UR QL STRIP: ABNORMAL
KETONES UR QL STRIP: NEGATIVE
LEUKOCYTE ESTERASE UR QL STRIP: NEGATIVE
MICROALBUMIN UR DL<=1MG/L-MCNC: 10 UG/ML
NITRITE UR QL STRIP: NEGATIVE
PH UR STRIP: 5 [PH] (ref 5–8)
PROT UR QL STRIP: NEGATIVE
SP GR UR STRIP: 1.02 (ref 1–1.03)
URN SPEC COLLECT METH UR: ABNORMAL
UROBILINOGEN UR STRIP-ACNC: NEGATIVE EU/DL

## 2022-01-06 PROCEDURE — 1160F RVW MEDS BY RX/DR IN RCRD: CPT | Mod: CPTII,S$GLB,, | Performed by: FAMILY MEDICINE

## 2022-01-06 PROCEDURE — 3075F PR MOST RECENT SYSTOLIC BLOOD PRESS GE 130-139MM HG: ICD-10-PCS | Mod: CPTII,S$GLB,, | Performed by: FAMILY MEDICINE

## 2022-01-06 PROCEDURE — 1101F PR PT FALLS ASSESS DOC 0-1 FALLS W/OUT INJ PAST YR: ICD-10-PCS | Mod: CPTII,S$GLB,, | Performed by: FAMILY MEDICINE

## 2022-01-06 PROCEDURE — 97140 MANUAL THERAPY 1/> REGIONS: CPT | Mod: PN

## 2022-01-06 PROCEDURE — 82043 UR ALBUMIN QUANTITATIVE: CPT | Performed by: FAMILY MEDICINE

## 2022-01-06 PROCEDURE — 1160F PR REVIEW ALL MEDS BY PRESCRIBER/CLIN PHARMACIST DOCUMENTED: ICD-10-PCS | Mod: CPTII,S$GLB,, | Performed by: FAMILY MEDICINE

## 2022-01-06 PROCEDURE — 1126F PR PAIN SEVERITY QUANTIFIED, NO PAIN PRESENT: ICD-10-PCS | Mod: CPTII,S$GLB,, | Performed by: FAMILY MEDICINE

## 2022-01-06 PROCEDURE — 99999 PR PBB SHADOW E&M-EST. PATIENT-LVL IV: ICD-10-PCS | Mod: PBBFAC,,, | Performed by: FAMILY MEDICINE

## 2022-01-06 PROCEDURE — 93005 ELECTROCARDIOGRAM TRACING: CPT

## 2022-01-06 PROCEDURE — 3288F PR FALLS RISK ASSESSMENT DOCUMENTED: ICD-10-PCS | Mod: CPTII,S$GLB,, | Performed by: FAMILY MEDICINE

## 2022-01-06 PROCEDURE — 3008F PR BODY MASS INDEX (BMI) DOCUMENTED: ICD-10-PCS | Mod: CPTII,S$GLB,, | Performed by: FAMILY MEDICINE

## 2022-01-06 PROCEDURE — 3078F PR MOST RECENT DIASTOLIC BLOOD PRESSURE < 80 MM HG: ICD-10-PCS | Mod: CPTII,S$GLB,, | Performed by: FAMILY MEDICINE

## 2022-01-06 PROCEDURE — 99214 OFFICE O/P EST MOD 30 MIN: CPT | Mod: S$GLB,,, | Performed by: FAMILY MEDICINE

## 2022-01-06 PROCEDURE — 99999 PR PBB SHADOW E&M-EST. PATIENT-LVL IV: CPT | Mod: PBBFAC,,, | Performed by: FAMILY MEDICINE

## 2022-01-06 PROCEDURE — 93010 ELECTROCARDIOGRAM REPORT: CPT | Mod: ,,, | Performed by: INTERNAL MEDICINE

## 2022-01-06 PROCEDURE — 81003 URINALYSIS AUTO W/O SCOPE: CPT | Performed by: FAMILY MEDICINE

## 2022-01-06 PROCEDURE — 1126F AMNT PAIN NOTED NONE PRSNT: CPT | Mod: CPTII,S$GLB,, | Performed by: FAMILY MEDICINE

## 2022-01-06 PROCEDURE — 1159F MED LIST DOCD IN RCRD: CPT | Mod: CPTII,S$GLB,, | Performed by: FAMILY MEDICINE

## 2022-01-06 PROCEDURE — 3288F FALL RISK ASSESSMENT DOCD: CPT | Mod: CPTII,S$GLB,, | Performed by: FAMILY MEDICINE

## 2022-01-06 PROCEDURE — 3078F DIAST BP <80 MM HG: CPT | Mod: CPTII,S$GLB,, | Performed by: FAMILY MEDICINE

## 2022-01-06 PROCEDURE — 97110 THERAPEUTIC EXERCISES: CPT | Mod: PN

## 2022-01-06 PROCEDURE — 1101F PT FALLS ASSESS-DOCD LE1/YR: CPT | Mod: CPTII,S$GLB,, | Performed by: FAMILY MEDICINE

## 2022-01-06 PROCEDURE — 99214 PR OFFICE/OUTPT VISIT, EST, LEVL IV, 30-39 MIN: ICD-10-PCS | Mod: S$GLB,,, | Performed by: FAMILY MEDICINE

## 2022-01-06 PROCEDURE — 3008F BODY MASS INDEX DOCD: CPT | Mod: CPTII,S$GLB,, | Performed by: FAMILY MEDICINE

## 2022-01-06 PROCEDURE — 3075F SYST BP GE 130 - 139MM HG: CPT | Mod: CPTII,S$GLB,, | Performed by: FAMILY MEDICINE

## 2022-01-06 PROCEDURE — 93010 EKG 12-LEAD: ICD-10-PCS | Mod: ,,, | Performed by: INTERNAL MEDICINE

## 2022-01-06 PROCEDURE — 82570 ASSAY OF URINE CREATININE: CPT | Performed by: FAMILY MEDICINE

## 2022-01-06 PROCEDURE — 1159F PR MEDICATION LIST DOCUMENTED IN MEDICAL RECORD: ICD-10-PCS | Mod: CPTII,S$GLB,, | Performed by: FAMILY MEDICINE

## 2022-01-06 NOTE — PROGRESS NOTES
"[facility] OUTPATIENT THERAPY AND WELLNESS   Physical Therapy Treatment Note     [id number]    Therapy Diagnosis:   Encounter Diagnoses   Name Primary?    Neck pain     Decreased range of motion      Physician: Stephan Ramey MD    Visit Date: 1/6/2022      Physician Orders: PT Eval and Treat Eval Only   Medical Diagnosis from Referral:   S16.1XXA (ICD-10-CM) - Acute strain of neck muscle, initial encounter   M54.2 (ICD-10-CM) - Neck pain on right side   Evaluation Date: 10/22/2021  Authorization Period Expiration: 10/19/2022  Plan of Care Expiration: 12/13/2021 to 1/13/2022  Visit # / Visits authorized: 6/12  FOTO#: 6/10 - completed 1/4/2022  PTA Visit #: 2/5     Time In: 7:35 am  Time Out: 8:15 am  Total Billable Time: 40 minutes (1 TE, 2 MT)    Precautions: Diabetes    SUBJECTIVE     Pt reports: he's feeling a little stiff this morning. Still having pain when he rotates to the left and to the right, but he is seeing improvements.    He was compliant with home exercise program.  Response to previous treatment: no adverse effects.   Functional change: less difficulty driving    Pain: 2/10  Location: bilateral neck      OBJECTIVE     Objective Measures updated at progress report unless specified.    Treatment     Addy received the treatments listed below:      Addy received the following manual therapy techniques: Joint mobilizations, Manual traction, Myofacial release, Soft tissue Mobilization and Friction Massage were applied to the: Neck/shoulder for 25 minutes, including:  B C3-C6 up glides  B C3-C6 down glides  B lower and mid cervical transverse glides   Suboccipital release   STM/MFR B Upper trap - emphasis on R  AP glide of OA grade 2 - NOT TODAY    Addy received therapeutic exercises to develop strength, endurance, ROM, flexibility and posture for 15 minutes including:    Chin tuck - 5"x20 - improved technique - minimal verbal cues  Rotational SNAG - gaze follows head turn - " "x20 B - improved technique minimal verbal cues  Modified downward dog at wall: 5" x 15  Seated B shoulder ER - RTB: 2x10    NOT TODAY:  Spotted cervical rotation seated - B 2 x 15 end range exhalation  Upper Trap seated stretch c hand behind back - B [id number]   Scapular retraction - 5"x20  Quadruped serratus push: 1x10    Patient Education and Home Exercises     Home Exercises Provided and Patient Education Provided     Education provided:   - Pt educated on POC  - Pt educated on anatomy and physiology of current conditions as it relates to signs and symptoms  - Pt educated on HEP -was not performing correctly, educated on proper form   - Pt educated on importance and benefits of daily HEP for best outcome of therapy    Written Home Exercises Provided: Patient instructed to cont prior HEP. Exercises were reviewed and Addy was able to demonstrate them prior to the end of the session.  Addy demonstrated fair  understanding of the education provided. See EMR under Patient Instructions for exercises provided during therapy sessions    ASSESSMENT   Addy is a 66 year old male referred to physical therapy for an acute strain of neck muscle. Patient presents with forward head posture, rotator cuff and periscapular strength deficits, and UT hypertonicity. Range of motion and subjective report improved with manual therapy. At the end of the session, patient reported that he thought today was his last visit and he plans to see what his MD says at his appointment today. Discussed with patient about options and plan moving forward with plan of care. Plan is to have patient finish last two visits and let us know what MD suggests as far as discharge vs. continuing.    Addy is progressing well towards his goals.   Pt prognosis is Good.     Pt will continue to benefit from skilled outpatient physical therapy to address the deficits listed in the problem list box on initial evaluation, provide pt/family education and to maximize " pt's level of independence in the home and community environment.     Pt's spiritual, cultural and educational needs considered and pt agreeable to plan of care and goals.     Anticipated barriers to physical therapy: fear avoidance    Goals:   STG (3 Weeks)  1. 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 Pt will reduce worse pain to 3/10 in order to drive car for work  3. Pt will be able to sleep for 6 hours without being woken up from pain in order to get rest at night  4. Pt will be independent c initial HEP in order to facilitate PT     LTG(6weeks)  1. Pt will improve FOTO limitation to 36% indicating overall improvement in function  2.Pt will be able to sleep for 8 hours without being woken up from pain in order to get rest at night  3.Pt will reduce worst pain to 1/10 in order to drive for work  4. Pt will be independent c final HEP in order to DC to home program      PLAN     Continue PT per POC, progress as tolerated.    Tyrese Steele, PT

## 2022-01-06 NOTE — PROGRESS NOTES
Subjective:       Patient ID: Addy Redding is a 66 y.o. male.    Chief Complaint: No chief complaint on file.    65 yr old black male with DM II, HTN, HLD, GERD, CKD III, presents today for his annual wellness check, lab work. No new complaints today.        DM II - improved -   HGBA1C                   6.0 (H)             07/07/2021            - complicated by CKD III                        - denies any hypoglycemic symptoms - on metformin - up to date with eye and foot screen - stopped glipizide since itw as making his sugars low    HTN - controlled  - on lisinopril 10 mg daily - no side effects    HLD - controlled and improving -  LDLCALC                  90.0                07/07/2021                 - on statin - compliant - need refill - not fully compliant with diet    GERD - stable - takes prilosec as needed    ED - stable - takes viagra as needed    Health maintenance  -labs due  -Flu and Pneumovax - up to date  -adacel due and done today  -colonoscopy due - wants to wait  -PSA UTD      Arm Pain   There was no injury mechanism. The pain is present in the right shoulder and upper right arm. The quality of the pain is described as aching. The pain does not radiate. The pain is at a severity of 5/10. The pain is moderate. The pain has been constant since the incident. Pertinent negatives include no chest pain or numbness. The symptoms are aggravated by movement, lifting and palpation. He has tried nothing for the symptoms. The treatment provided mild relief.   Shoulder Pain   The pain is present in the right shoulder. This is a new problem. The current episode started 1 to 4 weeks ago. There has been no history of extremity trauma. The problem occurs constantly. The problem has been unchanged. The quality of the pain is described as aching. The pain is at a severity of 5/10. The pain is moderate. Pertinent negatives include no headaches, itching, joint swelling or numbness. The symptoms are aggravated by  activity. He has tried nothing for the symptoms. The treatment provided no relief. His past medical history is significant for diabetes.   Gastroesophageal Reflux  He complains of heartburn. He reports no abdominal pain, no chest pain, no coughing or no wheezing. This is a new problem. The current episode started 1 to 4 weeks ago. The problem occurs constantly. The problem has been unchanged. The heartburn duration is several minutes. The heartburn is located in the substernum. The symptoms are aggravated by certain foods. Pertinent negatives include no anemia, fatigue or melena. There are no known risk factors. He has tried nothing for the symptoms. The treatment provided mild relief. Past procedures do not include an abdominal ultrasound, esophageal pH monitoring or a UGI. Past invasive treatments do not include gastroplasty or reflux surgery.   Follow-up  Associated symptoms include arthralgias and myalgias. Pertinent negatives include no abdominal pain, change in bowel habit, chest pain, congestion, coughing, diaphoresis, fatigue, headaches, joint swelling, numbness, rash, urinary symptoms, vertigo, vomiting or weakness.   Medication Refill  This is a chronic problem. The current episode started more than 1 year ago. The problem occurs constantly. The problem has been gradually improving. Associated symptoms include arthralgias and myalgias. Pertinent negatives include no abdominal pain, change in bowel habit, chest pain, congestion, coughing, diaphoresis, fatigue, headaches, joint swelling, numbness, rash, urinary symptoms, vertigo, vomiting or weakness.   Diabetes  Pertinent negatives for hypoglycemia include no confusion, dizziness, headaches, nervousness/anxiousness, speech difficulty or sweats. Pertinent negatives for diabetes include no chest pain, no fatigue, no polydipsia, no polyuria and no weakness. Pertinent negatives for diabetic complications include no CVA, PVD or retinopathy.   Hypertension  This  is a chronic problem. The current episode started more than 1 year ago. The problem has been gradually worsening since onset. The problem is uncontrolled. Pertinent negatives include no anxiety, chest pain, headaches, malaise/fatigue, orthopnea, palpitations, peripheral edema or sweats. There are no associated agents to hypertension. Risk factors for coronary artery disease include dyslipidemia, diabetes mellitus, male gender and obesity. Past treatments include ACE inhibitors. The current treatment provides no improvement. There are no compliance problems.  There is no history of angina, CAD/MI, CVA, left ventricular hypertrophy, PVD or retinopathy. There is no history of chronic renal disease, coarctation of the aorta, hypercortisolism, pheochromocytoma, renovascular disease or a thyroid problem.   Hyperlipidemia  This is a chronic problem. The current episode started more than 1 year ago. The problem is uncontrolled. Recent lipid tests were reviewed and are high. Exacerbating diseases include diabetes. He has no history of chronic renal disease, liver disease or nephrotic syndrome. There are no known factors aggravating his hyperlipidemia. Associated symptoms include myalgias. Pertinent negatives include no chest pain, focal sensory loss, focal weakness or leg pain. Current antihyperlipidemic treatment includes statins. There are no compliance problems.  Risk factors for coronary artery disease include diabetes mellitus, dyslipidemia, male sex and hypertension.     Review of Systems   Constitutional: Negative.  Negative for activity change, diaphoresis, fatigue, malaise/fatigue and unexpected weight change.   HENT: Negative.  Negative for nasal congestion, ear pain, mouth sores, rhinorrhea and voice change.    Eyes: Negative.  Negative for pain, discharge and visual disturbance.   Respiratory: Negative.  Negative for apnea, cough and wheezing.    Cardiovascular: Negative.  Negative for chest pain, palpitations  and orthopnea.   Gastrointestinal: Positive for heartburn. Negative for abdominal distention, abdominal pain, anal bleeding, change in bowel habit, diarrhea, melena, vomiting and change in bowel habit.   Endocrine: Negative.  Negative for cold intolerance, polydipsia and polyuria.   Genitourinary: Negative.  Negative for decreased urine volume, difficulty urinating, discharge, frequency and scrotal swelling.   Musculoskeletal: Positive for arthralgias and myalgias. Negative for back pain, joint swelling, leg pain and neck stiffness.   Integumentary:  Negative for color change, itching and rash. Negative.   Allergic/Immunologic: Negative.  Negative for environmental allergies.   Neurological: Negative.  Negative for dizziness, vertigo, focal weakness, speech difficulty, weakness, light-headedness, numbness and headaches.   Hematological: Negative.    Psychiatric/Behavioral: Negative.  Negative for agitation, confusion, dysphoric mood and suicidal ideas. The patient is not nervous/anxious.          Active Ambulatory Problems     Diagnosis Date Noted    Dyslipidemia associated with type 2 diabetes mellitus     Hypertension associated with diabetes 11/04/2014    ED (erectile dysfunction)     Type 2 diabetes mellitus with stage 3a chronic kidney disease, without long-term current use of insulin 02/22/2016    Left-sided low back pain with sciatica 02/22/2016    Neck pain 10/22/2021    Decreased range of motion 10/22/2021    Stricture of artery 01/06/2022     Resolved Ambulatory Problems     Diagnosis Date Noted    Type II or unspecified type diabetes mellitus without mention of complication, not stated as uncontrolled     GERD (gastroesophageal reflux disease)     Costochondritis 11/05/2012    Atypical chest pain 11/05/2012    Chest pain 11/05/2012    Colon cancer screening 10/23/2013    Prostate cancer screening 10/23/2013    Renal insufficiency 10/23/2013    Need for pneumococcal vaccination 10/23/2013     Influenza vaccine needed 10/23/2013    Diabetes mellitus, type 2 2014    Type II or unspecified type diabetes mellitus with renal manifestations, uncontrolled(250.42)     Diabetic nephropathy associated with type 2 diabetes mellitus     BMI 27.0-27.9,adult 2016     Past Medical History:   Diagnosis Date    Diabetic retinopathy     Hyperlipidemia     Hypertension      Past Surgical History:   Procedure Laterality Date    Bone biopsy right femur       Family History   Problem Relation Age of Onset    Hypertension Mother     Diabetes Mother     Hypertension Sister     Hypertension Brother     Diabetes Sister     Diabetes Brother     Lung cancer Brother     Stomach cancer Sister     Coronary artery disease Father          of an MI at age 60     Social History     Socioeconomic History    Marital status:    Tobacco Use    Smoking status: Former Smoker     Packs/day: 0.50     Years: 12.00     Pack years: 6.00     Types: Cigarettes    Smokeless tobacco: Never Used   Substance and Sexual Activity    Alcohol use: No    Drug use: No    Sexual activity: Yes     Partners: Female     Review of patient's allergies indicates:  No Known Allergies  Current Outpatient Medications on File Prior to Visit   Medication Sig Dispense Refill    ADVANCED GLUC METER TEST STRIP Strp USE TO TEST BLOOD SUGAR 4 TIMES DAILY. 100 strip 0    atorvastatin (LIPITOR) 40 MG tablet TAKE ONE TABLET BY MOUTH ONCE DAILY.  90 tablet 3    blood sugar diagnostic (BLOOD GLUCOSE TEST) Strp Check sugar once daily 100 each 11    FLUAD QUAD 2020-21,65Y UP,,PF, 60 mcg (15 mcg x 4)/0.5 mL Syrg       ibuprofen (ADVIL,MOTRIN) 800 MG tablet Take 1 tablet (800 mg total) by mouth every 8 (eight) hours as needed for Pain. 30 tablet 2    lancets (ACCU-CHEK SOFTCLIX LANCETS) Misc 1 lancet by Misc.(Non-Drug; Combo Route) route 2 (two) times daily. 200 each 1    lisinopriL (PRINIVIL,ZESTRIL) 20 MG tablet TAKE ONE TABLET  BY MOUTH TWICE DAILY 180 tablet 3    metFORMIN (GLUCOPHAGE) 1000 MG tablet TAKE ONE TABLET BY MOUTH TWICE DAILY  180 tablet 3    pantoprazole (PROTONIX) 40 MG tablet Take 1 tablet (40 mg total) by mouth once daily. 30 tablet 2    sildenafil (REVATIO) 20 mg Tab Take 1 tablet (20 mg total) by mouth daily as needed (ed). 30 tablet 2    azelastine (ASTELIN) 137 mcg (0.1 %) nasal spray 1 spray (137 mcg total) by Nasal route 2 (two) times daily. for 5 days 30 mL 0    blood-glucose meter kit Use as instructed      levocetirizine (XYZAL) 5 MG tablet Take 1 tablet (5 mg total) by mouth every evening. 30 tablet 0     No current facility-administered medications on file prior to visit.       Objective:       Vitals:    01/06/22 0903   BP: 130/68   Pulse: (!) 45   Temp: 97.6 °F (36.4 °C)       Physical Exam  Constitutional:       Appearance: He is well-developed.   HENT:      Head: Normocephalic and atraumatic.      Right Ear: External ear normal.      Left Ear: External ear normal.      Nose: Nose normal.      Mouth/Throat:      Pharynx: No oropharyngeal exudate.   Eyes:      General: No scleral icterus.        Right eye: No discharge.         Left eye: No discharge.      Conjunctiva/sclera: Conjunctivae normal.      Pupils: Pupils are equal, round, and reactive to light.   Neck:      Thyroid: No thyromegaly.      Vascular: No JVD.      Trachea: No tracheal deviation.   Cardiovascular:      Rate and Rhythm: Normal rate and regular rhythm.      Heart sounds: Normal heart sounds. No murmur heard.  No friction rub. No gallop.    Pulmonary:      Effort: Pulmonary effort is normal. No respiratory distress.      Breath sounds: Normal breath sounds. No stridor. No wheezing or rales.   Chest:      Chest wall: No tenderness.   Abdominal:      General: Bowel sounds are normal. There is no distension.      Palpations: Abdomen is soft. There is no mass.      Tenderness: There is no abdominal tenderness. There is no guarding or  rebound.      Hernia: No hernia is present.   Musculoskeletal:         General: Tenderness (mild TTP right shoulder but no impingemet and neer and rodriguez negative) present. Normal range of motion.      Cervical back: Normal range of motion and neck supple.   Lymphadenopathy:      Cervical: No cervical adenopathy.   Skin:     General: Skin is warm and dry.      Coloration: Skin is not pale.      Findings: No erythema or rash.   Neurological:      Mental Status: He is alert and oriented to person, place, and time.      Cranial Nerves: No cranial nerve deficit.      Motor: No abnormal muscle tone.      Coordination: Coordination normal.      Deep Tendon Reflexes: Reflexes are normal and symmetric. Reflexes normal.   Psychiatric:         Behavior: Behavior normal.         Thought Content: Thought content normal.         Judgment: Judgment normal.         Assessment:       Problem List Items Addressed This Visit     Type 2 diabetes mellitus with stage 3a chronic kidney disease, without long-term current use of insulin    Relevant Orders    CBC Auto Differential    Comprehensive Metabolic Panel    Lipid Panel    Hemoglobin A1C    Urinalysis    Microalbumin/Creatinine Ratio, Urine    Stricture of artery    Relevant Orders    CBC Auto Differential    Comprehensive Metabolic Panel    Lipid Panel    Hemoglobin A1C    Urinalysis    Microalbumin/Creatinine Ratio, Urine    Hypertension associated with diabetes    Relevant Orders    CBC Auto Differential    Comprehensive Metabolic Panel    Lipid Panel    Hemoglobin A1C    Urinalysis    Microalbumin/Creatinine Ratio, Urine    Dyslipidemia associated with type 2 diabetes mellitus    Relevant Orders    CBC Auto Differential    Comprehensive Metabolic Panel    Lipid Panel    Hemoglobin A1C    Urinalysis    Microalbumin/Creatinine Ratio, Urine      Other Visit Diagnoses     Routine general medical examination at health care facility    -  Primary    Relevant Orders    CBC Auto  Differential    Comprehensive Metabolic Panel    Lipid Panel    Hemoglobin A1C    Urinalysis    Microalbumin/Creatinine Ratio, Urine    Bradycardia        Relevant Orders    SCHEDULED EKG 12-LEAD (to [facility])          Plan:           Diagnoses and all orders for this visit:    Routine general medical examination at health care facility  -     CBC Auto Differential; Future  -     Comprehensive Metabolic Panel; Future  -     Lipid Panel; Future  -     Hemoglobin A1C; Future  -     Urinalysis; Future  -     Microalbumin/Creatinine Ratio, Urine; Future    Stricture of artery  -     CBC Auto Differential; Future  -     Comprehensive Metabolic Panel; Future  -     Lipid Panel; Future  -     Hemoglobin A1C; Future  -     Urinalysis; Future  -     Microalbumin/Creatinine Ratio, Urine; Future    Hypertension associated with diabetes  -     CBC Auto Differential; Future  -     Comprehensive Metabolic Panel; Future  -     Lipid Panel; Future  -     Hemoglobin A1C; Future  -     Urinalysis; Future  -     Microalbumin/Creatinine Ratio, Urine; Future    Type 2 diabetes mellitus with stage 3a chronic kidney disease, without long-term current use of insulin  -     CBC Auto Differential; Future  -     Comprehensive Metabolic Panel; Future  -     Lipid Panel; Future  -     Hemoglobin A1C; Future  -     Urinalysis; Future  -     Microalbumin/Creatinine Ratio, Urine; Future    Dyslipidemia associated with type 2 diabetes mellitus  -     CBC Auto Differential; Future  -     Comprehensive Metabolic Panel; Future  -     Lipid Panel; Future  -     Hemoglobin A1C; Future  -     Urinalysis; Future  -     Microalbumin/Creatinine Ratio, Urine; Future    Bradycardia  -     SCHEDULED EKG 12-LEAD (to [facility]); Future      Wellness check  -normal exam  -labs    Bradycardia  -EKG for surveillance      HTN   -at goal  -continue lisinopril to 20 mg/ twice day    DM II   -controlled   -continue metformin and labs    HLD   -improving  -continue  lipitor    GERD   -stable   -takes OTC PPI     ED'  -controlled      Spent adequate time in obtaining history and explaining differentials      Follow up in about 6 months (around 7/6/2022), or if symptoms worsen or fail to improve.

## 2022-02-07 DIAGNOSIS — N52.9 ERECTILE DYSFUNCTION, UNSPECIFIED ERECTILE DYSFUNCTION TYPE: ICD-10-CM

## 2022-02-07 RX ORDER — SILDENAFIL CITRATE 20 MG/1
20 TABLET ORAL DAILY PRN
Qty: 30 TABLET | Refills: 2 | Status: SHIPPED | OUTPATIENT
Start: 2022-02-07 | End: 2023-06-16 | Stop reason: SDUPTHER

## 2022-02-07 NOTE — TELEPHONE ENCOUNTER
No new care gaps identified.  Powered by [employer] ( [employer] ). Reference number: [id number].   2/07/2022 10:46:13 AM CST

## 2022-02-10 ENCOUNTER — TELEPHONE (OUTPATIENT)
Dept: FAMILY MEDICINE | Facility: CLINIC | Age: 67
End: 2022-02-10
Payer: MEDICARE

## 2022-02-18 ENCOUNTER — OFFICE VISIT (OUTPATIENT)
Dept: FAMILY MEDICINE | Facility: CLINIC | Age: 67
End: 2022-02-18
Payer: MEDICARE

## 2022-02-18 ENCOUNTER — LAB VISIT (OUTPATIENT)
Dept: LAB | Facility: HOSPITAL | Age: 67
End: 2022-02-18
Attending: STUDENT IN AN ORGANIZED HEALTH CARE EDUCATION/TRAINING PROGRAM
Payer: MEDICARE

## 2022-02-18 VITALS
SYSTOLIC BLOOD PRESSURE: 152 MMHG | HEART RATE: 50 BPM | WEIGHT: 198.63 LBS | DIASTOLIC BLOOD PRESSURE: 80 MMHG | TEMPERATURE: 98 F | BODY MASS INDEX: 26.33 KG/M2 | OXYGEN SATURATION: 100 % | HEIGHT: 73 IN

## 2022-02-18 DIAGNOSIS — E11.69 DYSLIPIDEMIA ASSOCIATED WITH TYPE 2 DIABETES MELLITUS: ICD-10-CM

## 2022-02-18 DIAGNOSIS — R97.20 ELEVATED PSA: ICD-10-CM

## 2022-02-18 DIAGNOSIS — N52.01 ERECTILE DYSFUNCTION DUE TO ARTERIAL INSUFFICIENCY: ICD-10-CM

## 2022-02-18 DIAGNOSIS — I77.1 STRICTURE OF ARTERY: ICD-10-CM

## 2022-02-18 DIAGNOSIS — I15.2 HYPERTENSION ASSOCIATED WITH DIABETES: ICD-10-CM

## 2022-02-18 DIAGNOSIS — I70.8 AORTO-ILIAC ATHEROSCLEROSIS: ICD-10-CM

## 2022-02-18 DIAGNOSIS — E78.5 DYSLIPIDEMIA ASSOCIATED WITH TYPE 2 DIABETES MELLITUS: ICD-10-CM

## 2022-02-18 DIAGNOSIS — I70.0 AORTO-ILIAC ATHEROSCLEROSIS: ICD-10-CM

## 2022-02-18 DIAGNOSIS — E11.59 HYPERTENSION ASSOCIATED WITH DIABETES: ICD-10-CM

## 2022-02-18 DIAGNOSIS — Z00.00 ENCOUNTER FOR PREVENTIVE HEALTH EXAMINATION: Primary | ICD-10-CM

## 2022-02-18 DIAGNOSIS — N18.31 TYPE 2 DIABETES MELLITUS WITH STAGE 3A CHRONIC KIDNEY DISEASE, WITHOUT LONG-TERM CURRENT USE OF INSULIN: ICD-10-CM

## 2022-02-18 DIAGNOSIS — K21.9 GASTROESOPHAGEAL REFLUX DISEASE, UNSPECIFIED WHETHER ESOPHAGITIS PRESENT: ICD-10-CM

## 2022-02-18 DIAGNOSIS — E11.22 TYPE 2 DIABETES MELLITUS WITH STAGE 3A CHRONIC KIDNEY DISEASE, WITHOUT LONG-TERM CURRENT USE OF INSULIN: ICD-10-CM

## 2022-02-18 LAB — COMPLEXED PSA SERPL-MCNC: 9.1 NG/ML (ref 0–4)

## 2022-02-18 PROCEDURE — 3066F NEPHROPATHY DOC TX: CPT | Mod: CPTII,S$GLB,, | Performed by: NURSE PRACTITIONER

## 2022-02-18 PROCEDURE — 99999 PR PBB SHADOW E&M-EST. PATIENT-LVL IV: CPT | Mod: PBBFAC,,, | Performed by: NURSE PRACTITIONER

## 2022-02-18 PROCEDURE — 3066F PR DOCUMENTATION OF TREATMENT FOR NEPHROPATHY: ICD-10-PCS | Mod: CPTII,S$GLB,, | Performed by: NURSE PRACTITIONER

## 2022-02-18 PROCEDURE — 84153 ASSAY OF PSA TOTAL: CPT | Performed by: STUDENT IN AN ORGANIZED HEALTH CARE EDUCATION/TRAINING PROGRAM

## 2022-02-18 PROCEDURE — G0439 PPPS, SUBSEQ VISIT: HCPCS | Mod: S$GLB,,, | Performed by: NURSE PRACTITIONER

## 2022-02-18 PROCEDURE — 1126F AMNT PAIN NOTED NONE PRSNT: CPT | Mod: CPTII,S$GLB,, | Performed by: NURSE PRACTITIONER

## 2022-02-18 PROCEDURE — 3008F BODY MASS INDEX DOCD: CPT | Mod: CPTII,S$GLB,, | Performed by: NURSE PRACTITIONER

## 2022-02-18 PROCEDURE — 3061F PR NEG MICROALBUMINURIA RESULT DOCUMENTED/REVIEW: ICD-10-PCS | Mod: CPTII,S$GLB,, | Performed by: NURSE PRACTITIONER

## 2022-02-18 PROCEDURE — 1170F FXNL STATUS ASSESSED: CPT | Mod: CPTII,S$GLB,, | Performed by: NURSE PRACTITIONER

## 2022-02-18 PROCEDURE — 3061F NEG MICROALBUMINURIA REV: CPT | Mod: CPTII,S$GLB,, | Performed by: NURSE PRACTITIONER

## 2022-02-18 PROCEDURE — 3044F PR MOST RECENT HEMOGLOBIN A1C LEVEL <7.0%: ICD-10-PCS | Mod: CPTII,S$GLB,, | Performed by: NURSE PRACTITIONER

## 2022-02-18 PROCEDURE — 1101F PR PT FALLS ASSESS DOC 0-1 FALLS W/OUT INJ PAST YR: ICD-10-PCS | Mod: CPTII,S$GLB,, | Performed by: NURSE PRACTITIONER

## 2022-02-18 PROCEDURE — G0439 PR MEDICARE ANNUAL WELLNESS SUBSEQUENT VISIT: ICD-10-PCS | Mod: S$GLB,,, | Performed by: NURSE PRACTITIONER

## 2022-02-18 PROCEDURE — 1170F PR FUNCTIONAL STATUS ASSESSED: ICD-10-PCS | Mod: CPTII,S$GLB,, | Performed by: NURSE PRACTITIONER

## 2022-02-18 PROCEDURE — 3008F PR BODY MASS INDEX (BMI) DOCUMENTED: ICD-10-PCS | Mod: CPTII,S$GLB,, | Performed by: NURSE PRACTITIONER

## 2022-02-18 PROCEDURE — 1160F PR REVIEW ALL MEDS BY PRESCRIBER/CLIN PHARMACIST DOCUMENTED: ICD-10-PCS | Mod: CPTII,S$GLB,, | Performed by: NURSE PRACTITIONER

## 2022-02-18 PROCEDURE — 1160F RVW MEDS BY RX/DR IN RCRD: CPT | Mod: CPTII,S$GLB,, | Performed by: NURSE PRACTITIONER

## 2022-02-18 PROCEDURE — 3079F DIAST BP 80-89 MM HG: CPT | Mod: CPTII,S$GLB,, | Performed by: NURSE PRACTITIONER

## 2022-02-18 PROCEDURE — 3077F SYST BP >= 140 MM HG: CPT | Mod: CPTII,S$GLB,, | Performed by: NURSE PRACTITIONER

## 2022-02-18 PROCEDURE — 99999 PR PBB SHADOW E&M-EST. PATIENT-LVL IV: ICD-10-PCS | Mod: PBBFAC,,, | Performed by: NURSE PRACTITIONER

## 2022-02-18 PROCEDURE — 36415 COLL VENOUS BLD VENIPUNCTURE: CPT | Performed by: STUDENT IN AN ORGANIZED HEALTH CARE EDUCATION/TRAINING PROGRAM

## 2022-02-18 PROCEDURE — 1126F PR PAIN SEVERITY QUANTIFIED, NO PAIN PRESENT: ICD-10-PCS | Mod: CPTII,S$GLB,, | Performed by: NURSE PRACTITIONER

## 2022-02-18 PROCEDURE — 1159F PR MEDICATION LIST DOCUMENTED IN MEDICAL RECORD: ICD-10-PCS | Mod: CPTII,S$GLB,, | Performed by: NURSE PRACTITIONER

## 2022-02-18 PROCEDURE — 1159F MED LIST DOCD IN RCRD: CPT | Mod: CPTII,S$GLB,, | Performed by: NURSE PRACTITIONER

## 2022-02-18 PROCEDURE — 3044F HG A1C LEVEL LT 7.0%: CPT | Mod: CPTII,S$GLB,, | Performed by: NURSE PRACTITIONER

## 2022-02-18 PROCEDURE — 3079F PR MOST RECENT DIASTOLIC BLOOD PRESSURE 80-89 MM HG: ICD-10-PCS | Mod: CPTII,S$GLB,, | Performed by: NURSE PRACTITIONER

## 2022-02-18 PROCEDURE — 1101F PT FALLS ASSESS-DOCD LE1/YR: CPT | Mod: CPTII,S$GLB,, | Performed by: NURSE PRACTITIONER

## 2022-02-18 PROCEDURE — 3288F PR FALLS RISK ASSESSMENT DOCUMENTED: ICD-10-PCS | Mod: CPTII,S$GLB,, | Performed by: NURSE PRACTITIONER

## 2022-02-18 PROCEDURE — 3288F FALL RISK ASSESSMENT DOCD: CPT | Mod: CPTII,S$GLB,, | Performed by: NURSE PRACTITIONER

## 2022-02-18 PROCEDURE — 3077F PR MOST RECENT SYSTOLIC BLOOD PRESSURE >= 140 MM HG: ICD-10-PCS | Mod: CPTII,S$GLB,, | Performed by: NURSE PRACTITIONER

## 2022-02-18 NOTE — PROGRESS NOTES
"Addy Redding presented for a  Medicare AWV and comprehensive Health Risk Assessment today. The following components were reviewed and updated:    · Medical history  · Family History  · Social history  · Allergies and Current Medications  · Health Risk Assessment  · Health Maintenance  · Care Team         ** See Completed Assessments for Annual Wellness Visit within the encounter summary.**         The following assessments were completed:  · Living Situation  · CAGE  · Depression Screening  · Timed Get Up and Go  · Whisper Test  · Cognitive Function Screening  · Nutrition Screening  · ADL Screening  · PAQ Screening        Vitals:    02/18/22 1306 02/18/22 1357   BP: (!) 146/78 (!) 152/80   BP Location: Right arm    Patient Position: Sitting    Pulse: (!) 50    Temp: 98.2 °F (36.8 °C)    TempSrc: Oral    SpO2: 100%    Weight: 90.1 kg (198 lb 10.2 oz)    Height: 6' 1" (1.854 m)      Body mass index is 26.21 kg/m².  Physical Exam  Vitals and nursing note reviewed.   Constitutional:       General: He is not in acute distress.     Appearance: Normal appearance. He is not ill-appearing.   HENT:      Head: Normocephalic and atraumatic.      Mouth/Throat:      Mouth: Mucous membranes are moist.      Pharynx: Oropharynx is clear.   Eyes:      General: No scleral icterus.        Right eye: No discharge.         Left eye: No discharge.      Extraocular Movements: Extraocular movements intact.      Conjunctiva/sclera: Conjunctivae normal.      Comments: + glasses   Cardiovascular:      Rate and Rhythm: Normal rate and regular rhythm.      Heart sounds: Normal heart sounds. No murmur heard.      Pulmonary:      Effort: Pulmonary effort is normal. No respiratory distress.      Breath sounds: Normal breath sounds. No wheezing, rhonchi or rales.   Musculoskeletal:      Cervical back: Normal range of motion.      Right lower leg: No edema.      Left lower leg: No edema.   Skin:     General: Skin is warm and dry.      Findings: No " rash.   Neurological:      Mental Status: He is alert and oriented to person, place, and time.   Psychiatric:         Mood and Affect: Mood normal.         Behavior: Behavior normal. Behavior is cooperative.         Cognition and Memory: Cognition and memory normal.               Diagnoses and health risks identified today and associated recommendations/orders:    1. Encounter for preventive health examination  - Chart reviewed. Problem list updated. Discussed current medical diagnosis, current medications, medical/surgical/family/social history; updated provider list; documented vital signs; identified any cognitive impairment; and updated risk factor list. Addressed any outstanding health maintenance. Provided patient with personalized health advice. Continue to follow up with PCP and any specialists.   - due for PSA screening, scheduled today       2. Dyslipidemia associated with type 2 diabetes mellitus  Chronic; stable on current treatment plan; follow up with PCP  - on statin    3. Aorto-iliac atherosclerosis  Chronic; stable on current treatment plan; follow up with PCP  - on statin  - seen on  [facility] 4/23/19    4. Type 2 diabetes mellitus with stage 3a chronic kidney disease, without long-term current use of insulin  Chronic; stable on current treatment plan; follow up with PCP  - recommend avoiding NSAIDS  - recommend good BP and glucose control     5. Hypertension associated with diabetes  Chronic;  follow up with PCP  - BP elevated today ; follow up in 1 week for BP check in clinic    6. Stricture of artery  Chronic; stable on current treatment plan; follow up with PCP  - on statin     7. Erectile dysfunction due to arterial insufficiency  Chronic; stable on current treatment plan; follow up with PCP  - sildenafil PRN     8. Gastroesophageal reflux disease, unspecified whether esophagitis present  Chronic; stable on current treatment plan; follow up with PCP  - on protonix     9. BMI 26.0-26.9,adult  -  Recommendation for healthy diet and increasing exercise as tolerated with goal of 150min/week . Recommend weight loss    10. Elevated PSA  - Chronic; stable on current treatment plan; follow up with PCP  - follows with Urology  - due for PSA screening, scheduled today   - will message Urology for follow up     Provided Addy with a 5-10 year written screening schedule and personal prevention plan. Recommendations were developed using the USPSTF age appropriate recommendations. Education, counseling, and referrals were provided as needed. After Visit Summary printed and given to patient which includes a list of additional screenings\tests needed.    Follow up in about 1 year (around 2/18/2023) for your next annual wellness visit.    Amee Christensen, FNP-C   Advance Care Planning        I offered to discuss advanced care planning, including how to pick a person who would make decisions for you if you were unable to make them for yourself, called a health care power of , and what kind of decisions you might make such as use of life sustaining treatments such as ventilators and tube feeding when faced with a life limiting illness recorded on a living will that they will need to know. (How you want to be cared for as you near the end of your natural life)     X Patient is interested in learning more about how to make advanced directives.  I provided them paperwork and offered to discuss this with them.

## 2022-02-18 NOTE — PATIENT INSTRUCTIONS
Counseling and Referral of Other Preventative  (Italic type indicates deductible and co-insurance are waived)    Patient Name: Addy Redding  Today's Date: 2/18/2022    Health Maintenance       Date Due Completion Date    PROSTATE-SPECIFIC ANTIGEN 02/04/2022 2/4/2021    Hemoglobin A1c 07/06/2022 1/6/2022    Eye Exam 07/13/2022 7/13/2021    Override on 8/28/2018: Done    Override on 12/23/2016: Done    Override on 9/23/2013: Done    Colorectal Cancer Screening 07/15/2022 7/15/2021    Foot Exam 10/19/2022 10/19/2021 (Done)    Override on 10/19/2021: Done    Override on 11/9/2020: Done    Override on 12/10/2019: Done    Override on 8/28/2018: Done    Override on 1/11/2017: Done    Override on 5/27/2015: Done    Diabetes Urine Screening 01/06/2023 1/6/2022    Lipid Panel 01/06/2023 1/6/2022    Low Dose Statin 02/18/2023 2/18/2022    Pneumococcal Vaccines (Age 65+) (2 of 2 - PPSV23) 06/06/2024 6/6/2019    TETANUS VACCINE 04/03/2027 4/3/2017        No orders of the defined types were placed in this encounter.    The following information is provided to all patients.  This information is to help you find resources for any of the problems found today that may be affecting your health:                Living healthy guide: www.[facility].[place].gov      Understanding Diabetes: www.diabetes.org      Eating healthy: www.cdc.gov/healthyweight      CDC home safety checklist: www.cdc.gov/steadi/patient.html      Agency on Aging: www.goea.[place].[facility]      Alcoholics anonymous (AA): www.aa.org      Physical Activity: www.adeola.nih.gov/tn5rfdf      Tobacco use: www.quitwithusla.org

## 2022-02-18 NOTE — Clinical Note
Saw your patient for Medicare wellness. BP elevated. I asked Kim to schedule BP nurse visit next week .

## 2022-02-18 NOTE — Clinical Note
I saw this patient for Medicare wellness visit today and realized you wanted him to get PSA level in August 2021, which he did not get. I scheduled him an appt today to get PSA level for follow up. Please have your office reach out to him if he needs to see you. Thank you.

## 2022-03-21 ENCOUNTER — OFFICE VISIT (OUTPATIENT)
Dept: FAMILY MEDICINE | Facility: CLINIC | Age: 67
End: 2022-03-21
Attending: FAMILY MEDICINE
Payer: MEDICARE

## 2022-03-21 VITALS
SYSTOLIC BLOOD PRESSURE: 124 MMHG | DIASTOLIC BLOOD PRESSURE: 68 MMHG | BODY MASS INDEX: 26.06 KG/M2 | HEIGHT: 73 IN | WEIGHT: 196.63 LBS | HEART RATE: 47 BPM | OXYGEN SATURATION: 98 %

## 2022-03-21 DIAGNOSIS — E78.5 DYSLIPIDEMIA ASSOCIATED WITH TYPE 2 DIABETES MELLITUS: ICD-10-CM

## 2022-03-21 DIAGNOSIS — I15.2 HYPERTENSION ASSOCIATED WITH DIABETES: ICD-10-CM

## 2022-03-21 DIAGNOSIS — R06.02 SOB (SHORTNESS OF BREATH): ICD-10-CM

## 2022-03-21 DIAGNOSIS — K21.9 GASTROESOPHAGEAL REFLUX DISEASE, UNSPECIFIED WHETHER ESOPHAGITIS PRESENT: ICD-10-CM

## 2022-03-21 DIAGNOSIS — R00.1 BRADYCARDIA: ICD-10-CM

## 2022-03-21 DIAGNOSIS — E11.22 TYPE 2 DIABETES MELLITUS WITH STAGE 3A CHRONIC KIDNEY DISEASE, WITHOUT LONG-TERM CURRENT USE OF INSULIN: Primary | ICD-10-CM

## 2022-03-21 DIAGNOSIS — E11.59 HYPERTENSION ASSOCIATED WITH DIABETES: ICD-10-CM

## 2022-03-21 DIAGNOSIS — E11.69 DYSLIPIDEMIA ASSOCIATED WITH TYPE 2 DIABETES MELLITUS: ICD-10-CM

## 2022-03-21 DIAGNOSIS — N18.31 TYPE 2 DIABETES MELLITUS WITH STAGE 3A CHRONIC KIDNEY DISEASE, WITHOUT LONG-TERM CURRENT USE OF INSULIN: Primary | ICD-10-CM

## 2022-03-21 PROCEDURE — 3074F SYST BP LT 130 MM HG: CPT | Mod: CPTII,S$GLB,, | Performed by: FAMILY MEDICINE

## 2022-03-21 PROCEDURE — 1126F AMNT PAIN NOTED NONE PRSNT: CPT | Mod: CPTII,S$GLB,, | Performed by: FAMILY MEDICINE

## 2022-03-21 PROCEDURE — 99214 OFFICE O/P EST MOD 30 MIN: CPT | Mod: S$GLB,,, | Performed by: FAMILY MEDICINE

## 2022-03-21 PROCEDURE — 1160F RVW MEDS BY RX/DR IN RCRD: CPT | Mod: CPTII,S$GLB,, | Performed by: FAMILY MEDICINE

## 2022-03-21 PROCEDURE — 3066F PR DOCUMENTATION OF TREATMENT FOR NEPHROPATHY: ICD-10-PCS | Mod: CPTII,S$GLB,, | Performed by: FAMILY MEDICINE

## 2022-03-21 PROCEDURE — 1159F MED LIST DOCD IN RCRD: CPT | Mod: CPTII,S$GLB,, | Performed by: FAMILY MEDICINE

## 2022-03-21 PROCEDURE — 3078F DIAST BP <80 MM HG: CPT | Mod: CPTII,S$GLB,, | Performed by: FAMILY MEDICINE

## 2022-03-21 PROCEDURE — 3008F BODY MASS INDEX DOCD: CPT | Mod: CPTII,S$GLB,, | Performed by: FAMILY MEDICINE

## 2022-03-21 PROCEDURE — 99999 PR PBB SHADOW E&M-EST. PATIENT-LVL IV: CPT | Mod: PBBFAC,,, | Performed by: FAMILY MEDICINE

## 2022-03-21 PROCEDURE — 3066F NEPHROPATHY DOC TX: CPT | Mod: CPTII,S$GLB,, | Performed by: FAMILY MEDICINE

## 2022-03-21 PROCEDURE — 1126F PR PAIN SEVERITY QUANTIFIED, NO PAIN PRESENT: ICD-10-PCS | Mod: CPTII,S$GLB,, | Performed by: FAMILY MEDICINE

## 2022-03-21 PROCEDURE — 3288F FALL RISK ASSESSMENT DOCD: CPT | Mod: CPTII,S$GLB,, | Performed by: FAMILY MEDICINE

## 2022-03-21 PROCEDURE — 1160F PR REVIEW ALL MEDS BY PRESCRIBER/CLIN PHARMACIST DOCUMENTED: ICD-10-PCS | Mod: CPTII,S$GLB,, | Performed by: FAMILY MEDICINE

## 2022-03-21 PROCEDURE — 1159F PR MEDICATION LIST DOCUMENTED IN MEDICAL RECORD: ICD-10-PCS | Mod: CPTII,S$GLB,, | Performed by: FAMILY MEDICINE

## 2022-03-21 PROCEDURE — 3061F PR NEG MICROALBUMINURIA RESULT DOCUMENTED/REVIEW: ICD-10-PCS | Mod: CPTII,S$GLB,, | Performed by: FAMILY MEDICINE

## 2022-03-21 PROCEDURE — 1101F PR PT FALLS ASSESS DOC 0-1 FALLS W/OUT INJ PAST YR: ICD-10-PCS | Mod: CPTII,S$GLB,, | Performed by: FAMILY MEDICINE

## 2022-03-21 PROCEDURE — 1101F PT FALLS ASSESS-DOCD LE1/YR: CPT | Mod: CPTII,S$GLB,, | Performed by: FAMILY MEDICINE

## 2022-03-21 PROCEDURE — 99999 PR PBB SHADOW E&M-EST. PATIENT-LVL IV: ICD-10-PCS | Mod: PBBFAC,,, | Performed by: FAMILY MEDICINE

## 2022-03-21 PROCEDURE — 3044F HG A1C LEVEL LT 7.0%: CPT | Mod: CPTII,S$GLB,, | Performed by: FAMILY MEDICINE

## 2022-03-21 PROCEDURE — 99214 PR OFFICE/OUTPT VISIT, EST, LEVL IV, 30-39 MIN: ICD-10-PCS | Mod: S$GLB,,, | Performed by: FAMILY MEDICINE

## 2022-03-21 PROCEDURE — 3074F PR MOST RECENT SYSTOLIC BLOOD PRESSURE < 130 MM HG: ICD-10-PCS | Mod: CPTII,S$GLB,, | Performed by: FAMILY MEDICINE

## 2022-03-21 PROCEDURE — 3061F NEG MICROALBUMINURIA REV: CPT | Mod: CPTII,S$GLB,, | Performed by: FAMILY MEDICINE

## 2022-03-21 PROCEDURE — 3288F PR FALLS RISK ASSESSMENT DOCUMENTED: ICD-10-PCS | Mod: CPTII,S$GLB,, | Performed by: FAMILY MEDICINE

## 2022-03-21 PROCEDURE — 3044F PR MOST RECENT HEMOGLOBIN A1C LEVEL <7.0%: ICD-10-PCS | Mod: CPTII,S$GLB,, | Performed by: FAMILY MEDICINE

## 2022-03-21 PROCEDURE — 3008F PR BODY MASS INDEX (BMI) DOCUMENTED: ICD-10-PCS | Mod: CPTII,S$GLB,, | Performed by: FAMILY MEDICINE

## 2022-03-21 PROCEDURE — 3078F PR MOST RECENT DIASTOLIC BLOOD PRESSURE < 80 MM HG: ICD-10-PCS | Mod: CPTII,S$GLB,, | Performed by: FAMILY MEDICINE

## 2022-03-21 RX ORDER — PANTOPRAZOLE SODIUM 40 MG/1
40 TABLET, DELAYED RELEASE ORAL DAILY
Qty: 30 TABLET | Refills: 2 | Status: SHIPPED | OUTPATIENT
Start: 2022-03-21 | End: 2022-06-15

## 2022-03-21 NOTE — PROGRESS NOTES
Subjective:       Patient ID: Addy Redding is a 66 y.o. male.    Chief Complaint: Hypertension    66 yr old black male with DM II, HTN, HLD, GERD, CKD III, presents today for his annual wellness check, lab work. C/o mild SOB and also bradycardic. EKg also done back in January and showed sinus bradycardia. Never saw cardiology.        DM II - improved -  HGBA1C                   6.3 (H)             01/06/2022                   - complicated by CKD III                        - denies any hypoglycemic symptoms - on metformin - up to date with eye and foot screen - stopped glipizide since itw as making his sugars low    HTN - controlled  - on lisinopril 10 mg daily - no side effects    HLD - controlled and improving -  LDLCALC                  121.0               01/06/2022                         - on statin - compliant - need refill - not fully compliant with diet    GERD - stable - takes prilosec as needed    ED - stable - takes viagra as needed    Health maintenance  -labs due  -Flu and Pneumovax - up to date  -adacel due and done today  -colonoscopy due - wants to wait  -PSA UTD      Hypertension  This is a chronic problem. The current episode started more than 1 year ago. The problem has been gradually worsening since onset. The problem is uncontrolled. Pertinent negatives include no anxiety, chest pain, headaches, malaise/fatigue, orthopnea, palpitations, peripheral edema or sweats. There are no associated agents to hypertension. Risk factors for coronary artery disease include dyslipidemia, diabetes mellitus, male gender and obesity. Past treatments include ACE inhibitors. The current treatment provides no improvement. There are no compliance problems.  There is no history of angina, CAD/MI, CVA, left ventricular hypertrophy, PVD or retinopathy. There is no history of chronic renal disease, coarctation of the aorta, hypercortisolism, pheochromocytoma, renovascular disease or a thyroid problem.   Arm Pain   There  was no injury mechanism. The pain is present in the right shoulder and upper right arm. The quality of the pain is described as aching. The pain does not radiate. The pain is at a severity of 5/10. The pain is moderate. The pain has been constant since the incident. Pertinent negatives include no chest pain or numbness. The symptoms are aggravated by movement, lifting and palpation. He has tried nothing for the symptoms. The treatment provided mild relief.   Shoulder Pain   The pain is present in the right shoulder. This is a new problem. The current episode started 1 to 4 weeks ago. There has been no history of extremity trauma. The problem occurs constantly. The problem has been unchanged. The quality of the pain is described as aching. The pain is at a severity of 5/10. The pain is moderate. Pertinent negatives include no headaches, itching, joint swelling or numbness. The symptoms are aggravated by activity. He has tried nothing for the symptoms. The treatment provided no relief. His past medical history is significant for diabetes.   Gastroesophageal Reflux  He complains of heartburn. He reports no abdominal pain, no chest pain, no coughing or no wheezing. This is a new problem. The current episode started 1 to 4 weeks ago. The problem occurs constantly. The problem has been unchanged. The heartburn duration is several minutes. The heartburn is located in the substernum. The symptoms are aggravated by certain foods. Pertinent negatives include no anemia, fatigue or melena. There are no known risk factors. He has tried nothing for the symptoms. The treatment provided mild relief. Past procedures do not include an abdominal ultrasound, esophageal pH monitoring or a UGI. Past invasive treatments do not include gastroplasty or reflux surgery.   Follow-up  Associated symptoms include arthralgias and myalgias. Pertinent negatives include no abdominal pain, change in bowel habit, chest pain, congestion, coughing,  diaphoresis, fatigue, headaches, joint swelling, numbness, rash, urinary symptoms, vertigo, vomiting or weakness.   Medication Refill  This is a chronic problem. The current episode started more than 1 year ago. The problem occurs constantly. The problem has been gradually improving. Associated symptoms include arthralgias and myalgias. Pertinent negatives include no abdominal pain, change in bowel habit, chest pain, congestion, coughing, diaphoresis, fatigue, headaches, joint swelling, numbness, rash, urinary symptoms, vertigo, vomiting or weakness.   Diabetes  Pertinent negatives for hypoglycemia include no confusion, dizziness, headaches, nervousness/anxiousness, speech difficulty or sweats. Pertinent negatives for diabetes include no chest pain, no fatigue, no polydipsia, no polyuria and no weakness. Pertinent negatives for diabetic complications include no CVA, PVD or retinopathy.   Hyperlipidemia  This is a chronic problem. The current episode started more than 1 year ago. The problem is uncontrolled. Recent lipid tests were reviewed and are high. Exacerbating diseases include diabetes. He has no history of chronic renal disease, liver disease or nephrotic syndrome. There are no known factors aggravating his hyperlipidemia. Associated symptoms include myalgias. Pertinent negatives include no chest pain, focal sensory loss, focal weakness or leg pain. Current antihyperlipidemic treatment includes statins. There are no compliance problems.  Risk factors for coronary artery disease include diabetes mellitus, dyslipidemia, male sex and hypertension.     Review of Systems   Constitutional: Negative.  Negative for activity change, diaphoresis, fatigue, malaise/fatigue and unexpected weight change.   HENT: Negative.  Negative for nasal congestion, ear pain, mouth sores, rhinorrhea and voice change.    Eyes: Negative.  Negative for pain, discharge and visual disturbance.   Respiratory: Negative.  Negative for apnea,  cough and wheezing.    Cardiovascular: Negative.  Negative for chest pain, palpitations and orthopnea.   Gastrointestinal: Positive for heartburn. Negative for abdominal distention, abdominal pain, anal bleeding, change in bowel habit, diarrhea, melena, vomiting and change in bowel habit.   Endocrine: Negative.  Negative for cold intolerance, polydipsia and polyuria.   Genitourinary: Negative.  Negative for decreased urine volume, difficulty urinating, discharge, frequency and scrotal swelling.   Musculoskeletal: Positive for arthralgias and myalgias. Negative for back pain, joint swelling, leg pain and neck stiffness.   Integumentary:  Negative for color change, itching and rash. Negative.   Allergic/Immunologic: Negative.  Negative for environmental allergies.   Neurological: Negative.  Negative for dizziness, vertigo, focal weakness, speech difficulty, weakness, light-headedness, numbness and headaches.   Hematological: Negative.    Psychiatric/Behavioral: Negative.  Negative for agitation, confusion, dysphoric mood and suicidal ideas. The patient is not nervous/anxious.          PMH/PSH/FH/SH/MED/ALLERGY reviewed    Objective:       Vitals:    03/21/22 0818   BP: 124/68   Pulse: (!) 47       Physical Exam  Constitutional:       Appearance: He is well-developed.   HENT:      Head: Normocephalic and atraumatic.      Right Ear: External ear normal.      Left Ear: External ear normal.      Nose: Nose normal.      Mouth/Throat:      Pharynx: No oropharyngeal exudate.   Eyes:      General: No scleral icterus.        Right eye: No discharge.         Left eye: No discharge.      Conjunctiva/sclera: Conjunctivae normal.      Pupils: Pupils are equal, round, and reactive to light.   Neck:      Thyroid: No thyromegaly.      Vascular: No JVD.      Trachea: No tracheal deviation.   Cardiovascular:      Rate and Rhythm: Regular rhythm. Bradycardia present.      Heart sounds: Normal heart sounds. No murmur heard.    No  friction rub. No gallop.   Pulmonary:      Effort: Pulmonary effort is normal. No respiratory distress.      Breath sounds: Normal breath sounds. No stridor. No wheezing or rales.   Chest:      Chest wall: No tenderness.   Abdominal:      General: Bowel sounds are normal. There is no distension.      Palpations: Abdomen is soft. There is no mass.      Tenderness: There is no abdominal tenderness. There is no guarding or rebound.      Hernia: No hernia is present.   Musculoskeletal:         General: No tenderness. Normal range of motion.      Cervical back: Normal range of motion and neck supple.   Lymphadenopathy:      Cervical: No cervical adenopathy.   Skin:     General: Skin is warm and dry.      Coloration: Skin is not pale.      Findings: No erythema or rash.   Neurological:      Mental Status: He is alert and oriented to person, place, and time.      Cranial Nerves: No cranial nerve deficit.      Motor: No abnormal muscle tone.      Coordination: Coordination normal.      Deep Tendon Reflexes: Reflexes are normal and symmetric. Reflexes normal.   Psychiatric:         Behavior: Behavior normal.         Thought Content: Thought content normal.         Judgment: Judgment normal.         Assessment:       Problem List Items Addressed This Visit     Dyslipidemia associated with type 2 diabetes mellitus    Type 2 diabetes mellitus with stage 3a chronic kidney disease, without long-term current use of insulin - Primary    Hypertension associated with diabetes      Other Visit Diagnoses     SOB (shortness of breath)        Relevant Orders    Ambulatory referral/consult to Cardiology    Bradycardia        Relevant Orders    Ambulatory referral/consult to Cardiology    Gastroesophageal reflux disease, unspecified whether esophagitis present        Relevant Medications    pantoprazole (PROTONIX) 40 MG tablet          Plan:           Addy was seen today for hypertension.    Diagnoses and all orders for this  visit:    Type 2 diabetes mellitus with stage 3a chronic kidney disease, without long-term current use of insulin    Dyslipidemia associated with type 2 diabetes mellitus    Hypertension associated with diabetes    SOB (shortness of breath)  -     Ambulatory referral/consult to Cardiology; Future    Bradycardia  -     Ambulatory referral/consult to Cardiology; Future    Gastroesophageal reflux disease, unspecified whether esophagitis present  -     pantoprazole (PROTONIX) 40 MG tablet; Take 1 tablet (40 mg total) by mouth once daily.      SOB/bradycardia  -sats normal  -refer cardiology    HTN   -at goal  -continue lisinopril to 20 mg/ twice day    DM II   -controlled   -continue metformin and labs    HLD   -improving  -continue lipitor    GERD   -stable   -takes OTC PPI     ED'  -controlled      Spent adequate time in obtaining history and explaining differentials      25 minutes spent during this visit of which greater than 50% devoted to face-face counseling and coordination of care regarding diagnosis and management plan    Follow up in about 4 weeks (around 4/18/2022), or if symptoms worsen or fail to improve.

## 2022-03-22 ENCOUNTER — OFFICE VISIT (OUTPATIENT)
Dept: CARDIOLOGY | Facility: CLINIC | Age: 67
End: 2022-03-22
Attending: FAMILY MEDICINE
Payer: MEDICARE

## 2022-03-22 VITALS
WEIGHT: 196.75 LBS | SYSTOLIC BLOOD PRESSURE: 155 MMHG | HEART RATE: 54 BPM | BODY MASS INDEX: 26.07 KG/M2 | DIASTOLIC BLOOD PRESSURE: 70 MMHG | HEIGHT: 73 IN | OXYGEN SATURATION: 99 %

## 2022-03-22 DIAGNOSIS — R00.1 SINUS BRADYCARDIA: ICD-10-CM

## 2022-03-22 DIAGNOSIS — E11.22 TYPE 2 DIABETES MELLITUS WITH STAGE 3A CHRONIC KIDNEY DISEASE, WITHOUT LONG-TERM CURRENT USE OF INSULIN: ICD-10-CM

## 2022-03-22 DIAGNOSIS — E11.59 HYPERTENSION ASSOCIATED WITH DIABETES: Primary | ICD-10-CM

## 2022-03-22 DIAGNOSIS — R07.2 PRECORDIAL PAIN: ICD-10-CM

## 2022-03-22 DIAGNOSIS — R07.9 EXERTIONAL CHEST PAIN: ICD-10-CM

## 2022-03-22 DIAGNOSIS — I15.2 HYPERTENSION ASSOCIATED WITH DIABETES: Primary | ICD-10-CM

## 2022-03-22 DIAGNOSIS — E78.5 DYSLIPIDEMIA ASSOCIATED WITH TYPE 2 DIABETES MELLITUS: ICD-10-CM

## 2022-03-22 DIAGNOSIS — N18.31 TYPE 2 DIABETES MELLITUS WITH STAGE 3A CHRONIC KIDNEY DISEASE, WITHOUT LONG-TERM CURRENT USE OF INSULIN: ICD-10-CM

## 2022-03-22 DIAGNOSIS — R00.1 BRADYCARDIA: ICD-10-CM

## 2022-03-22 DIAGNOSIS — I70.0 AORTIC ATHEROSCLEROSIS: ICD-10-CM

## 2022-03-22 DIAGNOSIS — E78.5 HYPERLIPIDEMIA, UNSPECIFIED HYPERLIPIDEMIA TYPE: ICD-10-CM

## 2022-03-22 DIAGNOSIS — R06.02 SOB (SHORTNESS OF BREATH): ICD-10-CM

## 2022-03-22 DIAGNOSIS — E11.69 DYSLIPIDEMIA ASSOCIATED WITH TYPE 2 DIABETES MELLITUS: ICD-10-CM

## 2022-03-22 PROCEDURE — 3044F HG A1C LEVEL LT 7.0%: CPT | Mod: CPTII,S$GLB,, | Performed by: INTERNAL MEDICINE

## 2022-03-22 PROCEDURE — 99204 OFFICE O/P NEW MOD 45 MIN: CPT | Mod: S$GLB,,, | Performed by: INTERNAL MEDICINE

## 2022-03-22 PROCEDURE — 99499 RISK ADDL DX/OHS AUDIT: ICD-10-PCS | Mod: S$GLB,,, | Performed by: INTERNAL MEDICINE

## 2022-03-22 PROCEDURE — 3044F PR MOST RECENT HEMOGLOBIN A1C LEVEL <7.0%: ICD-10-PCS | Mod: CPTII,S$GLB,, | Performed by: INTERNAL MEDICINE

## 2022-03-22 PROCEDURE — 99499 UNLISTED E&M SERVICE: CPT | Mod: S$GLB,,, | Performed by: INTERNAL MEDICINE

## 2022-03-22 PROCEDURE — 1159F MED LIST DOCD IN RCRD: CPT | Mod: CPTII,S$GLB,, | Performed by: INTERNAL MEDICINE

## 2022-03-22 PROCEDURE — 1160F PR REVIEW ALL MEDS BY PRESCRIBER/CLIN PHARMACIST DOCUMENTED: ICD-10-PCS | Mod: CPTII,S$GLB,, | Performed by: INTERNAL MEDICINE

## 2022-03-22 PROCEDURE — 99204 PR OFFICE/OUTPT VISIT, NEW, LEVL IV, 45-59 MIN: ICD-10-PCS | Mod: S$GLB,,, | Performed by: INTERNAL MEDICINE

## 2022-03-22 PROCEDURE — 1126F PR PAIN SEVERITY QUANTIFIED, NO PAIN PRESENT: ICD-10-PCS | Mod: CPTII,S$GLB,, | Performed by: INTERNAL MEDICINE

## 2022-03-22 PROCEDURE — 3008F BODY MASS INDEX DOCD: CPT | Mod: CPTII,S$GLB,, | Performed by: INTERNAL MEDICINE

## 2022-03-22 PROCEDURE — 3061F NEG MICROALBUMINURIA REV: CPT | Mod: CPTII,S$GLB,, | Performed by: INTERNAL MEDICINE

## 2022-03-22 PROCEDURE — 1160F RVW MEDS BY RX/DR IN RCRD: CPT | Mod: CPTII,S$GLB,, | Performed by: INTERNAL MEDICINE

## 2022-03-22 PROCEDURE — 1159F PR MEDICATION LIST DOCUMENTED IN MEDICAL RECORD: ICD-10-PCS | Mod: CPTII,S$GLB,, | Performed by: INTERNAL MEDICINE

## 2022-03-22 PROCEDURE — 3008F PR BODY MASS INDEX (BMI) DOCUMENTED: ICD-10-PCS | Mod: CPTII,S$GLB,, | Performed by: INTERNAL MEDICINE

## 2022-03-22 PROCEDURE — 1126F AMNT PAIN NOTED NONE PRSNT: CPT | Mod: CPTII,S$GLB,, | Performed by: INTERNAL MEDICINE

## 2022-03-22 PROCEDURE — 99999 PR PBB SHADOW E&M-EST. PATIENT-LVL III: ICD-10-PCS | Mod: PBBFAC,,, | Performed by: INTERNAL MEDICINE

## 2022-03-22 PROCEDURE — 3078F PR MOST RECENT DIASTOLIC BLOOD PRESSURE < 80 MM HG: ICD-10-PCS | Mod: CPTII,S$GLB,, | Performed by: INTERNAL MEDICINE

## 2022-03-22 PROCEDURE — 3066F PR DOCUMENTATION OF TREATMENT FOR NEPHROPATHY: ICD-10-PCS | Mod: CPTII,S$GLB,, | Performed by: INTERNAL MEDICINE

## 2022-03-22 PROCEDURE — 3077F SYST BP >= 140 MM HG: CPT | Mod: CPTII,S$GLB,, | Performed by: INTERNAL MEDICINE

## 2022-03-22 PROCEDURE — 3077F PR MOST RECENT SYSTOLIC BLOOD PRESSURE >= 140 MM HG: ICD-10-PCS | Mod: CPTII,S$GLB,, | Performed by: INTERNAL MEDICINE

## 2022-03-22 PROCEDURE — 3061F PR NEG MICROALBUMINURIA RESULT DOCUMENTED/REVIEW: ICD-10-PCS | Mod: CPTII,S$GLB,, | Performed by: INTERNAL MEDICINE

## 2022-03-22 PROCEDURE — 3078F DIAST BP <80 MM HG: CPT | Mod: CPTII,S$GLB,, | Performed by: INTERNAL MEDICINE

## 2022-03-22 PROCEDURE — 3066F NEPHROPATHY DOC TX: CPT | Mod: CPTII,S$GLB,, | Performed by: INTERNAL MEDICINE

## 2022-03-22 PROCEDURE — 99999 PR PBB SHADOW E&M-EST. PATIENT-LVL III: CPT | Mod: PBBFAC,,, | Performed by: INTERNAL MEDICINE

## 2022-03-22 RX ORDER — ATORVASTATIN CALCIUM 80 MG/1
80 TABLET, FILM COATED ORAL DAILY
Qty: 90 TABLET | Refills: 3 | Status: ON HOLD | OUTPATIENT
Start: 2022-03-22 | End: 2023-01-12 | Stop reason: CLARIF

## 2022-03-22 RX ORDER — NITROGLYCERIN 0.4 MG/1
0.4 TABLET SUBLINGUAL EVERY 5 MIN PRN
Qty: 30 TABLET | Refills: 3 | Status: ON HOLD | OUTPATIENT
Start: 2022-03-22 | End: 2023-07-26 | Stop reason: HOSPADM

## 2022-03-22 RX ORDER — EZETIMIBE 10 MG/1
10 TABLET ORAL DAILY
Qty: 90 TABLET | Refills: 4 | Status: SHIPPED | OUTPATIENT
Start: 2022-03-22 | End: 2022-05-25

## 2022-03-22 RX ORDER — AMLODIPINE BESYLATE 5 MG/1
5 TABLET ORAL DAILY
Qty: 90 TABLET | Refills: 4 | Status: ON HOLD | OUTPATIENT
Start: 2022-03-22 | End: 2022-04-07 | Stop reason: SDUPTHER

## 2022-03-22 NOTE — H&P (VIEW-ONLY)
[facility] Cardiology     Subjective:    Patient ID:  Addy Redding is a 66 y.o. male who presents for evaluation of Chest Pain, Shortness of Breath, Bradycardia, Diabetes Mellitus, and Heart Problem    Review of patient's allergies indicates:  No Known Allergies   He has kindly been referred for complaints of shortness of breath with exertion associated with neck and upper chest fullness.  It only occurs with activity but is not always occurring every time he exerts.  He is on lisinopril 40 mg per day due to hypertension.  His blood pressures have been 140s and higher.  He is a diabetic.  He is on atorvastatin.  His LDL increased into to the 120 range recently he has had LDLs as low as 79 mg%.  He takes metformin.  He has bradycardia.  He has had heart rates as slow as 41 beats per minute at rest.  He quit smoking in the 90s.  He does have aortic atherosclerosis noted on imaging studies.  He states he has been having exertional chest discomforts with shortness of breath for about a year and a half.  He has been on blood pressure therapy for 10 years.  He has never seen a cardiologist.  GFR 50 range.  His urinalysis does not show protein.  He considers himself to be in good shape, he is usually tolerant of exercise.  He denies episodes of shortness of breath or chest pain at rest.      Review of Systems   Constitutional: Negative for chills, decreased appetite, diaphoresis, fever, malaise/fatigue, night sweats, weight gain and weight loss.   HENT: Negative for congestion, ear discharge, ear pain, hearing loss, hoarse voice, nosebleeds, odynophagia, sore throat, stridor and tinnitus.    Eyes: Negative for blurred vision, discharge, double vision, pain, photophobia, redness, vision loss in left eye, vision loss in right eye, visual disturbance and visual halos.   Cardiovascular: Positive for chest pain and dyspnea on exertion. Negative for  claudication, cyanosis, irregular heartbeat, leg swelling, near-syncope, orthopnea, palpitations, paroxysmal nocturnal dyspnea and syncope.   Respiratory: Positive for shortness of breath. Negative for cough, hemoptysis, sleep disturbances due to breathing, snoring, sputum production and wheezing.    Endocrine: Negative for cold intolerance, heat intolerance, polydipsia, polyphagia and polyuria.   Hematologic/Lymphatic: Negative for adenopathy and bleeding problem. Does not bruise/bleed easily.   Skin: Negative for color change, dry skin, flushing, itching, nail changes, poor wound healing, rash, skin cancer, suspicious lesions and unusual hair distribution.   Musculoskeletal: Negative for arthritis, back pain, falls, gout, joint pain, joint swelling, muscle cramps, muscle weakness, myalgias, neck pain and stiffness.   Gastrointestinal: Positive for heartburn. Negative for bloating, abdominal pain, anorexia, change in bowel habit, bowel incontinence, constipation, diarrhea, dysphagia, excessive appetite, flatus, hematemesis, hematochezia, hemorrhoids, jaundice, melena, nausea and vomiting.   Genitourinary: Negative for bladder incontinence, decreased libido, dysuria, flank pain, frequency, genital sores, hematuria, hesitancy, incomplete emptying, nocturia and urgency.   Neurological: Negative for aphonia, brief paralysis, difficulty with concentration, disturbances in coordination, excessive daytime sleepiness, dizziness, focal weakness, headaches, light-headedness, loss of balance, numbness, paresthesias, seizures, sensory change, tremors, vertigo and weakness.   Psychiatric/Behavioral: Negative for altered mental status, depression, hallucinations, memory loss, substance abuse, suicidal ideas and thoughts of violence. The patient does not have insomnia and is not nervous/anxious.    Allergic/Immunologic: Negative for hives and persistent infections.        Objective:       Vitals:    03/22/22 1217   BP: (!) 155/70  "  Pulse: (!) 54   SpO2: 99%   Weight: 89.3 kg (196 lb 12.2 oz)   Height: 6' 1" (1.854 m)    Physical Exam  Constitutional:       General: He is not in acute distress.     Appearance: He is well-developed. He is not diaphoretic.   HENT:      Head: Normocephalic and atraumatic.      Nose: Nose normal.   Eyes:      General: No scleral icterus.        Right eye: No discharge.      Conjunctiva/sclera: Conjunctivae normal.      Pupils: Pupils are equal, round, and reactive to light.   Neck:      Thyroid: No thyromegaly.      Vascular: No JVD.      Trachea: No tracheal deviation.   Cardiovascular:      Rate and Rhythm: Normal rate and regular rhythm.      Pulses: Normal pulses.      Heart sounds: Normal heart sounds. No murmur heard.    No friction rub. No gallop.   Pulmonary:      Effort: Pulmonary effort is normal. No respiratory distress.      Breath sounds: Normal breath sounds. No stridor. No wheezing or rales.   Chest:      Chest wall: No tenderness.   Abdominal:      General: Bowel sounds are normal. There is no distension.      Palpations: Abdomen is soft. There is no mass.      Tenderness: There is no abdominal tenderness. There is no guarding or rebound.   Musculoskeletal:         General: No tenderness. Normal range of motion.      Cervical back: Normal range of motion and neck supple.   Lymphadenopathy:      Cervical: No cervical adenopathy.   Skin:     General: Skin is warm and dry.      Coloration: Skin is not pale.      Findings: No erythema or rash.   Neurological:      Mental Status: He is alert and oriented to person, place, and time.      Cranial Nerves: No cranial nerve deficit.      Coordination: Coordination normal.   Psychiatric:         Behavior: Behavior normal.         Thought Content: Thought content normal.         Judgment: Judgment normal.           Assessment:       1. Hypertension associated with diabetes    2. SOB (shortness of breath)    3. Bradycardia    4. Hyperlipidemia, unspecified " hyperlipidemia type    5. Precordial pain    6. Aortic atherosclerosis    7. Dyslipidemia associated with type 2 diabetes mellitus    8. Type 2 diabetes mellitus with stage 3a chronic kidney disease, without long-term current use of insulin    9. Exertional chest pain    10. Sinus bradycardia      Results for orders placed or performed in visit on 02/18/22   Prostate Specific Antigen, Diagnostic   Result Value Ref Range    PSA Diagnostic 9.1 (H) 0.00 - 4.00 ng/mL         Current Outpatient Medications:     ADVANCED GLUC METER TEST STRIP Strp, USE TO TEST BLOOD SUGAR 4 TIMES DAILY., Disp: 100 strip, Rfl: 0    amLODIPine (NORVASC) 5 MG tablet, Take 1 tablet (5 mg total) by mouth once daily., Disp: 90 tablet, Rfl: 4    atorvastatin (LIPITOR) 80 MG tablet, Take 1 tablet (80 mg total) by mouth once daily., Disp: 90 tablet, Rfl: 3    blood sugar diagnostic (BLOOD GLUCOSE TEST) Strp, Check sugar once daily, Disp: 100 each, Rfl: 11    blood-glucose meter kit, Use as instructed, Disp: , Rfl:     ezetimibe (ZETIA) 10 mg tablet, Take 1 tablet (10 mg total) by mouth once daily., Disp: 90 tablet, Rfl: 4    ibuprofen (ADVIL,MOTRIN) 800 MG tablet, Take 1 tablet (800 mg total) by mouth every 8 (eight) hours as needed for Pain., Disp: 30 tablet, Rfl: 2    lancets (ACCU-CHEK SOFTCLIX LANCETS) Misc, 1 lancet by Misc.(Non-Drug; Combo Route) route 2 (two) times daily., Disp: 200 each, Rfl: 1    lisinopriL (PRINIVIL,ZESTRIL) 20 MG tablet, TAKE ONE TABLET BY MOUTH TWICE DAILY, Disp: 180 tablet, Rfl: 3    metFORMIN (GLUCOPHAGE) 1000 MG tablet, TAKE ONE TABLET BY MOUTH TWICE DAILY , Disp: 180 tablet, Rfl: 3    nitroGLYCERIN (NITROSTAT) 0.4 MG SL tablet, Place 1 tablet (0.4 mg total) under the tongue every 5 (five) minutes as needed for Chest pain., Disp: 30 tablet, Rfl: 3    pantoprazole (PROTONIX) 40 MG tablet, Take 1 tablet (40 mg total) by mouth once daily., Disp: 30 tablet, Rfl: 2    sildenafil (REVATIO) 20 mg Tab, Take 1  tablet (20 mg total) by mouth daily as needed for ED, Disp: 30 tablet, Rfl: 2     Lab Results   Component Value Date    WBC 5.38 01/06/2022    RBC 4.13 (L) 01/06/2022    HGB 12.2 (L) 01/06/2022    HCT 36.7 (L) 01/06/2022    MCV 89 01/06/2022    MCH 29.5 01/06/2022    MCHC 33.2 01/06/2022    RDW 14.6 (H) 01/06/2022     01/06/2022    MPV 10.7 01/06/2022    GRAN 2.8 01/06/2022    GRAN 51.1 01/06/2022    LYMPH 1.7 01/06/2022    LYMPH 32.0 01/06/2022    MONO 0.6 01/06/2022    MONO 11.7 01/06/2022    EOS 0.2 01/06/2022    BASO 0.04 01/06/2022    EOSINOPHIL 4.3 01/06/2022    BASOPHIL 0.7 01/06/2022    MG 1.9 10/09/2012        CMP  Lab Results   Component Value Date     01/06/2022    K 4.6 01/06/2022     01/06/2022    CO2 25 01/06/2022     (H) 01/06/2022    BUN 27 (H) 01/06/2022    CREATININE 1.6 (H) 01/06/2022    CALCIUM 9.4 01/06/2022    PROT 7.4 01/06/2022    ALBUMIN 3.9 01/06/2022    BILITOT 0.4 01/06/2022    ALKPHOS 51 (L) 01/06/2022    AST 20 01/06/2022    ALT 15 01/06/2022    ANIONGAP 5 (L) 01/06/2022    ESTGFRAFRICA 50 (A) 01/06/2022    EGFRNONAA 44 (A) 01/06/2022        Lab Results   Component Value Date    LABURIN No growth 10/04/2018            Results for orders placed or performed in visit on 01/06/22   SCHEDULED EKG 12-LEAD (to Muse)    Collection Time: 01/06/22 10:39 AM    Narrative    Test Reason : R00.1,    Vent. Rate : 041 BPM     Atrial Rate : 041 BPM     P-R Int : 156 ms          QRS Dur : 092 ms      QT Int : 502 ms       P-R-T Axes : 052 048 051 degrees     QTc Int : 414 ms    Marked sinus bradycardia  Abnormal ECG  When compared with ECG of 09-OCT-2012 08:36,  No significant change was found  Confirmed by Presley Lang MD ([id number]) on 1/6/2022 7:57:19 PM    Referred By: ANIKA COLEMAN           Confirmed By:Presley Lang MD        X-Ray Cervical Spine AP And Lateral 10/10/2021 [id number] Final   MRI Prostate W W/O Contrast 01/28/2020 [id number] Final   US AAA Screening 04/23/2019  [id number] Final            Plan:       Problem List Items Addressed This Visit        Cardiac/Vascular    Dyslipidemia associated with type 2 diabetes mellitus     LDL not well controlled.  A 12 a statin increased to 80 mg per day and Zetia 10 mg prescribed.  Explanation of indications for a lower LDL was explained to the patient.           Relevant Medications    atorvastatin (LIPITOR) 80 MG tablet    ezetimibe (ZETIA) 10 mg tablet    Hypertension associated with diabetes - Primary     Amlodipine 5 mg has been added for better blood pressure control.  He will continue 40 mg of lisinopril.           Relevant Medications    amLODIPine (NORVASC) 5 MG tablet    Aortic atherosclerosis     Confirmed by ultrasound.  No abdominal aortic aneurysm.  Condition stable.  He is on statin therapy.           Sinus bradycardia     His heart rate today is 54. I see an Electrocardiogram with heart rate 41 beats per minute in the past.  He is asymptomatic.  Education provided to the patient regarding his bradycardia and long-term prognosis.    Reassurance provided.  I do not think a pacemaker would be likely needed in his life time.              Endocrine    Type 2 diabetes mellitus with stage 3a chronic kidney disease, without long-term current use of insulin     Serum creatinine 1.6, GFR 50 range.  Condition unchanged.              Other    Exertional chest pain     A stress test is ordered.  Nuclear imaging advised.  He had a negative stress test in 2012. P.r.n. nitroglycerin has been prescribed.  Amlodipine added.             Other Visit Diagnoses     SOB (shortness of breath)        Bradycardia        Hyperlipidemia, unspecified hyperlipidemia type        Relevant Medications    atorvastatin (LIPITOR) 80 MG tablet    ezetimibe (ZETIA) 10 mg tablet    Precordial pain        Relevant Medications    nitroGLYCERIN (NITROSTAT) 0.4 MG SL tablet    Other Relevant Orders    NM Myocardial Perfusion Spect Multi Exer    Nuclear Stress Test                  Thank you for allowing me to participate in your patient's care.          Carter Arevalo MD  03/22/2022   10:19 AM

## 2022-03-22 NOTE — ASSESSMENT & PLAN NOTE
His heart rate today is 54. I see an Electrocardiogram with heart rate 41 beats per minute in the past.  He is asymptomatic.  Education provided to the patient regarding his bradycardia and long-term prognosis.    Reassurance provided.  I do not think a pacemaker would be likely needed in his life time.

## 2022-03-22 NOTE — ASSESSMENT & PLAN NOTE
Amlodipine 5 mg has been added for better blood pressure control.  He will continue 40 mg of lisinopril.

## 2022-03-22 NOTE — ASSESSMENT & PLAN NOTE
LDL not well controlled.  A 12 a statin increased to 80 mg per day and Zetia 10 mg prescribed.  Explanation of indications for a lower LDL was explained to the patient.

## 2022-03-22 NOTE — ASSESSMENT & PLAN NOTE
A stress test is ordered.  Nuclear imaging advised.  He had a negative stress test in 2012. P.r.n. nitroglycerin has been prescribed.  Amlodipine added.

## 2022-03-22 NOTE — ASSESSMENT & PLAN NOTE
Confirmed by ultrasound.  No abdominal aortic aneurysm.  Condition stable.  He is on statin therapy.

## 2022-03-22 NOTE — PROGRESS NOTES
[facility]     Subjective:    Patient ID:  Addy Redding is a 66 y.o. male who presents for evaluation of Chest Pain, Shortness of Breath, Bradycardia, Diabetes Mellitus, and Heart Problem    Review of patient's allergies indicates:  No Known Allergies   He has kindly been referred for complaints of shortness of breath with exertion associated with neck and upper chest fullness.  It only occurs with activity but is not always occurring every time he exerts.  He is on lisinopril 40 mg per day due to hypertension.  His blood pressures have been 140s and higher.  He is a diabetic.  He is on atorvastatin.  His LDL increased into to the 120 range recently he has had LDLs as low as 79 mg%.  He takes metformin.  He has bradycardia.  He has had heart rates as slow as 41 beats per minute at rest.  He quit smoking in the 90s.  He does have aortic atherosclerosis noted on imaging studies.  He states he has been having exertional chest discomforts with shortness of breath for about a year and a half.  He has been on blood pressure therapy for 10 years.  He has never seen a cardiologist.  GFR 50 range.  His urinalysis does not show protein.  He considers himself to be in good shape, he is usually tolerant of exercise.  He denies episodes of shortness of breath or chest pain at rest.      Review of Systems   Constitutional: Negative for chills, decreased appetite, diaphoresis, fever, malaise/fatigue, night sweats, weight gain and weight loss.   HENT: Negative for congestion, ear discharge, ear pain, hearing loss, hoarse voice, nosebleeds, odynophagia, sore throat, stridor and tinnitus.    Eyes: Negative for blurred vision, discharge, double vision, pain, photophobia, redness, vision loss in left eye, vision loss in right eye, visual disturbance and visual halos.   Cardiovascular: Positive for chest pain and dyspnea on exertion. Negative for  claudication, cyanosis, irregular heartbeat, leg swelling, near-syncope, orthopnea, palpitations, paroxysmal nocturnal dyspnea and syncope.   Respiratory: Positive for shortness of breath. Negative for cough, hemoptysis, sleep disturbances due to breathing, snoring, sputum production and wheezing.    Endocrine: Negative for cold intolerance, heat intolerance, polydipsia, polyphagia and polyuria.   Hematologic/Lymphatic: Negative for adenopathy and bleeding problem. Does not bruise/bleed easily.   Skin: Negative for color change, dry skin, flushing, itching, nail changes, poor wound healing, rash, skin cancer, suspicious lesions and unusual hair distribution.   Musculoskeletal: Negative for arthritis, back pain, falls, gout, joint pain, joint swelling, muscle cramps, muscle weakness, myalgias, neck pain and stiffness.   Gastrointestinal: Positive for heartburn. Negative for bloating, abdominal pain, anorexia, change in bowel habit, bowel incontinence, constipation, diarrhea, dysphagia, excessive appetite, flatus, hematemesis, hematochezia, hemorrhoids, jaundice, melena, nausea and vomiting.   Genitourinary: Negative for bladder incontinence, decreased libido, dysuria, flank pain, frequency, genital sores, hematuria, hesitancy, incomplete emptying, nocturia and urgency.   Neurological: Negative for aphonia, brief paralysis, difficulty with concentration, disturbances in coordination, excessive daytime sleepiness, dizziness, focal weakness, headaches, light-headedness, loss of balance, numbness, paresthesias, seizures, sensory change, tremors, vertigo and weakness.   Psychiatric/Behavioral: Negative for altered mental status, depression, hallucinations, memory loss, substance abuse, suicidal ideas and thoughts of violence. The patient does not have insomnia and is not nervous/anxious.    Allergic/Immunologic: Negative for hives and persistent infections.        Objective:       Vitals:    03/22/22 1217   BP: (!) 155/70  "  Pulse: (!) 54   SpO2: 99%   Weight: 89.3 kg (196 lb 12.2 oz)   Height: 6' 1" (1.854 m)    Physical Exam  Constitutional:       General: He is not in acute distress.     Appearance: He is well-developed. He is not diaphoretic.   HENT:      Head: Normocephalic and atraumatic.      Nose: Nose normal.   Eyes:      General: No scleral icterus.        Right eye: No discharge.      Conjunctiva/sclera: Conjunctivae normal.      Pupils: Pupils are equal, round, and reactive to light.   Neck:      Thyroid: No thyromegaly.      Vascular: No JVD.      Trachea: No tracheal deviation.   Cardiovascular:      Rate and Rhythm: Normal rate and regular rhythm.      Pulses: Normal pulses.      Heart sounds: Normal heart sounds. No murmur heard.    No friction rub. No gallop.   Pulmonary:      Effort: Pulmonary effort is normal. No respiratory distress.      Breath sounds: Normal breath sounds. No stridor. No wheezing or rales.   Chest:      Chest wall: No tenderness.   Abdominal:      General: Bowel sounds are normal. There is no distension.      Palpations: Abdomen is soft. There is no mass.      Tenderness: There is no abdominal tenderness. There is no guarding or rebound.   Musculoskeletal:         General: No tenderness. Normal range of motion.      Cervical back: Normal range of motion and neck supple.   Lymphadenopathy:      Cervical: No cervical adenopathy.   Skin:     General: Skin is warm and dry.      Coloration: Skin is not pale.      Findings: No erythema or rash.   Neurological:      Mental Status: He is alert and oriented to person, place, and time.      Cranial Nerves: No cranial nerve deficit.      Coordination: Coordination normal.   Psychiatric:         Behavior: Behavior normal.         Thought Content: Thought content normal.         Judgment: Judgment normal.           Assessment:       1. Hypertension associated with diabetes    2. SOB (shortness of breath)    3. Bradycardia    4. Hyperlipidemia, unspecified " hyperlipidemia type    5. Precordial pain    6. Aortic atherosclerosis    7. Dyslipidemia associated with type 2 diabetes mellitus    8. Type 2 diabetes mellitus with stage 3a chronic kidney disease, without long-term current use of insulin    9. Exertional chest pain    10. Sinus bradycardia      Results for orders placed or performed in visit on 02/18/22   Prostate Specific Antigen, Diagnostic   Result Value Ref Range    PSA Diagnostic 9.1 (H) 0.00 - 4.00 ng/mL         Current Outpatient Medications:     ADVANCED GLUC METER TEST STRIP Strp, USE TO TEST BLOOD SUGAR 4 TIMES DAILY., Disp: 100 strip, Rfl: 0    amLODIPine (NORVASC) 5 MG tablet, Take 1 tablet (5 mg total) by mouth once daily., Disp: 90 tablet, Rfl: 4    atorvastatin (LIPITOR) 80 MG tablet, Take 1 tablet (80 mg total) by mouth once daily., Disp: 90 tablet, Rfl: 3    blood sugar diagnostic (BLOOD GLUCOSE TEST) Strp, Check sugar once daily, Disp: 100 each, Rfl: 11    blood-glucose meter kit, Use as instructed, Disp: , Rfl:     ezetimibe (ZETIA) 10 mg tablet, Take 1 tablet (10 mg total) by mouth once daily., Disp: 90 tablet, Rfl: 4    ibuprofen (ADVIL,MOTRIN) 800 MG tablet, Take 1 tablet (800 mg total) by mouth every 8 (eight) hours as needed for Pain., Disp: 30 tablet, Rfl: 2    lancets (ACCU-CHEK SOFTCLIX LANCETS) Misc, 1 lancet by Misc.(Non-Drug; Combo Route) route 2 (two) times daily., Disp: 200 each, Rfl: 1    lisinopriL (PRINIVIL,ZESTRIL) 20 MG tablet, TAKE ONE TABLET BY MOUTH TWICE DAILY, Disp: 180 tablet, Rfl: 3    metFORMIN (GLUCOPHAGE) 1000 MG tablet, TAKE ONE TABLET BY MOUTH TWICE DAILY , Disp: 180 tablet, Rfl: 3    nitroGLYCERIN (NITROSTAT) 0.4 MG SL tablet, Place 1 tablet (0.4 mg total) under the tongue every 5 (five) minutes as needed for Chest pain., Disp: 30 tablet, Rfl: 3    pantoprazole (PROTONIX) 40 MG tablet, Take 1 tablet (40 mg total) by mouth once daily., Disp: 30 tablet, Rfl: 2    sildenafil (REVATIO) 20 mg Tab, Take 1  tablet (20 mg total) by mouth daily as needed for ED, Disp: 30 tablet, Rfl: 2     Lab Results   Component Value Date    WBC 5.38 01/06/2022    RBC 4.13 (L) 01/06/2022    HGB 12.2 (L) 01/06/2022    HCT 36.7 (L) 01/06/2022    MCV 89 01/06/2022    MCH 29.5 01/06/2022    MCHC 33.2 01/06/2022    RDW 14.6 (H) 01/06/2022     01/06/2022    MPV 10.7 01/06/2022    GRAN 2.8 01/06/2022    GRAN 51.1 01/06/2022    LYMPH 1.7 01/06/2022    LYMPH 32.0 01/06/2022    MONO 0.6 01/06/2022    MONO 11.7 01/06/2022    EOS 0.2 01/06/2022    BASO 0.04 01/06/2022    EOSINOPHIL 4.3 01/06/2022    BASOPHIL 0.7 01/06/2022    MG 1.9 10/09/2012        CMP  Lab Results   Component Value Date     01/06/2022    K 4.6 01/06/2022     01/06/2022    CO2 25 01/06/2022     (H) 01/06/2022    BUN 27 (H) 01/06/2022    CREATININE 1.6 (H) 01/06/2022    CALCIUM 9.4 01/06/2022    PROT 7.4 01/06/2022    ALBUMIN 3.9 01/06/2022    BILITOT 0.4 01/06/2022    ALKPHOS 51 (L) 01/06/2022    AST 20 01/06/2022    ALT 15 01/06/2022    ANIONGAP 5 (L) 01/06/2022    ESTGFRAFRICA 50 (A) 01/06/2022    EGFRNONAA 44 (A) 01/06/2022        Lab Results   Component Value Date    LABURIN No growth 10/04/2018            Results for orders placed or performed in visit on 01/06/22   SCHEDULED EKG 12-LEAD (to Muse)    Collection Time: 01/06/22 10:39 AM    Narrative    Test Reason : R00.1,    Vent. Rate : 041 BPM     Atrial Rate : 041 BPM     P-R Int : 156 ms          QRS Dur : 092 ms      QT Int : 502 ms       P-R-T Axes : 052 048 051 degrees     QTc Int : 414 ms    Marked sinus bradycardia  Abnormal ECG  When compared with ECG of 09-OCT-2012 08:36,  No significant change was found  Confirmed by Presley Lang MD ([id number]) on 1/6/2022 7:57:19 PM    Referred By: ANIKA COLEMAN           Confirmed By:Presley Lang MD        X-Ray Cervical Spine AP And Lateral 10/10/2021 [id number] Final   MRI Prostate W W/O Contrast 01/28/2020 [id number] Final   US AAA Screening 04/23/2019  [id number] Final            Plan:       Problem List Items Addressed This Visit        Cardiac/Vascular    Dyslipidemia associated with type 2 diabetes mellitus     LDL not well controlled.  A 12 a statin increased to 80 mg per day and Zetia 10 mg prescribed.  Explanation of indications for a lower LDL was explained to the patient.           Relevant Medications    atorvastatin (LIPITOR) 80 MG tablet    ezetimibe (ZETIA) 10 mg tablet    Hypertension associated with diabetes - Primary     Amlodipine 5 mg has been added for better blood pressure control.  He will continue 40 mg of lisinopril.           Relevant Medications    amLODIPine (NORVASC) 5 MG tablet    Aortic atherosclerosis     Confirmed by ultrasound.  No abdominal aortic aneurysm.  Condition stable.  He is on statin therapy.           Sinus bradycardia     His heart rate today is 54. I see an Electrocardiogram with heart rate 41 beats per minute in the past.  He is asymptomatic.  Education provided to the patient regarding his bradycardia and long-term prognosis.    Reassurance provided.  I do not think a pacemaker would be likely needed in his life time.              Endocrine    Type 2 diabetes mellitus with stage 3a chronic kidney disease, without long-term current use of insulin     Serum creatinine 1.6, GFR 50 range.  Condition unchanged.              Other    Exertional chest pain     A stress test is ordered.  Nuclear imaging advised.  He had a negative stress test in 2012. P.r.n. nitroglycerin has been prescribed.  Amlodipine added.             Other Visit Diagnoses     SOB (shortness of breath)        Bradycardia        Hyperlipidemia, unspecified hyperlipidemia type        Relevant Medications    atorvastatin (LIPITOR) 80 MG tablet    ezetimibe (ZETIA) 10 mg tablet    Precordial pain        Relevant Medications    nitroGLYCERIN (NITROSTAT) 0.4 MG SL tablet    Other Relevant Orders    NM Myocardial Perfusion Spect Multi Exer    Nuclear Stress Test                  Thank you for allowing me to participate in your patient's care.          Carter Arevalo MD  03/22/2022   10:19 AM

## 2022-03-24 ENCOUNTER — PATIENT OUTREACH (OUTPATIENT)
Dept: ADMINISTRATIVE | Facility: OTHER | Age: 67
End: 2022-03-24
Payer: MEDICARE

## 2022-03-24 NOTE — PROGRESS NOTES
Health Maintenance Due   Topic Date Due    Pneumococcal Vaccines (Age 65+) (2 of 2 - PPSV23) 06/06/2020     Updates were requested from care everywhere.  Chart was reviewed for overdue Proactive [facility] Encounters (ALYSHA) topics (CRS, Breast Cancer Screening, Eye exam)  Health Maintenance has been updated.  LINKS immunization registry triggered.  Immunizations were reconciled.

## 2022-03-28 ENCOUNTER — OFFICE VISIT (OUTPATIENT)
Dept: UROLOGY | Facility: CLINIC | Age: 67
End: 2022-03-28
Payer: MEDICARE

## 2022-03-28 VITALS
DIASTOLIC BLOOD PRESSURE: 73 MMHG | HEIGHT: 73 IN | BODY MASS INDEX: 26.31 KG/M2 | WEIGHT: 198.5 LBS | HEART RATE: 59 BPM | SYSTOLIC BLOOD PRESSURE: 150 MMHG

## 2022-03-28 DIAGNOSIS — Z12.5 ENCOUNTER FOR PROSTATE CANCER SCREENING: ICD-10-CM

## 2022-03-28 DIAGNOSIS — R97.20 ELEVATED PSA: Primary | ICD-10-CM

## 2022-03-28 PROCEDURE — 1159F PR MEDICATION LIST DOCUMENTED IN MEDICAL RECORD: ICD-10-PCS | Mod: CPTII,S$GLB,, | Performed by: STUDENT IN AN ORGANIZED HEALTH CARE EDUCATION/TRAINING PROGRAM

## 2022-03-28 PROCEDURE — 3288F PR FALLS RISK ASSESSMENT DOCUMENTED: ICD-10-PCS | Mod: CPTII,S$GLB,, | Performed by: STUDENT IN AN ORGANIZED HEALTH CARE EDUCATION/TRAINING PROGRAM

## 2022-03-28 PROCEDURE — 99999 PR PBB SHADOW E&M-EST. PATIENT-LVL IV: ICD-10-PCS | Mod: PBBFAC,,, | Performed by: STUDENT IN AN ORGANIZED HEALTH CARE EDUCATION/TRAINING PROGRAM

## 2022-03-28 PROCEDURE — 3044F HG A1C LEVEL LT 7.0%: CPT | Mod: CPTII,S$GLB,, | Performed by: STUDENT IN AN ORGANIZED HEALTH CARE EDUCATION/TRAINING PROGRAM

## 2022-03-28 PROCEDURE — 3061F PR NEG MICROALBUMINURIA RESULT DOCUMENTED/REVIEW: ICD-10-PCS | Mod: CPTII,S$GLB,, | Performed by: STUDENT IN AN ORGANIZED HEALTH CARE EDUCATION/TRAINING PROGRAM

## 2022-03-28 PROCEDURE — 3078F PR MOST RECENT DIASTOLIC BLOOD PRESSURE < 80 MM HG: ICD-10-PCS | Mod: CPTII,S$GLB,, | Performed by: STUDENT IN AN ORGANIZED HEALTH CARE EDUCATION/TRAINING PROGRAM

## 2022-03-28 PROCEDURE — 1101F PT FALLS ASSESS-DOCD LE1/YR: CPT | Mod: CPTII,S$GLB,, | Performed by: STUDENT IN AN ORGANIZED HEALTH CARE EDUCATION/TRAINING PROGRAM

## 2022-03-28 PROCEDURE — 99214 PR OFFICE/OUTPT VISIT, EST, LEVL IV, 30-39 MIN: ICD-10-PCS | Mod: S$GLB,,, | Performed by: STUDENT IN AN ORGANIZED HEALTH CARE EDUCATION/TRAINING PROGRAM

## 2022-03-28 PROCEDURE — 1160F RVW MEDS BY RX/DR IN RCRD: CPT | Mod: CPTII,S$GLB,, | Performed by: STUDENT IN AN ORGANIZED HEALTH CARE EDUCATION/TRAINING PROGRAM

## 2022-03-28 PROCEDURE — 3066F PR DOCUMENTATION OF TREATMENT FOR NEPHROPATHY: ICD-10-PCS | Mod: CPTII,S$GLB,, | Performed by: STUDENT IN AN ORGANIZED HEALTH CARE EDUCATION/TRAINING PROGRAM

## 2022-03-28 PROCEDURE — 3044F PR MOST RECENT HEMOGLOBIN A1C LEVEL <7.0%: ICD-10-PCS | Mod: CPTII,S$GLB,, | Performed by: STUDENT IN AN ORGANIZED HEALTH CARE EDUCATION/TRAINING PROGRAM

## 2022-03-28 PROCEDURE — 3008F BODY MASS INDEX DOCD: CPT | Mod: CPTII,S$GLB,, | Performed by: STUDENT IN AN ORGANIZED HEALTH CARE EDUCATION/TRAINING PROGRAM

## 2022-03-28 PROCEDURE — 3077F SYST BP >= 140 MM HG: CPT | Mod: CPTII,S$GLB,, | Performed by: STUDENT IN AN ORGANIZED HEALTH CARE EDUCATION/TRAINING PROGRAM

## 2022-03-28 PROCEDURE — 3061F NEG MICROALBUMINURIA REV: CPT | Mod: CPTII,S$GLB,, | Performed by: STUDENT IN AN ORGANIZED HEALTH CARE EDUCATION/TRAINING PROGRAM

## 2022-03-28 PROCEDURE — 3078F DIAST BP <80 MM HG: CPT | Mod: CPTII,S$GLB,, | Performed by: STUDENT IN AN ORGANIZED HEALTH CARE EDUCATION/TRAINING PROGRAM

## 2022-03-28 PROCEDURE — 3008F PR BODY MASS INDEX (BMI) DOCUMENTED: ICD-10-PCS | Mod: CPTII,S$GLB,, | Performed by: STUDENT IN AN ORGANIZED HEALTH CARE EDUCATION/TRAINING PROGRAM

## 2022-03-28 PROCEDURE — 1101F PR PT FALLS ASSESS DOC 0-1 FALLS W/OUT INJ PAST YR: ICD-10-PCS | Mod: CPTII,S$GLB,, | Performed by: STUDENT IN AN ORGANIZED HEALTH CARE EDUCATION/TRAINING PROGRAM

## 2022-03-28 PROCEDURE — 3288F FALL RISK ASSESSMENT DOCD: CPT | Mod: CPTII,S$GLB,, | Performed by: STUDENT IN AN ORGANIZED HEALTH CARE EDUCATION/TRAINING PROGRAM

## 2022-03-28 PROCEDURE — 1159F MED LIST DOCD IN RCRD: CPT | Mod: CPTII,S$GLB,, | Performed by: STUDENT IN AN ORGANIZED HEALTH CARE EDUCATION/TRAINING PROGRAM

## 2022-03-28 PROCEDURE — 99999 PR PBB SHADOW E&M-EST. PATIENT-LVL IV: CPT | Mod: PBBFAC,,, | Performed by: STUDENT IN AN ORGANIZED HEALTH CARE EDUCATION/TRAINING PROGRAM

## 2022-03-28 PROCEDURE — 3066F NEPHROPATHY DOC TX: CPT | Mod: CPTII,S$GLB,, | Performed by: STUDENT IN AN ORGANIZED HEALTH CARE EDUCATION/TRAINING PROGRAM

## 2022-03-28 PROCEDURE — 3077F PR MOST RECENT SYSTOLIC BLOOD PRESSURE >= 140 MM HG: ICD-10-PCS | Mod: CPTII,S$GLB,, | Performed by: STUDENT IN AN ORGANIZED HEALTH CARE EDUCATION/TRAINING PROGRAM

## 2022-03-28 PROCEDURE — 1126F AMNT PAIN NOTED NONE PRSNT: CPT | Mod: CPTII,S$GLB,, | Performed by: STUDENT IN AN ORGANIZED HEALTH CARE EDUCATION/TRAINING PROGRAM

## 2022-03-28 PROCEDURE — 1126F PR PAIN SEVERITY QUANTIFIED, NO PAIN PRESENT: ICD-10-PCS | Mod: CPTII,S$GLB,, | Performed by: STUDENT IN AN ORGANIZED HEALTH CARE EDUCATION/TRAINING PROGRAM

## 2022-03-28 PROCEDURE — 99214 OFFICE O/P EST MOD 30 MIN: CPT | Mod: S$GLB,,, | Performed by: STUDENT IN AN ORGANIZED HEALTH CARE EDUCATION/TRAINING PROGRAM

## 2022-03-28 PROCEDURE — 1160F PR REVIEW ALL MEDS BY PRESCRIBER/CLIN PHARMACIST DOCUMENTED: ICD-10-PCS | Mod: CPTII,S$GLB,, | Performed by: STUDENT IN AN ORGANIZED HEALTH CARE EDUCATION/TRAINING PROGRAM

## 2022-03-28 NOTE — PROGRESS NOTES
Subjective:       Patient ID: Addy Redding is a 66 y.o. male.    Chief Complaint: Elevated PSA  This is a 66 y.o.  male patient that is an established patient of mine.     The patient was referred to me by his PCP Dr. Ramey for an elevated PSA.   Race:   Family history of prostate cancer no     He is s/p a TRUS biopsy with me on 9/18/18 for a PSA of 4.7. MARGARITA findings: 35-40. TRUS size: 43.6cc. Biopsy results demonstrated no cancer. We have been monitoring his PSA. His PSA in 2019 has demonstrated a rise to 5.6 on 4/2/19 and then again to 6.4 on 12/10/19 more recently. For the two persistent rises, we investigated further with an MRI. MRI was performed on 1/28/20 - Prostate: 5.6 x 3.5 x 3.4 cm corresponding to a computed volume of 32.7 cc. Left posterior transition zone PIRADS 3 lesion (equivocal for cancer), as above. He declined a uronav bx and wanted serial PSAs checked.   Microscopic hematuria - he declined workup.       Works as a .     3/28/22  Urinary frequency 2-3x/night a few weeks ago, now back to baseline 1x/night. He notes sometimes when he urinates he has a slower stream, difficult to empty his bladder. Spending longer times in the bathroom urinating. PSA 9.1.       LAST PSA  Lab Results   Component Value Date    PSA 7.2 (H) 06/15/2020    PSA 4.7 (H) 08/28/2018    PSA 3.3 01/11/2017    PSA 1.6 11/04/2014    PSA 1.7 10/23/2013    PSA 1.5 08/07/2012    PSA 0.68 07/25/2009    PSA 0.6 12/27/2007    PSADIAG 9.1 (H) 02/18/2022    PSADIAG 6.7 (H) 02/04/2021    PSATOTAL 6.4 (H) 12/10/2019    PSATOTAL 5.6 (H) 04/02/2019    PSAFREE 0.63 12/10/2019    PSAFREE 0.57 04/02/2019       Lab Results   Component Value Date    CREATININE 1.6 (H) 01/06/2022       ---  Past Medical History:   Diagnosis Date    Costochondritis     Diabetic nephropathy associated with type 2 diabetes mellitus     Diabetic retinopathy     ED (erectile dysfunction)     GERD (gastroesophageal reflux disease)      Hyperlipidemia     Hypertension     Type II or unspecified type diabetes mellitus with renal manifestations, uncontrolled(250.42)        Past Surgical History:   Procedure Laterality Date    Bone biopsy right femur         Family History   Problem Relation Age of Onset    Hypertension Mother     Diabetes Mother     Kidney disease Mother     Hypertension Sister     Diabetes Sister     Heart disease Sister     Heart attack Sister     Hyperlipidemia Sister     Coronary artery disease Sister     Hypertension Brother     Stroke Brother     Lung cancer Brother     Cancer Brother         lung cancer    Diabetes Sister     Hypertension Sister     Dementia Sister     Diabetes Brother     Peripheral vascular disease Brother     Hypertension Brother     Hyperlipidemia Brother     No Known Problems Brother     No Known Problems Sister     Coronary artery disease Father          of an MI at age 60    Hyperlipidemia Father     Heart attack Father     No Known Problems Daughter     Asthma Son     Hypertension Son     Stomach cancer Sister     Cancer Sister         stomach     Diabetes Brother     Stroke Brother     Hypertension Brother     Hyperlipidemia Brother     HIV Brother     Diabetes Brother     Hypertension Brother        Social History     Tobacco Use    Smoking status: Former Smoker     Packs/day: 0.50     Years: 26.00     Pack years: 13.00     Types: Cigarettes     Quit date:      Years since quittin.2    Smokeless tobacco: Never Used   Substance Use Topics    Alcohol use: No    Drug use: No       Current Outpatient Medications on File Prior to Visit   Medication Sig Dispense Refill    ADVANCED GLUC METER TEST STRIP Strp USE TO TEST BLOOD SUGAR 4 TIMES DAILY. 100 strip 0    amLODIPine (NORVASC) 5 MG tablet Take 1 tablet (5 mg total) by mouth once daily. 90 tablet 4    atorvastatin (LIPITOR) 80 MG tablet Take 1 tablet (80 mg total) by mouth once daily. 90 tablet  3    blood sugar diagnostic (BLOOD GLUCOSE TEST) Strp Check sugar once daily 100 each 11    blood-glucose meter kit Use as instructed      ezetimibe (ZETIA) 10 mg tablet Take 1 tablet (10 mg total) by mouth once daily. 90 tablet 4    ibuprofen (ADVIL,MOTRIN) 800 MG tablet Take 1 tablet (800 mg total) by mouth every 8 (eight) hours as needed for Pain. 30 tablet 2    lancets (ACCU-CHEK SOFTCLIX LANCETS) Misc 1 lancet by Misc.(Non-Drug; Combo Route) route 2 (two) times daily. 200 each 1    lisinopriL (PRINIVIL,ZESTRIL) 20 MG tablet TAKE ONE TABLET BY MOUTH TWICE DAILY 180 tablet 3    metFORMIN (GLUCOPHAGE) 1000 MG tablet TAKE ONE TABLET BY MOUTH TWICE DAILY  180 tablet 3    nitroGLYCERIN (NITROSTAT) 0.4 MG SL tablet Place 1 tablet (0.4 mg total) under the tongue every 5 (five) minutes as needed for Chest pain. 30 tablet 3    pantoprazole (PROTONIX) 40 MG tablet Take 1 tablet (40 mg total) by mouth once daily. 30 tablet 2    sildenafil (REVATIO) 20 mg Tab Take 1 tablet (20 mg total) by mouth daily as needed for ED 30 tablet 2     No current facility-administered medications on file prior to visit.       Review of patient's allergies indicates:  No Known Allergies    Review of Systems   Constitutional: Negative for chills.   HENT: Negative for congestion.    Eyes: Negative for visual disturbance.   Respiratory: Negative for shortness of breath.    Cardiovascular: Negative for chest pain.   Gastrointestinal: Negative for abdominal distention.   Musculoskeletal: Negative for gait problem.   Skin: Negative for color change.   Neurological: Negative for dizziness.   Psychiatric/Behavioral: Negative for agitation.       Objective:      Physical Exam  Constitutional:       Appearance: He is well-developed.   HENT:      Head: Normocephalic.   Eyes:      Pupils: Pupils are equal, round, and reactive to light.   Pulmonary:      Effort: Pulmonary effort is normal.   Abdominal:      Palpations: Abdomen is soft.    Musculoskeletal:         General: Normal range of motion.      Cervical back: Normal range of motion.   Skin:     General: Skin is warm and dry.   Neurological:      Mental Status: He is alert.         Assessment:       1. Elevated PSA    2. Encounter for prostate cancer screening        Plan:         Plan:  1. BMP and MRI.  2. F/u in office to review - if again concerning lesions will likely refer to Dr. Frias for MRI guided bx of prostate and f/u with me after.         Elevated PSA  -     MRI Prostate W W/O Contrast; Future; Expected date: 03/28/2022  -     Basic Metabolic Panel; Future; Expected date: 03/28/2022    Encounter for prostate cancer screening  -     MRI Prostate W W/O Contrast; Future; Expected date: 03/28/2022  -     Basic Metabolic Panel; Future; Expected date: 03/28/2022

## 2022-03-31 ENCOUNTER — PATIENT MESSAGE (OUTPATIENT)
Dept: CARDIOLOGY | Facility: CLINIC | Age: 67
End: 2022-03-31
Payer: MEDICARE

## 2022-03-31 ENCOUNTER — PATIENT MESSAGE (OUTPATIENT)
Dept: CARDIOLOGY | Facility: CLINIC | Age: 67
End: 2022-03-31

## 2022-03-31 ENCOUNTER — HOSPITAL ENCOUNTER (OUTPATIENT)
Dept: CARDIOLOGY | Facility: HOSPITAL | Age: 67
Discharge: HOME | End: 2022-03-31
Attending: INTERNAL MEDICINE
Payer: MEDICARE

## 2022-03-31 ENCOUNTER — HOSPITAL ENCOUNTER (OUTPATIENT)
Dept: RADIOLOGY | Facility: HOSPITAL | Age: 67
Discharge: HOME | End: 2022-03-31
Attending: INTERNAL MEDICINE
Payer: MEDICARE

## 2022-03-31 VITALS — WEIGHT: 198 LBS | HEIGHT: 73 IN | BODY MASS INDEX: 26.24 KG/M2

## 2022-03-31 DIAGNOSIS — R07.2 PRECORDIAL PAIN: ICD-10-CM

## 2022-03-31 LAB
CV STRESS BASE HR: 47 BPM
DIASTOLIC BLOOD PRESSURE: 71 MMHG
OHS CV CPX 1 MINUTE RECOVERY HEART RATE: 96 BPM
OHS CV CPX 85 PERCENT MAX PREDICTED HEART RATE MALE: 129
OHS CV CPX ESTIMATED METS: 7
OHS CV CPX MAX PREDICTED HEART RATE: 152
OHS CV CPX PATIENT IS FEMALE: 0
OHS CV CPX PATIENT IS MALE: 1
OHS CV CPX PEAK DIASTOLIC BLOOD PRESSURE: 55 MMHG
OHS CV CPX PEAK HEAR RATE: 136 BPM
OHS CV CPX PEAK RATE PRESSURE PRODUCT: NORMAL
OHS CV CPX PEAK SYSTOLIC BLOOD PRESSURE: 196 MMHG
OHS CV CPX PERCENT MAX PREDICTED HEART RATE ACHIEVED: 89
OHS CV CPX RATE PRESSURE PRODUCT PRESENTING: 6486
STRESS ECHO POST EXERCISE DUR MIN: 6 MINUTES
STRESS ECHO POST EXERCISE DUR SEC: 0 SECONDS
STRESS ST DEPRESSION: 2 MM
SYSTOLIC BLOOD PRESSURE: 138 MMHG

## 2022-03-31 PROCEDURE — 93018 NUCLEAR STRESS TEST (CUPID ONLY): ICD-10-PCS | Mod: ,,, | Performed by: INTERNAL MEDICINE

## 2022-03-31 PROCEDURE — 93017 CV STRESS TEST TRACING ONLY: CPT

## 2022-03-31 PROCEDURE — 93018 CV STRESS TEST I&R ONLY: CPT | Mod: ,,, | Performed by: INTERNAL MEDICINE

## 2022-03-31 PROCEDURE — 93016 CV STRESS TEST SUPVJ ONLY: CPT | Mod: ,,, | Performed by: INTERNAL MEDICINE

## 2022-03-31 PROCEDURE — A9502 TC99M TETROFOSMIN: HCPCS

## 2022-03-31 PROCEDURE — 78452 NM MYOCARDIAL PERFUSION SPECT MULTI STUDY: ICD-10-PCS | Mod: 26,,, | Performed by: STUDENT IN AN ORGANIZED HEALTH CARE EDUCATION/TRAINING PROGRAM

## 2022-03-31 PROCEDURE — 78452 HT MUSCLE IMAGE SPECT MULT: CPT | Mod: 26,,, | Performed by: STUDENT IN AN ORGANIZED HEALTH CARE EDUCATION/TRAINING PROGRAM

## 2022-03-31 PROCEDURE — 93016 NUCLEAR STRESS TEST (CUPID ONLY): ICD-10-PCS | Mod: ,,, | Performed by: INTERNAL MEDICINE

## 2022-04-01 ENCOUNTER — TELEPHONE (OUTPATIENT)
Dept: CARDIOLOGY | Facility: CLINIC | Age: 67
End: 2022-04-01
Payer: MEDICARE

## 2022-04-01 DIAGNOSIS — R07.9 CHEST PAIN, UNSPECIFIED TYPE: Primary | ICD-10-CM

## 2022-04-01 NOTE — TELEPHONE ENCOUNTER
I spoke to the patient and I spoke to his wife regarding stress test results and recommendation for cardiac catheterization.  He is agreeable.  Orders were placed.

## 2022-04-06 ENCOUNTER — LAB VISIT (OUTPATIENT)
Dept: LAB | Facility: HOSPITAL | Age: 67
End: 2022-04-06
Attending: INTERNAL MEDICINE
Payer: MEDICARE

## 2022-04-06 ENCOUNTER — TELEPHONE (OUTPATIENT)
Dept: CARDIOLOGY | Facility: CLINIC | Age: 67
End: 2022-04-06
Payer: MEDICARE

## 2022-04-06 DIAGNOSIS — Z01.810 PRE-OPERATIVE CARDIOVASCULAR EXAMINATION: ICD-10-CM

## 2022-04-06 LAB
ANION GAP SERPL CALC-SCNC: 7 MMOL/L (ref 8–16)
BASOPHILS # BLD AUTO: 0.05 K/UL (ref 0–0.2)
BASOPHILS NFR BLD: 1 % (ref 0–1.9)
BUN SERPL-MCNC: 23 MG/DL (ref 8–23)
CALCIUM SERPL-MCNC: 8.9 MG/DL (ref 8.7–10.5)
CHLORIDE SERPL-SCNC: 108 MMOL/L (ref 95–110)
CO2 SERPL-SCNC: 24 MMOL/L (ref 23–29)
CREAT SERPL-MCNC: 1.7 MG/DL (ref 0.5–1.4)
DIFFERENTIAL METHOD: ABNORMAL
EOSINOPHIL # BLD AUTO: 0.2 K/UL (ref 0–0.5)
EOSINOPHIL NFR BLD: 4.3 % (ref 0–8)
ERYTHROCYTE [DISTWIDTH] IN BLOOD BY AUTOMATED COUNT: 13.7 % (ref 11.5–14.5)
EST. GFR  (AFRICAN AMERICAN): 47 ML/MIN/1.73 M^2
EST. GFR  (NON AFRICAN AMERICAN): 41 ML/MIN/1.73 M^2
GLUCOSE SERPL-MCNC: 117 MG/DL (ref 70–110)
HCT VFR BLD AUTO: 38.5 % (ref 40–54)
HGB BLD-MCNC: 13.1 G/DL (ref 14–18)
IMM GRANULOCYTES # BLD AUTO: 0 K/UL (ref 0–0.04)
IMM GRANULOCYTES NFR BLD AUTO: 0 % (ref 0–0.5)
LYMPHOCYTES # BLD AUTO: 2 K/UL (ref 1–4.8)
LYMPHOCYTES NFR BLD: 39.3 % (ref 18–48)
MCH RBC QN AUTO: 29.5 PG (ref 27–31)
MCHC RBC AUTO-ENTMCNC: 34 G/DL (ref 32–36)
MCV RBC AUTO: 87 FL (ref 82–98)
MONOCYTES # BLD AUTO: 0.6 K/UL (ref 0.3–1)
MONOCYTES NFR BLD: 12 % (ref 4–15)
NEUTROPHILS # BLD AUTO: 2.2 K/UL (ref 1.8–7.7)
NEUTROPHILS NFR BLD: 43.4 % (ref 38–73)
NRBC BLD-RTO: 0 /100 WBC
PLATELET # BLD AUTO: 257 K/UL (ref 150–450)
PMV BLD AUTO: 10.8 FL (ref 9.2–12.9)
POTASSIUM SERPL-SCNC: 4.5 MMOL/L (ref 3.5–5.1)
RBC # BLD AUTO: 4.44 M/UL (ref 4.6–6.2)
SODIUM SERPL-SCNC: 139 MMOL/L (ref 136–145)
WBC # BLD AUTO: 5.16 K/UL (ref 3.9–12.7)

## 2022-04-06 PROCEDURE — 80048 BASIC METABOLIC PNL TOTAL CA: CPT | Performed by: INTERNAL MEDICINE

## 2022-04-06 PROCEDURE — 85025 COMPLETE CBC W/AUTO DIFF WBC: CPT | Performed by: INTERNAL MEDICINE

## 2022-04-06 PROCEDURE — 36415 COLL VENOUS BLD VENIPUNCTURE: CPT | Performed by: INTERNAL MEDICINE

## 2022-04-06 NOTE — TELEPHONE ENCOUNTER
Called pt to reschedule appt    Pt has cath proc scheduled tomorrow  Follow up already scheduled within pt chart in May 2022    Mailed appt reminder to the pt    ND

## 2022-04-07 ENCOUNTER — HOSPITAL ENCOUNTER (OUTPATIENT)
Facility: HOSPITAL | Age: 67
Discharge: HOME | End: 2022-04-07
Attending: INTERNAL MEDICINE | Admitting: INTERNAL MEDICINE
Payer: MEDICARE

## 2022-04-07 VITALS
RESPIRATION RATE: 16 BRPM | SYSTOLIC BLOOD PRESSURE: 141 MMHG | DIASTOLIC BLOOD PRESSURE: 67 MMHG | TEMPERATURE: 98 F | WEIGHT: 198 LBS | HEIGHT: 73 IN | BODY MASS INDEX: 26.24 KG/M2 | HEART RATE: 51 BPM | OXYGEN SATURATION: 98 %

## 2022-04-07 DIAGNOSIS — Z01.810 PRE-OPERATIVE CARDIOVASCULAR EXAMINATION: ICD-10-CM

## 2022-04-07 DIAGNOSIS — R07.9 EXERTIONAL CHEST PAIN: ICD-10-CM

## 2022-04-07 DIAGNOSIS — I15.2 HYPERTENSION ASSOCIATED WITH DIABETES: ICD-10-CM

## 2022-04-07 DIAGNOSIS — I10 ESSENTIAL HYPERTENSION: ICD-10-CM

## 2022-04-07 DIAGNOSIS — E11.59 HYPERTENSION ASSOCIATED WITH DIABETES: ICD-10-CM

## 2022-04-07 DIAGNOSIS — R07.9 CHEST PAIN, UNSPECIFIED TYPE: Primary | ICD-10-CM

## 2022-04-07 LAB
POC ACTIVATED CLOTTING TIME K: 273 SEC (ref 74–137)
POC ACTIVATED CLOTTING TIME K: 362 SEC (ref 74–137)
POCT GLUCOSE: 125 MG/DL (ref 70–110)
SAMPLE: ABNORMAL
SAMPLE: ABNORMAL

## 2022-04-07 PROCEDURE — 93571 IV DOP VEL&/PRESS C FLO 1ST: CPT | Mod: 74,LC | Performed by: INTERNAL MEDICINE

## 2022-04-07 PROCEDURE — 99152 MOD SED SAME PHYS/QHP 5/>YRS: CPT | Mod: ,,, | Performed by: INTERNAL MEDICINE

## 2022-04-07 PROCEDURE — C1769 GUIDE WIRE: HCPCS | Performed by: INTERNAL MEDICINE

## 2022-04-07 PROCEDURE — 93458 L HRT ARTERY/VENTRICLE ANGIO: CPT | Mod: 26,,, | Performed by: INTERNAL MEDICINE

## 2022-04-07 PROCEDURE — 99153 MOD SED SAME PHYS/QHP EA: CPT | Performed by: INTERNAL MEDICINE

## 2022-04-07 PROCEDURE — 27201423 OPTIME MED/SURG SUP & DEVICES STERILE SUPPLY: Performed by: INTERNAL MEDICINE

## 2022-04-07 PROCEDURE — 93571 IV DOP VEL&/PRESS C FLO 1ST: CPT | Mod: 26,52,LC, | Performed by: INTERNAL MEDICINE

## 2022-04-07 PROCEDURE — 93010 ELECTROCARDIOGRAM REPORT: CPT | Mod: ,,, | Performed by: INTERNAL MEDICINE

## 2022-04-07 PROCEDURE — 85347 COAGULATION TIME ACTIVATED: CPT | Mod: 91 | Performed by: INTERNAL MEDICINE

## 2022-04-07 PROCEDURE — 63600175 PHARM REV CODE 636 W HCPCS: Performed by: INTERNAL MEDICINE

## 2022-04-07 PROCEDURE — 93458 PR CATH PLACE/CORON ANGIO, IMG SUPER/INTERP,W LEFT HEART VENTRICULOGRAPHY: ICD-10-PCS | Mod: 26,,, | Performed by: INTERNAL MEDICINE

## 2022-04-07 PROCEDURE — 93005 ELECTROCARDIOGRAM TRACING: CPT | Mod: 59

## 2022-04-07 PROCEDURE — C1894 INTRO/SHEATH, NON-LASER: HCPCS | Performed by: INTERNAL MEDICINE

## 2022-04-07 PROCEDURE — 93010 EKG 12-LEAD: ICD-10-PCS | Mod: ,,, | Performed by: INTERNAL MEDICINE

## 2022-04-07 PROCEDURE — 99152 MOD SED SAME PHYS/QHP 5/>YRS: CPT | Performed by: INTERNAL MEDICINE

## 2022-04-07 PROCEDURE — C1887 CATHETER, GUIDING: HCPCS | Performed by: INTERNAL MEDICINE

## 2022-04-07 PROCEDURE — 25000003 PHARM REV CODE 250: Performed by: INTERNAL MEDICINE

## 2022-04-07 PROCEDURE — 25500020 PHARM REV CODE 255: Performed by: INTERNAL MEDICINE

## 2022-04-07 PROCEDURE — 93458 L HRT ARTERY/VENTRICLE ANGIO: CPT | Performed by: INTERNAL MEDICINE

## 2022-04-07 PROCEDURE — C1725 CATH, TRANSLUMIN NON-LASER: HCPCS | Performed by: INTERNAL MEDICINE

## 2022-04-07 PROCEDURE — 93571 PR HEART FLOW RESERV MEASURE,INIT VESSL: ICD-10-PCS | Mod: 26,52,LC, | Performed by: INTERNAL MEDICINE

## 2022-04-07 PROCEDURE — 99152 PR MOD CONSCIOUS SEDATION, SAME PHYS, 5+ YRS, FIRST 15 MIN: ICD-10-PCS | Mod: ,,, | Performed by: INTERNAL MEDICINE

## 2022-04-07 RX ORDER — LIDOCAINE HYDROCHLORIDE 10 MG/ML
INJECTION INFILTRATION; PERINEURAL
Status: DISCONTINUED | OUTPATIENT
Start: 2022-04-07 | End: 2022-04-07 | Stop reason: HOSPADM

## 2022-04-07 RX ORDER — VERAPAMIL HYDROCHLORIDE 2.5 MG/ML
INJECTION, SOLUTION INTRAVENOUS
Status: DISCONTINUED | OUTPATIENT
Start: 2022-04-07 | End: 2022-04-07 | Stop reason: HOSPADM

## 2022-04-07 RX ORDER — HEPARIN SODIUM 1000 [USP'U]/ML
INJECTION, SOLUTION INTRAVENOUS; SUBCUTANEOUS
Status: DISCONTINUED | OUTPATIENT
Start: 2022-04-07 | End: 2022-04-07 | Stop reason: HOSPADM

## 2022-04-07 RX ORDER — MIDAZOLAM HYDROCHLORIDE 1 MG/ML
INJECTION, SOLUTION INTRAMUSCULAR; INTRAVENOUS
Status: DISCONTINUED | OUTPATIENT
Start: 2022-04-07 | End: 2022-04-07 | Stop reason: HOSPADM

## 2022-04-07 RX ORDER — IODIXANOL 320 MG/ML
INJECTION, SOLUTION INTRAVASCULAR
Status: DISCONTINUED | OUTPATIENT
Start: 2022-04-07 | End: 2022-04-07 | Stop reason: HOSPADM

## 2022-04-07 RX ORDER — LISINOPRIL 10 MG/1
10 TABLET ORAL 2 TIMES DAILY
Qty: 180 TABLET | Refills: 3 | Status: SHIPPED | OUTPATIENT
Start: 2022-04-07 | End: 2022-06-29

## 2022-04-07 RX ORDER — AMLODIPINE BESYLATE 5 MG/1
5 TABLET ORAL 2 TIMES DAILY
Qty: 90 TABLET | Refills: 4 | Status: SHIPPED | OUTPATIENT
Start: 2022-04-07 | End: 2022-06-29 | Stop reason: SDUPTHER

## 2022-04-07 RX ORDER — ONDANSETRON 4 MG/1
8 TABLET, ORALLY DISINTEGRATING ORAL EVERY 8 HOURS PRN
Status: DISCONTINUED | OUTPATIENT
Start: 2022-04-07 | End: 2022-04-07 | Stop reason: HOSPADM

## 2022-04-07 RX ORDER — ISOSORBIDE MONONITRATE 30 MG/1
30 TABLET, EXTENDED RELEASE ORAL DAILY
Status: DISCONTINUED | OUTPATIENT
Start: 2022-04-07 | End: 2022-04-07 | Stop reason: HOSPADM

## 2022-04-07 RX ORDER — FENTANYL CITRATE 50 UG/ML
INJECTION, SOLUTION INTRAMUSCULAR; INTRAVENOUS
Status: DISCONTINUED | OUTPATIENT
Start: 2022-04-07 | End: 2022-04-07 | Stop reason: HOSPADM

## 2022-04-07 RX ORDER — HEPARIN SODIUM 200 [USP'U]/100ML
INJECTION, SOLUTION INTRAVENOUS
Status: DISCONTINUED | OUTPATIENT
Start: 2022-04-07 | End: 2022-04-07 | Stop reason: HOSPADM

## 2022-04-07 RX ORDER — ACETAMINOPHEN 325 MG/1
650 TABLET ORAL EVERY 4 HOURS PRN
Status: DISCONTINUED | OUTPATIENT
Start: 2022-04-07 | End: 2022-04-07 | Stop reason: HOSPADM

## 2022-04-07 RX ORDER — DIPHENHYDRAMINE HYDROCHLORIDE 50 MG/ML
INJECTION INTRAMUSCULAR; INTRAVENOUS
Status: DISCONTINUED | OUTPATIENT
Start: 2022-04-07 | End: 2022-04-07 | Stop reason: HOSPADM

## 2022-04-07 RX ORDER — ISOSORBIDE MONONITRATE 30 MG/1
30 TABLET, EXTENDED RELEASE ORAL DAILY
Qty: 30 TABLET | Refills: 11 | Status: SHIPPED | OUTPATIENT
Start: 2022-04-07 | End: 2022-06-29

## 2022-04-07 RX ORDER — ASPIRIN 81 MG/1
81 TABLET ORAL DAILY
Status: DISCONTINUED | OUTPATIENT
Start: 2022-04-07 | End: 2022-04-07 | Stop reason: HOSPADM

## 2022-04-07 RX ADMIN — ASPIRIN 81 MG: 81 TABLET, COATED ORAL at 08:04

## 2022-04-07 RX ADMIN — SODIUM CHLORIDE 500 ML: 0.9 INJECTION, SOLUTION INTRAVENOUS at 11:04

## 2022-04-07 RX ADMIN — TICAGRELOR 180 MG: 90 TABLET ORAL at 08:04

## 2022-04-07 NOTE — Clinical Note
The site was marked. The right groin and right radial was prepped. The site was prepped with ChloraPrep. The site was clipped. The patient was draped. The patient was positioned supine.

## 2022-04-07 NOTE — DISCHARGE INSTRUCTIONS
Understanding Transradial Cardiac Catheterization    Cardiac catheterization (cardiac cath) is a common, non-surgical procedure. During the procedure, your doctor will insert a long, thin tube (catheter) into an artery and move it up into your heart. Transradial means the catheter is inserted into an artery in the wrist (the radial artery). This procedure can be used to diagnose and treat certain heart problems.  Why do I need a transradial cardiac cath?  You may need a cardiac cath if signs indicate a problem with your heart. These may include:  Symptoms of chest pain, tightness, or heaviness (known as angina). This is a common symptom of blocked heart arteries, known as coronary artery disease.  Symptoms of weakness, dizziness, trouble breathing, or swollen legs or feet. These may be symptoms of a problem with a heart valve or the heart muscle.  Other test results show heart problems. Tests may include stress tests, heart scans, and echocardiography.  During a cardiac cath, your doctor can see the condition of the coronary arteries and heart valves. He or she can also check how well the heart pumps and the flow of blood through the heart. Your doctor can also measure pressures and take blood samples. And, if needed, he or she can open blocked arteries. This can help reduce symptoms of angina.  Cardiac cath is often done using a catheter inserted into an artery in the groin. During transradial cardiac cath, the catheter is inserted into an artery in the wrist. This can mean less bleeding and a faster recovery. Some people may have blockages in the groin arteries as well as in the heart arteries, making it difficult to reach the heart. The transradial approach can be used to get around this problem.   What happens during a transradial cardiac cath?  The procedure is done in the hospital or a surgery center. First, an IV line is put in your arm or hand to deliver fluids and medicines. You will likely be given  medicine to relax you and make you drowsy. When the procedure begins:  You lie on an X-ray table.  The skin over the insertion site in your wrist is numbed.  The doctor makes a tiny puncture or incision into the artery in the wrist. He or she then inserts a catheter and threads it through the blood vessel into your heart.    The doctor may inject a contrast fluid through the catheter into the arteries. This fluid makes the arteries show up better on X-rays.  Tests may be done to check the condition of your heart and arteries. If needed, the doctor can clear blockages in the arteries or do other repairs.  When the doctor is finished, he or she will remove the catheter and put direct pressure on the site to prevent bleeding.  You will stay for a time to recover, and then go home.  What are the risks of transradial cardiac cath?  These include:  Bleeding, bruising, infection, or blood clots  Damage to the radial artery that may cause injury to the hand  Allergic reaction to the contrast fluid  Abnormal heartbeat (arrhythmia)  Damage to blood vessels or tissues  Kidney damage or failure  The need for emergency heart surgery  Heart attack, stroke, or death  Date Last Reviewed: 5/1/2016  © 4713-1051 [employer] [address] [place] [address]. All rights reserved. This information is not intended as a substitute for professional medical care. Always follow your healthcare professional's instructions. Discharge Instructions:    Do not drive a car, operate heavy equipment, care for a young child, etc for the next 12-24 hours.   Avoid drinking alcohol for 24 hours.  Do not make any important decisions for 24 hours.    Drink fluids to keep hydrated. Resume your usual diet as tolerated.     Rest for today then activity as tolerated.   Do not lift anything over 5 pounds for the first 3 days after procedure.    Remove dressing tomorrow then may shower with warm soapy water. Do not scrub site. Pat dry.    May apply bandaid for 2 days.  No tub baths.  Do not submerge wound in water for 3 days.     Call MD for any unrelieved pain, excessive nausea or vomiting, redness around site, bleeding, or pus or foul smelling drainage, or any other questions or concerns.    Go to the ER for any difficulty breathing or chest pain.      If site swells or bleeds, hold direct pressure to area for 10 full minutes.   If site continues to bleed, continue to hold pressure to site and have someone bring you to the ER.      Follow any additional instructions given to you by MD.      Call MD to schedule a follow up appointment, or follow up as instructed.

## 2022-04-07 NOTE — PLAN OF CARE
Small amount of reducible swelling noted near right radial vasc band.  Ras pressure held and air returned to vasc band.  Will continue to monitor closely.  VSS.  NAD noted.

## 2022-04-07 NOTE — Clinical Note
All wires and all catheters removed from patient [place] for surgery per erin Received s/o from Dr Garg - Pt w/ PMHx CAD s/p PCI, HTN, gastric sleeve, ventral hernia w/ SBO p/w fever, chills, LLQ abd pain, near syncope. No EKG changes, neg troponin. Febrile. Received IVF, cipro / flagyl. Pending CT a/p. Surgery consulted. If no surgical intervention, admit to medicine, Dr Ragland - Dr Garg has already spoken w/ pt Received s/o from Dr Garg - Pt w/ PMHx CAD s/p PCI, HTN, gastric sleeve, ventral hernia w/ SBO p/w fever, chills, LLQ abd pain, near syncope. No EKG changes, neg troponin. Febrile. Received IVF, cipro / flagyl. Pending CT a/p. Surgery consulted. If no surgical intervention, admit to medicine, Dr Ragland - Dr Garg has already spoken w/ Dr Ragland

## 2022-04-07 NOTE — Clinical Note
The catheter was inserted into the aorta ostium   left main. An angiography was performed of the left coronary arteries. Multiple views were taken. The angiography was performed via hand injection with 10 mL of contrast.

## 2022-04-07 NOTE — PLAN OF CARE
Patient discharged to home as ordered recovery per Dr. Arevalo.     All discharge instructions, printed materials given.    Patient instructed on follow-up appointment as ordered.  Patient verbalized understanding and agreement with all discharge instructions given.   Pt is AAOx3, VSS, denies any pain.    Right radial gauze and tegaderm dressing c.d.i. No bleeding or hematoma noted.  Skin normal in color and warm to touch. Palpated bilateral radial pulses and dopplered bilateral pedal and post tib pulses.  Pt ate 100% of his ordered lunch. No n/v. Pt got dressed and moving around without difficulty and no distress noted.  Pt in w/c.  Right AC 20 gauge iv dc'd as ordered with tip intact. laurita and koban dressing applied c.d.i.  Pt discharged home via wheelchair to private vehicle by pt's spouse.

## 2022-04-07 NOTE — OP NOTE
Brief Note Coronary Angiogram:    Left main bifurcates to LAD and LCx. Short LM with mid 30% lesion.  LAD with a proximal lesion 30% gives off Diagonals and septals.   LCx moderate size with moderate disease proximally. Bifurcation lesion in OM1 with inferior limb with 80% lesion.  RCA small vessel, non-dominant with 100% occlusion in mid and distal.   LVEDP 18.

## 2022-04-07 NOTE — PLAN OF CARE
Remaining air removed from right radial vasc band. Gauze and tegaderm dressing applied c.d.i.   No drainage or shadowing noted. No bleeding or hematoma noted around site. +2 trang radial pulses palpated. Skin normal in color, warm to touch, < 3 sec cap refill.   Pt tolerated well.  Will continue to monitor pt.

## 2022-04-07 NOTE — PLAN OF CARE
2 mL of air removed from radial vasc band.  No hematoma or bleeding noted.  +2 trang radial pulses palpated. Skin normal in color, warm to touch, < 3 sec cap refill.   Will continue to monitor pt.

## 2022-04-07 NOTE — BRIEF OP NOTE
LM 30%  LAD 50%, proximal segment  CIRC CODOM OM 1 50% prox, 80% inferior limb of the OM branch  % proximal segment  Plan Med Tx for now, meds increased

## 2022-04-07 NOTE — DISCHARGE SUMMARY
[facility] - Lab (Hospital)  Discharge Note  Short Stay    Procedure(s) (LRB):  Left heart cath (Left)  Instantaneous Wave-Free Ratio (IFR) (N/A)    OUTCOME: Patient tolerated treatment/procedure well without complication and is now ready for discharge.    DISPOSITION: Home or Self Care    FINAL DIAGNOSIS:  <principal problem not specified>    FOLLOWUP: In clinic    DISCHARGE INSTRUCTIONS:    Discharge Procedure Orders   CBC W/ AUTO DIFFERENTIAL   Standing Status: Future Number of Occurrences: 1 Standing Exp. Date: 06/03/23     BASIC METABOLIC PANEL   Standing Status: Future Number of Occurrences: 1 Standing Exp. Date: 06/03/23     BASIC METABOLIC PANEL   Standing Status: Future Standing Exp. Date: 06/06/23     Diet general     Diet Cardiac     Diet Cardiac     Call MD for:  temperature >100.4     Call MD for:  persistent nausea and vomiting     No dressing needed     Notify your health care provider if you experience any of the following:  temperature >100.4     Notify your health care provider if you experience any of the following:  persistent nausea and vomiting or diarrhea     Notify your health care provider if you experience any of the following:  severe uncontrolled pain     Lifting restrictions   Order Comments: No heavy lifting access site for 24 hours     Remove dressing in 24 hours     Call MD for:  severe uncontrolled pain     Call MD for:  redness, tenderness, or signs of infection (pain, swelling, redness, odor or green/yellow discharge around incision site)     Activity as tolerated        TIME SPENT ON DISCHARGE: 30 minutes

## 2022-04-11 ENCOUNTER — LAB VISIT (OUTPATIENT)
Dept: LAB | Facility: HOSPITAL | Age: 67
End: 2022-04-11
Attending: STUDENT IN AN ORGANIZED HEALTH CARE EDUCATION/TRAINING PROGRAM
Payer: MEDICARE

## 2022-04-11 DIAGNOSIS — Z12.5 ENCOUNTER FOR PROSTATE CANCER SCREENING: ICD-10-CM

## 2022-04-11 DIAGNOSIS — R97.20 ELEVATED PSA: ICD-10-CM

## 2022-04-11 LAB
ANION GAP SERPL CALC-SCNC: 8 MMOL/L (ref 8–16)
BUN SERPL-MCNC: 19 MG/DL (ref 8–23)
CALCIUM SERPL-MCNC: 8.9 MG/DL (ref 8.7–10.5)
CHLORIDE SERPL-SCNC: 106 MMOL/L (ref 95–110)
CO2 SERPL-SCNC: 26 MMOL/L (ref 23–29)
CREAT SERPL-MCNC: 1.5 MG/DL (ref 0.5–1.4)
EST. GFR  (AFRICAN AMERICAN): 55 ML/MIN/1.73 M^2
EST. GFR  (NON AFRICAN AMERICAN): 47 ML/MIN/1.73 M^2
GLUCOSE SERPL-MCNC: 205 MG/DL (ref 70–110)
POTASSIUM SERPL-SCNC: 4.5 MMOL/L (ref 3.5–5.1)
SODIUM SERPL-SCNC: 140 MMOL/L (ref 136–145)

## 2022-04-11 PROCEDURE — 36415 COLL VENOUS BLD VENIPUNCTURE: CPT | Performed by: STUDENT IN AN ORGANIZED HEALTH CARE EDUCATION/TRAINING PROGRAM

## 2022-04-11 PROCEDURE — 80048 BASIC METABOLIC PNL TOTAL CA: CPT | Performed by: STUDENT IN AN ORGANIZED HEALTH CARE EDUCATION/TRAINING PROGRAM

## 2022-04-20 ENCOUNTER — PATIENT OUTREACH (OUTPATIENT)
Dept: ADMINISTRATIVE | Facility: HOSPITAL | Age: 67
End: 2022-04-20
Payer: MEDICARE

## 2022-04-21 ENCOUNTER — TELEPHONE (OUTPATIENT)
Dept: CARDIOLOGY | Facility: CLINIC | Age: 67
End: 2022-04-21
Payer: MEDICARE

## 2022-04-21 NOTE — TELEPHONE ENCOUNTER
----- Message from Carter Arevalo MD sent at 4/20/2022  7:37 AM CDT -----  Does he have a follow up? If not book one for him. Thanks

## 2022-04-22 ENCOUNTER — PATIENT MESSAGE (OUTPATIENT)
Dept: UROLOGY | Facility: CLINIC | Age: 67
End: 2022-04-22
Payer: MEDICARE

## 2022-04-25 ENCOUNTER — OFFICE VISIT (OUTPATIENT)
Dept: FAMILY MEDICINE | Facility: CLINIC | Age: 67
End: 2022-04-25
Attending: FAMILY MEDICINE
Payer: MEDICARE

## 2022-04-25 VITALS
HEART RATE: 50 BPM | BODY MASS INDEX: 26.15 KG/M2 | DIASTOLIC BLOOD PRESSURE: 70 MMHG | SYSTOLIC BLOOD PRESSURE: 130 MMHG | WEIGHT: 197.31 LBS | HEIGHT: 73 IN | OXYGEN SATURATION: 98 %

## 2022-04-25 DIAGNOSIS — E11.69 DYSLIPIDEMIA ASSOCIATED WITH TYPE 2 DIABETES MELLITUS: ICD-10-CM

## 2022-04-25 DIAGNOSIS — E11.59 HYPERTENSION ASSOCIATED WITH DIABETES: ICD-10-CM

## 2022-04-25 DIAGNOSIS — N18.31 TYPE 2 DIABETES MELLITUS WITH STAGE 3A CHRONIC KIDNEY DISEASE, WITHOUT LONG-TERM CURRENT USE OF INSULIN: Primary | ICD-10-CM

## 2022-04-25 DIAGNOSIS — E78.5 DYSLIPIDEMIA ASSOCIATED WITH TYPE 2 DIABETES MELLITUS: ICD-10-CM

## 2022-04-25 DIAGNOSIS — I15.2 HYPERTENSION ASSOCIATED WITH DIABETES: ICD-10-CM

## 2022-04-25 DIAGNOSIS — E11.22 TYPE 2 DIABETES MELLITUS WITH STAGE 3A CHRONIC KIDNEY DISEASE, WITHOUT LONG-TERM CURRENT USE OF INSULIN: Primary | ICD-10-CM

## 2022-04-25 DIAGNOSIS — K21.9 GASTROESOPHAGEAL REFLUX DISEASE, UNSPECIFIED WHETHER ESOPHAGITIS PRESENT: ICD-10-CM

## 2022-04-25 PROCEDURE — 1159F MED LIST DOCD IN RCRD: CPT | Mod: CPTII,S$GLB,, | Performed by: FAMILY MEDICINE

## 2022-04-25 PROCEDURE — 1101F PR PT FALLS ASSESS DOC 0-1 FALLS W/OUT INJ PAST YR: ICD-10-PCS | Mod: CPTII,S$GLB,, | Performed by: FAMILY MEDICINE

## 2022-04-25 PROCEDURE — 1126F PR PAIN SEVERITY QUANTIFIED, NO PAIN PRESENT: ICD-10-PCS | Mod: CPTII,S$GLB,, | Performed by: FAMILY MEDICINE

## 2022-04-25 PROCEDURE — 1160F RVW MEDS BY RX/DR IN RCRD: CPT | Mod: CPTII,S$GLB,, | Performed by: FAMILY MEDICINE

## 2022-04-25 PROCEDURE — 4010F PR ACE/ARB THEARPY RXD/TAKEN: ICD-10-PCS | Mod: CPTII,S$GLB,, | Performed by: FAMILY MEDICINE

## 2022-04-25 PROCEDURE — 99999 PR PBB SHADOW E&M-EST. PATIENT-LVL IV: ICD-10-PCS | Mod: PBBFAC,,, | Performed by: FAMILY MEDICINE

## 2022-04-25 PROCEDURE — 99214 OFFICE O/P EST MOD 30 MIN: CPT | Mod: S$GLB,,, | Performed by: FAMILY MEDICINE

## 2022-04-25 PROCEDURE — 3061F NEG MICROALBUMINURIA REV: CPT | Mod: CPTII,S$GLB,, | Performed by: FAMILY MEDICINE

## 2022-04-25 PROCEDURE — 3288F FALL RISK ASSESSMENT DOCD: CPT | Mod: CPTII,S$GLB,, | Performed by: FAMILY MEDICINE

## 2022-04-25 PROCEDURE — 1101F PT FALLS ASSESS-DOCD LE1/YR: CPT | Mod: CPTII,S$GLB,, | Performed by: FAMILY MEDICINE

## 2022-04-25 PROCEDURE — 3066F NEPHROPATHY DOC TX: CPT | Mod: CPTII,S$GLB,, | Performed by: FAMILY MEDICINE

## 2022-04-25 PROCEDURE — 3075F SYST BP GE 130 - 139MM HG: CPT | Mod: CPTII,S$GLB,, | Performed by: FAMILY MEDICINE

## 2022-04-25 PROCEDURE — 1126F AMNT PAIN NOTED NONE PRSNT: CPT | Mod: CPTII,S$GLB,, | Performed by: FAMILY MEDICINE

## 2022-04-25 PROCEDURE — 3066F PR DOCUMENTATION OF TREATMENT FOR NEPHROPATHY: ICD-10-PCS | Mod: CPTII,S$GLB,, | Performed by: FAMILY MEDICINE

## 2022-04-25 PROCEDURE — 3075F PR MOST RECENT SYSTOLIC BLOOD PRESS GE 130-139MM HG: ICD-10-PCS | Mod: CPTII,S$GLB,, | Performed by: FAMILY MEDICINE

## 2022-04-25 PROCEDURE — 4010F ACE/ARB THERAPY RXD/TAKEN: CPT | Mod: CPTII,S$GLB,, | Performed by: FAMILY MEDICINE

## 2022-04-25 PROCEDURE — 3008F BODY MASS INDEX DOCD: CPT | Mod: CPTII,S$GLB,, | Performed by: FAMILY MEDICINE

## 2022-04-25 PROCEDURE — 3044F HG A1C LEVEL LT 7.0%: CPT | Mod: CPTII,S$GLB,, | Performed by: FAMILY MEDICINE

## 2022-04-25 PROCEDURE — 1160F PR REVIEW ALL MEDS BY PRESCRIBER/CLIN PHARMACIST DOCUMENTED: ICD-10-PCS | Mod: CPTII,S$GLB,, | Performed by: FAMILY MEDICINE

## 2022-04-25 PROCEDURE — 3044F PR MOST RECENT HEMOGLOBIN A1C LEVEL <7.0%: ICD-10-PCS | Mod: CPTII,S$GLB,, | Performed by: FAMILY MEDICINE

## 2022-04-25 PROCEDURE — 99999 PR PBB SHADOW E&M-EST. PATIENT-LVL IV: CPT | Mod: PBBFAC,,, | Performed by: FAMILY MEDICINE

## 2022-04-25 PROCEDURE — 1159F PR MEDICATION LIST DOCUMENTED IN MEDICAL RECORD: ICD-10-PCS | Mod: CPTII,S$GLB,, | Performed by: FAMILY MEDICINE

## 2022-04-25 PROCEDURE — 3061F PR NEG MICROALBUMINURIA RESULT DOCUMENTED/REVIEW: ICD-10-PCS | Mod: CPTII,S$GLB,, | Performed by: FAMILY MEDICINE

## 2022-04-25 PROCEDURE — 3288F PR FALLS RISK ASSESSMENT DOCUMENTED: ICD-10-PCS | Mod: CPTII,S$GLB,, | Performed by: FAMILY MEDICINE

## 2022-04-25 PROCEDURE — 99214 PR OFFICE/OUTPT VISIT, EST, LEVL IV, 30-39 MIN: ICD-10-PCS | Mod: S$GLB,,, | Performed by: FAMILY MEDICINE

## 2022-04-25 PROCEDURE — 3078F PR MOST RECENT DIASTOLIC BLOOD PRESSURE < 80 MM HG: ICD-10-PCS | Mod: CPTII,S$GLB,, | Performed by: FAMILY MEDICINE

## 2022-04-25 PROCEDURE — 3008F PR BODY MASS INDEX (BMI) DOCUMENTED: ICD-10-PCS | Mod: CPTII,S$GLB,, | Performed by: FAMILY MEDICINE

## 2022-04-25 PROCEDURE — 3078F DIAST BP <80 MM HG: CPT | Mod: CPTII,S$GLB,, | Performed by: FAMILY MEDICINE

## 2022-04-25 NOTE — PROGRESS NOTES
Subjective:       Patient ID: Addy Redding is a 66 y.o. male.    Chief Complaint: Follow-up    66 yr old black male with DM II, HTN, HLD, GERD, CKD III, presents today for his one month follow up. Seen cardiology for SOB and did tests which were reassuring.        DM II - improved -  HGBA1C                   6.3 (H)             01/06/2022                   - complicated by CKD III                        - denies any hypoglycemic symptoms - on metformin - up to date with eye and foot screen - stopped glipizide since itw as making his sugars low    HTN - controlled  - on lisinopril 10 mg daily - no side effects    HLD - controlled and improving -  LDLCALC                  121.0               01/06/2022                         - on statin - compliant - need refill - not fully compliant with diet    GERD - stable - takes prilosec as needed    ED - stable - takes viagra as needed    Health maintenance  -labs due  -Flu and Pneumovax - up to date  -adacel due and done today  -colonoscopy due - wants to wait  -PSA UTD      Follow-up  Associated symptoms include arthralgias and myalgias. Pertinent negatives include no abdominal pain, change in bowel habit, chest pain, congestion, coughing, diaphoresis, fatigue, headaches, joint swelling, numbness, rash, urinary symptoms, vertigo, vomiting or weakness.   Hypertension  This is a chronic problem. The current episode started more than 1 year ago. The problem has been gradually worsening since onset. The problem is uncontrolled. Pertinent negatives include no anxiety, chest pain, headaches, malaise/fatigue, orthopnea, palpitations, peripheral edema or sweats. There are no associated agents to hypertension. Risk factors for coronary artery disease include dyslipidemia, diabetes mellitus, male gender and obesity. Past treatments include ACE inhibitors. The current treatment provides no improvement. There are no compliance problems.  There is no history of angina, CAD/MI, CVA, left  ventricular hypertrophy, PVD or retinopathy. There is no history of chronic renal disease, coarctation of the aorta, hypercortisolism, pheochromocytoma, renovascular disease or a thyroid problem.   Arm Pain   There was no injury mechanism. The pain is present in the right shoulder and upper right arm. The quality of the pain is described as aching. The pain does not radiate. The pain is at a severity of 5/10. The pain is moderate. The pain has been constant since the incident. Pertinent negatives include no chest pain or numbness. The symptoms are aggravated by movement, lifting and palpation. He has tried nothing for the symptoms. The treatment provided mild relief.   Shoulder Pain   The pain is present in the right shoulder. This is a new problem. The current episode started 1 to 4 weeks ago. There has been no history of extremity trauma. The problem occurs constantly. The problem has been unchanged. The quality of the pain is described as aching. The pain is at a severity of 5/10. The pain is moderate. Pertinent negatives include no headaches, itching, joint swelling or numbness. The symptoms are aggravated by activity. He has tried nothing for the symptoms. The treatment provided no relief. His past medical history is significant for diabetes.   Gastroesophageal Reflux  He complains of heartburn. He reports no abdominal pain, no chest pain, no coughing or no wheezing. This is a new problem. The current episode started 1 to 4 weeks ago. The problem occurs constantly. The problem has been unchanged. The heartburn duration is several minutes. The heartburn is located in the substernum. The symptoms are aggravated by certain foods. Pertinent negatives include no anemia, fatigue or melena. There are no known risk factors. He has tried nothing for the symptoms. The treatment provided mild relief. Past procedures do not include an abdominal ultrasound, esophageal pH monitoring or a UGI. Past invasive treatments do not  include gastroplasty or reflux surgery.   Medication Refill  This is a chronic problem. The current episode started more than 1 year ago. The problem occurs constantly. The problem has been gradually improving. Associated symptoms include arthralgias and myalgias. Pertinent negatives include no abdominal pain, change in bowel habit, chest pain, congestion, coughing, diaphoresis, fatigue, headaches, joint swelling, numbness, rash, urinary symptoms, vertigo, vomiting or weakness.   Diabetes  Pertinent negatives for hypoglycemia include no confusion, dizziness, headaches, nervousness/anxiousness, speech difficulty or sweats. Pertinent negatives for diabetes include no chest pain, no fatigue, no polydipsia, no polyuria and no weakness. Pertinent negatives for diabetic complications include no CVA, PVD or retinopathy.   Hyperlipidemia  This is a chronic problem. The current episode started more than 1 year ago. The problem is uncontrolled. Recent lipid tests were reviewed and are high. Exacerbating diseases include diabetes. He has no history of chronic renal disease, liver disease or nephrotic syndrome. There are no known factors aggravating his hyperlipidemia. Associated symptoms include myalgias. Pertinent negatives include no chest pain, focal sensory loss, focal weakness or leg pain. Current antihyperlipidemic treatment includes statins. There are no compliance problems.  Risk factors for coronary artery disease include diabetes mellitus, dyslipidemia, male sex and hypertension.     Review of Systems   Constitutional: Negative.  Negative for activity change, diaphoresis, fatigue, malaise/fatigue and unexpected weight change.   HENT: Negative.  Negative for nasal congestion, ear pain, mouth sores, rhinorrhea and voice change.    Eyes: Negative.  Negative for pain, discharge and visual disturbance.   Respiratory: Negative.  Negative for apnea, cough and wheezing.    Cardiovascular: Negative.  Negative for chest pain,  palpitations and orthopnea.   Gastrointestinal: Positive for heartburn. Negative for abdominal distention, abdominal pain, anal bleeding, change in bowel habit, diarrhea, melena, vomiting and change in bowel habit.   Endocrine: Negative.  Negative for cold intolerance, polydipsia and polyuria.   Genitourinary: Negative.  Negative for decreased urine volume, difficulty urinating, discharge, frequency and scrotal swelling.   Musculoskeletal: Positive for arthralgias and myalgias. Negative for back pain, joint swelling, leg pain and neck stiffness.   Integumentary:  Negative for color change, itching and rash. Negative.   Allergic/Immunologic: Negative.  Negative for environmental allergies.   Neurological: Negative.  Negative for dizziness, vertigo, focal weakness, speech difficulty, weakness, light-headedness, numbness and headaches.   Hematological: Negative.    Psychiatric/Behavioral: Negative.  Negative for agitation, confusion, dysphoric mood and suicidal ideas. The patient is not nervous/anxious.          PMH/PSH/FH/SH/MED/ALLERGY reviewed    Objective:       Vitals:    04/25/22 0942   BP: 130/70   Pulse: (!) 50       Physical Exam  Constitutional:       Appearance: He is well-developed.   HENT:      Head: Normocephalic and atraumatic.      Right Ear: External ear normal.      Left Ear: External ear normal.      Nose: Nose normal.      Mouth/Throat:      Pharynx: No oropharyngeal exudate.   Eyes:      General: No scleral icterus.        Right eye: No discharge.         Left eye: No discharge.      Conjunctiva/sclera: Conjunctivae normal.      Pupils: Pupils are equal, round, and reactive to light.   Neck:      Thyroid: No thyromegaly.      Vascular: No JVD.      Trachea: No tracheal deviation.   Cardiovascular:      Rate and Rhythm: Regular rhythm. Bradycardia present.      Heart sounds: Normal heart sounds. No murmur heard.    No friction rub. No gallop.   Pulmonary:      Effort: Pulmonary effort is normal. No  respiratory distress.      Breath sounds: Normal breath sounds. No stridor. No wheezing or rales.   Chest:      Chest wall: No tenderness.   Abdominal:      General: Bowel sounds are normal. There is no distension.      Palpations: Abdomen is soft. There is no mass.      Tenderness: There is no abdominal tenderness. There is no guarding or rebound.      Hernia: No hernia is present.   Musculoskeletal:         General: No tenderness. Normal range of motion.      Cervical back: Normal range of motion and neck supple.   Lymphadenopathy:      Cervical: No cervical adenopathy.   Skin:     General: Skin is warm and dry.      Coloration: Skin is not pale.      Findings: No erythema or rash.   Neurological:      Mental Status: He is alert and oriented to person, place, and time.      Cranial Nerves: No cranial nerve deficit.      Motor: No abnormal muscle tone.      Coordination: Coordination normal.      Deep Tendon Reflexes: Reflexes are normal and symmetric. Reflexes normal.   Psychiatric:         Behavior: Behavior normal.         Thought Content: Thought content normal.         Judgment: Judgment normal.         Assessment:       Problem List Items Addressed This Visit     Dyslipidemia associated with type 2 diabetes mellitus    Relevant Orders    CBC Auto Differential    Comprehensive Metabolic Panel    Lipid Panel    Type 2 diabetes mellitus with stage 3a chronic kidney disease, without long-term current use of insulin - Primary    Relevant Orders    CBC Auto Differential    Comprehensive Metabolic Panel    Lipid Panel    Hemoglobin A1C    Hypertension associated with diabetes    Relevant Orders    CBC Auto Differential    Comprehensive Metabolic Panel    Lipid Panel      Other Visit Diagnoses     Gastroesophageal reflux disease, unspecified whether esophagitis present              Plan:           Addy was seen today for follow-up.    Diagnoses and all orders for this visit:    Type 2 diabetes mellitus with stage 3a  chronic kidney disease, without long-term current use of insulin  -     CBC Auto Differential; Future  -     Comprehensive Metabolic Panel; Future  -     Lipid Panel; Future  -     Hemoglobin A1C; Future    Dyslipidemia associated with type 2 diabetes mellitus  -     CBC Auto Differential; Future  -     Comprehensive Metabolic Panel; Future  -     Lipid Panel; Future    Hypertension associated with diabetes  -     CBC Auto Differential; Future  -     Comprehensive Metabolic Panel; Future  -     Lipid Panel; Future    Gastroesophageal reflux disease, unspecified whether esophagitis present    HTN   -at goal  -continue lisinopril to 20 mg/ day    DM II   -controlled   -continue metformin and labs    HLD   -improving  -continue lipitor    GERD   -stable   -takes OTC PPI     ED'  -controlled      Spent adequate time in obtaining history and explaining differentials      25 minutes spent during this visit of which greater than 50% devoted to face-face counseling and coordination of care regarding diagnosis and management plan      Follow up in about 2 months (around 7/6/2022), or if symptoms worsen or fail to improve.

## 2022-04-28 ENCOUNTER — PATIENT OUTREACH (OUTPATIENT)
Dept: ADMINISTRATIVE | Facility: HOSPITAL | Age: 67
End: 2022-04-28
Payer: MEDICARE

## 2022-04-28 ENCOUNTER — PATIENT MESSAGE (OUTPATIENT)
Dept: ADMINISTRATIVE | Facility: HOSPITAL | Age: 67
End: 2022-04-28
Payer: MEDICARE

## 2022-04-28 ENCOUNTER — PATIENT MESSAGE (OUTPATIENT)
Dept: CARDIOLOGY | Facility: CLINIC | Age: 67
End: 2022-04-28
Payer: MEDICARE

## 2022-05-01 ENCOUNTER — PATIENT MESSAGE (OUTPATIENT)
Dept: UROLOGY | Facility: CLINIC | Age: 67
End: 2022-05-01
Payer: MEDICARE

## 2022-05-25 ENCOUNTER — OFFICE VISIT (OUTPATIENT)
Dept: CARDIOLOGY | Facility: CLINIC | Age: 67
End: 2022-05-25
Payer: MEDICARE

## 2022-05-25 VITALS
HEIGHT: 73 IN | OXYGEN SATURATION: 98 % | DIASTOLIC BLOOD PRESSURE: 74 MMHG | SYSTOLIC BLOOD PRESSURE: 130 MMHG | HEART RATE: 54 BPM | BODY MASS INDEX: 26.03 KG/M2

## 2022-05-25 DIAGNOSIS — I25.118 CORONARY ARTERY DISEASE OF NATIVE ARTERY OF NATIVE HEART WITH STABLE ANGINA PECTORIS: ICD-10-CM

## 2022-05-25 DIAGNOSIS — E11.59 HYPERTENSION ASSOCIATED WITH DIABETES: ICD-10-CM

## 2022-05-25 DIAGNOSIS — E11.69 DYSLIPIDEMIA ASSOCIATED WITH TYPE 2 DIABETES MELLITUS: Primary | ICD-10-CM

## 2022-05-25 DIAGNOSIS — I25.118 CORONARY ARTERY DISEASE OF NATIVE ARTERY WITH STABLE ANGINA PECTORIS, UNSPECIFIED WHETHER NATIVE OR TRANSPLANTED HEART: ICD-10-CM

## 2022-05-25 DIAGNOSIS — I70.0 AORTIC ATHEROSCLEROSIS: ICD-10-CM

## 2022-05-25 DIAGNOSIS — R00.1 SINUS BRADYCARDIA: ICD-10-CM

## 2022-05-25 DIAGNOSIS — E11.22 TYPE 2 DIABETES MELLITUS WITH STAGE 3A CHRONIC KIDNEY DISEASE, WITHOUT LONG-TERM CURRENT USE OF INSULIN: ICD-10-CM

## 2022-05-25 DIAGNOSIS — I15.2 HYPERTENSION ASSOCIATED WITH DIABETES: ICD-10-CM

## 2022-05-25 DIAGNOSIS — E78.5 DYSLIPIDEMIA ASSOCIATED WITH TYPE 2 DIABETES MELLITUS: Primary | ICD-10-CM

## 2022-05-25 DIAGNOSIS — I25.110 ATHEROSCLEROSIS OF NATIVE CORONARY ARTERY OF NATIVE HEART WITH UNSTABLE ANGINA PECTORIS: ICD-10-CM

## 2022-05-25 DIAGNOSIS — N18.31 TYPE 2 DIABETES MELLITUS WITH STAGE 3A CHRONIC KIDNEY DISEASE, WITHOUT LONG-TERM CURRENT USE OF INSULIN: ICD-10-CM

## 2022-05-25 PROCEDURE — 3008F PR BODY MASS INDEX (BMI) DOCUMENTED: ICD-10-PCS | Mod: CPTII,S$GLB,, | Performed by: INTERNAL MEDICINE

## 2022-05-25 PROCEDURE — 3078F DIAST BP <80 MM HG: CPT | Mod: CPTII,S$GLB,, | Performed by: INTERNAL MEDICINE

## 2022-05-25 PROCEDURE — 99214 OFFICE O/P EST MOD 30 MIN: CPT | Mod: S$GLB,,, | Performed by: INTERNAL MEDICINE

## 2022-05-25 PROCEDURE — 1159F PR MEDICATION LIST DOCUMENTED IN MEDICAL RECORD: ICD-10-PCS | Mod: CPTII,S$GLB,, | Performed by: INTERNAL MEDICINE

## 2022-05-25 PROCEDURE — 3044F HG A1C LEVEL LT 7.0%: CPT | Mod: CPTII,S$GLB,, | Performed by: INTERNAL MEDICINE

## 2022-05-25 PROCEDURE — 3066F NEPHROPATHY DOC TX: CPT | Mod: CPTII,S$GLB,, | Performed by: INTERNAL MEDICINE

## 2022-05-25 PROCEDURE — 1126F PR PAIN SEVERITY QUANTIFIED, NO PAIN PRESENT: ICD-10-PCS | Mod: CPTII,S$GLB,, | Performed by: INTERNAL MEDICINE

## 2022-05-25 PROCEDURE — 99499 RISK ADDL DX/OHS AUDIT: ICD-10-PCS | Mod: S$GLB,,, | Performed by: INTERNAL MEDICINE

## 2022-05-25 PROCEDURE — 3044F PR MOST RECENT HEMOGLOBIN A1C LEVEL <7.0%: ICD-10-PCS | Mod: CPTII,S$GLB,, | Performed by: INTERNAL MEDICINE

## 2022-05-25 PROCEDURE — 3075F PR MOST RECENT SYSTOLIC BLOOD PRESS GE 130-139MM HG: ICD-10-PCS | Mod: CPTII,S$GLB,, | Performed by: INTERNAL MEDICINE

## 2022-05-25 PROCEDURE — 99214 PR OFFICE/OUTPT VISIT, EST, LEVL IV, 30-39 MIN: ICD-10-PCS | Mod: S$GLB,,, | Performed by: INTERNAL MEDICINE

## 2022-05-25 PROCEDURE — 1159F MED LIST DOCD IN RCRD: CPT | Mod: CPTII,S$GLB,, | Performed by: INTERNAL MEDICINE

## 2022-05-25 PROCEDURE — 3061F PR NEG MICROALBUMINURIA RESULT DOCUMENTED/REVIEW: ICD-10-PCS | Mod: CPTII,S$GLB,, | Performed by: INTERNAL MEDICINE

## 2022-05-25 PROCEDURE — 4010F PR ACE/ARB THEARPY RXD/TAKEN: ICD-10-PCS | Mod: CPTII,S$GLB,, | Performed by: INTERNAL MEDICINE

## 2022-05-25 PROCEDURE — 3078F PR MOST RECENT DIASTOLIC BLOOD PRESSURE < 80 MM HG: ICD-10-PCS | Mod: CPTII,S$GLB,, | Performed by: INTERNAL MEDICINE

## 2022-05-25 PROCEDURE — 3061F NEG MICROALBUMINURIA REV: CPT | Mod: CPTII,S$GLB,, | Performed by: INTERNAL MEDICINE

## 2022-05-25 PROCEDURE — 1126F AMNT PAIN NOTED NONE PRSNT: CPT | Mod: CPTII,S$GLB,, | Performed by: INTERNAL MEDICINE

## 2022-05-25 PROCEDURE — 4010F ACE/ARB THERAPY RXD/TAKEN: CPT | Mod: CPTII,S$GLB,, | Performed by: INTERNAL MEDICINE

## 2022-05-25 PROCEDURE — 99999 PR PBB SHADOW E&M-EST. PATIENT-LVL III: CPT | Mod: PBBFAC,,, | Performed by: INTERNAL MEDICINE

## 2022-05-25 PROCEDURE — 1160F RVW MEDS BY RX/DR IN RCRD: CPT | Mod: CPTII,S$GLB,, | Performed by: INTERNAL MEDICINE

## 2022-05-25 PROCEDURE — 3066F PR DOCUMENTATION OF TREATMENT FOR NEPHROPATHY: ICD-10-PCS | Mod: CPTII,S$GLB,, | Performed by: INTERNAL MEDICINE

## 2022-05-25 PROCEDURE — 1160F PR REVIEW ALL MEDS BY PRESCRIBER/CLIN PHARMACIST DOCUMENTED: ICD-10-PCS | Mod: CPTII,S$GLB,, | Performed by: INTERNAL MEDICINE

## 2022-05-25 PROCEDURE — 3008F BODY MASS INDEX DOCD: CPT | Mod: CPTII,S$GLB,, | Performed by: INTERNAL MEDICINE

## 2022-05-25 PROCEDURE — 99999 PR PBB SHADOW E&M-EST. PATIENT-LVL III: ICD-10-PCS | Mod: PBBFAC,,, | Performed by: INTERNAL MEDICINE

## 2022-05-25 PROCEDURE — 3075F SYST BP GE 130 - 139MM HG: CPT | Mod: CPTII,S$GLB,, | Performed by: INTERNAL MEDICINE

## 2022-05-25 PROCEDURE — 99499 UNLISTED E&M SERVICE: CPT | Mod: S$GLB,,, | Performed by: INTERNAL MEDICINE

## 2022-05-25 RX ORDER — CLOPIDOGREL BISULFATE 75 MG/1
75 TABLET ORAL DAILY
Qty: 90 TABLET | Refills: 4 | Status: SHIPPED | OUTPATIENT
Start: 2022-05-25 | End: 2023-05-15 | Stop reason: SDUPTHER

## 2022-05-25 RX ORDER — ASPIRIN 81 MG/1
81 TABLET ORAL DAILY
Status: ON HOLD
Start: 2022-05-25 | End: 2023-07-26 | Stop reason: HOSPADM

## 2022-05-25 RX ORDER — SODIUM CHLORIDE 9 MG/ML
INJECTION, SOLUTION INTRAVENOUS CONTINUOUS
Status: CANCELLED | OUTPATIENT
Start: 2022-05-25 | End: 2022-05-25

## 2022-05-25 NOTE — H&P (VIEW-ONLY)
[facility]     Subjective:    Patient ID:  Addy Redding is a 66 y.o. male who presents for follow-up of Chest Pain, Coronary Artery Disease, Hyperlipidemia, and Hypertension    Review of patient's allergies indicates:  No Known Allergies   Mr. Redding is still having angina.  It depends on what he does.  His heart catheterization showed significant disease April 2022. Anti anginal therapy was added but he is still having chest pains on a regular basis.  He has pressure in the upper chest.  He has not had any prolonged episodes of discomfort or discomfort at rest.  He has not been measuring his blood pressure.  Atorvastatin was increased, he has not had blood work since then.  He is with his wife today.  He quit smoking decades ago.    His catheterization confirmed occluded right coronary artery.  His nuclear stress test showed reversible inferior wall ischemia.  I probed the right coronary artery and was unable to recanalized the vessel.  He has 50% proximal LAD disease.  He has 70% 1st marginal branch stenosis of circumflex.  His ejection fraction is normal.  His wife states he has not been eating a healthy diet.        Review of Systems   Constitutional: Negative for chills, decreased appetite, diaphoresis, fever, malaise/fatigue, night sweats, weight gain and weight loss.   HENT: Negative for congestion, ear discharge, ear pain, hearing loss, hoarse voice, nosebleeds, odynophagia, sore throat, stridor and tinnitus.    Eyes: Negative for blurred vision, discharge, double vision, pain, photophobia, redness, vision loss in left eye, vision loss in right eye, visual disturbance and visual halos.   Cardiovascular: Positive for chest pain. Negative for claudication, cyanosis, dyspnea on exertion, irregular heartbeat, leg swelling, near-syncope, orthopnea, palpitations, paroxysmal nocturnal dyspnea and syncope.   Respiratory: Negative for cough,  "hemoptysis, shortness of breath, sleep disturbances due to breathing, snoring, sputum production and wheezing.    Endocrine: Negative for cold intolerance, heat intolerance, polydipsia, polyphagia and polyuria.   Hematologic/Lymphatic: Negative for adenopathy and bleeding problem. Does not bruise/bleed easily.   Skin: Negative for color change, dry skin, flushing, itching, nail changes, poor wound healing, rash, skin cancer, suspicious lesions and unusual hair distribution.   Musculoskeletal: Positive for joint pain. Negative for arthritis, back pain, falls, gout, joint swelling, muscle cramps, muscle weakness, myalgias, neck pain and stiffness.   Gastrointestinal: Negative for bloating, abdominal pain, anorexia, change in bowel habit, bowel incontinence, constipation, diarrhea, dysphagia, excessive appetite, flatus, heartburn, hematemesis, hematochezia, hemorrhoids, jaundice, melena, nausea and vomiting.   Genitourinary: Negative for bladder incontinence, decreased libido, dysuria, flank pain, frequency, genital sores, hematuria, hesitancy, incomplete emptying, nocturia and urgency.   Neurological: Negative for aphonia, brief paralysis, difficulty with concentration, disturbances in coordination, excessive daytime sleepiness, dizziness, focal weakness, headaches, light-headedness, loss of balance, numbness, paresthesias, seizures, sensory change, tremors, vertigo and weakness.   Psychiatric/Behavioral: Negative for altered mental status, depression, hallucinations, memory loss, substance abuse, suicidal ideas and thoughts of violence. The patient does not have insomnia and is not nervous/anxious.    Allergic/Immunologic: Negative for hives and persistent infections.        Objective:       Vitals:    05/25/22 0943   BP: 130/74   Pulse: (!) 54   SpO2: 98%   Height: 6' 1" (1.854 m)    Physical Exam  Constitutional:       General: He is not in acute distress.     Appearance: He is well-developed. He is not " diaphoretic.   HENT:      Head: Normocephalic and atraumatic.      Nose: Nose normal.   Eyes:      General: No scleral icterus.        Right eye: No discharge.      Conjunctiva/sclera: Conjunctivae normal.      Pupils: Pupils are equal, round, and reactive to light.   Neck:      Thyroid: No thyromegaly.      Vascular: No JVD.      Trachea: No tracheal deviation.   Cardiovascular:      Rate and Rhythm: Normal rate and regular rhythm.      Pulses: Normal pulses.           Carotid pulses are 2+ on the right side and 2+ on the left side.       Radial pulses are 2+ on the right side and 2+ on the left side.        Dorsalis pedis pulses are 2+ on the right side and 2+ on the left side.        Posterior tibial pulses are 2+ on the right side and 2+ on the left side.      Heart sounds: Normal heart sounds. No murmur heard.    No friction rub. No gallop.   Pulmonary:      Effort: Pulmonary effort is normal. No respiratory distress.      Breath sounds: Normal breath sounds. No stridor. No wheezing or rales.   Chest:      Chest wall: No tenderness.   Abdominal:      General: Bowel sounds are normal. There is no distension.      Palpations: Abdomen is soft. There is no mass.      Tenderness: There is no abdominal tenderness. There is no guarding or rebound.   Musculoskeletal:         General: No tenderness. Normal range of motion.      Cervical back: Normal range of motion and neck supple.   Lymphadenopathy:      Cervical: No cervical adenopathy.   Skin:     General: Skin is warm and dry.      Coloration: Skin is not pale.      Findings: No erythema or rash.   Neurological:      Mental Status: He is alert and oriented to person, place, and time.      Cranial Nerves: No cranial nerve deficit.      Coordination: Coordination normal.   Psychiatric:         Behavior: Behavior normal.         Thought Content: Thought content normal.         Judgment: Judgment normal.           Assessment:       1. Dyslipidemia associated with type 2  diabetes mellitus    2. Coronary artery disease of native artery with stable angina pectoris, unspecified whether native or transplanted heart    3. Atherosclerosis of native coronary artery of native heart with unstable angina pectoris    4. Type 2 diabetes mellitus with stage 3a chronic kidney disease, without long-term current use of insulin    5. Coronary artery disease of native artery of native heart with stable angina pectoris    6. Hypertension associated with diabetes    7. Aortic atherosclerosis    8. Sinus bradycardia      Results for orders placed or performed in visit on 04/11/22   Basic Metabolic Panel   Result Value Ref Range    Sodium 140 136 - 145 mmol/L    Potassium 4.5 3.5 - 5.1 mmol/L    Chloride 106 95 - 110 mmol/L    CO2 26 23 - 29 mmol/L    Glucose 205 (H) 70 - 110 mg/dL    BUN 19 8 - 23 mg/dL    Creatinine 1.5 (H) 0.5 - 1.4 mg/dL    Calcium 8.9 8.7 - 10.5 mg/dL    Anion Gap 8 8 - 16 mmol/L    eGFR if African American 55 (A) >60 mL/min/1.73 m^2    eGFR if non African American 47 (A) >60 mL/min/1.73 m^2         Current Outpatient Medications:     ADVANCED GLUC METER TEST STRIP Strp, USE TO TEST BLOOD SUGAR 4 TIMES DAILY., Disp: 100 strip, Rfl: 0    amLODIPine (NORVASC) 5 MG tablet, Take 1 tablet (5 mg total) by mouth 2 (two) times daily., Disp: 90 tablet, Rfl: 4    aspirin (ECOTRIN) 81 MG EC tablet, Take 1 tablet (81 mg total) by mouth once daily., Disp: , Rfl:     atorvastatin (LIPITOR) 80 MG tablet, Take 1 tablet (80 mg total) by mouth once daily., Disp: 90 tablet, Rfl: 3    blood sugar diagnostic (BLOOD GLUCOSE TEST) Strp, Check sugar once daily, Disp: 100 each, Rfl: 11    blood-glucose meter kit, Use as instructed, Disp: , Rfl:     clopidogreL (PLAVIX) 75 mg tablet, Take 1 tablet (75 mg total) by mouth once daily., Disp: 90 tablet, Rfl: 4    ibuprofen (ADVIL,MOTRIN) 800 MG tablet, Take 1 tablet (800 mg total) by mouth every 8 (eight) hours as needed for Pain., Disp: 30 tablet, Rfl:  2    isosorbide mononitrate (IMDUR) 30 MG 24 hr tablet, Take 1 tablet (30 mg total) by mouth once daily., Disp: 30 tablet, Rfl: 11    lancets (ACCU-CHEK SOFTCLIX LANCETS) Misc, 1 lancet by Misc.(Non-Drug; Combo Route) route 2 (two) times daily., Disp: 200 each, Rfl: 1    lisinopriL 10 MG tablet, Take 1 tablet (10 mg total) by mouth 2 (two) times daily., Disp: 180 tablet, Rfl: 3    metFORMIN (GLUCOPHAGE) 1000 MG tablet, TAKE ONE TABLET BY MOUTH TWICE DAILY , Disp: 180 tablet, Rfl: 3    nitroGLYCERIN (NITROSTAT) 0.4 MG SL tablet, Place 1 tablet (0.4 mg total) under the tongue every 5 (five) minutes as needed for Chest pain., Disp: 30 tablet, Rfl: 3    pantoprazole (PROTONIX) 40 MG tablet, Take 1 tablet (40 mg total) by mouth once daily., Disp: 30 tablet, Rfl: 2    sildenafil (REVATIO) 20 mg Tab, Take 1 tablet (20 mg total) by mouth daily as needed for ED, Disp: 30 tablet, Rfl: 2     Lab Results   Component Value Date    WBC 5.16 04/06/2022    RBC 4.44 (L) 04/06/2022    HGB 13.1 (L) 04/06/2022    HCT 38.5 (L) 04/06/2022    MCV 87 04/06/2022    MCH 29.5 04/06/2022    MCHC 34.0 04/06/2022    RDW 13.7 04/06/2022     04/06/2022    MPV 10.8 04/06/2022    GRAN 2.2 04/06/2022    GRAN 43.4 04/06/2022    LYMPH 2.0 04/06/2022    LYMPH 39.3 04/06/2022    MONO 0.6 04/06/2022    MONO 12.0 04/06/2022    EOS 0.2 04/06/2022    BASO 0.05 04/06/2022    EOSINOPHIL 4.3 04/06/2022    BASOPHIL 1.0 04/06/2022    MG 1.9 10/09/2012        CMP  Lab Results   Component Value Date     04/11/2022    K 4.5 04/11/2022     04/11/2022    CO2 26 04/11/2022     (H) 04/11/2022    BUN 19 04/11/2022    CREATININE 1.5 (H) 04/11/2022    CALCIUM 8.9 04/11/2022    PROT 7.4 01/06/2022    ALBUMIN 3.9 01/06/2022    BILITOT 0.4 01/06/2022    ALKPHOS 51 (L) 01/06/2022    AST 20 01/06/2022    ALT 15 01/06/2022    ANIONGAP 8 04/11/2022    ESTGFRAFRICA 55 (A) 04/11/2022    EGFRNONAA 47 (A) 04/11/2022        Lab Results   Component  Value Date    LABURIN No growth 10/04/2018            Results for orders placed or performed during the hospital encounter of 04/07/22   EKG 12-lead    Collection Time: 04/07/22  7:49 AM    Narrative    Test Reason : Z01.810,    Vent. Rate : 045 BPM     Atrial Rate : 045 BPM     P-R Int : 162 ms          QRS Dur : 088 ms      QT Int : 462 ms       P-R-T Axes : 066 025 039 degrees     QTc Int : 399 ms    Marked sinus bradycardia  Abnormal ECG  When compared with ECG of 06-JAN-2022 10:39,  No significant change was found  Confirmed by Camille Medellin MD ([id number]) on 4/8/2022 8:56:35 AM    Referred By: RAINER SALDANA           Confirmed By:Camille Medellin MD                  Plan:       Problem List Items Addressed This Visit        Cardiac/Vascular    Dyslipidemia associated with type 2 diabetes mellitus - Primary     He has been taking atorvastatin but not Zetia.  Labs ordered to be done with atorvastatin.  He needs to eat a healthier diet.  Education provided.           Relevant Orders    Lipid Panel    Comprehensive Metabolic Panel    Case Request-Cath Lab: PTCA, Single Vessel (Completed)    Hypertension associated with diabetes     Blood pressure today 130/74.  Compliance with medications discussed.           Aortic atherosclerosis     Noted on imaging studies of the chest.  Condition stable.           Sinus bradycardia     He tolerates metoprolol without side effects.  Heart rate today 54 beats per minute.           Coronary artery disease of native artery with stable angina pectoris     He reports angina on a regular basis.  Triple anti anginal therapy on board.  I recommend stenting to circumflex to be done in the near future.  He has chronically occluded right coronary artery.  Risks benefits alternatives discussed with the patient and his wife.           Relevant Medications    aspirin (ECOTRIN) 81 MG EC tablet    clopidogreL (PLAVIX) 75 mg tablet    Other Relevant Orders    POCT glucose    Case Request-Cath Lab:  PTCA, Single Vessel (Completed)       Endocrine    Type 2 diabetes mellitus with stage 3a chronic kidney disease, without long-term current use of insulin     Most recent hemoglobin A1c 6.3.  GFR 55, creatinine 1.5.  Urinalysis negative for protein.  Condition stable.  He takes metformin.             Other Visit Diagnoses     Atherosclerosis of native coronary artery of native heart with unstable angina pectoris        Relevant Orders    POCT glucose    Case Request-Cath Lab: PTCA, Single Vessel (Completed)               Clopidogrel and aspirin started.  PCI to circumflex planned in June.  He has angina on a regular basis despite 3 drug therapy for anginal control.  He will continue current medical therapy.  We reviewed his diagnostic angiogram and indications to proceed with intervention to circumflex artery.           Carter Arevalo MD  05/26/2022   9:16 AM

## 2022-05-25 NOTE — PROGRESS NOTES
[facility] Cardiology     Subjective:    Patient ID:  Addy Redding is a 66 y.o. male who presents for follow-up of Chest Pain, Coronary Artery Disease, Hyperlipidemia, and Hypertension    Review of patient's allergies indicates:  No Known Allergies   Mr. Redding is still having angina.  It depends on what he does.  His heart catheterization showed significant disease April 2022. Anti anginal therapy was added but he is still having chest pains on a regular basis.  He has pressure in the upper chest.  He has not had any prolonged episodes of discomfort or discomfort at rest.  He has not been measuring his blood pressure.  Atorvastatin was increased, he has not had blood work since then.  He is with his wife today.  He quit smoking decades ago.    His catheterization confirmed occluded right coronary artery.  His nuclear stress test showed reversible inferior wall ischemia.  I probed the right coronary artery and was unable to recanalized the vessel.  He has 50% proximal LAD disease.  He has 70% 1st marginal branch stenosis of circumflex.  His ejection fraction is normal.  His wife states he has not been eating a healthy diet.        Review of Systems   Constitutional: Negative for chills, decreased appetite, diaphoresis, fever, malaise/fatigue, night sweats, weight gain and weight loss.   HENT: Negative for congestion, ear discharge, ear pain, hearing loss, hoarse voice, nosebleeds, odynophagia, sore throat, stridor and tinnitus.    Eyes: Negative for blurred vision, discharge, double vision, pain, photophobia, redness, vision loss in left eye, vision loss in right eye, visual disturbance and visual halos.   Cardiovascular: Positive for chest pain. Negative for claudication, cyanosis, dyspnea on exertion, irregular heartbeat, leg swelling, near-syncope, orthopnea, palpitations, paroxysmal nocturnal dyspnea and syncope.   Respiratory: Negative for cough,  "hemoptysis, shortness of breath, sleep disturbances due to breathing, snoring, sputum production and wheezing.    Endocrine: Negative for cold intolerance, heat intolerance, polydipsia, polyphagia and polyuria.   Hematologic/Lymphatic: Negative for adenopathy and bleeding problem. Does not bruise/bleed easily.   Skin: Negative for color change, dry skin, flushing, itching, nail changes, poor wound healing, rash, skin cancer, suspicious lesions and unusual hair distribution.   Musculoskeletal: Positive for joint pain. Negative for arthritis, back pain, falls, gout, joint swelling, muscle cramps, muscle weakness, myalgias, neck pain and stiffness.   Gastrointestinal: Negative for bloating, abdominal pain, anorexia, change in bowel habit, bowel incontinence, constipation, diarrhea, dysphagia, excessive appetite, flatus, heartburn, hematemesis, hematochezia, hemorrhoids, jaundice, melena, nausea and vomiting.   Genitourinary: Negative for bladder incontinence, decreased libido, dysuria, flank pain, frequency, genital sores, hematuria, hesitancy, incomplete emptying, nocturia and urgency.   Neurological: Negative for aphonia, brief paralysis, difficulty with concentration, disturbances in coordination, excessive daytime sleepiness, dizziness, focal weakness, headaches, light-headedness, loss of balance, numbness, paresthesias, seizures, sensory change, tremors, vertigo and weakness.   Psychiatric/Behavioral: Negative for altered mental status, depression, hallucinations, memory loss, substance abuse, suicidal ideas and thoughts of violence. The patient does not have insomnia and is not nervous/anxious.    Allergic/Immunologic: Negative for hives and persistent infections.        Objective:       Vitals:    05/25/22 0943   BP: 130/74   Pulse: (!) 54   SpO2: 98%   Height: 6' 1" (1.854 m)    Physical Exam  Constitutional:       General: He is not in acute distress.     Appearance: He is well-developed. He is not " diaphoretic.   HENT:      Head: Normocephalic and atraumatic.      Nose: Nose normal.   Eyes:      General: No scleral icterus.        Right eye: No discharge.      Conjunctiva/sclera: Conjunctivae normal.      Pupils: Pupils are equal, round, and reactive to light.   Neck:      Thyroid: No thyromegaly.      Vascular: No JVD.      Trachea: No tracheal deviation.   Cardiovascular:      Rate and Rhythm: Normal rate and regular rhythm.      Pulses: Normal pulses.           Carotid pulses are 2+ on the right side and 2+ on the left side.       Radial pulses are 2+ on the right side and 2+ on the left side.        Dorsalis pedis pulses are 2+ on the right side and 2+ on the left side.        Posterior tibial pulses are 2+ on the right side and 2+ on the left side.      Heart sounds: Normal heart sounds. No murmur heard.    No friction rub. No gallop.   Pulmonary:      Effort: Pulmonary effort is normal. No respiratory distress.      Breath sounds: Normal breath sounds. No stridor. No wheezing or rales.   Chest:      Chest wall: No tenderness.   Abdominal:      General: Bowel sounds are normal. There is no distension.      Palpations: Abdomen is soft. There is no mass.      Tenderness: There is no abdominal tenderness. There is no guarding or rebound.   Musculoskeletal:         General: No tenderness. Normal range of motion.      Cervical back: Normal range of motion and neck supple.   Lymphadenopathy:      Cervical: No cervical adenopathy.   Skin:     General: Skin is warm and dry.      Coloration: Skin is not pale.      Findings: No erythema or rash.   Neurological:      Mental Status: He is alert and oriented to person, place, and time.      Cranial Nerves: No cranial nerve deficit.      Coordination: Coordination normal.   Psychiatric:         Behavior: Behavior normal.         Thought Content: Thought content normal.         Judgment: Judgment normal.           Assessment:       1. Dyslipidemia associated with type 2  diabetes mellitus    2. Coronary artery disease of native artery with stable angina pectoris, unspecified whether native or transplanted heart    3. Atherosclerosis of native coronary artery of native heart with unstable angina pectoris    4. Type 2 diabetes mellitus with stage 3a chronic kidney disease, without long-term current use of insulin    5. Coronary artery disease of native artery of native heart with stable angina pectoris    6. Hypertension associated with diabetes    7. Aortic atherosclerosis    8. Sinus bradycardia      Results for orders placed or performed in visit on 04/11/22   Basic Metabolic Panel   Result Value Ref Range    Sodium 140 136 - 145 mmol/L    Potassium 4.5 3.5 - 5.1 mmol/L    Chloride 106 95 - 110 mmol/L    CO2 26 23 - 29 mmol/L    Glucose 205 (H) 70 - 110 mg/dL    BUN 19 8 - 23 mg/dL    Creatinine 1.5 (H) 0.5 - 1.4 mg/dL    Calcium 8.9 8.7 - 10.5 mg/dL    Anion Gap 8 8 - 16 mmol/L    eGFR if African American 55 (A) >60 mL/min/1.73 m^2    eGFR if non African American 47 (A) >60 mL/min/1.73 m^2         Current Outpatient Medications:     ADVANCED GLUC METER TEST STRIP Strp, USE TO TEST BLOOD SUGAR 4 TIMES DAILY., Disp: 100 strip, Rfl: 0    amLODIPine (NORVASC) 5 MG tablet, Take 1 tablet (5 mg total) by mouth 2 (two) times daily., Disp: 90 tablet, Rfl: 4    aspirin (ECOTRIN) 81 MG EC tablet, Take 1 tablet (81 mg total) by mouth once daily., Disp: , Rfl:     atorvastatin (LIPITOR) 80 MG tablet, Take 1 tablet (80 mg total) by mouth once daily., Disp: 90 tablet, Rfl: 3    blood sugar diagnostic (BLOOD GLUCOSE TEST) Strp, Check sugar once daily, Disp: 100 each, Rfl: 11    blood-glucose meter kit, Use as instructed, Disp: , Rfl:     clopidogreL (PLAVIX) 75 mg tablet, Take 1 tablet (75 mg total) by mouth once daily., Disp: 90 tablet, Rfl: 4    ibuprofen (ADVIL,MOTRIN) 800 MG tablet, Take 1 tablet (800 mg total) by mouth every 8 (eight) hours as needed for Pain., Disp: 30 tablet, Rfl:  2    isosorbide mononitrate (IMDUR) 30 MG 24 hr tablet, Take 1 tablet (30 mg total) by mouth once daily., Disp: 30 tablet, Rfl: 11    lancets (ACCU-CHEK SOFTCLIX LANCETS) Misc, 1 lancet by Misc.(Non-Drug; Combo Route) route 2 (two) times daily., Disp: 200 each, Rfl: 1    lisinopriL 10 MG tablet, Take 1 tablet (10 mg total) by mouth 2 (two) times daily., Disp: 180 tablet, Rfl: 3    metFORMIN (GLUCOPHAGE) 1000 MG tablet, TAKE ONE TABLET BY MOUTH TWICE DAILY , Disp: 180 tablet, Rfl: 3    nitroGLYCERIN (NITROSTAT) 0.4 MG SL tablet, Place 1 tablet (0.4 mg total) under the tongue every 5 (five) minutes as needed for Chest pain., Disp: 30 tablet, Rfl: 3    pantoprazole (PROTONIX) 40 MG tablet, Take 1 tablet (40 mg total) by mouth once daily., Disp: 30 tablet, Rfl: 2    sildenafil (REVATIO) 20 mg Tab, Take 1 tablet (20 mg total) by mouth daily as needed for ED, Disp: 30 tablet, Rfl: 2     Lab Results   Component Value Date    WBC 5.16 04/06/2022    RBC 4.44 (L) 04/06/2022    HGB 13.1 (L) 04/06/2022    HCT 38.5 (L) 04/06/2022    MCV 87 04/06/2022    MCH 29.5 04/06/2022    MCHC 34.0 04/06/2022    RDW 13.7 04/06/2022     04/06/2022    MPV 10.8 04/06/2022    GRAN 2.2 04/06/2022    GRAN 43.4 04/06/2022    LYMPH 2.0 04/06/2022    LYMPH 39.3 04/06/2022    MONO 0.6 04/06/2022    MONO 12.0 04/06/2022    EOS 0.2 04/06/2022    BASO 0.05 04/06/2022    EOSINOPHIL 4.3 04/06/2022    BASOPHIL 1.0 04/06/2022    MG 1.9 10/09/2012        CMP  Lab Results   Component Value Date     04/11/2022    K 4.5 04/11/2022     04/11/2022    CO2 26 04/11/2022     (H) 04/11/2022    BUN 19 04/11/2022    CREATININE 1.5 (H) 04/11/2022    CALCIUM 8.9 04/11/2022    PROT 7.4 01/06/2022    ALBUMIN 3.9 01/06/2022    BILITOT 0.4 01/06/2022    ALKPHOS 51 (L) 01/06/2022    AST 20 01/06/2022    ALT 15 01/06/2022    ANIONGAP 8 04/11/2022    ESTGFRAFRICA 55 (A) 04/11/2022    EGFRNONAA 47 (A) 04/11/2022        Lab Results   Component  Value Date    LABURIN No growth 10/04/2018            Results for orders placed or performed during the hospital encounter of 04/07/22   EKG 12-lead    Collection Time: 04/07/22  7:49 AM    Narrative    Test Reason : Z01.810,    Vent. Rate : 045 BPM     Atrial Rate : 045 BPM     P-R Int : 162 ms          QRS Dur : 088 ms      QT Int : 462 ms       P-R-T Axes : 066 025 039 degrees     QTc Int : 399 ms    Marked sinus bradycardia  Abnormal ECG  When compared with ECG of 06-JAN-2022 10:39,  No significant change was found  Confirmed by Camille Medellin MD ([id number]) on 4/8/2022 8:56:35 AM    Referred By: RAINER SALDANA           Confirmed By:Camille Medellin MD                  Plan:       Problem List Items Addressed This Visit        Cardiac/Vascular    Dyslipidemia associated with type 2 diabetes mellitus - Primary     He has been taking atorvastatin but not Zetia.  Labs ordered to be done with atorvastatin.  He needs to eat a healthier diet.  Education provided.           Relevant Orders    Lipid Panel    Comprehensive Metabolic Panel    Case Request-Cath Lab: [facility], Single Vessel (Completed)    Hypertension associated with diabetes     Blood pressure today 130/74.  Compliance with medications discussed.           Aortic atherosclerosis     Noted on imaging studies of the chest.  Condition stable.           Sinus bradycardia     He tolerates metoprolol without side effects.  Heart rate today 54 beats per minute.           Coronary artery disease of native artery with stable angina pectoris     He reports angina on a regular basis.  Triple anti anginal therapy on board.  I recommend stenting to circumflex to be done in the near future.  He has chronically occluded right coronary artery.  Risks benefits alternatives discussed with the patient and his wife.           Relevant Medications    aspirin (ECOTRIN) 81 MG EC tablet    clopidogreL (PLAVIX) 75 mg tablet    Other Relevant Orders    POCT glucose    Case Request-Cath Lab:  PTCA, Single Vessel (Completed)       Endocrine    Type 2 diabetes mellitus with stage 3a chronic kidney disease, without long-term current use of insulin     Most recent hemoglobin A1c 6.3.  GFR 55, creatinine 1.5.  Urinalysis negative for protein.  Condition stable.  He takes metformin.             Other Visit Diagnoses     Atherosclerosis of native coronary artery of native heart with unstable angina pectoris        Relevant Orders    POCT glucose    Case Request-Cath Lab: PTCA, Single Vessel (Completed)               Clopidogrel and aspirin started.  PCI to circumflex planned in June.  He has angina on a regular basis despite 3 drug therapy for anginal control.  He will continue current medical therapy.  We reviewed his diagnostic angiogram and indications to proceed with intervention to circumflex artery.           Carter Arevalo MD  05/26/2022   9:16 AM

## 2022-05-26 PROBLEM — I77.1 STRICTURE OF ARTERY: Status: RESOLVED | Noted: 2022-01-06 | Resolved: 2022-05-26

## 2022-05-26 PROBLEM — I25.118 CORONARY ARTERY DISEASE OF NATIVE ARTERY WITH STABLE ANGINA PECTORIS: Status: ACTIVE | Noted: 2022-05-26

## 2022-05-26 PROBLEM — R07.9 EXERTIONAL CHEST PAIN: Status: RESOLVED | Noted: 2022-03-22 | Resolved: 2022-05-26

## 2022-05-26 NOTE — ASSESSMENT & PLAN NOTE
He reports angina on a regular basis.  Triple anti anginal therapy on board.  I recommend stenting to circumflex to be done in the near future.  He has chronically occluded right coronary artery.  Risks benefits alternatives discussed with the patient and his wife.

## 2022-05-26 NOTE — ASSESSMENT & PLAN NOTE
Most recent hemoglobin A1c 6.3.  GFR 55, creatinine 1.5.  Urinalysis negative for protein.  Condition stable.  He takes metformin.

## 2022-05-26 NOTE — ASSESSMENT & PLAN NOTE
He has been taking atorvastatin but not Zetia.  Labs ordered to be done with atorvastatin.  He needs to eat a healthier diet.  Education provided.

## 2022-05-27 ENCOUNTER — LAB VISIT (OUTPATIENT)
Dept: LAB | Facility: HOSPITAL | Age: 67
End: 2022-05-27
Attending: INTERNAL MEDICINE
Payer: MEDICARE

## 2022-05-27 DIAGNOSIS — E11.69 DYSLIPIDEMIA ASSOCIATED WITH TYPE 2 DIABETES MELLITUS: ICD-10-CM

## 2022-05-27 DIAGNOSIS — E78.5 DYSLIPIDEMIA ASSOCIATED WITH TYPE 2 DIABETES MELLITUS: ICD-10-CM

## 2022-05-27 LAB
ALBUMIN SERPL BCP-MCNC: 3.6 G/DL (ref 3.5–5.2)
ALP SERPL-CCNC: 50 U/L (ref 55–135)
ALT SERPL W/O P-5'-P-CCNC: 13 U/L (ref 10–44)
ANION GAP SERPL CALC-SCNC: 10 MMOL/L (ref 8–16)
AST SERPL-CCNC: 16 U/L (ref 10–40)
BILIRUB SERPL-MCNC: 0.5 MG/DL (ref 0.1–1)
BUN SERPL-MCNC: 19 MG/DL (ref 8–23)
CALCIUM SERPL-MCNC: 9.2 MG/DL (ref 8.7–10.5)
CHLORIDE SERPL-SCNC: 106 MMOL/L (ref 95–110)
CHOLEST SERPL-MCNC: 146 MG/DL (ref 120–199)
CHOLEST/HDLC SERPL: 3.7 {RATIO} (ref 2–5)
CO2 SERPL-SCNC: 24 MMOL/L (ref 23–29)
CREAT SERPL-MCNC: 1.7 MG/DL (ref 0.5–1.4)
EST. GFR  (AFRICAN AMERICAN): 47 ML/MIN/1.73 M^2
EST. GFR  (NON AFRICAN AMERICAN): 41 ML/MIN/1.73 M^2
GLUCOSE SERPL-MCNC: 105 MG/DL (ref 70–110)
HDLC SERPL-MCNC: 39 MG/DL (ref 40–75)
HDLC SERPL: 26.7 % (ref 20–50)
LDLC SERPL CALC-MCNC: 92.6 MG/DL (ref 63–159)
NONHDLC SERPL-MCNC: 107 MG/DL
POTASSIUM SERPL-SCNC: 4.7 MMOL/L (ref 3.5–5.1)
PROT SERPL-MCNC: 6.6 G/DL (ref 6–8.4)
SODIUM SERPL-SCNC: 140 MMOL/L (ref 136–145)
TRIGL SERPL-MCNC: 72 MG/DL (ref 30–150)

## 2022-05-27 PROCEDURE — 80061 LIPID PANEL: CPT | Performed by: INTERNAL MEDICINE

## 2022-05-27 PROCEDURE — 80053 COMPREHEN METABOLIC PANEL: CPT | Performed by: INTERNAL MEDICINE

## 2022-05-27 PROCEDURE — 36415 COLL VENOUS BLD VENIPUNCTURE: CPT | Performed by: INTERNAL MEDICINE

## 2022-05-29 DIAGNOSIS — E11.9 CONTROLLED TYPE 2 DIABETES MELLITUS WITHOUT COMPLICATION, WITHOUT LONG-TERM CURRENT USE OF INSULIN: ICD-10-CM

## 2022-05-29 DIAGNOSIS — E11.9 TYPE 2 DIABETES MELLITUS WITHOUT COMPLICATION, WITHOUT LONG-TERM CURRENT USE OF INSULIN: ICD-10-CM

## 2022-05-29 RX ORDER — ATORVASTATIN CALCIUM 40 MG/1
TABLET, FILM COATED ORAL
Qty: 90 TABLET | OUTPATIENT
Start: 2022-05-29

## 2022-05-29 NOTE — TELEPHONE ENCOUNTER
Care Due:                  Date            Visit Type   Department     Provider  --------------------------------------------------------------------------------                                EP -                              [facility]    Stephan Benavidesashley  Last Visit: 04-      CARE ([facility])   MEDICINE       Tanvir                              [facility]  Next Visit: 07-      CARE ([facility])   MEDICINE       Tanvir                                                            Last  Test          Frequency    Reason                     Performed    Due Date  --------------------------------------------------------------------------------    HBA1C.......  6 months...  metFORMIN................  01- 07-    [facility] Embedded Care Gaps. Reference number: [id number]. 5/29/2022   9:55:34 AM CDT

## 2022-05-30 ENCOUNTER — TELEPHONE (OUTPATIENT)
Dept: CARDIOLOGY | Facility: CLINIC | Age: 67
End: 2022-05-30
Payer: MEDICARE

## 2022-05-30 DIAGNOSIS — E78.00 PURE HYPERCHOLESTEROLEMIA: Primary | ICD-10-CM

## 2022-05-30 RX ORDER — EZETIMIBE 10 MG/1
10 TABLET ORAL DAILY
Qty: 90 TABLET | Refills: 4 | Status: ON HOLD | OUTPATIENT
Start: 2022-05-30 | End: 2023-07-21 | Stop reason: ALTCHOICE

## 2022-05-30 RX ORDER — METFORMIN HYDROCHLORIDE 1000 MG/1
TABLET ORAL
Qty: 180 TABLET | Refills: 3 | Status: SHIPPED | OUTPATIENT
Start: 2022-05-30 | End: 2022-07-06

## 2022-05-30 NOTE — TELEPHONE ENCOUNTER
----- Message from Carter Arevalo MD sent at 5/30/2022 12:31 PM CDT -----  Please contact the wife for the patient and tell them that I sent a prescription for Zetia 10 mg to [facility] based on his most recent cholesterol lab values.  It is a long-term pill.  He will continue atorvastatin and takes both pills long-term.

## 2022-05-30 NOTE — TELEPHONE ENCOUNTER
Refill Routing Note   Medication(s) are not appropriate for processing by [facility] for the following reason(s):      - Required laboratory values are abnormal    [facility] action(s):  Defer  Quick Discontinue       Medication Therapy Plan: FLOS 7/5/2022; DEFER metformin (Last CREATININE 1.7 (H) 05/27/2022); QUICKDC atorvastatin 40, pt is now taking 80mg  Medication reconciliation completed: No     Appointments  past 12m or future 3m with PCP    Date Provider   Last Visit   4/25/2022 Stephan Ramey MD   Next Visit   7/6/2022 Stephan Ramey MD   ED visits in past 90 days: 0        Note composed:7:17 PM 05/29/2022

## 2022-05-31 ENCOUNTER — TELEPHONE (OUTPATIENT)
Dept: CARDIOLOGY | Facility: CLINIC | Age: 67
End: 2022-05-31
Payer: MEDICARE

## 2022-05-31 ENCOUNTER — PATIENT MESSAGE (OUTPATIENT)
Dept: CARDIOLOGY | Facility: HOSPITAL | Age: 67
End: 2022-05-31
Payer: MEDICARE

## 2022-05-31 NOTE — TELEPHONE ENCOUNTER
----- Message from Holly Mitchell sent at 5/31/2022  1:55 PM CDT -----  Contact: [phone]/ Wife  Type: Requesting to speak with nurse    Who Called: Pt's wife   Regarding: has questions on medications    Would the patient rather a call back or a response via [facility]? Call back  Best Call Back Number: [phone]  Additional Information: n/a

## 2022-06-01 ENCOUNTER — TELEPHONE (OUTPATIENT)
Dept: CARDIOLOGY | Facility: CLINIC | Age: 67
End: 2022-06-01
Payer: MEDICARE

## 2022-06-01 NOTE — TELEPHONE ENCOUNTER
----- Message from Oma Albarran sent at 5/31/2022  5:12 PM CDT -----  Contact: [phone]/Self  Who Called: PT wife  Regarding: procedure scheduled 06/16  Would the patient rather a call back or a response via [facility]? Portal   Best Call Back Number: [phone]  Additional Information: leave a vm or text

## 2022-06-08 ENCOUNTER — TELEPHONE (OUTPATIENT)
Dept: CARDIOLOGY | Facility: CLINIC | Age: 67
End: 2022-06-08
Payer: MEDICARE

## 2022-06-08 NOTE — TELEPHONE ENCOUNTER
----- Message from Adriane M Toussaint, LPN sent at 6/2/2022  5:03 PM CDT -----    ----- Message -----  From: Judy Hinojosa  Sent: 6/2/2022   3:00 PM CDT  To: [facility] Staff    Type:  Patient Returning Call    Who Called: pt wife   Who Left Message for Patient: office  Does the patient know what this is regarding?: medication  Would the patient rather a call back or a response via [facility]? [facility]   Best Call Back Number:   Additional Information:

## 2022-06-08 NOTE — TELEPHONE ENCOUNTER
----- Message from Adriane M Toussaint, LPN sent at 6/2/2022  5:03 PM CDT -----    ----- Message -----  From: Judy Hinojosa  Sent: 6/2/2022   3:00 PM CDT  To: [facility] Machado Staff    Type:  Patient Returning Call    Who Called: pt wife   Who Left Message for Patient: office  Does the patient know what this is regarding?: medication  Would the patient rather a call back or a response via [facility]? [facility]   Best Call Back Number:   Additional Information:

## 2022-06-10 ENCOUNTER — HOSPITAL ENCOUNTER (OUTPATIENT)
Dept: RADIOLOGY | Facility: HOSPITAL | Age: 67
Discharge: HOME | End: 2022-06-10
Attending: STUDENT IN AN ORGANIZED HEALTH CARE EDUCATION/TRAINING PROGRAM
Payer: MEDICARE

## 2022-06-10 DIAGNOSIS — R97.20 ELEVATED PSA: ICD-10-CM

## 2022-06-10 DIAGNOSIS — Z12.5 ENCOUNTER FOR PROSTATE CANCER SCREENING: ICD-10-CM

## 2022-06-10 PROCEDURE — 72197 MRI PELVIS W/O & W/DYE: CPT | Mod: TC

## 2022-06-10 PROCEDURE — 25500020 PHARM REV CODE 255: Performed by: STUDENT IN AN ORGANIZED HEALTH CARE EDUCATION/TRAINING PROGRAM

## 2022-06-10 PROCEDURE — 72197 MRI PROSTATE W W/O CONTRAST: ICD-10-PCS | Mod: 26,,, | Performed by: RADIOLOGY

## 2022-06-10 PROCEDURE — A9585 GADOBUTROL INJECTION: HCPCS | Performed by: STUDENT IN AN ORGANIZED HEALTH CARE EDUCATION/TRAINING PROGRAM

## 2022-06-10 PROCEDURE — 72197 MRI PELVIS W/O & W/DYE: CPT | Mod: 26,,, | Performed by: RADIOLOGY

## 2022-06-10 RX ORDER — GADOBUTROL 604.72 MG/ML
10 INJECTION INTRAVENOUS
Status: COMPLETED | OUTPATIENT
Start: 2022-06-10 | End: 2022-06-10

## 2022-06-10 RX ADMIN — GADOBUTROL 10 ML: 604.72 INJECTION INTRAVENOUS at 11:06

## 2022-06-14 ENCOUNTER — LAB VISIT (OUTPATIENT)
Dept: LAB | Facility: HOSPITAL | Age: 67
End: 2022-06-14
Attending: INTERNAL MEDICINE
Payer: MEDICARE

## 2022-06-14 ENCOUNTER — OFFICE VISIT (OUTPATIENT)
Dept: UROLOGY | Facility: CLINIC | Age: 67
End: 2022-06-14
Payer: MEDICARE

## 2022-06-14 VITALS
HEART RATE: 50 BPM | SYSTOLIC BLOOD PRESSURE: 120 MMHG | WEIGHT: 200.5 LBS | HEIGHT: 73 IN | BODY MASS INDEX: 26.57 KG/M2 | DIASTOLIC BLOOD PRESSURE: 70 MMHG

## 2022-06-14 DIAGNOSIS — Z12.5 ENCOUNTER FOR PROSTATE CANCER SCREENING: ICD-10-CM

## 2022-06-14 DIAGNOSIS — R97.20 ELEVATED PSA: Primary | ICD-10-CM

## 2022-06-14 DIAGNOSIS — Z01.810 PRE-OPERATIVE CARDIOVASCULAR EXAMINATION: ICD-10-CM

## 2022-06-14 LAB
ANION GAP SERPL CALC-SCNC: 6 MMOL/L (ref 8–16)
BASOPHILS # BLD AUTO: 0.07 K/UL (ref 0–0.2)
BASOPHILS NFR BLD: 1.1 % (ref 0–1.9)
BUN SERPL-MCNC: 21 MG/DL (ref 8–23)
CALCIUM SERPL-MCNC: 9.2 MG/DL (ref 8.7–10.5)
CHLORIDE SERPL-SCNC: 106 MMOL/L (ref 95–110)
CO2 SERPL-SCNC: 27 MMOL/L (ref 23–29)
CREAT SERPL-MCNC: 1.7 MG/DL (ref 0.5–1.4)
DIFFERENTIAL METHOD: ABNORMAL
EOSINOPHIL # BLD AUTO: 0.3 K/UL (ref 0–0.5)
EOSINOPHIL NFR BLD: 4.8 % (ref 0–8)
ERYTHROCYTE [DISTWIDTH] IN BLOOD BY AUTOMATED COUNT: 13.8 % (ref 11.5–14.5)
EST. GFR  (AFRICAN AMERICAN): 47 ML/MIN/1.73 M^2
EST. GFR  (NON AFRICAN AMERICAN): 41 ML/MIN/1.73 M^2
GLUCOSE SERPL-MCNC: 123 MG/DL (ref 70–110)
HCT VFR BLD AUTO: 37 % (ref 40–54)
HGB BLD-MCNC: 12.4 G/DL (ref 14–18)
IMM GRANULOCYTES # BLD AUTO: 0.03 K/UL (ref 0–0.04)
IMM GRANULOCYTES NFR BLD AUTO: 0.5 % (ref 0–0.5)
LYMPHOCYTES # BLD AUTO: 2.2 K/UL (ref 1–4.8)
LYMPHOCYTES NFR BLD: 33.2 % (ref 18–48)
MCH RBC QN AUTO: 29.2 PG (ref 27–31)
MCHC RBC AUTO-ENTMCNC: 33.5 G/DL (ref 32–36)
MCV RBC AUTO: 87 FL (ref 82–98)
MONOCYTES # BLD AUTO: 0.6 K/UL (ref 0.3–1)
MONOCYTES NFR BLD: 9.4 % (ref 4–15)
NEUTROPHILS # BLD AUTO: 3.3 K/UL (ref 1.8–7.7)
NEUTROPHILS NFR BLD: 51 % (ref 38–73)
NRBC BLD-RTO: 0 /100 WBC
PLATELET # BLD AUTO: 242 K/UL (ref 150–450)
PMV BLD AUTO: 10.3 FL (ref 9.2–12.9)
POTASSIUM SERPL-SCNC: 4.5 MMOL/L (ref 3.5–5.1)
RBC # BLD AUTO: 4.25 M/UL (ref 4.6–6.2)
SODIUM SERPL-SCNC: 139 MMOL/L (ref 136–145)
WBC # BLD AUTO: 6.51 K/UL (ref 3.9–12.7)

## 2022-06-14 PROCEDURE — 99999 PR PBB SHADOW E&M-EST. PATIENT-LVL III: ICD-10-PCS | Mod: PBBFAC,,, | Performed by: STUDENT IN AN ORGANIZED HEALTH CARE EDUCATION/TRAINING PROGRAM

## 2022-06-14 PROCEDURE — 85025 COMPLETE CBC W/AUTO DIFF WBC: CPT | Performed by: INTERNAL MEDICINE

## 2022-06-14 PROCEDURE — 3061F PR NEG MICROALBUMINURIA RESULT DOCUMENTED/REVIEW: ICD-10-PCS | Mod: CPTII,S$GLB,, | Performed by: STUDENT IN AN ORGANIZED HEALTH CARE EDUCATION/TRAINING PROGRAM

## 2022-06-14 PROCEDURE — 3078F DIAST BP <80 MM HG: CPT | Mod: CPTII,S$GLB,, | Performed by: STUDENT IN AN ORGANIZED HEALTH CARE EDUCATION/TRAINING PROGRAM

## 2022-06-14 PROCEDURE — 3074F SYST BP LT 130 MM HG: CPT | Mod: CPTII,S$GLB,, | Performed by: STUDENT IN AN ORGANIZED HEALTH CARE EDUCATION/TRAINING PROGRAM

## 2022-06-14 PROCEDURE — 99999 PR PBB SHADOW E&M-EST. PATIENT-LVL III: CPT | Mod: PBBFAC,,, | Performed by: STUDENT IN AN ORGANIZED HEALTH CARE EDUCATION/TRAINING PROGRAM

## 2022-06-14 PROCEDURE — 99215 OFFICE O/P EST HI 40 MIN: CPT | Mod: S$GLB,,, | Performed by: STUDENT IN AN ORGANIZED HEALTH CARE EDUCATION/TRAINING PROGRAM

## 2022-06-14 PROCEDURE — 99215 PR OFFICE/OUTPT VISIT, EST, LEVL V, 40-54 MIN: ICD-10-PCS | Mod: S$GLB,,, | Performed by: STUDENT IN AN ORGANIZED HEALTH CARE EDUCATION/TRAINING PROGRAM

## 2022-06-14 PROCEDURE — 4010F ACE/ARB THERAPY RXD/TAKEN: CPT | Mod: CPTII,S$GLB,, | Performed by: STUDENT IN AN ORGANIZED HEALTH CARE EDUCATION/TRAINING PROGRAM

## 2022-06-14 PROCEDURE — 3008F PR BODY MASS INDEX (BMI) DOCUMENTED: ICD-10-PCS | Mod: CPTII,S$GLB,, | Performed by: STUDENT IN AN ORGANIZED HEALTH CARE EDUCATION/TRAINING PROGRAM

## 2022-06-14 PROCEDURE — 1159F MED LIST DOCD IN RCRD: CPT | Mod: CPTII,S$GLB,, | Performed by: STUDENT IN AN ORGANIZED HEALTH CARE EDUCATION/TRAINING PROGRAM

## 2022-06-14 PROCEDURE — 3061F NEG MICROALBUMINURIA REV: CPT | Mod: CPTII,S$GLB,, | Performed by: STUDENT IN AN ORGANIZED HEALTH CARE EDUCATION/TRAINING PROGRAM

## 2022-06-14 PROCEDURE — 80048 BASIC METABOLIC PNL TOTAL CA: CPT | Performed by: INTERNAL MEDICINE

## 2022-06-14 PROCEDURE — 1160F RVW MEDS BY RX/DR IN RCRD: CPT | Mod: CPTII,S$GLB,, | Performed by: STUDENT IN AN ORGANIZED HEALTH CARE EDUCATION/TRAINING PROGRAM

## 2022-06-14 PROCEDURE — 4010F PR ACE/ARB THEARPY RXD/TAKEN: ICD-10-PCS | Mod: CPTII,S$GLB,, | Performed by: STUDENT IN AN ORGANIZED HEALTH CARE EDUCATION/TRAINING PROGRAM

## 2022-06-14 PROCEDURE — 3066F NEPHROPATHY DOC TX: CPT | Mod: CPTII,S$GLB,, | Performed by: STUDENT IN AN ORGANIZED HEALTH CARE EDUCATION/TRAINING PROGRAM

## 2022-06-14 PROCEDURE — 1125F PR PAIN SEVERITY QUANTIFIED, PAIN PRESENT: ICD-10-PCS | Mod: CPTII,S$GLB,, | Performed by: STUDENT IN AN ORGANIZED HEALTH CARE EDUCATION/TRAINING PROGRAM

## 2022-06-14 PROCEDURE — 3008F BODY MASS INDEX DOCD: CPT | Mod: CPTII,S$GLB,, | Performed by: STUDENT IN AN ORGANIZED HEALTH CARE EDUCATION/TRAINING PROGRAM

## 2022-06-14 PROCEDURE — 99499 UNLISTED E&M SERVICE: CPT | Mod: S$GLB,,, | Performed by: STUDENT IN AN ORGANIZED HEALTH CARE EDUCATION/TRAINING PROGRAM

## 2022-06-14 PROCEDURE — 3066F PR DOCUMENTATION OF TREATMENT FOR NEPHROPATHY: ICD-10-PCS | Mod: CPTII,S$GLB,, | Performed by: STUDENT IN AN ORGANIZED HEALTH CARE EDUCATION/TRAINING PROGRAM

## 2022-06-14 PROCEDURE — 3044F PR MOST RECENT HEMOGLOBIN A1C LEVEL <7.0%: ICD-10-PCS | Mod: CPTII,S$GLB,, | Performed by: STUDENT IN AN ORGANIZED HEALTH CARE EDUCATION/TRAINING PROGRAM

## 2022-06-14 PROCEDURE — 36415 COLL VENOUS BLD VENIPUNCTURE: CPT | Performed by: INTERNAL MEDICINE

## 2022-06-14 PROCEDURE — 3074F PR MOST RECENT SYSTOLIC BLOOD PRESSURE < 130 MM HG: ICD-10-PCS | Mod: CPTII,S$GLB,, | Performed by: STUDENT IN AN ORGANIZED HEALTH CARE EDUCATION/TRAINING PROGRAM

## 2022-06-14 PROCEDURE — 1125F AMNT PAIN NOTED PAIN PRSNT: CPT | Mod: CPTII,S$GLB,, | Performed by: STUDENT IN AN ORGANIZED HEALTH CARE EDUCATION/TRAINING PROGRAM

## 2022-06-14 PROCEDURE — 99499 RISK ADDL DX/OHS AUDIT: ICD-10-PCS | Mod: S$GLB,,, | Performed by: STUDENT IN AN ORGANIZED HEALTH CARE EDUCATION/TRAINING PROGRAM

## 2022-06-14 PROCEDURE — 3044F HG A1C LEVEL LT 7.0%: CPT | Mod: CPTII,S$GLB,, | Performed by: STUDENT IN AN ORGANIZED HEALTH CARE EDUCATION/TRAINING PROGRAM

## 2022-06-14 PROCEDURE — 1159F PR MEDICATION LIST DOCUMENTED IN MEDICAL RECORD: ICD-10-PCS | Mod: CPTII,S$GLB,, | Performed by: STUDENT IN AN ORGANIZED HEALTH CARE EDUCATION/TRAINING PROGRAM

## 2022-06-14 PROCEDURE — 3078F PR MOST RECENT DIASTOLIC BLOOD PRESSURE < 80 MM HG: ICD-10-PCS | Mod: CPTII,S$GLB,, | Performed by: STUDENT IN AN ORGANIZED HEALTH CARE EDUCATION/TRAINING PROGRAM

## 2022-06-14 PROCEDURE — 1160F PR REVIEW ALL MEDS BY PRESCRIBER/CLIN PHARMACIST DOCUMENTED: ICD-10-PCS | Mod: CPTII,S$GLB,, | Performed by: STUDENT IN AN ORGANIZED HEALTH CARE EDUCATION/TRAINING PROGRAM

## 2022-06-14 NOTE — PROGRESS NOTES
Subjective:       Patient ID: Addy Redding is a 66 y.o. male.    Chief Complaint: Follow-up (MRI results)  This is a 66 y.o.  male patient that is an established patient of mine.  The patient was referred to me by his PCP Dr. Ramey for an elevated PSA.   Race:   Family history of prostate cancer no     He is s/p a TRUS biopsy with me on 9/18/18 for a PSA of 4.7. MARGARITA findings: 35-40. TRUS size: 43.6cc. Biopsy results demonstrated no cancer. We have been monitoring his PSA. His PSA in 2019 has demonstrated a rise to 5.6 on 4/2/19 and then again to 6.4 on 12/10/19 more recently. For the two persistent rises, we investigated further with an MRI. MRI was performed on 1/28/20 - Prostate: 5.6 x 3.5 x 3.4 cm corresponding to a computed volume of 32.7 cc. Left posterior transition zone PIRADS 3 lesion (equivocal for cancer), as above. He declined a uronav bx and wanted serial PSAs checked.   Microscopic hematuria - he declined workup.       Works as a .     3/28/22  Urinary frequency 2-3x/night a few weeks ago, now back to baseline 1x/night. He notes sometimes when he urinates he has a slower stream, difficult to empty his bladder. Spending longer times in the bathroom urinating. PSA 9.1.     6/15/22  To further investigate the rise in PSA to 9.1, he underwent an MRI on 6/10/22. 33.88cc. 1.3cm PIRADs 4 lesion in Left posterior transitional zone lesion.      LAST PSA  Lab Results   Component Value Date    PSA 7.2 (H) 06/15/2020    PSA 4.7 (H) 08/28/2018    PSA 3.3 01/11/2017    PSA 1.6 11/04/2014    PSA 1.7 10/23/2013    PSA 1.5 08/07/2012    PSA 0.68 07/25/2009    PSA 0.6 12/27/2007    PSADIAG 9.1 (H) 02/18/2022    PSADIAG 6.7 (H) 02/04/2021    PSATOTAL 6.4 (H) 12/10/2019    PSATOTAL 5.6 (H) 04/02/2019    PSAFREE 0.63 12/10/2019    PSAFREE 0.57 04/02/2019       Lab Results   Component Value Date    CREATININE 1.7 (H) 06/14/2022       ---  Past Medical History:   Diagnosis Date    Costochondritis      Diabetic nephropathy associated with type 2 diabetes mellitus     Diabetic retinopathy     ED (erectile dysfunction)     GERD (gastroesophageal reflux disease)     Hyperlipidemia     Hypertension     Type II or unspecified type diabetes mellitus with renal manifestations, uncontrolled(250.42)        Past Surgical History:   Procedure Laterality Date    Bone biopsy right femur      LEFT HEART CATHETERIZATION Left 2022    Procedure: Left heart cath;  Surgeon: Carter Arevalo MD;  Location: [facility] CATH LAB/EP;  Service: Cardiology;  Laterality: Left;       Family History   Problem Relation Age of Onset    Hypertension Mother     Diabetes Mother     Kidney disease Mother     Hypertension Sister     Diabetes Sister     Heart disease Sister     Heart attack Sister     Hyperlipidemia Sister     Coronary artery disease Sister     Hypertension Brother     Stroke Brother     Lung cancer Brother     Cancer Brother         lung cancer    Diabetes Sister     Hypertension Sister     Dementia Sister     Diabetes Brother     Peripheral vascular disease Brother     Hypertension Brother     Hyperlipidemia Brother     No Known Problems Brother     No Known Problems Sister     Coronary artery disease Father          of an MI at age 60    Hyperlipidemia Father     Heart attack Father     No Known Problems Daughter     Asthma Son     Hypertension Son     Stomach cancer Sister     Cancer Sister         stomach     Diabetes Brother     Stroke Brother     Hypertension Brother     Hyperlipidemia Brother     HIV Brother     Diabetes Brother     Hypertension Brother        Social History     Tobacco Use    Smoking status: Former Smoker     Packs/day: 0.50     Years: 26.00     Pack years: 13.00     Types: Cigarettes     Quit date:      Years since quittin.4    Smokeless tobacco: Never Used   Substance Use Topics    Alcohol use: No    Drug use: No       Current Outpatient  Medications on File Prior to Visit   Medication Sig Dispense Refill    ADVANCED GLUC METER TEST STRIP Strp USE TO TEST BLOOD SUGAR 4 TIMES DAILY. 100 strip 0    amLODIPine (NORVASC) 5 MG tablet Take 1 tablet (5 mg total) by mouth 2 (two) times daily. 90 tablet 4    aspirin (ECOTRIN) 81 MG EC tablet Take 1 tablet (81 mg total) by mouth once daily.      atorvastatin (LIPITOR) 80 MG tablet Take 1 tablet (80 mg total) by mouth once daily. 90 tablet 3    blood sugar diagnostic (BLOOD GLUCOSE TEST) Strp Check sugar once daily 100 each 11    blood-glucose meter kit Use as instructed      clopidogreL (PLAVIX) 75 mg tablet Take 1 tablet (75 mg total) by mouth once daily. 90 tablet 4    ezetimibe (ZETIA) 10 mg tablet Take 1 tablet (10 mg total) by mouth once daily. 90 tablet 4    ibuprofen (ADVIL,MOTRIN) 800 MG tablet Take 1 tablet (800 mg total) by mouth every 8 (eight) hours as needed for Pain. 30 tablet 2    lancets (ACCU-CHEK SOFTCLIX LANCETS) Misc 1 lancet by Misc.(Non-Drug; Combo Route) route 2 (two) times daily. 200 each 1    lisinopriL 10 MG tablet Take 1 tablet (10 mg total) by mouth 2 (two) times daily. 180 tablet 3    metFORMIN (GLUCOPHAGE) 1000 MG tablet TAKE ONE TABLET BY MOUTH TWICE DAILY 180 tablet 3    nitroGLYCERIN (NITROSTAT) 0.4 MG SL tablet Place 1 tablet (0.4 mg total) under the tongue every 5 (five) minutes as needed for Chest pain. 30 tablet 3    pantoprazole (PROTONIX) 40 MG tablet Take 1 tablet (40 mg total) by mouth once daily. 30 tablet 2    isosorbide mononitrate (IMDUR) 30 MG 24 hr tablet Take 1 tablet (30 mg total) by mouth once daily. 30 tablet 11    sildenafil (REVATIO) 20 mg Tab Take 1 tablet (20 mg total) by mouth daily as needed for ED 30 tablet 2     No current facility-administered medications on file prior to visit.       Review of patient's allergies indicates:  No Known Allergies    Review of Systems   Constitutional: Negative for chills.   HENT: Negative for congestion.  "   Eyes: Negative for visual disturbance.   Respiratory: Negative for shortness of breath.    Cardiovascular: Negative for chest pain.   Gastrointestinal: Negative for abdominal distention.   Musculoskeletal: Negative for gait problem.   Skin: Negative for color change.   Neurological: Negative for dizziness.   Psychiatric/Behavioral: Negative for agitation.       Objective:      Physical Exam  Constitutional:       Appearance: He is well-developed.   HENT:      Head: Normocephalic.   Eyes:      Pupils: Pupils are equal, round, and reactive to light.   Pulmonary:      Effort: Pulmonary effort is normal.   Abdominal:      Palpations: Abdomen is soft.   Musculoskeletal:         General: Normal range of motion.      Cervical back: Normal range of motion.   Skin:     General: Skin is warm and dry.   Neurological:      Mental Status: He is alert.         Assessment:       1. Elevated PSA    2. Encounter for prostate cancer screening        Plan:           Plan:  1. Discussed at length that he would benefit from an MRI guided bx of the prostate. However he informed me that he has a stent procedure with Dr. Arevalo on Thursday.  2. Conferred with Dr. Arevalo the next day -  Addendum - after the stent, would need anticoagulation for 6 months. Per his message: "Aspirin and Plavix post stenting would be needed 6 months ideally without interruption if a stent is performed as planned tomorrow.  Let me know your thoughts.  He is having angina but obviously could get through a biopsy on the prostate I think. " Dr. Arevalo reached out to the patient and after they discussed he sent me the message "I spoke with him and he wants to proceed tomorrow with the procedure, cardiac angio and probable stent. He accepts the 3 to 6 month delay prostate wise."   3. Therefore I will see if the patient is amenable to a PSA check in about 3 months (sent him a portal message today 6/15/22).     40 min involving coordination of care after " visit  Elevated PSA    Encounter for prostate cancer screening

## 2022-06-15 ENCOUNTER — PATIENT MESSAGE (OUTPATIENT)
Dept: UROLOGY | Facility: CLINIC | Age: 67
End: 2022-06-15
Payer: MEDICARE

## 2022-06-15 DIAGNOSIS — Z12.5 ENCOUNTER FOR PROSTATE CANCER SCREENING: ICD-10-CM

## 2022-06-15 DIAGNOSIS — R97.20 ELEVATED PSA: Primary | ICD-10-CM

## 2022-06-15 DIAGNOSIS — K21.9 GASTROESOPHAGEAL REFLUX DISEASE, UNSPECIFIED WHETHER ESOPHAGITIS PRESENT: ICD-10-CM

## 2022-06-15 RX ORDER — PANTOPRAZOLE SODIUM 40 MG/1
TABLET, DELAYED RELEASE ORAL
Qty: 90 TABLET | Refills: 3 | Status: SHIPPED | OUTPATIENT
Start: 2022-06-15 | End: 2022-10-06

## 2022-06-15 NOTE — TELEPHONE ENCOUNTER
Refill Routing Note   Medication(s) are not appropriate for processing by [facility] for the following reason(s):      - Required indication for medication not on problem list (Gerd)    [facility] action(s):  Defer          Medication reconciliation completed: No     Appointments  past 12m or future 3m with PCP    Date Provider   Last Visit   4/25/2022 Stephan Ramey MD   Next Visit   7/6/2022 Stephan Ramey MD   ED visits in past 90 days: 0        Note composed:6:16 PM 06/15/2022

## 2022-06-15 NOTE — TELEPHONE ENCOUNTER
No new care gaps identified.  [facility] Embedded Care Gaps. Reference number: [id number]. 6/15/2022   8:19:31 AM CDT

## 2022-06-16 ENCOUNTER — HOSPITAL ENCOUNTER (OUTPATIENT)
Facility: HOSPITAL | Age: 67
Discharge: HOME | End: 2022-06-16
Attending: INTERNAL MEDICINE | Admitting: INTERNAL MEDICINE
Payer: MEDICARE

## 2022-06-16 VITALS
OXYGEN SATURATION: 100 % | DIASTOLIC BLOOD PRESSURE: 59 MMHG | HEART RATE: 51 BPM | HEIGHT: 74 IN | TEMPERATURE: 96 F | WEIGHT: 200 LBS | SYSTOLIC BLOOD PRESSURE: 130 MMHG | BODY MASS INDEX: 25.67 KG/M2 | RESPIRATION RATE: 19 BRPM

## 2022-06-16 DIAGNOSIS — I25.118 CORONARY ARTERY DISEASE OF NATIVE ARTERY OF NATIVE HEART WITH STABLE ANGINA PECTORIS: ICD-10-CM

## 2022-06-16 DIAGNOSIS — I25.118 CORONARY ARTERY DISEASE OF NATIVE ARTERY WITH STABLE ANGINA PECTORIS, UNSPECIFIED WHETHER NATIVE OR TRANSPLANTED HEART: ICD-10-CM

## 2022-06-16 DIAGNOSIS — E11.69 DYSLIPIDEMIA ASSOCIATED WITH TYPE 2 DIABETES MELLITUS: ICD-10-CM

## 2022-06-16 DIAGNOSIS — I25.110 ATHEROSCLEROSIS OF NATIVE CORONARY ARTERY OF NATIVE HEART WITH UNSTABLE ANGINA PECTORIS: ICD-10-CM

## 2022-06-16 DIAGNOSIS — Z01.810 PRE-OPERATIVE CARDIOVASCULAR EXAMINATION: Primary | ICD-10-CM

## 2022-06-16 DIAGNOSIS — E78.5 DYSLIPIDEMIA ASSOCIATED WITH TYPE 2 DIABETES MELLITUS: ICD-10-CM

## 2022-06-16 DIAGNOSIS — Z98.890 STATUS POST CARDIAC CATHETERIZATION: ICD-10-CM

## 2022-06-16 LAB
ANION GAP SERPL CALC-SCNC: 9 MMOL/L (ref 8–16)
BUN SERPL-MCNC: 20 MG/DL (ref 8–23)
CALCIUM SERPL-MCNC: 8.6 MG/DL (ref 8.7–10.5)
CHLORIDE SERPL-SCNC: 109 MMOL/L (ref 95–110)
CO2 SERPL-SCNC: 22 MMOL/L (ref 23–29)
CREAT SERPL-MCNC: 1.7 MG/DL (ref 0.5–1.4)
EST. GFR  (AFRICAN AMERICAN): 47 ML/MIN/1.73 M^2
EST. GFR  (NON AFRICAN AMERICAN): 41 ML/MIN/1.73 M^2
GLUCOSE SERPL-MCNC: 103 MG/DL (ref 70–110)
POTASSIUM SERPL-SCNC: 5.1 MMOL/L (ref 3.5–5.1)
SODIUM SERPL-SCNC: 140 MMOL/L (ref 136–145)

## 2022-06-16 PROCEDURE — 92979 ENDOLUMINL IVUS OCT C EA: CPT | Mod: LM | Performed by: INTERNAL MEDICINE

## 2022-06-16 PROCEDURE — C1887 CATHETER, GUIDING: HCPCS | Performed by: INTERNAL MEDICINE

## 2022-06-16 PROCEDURE — C1894 INTRO/SHEATH, NON-LASER: HCPCS | Performed by: INTERNAL MEDICINE

## 2022-06-16 PROCEDURE — 63600175 PHARM REV CODE 636 W HCPCS: Performed by: INTERNAL MEDICINE

## 2022-06-16 PROCEDURE — 85347 COAGULATION TIME ACTIVATED: CPT | Performed by: INTERNAL MEDICINE

## 2022-06-16 PROCEDURE — 93010 ELECTROCARDIOGRAM REPORT: CPT | Mod: ,,, | Performed by: INTERNAL MEDICINE

## 2022-06-16 PROCEDURE — 92928 PR STENT: ICD-10-PCS | Mod: LD,,, | Performed by: INTERNAL MEDICINE

## 2022-06-16 PROCEDURE — C1874 STENT, COATED/COV W/DEL SYS: HCPCS | Performed by: INTERNAL MEDICINE

## 2022-06-16 PROCEDURE — 93005 ELECTROCARDIOGRAM TRACING: CPT

## 2022-06-16 PROCEDURE — C9600 PERC DRUG-EL COR STENT SING: HCPCS | Mod: LC | Performed by: INTERNAL MEDICINE

## 2022-06-16 PROCEDURE — 92928 PRQ TCAT PLMT NTRAC ST 1 LES: CPT | Mod: LD,,, | Performed by: INTERNAL MEDICINE

## 2022-06-16 PROCEDURE — 93454 CORONARY ARTERY ANGIO S&I: CPT | Mod: 59 | Performed by: INTERNAL MEDICINE

## 2022-06-16 PROCEDURE — 92978 PR IVUS, CORONARY, 1ST VESSEL: ICD-10-PCS | Mod: 26,LC,, | Performed by: INTERNAL MEDICINE

## 2022-06-16 PROCEDURE — 99152 MOD SED SAME PHYS/QHP 5/>YRS: CPT | Mod: ,,, | Performed by: INTERNAL MEDICINE

## 2022-06-16 PROCEDURE — 92978 ENDOLUMINL IVUS OCT C 1ST: CPT | Mod: LC | Performed by: INTERNAL MEDICINE

## 2022-06-16 PROCEDURE — 25000003 PHARM REV CODE 250: Performed by: INTERNAL MEDICINE

## 2022-06-16 PROCEDURE — 99152 MOD SED SAME PHYS/QHP 5/>YRS: CPT | Performed by: INTERNAL MEDICINE

## 2022-06-16 PROCEDURE — 93454 CORONARY ARTERY ANGIO S&I: CPT | Mod: 26,59,51, | Performed by: INTERNAL MEDICINE

## 2022-06-16 PROCEDURE — 27201423 OPTIME MED/SURG SUP & DEVICES STERILE SUPPLY: Performed by: INTERNAL MEDICINE

## 2022-06-16 PROCEDURE — C1769 GUIDE WIRE: HCPCS | Performed by: INTERNAL MEDICINE

## 2022-06-16 PROCEDURE — C1725 CATH, TRANSLUMIN NON-LASER: HCPCS | Performed by: INTERNAL MEDICINE

## 2022-06-16 PROCEDURE — 36415 COLL VENOUS BLD VENIPUNCTURE: CPT | Performed by: INTERNAL MEDICINE

## 2022-06-16 PROCEDURE — 92979 PR INTRAVASC CORONARY SO2,ADDN VESSEL: ICD-10-PCS | Mod: 26,LM,, | Performed by: INTERNAL MEDICINE

## 2022-06-16 PROCEDURE — 92929 PR STENT, ADD'L VESSEL: ICD-10-PCS | Mod: ,,, | Performed by: INTERNAL MEDICINE

## 2022-06-16 PROCEDURE — 92929 PR STENT, ADD'L VESSEL: CPT | Mod: ,,, | Performed by: INTERNAL MEDICINE

## 2022-06-16 PROCEDURE — 99152 PR MOD CONSCIOUS SEDATION, SAME PHYS, 5+ YRS, FIRST 15 MIN: ICD-10-PCS | Mod: ,,, | Performed by: INTERNAL MEDICINE

## 2022-06-16 PROCEDURE — 93010 EKG 12-LEAD: ICD-10-PCS | Mod: ,,, | Performed by: INTERNAL MEDICINE

## 2022-06-16 PROCEDURE — 80048 BASIC METABOLIC PNL TOTAL CA: CPT | Performed by: INTERNAL MEDICINE

## 2022-06-16 PROCEDURE — 92978 ENDOLUMINL IVUS OCT C 1ST: CPT | Mod: 26,LC,, | Performed by: INTERNAL MEDICINE

## 2022-06-16 PROCEDURE — 99153 MOD SED SAME PHYS/QHP EA: CPT | Performed by: INTERNAL MEDICINE

## 2022-06-16 PROCEDURE — 92979 ENDOLUMINL IVUS OCT C EA: CPT | Mod: 26,LM,, | Performed by: INTERNAL MEDICINE

## 2022-06-16 PROCEDURE — 93454 PR CATH PLACE/CORONARY ANGIO, IMG SUPER/INTERP: ICD-10-PCS | Mod: 26,59,51, | Performed by: INTERNAL MEDICINE

## 2022-06-16 PROCEDURE — C9601 PERC DRUG-EL COR STENT BRAN: HCPCS | Performed by: INTERNAL MEDICINE

## 2022-06-16 PROCEDURE — C1753 CATH, INTRAVAS ULTRASOUND: HCPCS | Performed by: INTERNAL MEDICINE

## 2022-06-16 PROCEDURE — 25500020 PHARM REV CODE 255: Performed by: INTERNAL MEDICINE

## 2022-06-16 DEVICE — STENT RONYX30026UX RESOLUTE ONYX 3.00X26
Type: IMPLANTABLE DEVICE | Site: CORONARY | Status: FUNCTIONAL
Brand: RESOLUTE ONYX™

## 2022-06-16 DEVICE — STENT RONYX25018UX RESOLUTE ONYX 2.50X18
Type: IMPLANTABLE DEVICE | Site: HEART | Status: FUNCTIONAL
Brand: RESOLUTE ONYX™

## 2022-06-16 DEVICE — STENT RONYX25008UX RESOLUTE ONYX 2.50X08
Type: IMPLANTABLE DEVICE | Site: HEART | Status: FUNCTIONAL
Brand: RESOLUTE ONYX™

## 2022-06-16 RX ORDER — HEPARIN SODIUM 1000 [USP'U]/ML
INJECTION, SOLUTION INTRAVENOUS; SUBCUTANEOUS
Status: DISCONTINUED | OUTPATIENT
Start: 2022-06-16 | End: 2022-06-16 | Stop reason: HOSPADM

## 2022-06-16 RX ORDER — SODIUM CHLORIDE 9 MG/ML
INJECTION, SOLUTION INTRAVENOUS CONTINUOUS
Status: DISCONTINUED | OUTPATIENT
Start: 2022-06-16 | End: 2022-06-16 | Stop reason: HOSPADM

## 2022-06-16 RX ORDER — VERAPAMIL HYDROCHLORIDE 2.5 MG/ML
INJECTION, SOLUTION INTRAVENOUS
Status: DISCONTINUED | OUTPATIENT
Start: 2022-06-16 | End: 2022-06-16 | Stop reason: HOSPADM

## 2022-06-16 RX ORDER — HEPARIN SODIUM 200 [USP'U]/100ML
INJECTION, SOLUTION INTRAVENOUS
Status: DISCONTINUED | OUTPATIENT
Start: 2022-06-16 | End: 2022-06-16 | Stop reason: HOSPADM

## 2022-06-16 RX ORDER — LIDOCAINE HYDROCHLORIDE 10 MG/ML
INJECTION INFILTRATION; PERINEURAL
Status: DISCONTINUED | OUTPATIENT
Start: 2022-06-16 | End: 2022-06-16 | Stop reason: HOSPADM

## 2022-06-16 RX ORDER — IODIXANOL 320 MG/ML
INJECTION, SOLUTION INTRAVASCULAR
Status: DISCONTINUED | OUTPATIENT
Start: 2022-06-16 | End: 2022-06-16 | Stop reason: HOSPADM

## 2022-06-16 RX ORDER — FENTANYL CITRATE 50 UG/ML
INJECTION, SOLUTION INTRAMUSCULAR; INTRAVENOUS
Status: DISCONTINUED | OUTPATIENT
Start: 2022-06-16 | End: 2022-06-16 | Stop reason: HOSPADM

## 2022-06-16 RX ORDER — MIDAZOLAM HYDROCHLORIDE 1 MG/ML
INJECTION, SOLUTION INTRAMUSCULAR; INTRAVENOUS
Status: DISCONTINUED | OUTPATIENT
Start: 2022-06-16 | End: 2022-06-16 | Stop reason: HOSPADM

## 2022-06-16 RX ORDER — DIPHENHYDRAMINE HYDROCHLORIDE 50 MG/ML
INJECTION INTRAMUSCULAR; INTRAVENOUS
Status: DISCONTINUED | OUTPATIENT
Start: 2022-06-16 | End: 2022-06-16 | Stop reason: HOSPADM

## 2022-06-16 RX ORDER — SODIUM CHLORIDE 9 MG/ML
INJECTION, SOLUTION INTRAVENOUS
Status: DISCONTINUED | OUTPATIENT
Start: 2022-06-16 | End: 2022-06-16 | Stop reason: HOSPADM

## 2022-06-16 RX ORDER — SODIUM CHLORIDE 9 MG/ML
INJECTION, SOLUTION INTRAVENOUS CONTINUOUS
Status: ACTIVE | OUTPATIENT
Start: 2022-06-16 | End: 2022-06-16

## 2022-06-16 RX ORDER — ONDANSETRON 4 MG/1
4 TABLET, ORALLY DISINTEGRATING ORAL EVERY 6 HOURS PRN
Status: DISCONTINUED | OUTPATIENT
Start: 2022-06-16 | End: 2022-06-16 | Stop reason: HOSPADM

## 2022-06-16 RX ORDER — ACETAMINOPHEN 325 MG/1
650 TABLET ORAL EVERY 4 HOURS PRN
Status: DISCONTINUED | OUTPATIENT
Start: 2022-06-16 | End: 2022-06-16 | Stop reason: HOSPADM

## 2022-06-16 RX ORDER — EZETIMIBE 10 MG/1
10 TABLET ORAL DAILY
Qty: 90 TABLET | Refills: 3 | Status: SHIPPED | OUTPATIENT
Start: 2022-06-16 | End: 2022-06-29

## 2022-06-16 RX ADMIN — SODIUM CHLORIDE 75 ML/HR: 0.9 INJECTION, SOLUTION INTRAVENOUS at 10:06

## 2022-06-16 NOTE — BRIEF OP NOTE
Stenting to OM1 CIRC, successful.  Stenting required to AV groove CIRC due to dissection and loss of flow to OM2, flow, normalized flow and repaired dissection with second stent.  Post Op course stable, home later today planned.

## 2022-06-16 NOTE — DISCHARGE SUMMARY
[place] - Lab (Hospital)  Discharge Note  Short Stay    Procedure(s) (LRB):  PTCA, Single Vessel (Right)  IVUS, Coronary  ANGIOGRAM, CORONARY ARTERY (N/A)  Placement of Closure Device    OUTCOME: Patient tolerated treatment/procedure well without complication and is now ready for discharge.    DISPOSITION: Home or Self Care    FINAL DIAGNOSIS:  <principal problem not specified>    FOLLOWUP: In clinic    DISCHARGE INSTRUCTIONS:    Discharge Procedure Orders   BASIC METABOLIC PANEL   Standing Status: Future Number of Occurrences: 1 Standing Exp. Date: 08/06/23     CBC W/ AUTO DIFFERENTIAL   Standing Status: Future Number of Occurrences: 1 Standing Exp. Date: 08/06/23         Clinical Reference Documents Added to Patient Instructions       Document    CORONARY ANGIOPLASTY (ENGLISH)    CORONARY ANGIOPLASTY DISCHARGE INSTRUCTIONS (ENGLISH)    CORONARY STENTING DISCHARGE INSTRUCTIONS (ENGLISH)          TIME SPENT ON DISCHARGE: 45 minutes

## 2022-06-16 NOTE — NURSING
Remaining air removed from right radial vasc band. Gauze and tegaderm dressing applied c.d.i. No drainage or shadowing noted. No bleeding or hematoma noted around site. +2 trang radial pulses palpated. Skin normal in color, warm to touch, < 3 sec cap refill. Pt tolerated well.  Will continue to monitor pt.   Reviewed site care right radial access and reviewed home care w/ pt and questions answered; meal tray with po fluids served and pt sitting up in bed; ns infusing as ordered; will continue to monitor; belongings at bedside and pts spouse plans to transport pt to home

## 2022-06-16 NOTE — NURSING
Discharge instructions reviewed with pts spouse in waiting room and has a copy of instructions w/ home care and follow up; pt has zetia at home and rx in out pt pharmacy when needed

## 2022-06-16 NOTE — PLAN OF CARE
Patient lying in bed no complaints of pain or distress. V/s stable  Procedure and recovery process discussed with patient. All question answered and patient understood.Will continue to monitor

## 2022-06-16 NOTE — PLAN OF CARE
Patient to cath recovery per stretcher w/ 2 rails up with nurse Jennifer and bedside report received; pt connected to monitor; brendon on monitor; pt is awake and oriented and has no pain and in no distress; right radial access site w/ vasc band in place and no bleeding nor hematoma; skin wm dry color pk; wm blankets applied; ns infusing at 100 ml per hour and placed on iv pump; will continue to monitor; ekg requested

## 2022-06-16 NOTE — NURSING
Patient cleared for discharge; pt belongings and stent cards x 2 given to pt---- pts spouse has the written discharge instructions and has the med zetia at home already; pt aao; pt has no complaints; right radial access site drsg cdi and no hematoma nor bleeding w/ brisk cap refill and palpable pulse; pt to bathroom per whch then escorted to pov with nurse per whch

## 2022-06-16 NOTE — DISCHARGE INSTRUCTIONS
Understanding Transradial Cardiac Catheterization    Cardiac catheterization (cardiac cath) is a common, non-surgical procedure. During the procedure, your doctor will insert a long, thin tube (catheter) into an artery and move it up into your heart. Transradial means the catheter is inserted into an artery in the wrist (the radial artery). This procedure can be used to diagnose and treat certain heart problems.  Why do I need a transradial cardiac cath?  You may need a cardiac cath if signs indicate a problem with your heart. These may include:  Symptoms of chest pain, tightness, or heaviness (known as angina). This is a common symptom of blocked heart arteries, known as coronary artery disease.  Symptoms of weakness, dizziness, trouble breathing, or swollen legs or feet. These may be symptoms of a problem with a heart valve or the heart muscle.  Other test results show heart problems. Tests may include stress tests, heart scans, and echocardiography.  During a cardiac cath, your doctor can see the condition of the coronary arteries and heart valves. He or she can also check how well the heart pumps and the flow of blood through the heart. Your doctor can also measure pressures and take blood samples. And, if needed, he or she can open blocked arteries. This can help reduce symptoms of angina.  Cardiac cath is often done using a catheter inserted into an artery in the groin. During transradial cardiac cath, the catheter is inserted into an artery in the wrist. This can mean less bleeding and a faster recovery. Some people may have blockages in the groin arteries as well as in the heart arteries, making it difficult to reach the heart. The transradial approach can be used to get around this problem.   What happens during a transradial cardiac cath?  The procedure is done in the hospital or a surgery center. First, an IV line is put in your arm or hand to deliver fluids and medicines. You will likely be given  medicine to relax you and make you drowsy. When the procedure begins:  You lie on an X-ray table.  The skin over the insertion site in your wrist is numbed.  The doctor makes a tiny puncture or incision into the artery in the wrist. He or she then inserts a catheter and threads it through the blood vessel into your heart.    The doctor may inject a contrast fluid through the catheter into the arteries. This fluid makes the arteries show up better on X-rays.  Tests may be done to check the condition of your heart and arteries. If needed, the doctor can clear blockages in the arteries or do other repairs.  When the doctor is finished, he or she will remove the catheter and put direct pressure on the site to prevent bleeding.  You will stay for a time to recover, and then go home.  What are the risks of transradial cardiac cath?  These include:  Bleeding, bruising, infection, or blood clots  Damage to the radial artery that may cause injury to the hand  Allergic reaction to the contrast fluid  Abnormal heartbeat (arrhythmia)  Damage to blood vessels or tissues  Kidney damage or failure  The need for emergency heart surgery  Heart attack, stroke, or death  Date Last Reviewed: 5/1/2016  © 1198-7615 [employer]. [address] [place] [address]. All rights reserved. This information is not intended as a substitute for professional medical care. Always follow your healthcare professional's instructions. Discharge Instructions:    Do not drive a car, operate heavy equipment, care for a young child, etc for the next 12-24 hours.   Avoid drinking alcohol for 24 hours.  Do not make any important decisions for 24 hours.    Drink fluids to keep hydrated. Resume your usual diet as tolerated.     Rest for today then activity as tolerated.   Do not lift anything over 5 pounds for the first 3 days after procedure.    Remove dressing tomorrow then may shower with warm soapy water. Do not scrub site. Pat dry.    May apply bandaid for 2 days.  No tub baths.  Do not submerge wound in water for 3 days.     Call MD for any unrelieved pain, excessive nausea or vomiting, redness around site, bleeding, or pus or foul smelling drainage, or any other questions or concerns.    Go to the ER for any difficulty breathing or chest pain.      If site swells or bleeds, hold direct pressure to area for 10 full minutes.   If site continues to bleed, continue to hold pressure to site and have someone bring you to the ER.

## 2022-06-17 ENCOUNTER — PATIENT MESSAGE (OUTPATIENT)
Dept: UROLOGY | Facility: CLINIC | Age: 67
End: 2022-06-17
Payer: MEDICARE

## 2022-06-21 LAB
POC ACTIVATED CLOTTING TIME K: 208 SEC (ref 74–137)
POC ACTIVATED CLOTTING TIME K: 285 SEC (ref 74–137)
POC ACTIVATED CLOTTING TIME K: 380 SEC (ref 74–137)
POC ACTIVATED CLOTTING TIME K: 511 SEC (ref 74–137)
SAMPLE: ABNORMAL

## 2022-06-29 ENCOUNTER — OFFICE VISIT (OUTPATIENT)
Dept: CARDIOLOGY | Facility: CLINIC | Age: 67
End: 2022-06-29
Payer: MEDICARE

## 2022-06-29 VITALS
WEIGHT: 198.44 LBS | SYSTOLIC BLOOD PRESSURE: 117 MMHG | HEART RATE: 55 BPM | DIASTOLIC BLOOD PRESSURE: 65 MMHG | BODY MASS INDEX: 25.47 KG/M2 | OXYGEN SATURATION: 98 %

## 2022-06-29 DIAGNOSIS — I70.0 AORTIC ATHEROSCLEROSIS: ICD-10-CM

## 2022-06-29 DIAGNOSIS — E11.59 HYPERTENSION ASSOCIATED WITH DIABETES: ICD-10-CM

## 2022-06-29 DIAGNOSIS — I15.2 HYPERTENSION ASSOCIATED WITH DIABETES: ICD-10-CM

## 2022-06-29 DIAGNOSIS — E11.22 TYPE 2 DIABETES MELLITUS WITH STAGE 3A CHRONIC KIDNEY DISEASE, WITHOUT LONG-TERM CURRENT USE OF INSULIN: ICD-10-CM

## 2022-06-29 DIAGNOSIS — E11.69 DYSLIPIDEMIA ASSOCIATED WITH TYPE 2 DIABETES MELLITUS: ICD-10-CM

## 2022-06-29 DIAGNOSIS — I25.118 CORONARY ARTERY DISEASE OF NATIVE ARTERY OF NATIVE HEART WITH STABLE ANGINA PECTORIS: ICD-10-CM

## 2022-06-29 DIAGNOSIS — N18.31 TYPE 2 DIABETES MELLITUS WITH STAGE 3A CHRONIC KIDNEY DISEASE, WITHOUT LONG-TERM CURRENT USE OF INSULIN: ICD-10-CM

## 2022-06-29 DIAGNOSIS — R00.1 SINUS BRADYCARDIA: ICD-10-CM

## 2022-06-29 DIAGNOSIS — E78.5 DYSLIPIDEMIA ASSOCIATED WITH TYPE 2 DIABETES MELLITUS: ICD-10-CM

## 2022-06-29 PROCEDURE — 99999 PR PBB SHADOW E&M-EST. PATIENT-LVL IV: ICD-10-PCS | Mod: PBBFAC,,, | Performed by: INTERNAL MEDICINE

## 2022-06-29 PROCEDURE — 99499 UNLISTED E&M SERVICE: CPT | Mod: S$GLB,,, | Performed by: INTERNAL MEDICINE

## 2022-06-29 PROCEDURE — 3008F PR BODY MASS INDEX (BMI) DOCUMENTED: ICD-10-PCS | Mod: CPTII,S$GLB,, | Performed by: INTERNAL MEDICINE

## 2022-06-29 PROCEDURE — 1160F PR REVIEW ALL MEDS BY PRESCRIBER/CLIN PHARMACIST DOCUMENTED: ICD-10-PCS | Mod: CPTII,S$GLB,, | Performed by: INTERNAL MEDICINE

## 2022-06-29 PROCEDURE — 3074F SYST BP LT 130 MM HG: CPT | Mod: CPTII,S$GLB,, | Performed by: INTERNAL MEDICINE

## 2022-06-29 PROCEDURE — 3074F PR MOST RECENT SYSTOLIC BLOOD PRESSURE < 130 MM HG: ICD-10-PCS | Mod: CPTII,S$GLB,, | Performed by: INTERNAL MEDICINE

## 2022-06-29 PROCEDURE — 3061F NEG MICROALBUMINURIA REV: CPT | Mod: CPTII,S$GLB,, | Performed by: INTERNAL MEDICINE

## 2022-06-29 PROCEDURE — 1159F PR MEDICATION LIST DOCUMENTED IN MEDICAL RECORD: ICD-10-PCS | Mod: CPTII,S$GLB,, | Performed by: INTERNAL MEDICINE

## 2022-06-29 PROCEDURE — 3044F HG A1C LEVEL LT 7.0%: CPT | Mod: CPTII,S$GLB,, | Performed by: INTERNAL MEDICINE

## 2022-06-29 PROCEDURE — 99499 RISK ADDL DX/OHS AUDIT: ICD-10-PCS | Mod: S$GLB,,, | Performed by: INTERNAL MEDICINE

## 2022-06-29 PROCEDURE — 3061F PR NEG MICROALBUMINURIA RESULT DOCUMENTED/REVIEW: ICD-10-PCS | Mod: CPTII,S$GLB,, | Performed by: INTERNAL MEDICINE

## 2022-06-29 PROCEDURE — 4010F ACE/ARB THERAPY RXD/TAKEN: CPT | Mod: CPTII,S$GLB,, | Performed by: INTERNAL MEDICINE

## 2022-06-29 PROCEDURE — 4010F PR ACE/ARB THEARPY RXD/TAKEN: ICD-10-PCS | Mod: CPTII,S$GLB,, | Performed by: INTERNAL MEDICINE

## 2022-06-29 PROCEDURE — 3078F PR MOST RECENT DIASTOLIC BLOOD PRESSURE < 80 MM HG: ICD-10-PCS | Mod: CPTII,S$GLB,, | Performed by: INTERNAL MEDICINE

## 2022-06-29 PROCEDURE — 99214 OFFICE O/P EST MOD 30 MIN: CPT | Mod: S$GLB,,, | Performed by: INTERNAL MEDICINE

## 2022-06-29 PROCEDURE — 1160F RVW MEDS BY RX/DR IN RCRD: CPT | Mod: CPTII,S$GLB,, | Performed by: INTERNAL MEDICINE

## 2022-06-29 PROCEDURE — 3066F NEPHROPATHY DOC TX: CPT | Mod: CPTII,S$GLB,, | Performed by: INTERNAL MEDICINE

## 2022-06-29 PROCEDURE — 3008F BODY MASS INDEX DOCD: CPT | Mod: CPTII,S$GLB,, | Performed by: INTERNAL MEDICINE

## 2022-06-29 PROCEDURE — 99999 PR PBB SHADOW E&M-EST. PATIENT-LVL IV: CPT | Mod: PBBFAC,,, | Performed by: INTERNAL MEDICINE

## 2022-06-29 PROCEDURE — 3078F DIAST BP <80 MM HG: CPT | Mod: CPTII,S$GLB,, | Performed by: INTERNAL MEDICINE

## 2022-06-29 PROCEDURE — 1126F AMNT PAIN NOTED NONE PRSNT: CPT | Mod: CPTII,S$GLB,, | Performed by: INTERNAL MEDICINE

## 2022-06-29 PROCEDURE — 1126F PR PAIN SEVERITY QUANTIFIED, NO PAIN PRESENT: ICD-10-PCS | Mod: CPTII,S$GLB,, | Performed by: INTERNAL MEDICINE

## 2022-06-29 PROCEDURE — 1159F MED LIST DOCD IN RCRD: CPT | Mod: CPTII,S$GLB,, | Performed by: INTERNAL MEDICINE

## 2022-06-29 PROCEDURE — 3044F PR MOST RECENT HEMOGLOBIN A1C LEVEL <7.0%: ICD-10-PCS | Mod: CPTII,S$GLB,, | Performed by: INTERNAL MEDICINE

## 2022-06-29 PROCEDURE — 99214 PR OFFICE/OUTPT VISIT, EST, LEVL IV, 30-39 MIN: ICD-10-PCS | Mod: S$GLB,,, | Performed by: INTERNAL MEDICINE

## 2022-06-29 PROCEDURE — 3066F PR DOCUMENTATION OF TREATMENT FOR NEPHROPATHY: ICD-10-PCS | Mod: CPTII,S$GLB,, | Performed by: INTERNAL MEDICINE

## 2022-06-29 RX ORDER — LISINOPRIL 5 MG/1
5 TABLET ORAL DAILY
Qty: 90 TABLET | Refills: 4 | Status: SHIPPED | OUTPATIENT
Start: 2022-06-29 | End: 2023-01-26

## 2022-06-29 RX ORDER — AMLODIPINE BESYLATE 5 MG/1
5 TABLET ORAL 2 TIMES DAILY
Qty: 180 TABLET | Refills: 4 | Status: SHIPPED | OUTPATIENT
Start: 2022-06-29 | End: 2023-05-23 | Stop reason: SDUPTHER

## 2022-06-29 NOTE — PROGRESS NOTES
[facility] Cardiology     Subjective:    Patient ID:  Addy Redding is a 66 y.o. male who presents for follow-up of Diabetes Mellitus, Coronary Artery Disease, Hyperlipidemia, and Hypertension    Review of patient's allergies indicates:  No Known Allergies   The patient underwent angiography two weeks ago confirming non obstructive obstructive left main disease utilizing IVUS.  Stenting was performed to the 1st marginal branch.  The procedure required stenting to the AV groove obtuse marginal branch 2 due to plaque redistribution from 1st OM into the AV groove.  There was transient ST elevation.  I was unable to cross back into the 1st marginal branch and complete stenting to the ostium.  There was residual ostial marginal branch stenosis.  He went home on the same day.    The patient has been doing fairly well.  His blood pressures are stable.  My notes reflect that he had been on nitrates and calcium channel blockers but he is only on lisinopril currently.  He is not on beta-blockers due to chronic bradycardia.  He is on Zetia and atorvastatin.  Most recent LDL 93 mg%, HDL 39, triglycerides 70 to by labs May 2022. I do not think that set of labs reflects Zetia added to high-dose atorvastatin.  He has used to nitroglycerin.  I am not sure if it was before the stent her after.  He states it was after the stent.    He has elevated PSA.  His urologist wants to perform a prostate biopsy.  He is focused on getting that done.  He wanted to proceed with the coronary intervention before dealing with the prostate elevation of PSA issue.        Review of Systems   Constitutional: Positive for malaise/fatigue. Negative for chills, decreased appetite, diaphoresis, fever, night sweats, weight gain and weight loss.   HENT: Negative for congestion, ear discharge, ear pain, hearing loss, hoarse voice, nosebleeds, odynophagia, sore throat, stridor and tinnitus.     Eyes: Negative for blurred vision, discharge, double vision, pain, photophobia, redness, vision loss in left eye, vision loss in right eye, visual disturbance and visual halos.   Cardiovascular: Positive for chest pain. Negative for claudication, cyanosis, dyspnea on exertion, irregular heartbeat, leg swelling, near-syncope, orthopnea, palpitations, paroxysmal nocturnal dyspnea and syncope.   Respiratory: Negative for cough, hemoptysis, shortness of breath, sleep disturbances due to breathing, snoring, sputum production and wheezing.    Endocrine: Negative for cold intolerance, heat intolerance, polydipsia, polyphagia and polyuria.   Hematologic/Lymphatic: Negative for adenopathy and bleeding problem. Does not bruise/bleed easily.   Skin: Negative for color change, dry skin, flushing, itching, nail changes, poor wound healing, rash, skin cancer, suspicious lesions and unusual hair distribution.   Musculoskeletal: Negative for arthritis, back pain, falls, gout, joint pain, joint swelling, muscle cramps, muscle weakness, myalgias, neck pain and stiffness.   Gastrointestinal: Negative for bloating, abdominal pain, anorexia, change in bowel habit, bowel incontinence, constipation, diarrhea, dysphagia, excessive appetite, flatus, heartburn, hematemesis, hematochezia, hemorrhoids, jaundice, melena, nausea and vomiting.   Genitourinary: Negative for bladder incontinence, decreased libido, dysuria, flank pain, frequency, genital sores, hematuria, hesitancy, incomplete emptying, nocturia and urgency.   Neurological: Negative for aphonia, brief paralysis, difficulty with concentration, disturbances in coordination, excessive daytime sleepiness, dizziness, focal weakness, headaches, light-headedness, loss of balance, numbness, paresthesias, seizures, sensory change, tremors, vertigo and weakness.   Psychiatric/Behavioral: Negative for altered mental status, depression, hallucinations, memory loss, substance abuse, suicidal  ideas and thoughts of violence. The patient does not have insomnia and is not nervous/anxious.    Allergic/Immunologic: Negative for hives and persistent infections.        Objective:       Vitals:    06/29/22 1522   BP: 117/65   BP Location: Left arm   Pulse: (!) 55   SpO2: 98%   Weight: 90 kg (198 lb 6.6 oz)    Physical Exam  Constitutional:       General: He is not in acute distress.     Appearance: He is well-developed. He is not diaphoretic.   HENT:      Head: Normocephalic and atraumatic.      Nose: Nose normal.   Eyes:      General: No scleral icterus.        Right eye: No discharge.      Conjunctiva/sclera: Conjunctivae normal.      Pupils: Pupils are equal, round, and reactive to light.   Neck:      Thyroid: No thyromegaly.      Vascular: No JVD.      Trachea: No tracheal deviation.   Cardiovascular:      Rate and Rhythm: Normal rate and regular rhythm.      Pulses: Normal pulses.           Carotid pulses are 2+ on the right side and 2+ on the left side.       Radial pulses are 2+ on the right side and 2+ on the left side.        Dorsalis pedis pulses are 2+ on the right side and 2+ on the left side.        Posterior tibial pulses are 2+ on the right side and 2+ on the left side.      Heart sounds: Normal heart sounds. No murmur heard.    No friction rub. No gallop.   Pulmonary:      Effort: Pulmonary effort is normal. No respiratory distress.      Breath sounds: Normal breath sounds. No stridor. No wheezing or rales.   Chest:      Chest wall: No tenderness.   Abdominal:      General: Bowel sounds are normal. There is no distension.      Palpations: Abdomen is soft. There is no mass.      Tenderness: There is no abdominal tenderness. There is no guarding or rebound.   Musculoskeletal:         General: No tenderness. Normal range of motion.      Cervical back: Normal range of motion and neck supple.   Lymphadenopathy:      Cervical: No cervical adenopathy.   Skin:     General: Skin is warm and dry.       Coloration: Skin is not pale.      Findings: No erythema or rash.   Neurological:      Mental Status: He is alert and oriented to person, place, and time.      Cranial Nerves: No cranial nerve deficit.      Coordination: Coordination normal.   Psychiatric:         Behavior: Behavior normal.         Thought Content: Thought content normal.         Judgment: Judgment normal.           Assessment:       1. Hypertension associated with diabetes      Results for orders placed or performed during the hospital encounter of 06/16/22   Basic metabolic panel   Result Value Ref Range    Sodium 140 136 - 145 mmol/L    Potassium 5.1 3.5 - 5.1 mmol/L    Chloride 109 95 - 110 mmol/L    CO2 22 (L) 23 - 29 mmol/L    Glucose 103 70 - 110 mg/dL    BUN 20 8 - 23 mg/dL    Creatinine 1.7 (H) 0.5 - 1.4 mg/dL    Calcium 8.6 (L) 8.7 - 10.5 mg/dL    Anion Gap 9 8 - 16 mmol/L    eGFR if African American 47 (A) >60 mL/min/1.73 m^2    eGFR if non African American 41 (A) >60 mL/min/1.73 m^2   ISTAT ACT-K   Result Value Ref Range    POC ACTIVATED CLOTTING TIME K 380 (H) 74 - 137 sec    Sample ARTERIAL    ISTAT ACT-K   Result Value Ref Range    POC ACTIVATED CLOTTING TIME K 511 (H) 74 - 137 sec    Sample ARTERIAL    ISTAT ACT-K   Result Value Ref Range    POC ACTIVATED CLOTTING TIME K 285 (H) 74 - 137 sec    Sample ARTERIAL    ISTAT ACT-K   Result Value Ref Range    POC ACTIVATED CLOTTING TIME K 208 (H) 74 - 137 sec    Sample ARTERIAL          Current Outpatient Medications:     ADVANCED GLUC METER TEST STRIP Strp, USE TO TEST BLOOD SUGAR 4 TIMES DAILY., Disp: 100 strip, Rfl: 0    aspirin (ECOTRIN) 81 MG EC tablet, Take 1 tablet (81 mg total) by mouth once daily., Disp: , Rfl:     atorvastatin (LIPITOR) 80 MG tablet, Take 1 tablet (80 mg total) by mouth once daily., Disp: 90 tablet, Rfl: 3    blood sugar diagnostic (BLOOD GLUCOSE TEST) Strp, Check sugar once daily, Disp: 100 each, Rfl: 11    clopidogreL (PLAVIX) 75 mg tablet, Take 1 tablet  (75 mg total) by mouth once daily., Disp: 90 tablet, Rfl: 4    ezetimibe (ZETIA) 10 mg tablet, Take 1 tablet (10 mg total) by mouth once daily., Disp: 90 tablet, Rfl: 4    ibuprofen (ADVIL,MOTRIN) 800 MG tablet, Take 1 tablet (800 mg total) by mouth every 8 (eight) hours as needed for Pain., Disp: 30 tablet, Rfl: 2    lancets (ACCU-CHEK SOFTCLIX LANCETS) Misc, 1 lancet by Misc.(Non-Drug; Combo Route) route 2 (two) times daily., Disp: 200 each, Rfl: 1    metFORMIN (GLUCOPHAGE) 1000 MG tablet, TAKE ONE TABLET BY MOUTH TWICE DAILY, Disp: 180 tablet, Rfl: 3    nitroGLYCERIN (NITROSTAT) 0.4 MG SL tablet, Place 1 tablet (0.4 mg total) under the tongue every 5 (five) minutes as needed for Chest pain., Disp: 30 tablet, Rfl: 3    pantoprazole (PROTONIX) 40 MG tablet, TAKE ONE TABLET BY MOUTH ONCE DAILY., Disp: 90 tablet, Rfl: 3    sildenafil (REVATIO) 20 mg Tab, Take 1 tablet (20 mg total) by mouth daily as needed for ED, Disp: 30 tablet, Rfl: 2    amLODIPine (NORVASC) 5 MG tablet, Take 1 tablet (5 mg total) by mouth 2 (two) times daily., Disp: 180 tablet, Rfl: 4    blood-glucose meter kit, Use as instructed, Disp: , Rfl:     lisinopriL (PRINIVIL,ZESTRIL) 5 MG tablet, Take 1 tablet (5 mg total) by mouth once daily., Disp: 90 tablet, Rfl: 4     Lab Results   Component Value Date    WBC 6.51 06/14/2022    RBC 4.25 (L) 06/14/2022    HGB 12.4 (L) 06/14/2022    HCT 37.0 (L) 06/14/2022    MCV 87 06/14/2022    MCH 29.2 06/14/2022    MCHC 33.5 06/14/2022    RDW 13.8 06/14/2022     06/14/2022    MPV 10.3 06/14/2022    GRAN 3.3 06/14/2022    GRAN 51.0 06/14/2022    LYMPH 2.2 06/14/2022    LYMPH 33.2 06/14/2022    MONO 0.6 06/14/2022    MONO 9.4 06/14/2022    EOS 0.3 06/14/2022    BASO 0.07 06/14/2022    EOSINOPHIL 4.8 06/14/2022    BASOPHIL 1.1 06/14/2022    MG 1.9 10/09/2012        CMP  Lab Results   Component Value Date     06/16/2022    K 5.1 06/16/2022     06/16/2022    CO2 22 (L) 06/16/2022      06/16/2022    BUN 20 06/16/2022    CREATININE 1.7 (H) 06/16/2022    CALCIUM 8.6 (L) 06/16/2022    PROT 6.6 05/27/2022    ALBUMIN 3.6 05/27/2022    BILITOT 0.5 05/27/2022    ALKPHOS 50 (L) 05/27/2022    AST 16 05/27/2022    ALT 13 05/27/2022    ANIONGAP 9 06/16/2022    ESTGFRAFRICA 47 (A) 06/16/2022    EGFRNONAA 41 (A) 06/16/2022        Lab Results   Component Value Date    LABURIN No growth 10/04/2018            Results for orders placed or performed during the hospital encounter of 06/16/22   EKG 12-lead    Collection Time: 06/16/22  1:06 PM    Narrative    Test Reason : Z98.890,    Vent. Rate : 041 BPM     Atrial Rate : 041 BPM     P-R Int : 170 ms          QRS Dur : 094 ms      QT Int : 492 ms       P-R-T Axes : 017 016 023 degrees     QTc Int : 405 ms    Marked sinus bradycardia  Abnormal ECG  When compared with ECG of 07-APR-2022 07:49,  No significant change was found  Confirmed by SAURABH PEÑA MD [id number]) on 6/17/2022 7:11:03 AM    Referred By: ANIKA COLEMAN           Confirmed By:SAURABH PEÑA MD                  Plan:       Problem List Items Addressed This Visit        Cardiac/Vascular    Hypertension associated with diabetes    Relevant Medications    amLODIPine (NORVASC) 5 MG tablet    lisinopriL (PRINIVIL,ZESTRIL) 5 MG tablet               I will see him back in 2 months.  Given the importance of his prostate biopsy I think he can get done in 3-4 months.  Of course he will have to interrupt dual anti-platelet therapy.  I will work with Urology on that issue.    I have decrease lisinopril dosing to 5 mg daily.  Amlodipine 5 mg b.i.d. ordered.  He is not a good candidate for beta-blockers due to chronic bradycardia.  He uses Viagra, Imdur not ordered today.  He has [place] classification angina to status at this time.  There is normal ejection fraction.           Carter Arevalo MD  06/29/2022   3:53 PM

## 2022-06-30 NOTE — ASSESSMENT & PLAN NOTE
A chronic condition.  Heart rate today 50 beats per minute.  Beta-blockers not utilized for this reason.

## 2022-06-30 NOTE — ASSESSMENT & PLAN NOTE
He has class 2 angina pectoris I believe.  Imdur withdrawn today given his request to utilize Viagra.  He does have p.r.n. nitroglycerin.

## 2022-06-30 NOTE — ASSESSMENT & PLAN NOTE
He is on high-dose atorvastatin and Zetia.  Repeat labs to be reviewed.  Current LDL May 27, 2022 92.6 mg%.  Pure hypercholesterolemia.

## 2022-06-30 NOTE — ASSESSMENT & PLAN NOTE
I reduced his lisinopril dosing to 5 mg and he will start amlodipine 5 mg b.i.d..  His blood pressures have been stable with lisinopril 20 mg daily dosing.  I reduced ACE-inhibitor therapy for tolerance of amlodipine.  Blood pressure today 117/65 mm Hg.

## 2022-07-05 ENCOUNTER — LAB VISIT (OUTPATIENT)
Dept: LAB | Facility: HOSPITAL | Age: 67
End: 2022-07-05
Attending: FAMILY MEDICINE
Payer: MEDICARE

## 2022-07-05 DIAGNOSIS — E11.69 DYSLIPIDEMIA ASSOCIATED WITH TYPE 2 DIABETES MELLITUS: ICD-10-CM

## 2022-07-05 DIAGNOSIS — E11.59 HYPERTENSION ASSOCIATED WITH DIABETES: ICD-10-CM

## 2022-07-05 DIAGNOSIS — N18.31 TYPE 2 DIABETES MELLITUS WITH STAGE 3A CHRONIC KIDNEY DISEASE, WITHOUT LONG-TERM CURRENT USE OF INSULIN: ICD-10-CM

## 2022-07-05 DIAGNOSIS — I15.2 HYPERTENSION ASSOCIATED WITH DIABETES: ICD-10-CM

## 2022-07-05 DIAGNOSIS — E11.22 TYPE 2 DIABETES MELLITUS WITH STAGE 3A CHRONIC KIDNEY DISEASE, WITHOUT LONG-TERM CURRENT USE OF INSULIN: ICD-10-CM

## 2022-07-05 DIAGNOSIS — E78.5 DYSLIPIDEMIA ASSOCIATED WITH TYPE 2 DIABETES MELLITUS: ICD-10-CM

## 2022-07-05 LAB
ALBUMIN SERPL BCP-MCNC: 4.1 G/DL (ref 3.5–5.2)
ALP SERPL-CCNC: 40 U/L (ref 55–135)
ALT SERPL W/O P-5'-P-CCNC: 16 U/L (ref 10–44)
ANION GAP SERPL CALC-SCNC: 10 MMOL/L (ref 8–16)
AST SERPL-CCNC: 18 U/L (ref 10–40)
BASOPHILS # BLD AUTO: 0.05 K/UL (ref 0–0.2)
BASOPHILS NFR BLD: 0.8 % (ref 0–1.9)
BILIRUB SERPL-MCNC: 0.4 MG/DL (ref 0.1–1)
BUN SERPL-MCNC: 47 MG/DL (ref 8–23)
CALCIUM SERPL-MCNC: 9.7 MG/DL (ref 8.7–10.5)
CHLORIDE SERPL-SCNC: 108 MMOL/L (ref 95–110)
CHOLEST SERPL-MCNC: 120 MG/DL (ref 120–199)
CHOLEST/HDLC SERPL: 2.8 {RATIO} (ref 2–5)
CO2 SERPL-SCNC: 21 MMOL/L (ref 23–29)
CREAT SERPL-MCNC: 2.5 MG/DL (ref 0.5–1.4)
DIFFERENTIAL METHOD: ABNORMAL
EOSINOPHIL # BLD AUTO: 0.3 K/UL (ref 0–0.5)
EOSINOPHIL NFR BLD: 4.1 % (ref 0–8)
ERYTHROCYTE [DISTWIDTH] IN BLOOD BY AUTOMATED COUNT: 13.7 % (ref 11.5–14.5)
EST. GFR  (AFRICAN AMERICAN): 29 ML/MIN/1.73 M^2
EST. GFR  (NON AFRICAN AMERICAN): 25 ML/MIN/1.73 M^2
ESTIMATED AVG GLUCOSE: 166 MG/DL (ref 68–131)
GLUCOSE SERPL-MCNC: 107 MG/DL (ref 70–110)
HBA1C MFR BLD: 7.4 % (ref 4–5.6)
HCT VFR BLD AUTO: 34.7 % (ref 40–54)
HDLC SERPL-MCNC: 43 MG/DL (ref 40–75)
HDLC SERPL: 35.8 % (ref 20–50)
HGB BLD-MCNC: 11.6 G/DL (ref 14–18)
IMM GRANULOCYTES # BLD AUTO: 0.01 K/UL (ref 0–0.04)
IMM GRANULOCYTES NFR BLD AUTO: 0.2 % (ref 0–0.5)
LDLC SERPL CALC-MCNC: 66.2 MG/DL (ref 63–159)
LYMPHOCYTES # BLD AUTO: 1.6 K/UL (ref 1–4.8)
LYMPHOCYTES NFR BLD: 25.9 % (ref 18–48)
MCH RBC QN AUTO: 29.4 PG (ref 27–31)
MCHC RBC AUTO-ENTMCNC: 33.4 G/DL (ref 32–36)
MCV RBC AUTO: 88 FL (ref 82–98)
MONOCYTES # BLD AUTO: 0.7 K/UL (ref 0.3–1)
MONOCYTES NFR BLD: 11.1 % (ref 4–15)
NEUTROPHILS # BLD AUTO: 3.6 K/UL (ref 1.8–7.7)
NEUTROPHILS NFR BLD: 57.9 % (ref 38–73)
NONHDLC SERPL-MCNC: 77 MG/DL
NRBC BLD-RTO: 0 /100 WBC
PLATELET # BLD AUTO: 282 K/UL (ref 150–450)
PMV BLD AUTO: 10.9 FL (ref 9.2–12.9)
POTASSIUM SERPL-SCNC: 4.7 MMOL/L (ref 3.5–5.1)
PROT SERPL-MCNC: 7.6 G/DL (ref 6–8.4)
RBC # BLD AUTO: 3.94 M/UL (ref 4.6–6.2)
SODIUM SERPL-SCNC: 139 MMOL/L (ref 136–145)
TRIGL SERPL-MCNC: 54 MG/DL (ref 30–150)
WBC # BLD AUTO: 6.14 K/UL (ref 3.9–12.7)

## 2022-07-05 PROCEDURE — 83036 HEMOGLOBIN GLYCOSYLATED A1C: CPT | Performed by: FAMILY MEDICINE

## 2022-07-05 PROCEDURE — 85025 COMPLETE CBC W/AUTO DIFF WBC: CPT | Performed by: FAMILY MEDICINE

## 2022-07-05 PROCEDURE — 80053 COMPREHEN METABOLIC PANEL: CPT | Performed by: FAMILY MEDICINE

## 2022-07-05 PROCEDURE — 36415 COLL VENOUS BLD VENIPUNCTURE: CPT | Performed by: FAMILY MEDICINE

## 2022-07-05 PROCEDURE — 80061 LIPID PANEL: CPT | Performed by: FAMILY MEDICINE

## 2022-07-06 ENCOUNTER — OFFICE VISIT (OUTPATIENT)
Dept: FAMILY MEDICINE | Facility: CLINIC | Age: 67
End: 2022-07-06
Attending: FAMILY MEDICINE
Payer: MEDICARE

## 2022-07-06 VITALS
HEART RATE: 56 BPM | TEMPERATURE: 98 F | HEIGHT: 74 IN | BODY MASS INDEX: 25.07 KG/M2 | SYSTOLIC BLOOD PRESSURE: 110 MMHG | WEIGHT: 195.31 LBS | DIASTOLIC BLOOD PRESSURE: 60 MMHG | OXYGEN SATURATION: 99 %

## 2022-07-06 DIAGNOSIS — I25.118 CORONARY ARTERY DISEASE OF NATIVE ARTERY OF NATIVE HEART WITH STABLE ANGINA PECTORIS: ICD-10-CM

## 2022-07-06 DIAGNOSIS — E78.5 DYSLIPIDEMIA ASSOCIATED WITH TYPE 2 DIABETES MELLITUS: ICD-10-CM

## 2022-07-06 DIAGNOSIS — N18.4 CHRONIC KIDNEY DISEASE (CKD), STAGE IV (SEVERE): ICD-10-CM

## 2022-07-06 DIAGNOSIS — E11.22 TYPE 2 DIABETES MELLITUS WITH STAGE 3A CHRONIC KIDNEY DISEASE, WITHOUT LONG-TERM CURRENT USE OF INSULIN: Primary | ICD-10-CM

## 2022-07-06 DIAGNOSIS — E11.69 DYSLIPIDEMIA ASSOCIATED WITH TYPE 2 DIABETES MELLITUS: ICD-10-CM

## 2022-07-06 DIAGNOSIS — E11.59 HYPERTENSION ASSOCIATED WITH DIABETES: ICD-10-CM

## 2022-07-06 DIAGNOSIS — N18.31 TYPE 2 DIABETES MELLITUS WITH STAGE 3A CHRONIC KIDNEY DISEASE, WITHOUT LONG-TERM CURRENT USE OF INSULIN: Primary | ICD-10-CM

## 2022-07-06 DIAGNOSIS — I15.2 HYPERTENSION ASSOCIATED WITH DIABETES: ICD-10-CM

## 2022-07-06 PROCEDURE — 3288F PR FALLS RISK ASSESSMENT DOCUMENTED: ICD-10-PCS | Mod: CPTII,S$GLB,, | Performed by: FAMILY MEDICINE

## 2022-07-06 PROCEDURE — 3074F SYST BP LT 130 MM HG: CPT | Mod: CPTII,S$GLB,, | Performed by: FAMILY MEDICINE

## 2022-07-06 PROCEDURE — 3051F PR MOST RECENT HEMOGLOBIN A1C LEVEL 7.0 - < 8.0%: ICD-10-PCS | Mod: CPTII,S$GLB,, | Performed by: FAMILY MEDICINE

## 2022-07-06 PROCEDURE — 1159F PR MEDICATION LIST DOCUMENTED IN MEDICAL RECORD: ICD-10-PCS | Mod: CPTII,S$GLB,, | Performed by: FAMILY MEDICINE

## 2022-07-06 PROCEDURE — 1126F AMNT PAIN NOTED NONE PRSNT: CPT | Mod: CPTII,S$GLB,, | Performed by: FAMILY MEDICINE

## 2022-07-06 PROCEDURE — 1159F MED LIST DOCD IN RCRD: CPT | Mod: CPTII,S$GLB,, | Performed by: FAMILY MEDICINE

## 2022-07-06 PROCEDURE — 3078F PR MOST RECENT DIASTOLIC BLOOD PRESSURE < 80 MM HG: ICD-10-PCS | Mod: CPTII,S$GLB,, | Performed by: FAMILY MEDICINE

## 2022-07-06 PROCEDURE — 4010F ACE/ARB THERAPY RXD/TAKEN: CPT | Mod: CPTII,S$GLB,, | Performed by: FAMILY MEDICINE

## 2022-07-06 PROCEDURE — 99499 RISK ADDL DX/OHS AUDIT: ICD-10-PCS | Mod: S$GLB,,, | Performed by: FAMILY MEDICINE

## 2022-07-06 PROCEDURE — 3061F PR NEG MICROALBUMINURIA RESULT DOCUMENTED/REVIEW: ICD-10-PCS | Mod: CPTII,S$GLB,, | Performed by: FAMILY MEDICINE

## 2022-07-06 PROCEDURE — 3078F DIAST BP <80 MM HG: CPT | Mod: CPTII,S$GLB,, | Performed by: FAMILY MEDICINE

## 2022-07-06 PROCEDURE — 1160F RVW MEDS BY RX/DR IN RCRD: CPT | Mod: CPTII,S$GLB,, | Performed by: FAMILY MEDICINE

## 2022-07-06 PROCEDURE — 3008F BODY MASS INDEX DOCD: CPT | Mod: CPTII,S$GLB,, | Performed by: FAMILY MEDICINE

## 2022-07-06 PROCEDURE — 1160F PR REVIEW ALL MEDS BY PRESCRIBER/CLIN PHARMACIST DOCUMENTED: ICD-10-PCS | Mod: CPTII,S$GLB,, | Performed by: FAMILY MEDICINE

## 2022-07-06 PROCEDURE — 99214 OFFICE O/P EST MOD 30 MIN: CPT | Mod: S$GLB,,, | Performed by: FAMILY MEDICINE

## 2022-07-06 PROCEDURE — 99999 PR PBB SHADOW E&M-EST. PATIENT-LVL V: ICD-10-PCS | Mod: PBBFAC,,, | Performed by: FAMILY MEDICINE

## 2022-07-06 PROCEDURE — 3066F PR DOCUMENTATION OF TREATMENT FOR NEPHROPATHY: ICD-10-PCS | Mod: CPTII,S$GLB,, | Performed by: FAMILY MEDICINE

## 2022-07-06 PROCEDURE — 1101F PT FALLS ASSESS-DOCD LE1/YR: CPT | Mod: CPTII,S$GLB,, | Performed by: FAMILY MEDICINE

## 2022-07-06 PROCEDURE — 1101F PR PT FALLS ASSESS DOC 0-1 FALLS W/OUT INJ PAST YR: ICD-10-PCS | Mod: CPTII,S$GLB,, | Performed by: FAMILY MEDICINE

## 2022-07-06 PROCEDURE — 3008F PR BODY MASS INDEX (BMI) DOCUMENTED: ICD-10-PCS | Mod: CPTII,S$GLB,, | Performed by: FAMILY MEDICINE

## 2022-07-06 PROCEDURE — 3288F FALL RISK ASSESSMENT DOCD: CPT | Mod: CPTII,S$GLB,, | Performed by: FAMILY MEDICINE

## 2022-07-06 PROCEDURE — 99214 PR OFFICE/OUTPT VISIT, EST, LEVL IV, 30-39 MIN: ICD-10-PCS | Mod: S$GLB,,, | Performed by: FAMILY MEDICINE

## 2022-07-06 PROCEDURE — 3074F PR MOST RECENT SYSTOLIC BLOOD PRESSURE < 130 MM HG: ICD-10-PCS | Mod: CPTII,S$GLB,, | Performed by: FAMILY MEDICINE

## 2022-07-06 PROCEDURE — 99999 PR PBB SHADOW E&M-EST. PATIENT-LVL V: CPT | Mod: PBBFAC,,, | Performed by: FAMILY MEDICINE

## 2022-07-06 PROCEDURE — 3066F NEPHROPATHY DOC TX: CPT | Mod: CPTII,S$GLB,, | Performed by: FAMILY MEDICINE

## 2022-07-06 PROCEDURE — 4010F PR ACE/ARB THEARPY RXD/TAKEN: ICD-10-PCS | Mod: CPTII,S$GLB,, | Performed by: FAMILY MEDICINE

## 2022-07-06 PROCEDURE — 1126F PR PAIN SEVERITY QUANTIFIED, NO PAIN PRESENT: ICD-10-PCS | Mod: CPTII,S$GLB,, | Performed by: FAMILY MEDICINE

## 2022-07-06 PROCEDURE — 3051F HG A1C>EQUAL 7.0%<8.0%: CPT | Mod: CPTII,S$GLB,, | Performed by: FAMILY MEDICINE

## 2022-07-06 PROCEDURE — 3061F NEG MICROALBUMINURIA REV: CPT | Mod: CPTII,S$GLB,, | Performed by: FAMILY MEDICINE

## 2022-07-06 PROCEDURE — 99499 UNLISTED E&M SERVICE: CPT | Mod: S$GLB,,, | Performed by: FAMILY MEDICINE

## 2022-07-06 RX ORDER — DAPAGLIFLOZIN 5 MG/1
5 TABLET, FILM COATED ORAL DAILY
Qty: 30 TABLET | Refills: 2 | Status: SHIPPED | OUTPATIENT
Start: 2022-07-06 | End: 2022-09-28 | Stop reason: SDUPTHER

## 2022-07-06 NOTE — PROGRESS NOTES
Subjective:       Patient ID: Addy Redding is a 66 y.o. male.    Chief Complaint: Follow-up and Medication Refill    66 yr old black male with DM II, HTN, HLD, GERD, CKD III, presents today for his one month follow up. Lab shwoed worsening A1C and renal functions.        DM II - improved -    HGBA1C                   7.4 (H)             07/05/2022                     - complicated by CKD IV                    - denies any hypoglycemic symptoms - on metformin - up to date with eye and foot screen - on metformin    HTN - controlled  - on lisinopril 10 mg daily - no side effects    HLD - controlled and improving -    LDLCALC                  66.2                07/05/2022                                 - on statin - compliant - need refill - not fully compliant with diet    GERD - stable - takes prilosec as needed    ED - stable - takes viagra as needed    Health maintenance  -labs due  -Flu and Pneumovax - up to date  -adacel due and done today  -colonoscopy due - wants to wait  -PSA UTD      Follow-up  This is a chronic problem. The current episode started more than 1 year ago. The problem occurs constantly. The problem has been gradually worsening. Associated symptoms include arthralgias and myalgias. Pertinent negatives include no abdominal pain, change in bowel habit, chest pain, congestion, coughing, diaphoresis, fatigue, headaches, joint swelling, numbness, rash, urinary symptoms, vertigo, vomiting or weakness.   Medication Refill  This is a chronic problem. The current episode started more than 1 year ago. The problem occurs constantly. The problem has been gradually improving. Associated symptoms include arthralgias and myalgias. Pertinent negatives include no abdominal pain, change in bowel habit, chest pain, congestion, coughing, diaphoresis, fatigue, headaches, joint swelling, numbness, rash, urinary symptoms, vertigo, vomiting or weakness.   Hypertension  This is a chronic problem. The current episode  started more than 1 year ago. The problem has been gradually worsening since onset. The problem is uncontrolled. Pertinent negatives include no anxiety, chest pain, headaches, malaise/fatigue, orthopnea, palpitations, peripheral edema or sweats. There are no associated agents to hypertension. Risk factors for coronary artery disease include dyslipidemia, diabetes mellitus, male gender and obesity. Past treatments include ACE inhibitors. The current treatment provides no improvement. There are no compliance problems.  There is no history of angina, CAD/MI, CVA, left ventricular hypertrophy, PVD or retinopathy. There is no history of chronic renal disease, coarctation of the aorta, hypercortisolism, pheochromocytoma, renovascular disease or a thyroid problem.   Arm Pain   There was no injury mechanism. The pain is present in the right shoulder and upper right arm. The quality of the pain is described as aching. The pain does not radiate. The pain is at a severity of 5/10. The pain is moderate. The pain has been constant since the incident. Pertinent negatives include no chest pain or numbness. The symptoms are aggravated by movement, lifting and palpation. He has tried nothing for the symptoms. The treatment provided mild relief.   Shoulder Pain   The pain is present in the right shoulder. This is a new problem. The current episode started 1 to 4 weeks ago. There has been no history of extremity trauma. The problem occurs constantly. The problem has been unchanged. The quality of the pain is described as aching. The pain is at a severity of 5/10. The pain is moderate. Pertinent negatives include no headaches, itching, joint swelling or numbness. The symptoms are aggravated by activity. He has tried nothing for the symptoms. The treatment provided no relief. His past medical history is significant for diabetes.   Gastroesophageal Reflux  He complains of heartburn. He reports no abdominal pain, no chest pain, no  coughing or no wheezing. This is a new problem. The current episode started 1 to 4 weeks ago. The problem occurs constantly. The problem has been unchanged. The heartburn duration is several minutes. The heartburn is located in the substernum. The symptoms are aggravated by certain foods. Pertinent negatives include no anemia, fatigue or melena. There are no known risk factors. He has tried nothing for the symptoms. The treatment provided mild relief. Past procedures do not include an abdominal ultrasound, esophageal pH monitoring or a UGI. Past invasive treatments do not include gastroplasty or reflux surgery.   Diabetes  Pertinent negatives for hypoglycemia include no confusion, dizziness, headaches, nervousness/anxiousness, speech difficulty or sweats. Pertinent negatives for diabetes include no chest pain, no fatigue, no polydipsia, no polyuria and no weakness. Pertinent negatives for diabetic complications include no CVA, PVD or retinopathy.   Hyperlipidemia  This is a chronic problem. The current episode started more than 1 year ago. The problem is uncontrolled. Recent lipid tests were reviewed and are high. Exacerbating diseases include diabetes. He has no history of chronic renal disease, liver disease or nephrotic syndrome. There are no known factors aggravating his hyperlipidemia. Associated symptoms include myalgias. Pertinent negatives include no chest pain, focal sensory loss, focal weakness or leg pain. Current antihyperlipidemic treatment includes statins. There are no compliance problems.  Risk factors for coronary artery disease include diabetes mellitus, dyslipidemia, male sex and hypertension.     Review of Systems   Constitutional: Negative.  Negative for activity change, diaphoresis, fatigue, malaise/fatigue and unexpected weight change.   HENT: Negative.  Negative for nasal congestion, ear pain, mouth sores, rhinorrhea and voice change.    Eyes: Negative.  Negative for pain, discharge and  visual disturbance.   Respiratory: Negative.  Negative for apnea, cough and wheezing.    Cardiovascular: Negative.  Negative for chest pain, palpitations and orthopnea.   Gastrointestinal: Positive for heartburn. Negative for abdominal distention, abdominal pain, anal bleeding, change in bowel habit, diarrhea, melena, vomiting and change in bowel habit.   Endocrine: Negative.  Negative for cold intolerance, polydipsia and polyuria.   Genitourinary: Negative.  Negative for decreased urine volume, difficulty urinating, discharge, frequency and scrotal swelling.   Musculoskeletal: Positive for arthralgias and myalgias. Negative for back pain, joint swelling, leg pain and neck stiffness.   Integumentary:  Negative for color change, itching and rash. Negative.   Allergic/Immunologic: Negative.  Negative for environmental allergies.   Neurological: Negative.  Negative for dizziness, vertigo, focal weakness, speech difficulty, weakness, light-headedness, numbness and headaches.   Hematological: Negative.    Psychiatric/Behavioral: Negative.  Negative for agitation, confusion, dysphoric mood and suicidal ideas. The patient is not nervous/anxious.          PMH/PSH/FH/SH/MED/ALLERGY reviewed    Objective:       Vitals:    07/06/22 0757   BP: 110/60   Pulse: (!) 56   Temp: 98.1 °F (36.7 °C)       Physical Exam  Constitutional:       Appearance: He is well-developed.   HENT:      Head: Normocephalic and atraumatic.      Right Ear: External ear normal.      Left Ear: External ear normal.      Nose: Nose normal.      Mouth/Throat:      Pharynx: No oropharyngeal exudate.   Eyes:      General: No scleral icterus.        Right eye: No discharge.         Left eye: No discharge.      Conjunctiva/sclera: Conjunctivae normal.      Pupils: Pupils are equal, round, and reactive to light.   Neck:      Thyroid: No thyromegaly.      Vascular: No JVD.      Trachea: No tracheal deviation.   Cardiovascular:      Rate and Rhythm: Regular rhythm.  Bradycardia present.      Heart sounds: Normal heart sounds. No murmur heard.    No friction rub. No gallop.   Pulmonary:      Effort: Pulmonary effort is normal. No respiratory distress.      Breath sounds: Normal breath sounds. No stridor. No wheezing or rales.   Chest:      Chest wall: No tenderness.   Abdominal:      General: Bowel sounds are normal. There is no distension.      Palpations: Abdomen is soft. There is no mass.      Tenderness: There is no abdominal tenderness. There is no guarding or rebound.      Hernia: No hernia is present.   Musculoskeletal:         General: No tenderness. Normal range of motion.      Cervical back: Normal range of motion and neck supple.   Lymphadenopathy:      Cervical: No cervical adenopathy.   Skin:     General: Skin is warm and dry.      Coloration: Skin is not pale.      Findings: No erythema or rash.   Neurological:      Mental Status: He is alert and oriented to person, place, and time.      Cranial Nerves: No cranial nerve deficit.      Motor: No abnormal muscle tone.      Coordination: Coordination normal.      Deep Tendon Reflexes: Reflexes are normal and symmetric. Reflexes normal.   Psychiatric:         Behavior: Behavior normal.         Thought Content: Thought content normal.         Judgment: Judgment normal.         Assessment:       Problem List Items Addressed This Visit     Dyslipidemia associated with type 2 diabetes mellitus    Type 2 diabetes mellitus with stage 3a chronic kidney disease, without long-term current use of insulin - Primary    Relevant Medications    dapagliflozin (FARXIGA) 5 mg Tab tablet    Other Relevant Orders    Comprehensive Metabolic Panel    Hypertension associated with diabetes    Coronary artery disease of native artery with stable angina pectoris    Chronic kidney disease (CKD), stage IV (severe)    Relevant Medications    dapagliflozin (FARXIGA) 5 mg Tab tablet    Other Relevant Orders    Ambulatory referral/consult to  Nephrology    Comprehensive Metabolic Panel          Plan:           Addy was seen today for follow-up and medication refill.    Diagnoses and all orders for this visit:    Type 2 diabetes mellitus with stage 3a chronic kidney disease, without long-term current use of insulin  -     dapagliflozin (FARXIGA) 5 mg Tab tablet; Take 1 tablet (5 mg total) by mouth once daily.  -     Comprehensive Metabolic Panel; Future    Coronary artery disease of native artery of native heart with stable angina pectoris    Dyslipidemia associated with type 2 diabetes mellitus    Hypertension associated with diabetes    Chronic kidney disease (CKD), stage IV (severe)  -     dapagliflozin (FARXIGA) 5 mg Tab tablet; Take 1 tablet (5 mg total) by mouth once daily.  -     Ambulatory referral/consult to Nephrology; Future  -     Comprehensive Metabolic Panel; Future    HTN   -at goal  -continue lisinopril to 20 mg/ day    DM II   -controlled   -DC metformin due to CKD 4  -start farxiga daily    CKD 4  -refer nephrology  -ER precautions given    HLD   -improving  -continue lipitor    GERD   -stable   -takes OTC PPI     ED'  -controlled      Spent adequate time in obtaining history and explaining differentials      25 minutes spent during this visit of which greater than 50% devoted to face-face counseling and coordination of care regarding diagnosis and management plan    Follow up in about 3 months (around 10/6/2022), or if symptoms worsen or fail to improve.

## 2022-07-07 ENCOUNTER — TELEPHONE (OUTPATIENT)
Dept: NEPHROLOGY | Facility: CLINIC | Age: 67
End: 2022-07-07
Payer: MEDICARE

## 2022-07-08 ENCOUNTER — TELEPHONE (OUTPATIENT)
Dept: NEPHROLOGY | Facility: CLINIC | Age: 67
End: 2022-07-08
Payer: MEDICARE

## 2022-07-08 NOTE — TELEPHONE ENCOUNTER
CRISPIN Best MA  Caller: Unspecified (2 days ago,  8:16 AM)           Previous Messages       ----- Message -----   From: Mi Hardy   Sent: 7/6/2022   8:17 AM CDT   To: [facility] Nephrology Staff     Patient has a referral placed by Dr. Ramey, diagnosis is     N18.4 (ICD-10-CM) - Chronic kidney disease (CKD), stage IV (severe)     Can you please assist in scheduling patient, thank you

## 2022-07-11 ENCOUNTER — PATIENT MESSAGE (OUTPATIENT)
Dept: CARDIOLOGY | Facility: CLINIC | Age: 67
End: 2022-07-11
Payer: MEDICARE

## 2022-07-25 ENCOUNTER — OFFICE VISIT (OUTPATIENT)
Dept: NEPHROLOGY | Facility: CLINIC | Age: 67
End: 2022-07-25
Payer: MEDICARE

## 2022-07-25 ENCOUNTER — LAB VISIT (OUTPATIENT)
Dept: LAB | Facility: HOSPITAL | Age: 67
End: 2022-07-25
Payer: MEDICARE

## 2022-07-25 VITALS
HEART RATE: 52 BPM | DIASTOLIC BLOOD PRESSURE: 67 MMHG | SYSTOLIC BLOOD PRESSURE: 112 MMHG | OXYGEN SATURATION: 98 % | BODY MASS INDEX: 25.23 KG/M2 | HEIGHT: 74 IN | WEIGHT: 196.63 LBS

## 2022-07-25 DIAGNOSIS — N18.32 STAGE 3B CHRONIC KIDNEY DISEASE: Primary | ICD-10-CM

## 2022-07-25 DIAGNOSIS — N18.30 TYPE 2 DIABETES MELLITUS WITH STAGE 3 CHRONIC KIDNEY DISEASE, UNSPECIFIED WHETHER LONG TERM INSULIN USE, UNSPECIFIED WHETHER STAGE 3A OR 3B CKD: ICD-10-CM

## 2022-07-25 DIAGNOSIS — N18.4 CHRONIC KIDNEY DISEASE (CKD), STAGE IV (SEVERE): ICD-10-CM

## 2022-07-25 DIAGNOSIS — E11.22 TYPE 2 DIABETES MELLITUS WITH STAGE 3 CHRONIC KIDNEY DISEASE, UNSPECIFIED WHETHER LONG TERM INSULIN USE, UNSPECIFIED WHETHER STAGE 3A OR 3B CKD: ICD-10-CM

## 2022-07-25 DIAGNOSIS — N17.9 AKI (ACUTE KIDNEY INJURY): ICD-10-CM

## 2022-07-25 DIAGNOSIS — I10 HYPERTENSION, UNSPECIFIED TYPE: ICD-10-CM

## 2022-07-25 DIAGNOSIS — Z98.61 S/P PTCA (PERCUTANEOUS TRANSLUMINAL CORONARY ANGIOPLASTY): ICD-10-CM

## 2022-07-25 DIAGNOSIS — D64.9 ANEMIA, UNSPECIFIED TYPE: ICD-10-CM

## 2022-07-25 LAB
ALBUMIN SERPL BCP-MCNC: 3.9 G/DL (ref 3.5–5.2)
ANION GAP SERPL CALC-SCNC: 7 MMOL/L (ref 8–16)
BASOPHILS # BLD AUTO: 0.03 K/UL (ref 0–0.2)
BASOPHILS NFR BLD: 0.6 % (ref 0–1.9)
BUN SERPL-MCNC: 26 MG/DL (ref 8–23)
CALCIUM SERPL-MCNC: 9.2 MG/DL (ref 8.7–10.5)
CHLORIDE SERPL-SCNC: 105 MMOL/L (ref 95–110)
CO2 SERPL-SCNC: 26 MMOL/L (ref 23–29)
CREAT SERPL-MCNC: 2.1 MG/DL (ref 0.5–1.4)
DIFFERENTIAL METHOD: ABNORMAL
EOSINOPHIL # BLD AUTO: 0.3 K/UL (ref 0–0.5)
EOSINOPHIL NFR BLD: 5.2 % (ref 0–8)
ERYTHROCYTE [DISTWIDTH] IN BLOOD BY AUTOMATED COUNT: 13.7 % (ref 11.5–14.5)
EST. GFR  (AFRICAN AMERICAN): 36.3 ML/MIN/1.73 M^2
EST. GFR  (NON AFRICAN AMERICAN): 31.4 ML/MIN/1.73 M^2
GLUCOSE SERPL-MCNC: 148 MG/DL (ref 70–110)
HCT VFR BLD AUTO: 38.2 % (ref 40–54)
HGB BLD-MCNC: 12.5 G/DL (ref 14–18)
IMM GRANULOCYTES # BLD AUTO: 0.01 K/UL (ref 0–0.04)
IMM GRANULOCYTES NFR BLD AUTO: 0.2 % (ref 0–0.5)
LYMPHOCYTES # BLD AUTO: 1.3 K/UL (ref 1–4.8)
LYMPHOCYTES NFR BLD: 25.6 % (ref 18–48)
MCH RBC QN AUTO: 28.7 PG (ref 27–31)
MCHC RBC AUTO-ENTMCNC: 32.7 G/DL (ref 32–36)
MCV RBC AUTO: 88 FL (ref 82–98)
MONOCYTES # BLD AUTO: 0.5 K/UL (ref 0.3–1)
MONOCYTES NFR BLD: 9.9 % (ref 4–15)
NEUTROPHILS # BLD AUTO: 3 K/UL (ref 1.8–7.7)
NEUTROPHILS NFR BLD: 58.5 % (ref 38–73)
NRBC BLD-RTO: 0 /100 WBC
PHOSPHATE SERPL-MCNC: 3.6 MG/DL (ref 2.7–4.5)
PLATELET # BLD AUTO: 249 K/UL (ref 150–450)
PMV BLD AUTO: 10.3 FL (ref 9.2–12.9)
POTASSIUM SERPL-SCNC: 4.6 MMOL/L (ref 3.5–5.1)
RBC # BLD AUTO: 4.35 M/UL (ref 4.6–6.2)
SODIUM SERPL-SCNC: 138 MMOL/L (ref 136–145)
WBC # BLD AUTO: 5.04 K/UL (ref 3.9–12.7)

## 2022-07-25 PROCEDURE — 3066F NEPHROPATHY DOC TX: CPT | Mod: CPTII,S$GLB,, | Performed by: NURSE PRACTITIONER

## 2022-07-25 PROCEDURE — 1160F RVW MEDS BY RX/DR IN RCRD: CPT | Mod: CPTII,S$GLB,, | Performed by: NURSE PRACTITIONER

## 2022-07-25 PROCEDURE — 1159F PR MEDICATION LIST DOCUMENTED IN MEDICAL RECORD: ICD-10-PCS | Mod: CPTII,S$GLB,, | Performed by: NURSE PRACTITIONER

## 2022-07-25 PROCEDURE — 99499 UNLISTED E&M SERVICE: CPT | Mod: S$GLB,,, | Performed by: NURSE PRACTITIONER

## 2022-07-25 PROCEDURE — 1160F PR REVIEW ALL MEDS BY PRESCRIBER/CLIN PHARMACIST DOCUMENTED: ICD-10-PCS | Mod: CPTII,S$GLB,, | Performed by: NURSE PRACTITIONER

## 2022-07-25 PROCEDURE — 99204 PR OFFICE/OUTPT VISIT, NEW, LEVL IV, 45-59 MIN: ICD-10-PCS | Mod: S$GLB,,, | Performed by: NURSE PRACTITIONER

## 2022-07-25 PROCEDURE — 99204 OFFICE O/P NEW MOD 45 MIN: CPT | Mod: S$GLB,,, | Performed by: NURSE PRACTITIONER

## 2022-07-25 PROCEDURE — 86334 IMMUNOFIX E-PHORESIS SERUM: CPT | Mod: 26,,, | Performed by: PATHOLOGY

## 2022-07-25 PROCEDURE — 3051F HG A1C>EQUAL 7.0%<8.0%: CPT | Mod: CPTII,S$GLB,, | Performed by: NURSE PRACTITIONER

## 2022-07-25 PROCEDURE — 3061F PR NEG MICROALBUMINURIA RESULT DOCUMENTED/REVIEW: ICD-10-PCS | Mod: CPTII,S$GLB,, | Performed by: NURSE PRACTITIONER

## 2022-07-25 PROCEDURE — 99499 RISK ADDL DX/OHS AUDIT: ICD-10-PCS | Mod: S$GLB,,, | Performed by: NURSE PRACTITIONER

## 2022-07-25 PROCEDURE — 1125F PR PAIN SEVERITY QUANTIFIED, PAIN PRESENT: ICD-10-PCS | Mod: CPTII,S$GLB,, | Performed by: NURSE PRACTITIONER

## 2022-07-25 PROCEDURE — 3074F PR MOST RECENT SYSTOLIC BLOOD PRESSURE < 130 MM HG: ICD-10-PCS | Mod: CPTII,S$GLB,, | Performed by: NURSE PRACTITIONER

## 2022-07-25 PROCEDURE — 3008F PR BODY MASS INDEX (BMI) DOCUMENTED: ICD-10-PCS | Mod: CPTII,S$GLB,, | Performed by: NURSE PRACTITIONER

## 2022-07-25 PROCEDURE — 3078F DIAST BP <80 MM HG: CPT | Mod: CPTII,S$GLB,, | Performed by: NURSE PRACTITIONER

## 2022-07-25 PROCEDURE — 85025 COMPLETE CBC W/AUTO DIFF WBC: CPT | Performed by: NURSE PRACTITIONER

## 2022-07-25 PROCEDURE — 99999 PR PBB SHADOW E&M-EST. PATIENT-LVL V: CPT | Mod: PBBFAC,,, | Performed by: NURSE PRACTITIONER

## 2022-07-25 PROCEDURE — 80069 RENAL FUNCTION PANEL: CPT | Performed by: NURSE PRACTITIONER

## 2022-07-25 PROCEDURE — 4010F ACE/ARB THERAPY RXD/TAKEN: CPT | Mod: CPTII,S$GLB,, | Performed by: NURSE PRACTITIONER

## 2022-07-25 PROCEDURE — 1159F MED LIST DOCD IN RCRD: CPT | Mod: CPTII,S$GLB,, | Performed by: NURSE PRACTITIONER

## 2022-07-25 PROCEDURE — 4010F PR ACE/ARB THEARPY RXD/TAKEN: ICD-10-PCS | Mod: CPTII,S$GLB,, | Performed by: NURSE PRACTITIONER

## 2022-07-25 PROCEDURE — 99999 PR PBB SHADOW E&M-EST. PATIENT-LVL V: ICD-10-PCS | Mod: PBBFAC,,, | Performed by: NURSE PRACTITIONER

## 2022-07-25 PROCEDURE — 86334 IMMUNOFIX E-PHORESIS SERUM: CPT | Performed by: NURSE PRACTITIONER

## 2022-07-25 PROCEDURE — 3008F BODY MASS INDEX DOCD: CPT | Mod: CPTII,S$GLB,, | Performed by: NURSE PRACTITIONER

## 2022-07-25 PROCEDURE — 83520 IMMUNOASSAY QUANT NOS NONAB: CPT | Mod: 59 | Performed by: NURSE PRACTITIONER

## 2022-07-25 PROCEDURE — 86334 PATHOLOGIST INTERPRETATION IFE: ICD-10-PCS | Mod: 26,,, | Performed by: PATHOLOGY

## 2022-07-25 PROCEDURE — 84165 PROTEIN E-PHORESIS SERUM: CPT | Mod: 26,,, | Performed by: PATHOLOGY

## 2022-07-25 PROCEDURE — 84165 PROTEIN E-PHORESIS SERUM: CPT | Performed by: NURSE PRACTITIONER

## 2022-07-25 PROCEDURE — 3051F PR MOST RECENT HEMOGLOBIN A1C LEVEL 7.0 - < 8.0%: ICD-10-PCS | Mod: CPTII,S$GLB,, | Performed by: NURSE PRACTITIONER

## 2022-07-25 PROCEDURE — 3078F PR MOST RECENT DIASTOLIC BLOOD PRESSURE < 80 MM HG: ICD-10-PCS | Mod: CPTII,S$GLB,, | Performed by: NURSE PRACTITIONER

## 2022-07-25 PROCEDURE — 3061F NEG MICROALBUMINURIA REV: CPT | Mod: CPTII,S$GLB,, | Performed by: NURSE PRACTITIONER

## 2022-07-25 PROCEDURE — 3074F SYST BP LT 130 MM HG: CPT | Mod: CPTII,S$GLB,, | Performed by: NURSE PRACTITIONER

## 2022-07-25 PROCEDURE — 1125F AMNT PAIN NOTED PAIN PRSNT: CPT | Mod: CPTII,S$GLB,, | Performed by: NURSE PRACTITIONER

## 2022-07-25 PROCEDURE — 36415 COLL VENOUS BLD VENIPUNCTURE: CPT | Performed by: NURSE PRACTITIONER

## 2022-07-25 PROCEDURE — 84165 PATHOLOGIST INTERPRETATION SPE: ICD-10-PCS | Mod: 26,,, | Performed by: PATHOLOGY

## 2022-07-25 PROCEDURE — 3066F PR DOCUMENTATION OF TREATMENT FOR NEPHROPATHY: ICD-10-PCS | Mod: CPTII,S$GLB,, | Performed by: NURSE PRACTITIONER

## 2022-07-25 NOTE — PATIENT INSTRUCTIONS
Get kidney ultrasound.  Continue low sodium diet.  Avoid NSAID (ibuprofen, advil, motrin, aleve, naprosyn, naproxen, [place], Goody's Powder). Tylenol is okay.  Avoid bactrim/ IV contrast/ gadolinium/ aminoglycoside where possible.  Avoid herbal supplements.  Stay hydrated.  Return to clinic in 3 months with labs (bloodwork and urine) or sooner if needed.  Go to the ER with any emergency concerns.

## 2022-07-25 NOTE — PROGRESS NOTES
"Subjective:       Patient ID: Addy Redding is a 66 y.o.  male who presents for new evaluation of of renal dysfunction.      HPI     Patient is new to me. New to clinic.  Prior pertinent chart reviewed since this is patient's first appointment with me. Presents with spouse, Carmelita.    Patient presents for new evaluation of renal dysfunction.  Baseline creatinine of 1.5-1.7 mg/dL. Most recent sCr was 2.5 mg/dL.    Had contrast with PTCA on 6/16/22.  6/29- lisinopril decreased to 5 mg daily from 10 mg BID. Amlodipine 5 mg BID started by cardiology.  Needs prostate biopsy.  7/5/22 - sCr of 2.5 (NASREEN)  Dapagliflozin 5 mg started and metformin stopped on 7/6/22    Significant other medical problems include aortic atherosclerosis, CAD, T2DM since 2012, HTN.      The patient denies taking NSAIDs, herbal supplements, or new antibiotics, recreational drugs, recent episode of dehydration, diarrhea, nausea or vomiting, acute illness, hospitalization.    Significant family hx includes: mother had kidney stones, possible CKD    Last renal US: none in EMR    Review of Systems   HENT: Negative for facial swelling and mouth sores.    Respiratory: Negative for shortness of breath.    Cardiovascular: Negative for leg swelling.   Gastrointestinal: Negative for diarrhea, nausea and vomiting.   Genitourinary: Negative for difficulty urinating, dysuria, frequency, hematuria and urgency.        Weak stream   Musculoskeletal: Positive for back pain (lower).   Skin: Negative for rash.   Neurological: Positive for dizziness (if he stands up too suddenly).       Objective:       Blood pressure 112/67, pulse (!) 52, height 6' 2" (1.88 m), weight 89.2 kg (196 lb 10.4 oz), SpO2 98 %.  Physical Exam  Vitals reviewed.   Constitutional:       General: He is not in acute distress.     Appearance: He is well-developed.   Eyes:      Conjunctiva/sclera: Conjunctivae normal.   Cardiovascular:      Rate and Rhythm: Normal rate and regular rhythm.      Heart " sounds: Normal heart sounds. No murmur heard.    No friction rub. No gallop.   Pulmonary:      Effort: Pulmonary effort is normal. No respiratory distress.      Breath sounds: Normal breath sounds.   Abdominal:      Tenderness: There is no right CVA tenderness or left CVA tenderness.   Musculoskeletal:      Cervical back: Neck supple.      Right lower leg: No edema.      Left lower leg: No edema.   Skin:     General: Skin is warm and dry.      Findings: No lesion or rash.   Neurological:      Mental Status: He is alert and oriented to person, place, and time.   Psychiatric:         Mood and Affect: Mood normal.         Behavior: Behavior normal.         Thought Content: Thought content normal.         Judgment: Judgment normal.           Lab Results   Component Value Date    CREATININE 2.5 (H) 07/05/2022     No results found for: UTPCR  Lab Results   Component Value Date     07/05/2022    K 4.7 07/05/2022    CO2 21 (L) 07/05/2022     07/05/2022     Lab Results   Component Value Date    CALCIUM 9.7 07/05/2022    PHOS 2.9 09/01/2010     Lab Results   Component Value Date    HGB 11.6 (L) 07/05/2022    WBC 6.14 07/05/2022    HCT 34.7 (L) 07/05/2022      Lab Results   Component Value Date    HGBA1C 7.4 (H) 07/05/2022     07/05/2022    BUN 47 (H) 07/05/2022     Lab Results   Component Value Date    LDLCALC 66.2 07/05/2022         Assessment:       1. Stage 3b chronic kidney disease    2. Chronic kidney disease (CKD), stage IV (severe)    3. NASREEN (acute kidney injury)    4. Type 2 diabetes mellitus with stage 3 chronic kidney disease, unspecified whether long term insulin use, unspecified whether stage 3a or 3b CKD    5. Anemia, unspecified type    6. Hypertension, unspecified type    7. S/P PTCA (percutaneous transluminal coronary angioplasty)        Plan:   CKD stage 3A/B c eGFR 41-47 mL/min c superimposed NASREEN - Underlying CKD etiology is unclear. Possibly 2/2 atherosclerotic disease; T2DM could be  contributing, but he has not every been proteinuric (though he has been on ACEi).  Had been stable prior to recent NASREEN.    NASREEN is likely 2/2 a contrast-induced nephropathy. He has a weak urine stream with elevated PSA; no other LUTS. Not convincing for obstruction but should be considered in differential.    Educated patient to control BP, BG, remain well-hydrated, and avoid NSAIDs to prevent progression of CKD.      UPCR No albuminuria. Needs UPCR. On dapagliflozin, lisinopril 5 mg.     No compelling need for RAASi or dapagliflozin for renal protection without proteinuria.   Acid-base Serum bicarb low in setting of NASREEN   Renal osteodystrophy Ca okay.   Anemia At goal for CKD.   DM Near goal. Has had DM since 2012. Has had A1c of 11+.    Metformin d/c'd in setting of NASREEN. Would be okay to resume if NASREEN resolves and  eGFR persists over 30.   Lipid Management Defer   ESRD planning Anticipatory guidance provided about timing of dialysis. Start discussions and planning when eGFR is about 20 mL/min; most patients start dialysis between 5-10 mL/min.       HTN - WNL on amlodipine 5 mg BID, lisinopril 5 mg  - D/c lisinopril if NASREEN persists.  - No convincing need for RAASi without proteinuria.    All questions patient had were answered.  Asked if further questions. None. F/u in clinic in 3 mos with labs and urine prior to next visit or sooner if needed.  ER for emergency concerns.    Summary of Plan:  1. UA, UPCR, CBC, RFP  2. avoid NSAID/ bactrim/ IV contrast/ gadolinium/ aminoglycoside where possible  3. Renal US  4. RTC in 3 mos

## 2022-07-26 ENCOUNTER — PATIENT MESSAGE (OUTPATIENT)
Dept: NEPHROLOGY | Facility: CLINIC | Age: 67
End: 2022-07-26
Payer: MEDICARE

## 2022-07-26 LAB
ALBUMIN SERPL ELPH-MCNC: 4.03 G/DL (ref 3.35–5.55)
ALPHA1 GLOB SERPL ELPH-MCNC: 0.32 G/DL (ref 0.17–0.41)
ALPHA2 GLOB SERPL ELPH-MCNC: 0.74 G/DL (ref 0.43–0.99)
B-GLOBULIN SERPL ELPH-MCNC: 0.77 G/DL (ref 0.5–1.1)
GAMMA GLOB SERPL ELPH-MCNC: 1.13 G/DL (ref 0.67–1.58)
INTERPRETATION SERPL IFE-IMP: NORMAL
KAPPA LC SER QL IA: 3.1 MG/DL (ref 0.33–1.94)
KAPPA LC/LAMBDA SER IA: 0.62 (ref 0.26–1.65)
LAMBDA LC SER QL IA: 5.01 MG/DL (ref 0.57–2.63)
PROT SERPL-MCNC: 7 G/DL (ref 6–8.4)

## 2022-07-27 LAB
PATHOLOGIST INTERPRETATION IFE: NORMAL
PATHOLOGIST INTERPRETATION SPE: NORMAL

## 2022-08-02 ENCOUNTER — HOSPITAL ENCOUNTER (OUTPATIENT)
Dept: RADIOLOGY | Facility: HOSPITAL | Age: 67
Discharge: HOME | End: 2022-08-02
Attending: NURSE PRACTITIONER
Payer: MEDICARE

## 2022-08-02 DIAGNOSIS — N18.4 CHRONIC KIDNEY DISEASE (CKD), STAGE IV (SEVERE): ICD-10-CM

## 2022-08-02 PROCEDURE — 76770 US EXAM ABDO BACK WALL COMP: CPT | Mod: TC

## 2022-08-02 PROCEDURE — 76770 US RETROPERITONEAL COMPLETE: ICD-10-PCS | Mod: 26,,, | Performed by: RADIOLOGY

## 2022-08-02 PROCEDURE — 76770 US EXAM ABDO BACK WALL COMP: CPT | Mod: 26,,, | Performed by: RADIOLOGY

## 2022-08-03 ENCOUNTER — PATIENT MESSAGE (OUTPATIENT)
Dept: NEPHROLOGY | Facility: CLINIC | Age: 67
End: 2022-08-03
Payer: MEDICARE

## 2022-08-05 ENCOUNTER — LAB VISIT (OUTPATIENT)
Dept: LAB | Facility: HOSPITAL | Age: 67
End: 2022-08-05
Attending: FAMILY MEDICINE
Payer: MEDICARE

## 2022-08-05 ENCOUNTER — PATIENT MESSAGE (OUTPATIENT)
Dept: NEPHROLOGY | Facility: CLINIC | Age: 67
End: 2022-08-05
Payer: MEDICARE

## 2022-08-05 DIAGNOSIS — E11.22 TYPE 2 DIABETES MELLITUS WITH STAGE 3A CHRONIC KIDNEY DISEASE, WITHOUT LONG-TERM CURRENT USE OF INSULIN: ICD-10-CM

## 2022-08-05 DIAGNOSIS — N18.4 CHRONIC KIDNEY DISEASE (CKD), STAGE IV (SEVERE): ICD-10-CM

## 2022-08-05 DIAGNOSIS — N18.31 TYPE 2 DIABETES MELLITUS WITH STAGE 3A CHRONIC KIDNEY DISEASE, WITHOUT LONG-TERM CURRENT USE OF INSULIN: ICD-10-CM

## 2022-08-05 LAB
ALBUMIN SERPL BCP-MCNC: 3.9 G/DL (ref 3.5–5.2)
ALP SERPL-CCNC: 48 U/L (ref 55–135)
ALT SERPL W/O P-5'-P-CCNC: 16 U/L (ref 10–44)
ANION GAP SERPL CALC-SCNC: 8 MMOL/L (ref 8–16)
AST SERPL-CCNC: 16 U/L (ref 10–40)
BILIRUB SERPL-MCNC: 0.5 MG/DL (ref 0.1–1)
BUN SERPL-MCNC: 30 MG/DL (ref 8–23)
CALCIUM SERPL-MCNC: 9.3 MG/DL (ref 8.7–10.5)
CHLORIDE SERPL-SCNC: 107 MMOL/L (ref 95–110)
CO2 SERPL-SCNC: 23 MMOL/L (ref 23–29)
CREAT SERPL-MCNC: 2.1 MG/DL (ref 0.5–1.4)
EST. GFR  (NO RACE VARIABLE): 34 ML/MIN/1.73 M^2
GLUCOSE SERPL-MCNC: 152 MG/DL (ref 70–110)
POTASSIUM SERPL-SCNC: 4.6 MMOL/L (ref 3.5–5.1)
PROT SERPL-MCNC: 7.4 G/DL (ref 6–8.4)
SODIUM SERPL-SCNC: 138 MMOL/L (ref 136–145)

## 2022-08-05 PROCEDURE — 36415 COLL VENOUS BLD VENIPUNCTURE: CPT | Performed by: FAMILY MEDICINE

## 2022-08-05 PROCEDURE — 80053 COMPREHEN METABOLIC PANEL: CPT | Performed by: FAMILY MEDICINE

## 2022-08-11 ENCOUNTER — PATIENT MESSAGE (OUTPATIENT)
Dept: NEPHROLOGY | Facility: CLINIC | Age: 67
End: 2022-08-11
Payer: MEDICARE

## 2022-08-19 ENCOUNTER — PATIENT MESSAGE (OUTPATIENT)
Dept: NEPHROLOGY | Facility: CLINIC | Age: 67
End: 2022-08-19
Payer: MEDICARE

## 2022-08-25 ENCOUNTER — TELEPHONE (OUTPATIENT)
Dept: NEPHROLOGY | Facility: CLINIC | Age: 67
End: 2022-08-25

## 2022-08-26 ENCOUNTER — PATIENT MESSAGE (OUTPATIENT)
Dept: NEPHROLOGY | Facility: CLINIC | Age: 67
End: 2022-08-26
Payer: MEDICARE

## 2022-08-29 DIAGNOSIS — N17.9 AKI (ACUTE KIDNEY INJURY): Primary | ICD-10-CM

## 2022-08-29 DIAGNOSIS — I10 HYPERTENSION, UNSPECIFIED TYPE: ICD-10-CM

## 2022-08-31 ENCOUNTER — OFFICE VISIT (OUTPATIENT)
Dept: CARDIOLOGY | Facility: CLINIC | Age: 67
End: 2022-08-31
Payer: MEDICARE

## 2022-08-31 VITALS
HEART RATE: 51 BPM | BODY MASS INDEX: 25.68 KG/M2 | WEIGHT: 200.06 LBS | DIASTOLIC BLOOD PRESSURE: 71 MMHG | HEIGHT: 74 IN | OXYGEN SATURATION: 97 % | SYSTOLIC BLOOD PRESSURE: 137 MMHG

## 2022-08-31 DIAGNOSIS — I70.0 AORTIC ATHEROSCLEROSIS: ICD-10-CM

## 2022-08-31 DIAGNOSIS — Z95.5 PRESENCE OF DRUG-ELUTING STENT IN LEFT CIRCUMFLEX CORONARY ARTERY: ICD-10-CM

## 2022-08-31 DIAGNOSIS — N18.31 TYPE 2 DIABETES MELLITUS WITH STAGE 3A CHRONIC KIDNEY DISEASE, WITHOUT LONG-TERM CURRENT USE OF INSULIN: ICD-10-CM

## 2022-08-31 DIAGNOSIS — E11.22 TYPE 2 DIABETES MELLITUS WITH STAGE 3A CHRONIC KIDNEY DISEASE, WITHOUT LONG-TERM CURRENT USE OF INSULIN: ICD-10-CM

## 2022-08-31 DIAGNOSIS — I25.118 CORONARY ARTERY DISEASE OF NATIVE ARTERY OF NATIVE HEART WITH STABLE ANGINA PECTORIS: ICD-10-CM

## 2022-08-31 DIAGNOSIS — E11.59 HYPERTENSION ASSOCIATED WITH DIABETES: Primary | ICD-10-CM

## 2022-08-31 DIAGNOSIS — I15.2 HYPERTENSION ASSOCIATED WITH DIABETES: Primary | ICD-10-CM

## 2022-08-31 DIAGNOSIS — E11.69 DYSLIPIDEMIA ASSOCIATED WITH TYPE 2 DIABETES MELLITUS: ICD-10-CM

## 2022-08-31 DIAGNOSIS — R00.1 SINUS BRADYCARDIA: ICD-10-CM

## 2022-08-31 DIAGNOSIS — E78.5 DYSLIPIDEMIA ASSOCIATED WITH TYPE 2 DIABETES MELLITUS: ICD-10-CM

## 2022-08-31 DIAGNOSIS — Z01.818 PREOPERATIVE CLEARANCE: ICD-10-CM

## 2022-08-31 PROCEDURE — 3008F BODY MASS INDEX DOCD: CPT | Mod: CPTII,S$GLB,, | Performed by: INTERNAL MEDICINE

## 2022-08-31 PROCEDURE — 3288F FALL RISK ASSESSMENT DOCD: CPT | Mod: CPTII,S$GLB,, | Performed by: INTERNAL MEDICINE

## 2022-08-31 PROCEDURE — 3051F HG A1C>EQUAL 7.0%<8.0%: CPT | Mod: CPTII,S$GLB,, | Performed by: INTERNAL MEDICINE

## 2022-08-31 PROCEDURE — 3061F NEG MICROALBUMINURIA REV: CPT | Mod: CPTII,S$GLB,, | Performed by: INTERNAL MEDICINE

## 2022-08-31 PROCEDURE — 99214 PR OFFICE/OUTPT VISIT, EST, LEVL IV, 30-39 MIN: ICD-10-PCS | Mod: S$GLB,,, | Performed by: INTERNAL MEDICINE

## 2022-08-31 PROCEDURE — 3051F PR MOST RECENT HEMOGLOBIN A1C LEVEL 7.0 - < 8.0%: ICD-10-PCS | Mod: CPTII,S$GLB,, | Performed by: INTERNAL MEDICINE

## 2022-08-31 PROCEDURE — 1125F AMNT PAIN NOTED PAIN PRSNT: CPT | Mod: CPTII,S$GLB,, | Performed by: INTERNAL MEDICINE

## 2022-08-31 PROCEDURE — 3061F PR NEG MICROALBUMINURIA RESULT DOCUMENTED/REVIEW: ICD-10-PCS | Mod: CPTII,S$GLB,, | Performed by: INTERNAL MEDICINE

## 2022-08-31 PROCEDURE — 99214 OFFICE O/P EST MOD 30 MIN: CPT | Mod: S$GLB,,, | Performed by: INTERNAL MEDICINE

## 2022-08-31 PROCEDURE — 3066F NEPHROPATHY DOC TX: CPT | Mod: CPTII,S$GLB,, | Performed by: INTERNAL MEDICINE

## 2022-08-31 PROCEDURE — 1159F MED LIST DOCD IN RCRD: CPT | Mod: CPTII,S$GLB,, | Performed by: INTERNAL MEDICINE

## 2022-08-31 PROCEDURE — 99999 PR PBB SHADOW E&M-EST. PATIENT-LVL III: ICD-10-PCS | Mod: PBBFAC,,, | Performed by: INTERNAL MEDICINE

## 2022-08-31 PROCEDURE — 99999 PR PBB SHADOW E&M-EST. PATIENT-LVL III: CPT | Mod: PBBFAC,,, | Performed by: INTERNAL MEDICINE

## 2022-08-31 PROCEDURE — 3075F PR MOST RECENT SYSTOLIC BLOOD PRESS GE 130-139MM HG: ICD-10-PCS | Mod: CPTII,S$GLB,, | Performed by: INTERNAL MEDICINE

## 2022-08-31 PROCEDURE — 3008F PR BODY MASS INDEX (BMI) DOCUMENTED: ICD-10-PCS | Mod: CPTII,S$GLB,, | Performed by: INTERNAL MEDICINE

## 2022-08-31 PROCEDURE — 3078F DIAST BP <80 MM HG: CPT | Mod: CPTII,S$GLB,, | Performed by: INTERNAL MEDICINE

## 2022-08-31 PROCEDURE — 3078F PR MOST RECENT DIASTOLIC BLOOD PRESSURE < 80 MM HG: ICD-10-PCS | Mod: CPTII,S$GLB,, | Performed by: INTERNAL MEDICINE

## 2022-08-31 PROCEDURE — 1125F PR PAIN SEVERITY QUANTIFIED, PAIN PRESENT: ICD-10-PCS | Mod: CPTII,S$GLB,, | Performed by: INTERNAL MEDICINE

## 2022-08-31 PROCEDURE — 3288F PR FALLS RISK ASSESSMENT DOCUMENTED: ICD-10-PCS | Mod: CPTII,S$GLB,, | Performed by: INTERNAL MEDICINE

## 2022-08-31 PROCEDURE — 1101F PR PT FALLS ASSESS DOC 0-1 FALLS W/OUT INJ PAST YR: ICD-10-PCS | Mod: CPTII,S$GLB,, | Performed by: INTERNAL MEDICINE

## 2022-08-31 PROCEDURE — 3066F PR DOCUMENTATION OF TREATMENT FOR NEPHROPATHY: ICD-10-PCS | Mod: CPTII,S$GLB,, | Performed by: INTERNAL MEDICINE

## 2022-08-31 PROCEDURE — 1159F PR MEDICATION LIST DOCUMENTED IN MEDICAL RECORD: ICD-10-PCS | Mod: CPTII,S$GLB,, | Performed by: INTERNAL MEDICINE

## 2022-08-31 PROCEDURE — 3075F SYST BP GE 130 - 139MM HG: CPT | Mod: CPTII,S$GLB,, | Performed by: INTERNAL MEDICINE

## 2022-08-31 PROCEDURE — 4010F ACE/ARB THERAPY RXD/TAKEN: CPT | Mod: CPTII,S$GLB,, | Performed by: INTERNAL MEDICINE

## 2022-08-31 PROCEDURE — 4010F PR ACE/ARB THEARPY RXD/TAKEN: ICD-10-PCS | Mod: CPTII,S$GLB,, | Performed by: INTERNAL MEDICINE

## 2022-08-31 PROCEDURE — 1101F PT FALLS ASSESS-DOCD LE1/YR: CPT | Mod: CPTII,S$GLB,, | Performed by: INTERNAL MEDICINE

## 2022-08-31 RX ORDER — DOXAZOSIN 2 MG/1
2 TABLET ORAL NIGHTLY
Qty: 90 TABLET | Refills: 4 | Status: ON HOLD | OUTPATIENT
Start: 2022-08-31 | End: 2023-07-26 | Stop reason: HOSPADM

## 2022-08-31 NOTE — PROGRESS NOTES
[facility] Cardiology     Subjective:    Patient ID:  Addy Redding is a 66 y.o. male who presents for follow-up of Coronary Artery Disease, Hypertension, Hyperlipidemia, Diabetes Mellitus, and Pre-op Exam    Review of patient's allergies indicates:  No Known Allergies     He continues to work.  He was taken off lisinopril to reassess his GFR status, CKD stage 3.  He is diabetic.  His LDL is well controlled.  He has not used any nitroglycerin.  His blood pressure today is 137/71.  He is intolerant to beta-blockers due to chronic sinus bradycardia.  He is in need of prostate biopsy.  It was  put off because of angina and necessity for stenting May 2022.  He states at home his blood pressures have been in the 130 range.      Review of Systems   Constitutional: Negative for chills, decreased appetite, diaphoresis, fever, malaise/fatigue, night sweats, weight gain and weight loss.   HENT:  Negative for congestion, ear discharge, ear pain, hearing loss, hoarse voice, nosebleeds, odynophagia, sore throat, stridor and tinnitus.    Eyes:  Negative for blurred vision, discharge, double vision, pain, photophobia, redness, vision loss in left eye, vision loss in right eye, visual disturbance and visual halos.   Cardiovascular:  Negative for chest pain, claudication, cyanosis, dyspnea on exertion, irregular heartbeat, leg swelling, near-syncope, orthopnea, palpitations, paroxysmal nocturnal dyspnea and syncope.   Respiratory:  Negative for cough, hemoptysis, shortness of breath, sleep disturbances due to breathing, snoring, sputum production and wheezing.    Endocrine: Negative for cold intolerance, heat intolerance, polydipsia, polyphagia and polyuria.   Hematologic/Lymphatic: Negative for adenopathy and bleeding problem. Does not bruise/bleed easily.   Skin:  Negative for color change, dry skin, flushing, itching, nail changes, poor wound healing, rash, skin  "cancer, suspicious lesions and unusual hair distribution.   Musculoskeletal:  Negative for arthritis, back pain, falls, gout, joint pain, joint swelling, muscle cramps, muscle weakness, myalgias, neck pain and stiffness.   Gastrointestinal:  Negative for bloating, abdominal pain, anorexia, change in bowel habit, bowel incontinence, constipation, diarrhea, dysphagia, excessive appetite, flatus, heartburn, hematemesis, hematochezia, hemorrhoids, jaundice, melena, nausea and vomiting.   Genitourinary:  Negative for bladder incontinence, decreased libido, dysuria, flank pain, frequency, genital sores, hematuria, hesitancy, incomplete emptying, nocturia and urgency.   Neurological:  Negative for aphonia, brief paralysis, difficulty with concentration, disturbances in coordination, excessive daytime sleepiness, dizziness, focal weakness, headaches, light-headedness, loss of balance, numbness, paresthesias, seizures, sensory change, tremors, vertigo and weakness.   Psychiatric/Behavioral:  Negative for altered mental status, depression, hallucinations, memory loss, substance abuse, suicidal ideas and thoughts of violence. The patient does not have insomnia and is not nervous/anxious.    Allergic/Immunologic: Negative for hives and persistent infections.      Objective:       Vitals:    08/31/22 0838   BP: 137/71   Pulse: (!) 51   SpO2: 97%   Weight: 90.8 kg (200 lb 1.1 oz)   Height: 6' 2" (1.88 m)    Physical Exam  Constitutional:       General: He is not in acute distress.     Appearance: He is well-developed. He is not diaphoretic.   HENT:      Head: Normocephalic and atraumatic.      Nose: Nose normal.   Eyes:      General: No scleral icterus.        Right eye: No discharge.      Conjunctiva/sclera: Conjunctivae normal.      Pupils: Pupils are equal, round, and reactive to light.   Neck:      Thyroid: No thyromegaly.      Vascular: No JVD.      Trachea: No tracheal deviation.   Cardiovascular:      Rate and Rhythm: " Regular rhythm. Bradycardia present.      Pulses: Normal pulses.           Carotid pulses are 2+ on the right side and 2+ on the left side.       Radial pulses are 2+ on the right side and 2+ on the left side.        Dorsalis pedis pulses are 2+ on the right side and 2+ on the left side.        Posterior tibial pulses are 2+ on the right side and 2+ on the left side.      Heart sounds: Normal heart sounds. No murmur heard.    No friction rub. No gallop.   Pulmonary:      Effort: Pulmonary effort is normal. No respiratory distress.      Breath sounds: Normal breath sounds. No stridor. No wheezing or rales.   Chest:      Chest wall: No tenderness.   Abdominal:      General: Bowel sounds are normal. There is no distension.      Palpations: Abdomen is soft. There is no mass.      Tenderness: There is no abdominal tenderness. There is no guarding or rebound.   Musculoskeletal:         General: No tenderness. Normal range of motion.      Cervical back: Normal range of motion and neck supple.   Lymphadenopathy:      Cervical: No cervical adenopathy.   Skin:     General: Skin is warm and dry.      Coloration: Skin is not pale.      Findings: No erythema or rash.   Neurological:      Mental Status: He is alert and oriented to person, place, and time.      Cranial Nerves: No cranial nerve deficit.      Coordination: Coordination normal.   Psychiatric:         Behavior: Behavior normal.         Thought Content: Thought content normal.         Judgment: Judgment normal.         Assessment:       1. Hypertension associated with diabetes    2. Sinus bradycardia    3. Dyslipidemia associated with type 2 diabetes mellitus    4. Coronary artery disease of native artery of native heart with stable angina pectoris    5. Aortic atherosclerosis    6. Type 2 diabetes mellitus with stage 3a chronic kidney disease, without long-term current use of insulin    7. Presence of drug-eluting stent in left circumflex coronary artery    8.  Preoperative clearance      Results for orders placed or performed in visit on 08/05/22   Comprehensive Metabolic Panel   Result Value Ref Range    Sodium 138 136 - 145 mmol/L    Potassium 4.6 3.5 - 5.1 mmol/L    Chloride 107 95 - 110 mmol/L    CO2 23 23 - 29 mmol/L    Glucose 152 (H) 70 - 110 mg/dL    BUN 30 (H) 8 - 23 mg/dL    Creatinine 2.1 (H) 0.5 - 1.4 mg/dL    Calcium 9.3 8.7 - 10.5 mg/dL    Total Protein 7.4 6.0 - 8.4 g/dL    Albumin 3.9 3.5 - 5.2 g/dL    Total Bilirubin 0.5 0.1 - 1.0 mg/dL    Alkaline Phosphatase 48 (L) 55 - 135 U/L    AST 16 10 - 40 U/L    ALT 16 10 - 44 U/L    Anion Gap 8 8 - 16 mmol/L    eGFR 34 (A) >60 mL/min/1.73 m^2         Current Outpatient Medications:     ADVANCED GLUC METER TEST STRIP Strp, USE TO TEST BLOOD SUGAR 4 TIMES DAILY., Disp: 100 strip, Rfl: 0    amLODIPine (NORVASC) 5 MG tablet, Take 1 tablet (5 mg total) by mouth 2 (two) times daily., Disp: 180 tablet, Rfl: 4    aspirin (ECOTRIN) 81 MG EC tablet, Take 1 tablet (81 mg total) by mouth once daily., Disp: , Rfl:     atorvastatin (LIPITOR) 80 MG tablet, Take 1 tablet (80 mg total) by mouth once daily., Disp: 90 tablet, Rfl: 3    blood sugar diagnostic (BLOOD GLUCOSE TEST) Strp, Check sugar once daily, Disp: 100 each, Rfl: 11    clopidogreL (PLAVIX) 75 mg tablet, Take 1 tablet (75 mg total) by mouth once daily., Disp: 90 tablet, Rfl: 4    dapagliflozin (FARXIGA) 5 mg Tab tablet, Take 1 tablet (5 mg total) by mouth once daily., Disp: 30 tablet, Rfl: 2    ezetimibe (ZETIA) 10 mg tablet, Take 1 tablet (10 mg total) by mouth once daily., Disp: 90 tablet, Rfl: 4    ibuprofen (ADVIL,MOTRIN) 800 MG tablet, Take 1 tablet (800 mg total) by mouth every 8 (eight) hours as needed for Pain., Disp: 30 tablet, Rfl: 2    lancets (ACCU-CHEK SOFTCLIX LANCETS) Misc, 1 lancet by Misc.(Non-Drug; Combo Route) route 2 (two) times daily., Disp: 200 each, Rfl: 1    lisinopriL (PRINIVIL,ZESTRIL) 5 MG tablet, Take 1 tablet (5 mg total) by mouth once  daily., Disp: 90 tablet, Rfl: 4    nitroGLYCERIN (NITROSTAT) 0.4 MG SL tablet, Place 1 tablet (0.4 mg total) under the tongue every 5 (five) minutes as needed for Chest pain., Disp: 30 tablet, Rfl: 3    pantoprazole (PROTONIX) 40 MG tablet, TAKE ONE TABLET BY MOUTH ONCE DAILY., Disp: 90 tablet, Rfl: 3    sildenafil (REVATIO) 20 mg Tab, Take 1 tablet (20 mg total) by mouth daily as needed for ED, Disp: 30 tablet, Rfl: 2    blood-glucose meter kit, Use as instructed, Disp: , Rfl:     doxazosin (CARDURA) 2 MG tablet, Take 1 tablet (2 mg total) by mouth every evening., Disp: 90 tablet, Rfl: 4     Lab Results   Component Value Date    WBC 5.04 07/25/2022    RBC 4.35 (L) 07/25/2022    HGB 12.5 (L) 07/25/2022    HCT 38.2 (L) 07/25/2022    MCV 88 07/25/2022    MCH 28.7 07/25/2022    MCHC 32.7 07/25/2022    RDW 13.7 07/25/2022     07/25/2022    MPV 10.3 07/25/2022    GRAN 3.0 07/25/2022    GRAN 58.5 07/25/2022    LYMPH 1.3 07/25/2022    LYMPH 25.6 07/25/2022    MONO 0.5 07/25/2022    MONO 9.9 07/25/2022    EOS 0.3 07/25/2022    BASO 0.03 07/25/2022    EOSINOPHIL 5.2 07/25/2022    BASOPHIL 0.6 07/25/2022    MG 1.9 10/09/2012        CMP  Lab Results   Component Value Date     08/05/2022    K 4.6 08/05/2022     08/05/2022    CO2 23 08/05/2022     (H) 08/05/2022    BUN 30 (H) 08/05/2022    CREATININE 2.1 (H) 08/05/2022    CALCIUM 9.3 08/05/2022    PROT 7.4 08/05/2022    ALBUMIN 3.9 08/05/2022    BILITOT 0.5 08/05/2022    ALKPHOS 48 (L) 08/05/2022    AST 16 08/05/2022    ALT 16 08/05/2022    ANIONGAP 8 08/05/2022    ESTGFRAFRICA 36.3 (A) 07/25/2022    EGFRNONAA 31.4 (A) 07/25/2022        Lab Results   Component Value Date    LABURIN No growth 10/04/2018            Results for orders placed or performed during the hospital encounter of 06/16/22   EKG 12-lead    Collection Time: 06/16/22  1:06 PM    Narrative    Test Reason : Z98.890,    Vent. Rate : 041 BPM     Atrial Rate : 041 BPM     P-R Int : 170 ms           QRS Dur : 094 ms      QT Int : 492 ms       P-R-T Axes : 017 016 023 degrees     QTc Int : 405 ms    Marked sinus bradycardia  Abnormal ECG  When compared with ECG of 07-APR-2022 07:49,  No significant change was found  Confirmed by MARIANA CHAPA, SAURABH ([id number]) on 6/17/2022 7:11:03 AM    Referred By: ANIKA COLEMAN           Confirmed By:SAURABH PEÑA MD                  Plan:       Problem List Items Addressed This Visit          Cardiac/Vascular    Dyslipidemia associated with type 2 diabetes mellitus     Most recent LDL 66.  HDL 43.  He will continue atorvastatin and Zetia.         Hypertension associated with diabetes - Primary     He will likely resume lisinopril soon.  Given consistent elevated blood pressure is 130-140 systolic range, doxazosin 2 mg added at nighttime for better blood pressure control.  He will continue amlodipine.         Relevant Medications    doxazosin (CARDURA) 2 MG tablet    Aortic atherosclerosis     Condition unchanged.         Sinus bradycardia     Heart rate today 51.  Beta-blockers to be avoided.         Coronary artery disease of native artery with stable angina pectoris     He is still working.  He has no angina.  He does have nitroglycerin to use on an as-needed basis.         Presence of drug-eluting stent in left circumflex coronary artery     Stents were placed to the circumflex May 26, 2022.  The patient has elevated PSA and indications for prostate biopsy.  I am working with Urology to clear him.            Endocrine    Type 2 diabetes mellitus with stage 3a chronic kidney disease, without long-term current use of insulin     Most recent hemoglobin A1c 7.4.  He follows with Nephrology.  Most recent serum creatinine 2.1.            Other    Preoperative clearance     I have been discussing his care with Dr. Kent.  She is scheduling his prostate biopsy and will hold his dual anti-platelet therapy for 5 days pre biopsy.    The patient really wants his workup to be completed which  has been put off because of his stent procedure May 26, 2022.  He will resume dual anti-platelet therapy post biopsy.                   Doxazosin 2 mg ordered q.h.s. dosing for better blood pressure control.  I am clearing the patient for prostate biopsy.  It has been 3 months of dual anti-platelet therapy.  He is free of angina.           Carter Arevalo MD  09/01/2022   9:18 AM

## 2022-09-01 ENCOUNTER — TELEPHONE (OUTPATIENT)
Dept: UROLOGY | Facility: CLINIC | Age: 67
End: 2022-09-01
Payer: MEDICARE

## 2022-09-01 PROBLEM — Z01.818 PREOPERATIVE CLEARANCE: Status: ACTIVE | Noted: 2022-09-01

## 2022-09-01 PROBLEM — Z95.5 PRESENCE OF DRUG-ELUTING STENT IN LEFT CIRCUMFLEX CORONARY ARTERY: Status: ACTIVE | Noted: 2022-09-01

## 2022-09-01 NOTE — TELEPHONE ENCOUNTER
Dr. Arevalo, the patient's cardiologist has cleared pt to hold aspirin and plavix for 5 days before a prostate biopsy. Will message [facility] urologist to see if can accommodate for an MRI guided bx of the prostate given pirads 4 lesion seen.

## 2022-09-01 NOTE — ASSESSMENT & PLAN NOTE
I have been discussing his care with Dr. Kent.  She is scheduling his prostate biopsy and will hold his dual anti-platelet therapy for 5 days pre biopsy.    The patient really wants his workup to be completed which has been put off because of his stent procedure May 26, 2022.  He will resume dual anti-platelet therapy post biopsy.

## 2022-09-01 NOTE — ASSESSMENT & PLAN NOTE
He will likely resume lisinopril soon.  Given consistent elevated blood pressure is 130-140 systolic range, doxazosin 2 mg added at nighttime for better blood pressure control.  He will continue amlodipine.

## 2022-09-01 NOTE — TELEPHONE ENCOUNTER
----- Message from Carter Arevalo MD sent at 8/31/2022  4:20 PM CDT -----  Regarding: RE: I spoke with him and he wants to proceed tomorrow with the procedure, cardiac angio and probable stent. He accepts the 3 to 6 month delay prostate wise.  Begging for 5 days off both agents  ----- Message -----  From: Janice Kent MD  Sent: 8/31/2022  11:35 AM CDT  To: Carter Arevalo MD  Subject: RE: I spoke with him and he wants to proceed#    I would need him to be off of all blood thinners for 7 days before ideally. Is that possible?    ----- Message -----  From: Carter Arevalo MD  Sent: 8/31/2022   9:08 AM CDT  To: Janice Kent MD  Subject: RE: I spoke with him and he wants to proceed#    Mr Redding can go for his prostate biopsy, can aspirin be continued? Im here with him and told him you would reach out to him.  ----- Message -----  From: Janice Kent MD  Sent: 6/15/2022  12:41 PM CDT  To: Carter Arevalo MD  Subject: RE: I spoke with him and he wants to proceed#    Thank you for the followup Dr. Arevalo. I'll send him a portal message and we will check another PSA in a few months to track it. Good luck with his stent tomorrow.  Sincerely,  Janice  ----- Message -----  From: Carter Arevalo MD  Sent: 6/15/2022   9:07 AM CDT  To: Janice Kent MD  Subject: I spoke with him and he wants to proceed otto#      ----- Message -----  From: Janice Kent MD  Sent: 6/15/2022   8:07 AM CDT  To: Beryl Galvez LPN, MD Dr. Mikcey Singer, I feel that waiting for 6 months for his biopsy might be too far out. His PSA went up to 9.1. I hate to mess up your plans so last minute, he just saw me yesterday. I was recommending the most advanced type of biopsy which is the MRI guided biopsy that is performed at main campus by a colleague. They're scheduling sometimes around 4-6 weeks out from the time I request ( I haven't requested yet). Do you think holding on the stent procedure that long would be  ok?  Sincerely,  Janice    ----- Message -----  From: Carter Arevalo MD  Sent: 6/15/2022   7:56 AM CDT  To: Janice Kent MD    Aspirin and Plavix post stenting would be needed 6 months ideally without interruption if a stent is performed as planned tomorrow.  Let me know your thoughts.  He is having angina but obviously could get through a biopsy on the prostate I think.  ----- Message -----  From: Janice Kent MD  Sent: 6/14/2022   2:37 PM CDT  To: MD Dr. Mickey Singer,  I have mr. Redding here today in clinic. We obtained an MRI of the prostate to try to evaluate his elevated PSA of 9 further. It did demonstrate an area that would benefit from a special MRI guided biopsy of the prostate. I usually ask my colleague at [facility] to perform these - usually he is able to schedule patients about 4-6 weeks out. Mr. Daly did tell me that he has a procedure with you in 2 days for a stent. After the stent, how long would he need to remain on blood thinners for? I'm trying to get an idea of what to prioritize (should we wait on the stent until after the biopsy or is his cardiac status more pressing).

## 2022-09-01 NOTE — ASSESSMENT & PLAN NOTE
Stents were placed to the circumflex May 26, 2022.  The patient has elevated PSA and indications for prostate biopsy.  I am working with Urology to clear him.

## 2022-09-02 ENCOUNTER — TELEPHONE (OUTPATIENT)
Dept: UROLOGY | Facility: CLINIC | Age: 67
End: 2022-09-02
Payer: MEDICARE

## 2022-09-02 DIAGNOSIS — R97.20 ELEVATED PSA: Primary | ICD-10-CM

## 2022-09-02 RX ORDER — CIPROFLOXACIN 500 MG/1
TABLET ORAL
Qty: 1 TABLET | Refills: 0 | Status: SHIPPED | OUTPATIENT
Start: 2022-09-02 | End: 2022-11-08

## 2022-09-02 RX ORDER — ENEMA 19; 7 G/133ML; G/133ML
1 ENEMA RECTAL ONCE
Qty: 1 ENEMA | Refills: 0 | Status: SHIPPED | OUTPATIENT
Start: 2022-09-02 | End: 2022-09-02

## 2022-09-02 NOTE — TELEPHONE ENCOUNTER
1404-I LVM now per below with my [facility] to schedule prostate biopsy on a Wed afternoon late Sep or early Oct.  Pt can also send message in Portal with preferred date.  I will review all biopsy instructions at time of scheduling.  Thank you.    ----- Message from Scott Frias MD sent at 9/2/2022  8:18 AM CDT -----  Bethanie,   Please set this patient up for uronav prostate biopsy.  Orders placed.  Will f/u with dr. Kent after for results review.  Thank you.    MM  ----- Message -----  From: Janice Kent MD  Sent: 9/1/2022  10:51 AM CDT  To: Scott Frias MD    Hi Rodriguez,  Do you think you would be able to do an MRI guided bx on this pt? pirads 4, 1.3cm left apex. I biopsied him before in 2018 but it was neg. PSA went up to 9.1 in Feb of this year. He wanted to hold to get through his cardiology procedure and now his cardiologist Dr. Arevalo thinks it is an appropriate time to consider bx bc he cleared him to hold aspirin and plavix for 5 days before.   Thank you as always!  Janice

## 2022-09-06 ENCOUNTER — TELEPHONE (OUTPATIENT)
Dept: UROLOGY | Facility: CLINIC | Age: 67
End: 2022-09-06
Payer: MEDICARE

## 2022-09-07 DIAGNOSIS — E11.9 TYPE 2 DIABETES MELLITUS WITHOUT COMPLICATION, UNSPECIFIED WHETHER LONG TERM INSULIN USE: ICD-10-CM

## 2022-09-12 ENCOUNTER — PATIENT MESSAGE (OUTPATIENT)
Dept: ADMINISTRATIVE | Facility: HOSPITAL | Age: 67
End: 2022-09-12
Payer: MEDICARE

## 2022-09-13 ENCOUNTER — LAB VISIT (OUTPATIENT)
Dept: LAB | Facility: HOSPITAL | Age: 67
End: 2022-09-13
Attending: STUDENT IN AN ORGANIZED HEALTH CARE EDUCATION/TRAINING PROGRAM
Payer: MEDICARE

## 2022-09-13 DIAGNOSIS — Z12.5 ENCOUNTER FOR PROSTATE CANCER SCREENING: ICD-10-CM

## 2022-09-13 DIAGNOSIS — I10 HYPERTENSION, UNSPECIFIED TYPE: ICD-10-CM

## 2022-09-13 DIAGNOSIS — R97.20 ELEVATED PSA: ICD-10-CM

## 2022-09-13 DIAGNOSIS — N17.9 AKI (ACUTE KIDNEY INJURY): ICD-10-CM

## 2022-09-13 LAB
ANION GAP SERPL CALC-SCNC: 10 MMOL/L (ref 8–16)
BASOPHILS # BLD AUTO: 0.04 K/UL (ref 0–0.2)
BASOPHILS NFR BLD: 0.7 % (ref 0–1.9)
BUN SERPL-MCNC: 21 MG/DL (ref 8–23)
CALCIUM SERPL-MCNC: 8.8 MG/DL (ref 8.7–10.5)
CHLORIDE SERPL-SCNC: 109 MMOL/L (ref 95–110)
CO2 SERPL-SCNC: 21 MMOL/L (ref 23–29)
COMPLEXED PSA SERPL-MCNC: 8.1 NG/ML (ref 0–4)
CREAT SERPL-MCNC: 1.9 MG/DL (ref 0.5–1.4)
DIFFERENTIAL METHOD: ABNORMAL
EOSINOPHIL # BLD AUTO: 0.3 K/UL (ref 0–0.5)
EOSINOPHIL NFR BLD: 5.7 % (ref 0–8)
ERYTHROCYTE [DISTWIDTH] IN BLOOD BY AUTOMATED COUNT: 14 % (ref 11.5–14.5)
EST. GFR  (NO RACE VARIABLE): 38 ML/MIN/1.73 M^2
GLUCOSE SERPL-MCNC: 175 MG/DL (ref 70–110)
HCT VFR BLD AUTO: 35.9 % (ref 40–54)
HGB BLD-MCNC: 12.2 G/DL (ref 14–18)
IMM GRANULOCYTES # BLD AUTO: 0.02 K/UL (ref 0–0.04)
IMM GRANULOCYTES NFR BLD AUTO: 0.4 % (ref 0–0.5)
LYMPHOCYTES # BLD AUTO: 1.6 K/UL (ref 1–4.8)
LYMPHOCYTES NFR BLD: 29.8 % (ref 18–48)
MCH RBC QN AUTO: 29.1 PG (ref 27–31)
MCHC RBC AUTO-ENTMCNC: 34 G/DL (ref 32–36)
MCV RBC AUTO: 86 FL (ref 82–98)
MONOCYTES # BLD AUTO: 0.7 K/UL (ref 0.3–1)
MONOCYTES NFR BLD: 13.3 % (ref 4–15)
NEUTROPHILS # BLD AUTO: 2.7 K/UL (ref 1.8–7.7)
NEUTROPHILS NFR BLD: 50.1 % (ref 38–73)
NRBC BLD-RTO: 0 /100 WBC
PLATELET # BLD AUTO: 233 K/UL (ref 150–450)
PMV BLD AUTO: 10.8 FL (ref 9.2–12.9)
POTASSIUM SERPL-SCNC: 4.1 MMOL/L (ref 3.5–5.1)
RBC # BLD AUTO: 4.19 M/UL (ref 4.6–6.2)
SODIUM SERPL-SCNC: 140 MMOL/L (ref 136–145)
WBC # BLD AUTO: 5.41 K/UL (ref 3.9–12.7)

## 2022-09-13 PROCEDURE — 84153 ASSAY OF PSA TOTAL: CPT | Performed by: STUDENT IN AN ORGANIZED HEALTH CARE EDUCATION/TRAINING PROGRAM

## 2022-09-13 PROCEDURE — 85025 COMPLETE CBC W/AUTO DIFF WBC: CPT | Performed by: NURSE PRACTITIONER

## 2022-09-13 PROCEDURE — 36415 COLL VENOUS BLD VENIPUNCTURE: CPT | Performed by: STUDENT IN AN ORGANIZED HEALTH CARE EDUCATION/TRAINING PROGRAM

## 2022-09-13 PROCEDURE — 80048 BASIC METABOLIC PNL TOTAL CA: CPT | Performed by: NURSE PRACTITIONER

## 2022-09-20 ENCOUNTER — PATIENT OUTREACH (OUTPATIENT)
Dept: ADMINISTRATIVE | Facility: HOSPITAL | Age: 67
End: 2022-09-20
Payer: MEDICARE

## 2022-09-20 ENCOUNTER — PATIENT MESSAGE (OUTPATIENT)
Dept: ADMINISTRATIVE | Facility: HOSPITAL | Age: 67
End: 2022-09-20
Payer: MEDICARE

## 2022-09-20 NOTE — LETTER
September 27, 2022    Addy Redding  [address]  [place] [address]             [facility]  [address]  [place] [address]  Phone: [phone] [facility] [place] [facility] is committed to your overall health.  To help you get the most out of each of your visits, we will review your information to make sure you are up to date on all of your recommended tests and/or procedures.       Stephan Ramey MD  has found that your chart shows you may be due for :         Health Maintenance Due   Topic    Eye Exam     Colorectal Cancer Screening     Influenza Vaccine (1)    Foot Exam          If you have had any of the above done at another facility, please bring the records or information with you so that your record at [facility] will be complete.  If you would like to schedule any of these test, please call [phone] or send a message via [email or web address].        If you are currently taking medication, please bring it with you to your appointment for review.           Thank you for letting us care for you,        Stephan Ramey MD and your [facility] Team

## 2022-09-20 NOTE — PROGRESS NOTES
Care Everywhere updates requested and reviewed.  Immunizations reconciled. Media reports reviewed.  Duplicate HM overrides and  orders removed.  Overdue HM topic chart audit and/or requested.  Overdue lab testing linked to upcoming lab appointments if applies.        Health Maintenance Due   Topic Date Due    Eye Exam  2022    Colorectal Cancer Screening  07/15/2022    Influenza Vaccine (1) 2022    Foot Exam  10/19/2022

## 2022-09-22 ENCOUNTER — PATIENT MESSAGE (OUTPATIENT)
Dept: UROLOGY | Facility: CLINIC | Age: 67
End: 2022-09-22
Payer: MEDICARE

## 2022-09-27 ENCOUNTER — PATIENT OUTREACH (OUTPATIENT)
Dept: ADMINISTRATIVE | Facility: HOSPITAL | Age: 67
End: 2022-09-27
Payer: MEDICARE

## 2022-10-04 ENCOUNTER — HOSPITAL ENCOUNTER (OUTPATIENT)
Dept: RADIOLOGY | Facility: HOSPITAL | Age: 67
Discharge: HOME | End: 2022-10-04
Attending: FAMILY MEDICINE
Payer: MEDICARE

## 2022-10-04 ENCOUNTER — OFFICE VISIT (OUTPATIENT)
Dept: FAMILY MEDICINE | Facility: CLINIC | Age: 67
End: 2022-10-04
Attending: FAMILY MEDICINE
Payer: MEDICARE

## 2022-10-04 VITALS
HEART RATE: 51 BPM | BODY MASS INDEX: 26.08 KG/M2 | DIASTOLIC BLOOD PRESSURE: 65 MMHG | WEIGHT: 203.25 LBS | OXYGEN SATURATION: 97 % | HEIGHT: 74 IN | SYSTOLIC BLOOD PRESSURE: 120 MMHG | TEMPERATURE: 98 F

## 2022-10-04 DIAGNOSIS — N18.32 STAGE 3B CHRONIC KIDNEY DISEASE: ICD-10-CM

## 2022-10-04 DIAGNOSIS — M25.512 CHRONIC LEFT SHOULDER PAIN: ICD-10-CM

## 2022-10-04 DIAGNOSIS — I15.2 HYPERTENSION ASSOCIATED WITH DIABETES: ICD-10-CM

## 2022-10-04 DIAGNOSIS — G89.29 CHRONIC LEFT SHOULDER PAIN: ICD-10-CM

## 2022-10-04 DIAGNOSIS — Z12.11 ENCOUNTER FOR FIT (FECAL IMMUNOCHEMICAL TEST) SCREENING: ICD-10-CM

## 2022-10-04 DIAGNOSIS — E11.22 TYPE 2 DIABETES MELLITUS WITH STAGE 3A CHRONIC KIDNEY DISEASE, WITHOUT LONG-TERM CURRENT USE OF INSULIN: Primary | ICD-10-CM

## 2022-10-04 DIAGNOSIS — E11.69 DYSLIPIDEMIA ASSOCIATED WITH TYPE 2 DIABETES MELLITUS: ICD-10-CM

## 2022-10-04 DIAGNOSIS — N18.31 TYPE 2 DIABETES MELLITUS WITH STAGE 3A CHRONIC KIDNEY DISEASE, WITHOUT LONG-TERM CURRENT USE OF INSULIN: Primary | ICD-10-CM

## 2022-10-04 DIAGNOSIS — I25.118 CORONARY ARTERY DISEASE OF NATIVE ARTERY OF NATIVE HEART WITH STABLE ANGINA PECTORIS: ICD-10-CM

## 2022-10-04 DIAGNOSIS — Z23 IMMUNIZATION DUE: ICD-10-CM

## 2022-10-04 DIAGNOSIS — E11.59 HYPERTENSION ASSOCIATED WITH DIABETES: ICD-10-CM

## 2022-10-04 DIAGNOSIS — E78.5 DYSLIPIDEMIA ASSOCIATED WITH TYPE 2 DIABETES MELLITUS: ICD-10-CM

## 2022-10-04 PROCEDURE — 1159F MED LIST DOCD IN RCRD: CPT | Mod: CPTII,S$GLB,, | Performed by: FAMILY MEDICINE

## 2022-10-04 PROCEDURE — 1159F PR MEDICATION LIST DOCUMENTED IN MEDICAL RECORD: ICD-10-PCS | Mod: CPTII,S$GLB,, | Performed by: FAMILY MEDICINE

## 2022-10-04 PROCEDURE — 3051F HG A1C>EQUAL 7.0%<8.0%: CPT | Mod: CPTII,S$GLB,, | Performed by: FAMILY MEDICINE

## 2022-10-04 PROCEDURE — 3078F DIAST BP <80 MM HG: CPT | Mod: CPTII,S$GLB,, | Performed by: FAMILY MEDICINE

## 2022-10-04 PROCEDURE — 3066F PR DOCUMENTATION OF TREATMENT FOR NEPHROPATHY: ICD-10-PCS | Mod: CPTII,S$GLB,, | Performed by: FAMILY MEDICINE

## 2022-10-04 PROCEDURE — 73030 X-RAY EXAM OF SHOULDER: CPT | Mod: 26,LT,, | Performed by: RADIOLOGY

## 2022-10-04 PROCEDURE — 73030 XR SHOULDER COMPLETE 2 OR MORE VIEWS LEFT: ICD-10-PCS | Mod: 26,LT,, | Performed by: RADIOLOGY

## 2022-10-04 PROCEDURE — 1160F PR REVIEW ALL MEDS BY PRESCRIBER/CLIN PHARMACIST DOCUMENTED: ICD-10-PCS | Mod: CPTII,S$GLB,, | Performed by: FAMILY MEDICINE

## 2022-10-04 PROCEDURE — 90694 FLU VACCINE - QUADRIVALENT - ADJUVANTED: ICD-10-PCS | Mod: S$GLB,,, | Performed by: FAMILY MEDICINE

## 2022-10-04 PROCEDURE — 73030 X-RAY EXAM OF SHOULDER: CPT | Mod: TC,FY,LT

## 2022-10-04 PROCEDURE — 1160F RVW MEDS BY RX/DR IN RCRD: CPT | Mod: CPTII,S$GLB,, | Performed by: FAMILY MEDICINE

## 2022-10-04 PROCEDURE — 3008F BODY MASS INDEX DOCD: CPT | Mod: CPTII,S$GLB,, | Performed by: FAMILY MEDICINE

## 2022-10-04 PROCEDURE — 3074F SYST BP LT 130 MM HG: CPT | Mod: CPTII,S$GLB,, | Performed by: FAMILY MEDICINE

## 2022-10-04 PROCEDURE — 3061F PR NEG MICROALBUMINURIA RESULT DOCUMENTED/REVIEW: ICD-10-PCS | Mod: CPTII,S$GLB,, | Performed by: FAMILY MEDICINE

## 2022-10-04 PROCEDURE — 3066F NEPHROPATHY DOC TX: CPT | Mod: CPTII,S$GLB,, | Performed by: FAMILY MEDICINE

## 2022-10-04 PROCEDURE — 4010F ACE/ARB THERAPY RXD/TAKEN: CPT | Mod: CPTII,S$GLB,, | Performed by: FAMILY MEDICINE

## 2022-10-04 PROCEDURE — 4010F PR ACE/ARB THEARPY RXD/TAKEN: ICD-10-PCS | Mod: CPTII,S$GLB,, | Performed by: FAMILY MEDICINE

## 2022-10-04 PROCEDURE — G0008 ADMIN INFLUENZA VIRUS VAC: HCPCS | Mod: S$GLB,,, | Performed by: FAMILY MEDICINE

## 2022-10-04 PROCEDURE — 3051F PR MOST RECENT HEMOGLOBIN A1C LEVEL 7.0 - < 8.0%: ICD-10-PCS | Mod: CPTII,S$GLB,, | Performed by: FAMILY MEDICINE

## 2022-10-04 PROCEDURE — 90694 VACC AIIV4 NO PRSRV 0.5ML IM: CPT | Mod: S$GLB,,, | Performed by: FAMILY MEDICINE

## 2022-10-04 PROCEDURE — G0008 FLU VACCINE - QUADRIVALENT - ADJUVANTED: ICD-10-PCS | Mod: S$GLB,,, | Performed by: FAMILY MEDICINE

## 2022-10-04 PROCEDURE — 99214 OFFICE O/P EST MOD 30 MIN: CPT | Mod: 25,S$GLB,, | Performed by: FAMILY MEDICINE

## 2022-10-04 PROCEDURE — 3008F PR BODY MASS INDEX (BMI) DOCUMENTED: ICD-10-PCS | Mod: CPTII,S$GLB,, | Performed by: FAMILY MEDICINE

## 2022-10-04 PROCEDURE — 3074F PR MOST RECENT SYSTOLIC BLOOD PRESSURE < 130 MM HG: ICD-10-PCS | Mod: CPTII,S$GLB,, | Performed by: FAMILY MEDICINE

## 2022-10-04 PROCEDURE — 99214 PR OFFICE/OUTPT VISIT, EST, LEVL IV, 30-39 MIN: ICD-10-PCS | Mod: 25,S$GLB,, | Performed by: FAMILY MEDICINE

## 2022-10-04 PROCEDURE — 3078F PR MOST RECENT DIASTOLIC BLOOD PRESSURE < 80 MM HG: ICD-10-PCS | Mod: CPTII,S$GLB,, | Performed by: FAMILY MEDICINE

## 2022-10-04 PROCEDURE — 3061F NEG MICROALBUMINURIA REV: CPT | Mod: CPTII,S$GLB,, | Performed by: FAMILY MEDICINE

## 2022-10-04 PROCEDURE — 99999 PR PBB SHADOW E&M-EST. PATIENT-LVL III: ICD-10-PCS | Mod: PBBFAC,,, | Performed by: FAMILY MEDICINE

## 2022-10-04 PROCEDURE — 99999 PR PBB SHADOW E&M-EST. PATIENT-LVL III: CPT | Mod: PBBFAC,,, | Performed by: FAMILY MEDICINE

## 2022-10-04 NOTE — PROGRESS NOTES
Subjective:       Patient ID: Addy Redding is a 66 y.o. male.    Chief Complaint: No chief complaint on file.    66 yr old black male with DM II, HTN, HLD, GERD, CKD III, presents today for his three month follow up. Following urology for elevated PSA and has biopsy tomorrow.        DM II - improved -    HGBA1C                   7.4 (H)             07/05/2022                     - complicated by CKD III                   - denies any hypoglycemic symptoms - on metformin - up to date with eye and foot screen - on metformin    HTN - controlled  - on lisinopril 10 mg daily - no side effects    HLD - controlled and improving -    LDLCALC                  66.2                07/05/2022                                 - on statin - compliant - need refill - not fully compliant with diet    GERD - stable - takes prilosec as needed    ED - stable - takes viagra as needed    Health maintenance  -labs due  -Flu and Pneumovax - up to date  -adacel due and done today  -colonoscopy due - wants to wait  -PSA UTD      Follow-up  This is a chronic problem. The current episode started more than 1 year ago. The problem occurs constantly. The problem has been gradually worsening. Associated symptoms include arthralgias and myalgias. Pertinent negatives include no abdominal pain, change in bowel habit, chest pain, congestion, coughing, diaphoresis, fatigue, headaches, joint swelling, numbness, rash, urinary symptoms, vertigo, vomiting or weakness.   Medication Refill  This is a chronic problem. The current episode started more than 1 year ago. The problem occurs constantly. The problem has been gradually improving. Associated symptoms include arthralgias and myalgias. Pertinent negatives include no abdominal pain, change in bowel habit, chest pain, congestion, coughing, diaphoresis, fatigue, headaches, joint swelling, numbness, rash, urinary symptoms, vertigo, vomiting or weakness.   Hypertension  This is a chronic problem. The current  episode started more than 1 year ago. The problem has been gradually worsening since onset. The problem is uncontrolled. Pertinent negatives include no anxiety, chest pain, headaches, malaise/fatigue, orthopnea, palpitations, peripheral edema or sweats. There are no associated agents to hypertension. Risk factors for coronary artery disease include dyslipidemia, diabetes mellitus, male gender and obesity. Past treatments include ACE inhibitors. The current treatment provides no improvement. There are no compliance problems.  There is no history of angina, CAD/MI, CVA, left ventricular hypertrophy, PVD or retinopathy. There is no history of chronic renal disease, coarctation of the aorta, hypercortisolism, pheochromocytoma, renovascular disease or a thyroid problem.   Arm Pain   There was no injury mechanism. The pain is present in the right shoulder and upper right arm. The quality of the pain is described as aching. The pain does not radiate. The pain is at a severity of 5/10. The pain is moderate. The pain has been Constant since the incident. Pertinent negatives include no chest pain or numbness. The symptoms are aggravated by movement, lifting and palpation. He has tried nothing for the symptoms. The treatment provided mild relief.   Shoulder Pain   The pain is present in the right shoulder. This is a new problem. The current episode started 1 to 4 weeks ago. There has been no history of extremity trauma. The problem occurs constantly. The problem has been unchanged. The quality of the pain is described as aching. The pain is at a severity of 5/10. The pain is moderate. Pertinent negatives include no headaches, itching, joint swelling or numbness. The symptoms are aggravated by activity. He has tried nothing for the symptoms. The treatment provided no relief. His past medical history is significant for diabetes.   Gastroesophageal Reflux  He complains of heartburn. He reports no abdominal pain, no chest pain, no  coughing or no wheezing. This is a new problem. The current episode started 1 to 4 weeks ago. The problem occurs constantly. The problem has been unchanged. The heartburn duration is several minutes. The heartburn is located in the substernum. The symptoms are aggravated by certain foods. Pertinent negatives include no anemia, fatigue or melena. There are no known risk factors. He has tried nothing for the symptoms. The treatment provided mild relief. Past procedures do not include an abdominal ultrasound, esophageal pH monitoring or a UGI. Past invasive treatments do not include gastroplasty or reflux surgery.   Diabetes  Pertinent negatives for hypoglycemia include no confusion, dizziness, headaches, nervousness/anxiousness, speech difficulty or sweats. Pertinent negatives for diabetes include no chest pain, no fatigue, no polydipsia, no polyuria and no weakness. Pertinent negatives for diabetic complications include no CVA, PVD or retinopathy.   Hyperlipidemia  This is a chronic problem. The current episode started more than 1 year ago. The problem is uncontrolled. Recent lipid tests were reviewed and are high. Exacerbating diseases include diabetes. He has no history of chronic renal disease, liver disease or nephrotic syndrome. There are no known factors aggravating his hyperlipidemia. Associated symptoms include myalgias. Pertinent negatives include no chest pain, focal sensory loss, focal weakness or leg pain. Current antihyperlipidemic treatment includes statins. There are no compliance problems.  Risk factors for coronary artery disease include diabetes mellitus, dyslipidemia, male sex and hypertension.   Review of Systems   Constitutional: Negative.  Negative for activity change, diaphoresis, fatigue, malaise/fatigue and unexpected weight change.   HENT: Negative.  Negative for nasal congestion, ear pain, mouth sores, rhinorrhea and voice change.    Eyes: Negative.  Negative for pain, discharge and visual  disturbance.   Respiratory: Negative.  Negative for apnea, cough and wheezing.    Cardiovascular: Negative.  Negative for chest pain, palpitations and orthopnea.   Gastrointestinal:  Positive for heartburn. Negative for abdominal distention, abdominal pain, anal bleeding, change in bowel habit, diarrhea, melena, vomiting and change in bowel habit.   Endocrine: Negative.  Negative for cold intolerance, polydipsia and polyuria.   Genitourinary: Negative.  Negative for decreased urine volume, difficulty urinating, discharge, frequency and scrotal swelling.   Musculoskeletal:  Positive for arthralgias and myalgias. Negative for back pain, joint swelling, leg pain and neck stiffness.   Integumentary:  Negative for color change, itching and rash. Negative.   Allergic/Immunologic: Negative.  Negative for environmental allergies.   Neurological: Negative.  Negative for dizziness, vertigo, focal weakness, speech difficulty, weakness, light-headedness, numbness and headaches.   Hematological: Negative.    Psychiatric/Behavioral: Negative.  Negative for agitation, confusion, dysphoric mood and suicidal ideas. The patient is not nervous/anxious.        PMH/PSH/FH/SH/MED/ALLERGY reviewed    Objective:       Vitals:    10/04/22 0800   BP: 120/65   Pulse: (!) 51   Temp: 98 °F (36.7 °C)       Physical Exam  Constitutional:       Appearance: He is well-developed.   HENT:      Head: Normocephalic and atraumatic.      Right Ear: External ear normal.      Left Ear: External ear normal.      Nose: Nose normal.      Mouth/Throat:      Pharynx: No oropharyngeal exudate.   Eyes:      General: No scleral icterus.        Right eye: No discharge.         Left eye: No discharge.      Conjunctiva/sclera: Conjunctivae normal.      Pupils: Pupils are equal, round, and reactive to light.   Neck:      Thyroid: No thyromegaly.      Vascular: No JVD.      Trachea: No tracheal deviation.   Cardiovascular:      Rate and Rhythm: Regular rhythm.  Bradycardia present.      Pulses:           Dorsalis pedis pulses are 1+ on the right side and 1+ on the left side.        Posterior tibial pulses are 1+ on the right side and 1+ on the left side.      Heart sounds: Normal heart sounds. No murmur heard.    No friction rub. No gallop.   Pulmonary:      Effort: Pulmonary effort is normal. No respiratory distress.      Breath sounds: Normal breath sounds. No stridor. No wheezing or rales.   Chest:      Chest wall: No tenderness.   Abdominal:      General: Bowel sounds are normal. There is no distension.      Palpations: Abdomen is soft. There is no mass.      Tenderness: There is no abdominal tenderness. There is no guarding or rebound.      Hernia: No hernia is present.   Musculoskeletal:         General: Tenderness (TTP left shoulder with restricted ROM) present. Normal range of motion.      Cervical back: Normal range of motion and neck supple.      Right foot: Normal range of motion. No deformity, bunion, Charcot foot, foot drop or prominent metatarsal heads.      Left foot: Normal range of motion. No deformity, bunion, Charcot foot, foot drop or prominent metatarsal heads.   Feet:      Right foot:      Protective Sensation: 10 sites tested.  10 sites sensed.      Skin integrity: Skin integrity normal.      Toenail Condition: Right toenails are normal.      Left foot:      Protective Sensation: 10 sites tested.  10 sites sensed.      Skin integrity: Skin integrity normal.      Toenail Condition: Left toenails are normal.   Lymphadenopathy:      Cervical: No cervical adenopathy.   Skin:     General: Skin is warm and dry.      Coloration: Skin is not pale.      Findings: No erythema or rash.   Neurological:      Mental Status: He is alert and oriented to person, place, and time.      Cranial Nerves: No cranial nerve deficit.      Motor: No abnormal muscle tone.      Coordination: Coordination normal.      Deep Tendon Reflexes: Reflexes are normal and symmetric.  Reflexes normal.   Psychiatric:         Behavior: Behavior normal.         Thought Content: Thought content normal.         Judgment: Judgment normal.       Assessment:       Problem List Items Addressed This Visit       Dyslipidemia associated with type 2 diabetes mellitus    Type 2 diabetes mellitus with stage 3a chronic kidney disease, without long-term current use of insulin - Primary    Stage 3b chronic kidney disease    Hypertension associated with diabetes    Coronary artery disease of native artery of native heart with stable angina pectoris     Other Visit Diagnoses       Encounter for FIT (fecal immunochemical test) screening        Relevant Orders    Fecal Immunochemical Test (iFOBT)    Chronic left shoulder pain        Relevant Orders    X-Ray Shoulder 2 or More Views Left    Immunization due        Relevant Orders    Influenza (FLUAD) - Quadrivalent (Adjuvanted) *Preferred* (65+) (PF)            Plan:           Diagnoses and all orders for this visit:    Type 2 diabetes mellitus with stage 3a chronic kidney disease, without long-term current use of insulin    Dyslipidemia associated with type 2 diabetes mellitus    Hypertension associated with diabetes    Coronary artery disease of native artery of native heart with stable angina pectoris    Stage 3b chronic kidney disease    Encounter for FIT (fecal immunochemical test) screening  -     Fecal Immunochemical Test (iFOBT); Future    Chronic left shoulder pain  -     X-Ray Shoulder 2 or More Views Left; Future    Immunization due  -     Influenza (FLUAD) - Quadrivalent (Adjuvanted) *Preferred* (65+) (PF)  HTN   -at goal  -continue lisinopril to 20 mg/ day    DM II   -controlled   -continue farxiga daily    CKD 3  -follow nephrology  -ER precautions given    HLD   -improving  -continue lipitor    GERD   -stable   -takes OTC PPI     ED'  -controlled    Left shoulder pain  -x ray  -likely arthritis vs degeneration  -cannot r/o mets if prostate biopsy  confirms CA  -ortho and OT precautions given      Spent adequate time in obtaining history and explaining differentials      25 minutes spent during this visit of which greater than 50% devoted to face-face counseling and coordination of care regarding diagnosis and management plan    Rtc 3 m r prn

## 2022-10-05 ENCOUNTER — PROCEDURE VISIT (OUTPATIENT)
Dept: UROLOGY | Facility: CLINIC | Age: 67
End: 2022-10-05
Payer: MEDICARE

## 2022-10-05 VITALS
RESPIRATION RATE: 16 BRPM | BODY MASS INDEX: 26.27 KG/M2 | TEMPERATURE: 99 F | SYSTOLIC BLOOD PRESSURE: 145 MMHG | HEART RATE: 53 BPM | WEIGHT: 204.69 LBS | DIASTOLIC BLOOD PRESSURE: 69 MMHG | HEIGHT: 74 IN

## 2022-10-05 DIAGNOSIS — R97.20 ELEVATED PSA: ICD-10-CM

## 2022-10-05 PROCEDURE — 55700 PR BIOPSY OF PROSTATE,NEEDLE/PUNCH: ICD-10-PCS | Mod: S$GLB,,, | Performed by: UROLOGY

## 2022-10-05 PROCEDURE — 88342 CHG IMMUNOCYTOCHEMISTRY: ICD-10-PCS | Mod: 26,,, | Performed by: PATHOLOGY

## 2022-10-05 PROCEDURE — 88341 IMHCHEM/IMCYTCHM EA ADD ANTB: CPT | Mod: 26,,, | Performed by: PATHOLOGY

## 2022-10-05 PROCEDURE — 76872 US TRANSRECTAL: CPT | Mod: 26,S$GLB,, | Performed by: UROLOGY

## 2022-10-05 PROCEDURE — 88305 TISSUE EXAM BY PATHOLOGIST: CPT | Performed by: PATHOLOGY

## 2022-10-05 PROCEDURE — 88341 PR IHC OR ICC EACH ADD'L SINGLE ANTIBODY  STAINPR: ICD-10-PCS | Mod: 26,,, | Performed by: PATHOLOGY

## 2022-10-05 PROCEDURE — 88342 IMHCHEM/IMCYTCHM 1ST ANTB: CPT | Mod: 59 | Performed by: PATHOLOGY

## 2022-10-05 PROCEDURE — 96372 PR INJECTION,THERAP/PROPH/DIAG2ST, IM OR SUBCUT: ICD-10-PCS | Mod: 59,S$GLB,, | Performed by: UROLOGY

## 2022-10-05 PROCEDURE — 88305 TISSUE EXAM BY PATHOLOGIST: CPT | Mod: 26,,, | Performed by: PATHOLOGY

## 2022-10-05 PROCEDURE — 88341 IMHCHEM/IMCYTCHM EA ADD ANTB: CPT | Mod: 59 | Performed by: PATHOLOGY

## 2022-10-05 PROCEDURE — 88305 TISSUE EXAM BY PATHOLOGIST: ICD-10-PCS | Mod: 26,,, | Performed by: PATHOLOGY

## 2022-10-05 PROCEDURE — 55700 PR BIOPSY OF PROSTATE,NEEDLE/PUNCH: CPT | Mod: S$GLB,,, | Performed by: UROLOGY

## 2022-10-05 PROCEDURE — 88342 IMHCHEM/IMCYTCHM 1ST ANTB: CPT | Mod: 26,,, | Performed by: PATHOLOGY

## 2022-10-05 PROCEDURE — 96372 THER/PROPH/DIAG INJ SC/IM: CPT | Mod: 59,S$GLB,, | Performed by: UROLOGY

## 2022-10-05 PROCEDURE — 76872 PR US TRANSRECTAL: ICD-10-PCS | Mod: 26,S$GLB,, | Performed by: UROLOGY

## 2022-10-05 RX ORDER — LIDOCAINE HYDROCHLORIDE 20 MG/ML
JELLY TOPICAL ONCE
Status: COMPLETED | OUTPATIENT
Start: 2022-10-05 | End: 2022-10-05

## 2022-10-05 RX ORDER — CEFTRIAXONE 1 G/1
1 INJECTION, POWDER, FOR SOLUTION INTRAMUSCULAR; INTRAVENOUS
Status: COMPLETED | OUTPATIENT
Start: 2022-10-05 | End: 2022-10-05

## 2022-10-05 RX ORDER — LIDOCAINE HYDROCHLORIDE 10 MG/ML
20 INJECTION INFILTRATION; PERINEURAL
Status: COMPLETED | OUTPATIENT
Start: 2022-10-05 | End: 2022-10-05

## 2022-10-05 RX ADMIN — CEFTRIAXONE 1 G: 1 INJECTION, POWDER, FOR SOLUTION INTRAMUSCULAR; INTRAVENOUS at 02:10

## 2022-10-05 RX ADMIN — LIDOCAINE HYDROCHLORIDE 20 ML: 10 INJECTION INFILTRATION; PERINEURAL at 02:10

## 2022-10-05 RX ADMIN — LIDOCAINE HYDROCHLORIDE: 20 JELLY TOPICAL at 02:10

## 2022-10-05 NOTE — PROCEDURES
Procedures      Pre-procedure diagnosis:   1. elevated psa  Lab Results   Component Value Date    PSA 7.2 (H) 06/15/2020    PSA 4.7 (H) 08/28/2018    PSA 3.3 01/11/2017    PSA 1.6 11/04/2014    PSA 1.7 10/23/2013    PSA 1.5 08/07/2012    PSA 0.68 07/25/2009    PSA 0.6 12/27/2007    PSADIAG 8.1 (H) 09/13/2022    PSADIAG 9.1 (H) 02/18/2022    PSADIAG 6.7 (H) 02/04/2021    PSATOTAL 6.4 (H) 12/10/2019    PSATOTAL 5.6 (H) 04/02/2019    PSAFREE 0.63 12/10/2019    PSAFREE 0.57 04/02/2019       2. PIRADS 4 prostate lesion on MRI; 1.3cm left apex TZ      Post-procedure diagnosis: same    Procedure: Transrectal URONAV MRI fusion-ultrasound guided prostate biopsy    TRUS size: 38cc   MRI Vol: 33.88cc    Complications: none    Blood loss: minimal    Surgeon: Scott Frias MD    Anesthesia: 10cc lidocaine jelly, 20cc 1% lidocaine for prostatic block    Procedure: The risks and benefits of the procedure were explained to the patient and consent was obtained.  The patient laid in the lateral decubitus position.  10cc of lidocaine jelly was used for local analgesia in the rectum.  The ultrasound probe was advanced into the rectum. The prostate was visualized.  There was not a median lobe.  20cc of 1% lidocaine without epi was used for a prostatic nerve block.    At this point, a sweep of the prostate was performed from base to apex and the transverse plane using the ultrasound and URONAV platform.  Landmarks were then labeled with anterior, posterior, right, left, base and apex were labeled in the axial as well as sagittal planes.  Segmentation was then performed and manual adjustments were made as needed starting in the sagittal plane followed by the axial plane.  At this point, alignment of the ultrasound with MRI images was ensured using the toggle between ultrasound and MRI.      At this point elastic deformation was computed.  Our images were satisfactory.  At this point, we performed 4 biopsies of a target lesion.   Subsequent to that, a standard 12core biopsy was performed, 2 from the apex, mid and base from the right and left side.    The patient tolerated the procedure well and was discharged home.  He will f/u with Dr. Kent when the results are available for review.

## 2022-10-05 NOTE — PATIENT INSTRUCTIONS
What to Expect After a Prostate Biopsy    Please be sure to finish your pre-procedure antibiotics as instructed.    You may have mild bleeding from the rectum or urine for about 1 week to 1 month, or in your ejaculate for several months. This bleeding is normal and expected, and it will stop. You may have mild discomfort in your rectal or urethral area for 24-48 hours.    You cannot do any strenuous lifting, straining, or exercising for 24 hours. You may return to full activity the day after the biopsy.    You may continue to take all your regular medications after the procedure except for the blood thinners.    You may resume all blood-thinning medications once you no longer see any bleeding or whenever your physician prescribing the medication says it is all right to do so. You may take Tylenol if you have a fever and your temperature is less than 100° F or if you have some discomfort.    You will receive a call from the Urology Department at [facility] with the results of your prostate biopsy within one week.    Signs and Symptoms to Report    Call your [facility] urologist at [phone] if you develop any of the following:  Temperature greater than 101°  F  Inability to urinate  A large amount of bleeding from the rectum or in the urine  Persistent or severe pain    After hours or on weekends, you may reach a urology resident on call at this number: [phone].

## 2022-10-06 ENCOUNTER — LAB VISIT (OUTPATIENT)
Dept: LAB | Facility: HOSPITAL | Age: 67
End: 2022-10-06
Attending: FAMILY MEDICINE
Payer: MEDICARE

## 2022-10-06 DIAGNOSIS — Z12.11 ENCOUNTER FOR FIT (FECAL IMMUNOCHEMICAL TEST) SCREENING: ICD-10-CM

## 2022-10-06 PROCEDURE — 82274 ASSAY TEST FOR BLOOD FECAL: CPT | Performed by: FAMILY MEDICINE

## 2022-10-10 ENCOUNTER — PATIENT MESSAGE (OUTPATIENT)
Dept: ADMINISTRATIVE | Facility: HOSPITAL | Age: 67
End: 2022-10-10
Payer: MEDICARE

## 2022-10-11 LAB — HEMOCCULT STL QL IA: NEGATIVE

## 2022-10-14 ENCOUNTER — LAB VISIT (OUTPATIENT)
Dept: LAB | Facility: HOSPITAL | Age: 67
End: 2022-10-14
Attending: NURSE PRACTITIONER
Payer: MEDICARE

## 2022-10-14 DIAGNOSIS — N18.32 STAGE 3B CHRONIC KIDNEY DISEASE: ICD-10-CM

## 2022-10-14 LAB
ALBUMIN SERPL BCP-MCNC: 3.8 G/DL (ref 3.5–5.2)
ANION GAP SERPL CALC-SCNC: 10 MMOL/L (ref 8–16)
BASOPHILS # BLD AUTO: 0.04 K/UL (ref 0–0.2)
BASOPHILS NFR BLD: 0.7 % (ref 0–1.9)
BUN SERPL-MCNC: 24 MG/DL (ref 8–23)
CALCIUM SERPL-MCNC: 9 MG/DL (ref 8.7–10.5)
CHLORIDE SERPL-SCNC: 106 MMOL/L (ref 95–110)
CO2 SERPL-SCNC: 21 MMOL/L (ref 23–29)
CREAT SERPL-MCNC: 1.9 MG/DL (ref 0.5–1.4)
DIFFERENTIAL METHOD: ABNORMAL
EOSINOPHIL # BLD AUTO: 0.3 K/UL (ref 0–0.5)
EOSINOPHIL NFR BLD: 4.8 % (ref 0–8)
ERYTHROCYTE [DISTWIDTH] IN BLOOD BY AUTOMATED COUNT: 12.8 % (ref 11.5–14.5)
EST. GFR  (NO RACE VARIABLE): 38 ML/MIN/1.73 M^2
GLUCOSE SERPL-MCNC: 225 MG/DL (ref 70–110)
HCT VFR BLD AUTO: 38.6 % (ref 40–54)
HGB BLD-MCNC: 12.8 G/DL (ref 14–18)
IMM GRANULOCYTES # BLD AUTO: 0.02 K/UL (ref 0–0.04)
IMM GRANULOCYTES NFR BLD AUTO: 0.3 % (ref 0–0.5)
LYMPHOCYTES # BLD AUTO: 1.5 K/UL (ref 1–4.8)
LYMPHOCYTES NFR BLD: 26.3 % (ref 18–48)
MCH RBC QN AUTO: 28.8 PG (ref 27–31)
MCHC RBC AUTO-ENTMCNC: 33.2 G/DL (ref 32–36)
MCV RBC AUTO: 87 FL (ref 82–98)
MONOCYTES # BLD AUTO: 0.7 K/UL (ref 0.3–1)
MONOCYTES NFR BLD: 11.9 % (ref 4–15)
NEUTROPHILS # BLD AUTO: 3.3 K/UL (ref 1.8–7.7)
NEUTROPHILS NFR BLD: 56 % (ref 38–73)
NRBC BLD-RTO: 0 /100 WBC
PHOSPHATE SERPL-MCNC: 3.6 MG/DL (ref 2.7–4.5)
PLATELET # BLD AUTO: 237 K/UL (ref 150–450)
PMV BLD AUTO: 11.1 FL (ref 9.2–12.9)
POTASSIUM SERPL-SCNC: 4.2 MMOL/L (ref 3.5–5.1)
PTH-INTACT SERPL-MCNC: 143.8 PG/ML (ref 9–77)
RBC # BLD AUTO: 4.45 M/UL (ref 4.6–6.2)
SODIUM SERPL-SCNC: 137 MMOL/L (ref 136–145)
WBC # BLD AUTO: 5.81 K/UL (ref 3.9–12.7)

## 2022-10-14 PROCEDURE — 80069 RENAL FUNCTION PANEL: CPT | Performed by: NURSE PRACTITIONER

## 2022-10-14 PROCEDURE — 85025 COMPLETE CBC W/AUTO DIFF WBC: CPT | Performed by: NURSE PRACTITIONER

## 2022-10-14 PROCEDURE — 36415 COLL VENOUS BLD VENIPUNCTURE: CPT | Performed by: NURSE PRACTITIONER

## 2022-10-14 PROCEDURE — 83970 ASSAY OF PARATHORMONE: CPT | Performed by: NURSE PRACTITIONER

## 2022-10-18 ENCOUNTER — OFFICE VISIT (OUTPATIENT)
Dept: UROLOGY | Facility: CLINIC | Age: 67
End: 2022-10-18
Payer: MEDICARE

## 2022-10-18 VITALS
WEIGHT: 202.25 LBS | DIASTOLIC BLOOD PRESSURE: 67 MMHG | HEIGHT: 74 IN | SYSTOLIC BLOOD PRESSURE: 112 MMHG | BODY MASS INDEX: 25.96 KG/M2 | HEART RATE: 54 BPM

## 2022-10-18 DIAGNOSIS — C61 PROSTATE CANCER: Primary | ICD-10-CM

## 2022-10-18 PROCEDURE — 3051F PR MOST RECENT HEMOGLOBIN A1C LEVEL 7.0 - < 8.0%: ICD-10-PCS | Mod: CPTII,S$GLB,, | Performed by: STUDENT IN AN ORGANIZED HEALTH CARE EDUCATION/TRAINING PROGRAM

## 2022-10-18 PROCEDURE — 3066F PR DOCUMENTATION OF TREATMENT FOR NEPHROPATHY: ICD-10-PCS | Mod: CPTII,S$GLB,, | Performed by: STUDENT IN AN ORGANIZED HEALTH CARE EDUCATION/TRAINING PROGRAM

## 2022-10-18 PROCEDURE — 99215 OFFICE O/P EST HI 40 MIN: CPT | Mod: S$GLB,,, | Performed by: STUDENT IN AN ORGANIZED HEALTH CARE EDUCATION/TRAINING PROGRAM

## 2022-10-18 PROCEDURE — 99999 PR PBB SHADOW E&M-EST. PATIENT-LVL IV: CPT | Mod: PBBFAC,,, | Performed by: STUDENT IN AN ORGANIZED HEALTH CARE EDUCATION/TRAINING PROGRAM

## 2022-10-18 PROCEDURE — 1125F PR PAIN SEVERITY QUANTIFIED, PAIN PRESENT: ICD-10-PCS | Mod: CPTII,S$GLB,, | Performed by: STUDENT IN AN ORGANIZED HEALTH CARE EDUCATION/TRAINING PROGRAM

## 2022-10-18 PROCEDURE — 3074F SYST BP LT 130 MM HG: CPT | Mod: CPTII,S$GLB,, | Performed by: STUDENT IN AN ORGANIZED HEALTH CARE EDUCATION/TRAINING PROGRAM

## 2022-10-18 PROCEDURE — 99999 PR PBB SHADOW E&M-EST. PATIENT-LVL IV: ICD-10-PCS | Mod: PBBFAC,,, | Performed by: STUDENT IN AN ORGANIZED HEALTH CARE EDUCATION/TRAINING PROGRAM

## 2022-10-18 PROCEDURE — 3061F PR NEG MICROALBUMINURIA RESULT DOCUMENTED/REVIEW: ICD-10-PCS | Mod: CPTII,S$GLB,, | Performed by: STUDENT IN AN ORGANIZED HEALTH CARE EDUCATION/TRAINING PROGRAM

## 2022-10-18 PROCEDURE — 3078F DIAST BP <80 MM HG: CPT | Mod: CPTII,S$GLB,, | Performed by: STUDENT IN AN ORGANIZED HEALTH CARE EDUCATION/TRAINING PROGRAM

## 2022-10-18 PROCEDURE — 3051F HG A1C>EQUAL 7.0%<8.0%: CPT | Mod: CPTII,S$GLB,, | Performed by: STUDENT IN AN ORGANIZED HEALTH CARE EDUCATION/TRAINING PROGRAM

## 2022-10-18 PROCEDURE — 1159F MED LIST DOCD IN RCRD: CPT | Mod: CPTII,S$GLB,, | Performed by: STUDENT IN AN ORGANIZED HEALTH CARE EDUCATION/TRAINING PROGRAM

## 2022-10-18 PROCEDURE — 1159F PR MEDICATION LIST DOCUMENTED IN MEDICAL RECORD: ICD-10-PCS | Mod: CPTII,S$GLB,, | Performed by: STUDENT IN AN ORGANIZED HEALTH CARE EDUCATION/TRAINING PROGRAM

## 2022-10-18 PROCEDURE — 3061F NEG MICROALBUMINURIA REV: CPT | Mod: CPTII,S$GLB,, | Performed by: STUDENT IN AN ORGANIZED HEALTH CARE EDUCATION/TRAINING PROGRAM

## 2022-10-18 PROCEDURE — 4010F PR ACE/ARB THEARPY RXD/TAKEN: ICD-10-PCS | Mod: CPTII,S$GLB,, | Performed by: STUDENT IN AN ORGANIZED HEALTH CARE EDUCATION/TRAINING PROGRAM

## 2022-10-18 PROCEDURE — 3074F PR MOST RECENT SYSTOLIC BLOOD PRESSURE < 130 MM HG: ICD-10-PCS | Mod: CPTII,S$GLB,, | Performed by: STUDENT IN AN ORGANIZED HEALTH CARE EDUCATION/TRAINING PROGRAM

## 2022-10-18 PROCEDURE — 1160F RVW MEDS BY RX/DR IN RCRD: CPT | Mod: CPTII,S$GLB,, | Performed by: STUDENT IN AN ORGANIZED HEALTH CARE EDUCATION/TRAINING PROGRAM

## 2022-10-18 PROCEDURE — 1160F PR REVIEW ALL MEDS BY PRESCRIBER/CLIN PHARMACIST DOCUMENTED: ICD-10-PCS | Mod: CPTII,S$GLB,, | Performed by: STUDENT IN AN ORGANIZED HEALTH CARE EDUCATION/TRAINING PROGRAM

## 2022-10-18 PROCEDURE — 1125F AMNT PAIN NOTED PAIN PRSNT: CPT | Mod: CPTII,S$GLB,, | Performed by: STUDENT IN AN ORGANIZED HEALTH CARE EDUCATION/TRAINING PROGRAM

## 2022-10-18 PROCEDURE — 4010F ACE/ARB THERAPY RXD/TAKEN: CPT | Mod: CPTII,S$GLB,, | Performed by: STUDENT IN AN ORGANIZED HEALTH CARE EDUCATION/TRAINING PROGRAM

## 2022-10-18 PROCEDURE — 3066F NEPHROPATHY DOC TX: CPT | Mod: CPTII,S$GLB,, | Performed by: STUDENT IN AN ORGANIZED HEALTH CARE EDUCATION/TRAINING PROGRAM

## 2022-10-18 PROCEDURE — 99215 PR OFFICE/OUTPT VISIT, EST, LEVL V, 40-54 MIN: ICD-10-PCS | Mod: S$GLB,,, | Performed by: STUDENT IN AN ORGANIZED HEALTH CARE EDUCATION/TRAINING PROGRAM

## 2022-10-18 PROCEDURE — 3078F PR MOST RECENT DIASTOLIC BLOOD PRESSURE < 80 MM HG: ICD-10-PCS | Mod: CPTII,S$GLB,, | Performed by: STUDENT IN AN ORGANIZED HEALTH CARE EDUCATION/TRAINING PROGRAM

## 2022-10-18 RX ORDER — PNEUMOCOCCAL VACCINE POLYVALENT 25; 25; 25; 25; 25; 25; 25; 25; 25; 25; 25; 25; 25; 25; 25; 25; 25; 25; 25; 25; 25; 25; 25 UG/.5ML; UG/.5ML; UG/.5ML; UG/.5ML; UG/.5ML; UG/.5ML; UG/.5ML; UG/.5ML; UG/.5ML; UG/.5ML; UG/.5ML; UG/.5ML; UG/.5ML; UG/.5ML; UG/.5ML; UG/.5ML; UG/.5ML; UG/.5ML; UG/.5ML; UG/.5ML; UG/.5ML; UG/.5ML; UG/.5ML
INJECTION, SOLUTION INTRAMUSCULAR; SUBCUTANEOUS
COMMUNITY
Start: 2022-05-04 | End: 2023-01-27 | Stop reason: ALTCHOICE

## 2022-10-18 NOTE — PROGRESS NOTES
Subjective:       Patient ID: Addy Redding is a 66 y.o. male.    Chief Complaint:  path review  This is a 66 y.o.  male patient that is an established patient of mine.        The patient was referred to me by his PCP Dr. Ramey for an elevated PSA.   Race:   Family history of prostate cancer no     He is s/p a TRUS biopsy with me on 9/18/18 for a PSA of 4.7. MARGARITA findings: 35-40. TRUS size: 43.6cc. Biopsy results demonstrated no cancer. We have been monitoring his PSA. His PSA in 2019 has demonstrated a rise to 5.6 on 4/2/19 and then again to 6.4 on 12/10/19 more recently. For the two persistent rises, we investigated further with an MRI. MRI was performed on 1/28/20 - Prostate: 5.6 x 3.5 x 3.4 cm corresponding to a computed volume of 32.7 cc. Left posterior transition zone PIRADS 3 lesion (equivocal for cancer), as above. He declined a uronav bx and wanted serial PSAs checked.   Microscopic hematuria - he declined workup.       Works as a .     3/28/22  Urinary frequency 2-3x/night a few weeks ago, now back to baseline 1x/night. He notes sometimes when he urinates he has a slower stream, difficult to empty his bladder. Spending longer times in the bathroom urinating. PSA 9.1.     6/15/22  To further investigate the rise in PSA to 9.1, he underwent an MRI on 6/10/22. 33.88cc. 1.3cm PIRADs 4 lesion in Left posterior transitional zone lesion.    He underwent uronav bx with Dr. Frias 10/5/22. Here today for path review.   Path demonstrated [place] 3+4 grade group 2 in 8/19 cores.    3+4 in left apex, left mid, right apex 3+3, pirads lesion in left posterior trans 3+4    1.  Prostate, left apex, biopsy:   Prostatic adenocarcinoma, Grade Group 2, [place] score 3(80%) + 4(20) = 7   Tumor is present in 2 of 2 cores, involving approximately 75% of the overall   tissue   Negative for perineural invasion   2.  Prostate, left middle, biopsy:   Prostatic adenocarcinoma, Grade Group 2, Nasima score 3(70%) +  4(30) = 7   Tumor is present in 2 of 2 cores, involving approximately 30% of the overall   tissue   Positive for perineural invasion   3. Prostate, left base, biopsy:   Prostatic adenocarcinoma (see comment)   Tumor involves 1 gland and is present in 1 of 2 cores   Positive for perineural invasion   Comment:  A single malignant gland surrounding a nerve is present; therefore,   cancer is not graded   4. Prostate, right apex, biopsy:   Prostatic adenocarcinoma, Grade Group 1, Grafton score 3 + 3 = 6   Tumor is present in 1 of 2 cores, involving approximately 10% of the overall   tissue   Positive for perineural invasion   5.  Prostate, right middle, biopsy:   Focal high-grade prostatic intraepithelial neoplasia (HG-PIN)   6. Prostate, right base, biopsy:   Benign prostatic tissue   Negative for malignancy   7. Prostate, target lesion, biopsy:   Prostatic adenocarcinoma, Grade Group 2, Graft[place] score 3(80%) + 4(20) = 7   Tumor is present in 3 of 3 cores, involving approximately 75% of the overall   tissue   Negative for perineural invasion     LAST PSA  Lab Results   Component Value Date    PSA 7.2 (H) 06/15/2020    PSA 4.7 (H) 08/28/2018    PSA 3.3 01/11/2017    PSA 1.6 11/04/2014    PSA 1.7 10/23/2013    PSA 1.5 08/07/2012    PSA 0.68 07/25/2009    PSA 0.6 12/27/2007    PSADIAG 8.1 (H) 09/13/2022    PSADIAG 9.1 (H) 02/18/2022    PSADIAG 6.7 (H) 02/04/2021    PSATOTAL 6.4 (H) 12/10/2019    PSATOTAL 5.6 (H) 04/02/2019    PSAFREE 0.63 12/10/2019    PSAFREE 0.57 04/02/2019       Lab Results   Component Value Date    CREATININE 1.9 (H) 10/14/2022       ---  Past Medical History:   Diagnosis Date    Costochondritis     Diabetic nephropathy associated with type 2 diabetes mellitus     Diabetic retinopathy     ED (erectile dysfunction)     GERD (gastroesophageal reflux disease)     Hyperlipidemia     Hypertension     Type II or unspecified type diabetes mellitus with renal manifestations, uncontrolled(250.42)        Past  Surgical History:   Procedure Laterality Date    Bone biopsy right femur      CORONARY ANGIOGRAPHY N/A 2022    Procedure: ANGIOGRAM, CORONARY ARTERY;  Surgeon: Carter Arevalo MD;  Location: [facility] CATH LAB/EP;  Service: Cardiology;  Laterality: N/A;    LEFT HEART CATHETERIZATION Left 2022    Procedure: Left heart cath;  Surgeon: Carter Arevalo MD;  Location: [facility] CATH LAB/EP;  Service: Cardiology;  Laterality: Left;       Family History   Problem Relation Age of Onset    Hypertension Mother     Diabetes Mother     Kidney disease Mother     Hypertension Sister     Diabetes Sister     Heart disease Sister     Heart attack Sister     Hyperlipidemia Sister     Coronary artery disease Sister     Hypertension Brother     Stroke Brother     Lung cancer Brother     Cancer Brother         lung cancer    Diabetes Sister     Hypertension Sister     Dementia Sister     Diabetes Brother     Peripheral vascular disease Brother     Hypertension Brother     Hyperlipidemia Brother     No Known Problems Brother     No Known Problems Sister     Coronary artery disease Father          of an MI at age 60    Hyperlipidemia Father     Heart attack Father     No Known Problems Daughter     Asthma Son     Hypertension Son     Stomach cancer Sister     Cancer Sister         stomach     Diabetes Brother     Stroke Brother     Hypertension Brother     Hyperlipidemia Brother     HIV Brother     Diabetes Brother     Hypertension Brother        Social History     Tobacco Use    Smoking status: Former     Packs/day: 0.50     Years: 26.00     Pack years: 13.00     Types: Cigarettes     Quit date:      Years since quittin.8    Smokeless tobacco: Never   Substance Use Topics    Alcohol use: No    Drug use: No       Current Outpatient Medications on File Prior to Visit   Medication Sig Dispense Refill    ADVANCED GLUC METER TEST STRIP Strp USE TO TEST BLOOD SUGAR 4 TIMES DAILY. 100 strip 0    amLODIPine (NORVASC) 5 MG tablet  Take 1 tablet (5 mg total) by mouth 2 (two) times daily. 180 tablet 4    aspirin (ECOTRIN) 81 MG EC tablet Take 1 tablet (81 mg total) by mouth once daily.      atorvastatin (LIPITOR) 80 MG tablet Take 1 tablet (80 mg total) by mouth once daily. 90 tablet 3    blood sugar diagnostic (BLOOD GLUCOSE TEST) Strp Check sugar once daily 100 each 11    blood-glucose meter kit Use as instructed      ciprofloxacin HCl (CIPRO) 500 MG tablet 1 pill one hour before procedure 1 tablet 0    clopidogreL (PLAVIX) 75 mg tablet Take 1 tablet (75 mg total) by mouth once daily. 90 tablet 4    doxazosin (CARDURA) 2 MG tablet Take 1 tablet (2 mg total) by mouth every evening. 90 tablet 4    ezetimibe (ZETIA) 10 mg tablet Take 1 tablet (10 mg total) by mouth once daily. 90 tablet 4    FARXIGA 5 mg Tab tablet TAKE ONE TABLET BY MOUTH ONCE DAILY. 30 tablet 2    ibuprofen (ADVIL,MOTRIN) 800 MG tablet Take 1 tablet (800 mg total) by mouth every 8 (eight) hours as needed for Pain. 30 tablet 2    lancets (ACCU-CHEK SOFTCLIX LANCETS) Misc 1 lancet by Misc.(Non-Drug; Combo Route) route 2 (two) times daily. 200 each 1    lisinopriL (PRINIVIL,ZESTRIL) 5 MG tablet Take 1 tablet (5 mg total) by mouth once daily. 90 tablet 4    nitroGLYCERIN (NITROSTAT) 0.4 MG SL tablet Place 1 tablet (0.4 mg total) under the tongue every 5 (five) minutes as needed for Chest pain. 30 tablet 3    pantoprazole (PROTONIX) 40 MG tablet TAKE ONE TABLET BY MOUTH ONCE DAILY. 30 tablet 3    sildenafil (REVATIO) 20 mg Tab Take 1 tablet (20 mg total) by mouth daily as needed for ED 30 tablet 2     No current facility-administered medications on file prior to visit.       Review of patient's allergies indicates:  No Known Allergies    Review of Systems   Constitutional:  Negative for chills.   HENT:  Negative for congestion.    Eyes:  Negative for visual disturbance.   Respiratory:  Negative for shortness of breath.    Cardiovascular:  Negative for chest pain.    Gastrointestinal:  Negative for abdominal distention.   Musculoskeletal:  Negative for gait problem.   Skin:  Negative for color change.   Neurological:  Negative for dizziness.   Psychiatric/Behavioral:  Negative for agitation.      Objective:      Physical Exam  Constitutional:       Appearance: He is well-developed.   HENT:      Head: Normocephalic.   Eyes:      Pupils: Pupils are equal, round, and reactive to light.   Pulmonary:      Effort: Pulmonary effort is normal.   Abdominal:      Palpations: Abdomen is soft.   Musculoskeletal:         General: Normal range of motion.      Cervical back: Normal range of motion.   Skin:     General: Skin is warm and dry.   Neurological:      Mental Status: He is alert.       Assessment:       No diagnosis found.    Plan:       [email or web address]    Today's visit was spent almost entirely on prostate cancer counseling. 30 minutes of counseling time was spent.  We reviewed his diagnosis, stage, grade, and prognosis.      We reviewed the [facility] nomograms.    Primary treatment outcomes:  His probability of cancer specific survival after radical prostatectomy is  99% at 15 years.  His progression free probability after radical prostatectomy is  80% at 5 years and   68% at 10 years.      Extent of disease probability:  His likelihood of organ confined disease is 49%, extracapsular extension is 49%, lymph node involvement is 4%, and seminal vesicle invasion is 4%.     We discussed the concept of risk stratification: low risk, intermediate risk, and high risk and very high risk disease. The patient falls into the category of  favorable intermediate  risk based off of clinical/pathologic features. He has a Nasima 3+ 4 prostate cancer, Grade group 2.     We discussed the different treatment options including active surveillance (as well as surveillance protocol), prostate brachytherapy, IMRT, IGRT, cryotherapy, and both open and robotic  prostatectomy.  We also discussed the advantages, disadvantages, risks and benefits of the different options. Regarding radiation therapy we discussed treatment planning, the different techniques, short and long term complications. These included radiation cystitis, radiation proctitis, and impotence. We discussed success, failure, and salvage therapeutic options.      Will revisit back in a few weeks with his wife to discuss RARP. Will place referral for rad/onc discussion.  CC Dr. baldwin      There are no diagnoses linked to this encounter.

## 2022-10-21 ENCOUNTER — TELEPHONE (OUTPATIENT)
Dept: RADIATION ONCOLOGY | Facility: CLINIC | Age: 67
End: 2022-10-21
Payer: MEDICARE

## 2022-10-21 LAB
COMMENT: NORMAL
FINAL PATHOLOGIC DIAGNOSIS: NORMAL
GROSS: NORMAL
Lab: NORMAL
MICROSCOPIC EXAM: NORMAL
SUPPLEMENTAL DIAGNOSIS: NORMAL

## 2022-10-24 ENCOUNTER — OFFICE VISIT (OUTPATIENT)
Dept: NEPHROLOGY | Facility: CLINIC | Age: 67
End: 2022-10-24
Payer: MEDICARE

## 2022-10-24 ENCOUNTER — PATIENT MESSAGE (OUTPATIENT)
Dept: UROLOGY | Facility: CLINIC | Age: 67
End: 2022-10-24
Payer: MEDICARE

## 2022-10-24 ENCOUNTER — TELEPHONE (OUTPATIENT)
Dept: UROLOGY | Facility: CLINIC | Age: 67
End: 2022-10-24
Payer: MEDICARE

## 2022-10-24 VITALS
OXYGEN SATURATION: 99 % | SYSTOLIC BLOOD PRESSURE: 129 MMHG | BODY MASS INDEX: 25.86 KG/M2 | HEART RATE: 54 BPM | DIASTOLIC BLOOD PRESSURE: 66 MMHG | HEIGHT: 74 IN | WEIGHT: 201.5 LBS

## 2022-10-24 DIAGNOSIS — E11.22 TYPE 2 DIABETES MELLITUS WITH STAGE 3 CHRONIC KIDNEY DISEASE, UNSPECIFIED WHETHER LONG TERM INSULIN USE, UNSPECIFIED WHETHER STAGE 3A OR 3B CKD: ICD-10-CM

## 2022-10-24 DIAGNOSIS — N18.30 TYPE 2 DIABETES MELLITUS WITH STAGE 3 CHRONIC KIDNEY DISEASE, UNSPECIFIED WHETHER LONG TERM INSULIN USE, UNSPECIFIED WHETHER STAGE 3A OR 3B CKD: ICD-10-CM

## 2022-10-24 DIAGNOSIS — D64.9 ANEMIA, UNSPECIFIED TYPE: ICD-10-CM

## 2022-10-24 DIAGNOSIS — I10 HYPERTENSION, UNSPECIFIED TYPE: ICD-10-CM

## 2022-10-24 DIAGNOSIS — N18.32 STAGE 3B CHRONIC KIDNEY DISEASE: Primary | ICD-10-CM

## 2022-10-24 PROCEDURE — 3288F PR FALLS RISK ASSESSMENT DOCUMENTED: ICD-10-PCS | Mod: CPTII,S$GLB,, | Performed by: NURSE PRACTITIONER

## 2022-10-24 PROCEDURE — 3066F PR DOCUMENTATION OF TREATMENT FOR NEPHROPATHY: ICD-10-PCS | Mod: CPTII,S$GLB,, | Performed by: NURSE PRACTITIONER

## 2022-10-24 PROCEDURE — 3074F SYST BP LT 130 MM HG: CPT | Mod: CPTII,S$GLB,, | Performed by: NURSE PRACTITIONER

## 2022-10-24 PROCEDURE — 3074F PR MOST RECENT SYSTOLIC BLOOD PRESSURE < 130 MM HG: ICD-10-PCS | Mod: CPTII,S$GLB,, | Performed by: NURSE PRACTITIONER

## 2022-10-24 PROCEDURE — 99214 OFFICE O/P EST MOD 30 MIN: CPT | Mod: S$GLB,,, | Performed by: NURSE PRACTITIONER

## 2022-10-24 PROCEDURE — 1160F RVW MEDS BY RX/DR IN RCRD: CPT | Mod: CPTII,S$GLB,, | Performed by: NURSE PRACTITIONER

## 2022-10-24 PROCEDURE — 99999 PR PBB SHADOW E&M-EST. PATIENT-LVL IV: CPT | Mod: PBBFAC,,, | Performed by: NURSE PRACTITIONER

## 2022-10-24 PROCEDURE — 1101F PT FALLS ASSESS-DOCD LE1/YR: CPT | Mod: CPTII,S$GLB,, | Performed by: NURSE PRACTITIONER

## 2022-10-24 PROCEDURE — 1159F PR MEDICATION LIST DOCUMENTED IN MEDICAL RECORD: ICD-10-PCS | Mod: CPTII,S$GLB,, | Performed by: NURSE PRACTITIONER

## 2022-10-24 PROCEDURE — 4010F PR ACE/ARB THEARPY RXD/TAKEN: ICD-10-PCS | Mod: CPTII,S$GLB,, | Performed by: NURSE PRACTITIONER

## 2022-10-24 PROCEDURE — 1125F PR PAIN SEVERITY QUANTIFIED, PAIN PRESENT: ICD-10-PCS | Mod: CPTII,S$GLB,, | Performed by: NURSE PRACTITIONER

## 2022-10-24 PROCEDURE — 1125F AMNT PAIN NOTED PAIN PRSNT: CPT | Mod: CPTII,S$GLB,, | Performed by: NURSE PRACTITIONER

## 2022-10-24 PROCEDURE — 3066F NEPHROPATHY DOC TX: CPT | Mod: CPTII,S$GLB,, | Performed by: NURSE PRACTITIONER

## 2022-10-24 PROCEDURE — 1101F PR PT FALLS ASSESS DOC 0-1 FALLS W/OUT INJ PAST YR: ICD-10-PCS | Mod: CPTII,S$GLB,, | Performed by: NURSE PRACTITIONER

## 2022-10-24 PROCEDURE — 4010F ACE/ARB THERAPY RXD/TAKEN: CPT | Mod: CPTII,S$GLB,, | Performed by: NURSE PRACTITIONER

## 2022-10-24 PROCEDURE — 99499 UNLISTED E&M SERVICE: CPT | Mod: S$GLB,,, | Performed by: NURSE PRACTITIONER

## 2022-10-24 PROCEDURE — 3051F PR MOST RECENT HEMOGLOBIN A1C LEVEL 7.0 - < 8.0%: ICD-10-PCS | Mod: CPTII,S$GLB,, | Performed by: NURSE PRACTITIONER

## 2022-10-24 PROCEDURE — 99214 PR OFFICE/OUTPT VISIT, EST, LEVL IV, 30-39 MIN: ICD-10-PCS | Mod: S$GLB,,, | Performed by: NURSE PRACTITIONER

## 2022-10-24 PROCEDURE — 1160F PR REVIEW ALL MEDS BY PRESCRIBER/CLIN PHARMACIST DOCUMENTED: ICD-10-PCS | Mod: CPTII,S$GLB,, | Performed by: NURSE PRACTITIONER

## 2022-10-24 PROCEDURE — 3078F PR MOST RECENT DIASTOLIC BLOOD PRESSURE < 80 MM HG: ICD-10-PCS | Mod: CPTII,S$GLB,, | Performed by: NURSE PRACTITIONER

## 2022-10-24 PROCEDURE — 3061F PR NEG MICROALBUMINURIA RESULT DOCUMENTED/REVIEW: ICD-10-PCS | Mod: CPTII,S$GLB,, | Performed by: NURSE PRACTITIONER

## 2022-10-24 PROCEDURE — 3288F FALL RISK ASSESSMENT DOCD: CPT | Mod: CPTII,S$GLB,, | Performed by: NURSE PRACTITIONER

## 2022-10-24 PROCEDURE — 3051F HG A1C>EQUAL 7.0%<8.0%: CPT | Mod: CPTII,S$GLB,, | Performed by: NURSE PRACTITIONER

## 2022-10-24 PROCEDURE — 3078F DIAST BP <80 MM HG: CPT | Mod: CPTII,S$GLB,, | Performed by: NURSE PRACTITIONER

## 2022-10-24 PROCEDURE — 3061F NEG MICROALBUMINURIA REV: CPT | Mod: CPTII,S$GLB,, | Performed by: NURSE PRACTITIONER

## 2022-10-24 PROCEDURE — 99999 PR PBB SHADOW E&M-EST. PATIENT-LVL IV: ICD-10-PCS | Mod: PBBFAC,,, | Performed by: NURSE PRACTITIONER

## 2022-10-24 PROCEDURE — 1159F MED LIST DOCD IN RCRD: CPT | Mod: CPTII,S$GLB,, | Performed by: NURSE PRACTITIONER

## 2022-10-24 NOTE — PROGRESS NOTES
"Subjective:       Patient ID: Addy Redding is a 66 y.o.  male who presents for f/u evaluation of of renal dysfunction.      HPI     Patient is new to me. New to clinic.  Prior pertinent chart reviewed since this is patient's first appointment with me. Presents with spouse, Carmelita.    Patient presents for new evaluation of renal dysfunction.  Baseline creatinine of 1.5-1.7 mg/dL. Most recent sCr was 2.5 mg/dL.    Had contrast with PTCA on 6/16/22.  6/29- lisinopril decreased to 5 mg daily from 10 mg BID. Amlodipine 5 mg BID started by cardiology.  Needs prostate biopsy.  7/5/22 - sCr of 2.5 (NASREEN)  Dapagliflozin 5 mg started and metformin stopped on 7/6/22    Significant other medical problems include aortic atherosclerosis, CAD, T2DM since 2012, HTN.      The patient denies taking NSAIDs, herbal supplements, or new antibiotics, recreational drugs, recent episode of dehydration, diarrhea, nausea or vomiting, acute illness, hospitalization.    Significant family hx includes: mother had kidney stones, possible CKD    Last renal US: none in EMR    Update 10/24/22:  Returns for f/u of CKD. Presents with spouse.  Baseline sCr was 1.5-1.7 mg/dL. Appears to have resolved to ~1.9-2.1 since NASREEN.  Home BPs: 140s before medications  Lisinopril d/c since last visit.  Diagnosed with prostate cancer since last visit.    Review of Systems   Respiratory:  Negative for shortness of breath.    Cardiovascular:  Negative for leg swelling.   Gastrointestinal:  Positive for constipation. Negative for diarrhea, nausea and vomiting.   Genitourinary:  Negative for difficulty urinating, dysuria and hematuria.        Weak stream   Musculoskeletal:  Positive for back pain (lower).   Neurological:  Negative for dizziness.     Objective:       Blood pressure 129/66, pulse (!) 54, height 6' 2" (1.88 m), weight 91.4 kg (201 lb 8 oz), SpO2 99 %.  Physical Exam  Vitals reviewed.   Constitutional:       General: He is not in acute distress.     " Appearance: He is well-developed.   Eyes:      Conjunctiva/sclera: Conjunctivae normal.   Cardiovascular:      Rate and Rhythm: Bradycardia present.   Pulmonary:      Effort: Pulmonary effort is normal. No respiratory distress.   Abdominal:      Tenderness: There is no right CVA tenderness or left CVA tenderness.   Musculoskeletal:      Cervical back: Neck supple.      Right lower leg: No edema.      Left lower leg: No edema.   Neurological:      Mental Status: He is alert and oriented to person, place, and time.   Psychiatric:         Mood and Affect: Mood normal.         Behavior: Behavior normal.         Thought Content: Thought content normal.         Judgment: Judgment normal.         Lab Results   Component Value Date    CREATININE 1.9 (H) 10/14/2022     Prot/Creat Ratio, Urine   Date Value Ref Range Status   10/14/2022 0.09 0.00 - 0.20 Final   07/25/2022 Unable to calculate 0.00 - 0.20 Final     Lab Results   Component Value Date     10/14/2022    K 4.2 10/14/2022    CO2 21 (L) 10/14/2022     10/14/2022     Lab Results   Component Value Date    .8 (H) 10/14/2022    CALCIUM 9.0 10/14/2022    PHOS 3.6 10/14/2022     Lab Results   Component Value Date    HGB 12.8 (L) 10/14/2022    WBC 5.81 10/14/2022    HCT 38.6 (L) 10/14/2022      Lab Results   Component Value Date    HGBA1C 7.4 (H) 07/05/2022     10/14/2022    BUN 24 (H) 10/14/2022     Lab Results   Component Value Date    LDLCALC 66.2 07/05/2022         Assessment:       1. Stage 3b chronic kidney disease    2. Hypertension, unspecified type    3. Type 2 diabetes mellitus with stage 3 chronic kidney disease, unspecified whether long term insulin use, unspecified whether stage 3a or 3b CKD    4. Anemia, unspecified type          Plan:   CKD stage 3B c eGFR 34-38 mL/min - Underlying CKD etiology is unclear. Possibly 2/2 atherosclerotic disease; T2DM could be contributing, but he has not every been proteinuric (though he has been on  ACEi).  Has had some progression.    NASREEN (Summer 2022) was likely 2/2 a contrast-induced nephropathy. He has a weak urine stream with elevated PSA; no other LUTS. Not convincing for obstruction but should be considered in differential.    Educated patient to control BP, BG, remain well-hydrated, and avoid NSAIDs to prevent progression of CKD.      UPCR No proteinuric. On dapagliflozin, lisinopril 5 mg.     No compelling need for RAASi or dapagliflozin for renal protection without proteinuria.   Acid-base Serum bicarb low in setting of NASREEN   Renal osteodystrophy Ca okay.   Anemia At goal for CKD.   DM Near goal. Has had DM since 2012. Has had A1c of 11+.  Okay to resume metformin as long as eGFR is greater than 30   Lipid Management Defer   ESRD planning Anticipatory guidance provided about timing of dialysis. Start discussions and planning when eGFR is about 20 mL/min; most patients start dialysis between 5-10 mL/min.       HTN - WNL on amlodipine 5 mg BID, cardura 2 mg  - No convincing need for RAASi without proteinuria.    All questions patient had were answered.  Asked if further questions. None. F/u in clinic in 3 mos with labs and urine prior to next visit or sooner if needed.  ER for emergency concerns.    Summary of Plan:  1. Need improved glucose control (hyperglycemic on recent lab draw)  2. avoid NSAID/ bactrim/ IV contrast/ gadolinium/ aminoglycoside where possible  3. RTC in 3 mos

## 2022-10-24 NOTE — TELEPHONE ENCOUNTER
----- Message from Janice Kent MD sent at 10/18/2022  1:56 PM CDT -----  Please reach out to this pt to schedule a 30min extended visit so he and his wife can come discuss with me

## 2022-10-24 NOTE — Clinical Note
Chrystal Ramey, [place] pt. His glucoses have been elevated on recent lab draws. As long as eGFR maintains greater than 30, it is okay to resume metformin from nephrology standpoint.  Thanks, Rosalina

## 2022-11-01 ENCOUNTER — OFFICE VISIT (OUTPATIENT)
Dept: UROLOGY | Facility: CLINIC | Age: 67
End: 2022-11-01
Payer: MEDICARE

## 2022-11-01 VITALS
SYSTOLIC BLOOD PRESSURE: 122 MMHG | DIASTOLIC BLOOD PRESSURE: 68 MMHG | HEIGHT: 74 IN | HEART RATE: 62 BPM | WEIGHT: 202.63 LBS | BODY MASS INDEX: 26 KG/M2

## 2022-11-01 DIAGNOSIS — C61 PROSTATE CANCER: Primary | ICD-10-CM

## 2022-11-01 PROCEDURE — 3074F PR MOST RECENT SYSTOLIC BLOOD PRESSURE < 130 MM HG: ICD-10-PCS | Mod: CPTII,S$GLB,, | Performed by: STUDENT IN AN ORGANIZED HEALTH CARE EDUCATION/TRAINING PROGRAM

## 2022-11-01 PROCEDURE — 3078F DIAST BP <80 MM HG: CPT | Mod: CPTII,S$GLB,, | Performed by: STUDENT IN AN ORGANIZED HEALTH CARE EDUCATION/TRAINING PROGRAM

## 2022-11-01 PROCEDURE — 1160F PR REVIEW ALL MEDS BY PRESCRIBER/CLIN PHARMACIST DOCUMENTED: ICD-10-PCS | Mod: CPTII,S$GLB,, | Performed by: STUDENT IN AN ORGANIZED HEALTH CARE EDUCATION/TRAINING PROGRAM

## 2022-11-01 PROCEDURE — 3061F PR NEG MICROALBUMINURIA RESULT DOCUMENTED/REVIEW: ICD-10-PCS | Mod: CPTII,S$GLB,, | Performed by: STUDENT IN AN ORGANIZED HEALTH CARE EDUCATION/TRAINING PROGRAM

## 2022-11-01 PROCEDURE — 1126F PR PAIN SEVERITY QUANTIFIED, NO PAIN PRESENT: ICD-10-PCS | Mod: CPTII,S$GLB,, | Performed by: STUDENT IN AN ORGANIZED HEALTH CARE EDUCATION/TRAINING PROGRAM

## 2022-11-01 PROCEDURE — 3074F SYST BP LT 130 MM HG: CPT | Mod: CPTII,S$GLB,, | Performed by: STUDENT IN AN ORGANIZED HEALTH CARE EDUCATION/TRAINING PROGRAM

## 2022-11-01 PROCEDURE — 4010F PR ACE/ARB THEARPY RXD/TAKEN: ICD-10-PCS | Mod: CPTII,S$GLB,, | Performed by: STUDENT IN AN ORGANIZED HEALTH CARE EDUCATION/TRAINING PROGRAM

## 2022-11-01 PROCEDURE — 1159F MED LIST DOCD IN RCRD: CPT | Mod: CPTII,S$GLB,, | Performed by: STUDENT IN AN ORGANIZED HEALTH CARE EDUCATION/TRAINING PROGRAM

## 2022-11-01 PROCEDURE — 3066F NEPHROPATHY DOC TX: CPT | Mod: CPTII,S$GLB,, | Performed by: STUDENT IN AN ORGANIZED HEALTH CARE EDUCATION/TRAINING PROGRAM

## 2022-11-01 PROCEDURE — 1159F PR MEDICATION LIST DOCUMENTED IN MEDICAL RECORD: ICD-10-PCS | Mod: CPTII,S$GLB,, | Performed by: STUDENT IN AN ORGANIZED HEALTH CARE EDUCATION/TRAINING PROGRAM

## 2022-11-01 PROCEDURE — 3078F PR MOST RECENT DIASTOLIC BLOOD PRESSURE < 80 MM HG: ICD-10-PCS | Mod: CPTII,S$GLB,, | Performed by: STUDENT IN AN ORGANIZED HEALTH CARE EDUCATION/TRAINING PROGRAM

## 2022-11-01 PROCEDURE — 99215 OFFICE O/P EST HI 40 MIN: CPT | Mod: S$GLB,,, | Performed by: STUDENT IN AN ORGANIZED HEALTH CARE EDUCATION/TRAINING PROGRAM

## 2022-11-01 PROCEDURE — 1126F AMNT PAIN NOTED NONE PRSNT: CPT | Mod: CPTII,S$GLB,, | Performed by: STUDENT IN AN ORGANIZED HEALTH CARE EDUCATION/TRAINING PROGRAM

## 2022-11-01 PROCEDURE — 99999 PR PBB SHADOW E&M-EST. PATIENT-LVL IV: ICD-10-PCS | Mod: PBBFAC,,, | Performed by: STUDENT IN AN ORGANIZED HEALTH CARE EDUCATION/TRAINING PROGRAM

## 2022-11-01 PROCEDURE — 3051F PR MOST RECENT HEMOGLOBIN A1C LEVEL 7.0 - < 8.0%: ICD-10-PCS | Mod: CPTII,S$GLB,, | Performed by: STUDENT IN AN ORGANIZED HEALTH CARE EDUCATION/TRAINING PROGRAM

## 2022-11-01 PROCEDURE — 3008F PR BODY MASS INDEX (BMI) DOCUMENTED: ICD-10-PCS | Mod: CPTII,S$GLB,, | Performed by: STUDENT IN AN ORGANIZED HEALTH CARE EDUCATION/TRAINING PROGRAM

## 2022-11-01 PROCEDURE — 99999 PR PBB SHADOW E&M-EST. PATIENT-LVL IV: CPT | Mod: PBBFAC,,, | Performed by: STUDENT IN AN ORGANIZED HEALTH CARE EDUCATION/TRAINING PROGRAM

## 2022-11-01 PROCEDURE — 3008F BODY MASS INDEX DOCD: CPT | Mod: CPTII,S$GLB,, | Performed by: STUDENT IN AN ORGANIZED HEALTH CARE EDUCATION/TRAINING PROGRAM

## 2022-11-01 PROCEDURE — 3051F HG A1C>EQUAL 7.0%<8.0%: CPT | Mod: CPTII,S$GLB,, | Performed by: STUDENT IN AN ORGANIZED HEALTH CARE EDUCATION/TRAINING PROGRAM

## 2022-11-01 PROCEDURE — 3061F NEG MICROALBUMINURIA REV: CPT | Mod: CPTII,S$GLB,, | Performed by: STUDENT IN AN ORGANIZED HEALTH CARE EDUCATION/TRAINING PROGRAM

## 2022-11-01 PROCEDURE — 3066F PR DOCUMENTATION OF TREATMENT FOR NEPHROPATHY: ICD-10-PCS | Mod: CPTII,S$GLB,, | Performed by: STUDENT IN AN ORGANIZED HEALTH CARE EDUCATION/TRAINING PROGRAM

## 2022-11-01 PROCEDURE — 4010F ACE/ARB THERAPY RXD/TAKEN: CPT | Mod: CPTII,S$GLB,, | Performed by: STUDENT IN AN ORGANIZED HEALTH CARE EDUCATION/TRAINING PROGRAM

## 2022-11-01 PROCEDURE — 99215 PR OFFICE/OUTPT VISIT, EST, LEVL V, 40-54 MIN: ICD-10-PCS | Mod: S$GLB,,, | Performed by: STUDENT IN AN ORGANIZED HEALTH CARE EDUCATION/TRAINING PROGRAM

## 2022-11-01 PROCEDURE — 1160F RVW MEDS BY RX/DR IN RCRD: CPT | Mod: CPTII,S$GLB,, | Performed by: STUDENT IN AN ORGANIZED HEALTH CARE EDUCATION/TRAINING PROGRAM

## 2022-11-01 NOTE — PROGRESS NOTES
Patient called stating she has no energy and is always so tired lately. She's wondering what Dr Jackson would suggest so she can get her zip back. Please call back.       Subjective:       Patient ID: Addy Redding is a 66 y.o. male.    Chief Complaint: Follow-up (Discuss plan)   This is a 66 y.o.  male patient that is an established patient of mine.          The patient was referred to me by his PCP Dr. Ramey for an elevated PSA.   Race:   Family history of prostate cancer no     He is s/p a TRUS biopsy with me on 9/18/18 for a PSA of 4.7. MARGARITA findings: 35-40. TRUS size: 43.6cc. Biopsy results demonstrated no cancer. We have been monitoring his PSA. His PSA in 2019 has demonstrated a rise to 5.6 on 4/2/19 and then again to 6.4 on 12/10/19 more recently. For the two persistent rises, we investigated further with an MRI. MRI was performed on 1/28/20 - Prostate: 5.6 x 3.5 x 3.4 cm corresponding to a computed volume of 32.7 cc. Left posterior transition zone PIRADS 3 lesion (equivocal for cancer), as above. He declined a uronav bx and wanted serial PSAs checked.   Microscopic hematuria - he declined workup.       Works as a .     3/28/22  Urinary frequency 2-3x/night a few weeks ago, now back to baseline 1x/night. He notes sometimes when he urinates he has a slower stream, difficult to empty his bladder. Spending longer times in the bathroom urinating. PSA 9.1.     6/15/22  To further investigate the rise in PSA to 9.1, he underwent an MRI on 6/10/22. 33.88cc. 1.3cm PIRADs 4 lesion in Left posterior transitional zone lesion.    He underwent uronav bx with Dr. Frias 10/5/22. Here today for path review.   Path demonstrated [place] 3+4 grade group 2 in 8/19 cores.    3+4 in left apex, left mid, right apex 3+3, pirads lesion in left posterior trans 3+4    1.  Prostate, left apex, biopsy:   Prostatic adenocarcinoma, Grade Group 2, [place] score 3(80%) + 4(20) = 7   Tumor is present in 2 of 2 cores, involving approximately 75% of the overall   tissue   Negative for perineural invasion   2.  Prostate, left middle, biopsy:   Prostatic adenocarcinoma, Grade Group 2, [place]ima  score 3(70%) + 4(30) = 7   Tumor is present in 2 of 2 cores, involving approximately 30% of the overall   tissue   Positive for perineural invasion   3. Prostate, left base, biopsy:   Prostatic adenocarcinoma (see comment)   Tumor involves 1 gland and is present in 1 of 2 cores   Positive for perineural invasion   Comment:  A single malignant gland surrounding a nerve is present; therefore,   cancer is not graded   4. Prostate, right apex, biopsy:   Prostatic adenocarcinoma, Grade Group 1, Nasima score 3 + 3 = 6   Tumor is present in 1 of 2 cores, involving approximately 10% of the overall   tissue   Positive for perineural invasion   5.  Prostate, right middle, biopsy:   Focal high-grade prostatic intraepithelial neoplasia (HG-PIN)   6. Prostate, right base, biopsy:   Benign prostatic tissue   Negative for malignancy   7. Prostate, target lesion, biopsy:   Prostatic adenocarcinoma, Grade Group 2, Nasima score 3(80%) + 4(20) = 7   Tumor is present in 3 of 3 cores, involving approximately 75% of the overall   tissue   Negative for perineural invasion        LAST PSA  Lab Results   Component Value Date    PSA 7.2 (H) 06/15/2020    PSA 4.7 (H) 08/28/2018    PSA 3.3 01/11/2017    PSA 1.6 11/04/2014    PSA 1.7 10/23/2013    PSA 1.5 08/07/2012    PSA 0.68 07/25/2009    PSA 0.6 12/27/2007    PSADIAG 8.1 (H) 09/13/2022    PSADIAG 9.1 (H) 02/18/2022    PSADIAG 6.7 (H) 02/04/2021    PSATOTAL 6.4 (H) 12/10/2019    PSATOTAL 5.6 (H) 04/02/2019    PSAFREE 0.63 12/10/2019    PSAFREE 0.57 04/02/2019       Lab Results   Component Value Date    CREATININE 1.9 (H) 10/14/2022       ---  Past Medical History:   Diagnosis Date    Costochondritis     Diabetic nephropathy associated with type 2 diabetes mellitus     Diabetic retinopathy     ED (erectile dysfunction)     GERD (gastroesophageal reflux disease)     Hyperlipidemia     Hypertension     Type II or unspecified type diabetes mellitus with renal manifestations,  uncontrolled(250.42)        Past Surgical History:   Procedure Laterality Date    Bone biopsy right femur      CORONARY ANGIOGRAPHY N/A 2022    Procedure: ANGIOGRAM, CORONARY ARTERY;  Surgeon: Carter Arevalo MD;  Location: [facility] CATH LAB/EP;  Service: Cardiology;  Laterality: N/A;    LEFT HEART CATHETERIZATION Left 2022    Procedure: Left heart cath;  Surgeon: Carter Arevalo MD;  Location: [facility] CATH LAB/EP;  Service: Cardiology;  Laterality: Left;       Family History   Problem Relation Age of Onset    Hypertension Mother     Diabetes Mother     Kidney disease Mother     Hypertension Sister     Diabetes Sister     Heart disease Sister     Heart attack Sister     Hyperlipidemia Sister     Coronary artery disease Sister     Hypertension Brother     Stroke Brother     Lung cancer Brother     Cancer Brother         lung cancer    Diabetes Sister     Hypertension Sister     Dementia Sister     Diabetes Brother     Peripheral vascular disease Brother     Hypertension Brother     Hyperlipidemia Brother     No Known Problems Brother     No Known Problems Sister     Coronary artery disease Father          of an MI at age 60    Hyperlipidemia Father     Heart attack Father     No Known Problems Daughter     Asthma Son     Hypertension Son     Stomach cancer Sister     Cancer Sister         stomach     Diabetes Brother     Stroke Brother     Hypertension Brother     Hyperlipidemia Brother     HIV Brother     Diabetes Brother     Hypertension Brother        Social History     Tobacco Use    Smoking status: Former     Packs/day: 0.50     Years: 26.00     Pack years: 13.00     Types: Cigarettes     Quit date:      Years since quittin.8    Smokeless tobacco: Never   Substance Use Topics    Alcohol use: No    Drug use: No       Current Outpatient Medications on File Prior to Visit   Medication Sig Dispense Refill    ADVANCED GLUC METER TEST STRIP Strp USE TO TEST BLOOD SUGAR 4 TIMES DAILY. 100 strip 0     amLODIPine (NORVASC) 5 MG tablet Take 1 tablet (5 mg total) by mouth 2 (two) times daily. 180 tablet 4    aspirin (ECOTRIN) 81 MG EC tablet Take 1 tablet (81 mg total) by mouth once daily.      atorvastatin (LIPITOR) 80 MG tablet Take 1 tablet (80 mg total) by mouth once daily. 90 tablet 3    blood sugar diagnostic (BLOOD GLUCOSE TEST) Strp Check sugar once daily 100 each 11    blood-glucose meter kit Use as instructed      clopidogreL (PLAVIX) 75 mg tablet Take 1 tablet (75 mg total) by mouth once daily. 90 tablet 4    doxazosin (CARDURA) 2 MG tablet Take 1 tablet (2 mg total) by mouth every evening. 90 tablet 4    ezetimibe (ZETIA) 10 mg tablet Take 1 tablet (10 mg total) by mouth once daily. 90 tablet 4    FARXIGA 5 mg Tab tablet TAKE ONE TABLET BY MOUTH ONCE DAILY. 30 tablet 2    lancets (ACCU-CHEK SOFTCLIX LANCETS) Misc 1 lancet by Misc.(Non-Drug; Combo Route) route 2 (two) times daily. 200 each 1    nitroGLYCERIN (NITROSTAT) 0.4 MG SL tablet Place 1 tablet (0.4 mg total) under the tongue every 5 (five) minutes as needed for Chest pain. 30 tablet 3    pantoprazole (PROTONIX) 40 MG tablet TAKE ONE TABLET BY MOUTH ONCE DAILY. 30 tablet 3    PNEUMOVAX-23 25 mcg/0.5 mL vaccine       sars-cov-2, covid-19, (MODERNA COVID-19) 100 mcg/0.5 ml injection       sildenafil (REVATIO) 20 mg Tab Take 1 tablet (20 mg total) by mouth daily as needed for ED 30 tablet 2    ciprofloxacin HCl (CIPRO) 500 MG tablet 1 pill one hour before procedure 1 tablet 0    ibuprofen (ADVIL,MOTRIN) 800 MG tablet Take 1 tablet (800 mg total) by mouth every 8 (eight) hours as needed for Pain. 30 tablet 2    lisinopriL (PRINIVIL,ZESTRIL) 5 MG tablet Take 1 tablet (5 mg total) by mouth once daily. 90 tablet 4     No current facility-administered medications on file prior to visit.       Review of patient's allergies indicates:  No Known Allergies    Review of Systems   Constitutional:  Negative for chills.   HENT:  Negative for congestion.    Eyes:   Negative for visual disturbance.   Respiratory:  Negative for shortness of breath.    Cardiovascular:  Negative for chest pain.   Gastrointestinal:  Negative for abdominal distention.   Musculoskeletal:  Negative for gait problem.   Skin:  Negative for color change.   Neurological:  Negative for dizziness.   Psychiatric/Behavioral:  Negative for agitation.      Objective:      Physical Exam  Constitutional:       Appearance: He is well-developed.   HENT:      Head: Normocephalic.   Eyes:      Pupils: Pupils are equal, round, and reactive to light.   Pulmonary:      Effort: Pulmonary effort is normal.   Abdominal:      Palpations: Abdomen is soft.   Musculoskeletal:         General: Normal range of motion.      Cervical back: Normal range of motion.   Skin:     General: Skin is warm and dry.   Neurological:      Mental Status: He is alert.       Assessment:       1. Prostate cancer        Plan:           We reviewed the [facility] nomograms.    Primary treatment outcomes:  His probability of cancer specific survival after radical prostatectomy is  99% at 15 years.  His progression free probability after radical prostatectomy is  80% at 5 years and   68% at 10 years.      Extent of disease probability:  His likelihood of organ confined disease is 49%, extracapsular extension is 49%, lymph node involvement is 4%, and seminal vesicle invasion is 4%.     We discussed the concept of risk stratification: low risk, intermediate risk, and high risk and very high risk disease. The patient falls into the category of favorable intermediate risk based off of clinical/pathologic features. He has a [place] 3+ 4 prostate cancer, Grade group 2.     We discussed the different treatment options including active surveillance (as well as surveillance protocol), prostate brachytherapy, IMRT, IGRT, cryotherapy, and both open and robotic prostatectomy.  We also discussed the advantages, disadvantages, risks and benefits of the  different options. Regarding radiation therapy we discussed treatment planning, the different techniques, short and long term complications. These included radiation cystitis, radiation proctitis, and impotence. We discussed success, failure, and salvage therapeutic options.         We discussed surgical therapy in depth including preoperative preparation, surgical technique (including bladder neck and nerve-sparing techniques), postoperative recuperation and recovery, and short and long term complications. We discussed the risks of pain, infection, bleeding, scaring, damage to internal hollow and solid organs, damage to blood vessels and nerves, urinary incontinence and erectile dysfunction.    Specifically discussed was that some patients experience urinary incontinence and that not everyone regains complete continence. Also of note, we discussed erectile dysfunction, loss of erections, which often occurs after the prostate is removed because the nerves to the erections are located adjacent to the prostate. Some patients are not able to achieve erections postoperatively even with the use of erectile dysfunction medications. Some patients do end up undergoing an implantation of a penile prosthesis for erections. After the prostate is removed, if the patient is able to achieve erections and orgasms, the ejaculate is a dry ejaculate.     We discussed the possibility of rectal injury, obturator nerve injury, and occult injury to the bowel during Veress needle access.  We discussed the possibility of an ileus or a small bowel obstruction postoperatively necessitating additional procedures/surgeries.     We discussed preop and postop Kegels, post op penile rehab, and treatment options for incontinence and impotence. We discussed success, failure and salvage therapeutic options. We discussed the possible indications for adjuvant radiation therapy.     The patient voiced understanding and all questions have been  answered.    We will meet up after his radiation oncology appt.        Prostate cancer

## 2022-11-08 ENCOUNTER — OFFICE VISIT (OUTPATIENT)
Dept: RADIATION ONCOLOGY | Facility: CLINIC | Age: 67
End: 2022-11-08
Payer: MEDICARE

## 2022-11-08 VITALS
RESPIRATION RATE: 16 BRPM | WEIGHT: 203.19 LBS | SYSTOLIC BLOOD PRESSURE: 133 MMHG | HEIGHT: 73 IN | HEART RATE: 53 BPM | DIASTOLIC BLOOD PRESSURE: 65 MMHG | BODY MASS INDEX: 26.93 KG/M2

## 2022-11-08 DIAGNOSIS — C61 PROSTATE CANCER: ICD-10-CM

## 2022-11-08 PROCEDURE — 3075F PR MOST RECENT SYSTOLIC BLOOD PRESS GE 130-139MM HG: ICD-10-PCS | Mod: CPTII,S$GLB,, | Performed by: RADIOLOGY

## 2022-11-08 PROCEDURE — 1159F PR MEDICATION LIST DOCUMENTED IN MEDICAL RECORD: ICD-10-PCS | Mod: CPTII,S$GLB,, | Performed by: RADIOLOGY

## 2022-11-08 PROCEDURE — 3061F PR NEG MICROALBUMINURIA RESULT DOCUMENTED/REVIEW: ICD-10-PCS | Mod: CPTII,S$GLB,, | Performed by: RADIOLOGY

## 2022-11-08 PROCEDURE — 99999 PR PBB SHADOW E&M-EST. PATIENT-LVL IV: CPT | Mod: PBBFAC,,, | Performed by: RADIOLOGY

## 2022-11-08 PROCEDURE — 3066F PR DOCUMENTATION OF TREATMENT FOR NEPHROPATHY: ICD-10-PCS | Mod: CPTII,S$GLB,, | Performed by: RADIOLOGY

## 2022-11-08 PROCEDURE — 99204 PR OFFICE/OUTPT VISIT, NEW, LEVL IV, 45-59 MIN: ICD-10-PCS | Mod: S$GLB,,, | Performed by: RADIOLOGY

## 2022-11-08 PROCEDURE — 3066F NEPHROPATHY DOC TX: CPT | Mod: CPTII,S$GLB,, | Performed by: RADIOLOGY

## 2022-11-08 PROCEDURE — 3008F PR BODY MASS INDEX (BMI) DOCUMENTED: ICD-10-PCS | Mod: CPTII,S$GLB,, | Performed by: RADIOLOGY

## 2022-11-08 PROCEDURE — 1160F PR REVIEW ALL MEDS BY PRESCRIBER/CLIN PHARMACIST DOCUMENTED: ICD-10-PCS | Mod: CPTII,S$GLB,, | Performed by: RADIOLOGY

## 2022-11-08 PROCEDURE — 1101F PR PT FALLS ASSESS DOC 0-1 FALLS W/OUT INJ PAST YR: ICD-10-PCS | Mod: CPTII,S$GLB,, | Performed by: RADIOLOGY

## 2022-11-08 PROCEDURE — 3051F PR MOST RECENT HEMOGLOBIN A1C LEVEL 7.0 - < 8.0%: ICD-10-PCS | Mod: CPTII,S$GLB,, | Performed by: RADIOLOGY

## 2022-11-08 PROCEDURE — 3288F PR FALLS RISK ASSESSMENT DOCUMENTED: ICD-10-PCS | Mod: CPTII,S$GLB,, | Performed by: RADIOLOGY

## 2022-11-08 PROCEDURE — 3061F NEG MICROALBUMINURIA REV: CPT | Mod: CPTII,S$GLB,, | Performed by: RADIOLOGY

## 2022-11-08 PROCEDURE — 99204 OFFICE O/P NEW MOD 45 MIN: CPT | Mod: S$GLB,,, | Performed by: RADIOLOGY

## 2022-11-08 PROCEDURE — 3051F HG A1C>EQUAL 7.0%<8.0%: CPT | Mod: CPTII,S$GLB,, | Performed by: RADIOLOGY

## 2022-11-08 PROCEDURE — 1159F MED LIST DOCD IN RCRD: CPT | Mod: CPTII,S$GLB,, | Performed by: RADIOLOGY

## 2022-11-08 PROCEDURE — 3288F FALL RISK ASSESSMENT DOCD: CPT | Mod: CPTII,S$GLB,, | Performed by: RADIOLOGY

## 2022-11-08 PROCEDURE — 99999 PR PBB SHADOW E&M-EST. PATIENT-LVL IV: ICD-10-PCS | Mod: PBBFAC,,, | Performed by: RADIOLOGY

## 2022-11-08 PROCEDURE — 3075F SYST BP GE 130 - 139MM HG: CPT | Mod: CPTII,S$GLB,, | Performed by: RADIOLOGY

## 2022-11-08 PROCEDURE — 3078F DIAST BP <80 MM HG: CPT | Mod: CPTII,S$GLB,, | Performed by: RADIOLOGY

## 2022-11-08 PROCEDURE — 3078F PR MOST RECENT DIASTOLIC BLOOD PRESSURE < 80 MM HG: ICD-10-PCS | Mod: CPTII,S$GLB,, | Performed by: RADIOLOGY

## 2022-11-08 PROCEDURE — 4010F PR ACE/ARB THEARPY RXD/TAKEN: ICD-10-PCS | Mod: CPTII,S$GLB,, | Performed by: RADIOLOGY

## 2022-11-08 PROCEDURE — 1101F PT FALLS ASSESS-DOCD LE1/YR: CPT | Mod: CPTII,S$GLB,, | Performed by: RADIOLOGY

## 2022-11-08 PROCEDURE — 3008F BODY MASS INDEX DOCD: CPT | Mod: CPTII,S$GLB,, | Performed by: RADIOLOGY

## 2022-11-08 PROCEDURE — 4010F ACE/ARB THERAPY RXD/TAKEN: CPT | Mod: CPTII,S$GLB,, | Performed by: RADIOLOGY

## 2022-11-08 PROCEDURE — 1160F RVW MEDS BY RX/DR IN RCRD: CPT | Mod: CPTII,S$GLB,, | Performed by: RADIOLOGY

## 2022-11-08 PROCEDURE — 1126F AMNT PAIN NOTED NONE PRSNT: CPT | Mod: CPTII,S$GLB,, | Performed by: RADIOLOGY

## 2022-11-08 PROCEDURE — 1126F PR PAIN SEVERITY QUANTIFIED, NO PAIN PRESENT: ICD-10-PCS | Mod: CPTII,S$GLB,, | Performed by: RADIOLOGY

## 2022-11-08 NOTE — PROGRESS NOTES
Multidisciplinary Uro-Oncology Clinic    HISTORY OF PRESENT ILLNESS:   This patient presents for discussion of treatment options for his prostate cancer.     Mr. Redding has a history of an elevated PSA since 2018 with negative biopsies in September of 2018.  His PSA has continued to increase since that time.  MRI of the prostate in January of 2020 revealed a PI-RADS 3 lesion in the Lt. posterior transitional zone. Repeat PSA in February of 2022 returned at 9.1 ng/ml.  Repeat MRI on 6/10/22 revealed a 33.9 cc prostate with a 1.3 cm T2 hypointense lesion in the apex of the Lt. transition zone, PI-RADS 4 with no extraprostatic extension.  The seminal vesicles and neurovascular bundles were unremarkable.  There was no adenopathy.  Repeat Biopsies on 10/5/22 revealed Nasima 7 (3+4) adenocarcinoma involving 75% of 2 of 2 cores from the Lt. apex, 30% of 2 of 2 cores from the Lt. mid gland,  and 75% of 3 of 3 cores from the target lesion.  The [place] pattern 4 accounted for 20 - 30% of the tumor.  There was perineural invasion.  There was Nasima 6 (3+3) adenocarcinoma involving 10% of 1 of 2 cores from the Rt. apex.  The patient presents to discuss treatment options.      REVIEW OF SYSTEMS:   Review of Systems   Constitutional:  Negative for chills, fever, malaise/fatigue and weight loss.   Respiratory:  Negative for cough, sputum production and shortness of breath.    Cardiovascular:  Negative for chest pain and palpitations.   Gastrointestinal:  Negative for abdominal pain, constipation and diarrhea.   Genitourinary:  Negative for dysuria, frequency, hematuria and urgency.       PAST MEDICAL HISTORY:  Past Medical History:   Diagnosis Date    Costochondritis     Diabetic nephropathy associated with type 2 diabetes mellitus     Diabetic retinopathy     ED (erectile dysfunction)     GERD (gastroesophageal reflux disease)     Hyperlipidemia     Hypertension     Prostate cancer     Type II or unspecified type diabetes  mellitus with renal manifestations, uncontrolled(250.42)        PAST SURGICAL HISTORY:  Past Surgical History:   Procedure Laterality Date    Bone biopsy right femur      CORONARY ANGIOGRAPHY N/A 06/16/2022    Procedure: ANGIOGRAM, CORONARY ARTERY;  Surgeon: Carter Arevalo MD;  Location: [facility] CATH LAB/EP;  Service: Cardiology;  Laterality: N/A;    LEFT HEART CATHETERIZATION Left 04/07/2022    Procedure: Left heart cath;  Surgeon: Carter Arevalo MD;  Location: [facility] CATH LAB/EP;  Service: Cardiology;  Laterality: Left;    PROSTATE BIOPSY  10/05/2022       ALLERGIES:   Review of patient's allergies indicates:  No Known Allergies    MEDICATIONS:  Current Outpatient Medications   Medication Sig    ADVANCED GLUC METER TEST STRIP Strp USE TO TEST BLOOD SUGAR 4 TIMES DAILY.    amLODIPine (NORVASC) 5 MG tablet Take 1 tablet (5 mg total) by mouth 2 (two) times daily.    aspirin (ECOTRIN) 81 MG EC tablet Take 1 tablet (81 mg total) by mouth once daily.    atorvastatin (LIPITOR) 80 MG tablet Take 1 tablet (80 mg total) by mouth once daily.    blood sugar diagnostic (BLOOD GLUCOSE TEST) Strp Check sugar once daily    clopidogreL (PLAVIX) 75 mg tablet Take 1 tablet (75 mg total) by mouth once daily.    doxazosin (CARDURA) 2 MG tablet Take 1 tablet (2 mg total) by mouth every evening.    ezetimibe (ZETIA) 10 mg tablet Take 1 tablet (10 mg total) by mouth once daily.    FARXIGA 5 mg Tab tablet TAKE ONE TABLET BY MOUTH ONCE DAILY.    lancets (ACCU-CHEK SOFTCLIX LANCETS) Misc 1 lancet by Misc.(Non-Drug; Combo Route) route 2 (two) times daily.    lisinopriL (PRINIVIL,ZESTRIL) 5 MG tablet Take 1 tablet (5 mg total) by mouth once daily.    nitroGLYCERIN (NITROSTAT) 0.4 MG SL tablet Place 1 tablet (0.4 mg total) under the tongue every 5 (five) minutes as needed for Chest pain.    pantoprazole (PROTONIX) 40 MG tablet TAKE ONE TABLET BY MOUTH ONCE DAILY.    PNEUMOVAX-23 25 mcg/0.5 mL vaccine     sars-cov-2, covid-19, (MODERNA COVID-19)  100 mcg/0.5 ml injection     sildenafil (REVATIO) 20 mg Tab Take 1 tablet (20 mg total) by mouth daily as needed for ED    blood-glucose meter kit Use as instructed    ciprofloxacin HCl (CIPRO) 500 MG tablet 1 pill one hour before procedure    ibuprofen (ADVIL,MOTRIN) 800 MG tablet Take 1 tablet (800 mg total) by mouth every 8 (eight) hours as needed for Pain.     No current facility-administered medications for this visit.       SOCIAL HISTORY:  Social History     Socioeconomic History    Marital status:    Tobacco Use    Smoking status: Former     Packs/day: 0.50     Years: 26.00     Pack years: 13.00     Types: Cigarettes     Quit date:      Years since quittin.8    Smokeless tobacco: Never   Substance and Sexual Activity    Alcohol use: No    Drug use: No    Sexual activity: Yes     Partners: Female     Social Determinants of Health     Financial Resource Strain: Low Risk     Difficulty of Paying Living Expenses: Not hard at all   Food Insecurity: No Food Insecurity    Worried About Running Out of Food in the Last Year: Never true    Ran Out of Food in the Last Year: Never true   Transportation Needs: No Transportation Needs    Lack of Transportation (Medical): No    Lack of Transportation (Non-Medical): No   Physical Activity: Sufficiently Active    Days of Exercise per Week: 5 days    Minutes of Exercise per Session: 60 min   Stress: No Stress Concern Present    Feeling of Stress : Not at all   Social Connections: Moderately Integrated    Frequency of Communication with Friends and Family: More than three times a week    Frequency of Social Gatherings with Friends and Family: More than three times a week    Attends [employer] Services: More than 4 times per year    Active Member of Clubs or Organizations: No    Attends Club or Organization Meetings: Never    Marital Status:    Housing Stability: Low Risk     Unable to Pay for Housing in the Last Year: No    Number of Places Lived in the  Last Year: 1    Unstable Housing in the Last Year: No       FAMILY HISTORY:  Family History   Problem Relation Age of Onset    Hypertension Mother     Diabetes Mother     Kidney disease Mother     Hypertension Sister     Diabetes Sister     Heart disease Sister     Heart attack Sister     Hyperlipidemia Sister     Coronary artery disease Sister     Hypertension Brother     Stroke Brother     Lung cancer Brother     Cancer Brother         lung cancer    Diabetes Sister     Hypertension Sister     Dementia Sister     Diabetes Brother     Peripheral vascular disease Brother     Hypertension Brother     Hyperlipidemia Brother     No Known Problems Brother     No Known Problems Sister     Coronary artery disease Father          of an MI at age 60    Hyperlipidemia Father     Heart attack Father     No Known Problems Daughter     Asthma Son     Hypertension Son     Stomach cancer Sister     Cancer Sister         stomach     Diabetes Brother     Stroke Brother     Hypertension Brother     Hyperlipidemia Brother     HIV Brother     Diabetes Brother     Hypertension Brother          PHYSICAL EXAMINATION:  Vitals:    22 1419   BP: 133/65   Pulse: (!) 53   Resp: 16     Physical Exam  Constitutional:       General: He is not in acute distress.     Appearance: Normal appearance.   Pulmonary:      Effort: Pulmonary effort is normal. No respiratory distress.   Abdominal:      General: Abdomen is flat. There is no distension.   Neurological:      Mental Status: He is alert and oriented to person, place, and time.   Psychiatric:         Mood and Affect: Mood normal.         Judgment: Judgment normal.       ASSESSMENT/PLAN:  Clinical stage IIB (T2c, N0, M0, PSA < 10, GG2) prostate cancer     ECO    I had a long discussion with the patient and his wife.  Explained he is considered to have favorable intermediate risk prostate cancer.  Discussed the options of active surveillance vs. RALP with bilateral node dissection or  definitive radiotherapy with either IMRT of prostate brachytherapy.  Discussed the pros and cons of each approach.  Discussed the procedures, risks and benefits of each option.  Discussed the acute and long term risks of therapy.  After our discussion the patient and his wife felt they would like to proceed with prostate brachytherapy.  Explained this very reasonable given his presentation.   Will plan brachytherapy in January.  The patient is status post stent placement in June.  Will ask for cardiac clearance.  Thank you for allowing us to participate in the car of this patient.      Psychosocial Distress screening score of Distress Score: 2 noted and reviewed. No intervention indicated.     I spent approximately 50 minutes reviewing the available records and evaluating the patient, out of which over 50% of the time was spent face to face with the patient in counseling and coordinating this patient's care.

## 2022-11-09 ENCOUNTER — TELEPHONE (OUTPATIENT)
Dept: UROLOGY | Facility: CLINIC | Age: 67
End: 2022-11-09
Payer: MEDICARE

## 2022-11-09 DIAGNOSIS — C61 PROSTATE CANCER: Primary | ICD-10-CM

## 2022-11-10 DIAGNOSIS — I70.223 CRITICAL LIMB ISCHEMIA OF BOTH LOWER EXTREMITIES: Primary | ICD-10-CM

## 2022-11-10 RX ORDER — SODIUM CHLORIDE 9 MG/ML
INJECTION, SOLUTION INTRAVENOUS CONTINUOUS
Status: CANCELLED | OUTPATIENT
Start: 2022-11-10 | End: 2022-11-10

## 2022-12-06 ENCOUNTER — TELEPHONE (OUTPATIENT)
Dept: FAMILY MEDICINE | Facility: CLINIC | Age: 67
End: 2022-12-06
Payer: MEDICARE

## 2022-12-06 NOTE — TELEPHONE ENCOUNTER
----- Message from Edie Luna sent at 12/6/2022  4:19 PM CST -----  Needs advice from nurse:      Who Called:pt  Regarding:patient needs to be seen for cough/sore throat/lightheadedness/denied appt on 12/10  Would the patient rather a call back or VIA [facility]?  Best Call Back number:[phone]  Additional Info:

## 2022-12-06 NOTE — TELEPHONE ENCOUNTER
----- Message from Adela Deal sent at 12/6/2022 11:08 AM CST -----  Regarding: Same Day Access  Contact: Patient  Type:  Same Day Appointment Request    Caller is requesting a same day appointment.  Caller declined first available appointment listed below.    Name of Caller:Patient   When is the first available appointment? No appts generated   Symptoms: cough itchy throat weakness   Best Call Back Number:[phone]   Additional Information: Patient would like to be seen on today patient is willing to see another physician

## 2022-12-07 ENCOUNTER — OFFICE VISIT (OUTPATIENT)
Dept: FAMILY MEDICINE | Facility: CLINIC | Age: 67
End: 2022-12-07
Payer: MEDICARE

## 2022-12-07 VITALS
SYSTOLIC BLOOD PRESSURE: 134 MMHG | HEIGHT: 73 IN | DIASTOLIC BLOOD PRESSURE: 62 MMHG | WEIGHT: 196.88 LBS | TEMPERATURE: 98 F | HEART RATE: 70 BPM | OXYGEN SATURATION: 98 % | BODY MASS INDEX: 26.09 KG/M2

## 2022-12-07 DIAGNOSIS — R53.83 OTHER FATIGUE: ICD-10-CM

## 2022-12-07 DIAGNOSIS — J06.9 URTI (ACUTE UPPER RESPIRATORY INFECTION): Primary | ICD-10-CM

## 2022-12-07 DIAGNOSIS — E11.59 HYPERTENSION ASSOCIATED WITH DIABETES: ICD-10-CM

## 2022-12-07 DIAGNOSIS — B34.9 VIRAL SYNDROME: ICD-10-CM

## 2022-12-07 DIAGNOSIS — N18.32 STAGE 3B CHRONIC KIDNEY DISEASE: ICD-10-CM

## 2022-12-07 DIAGNOSIS — N18.31 TYPE 2 DIABETES MELLITUS WITH STAGE 3A CHRONIC KIDNEY DISEASE, WITHOUT LONG-TERM CURRENT USE OF INSULIN: ICD-10-CM

## 2022-12-07 DIAGNOSIS — I15.2 HYPERTENSION ASSOCIATED WITH DIABETES: ICD-10-CM

## 2022-12-07 DIAGNOSIS — E11.22 TYPE 2 DIABETES MELLITUS WITH STAGE 3A CHRONIC KIDNEY DISEASE, WITHOUT LONG-TERM CURRENT USE OF INSULIN: ICD-10-CM

## 2022-12-07 LAB
CTP QC/QA: YES
CTP QC/QA: YES
FLUAV AG NPH QL: NEGATIVE
FLUBV AG NPH QL: NEGATIVE
S PYO RRNA THROAT QL PROBE: NEGATIVE

## 2022-12-07 PROCEDURE — 3075F SYST BP GE 130 - 139MM HG: CPT | Mod: CPTII,S$GLB,, | Performed by: INTERNAL MEDICINE

## 2022-12-07 PROCEDURE — 99214 PR OFFICE/OUTPT VISIT, EST, LEVL IV, 30-39 MIN: ICD-10-PCS | Mod: S$GLB,,, | Performed by: INTERNAL MEDICINE

## 2022-12-07 PROCEDURE — 1160F RVW MEDS BY RX/DR IN RCRD: CPT | Mod: CPTII,S$GLB,, | Performed by: INTERNAL MEDICINE

## 2022-12-07 PROCEDURE — 3061F PR NEG MICROALBUMINURIA RESULT DOCUMENTED/REVIEW: ICD-10-PCS | Mod: CPTII,S$GLB,, | Performed by: INTERNAL MEDICINE

## 2022-12-07 PROCEDURE — 3061F NEG MICROALBUMINURIA REV: CPT | Mod: CPTII,S$GLB,, | Performed by: INTERNAL MEDICINE

## 2022-12-07 PROCEDURE — 99999 PR PBB SHADOW E&M-EST. PATIENT-LVL III: CPT | Mod: PBBFAC,,, | Performed by: INTERNAL MEDICINE

## 2022-12-07 PROCEDURE — 4010F PR ACE/ARB THEARPY RXD/TAKEN: ICD-10-PCS | Mod: CPTII,S$GLB,, | Performed by: INTERNAL MEDICINE

## 2022-12-07 PROCEDURE — 3066F NEPHROPATHY DOC TX: CPT | Mod: CPTII,S$GLB,, | Performed by: INTERNAL MEDICINE

## 2022-12-07 PROCEDURE — 3008F PR BODY MASS INDEX (BMI) DOCUMENTED: ICD-10-PCS | Mod: CPTII,S$GLB,, | Performed by: INTERNAL MEDICINE

## 2022-12-07 PROCEDURE — 4010F ACE/ARB THERAPY RXD/TAKEN: CPT | Mod: CPTII,S$GLB,, | Performed by: INTERNAL MEDICINE

## 2022-12-07 PROCEDURE — 1101F PR PT FALLS ASSESS DOC 0-1 FALLS W/OUT INJ PAST YR: ICD-10-PCS | Mod: CPTII,S$GLB,, | Performed by: INTERNAL MEDICINE

## 2022-12-07 PROCEDURE — 3078F PR MOST RECENT DIASTOLIC BLOOD PRESSURE < 80 MM HG: ICD-10-PCS | Mod: CPTII,S$GLB,, | Performed by: INTERNAL MEDICINE

## 2022-12-07 PROCEDURE — 87804 POCT INFLUENZA A/B: ICD-10-PCS | Mod: QW,S$GLB,, | Performed by: INTERNAL MEDICINE

## 2022-12-07 PROCEDURE — 3051F HG A1C>EQUAL 7.0%<8.0%: CPT | Mod: CPTII,S$GLB,, | Performed by: INTERNAL MEDICINE

## 2022-12-07 PROCEDURE — 1126F AMNT PAIN NOTED NONE PRSNT: CPT | Mod: CPTII,S$GLB,, | Performed by: INTERNAL MEDICINE

## 2022-12-07 PROCEDURE — 1126F PR PAIN SEVERITY QUANTIFIED, NO PAIN PRESENT: ICD-10-PCS | Mod: CPTII,S$GLB,, | Performed by: INTERNAL MEDICINE

## 2022-12-07 PROCEDURE — 99999 PR PBB SHADOW E&M-EST. PATIENT-LVL III: ICD-10-PCS | Mod: PBBFAC,,, | Performed by: INTERNAL MEDICINE

## 2022-12-07 PROCEDURE — 87880 POCT RAPID STREP A: ICD-10-PCS | Mod: QW,S$GLB,, | Performed by: INTERNAL MEDICINE

## 2022-12-07 PROCEDURE — 3066F PR DOCUMENTATION OF TREATMENT FOR NEPHROPATHY: ICD-10-PCS | Mod: CPTII,S$GLB,, | Performed by: INTERNAL MEDICINE

## 2022-12-07 PROCEDURE — 3075F PR MOST RECENT SYSTOLIC BLOOD PRESS GE 130-139MM HG: ICD-10-PCS | Mod: CPTII,S$GLB,, | Performed by: INTERNAL MEDICINE

## 2022-12-07 PROCEDURE — 3288F FALL RISK ASSESSMENT DOCD: CPT | Mod: CPTII,S$GLB,, | Performed by: INTERNAL MEDICINE

## 2022-12-07 PROCEDURE — 3051F PR MOST RECENT HEMOGLOBIN A1C LEVEL 7.0 - < 8.0%: ICD-10-PCS | Mod: CPTII,S$GLB,, | Performed by: INTERNAL MEDICINE

## 2022-12-07 PROCEDURE — 1160F PR REVIEW ALL MEDS BY PRESCRIBER/CLIN PHARMACIST DOCUMENTED: ICD-10-PCS | Mod: CPTII,S$GLB,, | Performed by: INTERNAL MEDICINE

## 2022-12-07 PROCEDURE — 3288F PR FALLS RISK ASSESSMENT DOCUMENTED: ICD-10-PCS | Mod: CPTII,S$GLB,, | Performed by: INTERNAL MEDICINE

## 2022-12-07 PROCEDURE — 87804 INFLUENZA ASSAY W/OPTIC: CPT | Mod: QW,S$GLB,, | Performed by: INTERNAL MEDICINE

## 2022-12-07 PROCEDURE — 99214 OFFICE O/P EST MOD 30 MIN: CPT | Mod: S$GLB,,, | Performed by: INTERNAL MEDICINE

## 2022-12-07 PROCEDURE — 3078F DIAST BP <80 MM HG: CPT | Mod: CPTII,S$GLB,, | Performed by: INTERNAL MEDICINE

## 2022-12-07 PROCEDURE — 1101F PT FALLS ASSESS-DOCD LE1/YR: CPT | Mod: CPTII,S$GLB,, | Performed by: INTERNAL MEDICINE

## 2022-12-07 PROCEDURE — 87880 STREP A ASSAY W/OPTIC: CPT | Mod: QW,S$GLB,, | Performed by: INTERNAL MEDICINE

## 2022-12-07 PROCEDURE — 1159F PR MEDICATION LIST DOCUMENTED IN MEDICAL RECORD: ICD-10-PCS | Mod: CPTII,S$GLB,, | Performed by: INTERNAL MEDICINE

## 2022-12-07 PROCEDURE — 1159F MED LIST DOCD IN RCRD: CPT | Mod: CPTII,S$GLB,, | Performed by: INTERNAL MEDICINE

## 2022-12-07 PROCEDURE — 3008F BODY MASS INDEX DOCD: CPT | Mod: CPTII,S$GLB,, | Performed by: INTERNAL MEDICINE

## 2022-12-07 RX ORDER — AZITHROMYCIN 250 MG/1
TABLET, FILM COATED ORAL
Qty: 6 TABLET | Refills: 0 | Status: SHIPPED | OUTPATIENT
Start: 2022-12-07 | End: 2022-12-12

## 2022-12-07 RX ORDER — BENZONATATE 200 MG/1
200 CAPSULE ORAL 3 TIMES DAILY PRN
Qty: 30 CAPSULE | Refills: 1 | Status: SHIPPED | OUTPATIENT
Start: 2022-12-07 | End: 2022-12-17

## 2022-12-07 RX ORDER — GUAIFENESIN 600 MG/1
600 TABLET, EXTENDED RELEASE ORAL 2 TIMES DAILY
Qty: 20 TABLET | Refills: 0 | Status: SHIPPED | OUTPATIENT
Start: 2022-12-07 | End: 2022-12-17

## 2022-12-07 RX ORDER — FLUTICASONE PROPIONATE 50 MCG
2 SPRAY, SUSPENSION (ML) NASAL DAILY
Qty: 16 G | Refills: 2 | Status: SHIPPED | OUTPATIENT
Start: 2022-12-07 | End: 2023-10-31

## 2022-12-07 RX ORDER — PROMETHAZINE HYDROCHLORIDE AND DEXTROMETHORPHAN HYDROBROMIDE 6.25; 15 MG/5ML; MG/5ML
5 SYRUP ORAL EVERY 4 HOURS PRN
Qty: 120 ML | Refills: 0 | Status: SHIPPED | OUTPATIENT
Start: 2022-12-07 | End: 2022-12-17

## 2022-12-07 NOTE — PROGRESS NOTES
Subjective:       Patient ID: Addy Redding is a 67 y.o. male.    Chief Complaint: No chief complaint on file.      HPI  Addy Redding is a 67 y.o. male with chronic conditions of DM II, HTN, HLD, GERD, CKD III, who presents today for evaluation of upper respiratory tract symptoms.    Reports since 5 days ago has been feeling weak with a scratchy throat and a cough.  Denies fever, shortness of breath, or leg swelling.  His wife had the flu over a week ago.  He is not taking any medications for his symptoms.  Not sleeping well due to the cough.  Cough is mostly dry.  With the symptoms reports nasal congestion, sinus pressure.  Has been drinking fluids and eating well but concern of feeling tired.  Tested negative for COVID.    Diabetes has been controlled or stable as he reports.  Health Maintenance:  Health Maintenance   Topic Date Due    Eye Exam  07/13/2022    Hemoglobin A1c  01/05/2023    Lipid Panel  07/05/2023    PROSTATE-SPECIFIC ANTIGEN  09/13/2023    Foot Exam  10/04/2023    High Dose Statin  11/08/2023    TETANUS VACCINE  04/03/2027    Hepatitis C Screening  Completed    Abdominal Aortic Aneurysm Screening  Completed       Review of Systems   Constitutional:  Positive for fatigue.   HENT:  Positive for nasal congestion, sinus pressure/congestion and sore throat. Negative for ear pain.    Respiratory:  Positive for cough (dry).    Cardiovascular: Negative.    Gastrointestinal: Negative.    Genitourinary: Negative.    Neurological:  Positive for weakness.   Psychiatric/Behavioral:  Positive for sleep disturbance (Due to cough).     Past Medical History:   Diagnosis Date    Costochondritis     Diabetic nephropathy associated with type 2 diabetes mellitus     Diabetic retinopathy     ED (erectile dysfunction)     GERD (gastroesophageal reflux disease)     Hyperlipidemia     Hypertension     Prostate cancer     Type II or unspecified type diabetes mellitus with renal manifestations, uncontrolled(250.42)        Past  Surgical History:   Procedure Laterality Date    Bone biopsy right femur      CORONARY ANGIOGRAPHY N/A 2022    Procedure: ANGIOGRAM, CORONARY ARTERY;  Surgeon: Carter Arevalo MD;  Location: [facility] CATH LAB/EP;  Service: Cardiology;  Laterality: N/A;    LEFT HEART CATHETERIZATION Left 2022    Procedure: Left heart cath;  Surgeon: Carter Arevalo MD;  Location: [facility] CATH LAB/EP;  Service: Cardiology;  Laterality: Left;    PROSTATE BIOPSY  10/05/2022       Family History   Problem Relation Age of Onset    Hypertension Mother     Diabetes Mother     Kidney disease Mother     Hypertension Sister     Diabetes Sister     Heart disease Sister     Heart attack Sister     Hyperlipidemia Sister     Coronary artery disease Sister     Hypertension Brother     Stroke Brother     Lung cancer Brother     Cancer Brother         lung cancer    Diabetes Sister     Hypertension Sister     Dementia Sister     Diabetes Brother     Peripheral vascular disease Brother     Hypertension Brother     Hyperlipidemia Brother     No Known Problems Brother     No Known Problems Sister     Coronary artery disease Father          of an MI at age 60    Hyperlipidemia Father     Heart attack Father     No Known Problems Daughter     Asthma Son     Hypertension Son     Stomach cancer Sister     Cancer Sister         stomach     Diabetes Brother     Stroke Brother     Hypertension Brother     Hyperlipidemia Brother     HIV Brother     Diabetes Brother     Hypertension Brother        Social History     Socioeconomic History    Marital status:    Tobacco Use    Smoking status: Former     Packs/day: 0.50     Years: 26.00     Pack years: 13.00     Types: Cigarettes     Quit date:      Years since quittin.9    Smokeless tobacco: Never   Substance and Sexual Activity    Alcohol use: No    Drug use: No    Sexual activity: Yes     Partners: Female     Social Determinants of Health     Financial Resource Strain: Low Risk      Difficulty of Paying Living Expenses: Not hard at all   Food Insecurity: No Food Insecurity    Worried About Running Out of Food in the Last Year: Never true    Ran Out of Food in the Last Year: Never true   Transportation Needs: No Transportation Needs    Lack of Transportation (Medical): No    Lack of Transportation (Non-Medical): No   Physical Activity: Sufficiently Active    Days of Exercise per Week: 5 days    Minutes of Exercise per Session: 60 min   Stress: No Stress Concern Present    Feeling of Stress : Not at all   Social Connections: Moderately Integrated    Frequency of Communication with Friends and Family: More than three times a week    Frequency of Social Gatherings with Friends and Family: More than three times a week    Attends Congregation Services: More than 4 times per year    Active Member of Clubs or Organizations: No    Attends Club or Organization Meetings: Never    Marital Status:    Housing Stability: Low Risk     Unable to Pay for Housing in the Last Year: No    Number of Places Lived in the Last Year: 1    Unstable Housing in the Last Year: No       Current Outpatient Medications   Medication Sig Dispense Refill    dapagliflozin (FARXIGA) 5 mg Tab tablet TAKE ONE TABLET BY MOUTH ONCE DAILY. 90 tablet 4    ADVANCED GLUC METER TEST STRIP Strp USE TO TEST BLOOD SUGAR 4 TIMES DAILY. 100 strip 0    amLODIPine (NORVASC) 5 MG tablet Take 1 tablet (5 mg total) by mouth 2 (two) times daily. 180 tablet 4    aspirin (ECOTRIN) 81 MG EC tablet Take 1 tablet (81 mg total) by mouth once daily.      atorvastatin (LIPITOR) 80 MG tablet Take 1 tablet (80 mg total) by mouth once daily. 90 tablet 3    blood sugar diagnostic (BLOOD GLUCOSE TEST) Strp Check sugar once daily 100 each 11    blood-glucose meter kit Use as instructed      clopidogreL (PLAVIX) 75 mg tablet Take 1 tablet (75 mg total) by mouth once daily. 90 tablet 4    doxazosin (CARDURA) 2 MG tablet Take 1 tablet (2 mg total) by mouth every  evening. 90 tablet 4    ezetimibe (ZETIA) 10 mg tablet Take 1 tablet (10 mg total) by mouth once daily. 90 tablet 4    lancets (ACCU-CHEK SOFTCLIX LANCETS) Misc 1 lancet by Misc.(Non-Drug; Combo Route) route 2 (two) times daily. 200 each 1    lisinopriL (PRINIVIL,ZESTRIL) 5 MG tablet Take 1 tablet (5 mg total) by mouth once daily. 90 tablet 4    nitroGLYCERIN (NITROSTAT) 0.4 MG SL tablet Place 1 tablet (0.4 mg total) under the tongue every 5 (five) minutes as needed for Chest pain. 30 tablet 3    pantoprazole (PROTONIX) 40 MG tablet TAKE ONE TABLET BY MOUTH ONCE DAILY. 30 tablet 3    PNEUMOVAX-23 25 mcg/0.5 mL vaccine       sars-cov-2, covid-19, (MODERNA COVID-19) 100 mcg/0.5 ml injection       sildenafil (REVATIO) 20 mg Tab Take 1 tablet (20 mg total) by mouth daily as needed for ED 30 tablet 2     No current facility-administered medications for this visit.       Review of patient's allergies indicates:  No Known Allergies      Objective:       Last 3 sets of Vitals    Vitals - 1 value per visit 11/1/2022 11/8/2022 11/8/2022   SYSTOLIC 122 - 133   DIASTOLIC 68 - 65   Pulse 62 - 53   Temp - - -   Resp - - 16   SPO2 - - -   Weight (lb) 202.6 - 203.2   Weight (kg) 91.9 - 92.171   Height 74 - 73   BMI (Calculated) 26 - 26.8   VISIT REPORT - - -   Pain Score  - 0 -   Some recent data might be hidden   Physical Exam  Constitutional:       General: He is not in acute distress.  HENT:      Head: Normocephalic.      Right Ear: Tympanic membrane, ear canal and external ear normal.      Left Ear: Tympanic membrane, ear canal and external ear normal.      Nose: Nose normal.      Mouth/Throat:      Mouth: Mucous membranes are moist.      Pharynx: Posterior oropharyngeal erythema present.   Eyes:      General: No scleral icterus.     Extraocular Movements: Extraocular movements intact.      Conjunctiva/sclera: Conjunctivae normal.   Neck:      Vascular: No carotid bruit.      Comments: No goiter.  Cardiovascular:      Rate and  Rhythm: Normal rate and regular rhythm.      Pulses: Normal pulses.      Heart sounds: Normal heart sounds.   Pulmonary:      Effort: Pulmonary effort is normal.      Breath sounds: Normal breath sounds.      Comments: Dry cough.  Abdominal:      General: Bowel sounds are normal. There is no distension.      Palpations: Abdomen is soft. There is no mass.      Tenderness: There is no abdominal tenderness.   Musculoskeletal:         General: No swelling.   Lymphadenopathy:      Cervical: No cervical adenopathy.   Skin:     General: Skin is warm and dry.   Neurological:      General: No focal deficit present.      Mental Status: He is alert and oriented to person, place, and time.      Gait: Gait normal.      Comments: Looks tired but no loss of balance or focalizing signs.   Psychiatric:         Mood and Affect: Mood normal.         Behavior: Behavior normal.         CBC:  Recent Labs   Lab 07/25/22  1126 09/13/22  0720 10/14/22  0716   WBC 5.04 5.41 5.81   RBC 4.35 L 4.19 L 4.45 L   Hemoglobin 12.5 L 12.2 L 12.8 L   Hematocrit 38.2 L 35.9 L 38.6 L   Platelets 249 233 237   MCV 88 86 87   MCH 28.7 29.1 28.8   MCHC 32.7 34.0 33.2     CMP:  Recent Labs   Lab 05/27/22  0759 06/14/22  0708 07/05/22  0712 07/25/22  1126 08/05/22  0734 09/13/22  0720 10/14/22  0716   Glucose 105   < > 107   < > 152 H   < > 225 H   Calcium 9.2   < > 9.7   < > 9.3   < > 9.0   Albumin 3.6  --  4.1   < > 3.9  --  3.8   Total Protein 6.6  --  7.6  --  7.4  --   --    Sodium 140   < > 139   < > 138   < > 137   Potassium 4.7   < > 4.7   < > 4.6   < > 4.2   CO2 24   < > 21 L   < > 23   < > 21 L   Chloride 106   < > 108   < > 107   < > 106   BUN 19   < > 47 H   < > 30 H   < > 24 H   Creatinine 1.7 H   < > 2.5 H   < > 2.1 H   < > 1.9 H   Alkaline Phosphatase 50 L  --  40 L  --  48 L  --   --    ALT 13  --  16  --  16  --   --    AST 16  --  18  --  16  --   --    Total Bilirubin 0.5  --  0.4  --  0.5  --   --     < > = values in this interval not  displayed.     URINALYSIS:  Recent Labs   Lab 10/14/22  0656   Color, UA Yellow   Specific Gravity, UA 1.030   pH, UA 6.0   Protein, UA Negative   Bacteria None   Nitrite, UA Negative   Leukocytes, UA Negative   Urobilinogen, UA Negative      LIPIDS:  Recent Labs   Lab 01/06/22  1013 05/27/22  0759 07/05/22  0712   HDL 49 39 L 43   Cholesterol 183 146 120   Triglycerides 65 72 54   LDL Cholesterol 121.0 92.6 66.2   HDL/Cholesterol Ratio 26.8 26.7 35.8   Non-HDL Cholesterol 134 107 77   Total Cholesterol/HDL Ratio 3.7 3.7 2.8     TSH:        A1C:  Recent Labs   Lab 12/10/19  1156 06/15/20  0910 12/15/20  0856 07/07/21  0703 01/06/22  1013 07/05/22  0712   Hemoglobin A1C 6.4 H 6.7 H 6.2 H 6.0 H 6.3 H 7.4 H       Imaging:  X-Ray Shoulder 2 or More Views Left  Narrative: EXAMINATION:  XR SHOULDER COMPLETE 2 OR MORE VIEWS LEFT    CLINICAL HISTORY:  Pain in left shoulder    TECHNIQUE:  Two or three views of the left shoulder were performed.    COMPARISON:  None available    FINDINGS:  Left glenohumeral articulation is maintained.  AC joint is intact with mild DJD.  No acute fracture, dislocation, or osseous destruction.  Impression: As above.    Electronically signed by: Nicanor Mart  Date:    10/04/2022  Time:    16:59      Assessment:       1. URTI (acute upper respiratory infection)    2. Viral syndrome    3. Other fatigue    4. Type 2 diabetes mellitus with stage 3a chronic kidney disease, without long-term current use of insulin    5. Hypertension associated with diabetes    6. Stage 3b chronic kidney disease            Plan:       1. URTI (acute upper respiratory infection)  -     POCT Rapid Strep A- negative  -     benzonatate (TESSALON) 200 MG capsule; Take 1 capsule (200 mg total) by mouth 3 (three) times daily as needed for Cough.  Dispense: 30 capsule; Refill: 1  -     guaiFENesin (MUCINEX) 600 mg 12 hr tablet; Take 1 tablet (600 mg total) by mouth 2 (two) times daily. for 10 days  Dispense: 20 tablet;  Refill: 0  -     promethazine-dextromethorphan (PROMETHAZINE-DM) 6.25-15 mg/5 mL Syrp; Take 5 mLs by mouth every 4 (four) hours as needed (cough).  Dispense: 120 mL; Refill: 0  -     fluticasone propionate (FLONASE) 50 mcg/actuation nasal spray; 2 sprays (100 mcg total) by Each Nostril route once daily.  Dispense: 16 g; Refill: 2  -     CBC Auto Differential; Future; Expected date: 12/07/2022  -     several days with symptoms and feels his getting weaker.  azithromycin (Z-RITU) 250 MG tablet; Take 2 tablets by mouth on day 1; Take 1 tablet by mouth on days 2-5  Dispense: 6 tablet; Refill: 0    2. Viral syndrome  -     POCT Influenza A/B- considering his wife had the flu over a week ago but results were Negative.    3. Other fatigue  -     CBC Auto Differential; Future; Expected date: 12/07/2022  -     Comprehensive Metabolic Panel; Future; Expected date: 12/07/2022  -     TSH; Future; Expected date: 12/07/2022    4. Type 2 diabetes mellitus with stage 3a chronic kidney disease, without long-term current use of insulin  -     Comprehensive Metabolic Panel; Future; Expected date: 12/07/2022  -     Hemoglobin A1C; Future; Expected date: 12/07/2022    5. Hypertension associated with diabetes  -     Comprehensive Metabolic Panel; Future; Expected date: 12/07/2022    6. Stage 3b chronic kidney disease  -     Comprehensive Metabolic Panel; Future; Expected date: 12/07/2022     Health Maintenance Due   Topic Date Due    COVID-19 Vaccine (5 - Booster for Moderna series) 06/29/2022    Eye Exam  07/13/2022    Diabetes Urine Screening  01/06/2023            Return to clinic as scheduled.    Shraddha Salgado MD  [facility]  Disclaimer:  This note has been generated using voice-recognition software. There may be grammatical or spelling errors that have been missed during proof-reading

## 2022-12-08 ENCOUNTER — TELEPHONE (OUTPATIENT)
Dept: FAMILY MEDICINE | Facility: CLINIC | Age: 67
End: 2022-12-08
Payer: MEDICARE

## 2022-12-08 ENCOUNTER — LAB VISIT (OUTPATIENT)
Dept: LAB | Facility: HOSPITAL | Age: 67
End: 2022-12-08
Attending: INTERNAL MEDICINE
Payer: MEDICARE

## 2022-12-08 DIAGNOSIS — J06.9 URTI (ACUTE UPPER RESPIRATORY INFECTION): ICD-10-CM

## 2022-12-08 DIAGNOSIS — N18.31 TYPE 2 DIABETES MELLITUS WITH STAGE 3A CHRONIC KIDNEY DISEASE, WITHOUT LONG-TERM CURRENT USE OF INSULIN: ICD-10-CM

## 2022-12-08 DIAGNOSIS — R53.83 OTHER FATIGUE: ICD-10-CM

## 2022-12-08 DIAGNOSIS — E11.22 TYPE 2 DIABETES MELLITUS WITH STAGE 3A CHRONIC KIDNEY DISEASE, WITHOUT LONG-TERM CURRENT USE OF INSULIN: ICD-10-CM

## 2022-12-08 DIAGNOSIS — E11.59 HYPERTENSION ASSOCIATED WITH DIABETES: ICD-10-CM

## 2022-12-08 DIAGNOSIS — N18.32 STAGE 3B CHRONIC KIDNEY DISEASE: ICD-10-CM

## 2022-12-08 DIAGNOSIS — I15.2 HYPERTENSION ASSOCIATED WITH DIABETES: ICD-10-CM

## 2022-12-08 LAB
ALBUMIN SERPL BCP-MCNC: 3.9 G/DL (ref 3.5–5.2)
ALP SERPL-CCNC: 85 U/L (ref 55–135)
ALT SERPL W/O P-5'-P-CCNC: 16 U/L (ref 10–44)
ANION GAP SERPL CALC-SCNC: 11 MMOL/L (ref 8–16)
AST SERPL-CCNC: 15 U/L (ref 10–40)
BASOPHILS # BLD AUTO: 0.03 K/UL (ref 0–0.2)
BASOPHILS NFR BLD: 0.5 % (ref 0–1.9)
BILIRUB SERPL-MCNC: 0.5 MG/DL (ref 0.1–1)
BUN SERPL-MCNC: 35 MG/DL (ref 8–23)
CALCIUM SERPL-MCNC: 9.2 MG/DL (ref 8.7–10.5)
CHLORIDE SERPL-SCNC: 101 MMOL/L (ref 95–110)
CO2 SERPL-SCNC: 21 MMOL/L (ref 23–29)
CREAT SERPL-MCNC: 2.4 MG/DL (ref 0.5–1.4)
DIFFERENTIAL METHOD: NORMAL
EOSINOPHIL # BLD AUTO: 0.3 K/UL (ref 0–0.5)
EOSINOPHIL NFR BLD: 3.8 % (ref 0–8)
ERYTHROCYTE [DISTWIDTH] IN BLOOD BY AUTOMATED COUNT: 12.7 % (ref 11.5–14.5)
EST. GFR  (NO RACE VARIABLE): 28 ML/MIN/1.73 M^2
ESTIMATED AVG GLUCOSE: 349 MG/DL (ref 68–131)
GLUCOSE SERPL-MCNC: 375 MG/DL (ref 70–110)
HBA1C MFR BLD: 13.8 % (ref 4–5.6)
HCT VFR BLD AUTO: 41.4 % (ref 40–54)
HGB BLD-MCNC: 14 G/DL (ref 14–18)
IMM GRANULOCYTES # BLD AUTO: 0.02 K/UL (ref 0–0.04)
IMM GRANULOCYTES NFR BLD AUTO: 0.3 % (ref 0–0.5)
LYMPHOCYTES # BLD AUTO: 2.1 K/UL (ref 1–4.8)
LYMPHOCYTES NFR BLD: 31.5 % (ref 18–48)
MCH RBC QN AUTO: 28.2 PG (ref 27–31)
MCHC RBC AUTO-ENTMCNC: 33.8 G/DL (ref 32–36)
MCV RBC AUTO: 83 FL (ref 82–98)
MONOCYTES # BLD AUTO: 0.7 K/UL (ref 0.3–1)
MONOCYTES NFR BLD: 9.8 % (ref 4–15)
NEUTROPHILS # BLD AUTO: 3.6 K/UL (ref 1.8–7.7)
NEUTROPHILS NFR BLD: 54.1 % (ref 38–73)
NRBC BLD-RTO: 0 /100 WBC
PLATELET # BLD AUTO: 243 K/UL (ref 150–450)
PMV BLD AUTO: 11.4 FL (ref 9.2–12.9)
POTASSIUM SERPL-SCNC: 5 MMOL/L (ref 3.5–5.1)
PROT SERPL-MCNC: 7.6 G/DL (ref 6–8.4)
RBC # BLD AUTO: 4.97 M/UL (ref 4.6–6.2)
SODIUM SERPL-SCNC: 133 MMOL/L (ref 136–145)
TSH SERPL DL<=0.005 MIU/L-ACNC: 2.87 UIU/ML (ref 0.4–4)
WBC # BLD AUTO: 6.6 K/UL (ref 3.9–12.7)

## 2022-12-08 PROCEDURE — 83036 HEMOGLOBIN GLYCOSYLATED A1C: CPT | Performed by: INTERNAL MEDICINE

## 2022-12-08 PROCEDURE — 99284 EMERGENCY DEPT VISIT MOD MDM: CPT | Mod: 25

## 2022-12-08 PROCEDURE — 85025 COMPLETE CBC W/AUTO DIFF WBC: CPT | Performed by: INTERNAL MEDICINE

## 2022-12-08 PROCEDURE — 36415 COLL VENOUS BLD VENIPUNCTURE: CPT | Performed by: INTERNAL MEDICINE

## 2022-12-08 PROCEDURE — 84443 ASSAY THYROID STIM HORMONE: CPT | Performed by: INTERNAL MEDICINE

## 2022-12-08 PROCEDURE — 80053 COMPREHEN METABOLIC PANEL: CPT | Performed by: INTERNAL MEDICINE

## 2022-12-08 RX ORDER — INSULIN GLARGINE 100 [IU]/ML
15 INJECTION, SOLUTION SUBCUTANEOUS DAILY
Qty: 3 ML | Refills: 0 | Status: SHIPPED | OUTPATIENT
Start: 2022-12-08 | End: 2023-01-11

## 2022-12-09 ENCOUNTER — HOSPITAL ENCOUNTER (EMERGENCY)
Facility: HOSPITAL | Age: 67
Discharge: HOME | End: 2022-12-09
Attending: STUDENT IN AN ORGANIZED HEALTH CARE EDUCATION/TRAINING PROGRAM
Payer: MEDICARE

## 2022-12-09 VITALS
TEMPERATURE: 98 F | HEART RATE: 68 BPM | OXYGEN SATURATION: 100 % | SYSTOLIC BLOOD PRESSURE: 126 MMHG | RESPIRATION RATE: 23 BRPM | WEIGHT: 196 LBS | HEIGHT: 73 IN | BODY MASS INDEX: 25.98 KG/M2 | DIASTOLIC BLOOD PRESSURE: 58 MMHG

## 2022-12-09 DIAGNOSIS — E87.5 HYPERKALEMIA: ICD-10-CM

## 2022-12-09 DIAGNOSIS — N17.9 AKI (ACUTE KIDNEY INJURY): ICD-10-CM

## 2022-12-09 DIAGNOSIS — R73.9 HYPERGLYCEMIA: Primary | ICD-10-CM

## 2022-12-09 LAB
ALBUMIN SERPL BCP-MCNC: 3.8 G/DL (ref 3.5–5.2)
ALP SERPL-CCNC: 79 U/L (ref 55–135)
ALT SERPL W/O P-5'-P-CCNC: 16 U/L (ref 10–44)
ANION GAP SERPL CALC-SCNC: 10 MMOL/L (ref 8–16)
ANION GAP SERPL CALC-SCNC: 9 MMOL/L (ref 8–16)
AST SERPL-CCNC: 14 U/L (ref 10–40)
B-OH-BUTYR BLD STRIP-SCNC: 0.1 MMOL/L (ref 0–0.5)
BACTERIA #/AREA URNS HPF: NORMAL /HPF
BASOPHILS # BLD AUTO: 0.04 K/UL (ref 0–0.2)
BASOPHILS NFR BLD: 0.5 % (ref 0–1.9)
BILIRUB SERPL-MCNC: 0.3 MG/DL (ref 0.1–1)
BILIRUB UR QL STRIP: NEGATIVE
BUN SERPL-MCNC: 35 MG/DL (ref 8–23)
BUN SERPL-MCNC: 39 MG/DL (ref 8–23)
CALCIUM SERPL-MCNC: 8.3 MG/DL (ref 8.7–10.5)
CALCIUM SERPL-MCNC: 8.5 MG/DL (ref 8.7–10.5)
CHLORIDE SERPL-SCNC: 108 MMOL/L (ref 95–110)
CHLORIDE SERPL-SCNC: 95 MMOL/L (ref 95–110)
CLARITY UR: CLEAR
CO2 SERPL-SCNC: 18 MMOL/L (ref 23–29)
CO2 SERPL-SCNC: 22 MMOL/L (ref 23–29)
COLOR UR: COLORLESS
CREAT SERPL-MCNC: 2.1 MG/DL (ref 0.5–1.4)
CREAT SERPL-MCNC: 2.7 MG/DL (ref 0.5–1.4)
DIFFERENTIAL METHOD: ABNORMAL
EOSINOPHIL # BLD AUTO: 0.2 K/UL (ref 0–0.5)
EOSINOPHIL NFR BLD: 2.5 % (ref 0–8)
ERYTHROCYTE [DISTWIDTH] IN BLOOD BY AUTOMATED COUNT: 12.7 % (ref 11.5–14.5)
EST. GFR  (NO RACE VARIABLE): 25 ML/MIN/1.73 M^2
EST. GFR  (NO RACE VARIABLE): 33 ML/MIN/1.73 M^2
GLUCOSE SERPL-MCNC: 121 MG/DL (ref 70–110)
GLUCOSE SERPL-MCNC: 619 MG/DL (ref 70–110)
GLUCOSE UR QL STRIP: ABNORMAL
HCO3 UR-SCNC: 21.5 MMOL/L (ref 24–28)
HCT VFR BLD AUTO: 36.8 % (ref 40–54)
HCT VFR BLD CALC: 37 %PCV (ref 36–54)
HGB BLD-MCNC: 12.4 G/DL (ref 14–18)
HGB BLD-MCNC: 13 G/DL
HGB UR QL STRIP: NEGATIVE
IMM GRANULOCYTES # BLD AUTO: 0.02 K/UL (ref 0–0.04)
IMM GRANULOCYTES NFR BLD AUTO: 0.3 % (ref 0–0.5)
KETONES UR QL STRIP: NEGATIVE
LEUKOCYTE ESTERASE UR QL STRIP: NEGATIVE
LIPASE SERPL-CCNC: 102 U/L (ref 4–60)
LYMPHOCYTES # BLD AUTO: 1.6 K/UL (ref 1–4.8)
LYMPHOCYTES NFR BLD: 22.4 % (ref 18–48)
MCH RBC QN AUTO: 28.2 PG (ref 27–31)
MCHC RBC AUTO-ENTMCNC: 33.7 G/DL (ref 32–36)
MCV RBC AUTO: 84 FL (ref 82–98)
MICROSCOPIC COMMENT: NORMAL
MONOCYTES # BLD AUTO: 0.7 K/UL (ref 0.3–1)
MONOCYTES NFR BLD: 9.9 % (ref 4–15)
NEUTROPHILS # BLD AUTO: 4.7 K/UL (ref 1.8–7.7)
NEUTROPHILS NFR BLD: 64.4 % (ref 38–73)
NITRITE UR QL STRIP: NEGATIVE
NRBC BLD-RTO: 0 /100 WBC
PCO2 BLDA: 41.7 MMHG (ref 35–45)
PH SMN: 7.32 [PH] (ref 7.35–7.45)
PH UR STRIP: 7 [PH] (ref 5–8)
PLATELET # BLD AUTO: 215 K/UL (ref 150–450)
PMV BLD AUTO: 11.4 FL (ref 9.2–12.9)
PO2 BLDA: 50 MMHG (ref 40–60)
POC BE: -5 MMOL/L
POC SATURATED O2: 82 % (ref 95–100)
POC TCO2: 23 MMOL/L (ref 24–29)
POCT GLUCOSE: 167 MG/DL (ref 70–110)
POCT GLUCOSE: >500 MG/DL (ref 70–110)
POTASSIUM BLD-SCNC: 5.1 MMOL/L (ref 3.5–5.1)
POTASSIUM SERPL-SCNC: 4 MMOL/L (ref 3.5–5.1)
POTASSIUM SERPL-SCNC: 5.5 MMOL/L (ref 3.5–5.1)
PROT SERPL-MCNC: 7.2 G/DL (ref 6–8.4)
PROT UR QL STRIP: NEGATIVE
RBC # BLD AUTO: 4.39 M/UL (ref 4.6–6.2)
RBC #/AREA URNS HPF: 0 /HPF (ref 0–4)
SAMPLE: ABNORMAL
SODIUM BLD-SCNC: 130 MMOL/L (ref 136–145)
SODIUM SERPL-SCNC: 126 MMOL/L (ref 136–145)
SODIUM SERPL-SCNC: 136 MMOL/L (ref 136–145)
SP GR UR STRIP: 1.02 (ref 1–1.03)
URN SPEC COLLECT METH UR: ABNORMAL
UROBILINOGEN UR STRIP-ACNC: NEGATIVE EU/DL
WBC # BLD AUTO: 7.29 K/UL (ref 3.9–12.7)
YEAST URNS QL MICRO: NORMAL

## 2022-12-09 PROCEDURE — 63600175 PHARM REV CODE 636 W HCPCS: Performed by: STUDENT IN AN ORGANIZED HEALTH CARE EDUCATION/TRAINING PROGRAM

## 2022-12-09 PROCEDURE — 80048 BASIC METABOLIC PNL TOTAL CA: CPT | Mod: XB | Performed by: STUDENT IN AN ORGANIZED HEALTH CARE EDUCATION/TRAINING PROGRAM

## 2022-12-09 PROCEDURE — 80053 COMPREHEN METABOLIC PANEL: CPT | Performed by: PHYSICIAN ASSISTANT

## 2022-12-09 PROCEDURE — 82962 GLUCOSE BLOOD TEST: CPT

## 2022-12-09 PROCEDURE — 81000 URINALYSIS NONAUTO W/SCOPE: CPT | Performed by: PHYSICIAN ASSISTANT

## 2022-12-09 PROCEDURE — 96374 THER/PROPH/DIAG INJ IV PUSH: CPT

## 2022-12-09 PROCEDURE — 25000003 PHARM REV CODE 250: Performed by: PHYSICIAN ASSISTANT

## 2022-12-09 PROCEDURE — 96361 HYDRATE IV INFUSION ADD-ON: CPT

## 2022-12-09 PROCEDURE — 93010 EKG 12-LEAD: ICD-10-PCS | Mod: ,,, | Performed by: INTERNAL MEDICINE

## 2022-12-09 PROCEDURE — 82010 KETONE BODYS QUAN: CPT | Performed by: STUDENT IN AN ORGANIZED HEALTH CARE EDUCATION/TRAINING PROGRAM

## 2022-12-09 PROCEDURE — 93010 ELECTROCARDIOGRAM REPORT: CPT | Mod: ,,, | Performed by: INTERNAL MEDICINE

## 2022-12-09 PROCEDURE — 93005 ELECTROCARDIOGRAM TRACING: CPT

## 2022-12-09 PROCEDURE — 25000003 PHARM REV CODE 250: Performed by: STUDENT IN AN ORGANIZED HEALTH CARE EDUCATION/TRAINING PROGRAM

## 2022-12-09 PROCEDURE — 83690 ASSAY OF LIPASE: CPT | Performed by: PHYSICIAN ASSISTANT

## 2022-12-09 PROCEDURE — 85025 COMPLETE CBC W/AUTO DIFF WBC: CPT | Performed by: PHYSICIAN ASSISTANT

## 2022-12-09 RX ADMIN — INSULIN HUMAN 15 UNITS: 100 INJECTION, SOLUTION PARENTERAL at 01:12

## 2022-12-09 RX ADMIN — SODIUM CHLORIDE 1000 ML: 0.9 INJECTION, SOLUTION INTRAVENOUS at 01:12

## 2022-12-09 RX ADMIN — SODIUM CHLORIDE 1000 ML: 0.9 INJECTION, SOLUTION INTRAVENOUS at 12:12

## 2022-12-09 NOTE — FIRST PROVIDER EVALUATION
Emergency Department TeleTriage Encounter Note      CHIEF COMPLAINT    Chief Complaint   Patient presents with    Hyperglycemia     States he had blood work done today and was told to come in because glucose was high.       VITAL SIGNS   Initial Vitals [12/08/22 2257]   BP Pulse Resp Temp SpO2   (!) 156/72 66 18 98.4 °F (36.9 °C) 97 %      MAP       --            ALLERGIES    Review of patient's allergies indicates:  No Known Allergies    PROVIDER TRIAGE NOTE  67-year-old male arrives the ER referred from PCP when labs showed elevated blood sugar over 300.  Patient reports being prescribed oral medication for his diabetes.  States that he is supposed to begin taking insulin tomorrow but has not yet.  Days that he feels a little bit weak and fatigued.  Appears well on exam, vital signs are normal.  No distress noted.      ORDERS  Labs Reviewed   CBC W/ AUTO DIFFERENTIAL   COMPREHENSIVE METABOLIC PANEL   LIPASE   URINALYSIS, REFLEX TO URINE CULTURE       ED Orders (720h ago, onward)      Start Ordered     Status Ordering Provider    12/08/22 2330 12/08/22 2325  sodium chloride 0.9% bolus 1,000 mL  ED 1 Time         Ordered ABEBE NEIL    12/08/22 2326 12/08/22 2325  Saline lock IV  Once         Ordered ABEBE NEIL    12/08/22 2326 12/08/22 2325  CBC Auto Differential  STAT         Ordered ABEBE NEIL    12/08/22 2326 12/08/22 2325  Comprehensive Metabolic Panel  STAT         Ordered ABEBE NEIL    12/08/22 2326 12/08/22 2325  Lipase  STAT         Ordered ABEBE NEIL    12/08/22 2326 12/08/22 2325  Urinalysis, Reflex to Urine Culture  STAT         Ordered ABEBE NEIL              Virtual Visit Note: The provider triage portion of this emergency department evaluation and documentation was performed via [employer], a HIPAA-compliant telemedicine application, in concert with a tele-presenter in the room. A face to face patient evaluation with one of my colleagues will occur once the  patient is placed in an emergency department room.      DISCLAIMER: This note was prepared with [employer] voice recognition transcription software. Garbled syntax, mangled pronouns, and other bizarre constructions may be attributed to that software system.

## 2022-12-09 NOTE — TELEPHONE ENCOUNTER
The patient was called and reported his labs.  His diabetes is uncontrolled and appears to be getting dehydrated.  Needs to drink more fluids and monitor his diabetes.  I will add Lantus 15 units and office arrange a sooner appointment with his primary care physician.  If he becomes weak or condition worsens advised to go to the emergency room a he will need fluids.  If blood sugar goes down I would hold the new pill and continue working with insulin.  Advised to call with any questions.  He reports he feels better.  Has been urinating a lot.  Drinks fluids but goes through him.

## 2022-12-09 NOTE — DISCHARGE INSTRUCTIONS
Thank you for coming to our Emergency Department today. It is important to remember that some problems are difficult to diagnose and may not be found during your first visit. Be sure to follow up with your primary care doctor and review any labs/imaging that was performed with them. If you do not have a primary care doctor, you may contact the one listed on your discharge paperwork or you may also call the [facility] Appointment Desk at [phone] to schedule an appointment with one.     All medications may potentially have side effects and it is impossible to predict which medications may give you side effects. If you feel that you are having a negative effect of any medication you should immediately stop taking them and seek medical attention.    Return to the ER with any questions/concerns, new/concerning symptoms, worsening or failure to improve. Do not drive or make any important decisions for 24 hours if you have received any pain medications, sedatives or mood altering drugs during your ER visit.

## 2022-12-09 NOTE — ED NOTES
Pt c/o hyperglycemia x day. PCP prescribed insulin today, zero doses taken. Pt reports high CBG at home at 2200 last night. Pt denies pain and SOB. Reports urinary frequency. Pt AAOx3. Respirations even and unlabored. Skin is warm and dry. NAD noted. Spouse at  bedside. CB within reach.

## 2022-12-09 NOTE — ED PROVIDER NOTES
Encounter Date: 12/8/2022       History     Chief Complaint   Patient presents with    Hyperglycemia     States he had blood work done today and was told to come in because glucose was high.     67-year-old male history of type 2 diabetes (off insulin), hypertension and hyperlipidemia presents after referral from his doctor when it was noticed his glucose level and A1c were elevated in blood work that was done yesterday.  Patient reports for the past couple of months feeling generally weak and having polyuria and polydipsia.  Currently has no complaints.  Specifically denies any headache nausea vomiting or abdominal pain.  Denies any dysuria or hematuria.  Denies any fevers or chills.    Patient reports he was previously on insulin but was taken off once his A1c normalized.  States his doctor prescribed him long-acting insulin yesterday which he has not gotten filled yet.      Review of patient's allergies indicates:  No Known Allergies  Past Medical History:   Diagnosis Date    Costochondritis     Diabetic nephropathy associated with type 2 diabetes mellitus     Diabetic retinopathy     ED (erectile dysfunction)     GERD (gastroesophageal reflux disease)     Hyperlipidemia     Hypertension     Prostate cancer     Type II or unspecified type diabetes mellitus with renal manifestations, uncontrolled(250.42)      Past Surgical History:   Procedure Laterality Date    Bone biopsy right femur      CORONARY ANGIOGRAPHY N/A 06/16/2022    Procedure: ANGIOGRAM, CORONARY ARTERY;  Surgeon: Carter Arevalo MD;  Location: [facility] CATH LAB/EP;  Service: Cardiology;  Laterality: N/A;    LEFT HEART CATHETERIZATION Left 04/07/2022    Procedure: Left heart cath;  Surgeon: Carter Arevalo MD;  Location: [facility] CATH LAB/EP;  Service: Cardiology;  Laterality: Left;    PROSTATE BIOPSY  10/05/2022     Family History   Problem Relation Age of Onset    Hypertension Mother     Diabetes Mother     Kidney disease Mother     Hypertension Sister      Diabetes Sister     Heart disease Sister     Heart attack Sister     Hyperlipidemia Sister     Coronary artery disease Sister     Hypertension Brother     Stroke Brother     Lung cancer Brother     Cancer Brother         lung cancer    Diabetes Sister     Hypertension Sister     Dementia Sister     Diabetes Brother     Peripheral vascular disease Brother     Hypertension Brother     Hyperlipidemia Brother     No Known Problems Brother     No Known Problems Sister     Coronary artery disease Father          of an MI at age 60    Hyperlipidemia Father     Heart attack Father     No Known Problems Daughter     Asthma Son     Hypertension Son     Stomach cancer Sister     Cancer Sister         stomach     Diabetes Brother     Stroke Brother     Hypertension Brother     Hyperlipidemia Brother     HIV Brother     Diabetes Brother     Hypertension Brother      Social History     Tobacco Use    Smoking status: Former     Packs/day: 0.50     Years: 26.00     Pack years: 13.00     Types: Cigarettes     Quit date:      Years since quittin.9    Smokeless tobacco: Never   Substance Use Topics    Alcohol use: No    Drug use: No     Review of Systems   Constitutional:  Negative for chills and fever.   HENT:  Negative for congestion and rhinorrhea.    Eyes:  Negative for pain.   Respiratory:  Negative for cough and shortness of breath.    Cardiovascular:  Negative for chest pain and leg swelling.   Gastrointestinal:  Negative for abdominal pain, nausea and vomiting.   Endocrine: Positive for polydipsia and polyuria.   Genitourinary:  Negative for dysuria and hematuria.   Musculoskeletal:  Negative for gait problem and neck pain.   Skin:  Negative for rash.   Allergic/Immunologic: Negative for immunocompromised state.   Neurological:  Positive for dizziness. Negative for weakness and headaches.     Physical Exam     Initial Vitals [22 2257]   BP Pulse Resp Temp SpO2   (!) 156/72 66 18 98.4 °F (36.9 °C) 97 %       MAP       --         Physical Exam    Constitutional: He appears well-developed and well-nourished. He is not diaphoretic. No distress.   HENT:   Head: Normocephalic and atraumatic.   Eyes: Conjunctivae and EOM are normal. Pupils are equal, round, and reactive to light.   Neck: No JVD present.   Normal range of motion.  Cardiovascular:  Normal rate and regular rhythm.           Pulses:       Radial pulses are 2+ on the right side and 2+ on the left side.        Posterior tibial pulses are 2+ on the right side and 2+ on the left side.   Pulmonary/Chest: Breath sounds normal. No respiratory distress.   Abdominal: Abdomen is soft. Bowel sounds are normal. He exhibits no distension. There is no abdominal tenderness. There is no rebound and no guarding.   Musculoskeletal:         General: No tenderness or edema. Normal range of motion.      Cervical back: Normal range of motion.     Lymphadenopathy:     He has no cervical adenopathy.   Neurological: He is alert and oriented to person, place, and time.   Skin: Skin is warm. Capillary refill takes less than 2 seconds.   Psychiatric: He has a normal mood and affect.       ED Course   Procedures  Labs Reviewed   CBC W/ AUTO DIFFERENTIAL - Abnormal; Notable for the following components:       Result Value    RBC 4.39 (*)     Hemoglobin 12.4 (*)     Hematocrit 36.8 (*)     All other components within normal limits   COMPREHENSIVE METABOLIC PANEL - Abnormal; Notable for the following components:    Sodium 126 (*)     Potassium 5.5 (*)     CO2 22 (*)     Glucose 619 (*)     BUN 39 (*)     Creatinine 2.7 (*)     Calcium 8.5 (*)     eGFR 25 (*)     All other components within normal limits    Narrative:       Glu critical result(s) called and verbal readback obtained from   Jaqueline Varela RN by BM6 12/09/2022 00:51   LIPASE - Abnormal; Notable for the following components:    Lipase 102 (*)     All other components within normal limits   URINALYSIS, REFLEX TO URINE CULTURE -  Abnormal; Notable for the following components:    Color, UA Colorless (*)     Glucose, UA 4+ (*)     All other components within normal limits    Narrative:     Specimen Source->Urine   BASIC METABOLIC PANEL - Abnormal; Notable for the following components:    CO2 18 (*)     Glucose 121 (*)     BUN 35 (*)     Creatinine 2.1 (*)     Calcium 8.3 (*)     eGFR 33 (*)     All other components within normal limits   ISTAT PROCEDURE - Abnormal; Notable for the following components:    POC PH 7.321 (*)     POC HCO3 21.5 (*)     POC SATURATED O2 82 (*)     POC Sodium 130 (*)     POC TCO2 23 (*)     All other components within normal limits   POCT GLUCOSE - Abnormal; Notable for the following components:    POCT Glucose >500 (*)     All other components within normal limits   POCT GLUCOSE - Abnormal; Notable for the following components:    POCT Glucose 167 (*)     All other components within normal limits   URINALYSIS MICROSCOPIC    Narrative:     Specimen Source->Urine   BETA - HYDROXYBUTYRATE, SERUM   POCT GLUCOSE MONITORING CONTINUOUS   POCT GLUCOSE MONITORING CONTINUOUS          Imaging Results    None          Medications   sodium chloride 0.9% bolus 1,000 mL (0 mLs Intravenous Stopped 12/9/22 0115)   insulin regular injection 15 Units 0.15 mL (15 Units Intravenous Given 12/9/22 0120)   sodium chloride 0.9% bolus 1,000 mL (0 mLs Intravenous Stopped 12/9/22 0219)     Medical Decision Making:   Initial Assessment:   67-year-old male history of type 2 diabetes (off insulin), hypertension and hyperlipidemia presents after referral from his doctor when it was noticed his glucose level and A1c were elevated in blood work that was done yesterday. I checked to make sure patient does not have any major infections and/or electrolyte abnormalities. Patient was rehydrated with IV fluids.  Patient was given 15u insulin to help with hyperglycemic control.  After interventions patient's blood glucose decreased to 121.  Patient is not  in DKA. EKG which reassuring on the patient.   Patient's exam is reassuring.   Clinical Tests:   Lab Tests: Reviewed and Ordered  Radiological Study: Reviewed and Ordered  Medical Tests: Reviewed and Ordered           ED Course as of 12/09/22 0418   Fri Dec 09, 2022   0121 CBC without significant leukocytosis, anemia, or platelet abnormalities.  Chemistry notable for sodium of 126 but given elevated glucose with a chest pains is within normal limits.  Potassium of 5.5.  And creatinine of 2.7.  Patient's baseline creatinine of 1.9.  Patient given IV fluids will give 15 units of insulin as well as obtain beta hydroxybutyrate and VBG.  Once patient perceives fluids and insulin will repeat BNP to ensure potassium is improvement [AS]   0318 Repeat BMP with cr at pts baseline, glucose of 121 and normal AG.  Patient has follow-up with his primary care doctor on the 12th (in 3 days) has a prescription for long-acting insulin which he will fill today.  Patient currently stable for discharge return precautions and anticipatory guidance given. Pt is currently stable for discharge. I discussed with the patient/family the diagnosis, treatment plan, indications for return to the emergency department, and for expected follow-up. The patient/family verbalized an understanding. The patient/family is asked if there are any questions or concerns. We discuss the case, until all issues are addressed to the patient/family's satisfaction. Patient/family understands and is agreeable to the plan.   [AS]      ED Course User Index  [AS] Robby Stone MD          This dictation has been generated using M-Modal Fluency Direct dictation; some phonetic errors may occur.          Clinical Impression:   Final diagnoses:  [E87.5] Hyperkalemia  [R73.9] Hyperglycemia (Primary)  [N17.9] NASREEN (acute kidney injury)      ED Disposition Condition    Discharge Stable          ED Prescriptions    None       Follow-up Information       Follow up With  Specialties Details Why Contact Info    Stephan Ramey MD Internal Medicine Go on 12/12/2022  [address] [facility]  [address]  [facility] [id number] [phone]               Robby Stone MD  12/09/22 [id number]

## 2022-12-13 ENCOUNTER — OFFICE VISIT (OUTPATIENT)
Dept: FAMILY MEDICINE | Facility: CLINIC | Age: 67
End: 2022-12-13
Attending: FAMILY MEDICINE
Payer: MEDICARE

## 2022-12-13 VITALS
DIASTOLIC BLOOD PRESSURE: 58 MMHG | OXYGEN SATURATION: 97 % | HEIGHT: 73 IN | WEIGHT: 196 LBS | SYSTOLIC BLOOD PRESSURE: 110 MMHG | TEMPERATURE: 98 F | HEART RATE: 52 BPM | BODY MASS INDEX: 25.98 KG/M2

## 2022-12-13 DIAGNOSIS — E11.22 TYPE 2 DIABETES MELLITUS WITH STAGE 3A CHRONIC KIDNEY DISEASE, WITHOUT LONG-TERM CURRENT USE OF INSULIN: Primary | ICD-10-CM

## 2022-12-13 DIAGNOSIS — N18.31 TYPE 2 DIABETES MELLITUS WITH STAGE 3A CHRONIC KIDNEY DISEASE, WITHOUT LONG-TERM CURRENT USE OF INSULIN: Primary | ICD-10-CM

## 2022-12-13 DIAGNOSIS — C61 PROSTATE CANCER: ICD-10-CM

## 2022-12-13 DIAGNOSIS — E11.69 DYSLIPIDEMIA ASSOCIATED WITH TYPE 2 DIABETES MELLITUS: ICD-10-CM

## 2022-12-13 DIAGNOSIS — I15.2 HYPERTENSION ASSOCIATED WITH DIABETES: ICD-10-CM

## 2022-12-13 DIAGNOSIS — E78.5 DYSLIPIDEMIA ASSOCIATED WITH TYPE 2 DIABETES MELLITUS: ICD-10-CM

## 2022-12-13 DIAGNOSIS — E11.59 HYPERTENSION ASSOCIATED WITH DIABETES: ICD-10-CM

## 2022-12-13 DIAGNOSIS — N18.32 STAGE 3B CHRONIC KIDNEY DISEASE: ICD-10-CM

## 2022-12-13 PROCEDURE — 3066F NEPHROPATHY DOC TX: CPT | Mod: CPTII,S$GLB,, | Performed by: FAMILY MEDICINE

## 2022-12-13 PROCEDURE — 3061F NEG MICROALBUMINURIA REV: CPT | Mod: CPTII,S$GLB,, | Performed by: FAMILY MEDICINE

## 2022-12-13 PROCEDURE — 1160F RVW MEDS BY RX/DR IN RCRD: CPT | Mod: CPTII,S$GLB,, | Performed by: FAMILY MEDICINE

## 2022-12-13 PROCEDURE — 1160F PR REVIEW ALL MEDS BY PRESCRIBER/CLIN PHARMACIST DOCUMENTED: ICD-10-PCS | Mod: CPTII,S$GLB,, | Performed by: FAMILY MEDICINE

## 2022-12-13 PROCEDURE — 3046F PR MOST RECENT HEMOGLOBIN A1C LEVEL > 9.0%: ICD-10-PCS | Mod: CPTII,S$GLB,, | Performed by: FAMILY MEDICINE

## 2022-12-13 PROCEDURE — 3061F PR NEG MICROALBUMINURIA RESULT DOCUMENTED/REVIEW: ICD-10-PCS | Mod: CPTII,S$GLB,, | Performed by: FAMILY MEDICINE

## 2022-12-13 PROCEDURE — 4010F PR ACE/ARB THEARPY RXD/TAKEN: ICD-10-PCS | Mod: CPTII,S$GLB,, | Performed by: FAMILY MEDICINE

## 2022-12-13 PROCEDURE — 3078F PR MOST RECENT DIASTOLIC BLOOD PRESSURE < 80 MM HG: ICD-10-PCS | Mod: CPTII,S$GLB,, | Performed by: FAMILY MEDICINE

## 2022-12-13 PROCEDURE — 99999 PR PBB SHADOW E&M-EST. PATIENT-LVL V: ICD-10-PCS | Mod: PBBFAC,,, | Performed by: FAMILY MEDICINE

## 2022-12-13 PROCEDURE — 99214 PR OFFICE/OUTPT VISIT, EST, LEVL IV, 30-39 MIN: ICD-10-PCS | Mod: S$GLB,,, | Performed by: FAMILY MEDICINE

## 2022-12-13 PROCEDURE — 99214 OFFICE O/P EST MOD 30 MIN: CPT | Mod: S$GLB,,, | Performed by: FAMILY MEDICINE

## 2022-12-13 PROCEDURE — 3288F FALL RISK ASSESSMENT DOCD: CPT | Mod: CPTII,S$GLB,, | Performed by: FAMILY MEDICINE

## 2022-12-13 PROCEDURE — 3066F PR DOCUMENTATION OF TREATMENT FOR NEPHROPATHY: ICD-10-PCS | Mod: CPTII,S$GLB,, | Performed by: FAMILY MEDICINE

## 2022-12-13 PROCEDURE — 3078F DIAST BP <80 MM HG: CPT | Mod: CPTII,S$GLB,, | Performed by: FAMILY MEDICINE

## 2022-12-13 PROCEDURE — 1126F AMNT PAIN NOTED NONE PRSNT: CPT | Mod: CPTII,S$GLB,, | Performed by: FAMILY MEDICINE

## 2022-12-13 PROCEDURE — 1159F PR MEDICATION LIST DOCUMENTED IN MEDICAL RECORD: ICD-10-PCS | Mod: CPTII,S$GLB,, | Performed by: FAMILY MEDICINE

## 2022-12-13 PROCEDURE — 1126F PR PAIN SEVERITY QUANTIFIED, NO PAIN PRESENT: ICD-10-PCS | Mod: CPTII,S$GLB,, | Performed by: FAMILY MEDICINE

## 2022-12-13 PROCEDURE — 1159F MED LIST DOCD IN RCRD: CPT | Mod: CPTII,S$GLB,, | Performed by: FAMILY MEDICINE

## 2022-12-13 PROCEDURE — 1101F PR PT FALLS ASSESS DOC 0-1 FALLS W/OUT INJ PAST YR: ICD-10-PCS | Mod: CPTII,S$GLB,, | Performed by: FAMILY MEDICINE

## 2022-12-13 PROCEDURE — 4010F ACE/ARB THERAPY RXD/TAKEN: CPT | Mod: CPTII,S$GLB,, | Performed by: FAMILY MEDICINE

## 2022-12-13 PROCEDURE — 99999 PR PBB SHADOW E&M-EST. PATIENT-LVL V: CPT | Mod: PBBFAC,,, | Performed by: FAMILY MEDICINE

## 2022-12-13 PROCEDURE — 3046F HEMOGLOBIN A1C LEVEL >9.0%: CPT | Mod: CPTII,S$GLB,, | Performed by: FAMILY MEDICINE

## 2022-12-13 PROCEDURE — 3008F BODY MASS INDEX DOCD: CPT | Mod: CPTII,S$GLB,, | Performed by: FAMILY MEDICINE

## 2022-12-13 PROCEDURE — 1101F PT FALLS ASSESS-DOCD LE1/YR: CPT | Mod: CPTII,S$GLB,, | Performed by: FAMILY MEDICINE

## 2022-12-13 PROCEDURE — 3288F PR FALLS RISK ASSESSMENT DOCUMENTED: ICD-10-PCS | Mod: CPTII,S$GLB,, | Performed by: FAMILY MEDICINE

## 2022-12-13 PROCEDURE — 3074F PR MOST RECENT SYSTOLIC BLOOD PRESSURE < 130 MM HG: ICD-10-PCS | Mod: CPTII,S$GLB,, | Performed by: FAMILY MEDICINE

## 2022-12-13 PROCEDURE — 3008F PR BODY MASS INDEX (BMI) DOCUMENTED: ICD-10-PCS | Mod: CPTII,S$GLB,, | Performed by: FAMILY MEDICINE

## 2022-12-13 PROCEDURE — 3074F SYST BP LT 130 MM HG: CPT | Mod: CPTII,S$GLB,, | Performed by: FAMILY MEDICINE

## 2022-12-13 RX ORDER — SEMAGLUTIDE 1.34 MG/ML
0.25 INJECTION, SOLUTION SUBCUTANEOUS
Qty: 1 PEN | Refills: 5 | Status: SHIPPED | OUTPATIENT
Start: 2022-12-13 | End: 2022-12-16

## 2022-12-13 NOTE — PROGRESS NOTES
Subjective:       Patient ID: Addy Redding is a 67 y.o. male.    Chief Complaint: Results (Labs)      66 yr old black male with DM II, HTN, HLD, GERD, CKD III, presents today for his three month follow up. Following urology for elevated PSA and has biopsy tomorrow.        DM II - uncontrolled - -    HGBA1C                   13.8 (H)            12/08/2022            on lantus and farxiga       - complicated by CKD III                   - denies any hypoglycemic symptoms - on metformin - up to date with eye and foot screen - on metformin    HTN - controlled  - on lisinopril 10 mg daily - no side effects    HLD - controlled and improving -    LDLCALC                  66.2                07/05/2022                                 - on statin - compliant - need refill - not fully compliant with diet    GERD - stable - takes prilosec as needed    ED - stable - takes viagra as needed    Health maintenance  -labs due  -Flu and Pneumovax - up to date  -adacel due and done today  -colonoscopy due - wants to wait  -PSA UTD      Follow-up  This is a chronic problem. The current episode started more than 1 year ago. The problem occurs constantly. The problem has been gradually worsening. Associated symptoms include arthralgias and myalgias. Pertinent negatives include no abdominal pain, change in bowel habit, chest pain, congestion, coughing, diaphoresis, fatigue, headaches, joint swelling, numbness, rash, urinary symptoms, vertigo, vomiting or weakness.   Medication Refill  This is a chronic problem. The current episode started more than 1 year ago. The problem occurs constantly. The problem has been gradually improving. Associated symptoms include arthralgias and myalgias. Pertinent negatives include no abdominal pain, change in bowel habit, chest pain, congestion, coughing, diaphoresis, fatigue, headaches, joint swelling, numbness, rash, urinary symptoms, vertigo, vomiting or weakness.   Hypertension  This is a chronic problem.  The current episode started more than 1 year ago. The problem has been gradually worsening since onset. The problem is uncontrolled. Pertinent negatives include no anxiety, chest pain, headaches, malaise/fatigue, orthopnea, palpitations, peripheral edema or sweats. There are no associated agents to hypertension. Risk factors for coronary artery disease include dyslipidemia, diabetes mellitus, male gender and obesity. Past treatments include ACE inhibitors. The current treatment provides no improvement. There are no compliance problems.  There is no history of angina, CAD/MI, CVA, left ventricular hypertrophy, PVD or retinopathy. There is no history of chronic renal disease, coarctation of the aorta, hypercortisolism, pheochromocytoma, renovascular disease or a thyroid problem.   Arm Pain   There was no injury mechanism. The pain is present in the right shoulder and upper right arm. The quality of the pain is described as aching. The pain does not radiate. The pain is at a severity of 5/10. The pain is moderate. The pain has been Constant since the incident. Pertinent negatives include no chest pain or numbness. The symptoms are aggravated by movement, lifting and palpation. He has tried nothing for the symptoms. The treatment provided mild relief.   Shoulder Pain   The pain is present in the right shoulder. This is a new problem. The current episode started 1 to 4 weeks ago. There has been no history of extremity trauma. The problem occurs constantly. The problem has been unchanged. The quality of the pain is described as aching. The pain is at a severity of 5/10. The pain is moderate. Pertinent negatives include no headaches, itching, joint swelling or numbness. The symptoms are aggravated by activity. He has tried nothing for the symptoms. The treatment provided no relief. His past medical history is significant for diabetes.   Gastroesophageal Reflux  He complains of heartburn. He reports no abdominal pain, no  chest pain, no coughing or no wheezing. This is a new problem. The current episode started 1 to 4 weeks ago. The problem occurs constantly. The problem has been unchanged. The heartburn duration is several minutes. The heartburn is located in the substernum. The symptoms are aggravated by certain foods. Pertinent negatives include no anemia, fatigue or melena. There are no known risk factors. He has tried nothing for the symptoms. The treatment provided mild relief. Past procedures do not include an abdominal ultrasound, esophageal pH monitoring or a UGI. Past invasive treatments do not include gastroplasty or reflux surgery.   Diabetes  Pertinent negatives for hypoglycemia include no confusion, dizziness, headaches, nervousness/anxiousness, speech difficulty or sweats. Pertinent negatives for diabetes include no chest pain, no fatigue, no polydipsia, no polyuria and no weakness. Pertinent negatives for diabetic complications include no CVA, PVD or retinopathy.   Hyperlipidemia  This is a chronic problem. The current episode started more than 1 year ago. The problem is uncontrolled. Recent lipid tests were reviewed and are high. Exacerbating diseases include diabetes. He has no history of chronic renal disease, liver disease or nephrotic syndrome. There are no known factors aggravating his hyperlipidemia. Associated symptoms include myalgias. Pertinent negatives include no chest pain, focal sensory loss, focal weakness or leg pain. Current antihyperlipidemic treatment includes statins. There are no compliance problems.  Risk factors for coronary artery disease include diabetes mellitus, dyslipidemia, male sex and hypertension.   Review of Systems   Constitutional: Negative.  Negative for activity change, diaphoresis, fatigue, malaise/fatigue and unexpected weight change.   HENT: Negative.  Negative for nasal congestion, ear pain, mouth sores, rhinorrhea and voice change.    Eyes: Negative.  Negative for pain,  discharge and visual disturbance.   Respiratory: Negative.  Negative for apnea, cough and wheezing.    Cardiovascular: Negative.  Negative for chest pain, palpitations and orthopnea.   Gastrointestinal:  Positive for heartburn. Negative for abdominal distention, abdominal pain, anal bleeding, change in bowel habit, diarrhea, melena, vomiting and change in bowel habit.   Endocrine: Negative.  Negative for cold intolerance, polydipsia and polyuria.   Genitourinary: Negative.  Negative for decreased urine volume, difficulty urinating, discharge, frequency and scrotal swelling.   Musculoskeletal:  Positive for arthralgias and myalgias. Negative for back pain, joint swelling, leg pain and neck stiffness.   Integumentary:  Negative for color change, itching and rash. Negative.   Allergic/Immunologic: Negative.  Negative for environmental allergies.   Neurological: Negative.  Negative for dizziness, vertigo, focal weakness, speech difficulty, weakness, light-headedness, numbness and headaches.   Hematological: Negative.    Psychiatric/Behavioral: Negative.  Negative for agitation, confusion, dysphoric mood and suicidal ideas. The patient is not nervous/anxious.        PMH/PSH/FH/SH/MED/ALLERGY reviewed    Objective:       Vitals:    12/13/22 0815   BP: (!) 110/58   Pulse: (!) 52   Temp: 98.1 °F (36.7 °C)       Physical Exam  Constitutional:       Appearance: He is well-developed.   HENT:      Head: Normocephalic and atraumatic.      Right Ear: External ear normal.      Left Ear: External ear normal.      Nose: Nose normal.      Mouth/Throat:      Pharynx: No oropharyngeal exudate.   Eyes:      General: No scleral icterus.        Right eye: No discharge.         Left eye: No discharge.      Conjunctiva/sclera: Conjunctivae normal.      Pupils: Pupils are equal, round, and reactive to light.   Neck:      Thyroid: No thyromegaly.      Vascular: No JVD.      Trachea: No tracheal deviation.   Cardiovascular:      Rate and  Rhythm: Regular rhythm. Bradycardia present.      Pulses:           Dorsalis pedis pulses are 1+ on the right side and 1+ on the left side.        Posterior tibial pulses are 1+ on the right side and 1+ on the left side.      Heart sounds: Normal heart sounds. No murmur heard.    No friction rub. No gallop.   Pulmonary:      Effort: Pulmonary effort is normal. No respiratory distress.      Breath sounds: Normal breath sounds. No stridor. No wheezing or rales.   Chest:      Chest wall: No tenderness.   Abdominal:      General: Bowel sounds are normal. There is no distension.      Palpations: Abdomen is soft. There is no mass.      Tenderness: There is no abdominal tenderness. There is no guarding or rebound.      Hernia: No hernia is present.   Musculoskeletal:         General: Tenderness (TTP left shoulder with restricted ROM) present. Normal range of motion.      Cervical back: Normal range of motion and neck supple.      Right foot: Normal range of motion. No deformity, bunion, Charcot foot, foot drop or prominent metatarsal heads.      Left foot: Normal range of motion. No deformity, bunion, Charcot foot, foot drop or prominent metatarsal heads.   Feet:      Right foot:      Protective Sensation: 10 sites tested.  10 sites sensed.      Skin integrity: Skin integrity normal.      Toenail Condition: Right toenails are normal.      Left foot:      Protective Sensation: 10 sites tested.  10 sites sensed.      Skin integrity: Skin integrity normal.      Toenail Condition: Left toenails are normal.   Lymphadenopathy:      Cervical: No cervical adenopathy.   Skin:     General: Skin is warm and dry.      Coloration: Skin is not pale.      Findings: No erythema or rash.   Neurological:      Mental Status: He is alert and oriented to person, place, and time.      Cranial Nerves: No cranial nerve deficit.      Motor: No abnormal muscle tone.      Coordination: Coordination normal.      Deep Tendon Reflexes: Reflexes are  normal and symmetric. Reflexes normal.   Psychiatric:         Behavior: Behavior normal.         Thought Content: Thought content normal.         Judgment: Judgment normal.       Assessment:       Problem List Items Addressed This Visit       Dyslipidemia associated with type 2 diabetes mellitus    Relevant Medications    semaglutide (OZEMPIC) 0.25 mg or 0.5 mg(2 mg/1.5 mL) pen injector    Type 2 diabetes mellitus with stage 3a chronic kidney disease, without long-term current use of insulin - Primary    Relevant Medications    semaglutide (OZEMPIC) 0.25 mg or 0.5 mg(2 mg/1.5 mL) pen injector    Other Relevant Orders    Comprehensive Metabolic Panel    Hemoglobin A1C    Stage 3b chronic kidney disease    Prostate cancer    Hypertension associated with diabetes    Relevant Medications    semaglutide (OZEMPIC) 0.25 mg or 0.5 mg(2 mg/1.5 mL) pen injector       Plan:           Addy was seen today for results.    Diagnoses and all orders for this visit:    Type 2 diabetes mellitus with stage 3a chronic kidney disease, without long-term current use of insulin  -     semaglutide (OZEMPIC) 0.25 mg or 0.5 mg(2 mg/1.5 mL) pen injector; Inject 0.25 mg into the skin every 7 days.  -     Comprehensive Metabolic Panel; Future  -     Hemoglobin A1C; Future    Dyslipidemia associated with type 2 diabetes mellitus    Stage 3b chronic kidney disease    Prostate cancer    Hypertension associated with diabetes    HTN   -at goal  -continue lisinopril to 20 mg/ day    DM II   -uncontrolled   -continue farxiga daily  -increase lantus to 15 units BID  -add ozempic weekly. Side effects of medications have been discussed and patient agreed to proceed with treatment and understands the risks and benefits.      CKD 3  -follow nephrology  -ER precautions given    HLD   -improving  -continue lipitor    GERD   -stable   -takes OTC PPI     ED'  -controlled    Left shoulder pain  -x ray  -likely arthritis vs degeneration  -cannot r/o mets if  prostate biopsy confirms CA  -ortho and OT precautions given      Spent adequate time in obtaining history and explaining differentials      25 minutes spent during this visit of which greater than 50% devoted to face-face counseling and coordination of care regarding diagnosis and management plan    Follow up in about 4 weeks (around 1/10/2023), or if symptoms worsen or fail to improve.

## 2022-12-15 ENCOUNTER — TELEPHONE (OUTPATIENT)
Dept: FAMILY MEDICINE | Facility: CLINIC | Age: 67
End: 2022-12-15
Payer: MEDICARE

## 2022-12-15 DIAGNOSIS — E11.22 TYPE 2 DIABETES MELLITUS WITH STAGE 3A CHRONIC KIDNEY DISEASE, WITHOUT LONG-TERM CURRENT USE OF INSULIN: Primary | ICD-10-CM

## 2022-12-15 DIAGNOSIS — N18.31 TYPE 2 DIABETES MELLITUS WITH STAGE 3A CHRONIC KIDNEY DISEASE, WITHOUT LONG-TERM CURRENT USE OF INSULIN: Primary | ICD-10-CM

## 2022-12-15 NOTE — TELEPHONE ENCOUNTER
----- Message from Edie Luna sent at 12/15/2022 11:09 AM CST -----  Needs advice from nurse:      Who Called:pt  Regarding:Ozempic is not at the pharmacy and patient's levels are still high, needs something called in [id number] - [place], [place] - [address] (Ph: [phone])  Would the patient rather a call back or [facility]?  Best Call Back number:[phone]  Additional Info:

## 2022-12-15 NOTE — TELEPHONE ENCOUNTER
Spoke to pharmacist at [facility] and stated that the Ozempic is on back order. Pharmacist is requesting another medication to be sent in. Pls advise.

## 2022-12-16 RX ORDER — DULAGLUTIDE 0.75 MG/.5ML
0.75 INJECTION, SOLUTION SUBCUTANEOUS
Qty: 4 PEN | Refills: 11 | Status: SHIPPED | OUTPATIENT
Start: 2022-12-16 | End: 2023-01-11

## 2022-12-28 ENCOUNTER — PATIENT MESSAGE (OUTPATIENT)
Dept: ADMINISTRATIVE | Facility: HOSPITAL | Age: 67
End: 2022-12-28
Payer: MEDICARE

## 2022-12-28 ENCOUNTER — PATIENT OUTREACH (OUTPATIENT)
Dept: ADMINISTRATIVE | Facility: HOSPITAL | Age: 67
End: 2022-12-28
Payer: MEDICARE

## 2022-12-28 NOTE — PROGRESS NOTES
Care Everywhere updates requested and reviewed.  Immunizations reconciled. Media reports reviewed.  Duplicate HM overrides and  orders removed.  Overdue HM topic chart audit and/or requested.  Overdue lab testing linked to upcoming lab appointments if applies.    Health Maintenance Due   Topic Date Due    COVID-19 Vaccine (5 - Booster for Moderna series) 2022    Eye Exam  2022    Diabetes Urine Screening  2023

## 2023-01-03 ENCOUNTER — HOSPITAL ENCOUNTER (OUTPATIENT)
Dept: RADIATION THERAPY | Facility: HOSPITAL | Age: 68
Discharge: HOME | End: 2023-01-03
Attending: RADIOLOGY
Payer: MEDICARE

## 2023-01-04 NOTE — ANESTHESIA PAT ROS NOTE
01/04/2023  Addy Redding is a 67 y.o., male.      Pre-op Assessment          Review of Systems  Anesthesia Hx:  No problems with previous Anesthesia             Denies Family Hx of Anesthesia complications.    Denies Personal Hx of Anesthesia complications.                    Social:  Former Smoker, No Alcohol Use       Hematology/Oncology:  Hematology Normal                                 Oncology Comments: Prostate     EENT/Dental:   Crowns-top & bottom/ Wears glasses          Cardiovascular:  Exercise tolerance: good   Hypertension   CAD  asymptomatic    Angina     hyperlipidemia    Walking x2 miles-3x/week/Housework/ mows lawn/Washs his cars                         Pulmonary:  Pulmonary Normal                       Renal/:  Chronic Renal Disease   CKD Stage 4/Prostate Cancer             Hepatic/GI:     GERD             Musculoskeletal:  Musculoskeletal Normal                Neurological:    Neuromuscular Disease,                                   Endocrine:  Diabetes, type 2           Dermatological:  Skin Normal    Psych:  Psychiatric Normal                   Saba Cohen RN  1/5/23    Anesthesia Assessment: Preoperative EQUATION    Planned Procedure: Procedure(s) (LRB):  INSERTION, RADIOACTIVE SEED, PROSTATE (N/A)  Requested Anesthesia Type:General  Surgeon: Scott Frias MD  Service: Urology  Known or anticipated Date of Surgery:1/12/2023    Surgeon notes: reviewed and Prostate cancer    Previous anesthesia records: None found in EPIC  System    Last PCP note: within 1 month , within [facility] , 12/13/22-Stephan Rivas-Type 2 diabetes mellitus with stage 3a chronic kidney disease, without long-term current use of insulin +4 more Dx-Results    Subspecialty notes: Cardiology: General and  , Nephrology, Radiology/ONC,      Other important co-morbidities: DM2, GERD, HLD, HTN, and Prostate Cancer   "   Medical History    Diagnosis Date Comment Source   Costochondritis      Diabetic nephropathy associated with type 2 diabetes mellitus      Diabetic retinopathy      ED (erectile dysfunction)      GERD (gastroesophageal reflux disease)      Hyperlipidemia      Hypertension      Prostate cancer      Type II or unspecified type diabetes mellitus with renal manifestations, uncontrolled(250.42)        Tests already available:  Results have been reviewed. Labs-12/9/22-BMP/POCT glucose, etc./ 12/8/22-CMP/CBC/TSH/A1c/IA reflex/Lipase. Etc./ 12/7/22-Strep A Influenza A/B/ EKG-12/9/22        Plan:  Phone pending    Testing:  None Needed   Patient  has previously scheduled Medical Appointment:Appointment on 1/10/23 prior to the surgery date.    Navigation: Phone Completed                              Consults scheduled.Per Cards, Dr. Carter Arevalo in chart note:"Yes he is cleared, he can hold aspirin and plavix 5 days before the procedure. Resume after surgery."                               Saba Cohen RN  1/4/23 & 1/5/23 & 1/6/23  "

## 2023-01-05 ENCOUNTER — TELEPHONE (OUTPATIENT)
Dept: CARDIOLOGY | Facility: CLINIC | Age: 68
End: 2023-01-05
Payer: MEDICARE

## 2023-01-05 RX ORDER — DEXAMETHASONE 0.75 MG/1
TABLET ORAL
COMMUNITY
Start: 2022-09-28 | End: 2023-01-05 | Stop reason: CLARIF

## 2023-01-05 RX ORDER — CHLORHEXIDINE GLUCONATE ORAL RINSE 1.2 MG/ML
SOLUTION DENTAL
COMMUNITY
Start: 2022-09-28 | End: 2023-01-05 | Stop reason: CLARIF

## 2023-01-05 RX ORDER — LATANOPROST 50 UG/ML
SOLUTION/ DROPS OPHTHALMIC
COMMUNITY
Start: 2022-12-16 | End: 2023-10-31

## 2023-01-05 RX ORDER — AMOXICILLIN 500 MG/1
CAPSULE ORAL
COMMUNITY
Start: 2022-09-28 | End: 2023-01-05 | Stop reason: CLARIF

## 2023-01-05 NOTE — TELEPHONE ENCOUNTER
----- Message from Saba Cohen RN sent at 1/5/2023  1:55 PM CST -----  Regarding: Plavix & baby Aspirin guideline  Patient is having an Insertion, Radioactive Seed, Prostate on 1/12/23, with Dr. Scott Frias. Requesting pre-op guideline for Plavix & baby Aspirin. Await your reply. Thank you. Sincerely, Saba Cohen RN [facility] ext. [phone]

## 2023-01-05 NOTE — TELEPHONE ENCOUNTER
Yes he is cleared, he can hold aspirin and plavix 5 days before the procedure. Resume after surgery.

## 2023-01-09 PROCEDURE — 77295 3-D RADIOTHERAPY PLAN: CPT | Mod: 26,,, | Performed by: RADIOLOGY

## 2023-01-09 PROCEDURE — 77295 3-D RADIOTHERAPY PLAN: CPT | Mod: TC | Performed by: RADIOLOGY

## 2023-01-09 PROCEDURE — 77295 PR 3D RADIOTHERAPY PLAN: ICD-10-PCS | Mod: 26,,, | Performed by: RADIOLOGY

## 2023-01-10 ENCOUNTER — LAB VISIT (OUTPATIENT)
Dept: LAB | Facility: HOSPITAL | Age: 68
End: 2023-01-10
Attending: FAMILY MEDICINE
Payer: MEDICARE

## 2023-01-10 DIAGNOSIS — N18.31 TYPE 2 DIABETES MELLITUS WITH STAGE 3A CHRONIC KIDNEY DISEASE, WITHOUT LONG-TERM CURRENT USE OF INSULIN: ICD-10-CM

## 2023-01-10 DIAGNOSIS — E11.22 TYPE 2 DIABETES MELLITUS WITH STAGE 3A CHRONIC KIDNEY DISEASE, WITHOUT LONG-TERM CURRENT USE OF INSULIN: ICD-10-CM

## 2023-01-10 LAB
ALBUMIN SERPL BCP-MCNC: 3.8 G/DL (ref 3.5–5.2)
ALP SERPL-CCNC: 52 U/L (ref 55–135)
ALT SERPL W/O P-5'-P-CCNC: 16 U/L (ref 10–44)
ANION GAP SERPL CALC-SCNC: 10 MMOL/L (ref 8–16)
AST SERPL-CCNC: 20 U/L (ref 10–40)
BILIRUB SERPL-MCNC: 0.4 MG/DL (ref 0.1–1)
BUN SERPL-MCNC: 28 MG/DL (ref 8–23)
CALCIUM SERPL-MCNC: 8.9 MG/DL (ref 8.7–10.5)
CHLORIDE SERPL-SCNC: 108 MMOL/L (ref 95–110)
CO2 SERPL-SCNC: 22 MMOL/L (ref 23–29)
CREAT SERPL-MCNC: 2.1 MG/DL (ref 0.5–1.4)
EST. GFR  (NO RACE VARIABLE): 33 ML/MIN/1.73 M^2
ESTIMATED AVG GLUCOSE: 278 MG/DL (ref 68–131)
GLUCOSE SERPL-MCNC: 160 MG/DL (ref 70–110)
HBA1C MFR BLD: 11.3 % (ref 4–5.6)
POTASSIUM SERPL-SCNC: 4.6 MMOL/L (ref 3.5–5.1)
PROT SERPL-MCNC: 7.3 G/DL (ref 6–8.4)
SODIUM SERPL-SCNC: 140 MMOL/L (ref 136–145)

## 2023-01-10 PROCEDURE — 80053 COMPREHEN METABOLIC PANEL: CPT | Performed by: FAMILY MEDICINE

## 2023-01-10 PROCEDURE — 36415 COLL VENOUS BLD VENIPUNCTURE: CPT | Performed by: FAMILY MEDICINE

## 2023-01-10 PROCEDURE — 83036 HEMOGLOBIN GLYCOSYLATED A1C: CPT | Performed by: FAMILY MEDICINE

## 2023-01-11 ENCOUNTER — TELEPHONE (OUTPATIENT)
Dept: UROLOGY | Facility: CLINIC | Age: 68
End: 2023-01-11
Payer: MEDICARE

## 2023-01-11 ENCOUNTER — PATIENT MESSAGE (OUTPATIENT)
Dept: FAMILY MEDICINE | Facility: CLINIC | Age: 68
End: 2023-01-11

## 2023-01-11 ENCOUNTER — OFFICE VISIT (OUTPATIENT)
Dept: FAMILY MEDICINE | Facility: CLINIC | Age: 68
End: 2023-01-11
Attending: FAMILY MEDICINE
Payer: MEDICARE

## 2023-01-11 VITALS
SYSTOLIC BLOOD PRESSURE: 110 MMHG | HEART RATE: 80 BPM | BODY MASS INDEX: 26.27 KG/M2 | OXYGEN SATURATION: 98 % | DIASTOLIC BLOOD PRESSURE: 62 MMHG | HEIGHT: 73 IN | WEIGHT: 198.19 LBS | TEMPERATURE: 98 F

## 2023-01-11 DIAGNOSIS — N18.32 STAGE 3B CHRONIC KIDNEY DISEASE: ICD-10-CM

## 2023-01-11 DIAGNOSIS — I15.2 HYPERTENSION ASSOCIATED WITH DIABETES: ICD-10-CM

## 2023-01-11 DIAGNOSIS — I73.9 PAD (PERIPHERAL ARTERY DISEASE): ICD-10-CM

## 2023-01-11 DIAGNOSIS — N18.31 TYPE 2 DIABETES MELLITUS WITH STAGE 3A CHRONIC KIDNEY DISEASE, WITHOUT LONG-TERM CURRENT USE OF INSULIN: Primary | ICD-10-CM

## 2023-01-11 DIAGNOSIS — E11.22 TYPE 2 DIABETES MELLITUS WITH STAGE 3A CHRONIC KIDNEY DISEASE, WITHOUT LONG-TERM CURRENT USE OF INSULIN: Primary | ICD-10-CM

## 2023-01-11 DIAGNOSIS — E11.59 HYPERTENSION ASSOCIATED WITH DIABETES: ICD-10-CM

## 2023-01-11 DIAGNOSIS — C61 PROSTATE CANCER: ICD-10-CM

## 2023-01-11 DIAGNOSIS — E78.5 DYSLIPIDEMIA ASSOCIATED WITH TYPE 2 DIABETES MELLITUS: ICD-10-CM

## 2023-01-11 DIAGNOSIS — I25.118 CORONARY ARTERY DISEASE OF NATIVE ARTERY OF NATIVE HEART WITH STABLE ANGINA PECTORIS: ICD-10-CM

## 2023-01-11 DIAGNOSIS — E11.69 DYSLIPIDEMIA ASSOCIATED WITH TYPE 2 DIABETES MELLITUS: ICD-10-CM

## 2023-01-11 PROCEDURE — 3078F PR MOST RECENT DIASTOLIC BLOOD PRESSURE < 80 MM HG: ICD-10-PCS | Mod: CPTII,S$GLB,, | Performed by: FAMILY MEDICINE

## 2023-01-11 PROCEDURE — 3078F DIAST BP <80 MM HG: CPT | Mod: CPTII,S$GLB,, | Performed by: FAMILY MEDICINE

## 2023-01-11 PROCEDURE — 3008F BODY MASS INDEX DOCD: CPT | Mod: CPTII,S$GLB,, | Performed by: FAMILY MEDICINE

## 2023-01-11 PROCEDURE — 1159F PR MEDICATION LIST DOCUMENTED IN MEDICAL RECORD: ICD-10-PCS | Mod: CPTII,S$GLB,, | Performed by: FAMILY MEDICINE

## 2023-01-11 PROCEDURE — 99214 PR OFFICE/OUTPT VISIT, EST, LEVL IV, 30-39 MIN: ICD-10-PCS | Mod: S$GLB,,, | Performed by: FAMILY MEDICINE

## 2023-01-11 PROCEDURE — 1159F MED LIST DOCD IN RCRD: CPT | Mod: CPTII,S$GLB,, | Performed by: FAMILY MEDICINE

## 2023-01-11 PROCEDURE — 99999 PR PBB SHADOW E&M-EST. PATIENT-LVL IV: CPT | Mod: PBBFAC,,, | Performed by: FAMILY MEDICINE

## 2023-01-11 PROCEDURE — 99999 PR PBB SHADOW E&M-EST. PATIENT-LVL IV: ICD-10-PCS | Mod: PBBFAC,,, | Performed by: FAMILY MEDICINE

## 2023-01-11 PROCEDURE — 1101F PR PT FALLS ASSESS DOC 0-1 FALLS W/OUT INJ PAST YR: ICD-10-PCS | Mod: CPTII,S$GLB,, | Performed by: FAMILY MEDICINE

## 2023-01-11 PROCEDURE — 1126F PR PAIN SEVERITY QUANTIFIED, NO PAIN PRESENT: ICD-10-PCS | Mod: CPTII,S$GLB,, | Performed by: FAMILY MEDICINE

## 2023-01-11 PROCEDURE — 77263 THER RADIOLOGY TX PLNG CPLX: CPT | Mod: ,,, | Performed by: RADIOLOGY

## 2023-01-11 PROCEDURE — 3288F FALL RISK ASSESSMENT DOCD: CPT | Mod: CPTII,S$GLB,, | Performed by: FAMILY MEDICINE

## 2023-01-11 PROCEDURE — 77263 PR  RADIATION THERAPY PLAN COMPLEX: ICD-10-PCS | Mod: ,,, | Performed by: RADIOLOGY

## 2023-01-11 PROCEDURE — 77300 PR RADIATION THERAPY,DOSIMETRY PLAN: ICD-10-PCS | Mod: 26,,, | Performed by: RADIOLOGY

## 2023-01-11 PROCEDURE — 1126F AMNT PAIN NOTED NONE PRSNT: CPT | Mod: CPTII,S$GLB,, | Performed by: FAMILY MEDICINE

## 2023-01-11 PROCEDURE — 77300 RADIATION THERAPY DOSE PLAN: CPT | Mod: TC | Performed by: RADIOLOGY

## 2023-01-11 PROCEDURE — 3288F PR FALLS RISK ASSESSMENT DOCUMENTED: ICD-10-PCS | Mod: CPTII,S$GLB,, | Performed by: FAMILY MEDICINE

## 2023-01-11 PROCEDURE — 1101F PT FALLS ASSESS-DOCD LE1/YR: CPT | Mod: CPTII,S$GLB,, | Performed by: FAMILY MEDICINE

## 2023-01-11 PROCEDURE — 3074F PR MOST RECENT SYSTOLIC BLOOD PRESSURE < 130 MM HG: ICD-10-PCS | Mod: CPTII,S$GLB,, | Performed by: FAMILY MEDICINE

## 2023-01-11 PROCEDURE — 3046F PR MOST RECENT HEMOGLOBIN A1C LEVEL > 9.0%: ICD-10-PCS | Mod: CPTII,S$GLB,, | Performed by: FAMILY MEDICINE

## 2023-01-11 PROCEDURE — 77300 RADIATION THERAPY DOSE PLAN: CPT | Mod: 26,,, | Performed by: RADIOLOGY

## 2023-01-11 PROCEDURE — 3008F PR BODY MASS INDEX (BMI) DOCUMENTED: ICD-10-PCS | Mod: CPTII,S$GLB,, | Performed by: FAMILY MEDICINE

## 2023-01-11 PROCEDURE — 3046F HEMOGLOBIN A1C LEVEL >9.0%: CPT | Mod: CPTII,S$GLB,, | Performed by: FAMILY MEDICINE

## 2023-01-11 PROCEDURE — 99214 OFFICE O/P EST MOD 30 MIN: CPT | Mod: S$GLB,,, | Performed by: FAMILY MEDICINE

## 2023-01-11 PROCEDURE — 3074F SYST BP LT 130 MM HG: CPT | Mod: CPTII,S$GLB,, | Performed by: FAMILY MEDICINE

## 2023-01-11 RX ORDER — DULAGLUTIDE 0.75 MG/.5ML
0.75 INJECTION, SOLUTION SUBCUTANEOUS
Qty: 4 PEN | Refills: 11 | Status: SHIPPED | OUTPATIENT
Start: 2023-01-11 | End: 2023-01-11 | Stop reason: SDUPTHER

## 2023-01-11 RX ORDER — INSULIN GLARGINE 100 [IU]/ML
15 INJECTION, SOLUTION SUBCUTANEOUS 2 TIMES DAILY
Qty: 27 ML | Refills: 3 | Status: SHIPPED | OUTPATIENT
Start: 2023-01-11 | End: 2023-06-13 | Stop reason: SDUPTHER

## 2023-01-11 RX ORDER — DULAGLUTIDE 1.5 MG/.5ML
1.5 INJECTION, SOLUTION SUBCUTANEOUS
Qty: 4 PEN | Refills: 2 | Status: SHIPPED | OUTPATIENT
Start: 2023-01-11 | End: 2023-05-15

## 2023-01-11 NOTE — PROGRESS NOTES
Subjective:       Patient ID: Addy Redding is a 67 y.o. male.    Chief Complaint: Cough and Fatigue      66 yr old black male with DM II, HTN, HLD, GERD, CKD III, presents today for his three month follow up.         DM II - uncontrolled - - HGBA1C                   11.3 (H)            01/10/2023                   on trulicity, lantus and farxiga       - complicated by CKD III                   - denies any hypoglycemic symptoms - on metformin - up to date with eye and foot screen - on metformin    HTN - controlled  - on lisinopril 10 mg daily - no side effects    HLD - controlled and improving -    LDLCALC                  66.2                07/05/2022                                 - on statin - compliant - need refill - not fully compliant with diet    GERD - stable - takes prilosec as needed    ED - stable - takes viagra as needed    Health maintenance  -labs due  -Flu and Pneumovax - up to date  -adacel due and done today  -colonoscopy due - wants to wait  -PSA UTD      Cough  Associated symptoms include heartburn and myalgias. Pertinent negatives include no chest pain, ear pain, headaches, rash, rhinorrhea, sweats or wheezing. There is no history of environmental allergies.   Fatigue  Associated symptoms include arthralgias, coughing, fatigue and myalgias. Pertinent negatives include no abdominal pain, change in bowel habit, chest pain, congestion, diaphoresis, headaches, joint swelling, numbness, rash, urinary symptoms, vertigo, vomiting or weakness.   Follow-up  This is a chronic problem. The current episode started more than 1 year ago. The problem occurs constantly. The problem has been gradually worsening. Associated symptoms include arthralgias, coughing, fatigue and myalgias. Pertinent negatives include no abdominal pain, change in bowel habit, chest pain, congestion, diaphoresis, headaches, joint swelling, numbness, rash, urinary symptoms, vertigo, vomiting or weakness.   Medication Refill  This is a  chronic problem. The current episode started more than 1 year ago. The problem occurs constantly. The problem has been gradually improving. Associated symptoms include arthralgias, coughing, fatigue and myalgias. Pertinent negatives include no abdominal pain, change in bowel habit, chest pain, congestion, diaphoresis, headaches, joint swelling, numbness, rash, urinary symptoms, vertigo, vomiting or weakness.   Hypertension  This is a chronic problem. The current episode started more than 1 year ago. The problem has been gradually worsening since onset. The problem is uncontrolled. Pertinent negatives include no anxiety, chest pain, headaches, malaise/fatigue, orthopnea, palpitations, peripheral edema or sweats. There are no associated agents to hypertension. Risk factors for coronary artery disease include dyslipidemia, diabetes mellitus, male gender and obesity. Past treatments include ACE inhibitors. The current treatment provides no improvement. There are no compliance problems.  There is no history of angina, CAD/MI, CVA, left ventricular hypertrophy, PVD or retinopathy. There is no history of chronic renal disease, coarctation of the aorta, hypercortisolism, pheochromocytoma, renovascular disease or a thyroid problem.   Arm Pain   There was no injury mechanism. The pain is present in the right shoulder and upper right arm. The quality of the pain is described as aching. The pain does not radiate. The pain is at a severity of 5/10. The pain is moderate. The pain has been Constant since the incident. Pertinent negatives include no chest pain or numbness. The symptoms are aggravated by movement, lifting and palpation. He has tried nothing for the symptoms. The treatment provided mild relief.   Shoulder Pain   The pain is present in the right shoulder. This is a new problem. The current episode started 1 to 4 weeks ago. There has been no history of extremity trauma. The problem occurs constantly. The problem has  been unchanged. The quality of the pain is described as aching. The pain is at a severity of 5/10. The pain is moderate. Pertinent negatives include no headaches, itching, joint swelling or numbness. The symptoms are aggravated by activity. He has tried nothing for the symptoms. The treatment provided no relief. His past medical history is significant for diabetes.   Gastroesophageal Reflux  He complains of coughing and heartburn. He reports no abdominal pain, no chest pain or no wheezing. This is a new problem. The current episode started 1 to 4 weeks ago. The problem occurs constantly. The problem has been unchanged. The heartburn duration is several minutes. The heartburn is located in the substernum. The symptoms are aggravated by certain foods. Associated symptoms include fatigue. Pertinent negatives include no anemia or melena. There are no known risk factors. He has tried nothing for the symptoms. The treatment provided mild relief. Past procedures do not include an abdominal ultrasound, esophageal pH monitoring or a UGI. Past invasive treatments do not include gastroplasty or reflux surgery.   Diabetes  Pertinent negatives for hypoglycemia include no confusion, dizziness, headaches, nervousness/anxiousness, speech difficulty or sweats. Associated symptoms include fatigue. Pertinent negatives for diabetes include no chest pain, no polydipsia, no polyuria and no weakness. Pertinent negatives for diabetic complications include no CVA, PVD or retinopathy.   Hyperlipidemia  This is a chronic problem. The current episode started more than 1 year ago. The problem is uncontrolled. Recent lipid tests were reviewed and are high. Exacerbating diseases include diabetes. He has no history of chronic renal disease, liver disease or nephrotic syndrome. There are no known factors aggravating his hyperlipidemia. Associated symptoms include myalgias. Pertinent negatives include no chest pain, focal sensory loss, focal  weakness or leg pain. Current antihyperlipidemic treatment includes statins. There are no compliance problems.  Risk factors for coronary artery disease include diabetes mellitus, dyslipidemia, male sex and hypertension.   Review of Systems   Constitutional:  Positive for fatigue. Negative for activity change, diaphoresis, malaise/fatigue and unexpected weight change.   HENT: Negative.  Negative for nasal congestion, ear pain, mouth sores, rhinorrhea and voice change.    Eyes: Negative.  Negative for pain, discharge and visual disturbance.   Respiratory:  Positive for cough. Negative for apnea and wheezing.    Cardiovascular: Negative.  Negative for chest pain, palpitations and orthopnea.   Gastrointestinal:  Positive for heartburn. Negative for abdominal distention, abdominal pain, anal bleeding, change in bowel habit, diarrhea, melena, vomiting and change in bowel habit.   Endocrine: Negative.  Negative for cold intolerance, polydipsia and polyuria.   Genitourinary: Negative.  Negative for decreased urine volume, difficulty urinating, discharge, frequency and scrotal swelling.   Musculoskeletal:  Positive for arthralgias and myalgias. Negative for back pain, joint swelling, leg pain and neck stiffness.   Integumentary:  Negative for color change, itching and rash. Negative.   Allergic/Immunologic: Negative.  Negative for environmental allergies.   Neurological: Negative.  Negative for dizziness, vertigo, focal weakness, speech difficulty, weakness, light-headedness, numbness and headaches.   Hematological: Negative.    Psychiatric/Behavioral: Negative.  Negative for agitation, confusion, dysphoric mood and suicidal ideas. The patient is not nervous/anxious.        PMH/PSH/FH/SH/MED/ALLERGY reviewed    Objective:       Vitals:    01/11/23 0951   BP: 110/62   Pulse: 80   Temp: 98.1 °F (36.7 °C)       Physical Exam  Constitutional:       Appearance: He is well-developed.   HENT:      Head: Normocephalic and  atraumatic.      Right Ear: External ear normal.      Left Ear: External ear normal.      Nose: Nose normal.      Mouth/Throat:      Pharynx: No oropharyngeal exudate.   Eyes:      General: No scleral icterus.        Right eye: No discharge.         Left eye: No discharge.      Conjunctiva/sclera: Conjunctivae normal.      Pupils: Pupils are equal, round, and reactive to light.   Neck:      Thyroid: No thyromegaly.      Vascular: No JVD.      Trachea: No tracheal deviation.   Cardiovascular:      Rate and Rhythm: Regular rhythm. Bradycardia present.      Pulses:           Dorsalis pedis pulses are 1+ on the right side and 1+ on the left side.        Posterior tibial pulses are 1+ on the right side and 1+ on the left side.      Heart sounds: Normal heart sounds. No murmur heard.    No friction rub. No gallop.   Pulmonary:      Effort: Pulmonary effort is normal. No respiratory distress.      Breath sounds: Normal breath sounds. No stridor. No wheezing or rales.   Chest:      Chest wall: No tenderness.   Abdominal:      General: Bowel sounds are normal. There is no distension.      Palpations: Abdomen is soft. There is no mass.      Tenderness: There is no abdominal tenderness. There is no guarding or rebound.      Hernia: No hernia is present.   Musculoskeletal:         General: Tenderness (TTP left shoulder with restricted ROM) present. Normal range of motion.      Cervical back: Normal range of motion and neck supple.      Right foot: Normal range of motion. No deformity, bunion, Charcot foot, foot drop or prominent metatarsal heads.      Left foot: Normal range of motion. No deformity, bunion, Charcot foot, foot drop or prominent metatarsal heads.   Feet:      Right foot:      Protective Sensation: 10 sites tested.  10 sites sensed.      Skin integrity: Skin integrity normal.      Toenail Condition: Right toenails are normal.      Left foot:      Protective Sensation: 10 sites tested.  10 sites sensed.      Skin  integrity: Skin integrity normal.      Toenail Condition: Left toenails are normal.   Lymphadenopathy:      Cervical: No cervical adenopathy.   Skin:     General: Skin is warm and dry.      Coloration: Skin is not pale.      Findings: No erythema or rash.   Neurological:      Mental Status: He is alert and oriented to person, place, and time.      Cranial Nerves: No cranial nerve deficit.      Motor: No abnormal muscle tone.      Coordination: Coordination normal.      Deep Tendon Reflexes: Reflexes are normal and symmetric. Reflexes normal.   Psychiatric:         Behavior: Behavior normal.         Thought Content: Thought content normal.         Judgment: Judgment normal.       Assessment:       Problem List Items Addressed This Visit       Dyslipidemia associated with type 2 diabetes mellitus    Relevant Medications    insulin (LANTUS SOLOSTAR U-100 INSULIN) glargine 100 units/mL SubQ pen    dulaglutide (TRULICITY) 1.5 mg/0.5 mL pen injector    Other Relevant Orders    Comprehensive Metabolic Panel    Type 2 diabetes mellitus with stage 3a chronic kidney disease, without long-term current use of insulin - Primary    Relevant Medications    insulin (LANTUS SOLOSTAR U-100 INSULIN) glargine 100 units/mL SubQ pen    dulaglutide (TRULICITY) 1.5 mg/0.5 mL pen injector    Other Relevant Orders    Comprehensive Metabolic Panel    Hemoglobin A1C    Urinalysis    Microalbumin/Creatinine Ratio, Urine    Stage 3b chronic kidney disease    Relevant Orders    Comprehensive Metabolic Panel    Urinalysis    Prostate cancer    Hypertension associated with diabetes    Relevant Medications    insulin (LANTUS SOLOSTAR U-100 INSULIN) glargine 100 units/mL SubQ pen    dulaglutide (TRULICITY) 1.5 mg/0.5 mL pen injector    Other Relevant Orders    Comprehensive Metabolic Panel    Urinalysis    Coronary artery disease of native artery of native heart with stable angina pectoris     Other Visit Diagnoses       PAD (peripheral artery  disease)                Plan:           Addy was seen today for cough and fatigue.    Diagnoses and all orders for this visit:    Type 2 diabetes mellitus with stage 3a chronic kidney disease, without long-term current use of insulin  -     insulin (LANTUS SOLOSTAR U-100 INSULIN) glargine 100 units/mL SubQ pen; Inject 15 Units into the skin 2 (two) times a day.  -     Discontinue: dulaglutide (TRULICITY) 0.75 mg/0.5 mL pen injector; Inject 0.75 mg into the skin every 7 days.  -     dulaglutide (TRULICITY) 1.5 mg/0.5 mL pen injector; Inject 1.5 mg into the skin every 7 days.  -     Comprehensive Metabolic Panel; Future  -     Hemoglobin A1C; Future  -     Urinalysis; Future  -     Microalbumin/Creatinine Ratio, Urine; Future    Dyslipidemia associated with type 2 diabetes mellitus  -     Comprehensive Metabolic Panel; Future    Stage 3b chronic kidney disease  -     Comprehensive Metabolic Panel; Future  -     Urinalysis; Future    Hypertension associated with diabetes  -     Comprehensive Metabolic Panel; Future  -     Urinalysis; Future    Prostate cancer    Coronary artery disease of native artery of native heart with stable angina pectoris    PAD (peripheral artery disease)    HTN   -at goal  -continue lisinopril to 20 mg/ day    DM II   -uncontrolled   -continue farxiga daily  -increase lantus to 15 units BID  -add ozempic weekly. Side effects of medications have been discussed and patient agreed to proceed with treatment and understands the risks and benefits.      CKD 3  -follow nephrology  -ER precautions given    HLD   -improving  -continue lipitor    GERD   -stable   -takes OTC PPI     ED'  -controlled        Spent adequate time in obtaining history and explaining differentials      25 minutes spent during this visit of which greater than 50% devoted to face-face counseling and coordination of care regarding diagnosis and management plan    Follow up in about 4 weeks (around 2/8/2023), or if symptoms  worsen or fail to improve.

## 2023-01-11 NOTE — TELEPHONE ENCOUNTER
Called pt to confirm arrival time of 630a for surgery on 1/12/2022.  Left detailed message including location and surgery instructions.

## 2023-01-11 NOTE — TELEPHONE ENCOUNTER
Called pt to confirm arrival time of 630am for procedure on 1/12/2023. Gave pt NPO instructions and gave pt opportunity to ask questions. Pt verbalized understanding.

## 2023-01-12 ENCOUNTER — HOSPITAL ENCOUNTER (OUTPATIENT)
Dept: RADIOLOGY | Facility: HOSPITAL | Age: 68
Discharge: HOME | End: 2023-01-12
Attending: UROLOGY | Admitting: UROLOGY
Payer: MEDICARE

## 2023-01-12 ENCOUNTER — ANESTHESIA EVENT (OUTPATIENT)
Dept: SURGERY | Facility: HOSPITAL | Age: 68
End: 2023-01-12
Payer: MEDICARE

## 2023-01-12 ENCOUNTER — HOSPITAL ENCOUNTER (OUTPATIENT)
Facility: HOSPITAL | Age: 68
Discharge: HOME | End: 2023-01-12
Attending: UROLOGY | Admitting: UROLOGY
Payer: MEDICARE

## 2023-01-12 ENCOUNTER — ANESTHESIA (OUTPATIENT)
Dept: SURGERY | Facility: HOSPITAL | Age: 68
End: 2023-01-12
Payer: MEDICARE

## 2023-01-12 VITALS
DIASTOLIC BLOOD PRESSURE: 76 MMHG | RESPIRATION RATE: 20 BRPM | TEMPERATURE: 98 F | BODY MASS INDEX: 27.72 KG/M2 | SYSTOLIC BLOOD PRESSURE: 149 MMHG | HEART RATE: 62 BPM | WEIGHT: 198 LBS | HEIGHT: 71 IN | OXYGEN SATURATION: 100 %

## 2023-01-12 DIAGNOSIS — C61 PROSTATE CANCER: ICD-10-CM

## 2023-01-12 DIAGNOSIS — C61 PROSTATE CANCER: Primary | ICD-10-CM

## 2023-01-12 LAB
POCT GLUCOSE: 114 MG/DL (ref 70–110)
POCT GLUCOSE: 83 MG/DL (ref 70–110)

## 2023-01-12 PROCEDURE — C2638 BRACHYTX, STRANDED, I-125: HCPCS | Performed by: RADIOLOGY

## 2023-01-12 PROCEDURE — 76965 CHG SONO GUIDE RADIOELEMT APPLIC: ICD-10-PCS | Mod: 26,,, | Performed by: UROLOGY

## 2023-01-12 PROCEDURE — 37000009 HC ANESTHESIA EA ADD 15 MINS: Performed by: UROLOGY

## 2023-01-12 PROCEDURE — 63600175 PHARM REV CODE 636 W HCPCS: Performed by: ANESTHESIOLOGY

## 2023-01-12 PROCEDURE — 71000044 HC DOSC ROUTINE RECOVERY FIRST HOUR: Performed by: UROLOGY

## 2023-01-12 PROCEDURE — 63600175 PHARM REV CODE 636 W HCPCS: Performed by: STUDENT IN AN ORGANIZED HEALTH CARE EDUCATION/TRAINING PROGRAM

## 2023-01-12 PROCEDURE — D9220A PRA ANESTHESIA: Mod: CRNA,,, | Performed by: NURSE ANESTHETIST, CERTIFIED REGISTERED

## 2023-01-12 PROCEDURE — 77778 PR 77778 - APPLY INTERSTITIAL RADIATION COMPLEX: ICD-10-PCS | Mod: 26,,, | Performed by: RADIOLOGY

## 2023-01-12 PROCEDURE — C2639 BRACHYTX, NON-STRANDED,I-125: HCPCS | Performed by: RADIOLOGY

## 2023-01-12 PROCEDURE — 77470 PR  SPECIAL RADIATION TREATMENT: ICD-10-PCS | Mod: 26,59,, | Performed by: RADIOLOGY

## 2023-01-12 PROCEDURE — 82962 GLUCOSE BLOOD TEST: CPT | Performed by: UROLOGY

## 2023-01-12 PROCEDURE — D9220A PRA ANESTHESIA: ICD-10-PCS | Mod: ANES,,, | Performed by: ANESTHESIOLOGY

## 2023-01-12 PROCEDURE — 63600175 PHARM REV CODE 636 W HCPCS: Performed by: NURSE ANESTHETIST, CERTIFIED REGISTERED

## 2023-01-12 PROCEDURE — 82962 GLUCOSE BLOOD TEST: CPT | Mod: 91 | Performed by: UROLOGY

## 2023-01-12 PROCEDURE — C2638 BRACHYTX, STRANDED, I-125: HCPCS | Performed by: UROLOGY

## 2023-01-12 PROCEDURE — 36000704 HC OR TIME LEV I 1ST 15 MIN: Performed by: UROLOGY

## 2023-01-12 PROCEDURE — 25000003 PHARM REV CODE 250: Performed by: STUDENT IN AN ORGANIZED HEALTH CARE EDUCATION/TRAINING PROGRAM

## 2023-01-12 PROCEDURE — 36000705 HC OR TIME LEV I EA ADD 15 MIN: Performed by: UROLOGY

## 2023-01-12 PROCEDURE — 55875 TRANSPERI NEEDLE PLACE PROS: CPT | Mod: ,,, | Performed by: UROLOGY

## 2023-01-12 PROCEDURE — 77778 APPLY INTERSTIT RADIAT COMPL: CPT | Mod: TC | Performed by: RADIOLOGY

## 2023-01-12 PROCEDURE — 71000015 HC POSTOP RECOV 1ST HR: Performed by: UROLOGY

## 2023-01-12 PROCEDURE — 76965 ECHO GUIDANCE RADIOTHERAPY: CPT | Performed by: UROLOGY

## 2023-01-12 PROCEDURE — 77778 APPLY INTERSTIT RADIAT COMPL: CPT | Mod: 26,,, | Performed by: RADIOLOGY

## 2023-01-12 PROCEDURE — D9220A PRA ANESTHESIA: ICD-10-PCS | Mod: CRNA,,, | Performed by: NURSE ANESTHETIST, CERTIFIED REGISTERED

## 2023-01-12 PROCEDURE — 55875 PR PERCUT/NEEDLE INSERT,PROSTATE,RADIOISOT: ICD-10-PCS | Mod: ,,, | Performed by: UROLOGY

## 2023-01-12 PROCEDURE — 25000003 PHARM REV CODE 250: Performed by: NURSE ANESTHETIST, CERTIFIED REGISTERED

## 2023-01-12 PROCEDURE — 37000008 HC ANESTHESIA 1ST 15 MINUTES: Performed by: UROLOGY

## 2023-01-12 PROCEDURE — D9220A PRA ANESTHESIA: Mod: ANES,,, | Performed by: ANESTHESIOLOGY

## 2023-01-12 PROCEDURE — 77470 SPECIAL RADIATION TREATMENT: CPT | Mod: 59,TC | Performed by: RADIOLOGY

## 2023-01-12 PROCEDURE — 77790 RADIATION HANDLING: CPT | Mod: TC | Performed by: RADIOLOGY

## 2023-01-12 PROCEDURE — 76965 ECHO GUIDANCE RADIOTHERAPY: CPT | Mod: 26,,, | Performed by: UROLOGY

## 2023-01-12 PROCEDURE — 77370 RADIATION PHYSICS CONSULT: CPT | Performed by: RADIOLOGY

## 2023-01-12 PROCEDURE — 77470 SPECIAL RADIATION TREATMENT: CPT | Mod: 26,59,, | Performed by: RADIOLOGY

## 2023-01-12 DEVICE — IMPLANTABLE DEVICE: Type: IMPLANTABLE DEVICE | Site: PROSTATE | Status: FUNCTIONAL

## 2023-01-12 RX ORDER — TAMSULOSIN HYDROCHLORIDE 0.4 MG/1
0.4 CAPSULE ORAL DAILY
Qty: 30 CAPSULE | Refills: 0 | Status: SHIPPED | OUTPATIENT
Start: 2023-01-12 | End: 2023-01-26 | Stop reason: SDUPTHER

## 2023-01-12 RX ORDER — PROPOFOL 10 MG/ML
VIAL (ML) INTRAVENOUS
Status: DISCONTINUED | OUTPATIENT
Start: 2023-01-12 | End: 2023-01-12

## 2023-01-12 RX ORDER — DEXAMETHASONE SODIUM PHOSPHATE 4 MG/ML
INJECTION, SOLUTION INTRA-ARTICULAR; INTRALESIONAL; INTRAMUSCULAR; INTRAVENOUS; SOFT TISSUE
Status: DISCONTINUED | OUTPATIENT
Start: 2023-01-12 | End: 2023-01-12

## 2023-01-12 RX ORDER — ONDANSETRON 2 MG/ML
4 INJECTION INTRAMUSCULAR; INTRAVENOUS DAILY PRN
Status: DISCONTINUED | OUTPATIENT
Start: 2023-01-12 | End: 2023-01-12 | Stop reason: HOSPADM

## 2023-01-12 RX ORDER — MIDAZOLAM HYDROCHLORIDE 1 MG/ML
INJECTION, SOLUTION INTRAMUSCULAR; INTRAVENOUS
Status: DISCONTINUED | OUTPATIENT
Start: 2023-01-12 | End: 2023-01-12

## 2023-01-12 RX ORDER — SULFAMETHOXAZOLE AND TRIMETHOPRIM 800; 160 MG/1; MG/1
1 TABLET ORAL 2 TIMES DAILY
Qty: 14 TABLET | Refills: 0 | Status: SHIPPED | OUTPATIENT
Start: 2023-01-12 | End: 2023-01-19

## 2023-01-12 RX ORDER — LIDOCAINE HYDROCHLORIDE 20 MG/ML
INJECTION, SOLUTION EPIDURAL; INFILTRATION; INTRACAUDAL; PERINEURAL
Status: DISCONTINUED | OUTPATIENT
Start: 2023-01-12 | End: 2023-01-12

## 2023-01-12 RX ORDER — LIDOCAINE HYDROCHLORIDE 10 MG/ML
1 INJECTION, SOLUTION EPIDURAL; INFILTRATION; INTRACAUDAL; PERINEURAL ONCE
Status: DISCONTINUED | OUTPATIENT
Start: 2023-01-12 | End: 2023-01-12 | Stop reason: HOSPADM

## 2023-01-12 RX ORDER — IBUPROFEN 800 MG/1
800 TABLET ORAL 3 TIMES DAILY
Qty: 21 TABLET | Refills: 0 | Status: SHIPPED | OUTPATIENT
Start: 2023-01-12 | End: 2023-01-26 | Stop reason: SDUPTHER

## 2023-01-12 RX ORDER — FENTANYL CITRATE 50 UG/ML
25 INJECTION, SOLUTION INTRAMUSCULAR; INTRAVENOUS EVERY 5 MIN PRN
Status: DISCONTINUED | OUTPATIENT
Start: 2023-01-12 | End: 2023-01-12 | Stop reason: HOSPADM

## 2023-01-12 RX ORDER — METHYLPREDNISOLONE 4 MG/1
TABLET ORAL
Qty: 21 EACH | Refills: 0 | Status: SHIPPED | OUTPATIENT
Start: 2023-01-12 | End: 2023-01-26

## 2023-01-12 RX ADMIN — SODIUM CHLORIDE: 0.9 INJECTION, SOLUTION INTRAVENOUS at 08:01

## 2023-01-12 RX ADMIN — PROPOFOL 200 MG: 10 INJECTION, EMULSION INTRAVENOUS at 08:01

## 2023-01-12 RX ADMIN — LIDOCAINE HYDROCHLORIDE 100 MG: 20 INJECTION, SOLUTION EPIDURAL; INFILTRATION; INTRACAUDAL; PERINEURAL at 08:01

## 2023-01-12 RX ADMIN — DEXAMETHASONE SODIUM PHOSPHATE 4 MG: 4 INJECTION, SOLUTION INTRAMUSCULAR; INTRAVENOUS at 08:01

## 2023-01-12 RX ADMIN — CEFTRIAXONE SODIUM 1 G: 1 INJECTION, POWDER, FOR SOLUTION INTRAMUSCULAR; INTRAVENOUS at 08:01

## 2023-01-12 RX ADMIN — MIDAZOLAM HYDROCHLORIDE 2 MG: 1 INJECTION, SOLUTION INTRAMUSCULAR; INTRAVENOUS at 08:01

## 2023-01-12 RX ADMIN — ONDANSETRON 4 MG: 2 INJECTION INTRAMUSCULAR; INTRAVENOUS at 08:01

## 2023-01-12 NOTE — ANESTHESIA PROCEDURE NOTES
Intubation    Date/Time: 1/12/2023 8:13 AM  Performed by: Eugene Voss CRNA  Authorized by: Love Tellez MD     Intubation:     Induction:  Intravenous    Intubated:  Postinduction    Mask Ventilation:  Easy mask    Attempts:  1    Attempted By:  CRNA    Difficult Airway Encountered?: No      Complications:  None    Airway Device:  Supraglottic airway/LMA    Airway Device Size:  4.0    Style/Cuff Inflation:  Cuffed (inflated to minimal occlusive pressure)    Secured at:  The lips    Placement Verified By:  Capnometry    Complicating Factors:  None    Findings Post-Intubation:  BS equal bilateral and atraumatic/condition of teeth unchanged

## 2023-01-12 NOTE — OP NOTE
[facility]  Operative Note    Date: 01/12/2023    Pre-Op Diagnosis: Prostate Cancer    Post-Op Diagnosis: same    Procedure(s) Performed:   1. Transrectal ultrasound of prostate  2. Ultrasound guided needle placement  3. Radioactive seed implantation in prostate  4. Fluoroscopy <1 hour    Specimen(s): none    Staff Surgeon: Scott Frias MD    Assistant Surgeon: Gregory Ch MD    Anesthesia: General LMA anesthesia    Indications: Addy Redding is a 67 y.o. male with a history of prostate cancer presenting for brachytherapy seed implants.    Findings:   78 brachytherapy seeds implanted without issue    Estimated Blood Loss: min    Drains: none    Procedure in detail:  The patient confirmed he had taken his pre-procedure antibiotics and did the bowel prep.  After risks benefits and possible complications of placement of radioactive seed placement were discussed, the patient elected to undergo the procedure and informed consent was obtained. All questions were answered in the pre-operative area. The patient was transferred to cystoscopy suite and placed in the supine position.  SCDs were applied and working.  Anesthesia was administered.  Once adequately sedated, the patient was placed in dorsal lithotomy position and prepped and draped in the usual sterile fashion. Time out was performed, naomi-procedural antibiotics were confirmed    A 16-[place] Bowman catheter was advanced into the bladder and 10 mL of sterile water was used to inflate the balloon.  The patient's bladder was emptied completely and filled with 120 mL of  sterile water.    The US probe was introduced into the patient's rectum and the secured with stabilizers in coordination with the patients previous prostate mapping.      A total of 78 preloaded seeds were placed trans perineally into the prostate in accordance to the patients previous prostate mapping under the guidance of radiation oncology, who was present throughout the  procedure.      A KUB was taken at the end of the procedure to confirm seed implantation.       Follow up care:  The patient will follow up with Dr. Stallworth as scheduled.  He will get a CT scan of the abdomen and pelvis before leaving today. He will be discharged home with prescriptions for antibiotics, medrol dose pack, ibuprofen 800 TID, and flomax      Gregory Ch MD  I have reviewed the operative note performed by Dr. Ch, and I concur with her/his documentation of Addy Redding. I was present for the critical or key portions of the procedure.

## 2023-01-12 NOTE — BRIEF OP NOTE
[facility] ([facility])  Brief Operative Note    Surgery Date: 1/12/2023     Surgeon(s) and Role:     * Scott Frias MD - Primary     * Gregory Ch MD - Resident - Assisting        Pre-op Diagnosis:  Prostate cancer [C61]    Post-op Diagnosis:  Post-Op Diagnosis Codes:     * Prostate cancer [C61]    Procedure(s) (LRB):  INSERTION, RADIOACTIVE SEED, PROSTATE  ULTRASOUND, RECTAL APPROACH (N/A)    Anesthesia: General    Operative Findings:   78 seeds implanted without issue    Estimated Blood Loss: * No values recorded between 1/12/2023 12:00 AM and 1/12/2023  7:55 AM *         Specimens:   Specimen (24h ago, onward)      None              Discharge Note    OUTCOME: Patient tolerated treatment/procedure well without complication and is now ready for discharge.    DISPOSITION: Home or Self Care    FINAL DIAGNOSIS: Prostate cancer  Patient Active Problem List    Diagnosis Date Noted    Prostate cancer     Stage 3b chronic kidney disease 10/04/2022    Coronary artery disease of native artery of native heart with stable angina pectoris 10/04/2022    Presence of drug-eluting stent in left circumflex coronary artery 09/01/2022    Preoperative clearance 09/01/2022    Chronic kidney disease (CKD), stage IV (severe) 07/06/2022    Coronary artery disease of native artery with stable angina pectoris 05/26/2022    Aortic atherosclerosis 03/22/2022    Sinus bradycardia 03/22/2022    Neck pain 10/22/2021    Decreased range of motion 10/22/2021    Type 2 diabetes mellitus with stage 3a chronic kidney disease, without long-term current use of insulin 02/22/2016    Left-sided low back pain with sciatica 02/22/2016    ED (erectile dysfunction)     Hypertension associated with diabetes 11/04/2014    Dyslipidemia associated with type 2 diabetes mellitus          FOLLOWUP: In clinic    DISCHARGE INSTRUCTIONS:    Discharge Procedure Orders   CT Guidance Rad Therapy Field Non   Standing Status: Future Standing Exp. Date:  01/12/24     Order Specific Question Answer Comments   May the Radiologist modify the order per protocol to meet the clinical needs of the patient? Yes      Notify your health care provider if you experience any of the following:  temperature >100.4     Notify your health care provider if you experience any of the following:  persistent nausea and vomiting or diarrhea     Notify your health care provider if you experience any of the following:  severe uncontrolled pain     Notify your health care provider if you experience any of the following:  difficulty breathing or increased cough     Notify your health care provider if you experience any of the following:  severe persistent headache     Notify your health care provider if you experience any of the following:  persistent dizziness, light-headedness, or visual disturbances     Notify your health care provider if you experience any of the following:  increased confusion or weakness

## 2023-01-12 NOTE — OP NOTE
[facility]  Operative Note    Date: 01/12/2023    Pre-Op Diagnosis: Prostate Cancer    Post-Op Diagnosis: same    Procedure(s) Performed:   1. Transrectal ultrasound of prostate  2. Ultrasound guided needle placement  3. Radioactive seed implantation in prostate  4. Fluoroscopy <1 hour    Specimen(s): none    Staff Surgeon: Saroj Stallworth MD    Assistant Surgeon: Gregory Ch MD    Anesthesia: General LMA anesthesia    Indications: Addy Redding is a 67 y.o. male with a history of prostate cancer presenting for brachytherapy seed implants.    Findings:   78 brachytherapy seeds implanted without issue    Estimated Blood Loss: min    Drains: none    Procedure in detail:  The patient confirmed he had taken his pre-procedure antibiotics and did the bowel prep.  After risks benefits and possible complications of placement of radioactive seed placement were discussed, the patient elected to undergo the procedure and informed consent was obtained. All questions were answered in the pre-operative area. The patient was transferred to cystoscopy suite and placed in the supine position.  SCDs were applied and working.  Anesthesia was administered.  Once adequately sedated, the patient was placed in dorsal lithotomy position and prepped and draped in the usual sterile fashion. Time out was performed, naomi-procedural antibiotics were confirmed    A 16-Malagasy Bowman catheter was advanced into the bladder and 10 mL of sterile water was used to inflate the balloon.  The patient's bladder was emptied completely and filled with 120 mL of  sterile water.    The US probe was introduced into the patient's rectum and the secured with stabilizers in coordination with the patients previous prostate mapping.      A total of 78 preloaded seeds were placed trans perineally into the prostate in accordance to the patients previous prostate mapping under the guidance of radiation oncology, who was present throughout the  procedure.      A KUB was taken at the end of the procedure to confirm seed implantation.       Follow up care:  The patient will follow up with Dr. Stallworth as scheduled.  He will get a CT scan of the abdomen and pelvis before leaving today. He will be discharged home with prescriptions for antibiotics, medrol dose pack, ibuprofen 800 TID, and flomax      Gregory Ch MD

## 2023-01-12 NOTE — H&P
Multidisciplinary Uro-Oncology Clinic    HISTORY OF PRESENT ILLNESS:   This patient presents for discussion of treatment options for his prostate cancer.     Mr. Redding has a history of an elevated PSA since 2018 with negative biopsies in September of 2018.  His PSA has continued to increase since that time.  MRI of the prostate in January of 2020 revealed a PI-RADS 3 lesion in the Lt. posterior transitional zone. Repeat PSA in February of 2022 returned at 9.1 ng/ml.  Repeat MRI on 6/10/22 revealed a 33.9 cc prostate with a 1.3 cm T2 hypointense lesion in the apex of the Lt. transition zone, PI-RADS 4 with no extraprostatic extension.  The seminal vesicles and neurovascular bundles were unremarkable.  There was no adenopathy.  Repeat Biopsies on 10/5/22 revealed Nasima 7 (3+4) adenocarcinoma involving 75% of 2 of 2 cores from the Lt. apex, 30% of 2 of 2 cores from the Lt. mid gland,  and 75% of 3 of 3 cores from the target lesion.  The [place] pattern 4 accounted for 20 - 30% of the tumor.  There was perineural invasion.  There was Nasima 6 (3+3) adenocarcinoma involving 10% of 1 of 2 cores from the Rt. apex.  The patient presents to discuss treatment options.      REVIEW OF SYSTEMS:   Review of Systems   Constitutional:  Negative for chills, fever, malaise/fatigue and weight loss.   Respiratory:  Negative for cough, sputum production and shortness of breath.    Cardiovascular:  Negative for chest pain and palpitations.   Gastrointestinal:  Negative for abdominal pain, constipation and diarrhea.   Genitourinary:  Negative for dysuria, frequency, hematuria and urgency.       PAST MEDICAL HISTORY:  Past Medical History:   Diagnosis Date    Costochondritis     Diabetic nephropathy associated with type 2 diabetes mellitus     Diabetic retinopathy     ED (erectile dysfunction)     GERD (gastroesophageal reflux disease)     Hyperlipidemia     Hypertension     Prostate cancer     Type II or unspecified type diabetes  mellitus with renal manifestations, uncontrolled(250.42)        PAST SURGICAL HISTORY:  Past Surgical History:   Procedure Laterality Date    Bone biopsy right femur      CORONARY ANGIOGRAPHY N/A 2022    Procedure: ANGIOGRAM, CORONARY ARTERY;  Surgeon: Carter Arevalo MD;  Location: [facility] CATH LAB/EP;  Service: Cardiology;  Laterality: N/A;    LEFT HEART CATHETERIZATION Left 2022    Procedure: Left heart cath;  Surgeon: Carter Arevalo MD;  Location: [facility] CATH LAB/EP;  Service: Cardiology;  Laterality: Left;    PROSTATE BIOPSY  10/05/2022       ALLERGIES:   Review of patient's allergies indicates:  No Known Allergies    MEDICATIONS:  Current Facility-Administered Medications   Medication    cefTRIAXone (ROCEPHIN) 1 g in dextrose 5 % in water (D5W) 5 % 50 mL IVPB (MB+)    fentaNYL 50 mcg/mL injection 25 mcg    LIDOcaine (PF) 10 mg/ml (1%) injection 10 mg    ondansetron injection 4 mg       SOCIAL HISTORY:  Social History     Socioeconomic History    Marital status:    Tobacco Use    Smoking status: Former     Packs/day: 0.50     Years: 26.00     Pack years: 13.00     Types: Cigarettes     Quit date:      Years since quittin.0    Smokeless tobacco: Never   Substance and Sexual Activity    Alcohol use: No    Drug use: No    Sexual activity: Yes     Partners: Female     Social Determinants of Health     Financial Resource Strain: Low Risk     Difficulty of Paying Living Expenses: Not hard at all   Food Insecurity: No Food Insecurity    Worried About Running Out of Food in the Last Year: Never true    Ran Out of Food in the Last Year: Never true   Transportation Needs: No Transportation Needs    Lack of Transportation (Medical): No    Lack of Transportation (Non-Medical): No   Physical Activity: Sufficiently Active    Days of Exercise per Week: 5 days    Minutes of Exercise per Session: 60 min   Stress: No Stress Concern Present    Feeling of Stress : Not at all   Social Connections:  Moderately Integrated    Frequency of Communication with Friends and Family: More than three times a week    Frequency of Social Gatherings with Friends and Family: More than three times a week    Attends Shinto Services: More than 4 times per year    Active Member of Clubs or Organizations: No    Attends Club or Organization Meetings: Never    Marital Status:    Housing Stability: Low Risk     Unable to Pay for Housing in the Last Year: No    Number of Places Lived in the Last Year: 1    Unstable Housing in the Last Year: No       FAMILY HISTORY:  Family History   Problem Relation Age of Onset    Hypertension Mother     Diabetes Mother     Kidney disease Mother     Coronary artery disease Father          of an MI at age 60    Hyperlipidemia Father     Heart attack Father     Hypertension Sister     Diabetes Sister     Heart disease Sister     Heart attack Sister     Hyperlipidemia Sister     Coronary artery disease Sister     Diabetes Sister     Hypertension Sister     Dementia Sister     No Known Problems Sister     Stomach cancer Sister     Cancer Sister         stomach     Hypertension Brother     Stroke Brother     Lung cancer Brother     Cancer Brother         lung cancer    Diabetes Brother     Peripheral vascular disease Brother     Hypertension Brother     Hyperlipidemia Brother     No Known Problems Brother     Diabetes Brother     Stroke Brother     Hypertension Brother     Hyperlipidemia Brother     HIV Brother     Diabetes Brother     Hypertension Brother     No Known Problems Daughter     Asthma Son     Hypertension Son     Anesthesia problems Neg Hx          PHYSICAL EXAMINATION:  There were no vitals filed for this visit.    Physical Exam  Constitutional:       General: He is not in acute distress.     Appearance: Normal appearance.   Pulmonary:      Effort: Pulmonary effort is normal. No respiratory distress.   Abdominal:      General: Abdomen is flat. There is no distension.    Neurological:      Mental Status: He is alert and oriented to person, place, and time.   Psychiatric:         Mood and Affect: Mood normal.         Judgment: Judgment normal.       ASSESSMENT/PLAN:  Clinical stage IIB (T2c, N0, M0, PSA < 10, GG2) prostate cancer     ECO    Patient was assessed preoperatively, found to be appropriate in behavior. The risks, benefits, and alternatives to procedures were discussed, and questions were answered. Plan to proceed with described procedure.    Urine negative for all tested components.    ASA, plavix held for one week.

## 2023-01-12 NOTE — ANESTHESIA PREPROCEDURE EVALUATION
01/12/2023  Addy Redding is a 67 y.o., male.  Pre-operative evaluation for Procedure(s) (LRB):  INSERTION, RADIOACTIVE SEED, PROSTATE  ULTRASOUND, RECTAL APPROACH (N/A)    Addy Redding is a 67 y.o. male     Patient Active Problem List   Diagnosis    Dyslipidemia associated with type 2 diabetes mellitus    Hypertension associated with diabetes    ED (erectile dysfunction)    Type 2 diabetes mellitus with stage 3a chronic kidney disease, without long-term current use of insulin    Left-sided low back pain with sciatica    Neck pain    Decreased range of motion    Aortic atherosclerosis    Sinus bradycardia    Coronary artery disease of native artery with stable angina pectoris    Chronic kidney disease (CKD), stage IV (severe)    Presence of drug-eluting stent in left circumflex coronary artery    Preoperative clearance    Stage 3b chronic kidney disease    Coronary artery disease of native artery of native heart with stable angina pectoris    Prostate cancer       Review of patient's allergies indicates:  No Known Allergies    No current facility-administered medications on file prior to encounter.     Current Outpatient Medications on File Prior to Encounter   Medication Sig Dispense Refill    amLODIPine (NORVASC) 5 MG tablet Take 1 tablet (5 mg total) by mouth 2 (two) times daily. 180 tablet 4    doxazosin (CARDURA) 2 MG tablet Take 1 tablet (2 mg total) by mouth every evening. 90 tablet 4    ezetimibe (ZETIA) 10 mg tablet Take 1 tablet (10 mg total) by mouth once daily. 90 tablet 4    latanoprost 0.005 % ophthalmic solution       pantoprazole (PROTONIX) 40 MG tablet TAKE ONE TABLET BY MOUTH ONCE DAILY. 30 tablet 3    ADVANCED GLUC METER TEST STRIP Strp USE TO TEST BLOOD SUGAR 4 TIMES DAILY. 100 strip 0    aspirin (ECOTRIN) 81 MG EC tablet Take 1 tablet (81 mg total) by mouth once daily.       blood sugar diagnostic (BLOOD GLUCOSE TEST) Strp Check sugar once daily 100 each 11    blood-glucose meter kit Use as instructed      clopidogreL (PLAVIX) 75 mg tablet Take 1 tablet (75 mg total) by mouth once daily. 90 tablet 4    lancets (ACCU-CHEK SOFTCLIX LANCETS) Misc 1 lancet by Misc.(Non-Drug; Combo Route) route 2 (two) times daily. 200 each 1    lisinopriL (PRINIVIL,ZESTRIL) 5 MG tablet Take 1 tablet (5 mg total) by mouth once daily. 90 tablet 4    nitroGLYCERIN (NITROSTAT) 0.4 MG SL tablet Place 1 tablet (0.4 mg total) under the tongue every 5 (five) minutes as needed for Chest pain. 30 tablet 3    PNEUMOVAX-23 25 mcg/0.5 mL vaccine       sars-cov-2, covid-19, (MODERNA COVID-19) 100 mcg/0.5 ml injection       sildenafil (REVATIO) 20 mg Tab Take 1 tablet (20 mg total) by mouth daily as needed for ED 30 tablet 2       Past Surgical History:   Procedure Laterality Date    Bone biopsy right femur      CORONARY ANGIOGRAPHY N/A 2022    Procedure: ANGIOGRAM, CORONARY ARTERY;  Surgeon: Carter Arevalo MD;  Location: [facility] CATH LAB/EP;  Service: Cardiology;  Laterality: N/A;    LEFT HEART CATHETERIZATION Left 2022    Procedure: Left heart cath;  Surgeon: Carter Arevalo MD;  Location: [facility] CATH LAB/EP;  Service: Cardiology;  Laterality: Left;    PROSTATE BIOPSY  10/05/2022       Social History     Socioeconomic History    Marital status:    Tobacco Use    Smoking status: Former     Packs/day: 0.50     Years: 26.00     Pack years: 13.00     Types: Cigarettes     Quit date:      Years since quittin.0    Smokeless tobacco: Never   Substance and Sexual Activity    Alcohol use: No    Drug use: No    Sexual activity: Yes     Partners: Female     Social Determinants of Health     Financial Resource Strain: Low Risk     Difficulty of Paying Living Expenses: Not hard at all   Food Insecurity: No Food Insecurity    Worried About Running Out of Food in the Last Year: Never  true    Ran Out of Food in the Last Year: Never true   Transportation Needs: No Transportation Needs    Lack of Transportation (Medical): No    Lack of Transportation (Non-Medical): No   Physical Activity: Sufficiently Active    Days of Exercise per Week: 5 days    Minutes of Exercise per Session: 60 min   Stress: No Stress Concern Present    Feeling of Stress : Not at all   Social Connections: Moderately Integrated    Frequency of Communication with Friends and Family: More than three times a week    Frequency of Social Gatherings with Friends and Family: More than three times a week    Attends [employer] Services: More than 4 times per year    Active Member of Clubs or Organizations: No    Attends Club or Organization Meetings: Never    Marital Status:    Housing Stability: Low Risk     Unable to Pay for Housing in the Last Year: No    Number of Places Lived in the Last Year: 1    Unstable Housing in the Last Year: No         CBC: No results for input(s): WBC, RBC, HGB, HCT, PLT, MCV, MCH, MCHC in the last 72 hours.    CMP:   Recent Labs     01/10/23  0733      K 4.6      CO2 22*   BUN 28*   CREATININE 2.1*   *   CALCIUM 8.9   ALBUMIN 3.8   PROT 7.3   ALKPHOS 52*   ALT 16   AST 20   BILITOT 0.4             Pre-op Assessment    I have reviewed the Patient Summary Reports.     I have reviewed the Nursing Notes.    I have reviewed the Medications.     Review of Systems  Anesthesia Hx:  No problems with previous Anesthesia  History of prior surgery of interest to airway management or planning: Denies Family Hx of Anesthesia complications.   Denies Personal Hx of Anesthesia complications.   Social:  Non-Smoker    Hematology/Oncology:  Hematology Normal   Oncology Normal     EENT/Dental:EENT/Dental Normal   Cardiovascular:   Hypertension CAD  CABG/stent     Pulmonary:  Pulmonary Normal    Renal/:  Renal/ Normal     Hepatic/GI:   GERD    Musculoskeletal:  Musculoskeletal Normal     Neurological:  Neurology Normal    Endocrine:   Diabetes    Psych:  Psychiatric Normal           Physical Exam  General: Well nourished and Cooperative    Airway:  Mouth Opening: Normal  TM Distance: Normal  Tongue: Normal  Neck ROM: Normal ROM    Dental:  Intact, Partial Dentures    Chest/Lungs:  Clear to auscultation, Normal Respiratory Rate    Heart:  Rate: Normal  Rhythm: Regular Rhythm  Sounds: Normal        Anesthesia Plan  Type of Anesthesia, risks & benefits discussed:    Anesthesia Type: Gen Supraglottic Airway  Intra-op Monitoring Plan: Standard ASA Monitors  Post Op Pain Control Plan: multimodal analgesia  Induction:  IV  Airway Plan: , Post-Induction  Informed Consent: Informed consent signed with the Patient and all parties understand the risks and agree with anesthesia plan.  All questions answered.   ASA Score: 3  Day of Surgery Review of History & Physical: H&P Update referred to the surgeon/provider.    Ready For Surgery From Anesthesia Perspective.     .

## 2023-01-12 NOTE — TRANSFER OF CARE
"Anesthesia Transfer of Care Note    Patient: Addy Redding    Procedure(s) Performed: Procedure(s) (LRB):  INSERTION, RADIOACTIVE SEED, PROSTATE  ULTRASOUND, RECTAL APPROACH (N/A)    Patient location: [facility]    Anesthesia Type: general    Transport from OR: Transported from OR on room air with adequate spontaneous ventilation    Post pain: adequate analgesia    Post assessment: no apparent anesthetic complications and tolerated procedure well    Post vital signs: stable    Level of consciousness: awake and alert    Nausea/Vomiting: no nausea/vomiting    Complications: none    Transfer of care protocol was followed      Last vitals:   Visit Vitals  /62 (BP Location: Left arm, Patient Position: Lying)   Pulse (!) 53   Temp 36.5 °C (97.7 °F) (Temporal)   Resp 18   Ht 5' 11" (1.803 m)   Wt 89.8 kg (198 lb)   SpO2 100%   BMI 27.62 kg/m²     "

## 2023-01-12 NOTE — DISCHARGE INSTRUCTIONS
Home Care Instructions  PROSTATE SEED IMPLANT    ACTIVITY LEVEL:  The radioactive Palladium 103/ Iodine 125 seeds are surgically implanted directly into the prostate gland. A very small amount of  radioactivity is present outside the body. The precautions that we ask are to insure that those around you are protected from  unnecessary radiation exposure. Children should not be allowed to sit on your lap for 5 weeks following the implant. You may  affectionately greet (hug/kiss) a child for a short period of time. Pregnant woman should avoid prolonged close physical contact with  you for 8 weeks after the implant. Pregnant women, or possible pregnant women, can safely be in close contact with you for a limited  period of time. At a distance of 6 feet there is no limit to the length of time you may spend together.  SEXUAL ACTIVITY:  You may sleep in the same bed as your partner (provided she is not pregnant or under the age of 45). Sexual intercourse, using a  condom, may be resumed 4 weeks after the implant. Your semen may be discolored dark brown or black. This is normal and is a  result of bleeding that may have occurred during the implant. After 8 weeks it will not be necessary to use a condom.  DAILY ACTIVITIES:  You may resume your normal activities in a few days (example: work, shopping, [employer]) without the risk of harmful radiation  exposure to those around. Objects that you touch or items that you use do not become radioactive. Linens, clothing, tableware and  dishes may be used by other persons without special precautions. Your bodily wastes (urine and stool) are not radioactive. Minor  burning during urination, urinating more frequently, mild pain or feeling unable to pass your urine freely are common and usually stop  in 1 to 4 months.  BATHING:  You may shower or bathe as desired, the following day.  DIET:  At home, continue with liquids, and if there is no nausea, you may eat a soft diet. Gradually resume  your normal diet.  MEDICATIONS:  You will receive instructions for any pain and/or antibiotic prescriptions. Pain medication should be taken only if needed and as  directed. Antibiotics, if ordered, should be taken as directed until the entire prescription is completed.  SPECIAL PRECAUTIONS:  For 2 weeks after the implant procedure it is possible to lose the Palladium 103/ Iodine 125 seeds through urination. It is possible to  pass seeds indefinitely, but it is unlikely after the first 2 weeks. Please strain your urine for 3-5 days and look for any Palladium 103/  Iodine 125 seeds. The seeds are silver in color and the size of a grain of rice. Whenever a seed is found, it should be picked up with a  spoon and wrapped in aluminum foil. The seed should not be handled with your fingers. The foil should then be in an inaccessible  area of your home until it can be returned to [facility]. Any seeds passed should be returned to the Radiation Oncology  Department as [facility], to the attention of the Radiation Safety Office, [facility], [address] [place] [address]. The Radiation Oncology Department is open Monday-Friday, 7:00 a.m. to 4:00 p.m. If the patient needs to be  hospitalized, or if death should occur within 16 weeks of the implant, relatives should notify the above department at [phone].  WHEN TO CALL THE DOCTOR:   Excessive burning during urination, or the inability to urinate.   Fever over 101ºF (38.4ºC).    FOR EMERGENCIES:  If any unusual problems or difficulties occur, contact Dr. Frias, or the resident at [phone] (page  ) or at the Clinic Office.

## 2023-01-13 PROCEDURE — 77318 BRACHYTX ISODOSE COMPLEX: CPT | Mod: 26,,, | Performed by: RADIOLOGY

## 2023-01-13 PROCEDURE — 77318 PR BRACYTHERAPY ISODOSE PLAN; COMPLEX: ICD-10-PCS | Mod: 26,,, | Performed by: RADIOLOGY

## 2023-01-13 PROCEDURE — 77318 BRACHYTX ISODOSE COMPLEX: CPT | Mod: TC | Performed by: RADIOLOGY

## 2023-01-17 ENCOUNTER — TELEPHONE (OUTPATIENT)
Dept: RADIATION ONCOLOGY | Facility: CLINIC | Age: 68
End: 2023-01-17
Payer: MEDICARE

## 2023-01-17 NOTE — TELEPHONE ENCOUNTER
Phone review s/p brachytherapy. Pt states he is doing well. No dysuria or hematuria. Nocturia x 2. LBM 1/16/23. Currently taking medications received at discharge. Confirmed follow-up appointment. Client agrees to contact office if he should have any questions or concerns. Contact information provided.

## 2023-01-19 ENCOUNTER — PES CALL (OUTPATIENT)
Dept: ADMINISTRATIVE | Facility: CLINIC | Age: 68
End: 2023-01-19
Payer: MEDICARE

## 2023-01-25 ENCOUNTER — TELEPHONE (OUTPATIENT)
Dept: ADMINISTRATIVE | Facility: CLINIC | Age: 68
End: 2023-01-25
Payer: MEDICARE

## 2023-01-25 NOTE — TELEPHONE ENCOUNTER
Called pt, no answer; left message informing pt I was calling to remind pt of his in office EAWV on 1/27/23 and to return my call if he had any questions; left my name and number

## 2023-01-26 ENCOUNTER — OFFICE VISIT (OUTPATIENT)
Dept: RADIATION ONCOLOGY | Facility: CLINIC | Age: 68
End: 2023-01-26
Payer: MEDICARE

## 2023-01-26 VITALS
DIASTOLIC BLOOD PRESSURE: 62 MMHG | BODY MASS INDEX: 26.39 KG/M2 | HEIGHT: 73 IN | WEIGHT: 199.13 LBS | HEART RATE: 64 BPM | SYSTOLIC BLOOD PRESSURE: 134 MMHG | RESPIRATION RATE: 16 BRPM

## 2023-01-26 DIAGNOSIS — C61 PROSTATE CANCER: Primary | ICD-10-CM

## 2023-01-26 PROCEDURE — 99499 NO LOS: ICD-10-PCS | Mod: S$GLB,,, | Performed by: RADIOLOGY

## 2023-01-26 PROCEDURE — 3075F PR MOST RECENT SYSTOLIC BLOOD PRESS GE 130-139MM HG: ICD-10-PCS | Mod: CPTII,S$GLB,, | Performed by: RADIOLOGY

## 2023-01-26 PROCEDURE — 99999 PR PBB SHADOW E&M-EST. PATIENT-LVL IV: CPT | Mod: PBBFAC,,, | Performed by: RADIOLOGY

## 2023-01-26 PROCEDURE — 1159F MED LIST DOCD IN RCRD: CPT | Mod: CPTII,S$GLB,, | Performed by: RADIOLOGY

## 2023-01-26 PROCEDURE — 1126F AMNT PAIN NOTED NONE PRSNT: CPT | Mod: CPTII,S$GLB,, | Performed by: RADIOLOGY

## 2023-01-26 PROCEDURE — 3075F SYST BP GE 130 - 139MM HG: CPT | Mod: CPTII,S$GLB,, | Performed by: RADIOLOGY

## 2023-01-26 PROCEDURE — 3288F PR FALLS RISK ASSESSMENT DOCUMENTED: ICD-10-PCS | Mod: CPTII,S$GLB,, | Performed by: RADIOLOGY

## 2023-01-26 PROCEDURE — 1160F RVW MEDS BY RX/DR IN RCRD: CPT | Mod: CPTII,S$GLB,, | Performed by: RADIOLOGY

## 2023-01-26 PROCEDURE — 3046F HEMOGLOBIN A1C LEVEL >9.0%: CPT | Mod: CPTII,S$GLB,, | Performed by: RADIOLOGY

## 2023-01-26 PROCEDURE — 1160F PR REVIEW ALL MEDS BY PRESCRIBER/CLIN PHARMACIST DOCUMENTED: ICD-10-PCS | Mod: CPTII,S$GLB,, | Performed by: RADIOLOGY

## 2023-01-26 PROCEDURE — 1126F PR PAIN SEVERITY QUANTIFIED, NO PAIN PRESENT: ICD-10-PCS | Mod: CPTII,S$GLB,, | Performed by: RADIOLOGY

## 2023-01-26 PROCEDURE — 3078F PR MOST RECENT DIASTOLIC BLOOD PRESSURE < 80 MM HG: ICD-10-PCS | Mod: CPTII,S$GLB,, | Performed by: RADIOLOGY

## 2023-01-26 PROCEDURE — 1159F PR MEDICATION LIST DOCUMENTED IN MEDICAL RECORD: ICD-10-PCS | Mod: CPTII,S$GLB,, | Performed by: RADIOLOGY

## 2023-01-26 PROCEDURE — 3288F FALL RISK ASSESSMENT DOCD: CPT | Mod: CPTII,S$GLB,, | Performed by: RADIOLOGY

## 2023-01-26 PROCEDURE — 3078F DIAST BP <80 MM HG: CPT | Mod: CPTII,S$GLB,, | Performed by: RADIOLOGY

## 2023-01-26 PROCEDURE — 99499 UNLISTED E&M SERVICE: CPT | Mod: S$GLB,,, | Performed by: RADIOLOGY

## 2023-01-26 PROCEDURE — 1101F PR PT FALLS ASSESS DOC 0-1 FALLS W/OUT INJ PAST YR: ICD-10-PCS | Mod: CPTII,S$GLB,, | Performed by: RADIOLOGY

## 2023-01-26 PROCEDURE — 1101F PT FALLS ASSESS-DOCD LE1/YR: CPT | Mod: CPTII,S$GLB,, | Performed by: RADIOLOGY

## 2023-01-26 PROCEDURE — 3008F PR BODY MASS INDEX (BMI) DOCUMENTED: ICD-10-PCS | Mod: CPTII,S$GLB,, | Performed by: RADIOLOGY

## 2023-01-26 PROCEDURE — 3008F BODY MASS INDEX DOCD: CPT | Mod: CPTII,S$GLB,, | Performed by: RADIOLOGY

## 2023-01-26 PROCEDURE — 99999 PR PBB SHADOW E&M-EST. PATIENT-LVL IV: ICD-10-PCS | Mod: PBBFAC,,, | Performed by: RADIOLOGY

## 2023-01-26 PROCEDURE — 3046F PR MOST RECENT HEMOGLOBIN A1C LEVEL > 9.0%: ICD-10-PCS | Mod: CPTII,S$GLB,, | Performed by: RADIOLOGY

## 2023-01-26 RX ORDER — IBUPROFEN 800 MG/1
TABLET ORAL
Qty: 20 TABLET | Refills: 0 | Status: SHIPPED | OUTPATIENT
Start: 2023-01-26 | End: 2023-05-15

## 2023-01-26 RX ORDER — TAMSULOSIN HYDROCHLORIDE 0.4 MG/1
0.4 CAPSULE ORAL DAILY
Qty: 90 CAPSULE | Refills: 2 | Status: SHIPPED | OUTPATIENT
Start: 2023-01-26 | End: 2023-05-15

## 2023-01-26 NOTE — PROGRESS NOTES
Subjective:       Patient ID: Addy Redding is a 67 y.o. male.    Chief Complaint: Prostate Cancer (S/p brachytherapy)    This patient returns for his initial follow up visit.     Mr. 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  Repeat PSA in February of 2022 returned at 9.1 ng/ml. MRI revealed a 1.3 cm T2 hypointense lesion in the apex of the Lt. transition zone, PI-RADS 4 with no extraprostatic extension. Biopsies on 10/5/22 revealed [place] 7 (3+4) adenocarcinoma involving 75% of 2 of 2 cores from the Lt. apex, 30% of 2 of 2 cores from the Lt. mid gland, and 75% of 3 of 3 cores from the target lesion. The [place] pattern 4 accounted for 20 - 30% of the tumor. There was perineural invasion. There was Nasima 6 (3+3) adenocarcinoma involving 10% of 1 of 2 cores from the Rt. apex.  On 1/12/22 he underwent transperineal implantation of the prostate gland using Iodine 125 seeds. A total of 78 seeds were placed in the prostate.  Today the patient states he feels well.  Notes some decreased urinary stream.  No other complaints.     Review of Systems   Constitutional:  Negative for activity change, appetite change, chills and fatigue.   Respiratory:  Negative for cough and shortness of breath.    Gastrointestinal:  Negative for abdominal pain, constipation, diarrhea and fecal incontinence.   Genitourinary:  Negative for bladder incontinence, difficulty urinating, dysuria, frequency and hematuria.       Objective:      Physical Exam  Constitutional:       General: He is not in acute distress.     Appearance: Normal appearance.   Abdominal:      General: Abdomen is flat. There is no distension.   Neurological:      Mental Status: He is alert and oriented to person, place, and time.   Psychiatric:         Mood and Affect: Mood normal.         Judgment: Judgment normal.       Assessment:       Problem List Items Addressed This Visit       Prostate cancer - Primary         Plan:        Tolerating the acute effects of radiotherapy well.  Will continue tamsulosin.  Plan to restart Ibuprofen 800 mg twice a day for next 5 - 7 days.  follow up in 2 - 3 months with PSA at [facility].

## 2023-01-27 ENCOUNTER — OFFICE VISIT (OUTPATIENT)
Dept: FAMILY MEDICINE | Facility: CLINIC | Age: 68
End: 2023-01-27
Payer: MEDICARE

## 2023-01-27 VITALS
WEIGHT: 197.75 LBS | HEIGHT: 73 IN | SYSTOLIC BLOOD PRESSURE: 112 MMHG | OXYGEN SATURATION: 100 % | DIASTOLIC BLOOD PRESSURE: 70 MMHG | BODY MASS INDEX: 26.21 KG/M2 | HEART RATE: 98 BPM

## 2023-01-27 DIAGNOSIS — I15.2 HYPERTENSION ASSOCIATED WITH DIABETES: ICD-10-CM

## 2023-01-27 DIAGNOSIS — I70.0 AORTIC ATHEROSCLEROSIS: ICD-10-CM

## 2023-01-27 DIAGNOSIS — Z00.00 ENCOUNTER FOR PREVENTIVE HEALTH EXAMINATION: Primary | ICD-10-CM

## 2023-01-27 DIAGNOSIS — E11.22 TYPE 2 DIABETES MELLITUS WITH STAGE 3A CHRONIC KIDNEY DISEASE, WITHOUT LONG-TERM CURRENT USE OF INSULIN: ICD-10-CM

## 2023-01-27 DIAGNOSIS — E78.5 DYSLIPIDEMIA ASSOCIATED WITH TYPE 2 DIABETES MELLITUS: ICD-10-CM

## 2023-01-27 DIAGNOSIS — N18.4 CHRONIC KIDNEY DISEASE (CKD), STAGE IV (SEVERE): ICD-10-CM

## 2023-01-27 DIAGNOSIS — E11.69 DYSLIPIDEMIA ASSOCIATED WITH TYPE 2 DIABETES MELLITUS: ICD-10-CM

## 2023-01-27 DIAGNOSIS — N18.31 TYPE 2 DIABETES MELLITUS WITH STAGE 3A CHRONIC KIDNEY DISEASE, WITHOUT LONG-TERM CURRENT USE OF INSULIN: ICD-10-CM

## 2023-01-27 DIAGNOSIS — I25.118 CORONARY ARTERY DISEASE OF NATIVE ARTERY OF NATIVE HEART WITH STABLE ANGINA PECTORIS: ICD-10-CM

## 2023-01-27 DIAGNOSIS — C61 PROSTATE CANCER: ICD-10-CM

## 2023-01-27 DIAGNOSIS — E21.3 HYPERPARATHYROIDISM, UNSPECIFIED: ICD-10-CM

## 2023-01-27 DIAGNOSIS — E11.59 HYPERTENSION ASSOCIATED WITH DIABETES: ICD-10-CM

## 2023-01-27 PROCEDURE — 1126F PR PAIN SEVERITY QUANTIFIED, NO PAIN PRESENT: ICD-10-PCS | Mod: CPTII,S$GLB,,

## 2023-01-27 PROCEDURE — 99999 PR PBB SHADOW E&M-EST. PATIENT-LVL V: ICD-10-PCS | Mod: PBBFAC,,,

## 2023-01-27 PROCEDURE — 3008F BODY MASS INDEX DOCD: CPT | Mod: CPTII,S$GLB,,

## 2023-01-27 PROCEDURE — 1170F PR FUNCTIONAL STATUS ASSESSED: ICD-10-PCS | Mod: CPTII,S$GLB,,

## 2023-01-27 PROCEDURE — 1101F PR PT FALLS ASSESS DOC 0-1 FALLS W/OUT INJ PAST YR: ICD-10-PCS | Mod: CPTII,S$GLB,,

## 2023-01-27 PROCEDURE — 1160F PR REVIEW ALL MEDS BY PRESCRIBER/CLIN PHARMACIST DOCUMENTED: ICD-10-PCS | Mod: CPTII,S$GLB,,

## 2023-01-27 PROCEDURE — 3078F PR MOST RECENT DIASTOLIC BLOOD PRESSURE < 80 MM HG: ICD-10-PCS | Mod: CPTII,S$GLB,,

## 2023-01-27 PROCEDURE — 3074F PR MOST RECENT SYSTOLIC BLOOD PRESSURE < 130 MM HG: ICD-10-PCS | Mod: CPTII,S$GLB,,

## 2023-01-27 PROCEDURE — 3046F PR MOST RECENT HEMOGLOBIN A1C LEVEL > 9.0%: ICD-10-PCS | Mod: CPTII,S$GLB,,

## 2023-01-27 PROCEDURE — 1126F AMNT PAIN NOTED NONE PRSNT: CPT | Mod: CPTII,S$GLB,,

## 2023-01-27 PROCEDURE — 1159F MED LIST DOCD IN RCRD: CPT | Mod: CPTII,S$GLB,,

## 2023-01-27 PROCEDURE — G0439 PPPS, SUBSEQ VISIT: HCPCS | Mod: S$GLB,,,

## 2023-01-27 PROCEDURE — 1159F PR MEDICATION LIST DOCUMENTED IN MEDICAL RECORD: ICD-10-PCS | Mod: CPTII,S$GLB,,

## 2023-01-27 PROCEDURE — 99999 PR PBB SHADOW E&M-EST. PATIENT-LVL V: CPT | Mod: PBBFAC,,,

## 2023-01-27 PROCEDURE — 3288F FALL RISK ASSESSMENT DOCD: CPT | Mod: CPTII,S$GLB,,

## 2023-01-27 PROCEDURE — 1170F FXNL STATUS ASSESSED: CPT | Mod: CPTII,S$GLB,,

## 2023-01-27 PROCEDURE — 1101F PT FALLS ASSESS-DOCD LE1/YR: CPT | Mod: CPTII,S$GLB,,

## 2023-01-27 PROCEDURE — 3008F PR BODY MASS INDEX (BMI) DOCUMENTED: ICD-10-PCS | Mod: CPTII,S$GLB,,

## 2023-01-27 PROCEDURE — 3046F HEMOGLOBIN A1C LEVEL >9.0%: CPT | Mod: CPTII,S$GLB,,

## 2023-01-27 PROCEDURE — G0439 PR MEDICARE ANNUAL WELLNESS SUBSEQUENT VISIT: ICD-10-PCS | Mod: S$GLB,,,

## 2023-01-27 PROCEDURE — 1160F RVW MEDS BY RX/DR IN RCRD: CPT | Mod: CPTII,S$GLB,,

## 2023-01-27 PROCEDURE — 3074F SYST BP LT 130 MM HG: CPT | Mod: CPTII,S$GLB,,

## 2023-01-27 PROCEDURE — 3288F PR FALLS RISK ASSESSMENT DOCUMENTED: ICD-10-PCS | Mod: CPTII,S$GLB,,

## 2023-01-27 PROCEDURE — 3078F DIAST BP <80 MM HG: CPT | Mod: CPTII,S$GLB,,

## 2023-01-27 RX ORDER — PEN NEEDLE, DIABETIC 32GX 5/32"
NEEDLE, DISPOSABLE MISCELLANEOUS
COMMUNITY
Start: 2022-12-08 | End: 2023-11-02 | Stop reason: SDUPTHER

## 2023-01-27 RX ORDER — ATORVASTATIN CALCIUM 80 MG/1
80 TABLET, FILM COATED ORAL DAILY
COMMUNITY
End: 2023-05-15 | Stop reason: SDUPTHER

## 2023-01-27 NOTE — PATIENT INSTRUCTIONS
Counseling and Referral of Other Preventative  (Italic type indicates deductible and co-insurance are waived)    Patient Name: Addy Redding  Today's Date: 1/27/2023    Health Maintenance       Date Due Completion Date    High Dose Statin Never done ---    COVID-19 Vaccine (5 - Booster for Moderna series) 06/29/2022 5/4/2022    Diabetes Urine Screening 01/06/2023 1/6/2022    Hemoglobin A1c 04/10/2023 1/10/2023    Lipid Panel 07/05/2023 7/5/2022    PROSTATE-SPECIFIC ANTIGEN 09/13/2023 9/13/2022    Colorectal Cancer Screening 10/11/2023 10/11/2022    Eye Exam 11/14/2023 11/14/2022    Override on 8/28/2018: Done    Override on 12/23/2016: Done    Override on 9/23/2013: Done    Foot Exam 01/11/2024 1/11/2023    Override on 10/4/2022: Done    Override on 10/19/2021: Done    Override on 11/9/2020: Done    Override on 12/10/2019: Done    Override on 8/28/2018: Done    Override on 1/11/2017: Done    Override on 5/27/2015: Done    TETANUS VACCINE 04/03/2027 4/3/2017        No orders of the defined types were placed in this encounter.      The following information is provided to all patients.  This information is to help you find resources for any of the problems found today that may be affecting your health:                Living healthy guide: www.[facility].[place].gov      Understanding Diabetes: www.diabetes.org      Eating healthy: www.cdc.gov/healthyweight      CDC home safety checklist: www.cdc.gov/steadi/patient.html      Agency on Aging: www.goea.[place].[facility]      Alcoholics anonymous (AA): www.aa.org      Physical Activity: www.adeola.nih.gov/gu9reaj      Tobacco use: www.quitwithusla.org

## 2023-01-27 NOTE — PROGRESS NOTES
"Addy Redding presented for a  Medicare AWV and comprehensive Health Risk Assessment today. The following components were reviewed and updated:    Medical history  Family History  Social history  Allergies and Current Medications  Health Risk Assessment  Health Maintenance  Care Team         ** See Completed Assessments for Annual Wellness Visit within the encounter summary.**         The following assessments were completed:  Living Situation  CAGE  Depression Screening  Timed Get Up and Go  Whisper Test  Cognitive Function Screening      Nutrition Screening  ADL Screening  PAQ Screening      Review for Opioid Screening: Pt does not have Rx for Opioids      Review for Substance Use Disorders: Patient does not use substance        Current Outpatient Medications:     ADVANCED GLUC METER TEST STRIP Strp, USE TO TEST BLOOD SUGAR 4 TIMES DAILY., Disp: 100 strip, Rfl: 0    amLODIPine (NORVASC) 5 MG tablet, Take 1 tablet (5 mg total) by mouth 2 (two) times daily., Disp: 180 tablet, Rfl: 4    aspirin (ECOTRIN) 81 MG EC tablet, Take 1 tablet (81 mg total) by mouth once daily., Disp: , Rfl:     atorvastatin (LIPITOR) 80 MG tablet, Take 80 mg by mouth once daily., Disp: , Rfl:     BD ARIA 2ND GEN PEN NEEDLE 32 gauge x 5/32" Ndle, , Disp: , Rfl:     blood sugar diagnostic (BLOOD GLUCOSE TEST) Strp, Check sugar once daily, Disp: 100 each, Rfl: 11    clopidogreL (PLAVIX) 75 mg tablet, Take 1 tablet (75 mg total) by mouth once daily., Disp: 90 tablet, Rfl: 4    dapagliflozin (FARXIGA) 5 mg Tab tablet, TAKE ONE TABLET BY MOUTH ONCE DAILY., Disp: 90 tablet, Rfl: 4    doxazosin (CARDURA) 2 MG tablet, Take 1 tablet (2 mg total) by mouth every evening., Disp: 90 tablet, Rfl: 4    dulaglutide (TRULICITY) 1.5 mg/0.5 mL pen injector, Inject 1.5 mg into the skin every 7 days., Disp: 4 pen, Rfl: 2    ezetimibe (ZETIA) 10 mg tablet, Take 1 tablet (10 mg total) by mouth once daily., Disp: 90 tablet, Rfl: 4    fluticasone propionate (FLONASE) 50 " "mcg/actuation nasal spray, 2 sprays (100 mcg total) by Each Nostril route once daily., Disp: 16 g, Rfl: 2    ibuprofen (ADVIL,MOTRIN) 800 MG tablet, take 1 tablet twice a day with food for 5 - 7 days, Disp: 20 tablet, Rfl: 0    insulin (LANTUS SOLOSTAR U-100 INSULIN) glargine 100 units/mL SubQ pen, Inject 15 Units into the skin 2 (two) times a day., Disp: 27 mL, Rfl: 3    lancets (ACCU-CHEK SOFTCLIX LANCETS) Misc, 1 lancet by Misc.(Non-Drug; Combo Route) route 2 (two) times daily., Disp: 200 each, Rfl: 1    latanoprost 0.005 % ophthalmic solution, , Disp: , Rfl:     pantoprazole (PROTONIX) 40 MG tablet, TAKE ONE TABLET BY MOUTH ONCE DAILY., Disp: 30 tablet, Rfl: 3    tamsulosin (FLOMAX) 0.4 mg Cap, Take 1 capsule (0.4 mg total) by mouth once daily., Disp: 90 capsule, Rfl: 2    blood-glucose meter kit, Use as instructed, Disp: , Rfl:     nitroGLYCERIN (NITROSTAT) 0.4 MG SL tablet, Place 1 tablet (0.4 mg total) under the tongue every 5 (five) minutes as needed for Chest pain. (Patient not taking: Reported on 1/27/2023), Disp: 30 tablet, Rfl: 3    sildenafil (REVATIO) 20 mg Tab, Take 1 tablet (20 mg total) by mouth daily as needed for ED (Patient not taking: Reported on 1/27/2023), Disp: 30 tablet, Rfl: 2       Vitals:    01/27/23 0808   BP: 112/70   Pulse: 98   SpO2: 100%   Weight: 89.7 kg (197 lb 12 oz)   Height: 6' 1" (1.854 m)   PainSc: 0-No pain      Physical Exam  Vitals reviewed.   Constitutional:       General: He is not in acute distress.     Appearance: Normal appearance. He is not ill-appearing or toxic-appearing.   Eyes:      Pupils: Pupils are equal, round, and reactive to light.   Cardiovascular:      Rate and Rhythm: Normal rate and regular rhythm.      Pulses: Normal pulses.      Heart sounds: Normal heart sounds. No murmur heard.  Pulmonary:      Effort: Pulmonary effort is normal. No respiratory distress.      Breath sounds: Normal breath sounds.   Abdominal:      General: Bowel sounds are normal. " There is no distension.      Palpations: Abdomen is soft.   Musculoskeletal:      Cervical back: Normal range of motion.      Right lower leg: No edema.      Left lower leg: No edema.   Skin:     General: Skin is warm and dry.   Neurological:      General: No focal deficit present.      Mental Status: He is alert and oriented to person, place, and time.   Psychiatric:         Mood and Affect: Mood normal.         Speech: Speech normal.           Diagnoses and health risks identified today and associated recommendations/orders:    1. Encounter for preventive health examination      2. Coronary artery disease of native artery of native heart with stable angina pectoris  Chronic; stable on medications. Followed by Cardiology.    3. Type 2 diabetes mellitus with stage 3a chronic kidney disease, without long-term current use of insulin  Chronic; stable on medications. Followed by PCP.    4. Dyslipidemia associated with type 2 diabetes mellitus  Chronic; stable on medications. Followed by PCP.     5. Hypertension associated with diabetes  Chronic; stable on medications. Followed by PCP.     6. Chronic kidney disease (CKD), stage IV (severe)  Chronic; stable on medications. Followed by PCP.     7. Aortic atherosclerosis  Chronic; stable on medications. Followed by PCP.     8. Prostate cancer  Chronic; stable on medications. Followed by Urology.    9. Hyperparathyroidism, unspecified  Chronic; stable. Followed by Urology.      Provided [place] with a 5-10 year written screening schedule and personal prevention plan. Recommendations were developed using the USPSTF age appropriate recommendations. Education, counseling, and referrals were provided as needed. After Visit Summary printed and given to patient which includes a list of additional screenings\tests needed.    Follow up in about 1 year (around 1/27/2024).    Marielle Zavala NP    Advance Care Planning I offered to discuss advanced care planning, including how to pick  a person who would make decisions for you if you were unable to make them for yourself, called a health care power of , and what kind of decisions you might make such as use of life sustaining treatments such as ventilators and tube feeding when faced with a life limiting illness recorded on a living will that they will need to know. (How you want to be cared for as you near the end of your natural life)     X Patient is interested in learning more about how to make advanced directives.  I provided them paperwork and offered to discuss this with them.

## 2023-02-13 ENCOUNTER — LAB VISIT (OUTPATIENT)
Dept: LAB | Facility: HOSPITAL | Age: 68
End: 2023-02-13
Attending: FAMILY MEDICINE
Payer: MEDICARE

## 2023-02-13 DIAGNOSIS — N18.31 TYPE 2 DIABETES MELLITUS WITH STAGE 3A CHRONIC KIDNEY DISEASE, WITHOUT LONG-TERM CURRENT USE OF INSULIN: ICD-10-CM

## 2023-02-13 DIAGNOSIS — E78.5 DYSLIPIDEMIA ASSOCIATED WITH TYPE 2 DIABETES MELLITUS: ICD-10-CM

## 2023-02-13 DIAGNOSIS — E11.69 DYSLIPIDEMIA ASSOCIATED WITH TYPE 2 DIABETES MELLITUS: ICD-10-CM

## 2023-02-13 DIAGNOSIS — N18.32 STAGE 3B CHRONIC KIDNEY DISEASE: ICD-10-CM

## 2023-02-13 DIAGNOSIS — E11.59 HYPERTENSION ASSOCIATED WITH DIABETES: ICD-10-CM

## 2023-02-13 DIAGNOSIS — E11.22 TYPE 2 DIABETES MELLITUS WITH STAGE 3A CHRONIC KIDNEY DISEASE, WITHOUT LONG-TERM CURRENT USE OF INSULIN: ICD-10-CM

## 2023-02-13 DIAGNOSIS — I15.2 HYPERTENSION ASSOCIATED WITH DIABETES: ICD-10-CM

## 2023-02-13 LAB
ALBUMIN SERPL BCP-MCNC: 4.1 G/DL (ref 3.5–5.2)
ALBUMIN/CREAT UR: 6.5 UG/MG (ref 0–30)
ALP SERPL-CCNC: 55 U/L (ref 55–135)
ALT SERPL W/O P-5'-P-CCNC: 12 U/L (ref 10–44)
ANION GAP SERPL CALC-SCNC: 10 MMOL/L (ref 8–16)
AST SERPL-CCNC: 12 U/L (ref 10–40)
BACTERIA #/AREA URNS HPF: NORMAL /HPF
BILIRUB SERPL-MCNC: 0.5 MG/DL (ref 0.1–1)
BILIRUB UR QL STRIP: NEGATIVE
BUN SERPL-MCNC: 27 MG/DL (ref 8–23)
CALCIUM SERPL-MCNC: 9.3 MG/DL (ref 8.7–10.5)
CHLORIDE SERPL-SCNC: 107 MMOL/L (ref 95–110)
CLARITY UR: CLEAR
CO2 SERPL-SCNC: 22 MMOL/L (ref 23–29)
COLOR UR: YELLOW
CREAT SERPL-MCNC: 2 MG/DL (ref 0.5–1.4)
CREAT UR-MCNC: 232 MG/DL (ref 23–375)
EST. GFR  (NO RACE VARIABLE): 35 ML/MIN/1.73 M^2
ESTIMATED AVG GLUCOSE: 200 MG/DL (ref 68–131)
GLUCOSE SERPL-MCNC: 115 MG/DL (ref 70–110)
GLUCOSE UR QL STRIP: ABNORMAL
HBA1C MFR BLD: 8.6 % (ref 4–5.6)
HGB UR QL STRIP: ABNORMAL
KETONES UR QL STRIP: NEGATIVE
LEUKOCYTE ESTERASE UR QL STRIP: NEGATIVE
MICROALBUMIN UR DL<=1MG/L-MCNC: 15 UG/ML
MICROSCOPIC COMMENT: NORMAL
NITRITE UR QL STRIP: NEGATIVE
PH UR STRIP: 6 [PH] (ref 5–8)
POTASSIUM SERPL-SCNC: 4.2 MMOL/L (ref 3.5–5.1)
PROT SERPL-MCNC: 7.5 G/DL (ref 6–8.4)
PROT UR QL STRIP: NEGATIVE
RBC #/AREA URNS HPF: 2 /HPF (ref 0–4)
SODIUM SERPL-SCNC: 139 MMOL/L (ref 136–145)
SP GR UR STRIP: 1.02 (ref 1–1.03)
URN SPEC COLLECT METH UR: ABNORMAL
UROBILINOGEN UR STRIP-ACNC: NEGATIVE EU/DL
WBC #/AREA URNS HPF: 1 /HPF (ref 0–5)
YEAST URNS QL MICRO: NORMAL

## 2023-02-13 PROCEDURE — 83036 HEMOGLOBIN GLYCOSYLATED A1C: CPT | Performed by: FAMILY MEDICINE

## 2023-02-13 PROCEDURE — 82570 ASSAY OF URINE CREATININE: CPT | Performed by: FAMILY MEDICINE

## 2023-02-13 PROCEDURE — 81000 URINALYSIS NONAUTO W/SCOPE: CPT | Performed by: FAMILY MEDICINE

## 2023-02-13 PROCEDURE — 80053 COMPREHEN METABOLIC PANEL: CPT | Performed by: FAMILY MEDICINE

## 2023-02-13 PROCEDURE — 36415 COLL VENOUS BLD VENIPUNCTURE: CPT | Performed by: FAMILY MEDICINE

## 2023-02-14 ENCOUNTER — OFFICE VISIT (OUTPATIENT)
Dept: FAMILY MEDICINE | Facility: CLINIC | Age: 68
End: 2023-02-14
Attending: FAMILY MEDICINE
Payer: MEDICARE

## 2023-02-14 VITALS
HEART RATE: 85 BPM | SYSTOLIC BLOOD PRESSURE: 104 MMHG | OXYGEN SATURATION: 99 % | BODY MASS INDEX: 26.65 KG/M2 | HEIGHT: 73 IN | WEIGHT: 201.06 LBS | DIASTOLIC BLOOD PRESSURE: 58 MMHG | TEMPERATURE: 98 F

## 2023-02-14 DIAGNOSIS — R06.09 DOE (DYSPNEA ON EXERTION): ICD-10-CM

## 2023-02-14 DIAGNOSIS — C61 PROSTATE CANCER: ICD-10-CM

## 2023-02-14 DIAGNOSIS — N18.31 TYPE 2 DIABETES MELLITUS WITH STAGE 3A CHRONIC KIDNEY DISEASE, WITHOUT LONG-TERM CURRENT USE OF INSULIN: Primary | ICD-10-CM

## 2023-02-14 DIAGNOSIS — I25.118 CORONARY ARTERY DISEASE OF NATIVE ARTERY OF NATIVE HEART WITH STABLE ANGINA PECTORIS: ICD-10-CM

## 2023-02-14 DIAGNOSIS — N18.32 STAGE 3B CHRONIC KIDNEY DISEASE: ICD-10-CM

## 2023-02-14 DIAGNOSIS — E11.69 DYSLIPIDEMIA ASSOCIATED WITH TYPE 2 DIABETES MELLITUS: ICD-10-CM

## 2023-02-14 DIAGNOSIS — I15.2 HYPERTENSION ASSOCIATED WITH DIABETES: ICD-10-CM

## 2023-02-14 DIAGNOSIS — E78.5 DYSLIPIDEMIA ASSOCIATED WITH TYPE 2 DIABETES MELLITUS: ICD-10-CM

## 2023-02-14 DIAGNOSIS — E11.59 HYPERTENSION ASSOCIATED WITH DIABETES: ICD-10-CM

## 2023-02-14 DIAGNOSIS — E11.22 TYPE 2 DIABETES MELLITUS WITH STAGE 3A CHRONIC KIDNEY DISEASE, WITHOUT LONG-TERM CURRENT USE OF INSULIN: Primary | ICD-10-CM

## 2023-02-14 PROCEDURE — 3074F SYST BP LT 130 MM HG: CPT | Mod: CPTII,S$GLB,, | Performed by: FAMILY MEDICINE

## 2023-02-14 PROCEDURE — 3074F PR MOST RECENT SYSTOLIC BLOOD PRESSURE < 130 MM HG: ICD-10-PCS | Mod: CPTII,S$GLB,, | Performed by: FAMILY MEDICINE

## 2023-02-14 PROCEDURE — 1126F PR PAIN SEVERITY QUANTIFIED, NO PAIN PRESENT: ICD-10-PCS | Mod: CPTII,S$GLB,, | Performed by: FAMILY MEDICINE

## 2023-02-14 PROCEDURE — 3052F PR MOST RECENT HEMOGLOBIN A1C LEVEL 8.0 - < 9.0%: ICD-10-PCS | Mod: CPTII,S$GLB,, | Performed by: FAMILY MEDICINE

## 2023-02-14 PROCEDURE — 1159F PR MEDICATION LIST DOCUMENTED IN MEDICAL RECORD: ICD-10-PCS | Mod: CPTII,S$GLB,, | Performed by: FAMILY MEDICINE

## 2023-02-14 PROCEDURE — 3288F FALL RISK ASSESSMENT DOCD: CPT | Mod: CPTII,S$GLB,, | Performed by: FAMILY MEDICINE

## 2023-02-14 PROCEDURE — 3052F HG A1C>EQUAL 8.0%<EQUAL 9.0%: CPT | Mod: CPTII,S$GLB,, | Performed by: FAMILY MEDICINE

## 2023-02-14 PROCEDURE — 99999 PR PBB SHADOW E&M-EST. PATIENT-LVL V: ICD-10-PCS | Mod: PBBFAC,,, | Performed by: FAMILY MEDICINE

## 2023-02-14 PROCEDURE — 1160F RVW MEDS BY RX/DR IN RCRD: CPT | Mod: CPTII,S$GLB,, | Performed by: FAMILY MEDICINE

## 2023-02-14 PROCEDURE — 3008F PR BODY MASS INDEX (BMI) DOCUMENTED: ICD-10-PCS | Mod: CPTII,S$GLB,, | Performed by: FAMILY MEDICINE

## 2023-02-14 PROCEDURE — 1101F PT FALLS ASSESS-DOCD LE1/YR: CPT | Mod: CPTII,S$GLB,, | Performed by: FAMILY MEDICINE

## 2023-02-14 PROCEDURE — 1159F MED LIST DOCD IN RCRD: CPT | Mod: CPTII,S$GLB,, | Performed by: FAMILY MEDICINE

## 2023-02-14 PROCEDURE — 99214 OFFICE O/P EST MOD 30 MIN: CPT | Mod: S$GLB,,, | Performed by: FAMILY MEDICINE

## 2023-02-14 PROCEDURE — 3008F BODY MASS INDEX DOCD: CPT | Mod: CPTII,S$GLB,, | Performed by: FAMILY MEDICINE

## 2023-02-14 PROCEDURE — 3066F NEPHROPATHY DOC TX: CPT | Mod: CPTII,S$GLB,, | Performed by: FAMILY MEDICINE

## 2023-02-14 PROCEDURE — 99999 PR PBB SHADOW E&M-EST. PATIENT-LVL V: CPT | Mod: PBBFAC,,, | Performed by: FAMILY MEDICINE

## 2023-02-14 PROCEDURE — 1126F AMNT PAIN NOTED NONE PRSNT: CPT | Mod: CPTII,S$GLB,, | Performed by: FAMILY MEDICINE

## 2023-02-14 PROCEDURE — 3288F PR FALLS RISK ASSESSMENT DOCUMENTED: ICD-10-PCS | Mod: CPTII,S$GLB,, | Performed by: FAMILY MEDICINE

## 2023-02-14 PROCEDURE — 3061F NEG MICROALBUMINURIA REV: CPT | Mod: CPTII,S$GLB,, | Performed by: FAMILY MEDICINE

## 2023-02-14 PROCEDURE — 3078F DIAST BP <80 MM HG: CPT | Mod: CPTII,S$GLB,, | Performed by: FAMILY MEDICINE

## 2023-02-14 PROCEDURE — 3061F PR NEG MICROALBUMINURIA RESULT DOCUMENTED/REVIEW: ICD-10-PCS | Mod: CPTII,S$GLB,, | Performed by: FAMILY MEDICINE

## 2023-02-14 PROCEDURE — 99214 PR OFFICE/OUTPT VISIT, EST, LEVL IV, 30-39 MIN: ICD-10-PCS | Mod: S$GLB,,, | Performed by: FAMILY MEDICINE

## 2023-02-14 PROCEDURE — 3078F PR MOST RECENT DIASTOLIC BLOOD PRESSURE < 80 MM HG: ICD-10-PCS | Mod: CPTII,S$GLB,, | Performed by: FAMILY MEDICINE

## 2023-02-14 PROCEDURE — 1101F PR PT FALLS ASSESS DOC 0-1 FALLS W/OUT INJ PAST YR: ICD-10-PCS | Mod: CPTII,S$GLB,, | Performed by: FAMILY MEDICINE

## 2023-02-14 PROCEDURE — 1160F PR REVIEW ALL MEDS BY PRESCRIBER/CLIN PHARMACIST DOCUMENTED: ICD-10-PCS | Mod: CPTII,S$GLB,, | Performed by: FAMILY MEDICINE

## 2023-02-14 PROCEDURE — 3066F PR DOCUMENTATION OF TREATMENT FOR NEPHROPATHY: ICD-10-PCS | Mod: CPTII,S$GLB,, | Performed by: FAMILY MEDICINE

## 2023-02-14 NOTE — PROGRESS NOTES
Subjective:       Patient ID: Addy Redding is a 67 y.o. male.    Chief Complaint: Sore Throat      67 yr old black male with DM II, HTN, HLD, GERD, CKD III, presents today for his three month follow up.         DM II - uncontrolled - -but coming down -   HGBA1C                   8.6 (H)             02/13/2023                         on trulicity, lantus and farxiga       - complicated by CKD III                   - denies any hypoglycemic symptoms - on metformin - up to date with eye and foot screen - on metformin    HTN - controlled  - on lisinopril 10 mg daily - no side effects    HLD - controlled and improving -    LDLCALC                  66.2                07/05/2022                                 - on statin - compliant - need refill - not fully compliant with diet    GERD - stable - takes prilosec as needed    ED - stable - takes viagra as needed    Health maintenance  -labs due  -Flu and Pneumovax - up to date  -adacel due and done today  -colonoscopy due - wants to wait  -PSA UTD      Sore Throat   Associated symptoms include coughing. Pertinent negatives include no abdominal pain, congestion, diarrhea, ear pain, headaches or vomiting.   Cough  Associated symptoms include heartburn, myalgias and a sore throat. Pertinent negatives include no chest pain, ear pain, headaches, rash, rhinorrhea, sweats or wheezing. There is no history of environmental allergies.   Fatigue  Associated symptoms include arthralgias, coughing, fatigue, myalgias and a sore throat. Pertinent negatives include no abdominal pain, change in bowel habit, chest pain, congestion, diaphoresis, headaches, joint swelling, numbness, rash, urinary symptoms, vertigo, vomiting or weakness.   Follow-up  This is a chronic problem. The current episode started more than 1 year ago. The problem occurs constantly. The problem has been gradually worsening. Associated symptoms include arthralgias, coughing, fatigue, myalgias and a sore throat. Pertinent  negatives include no abdominal pain, change in bowel habit, chest pain, congestion, diaphoresis, headaches, joint swelling, numbness, rash, urinary symptoms, vertigo, vomiting or weakness.   Medication Refill  This is a chronic problem. The current episode started more than 1 year ago. The problem occurs constantly. The problem has been gradually improving. Associated symptoms include arthralgias, coughing, fatigue, myalgias and a sore throat. Pertinent negatives include no abdominal pain, change in bowel habit, chest pain, congestion, diaphoresis, headaches, joint swelling, numbness, rash, urinary symptoms, vertigo, vomiting or weakness.   Hypertension  This is a chronic problem. The current episode started more than 1 year ago. The problem has been gradually worsening since onset. The problem is uncontrolled. Pertinent negatives include no anxiety, chest pain, headaches, malaise/fatigue, orthopnea, palpitations, peripheral edema or sweats. There are no associated agents to hypertension. Risk factors for coronary artery disease include dyslipidemia, diabetes mellitus, male gender and obesity. Past treatments include ACE inhibitors. The current treatment provides no improvement. There are no compliance problems.  There is no history of angina, CAD/MI, CVA, left ventricular hypertrophy, PVD or retinopathy. There is no history of chronic renal disease, coarctation of the aorta, hypercortisolism, pheochromocytoma, renovascular disease or a thyroid problem.   Arm Pain   There was no injury mechanism. The pain is present in the right shoulder and upper right arm. The quality of the pain is described as aching. The pain does not radiate. The pain is at a severity of 5/10. The pain is moderate. The pain has been Constant since the incident. Pertinent negatives include no chest pain or numbness. The symptoms are aggravated by movement, lifting and palpation. He has tried nothing for the symptoms. The treatment provided mild  relief.   Shoulder Pain   The pain is present in the right shoulder. This is a new problem. The current episode started 1 to 4 weeks ago. There has been no history of extremity trauma. The problem occurs constantly. The problem has been unchanged. The quality of the pain is described as aching. The pain is at a severity of 5/10. The pain is moderate. Pertinent negatives include no headaches, itching, joint swelling or numbness. The symptoms are aggravated by activity. He has tried nothing for the symptoms. The treatment provided no relief. His past medical history is significant for diabetes.   Gastroesophageal Reflux  He complains of coughing, heartburn and a sore throat. He reports no abdominal pain, no chest pain or no wheezing. This is a new problem. The current episode started 1 to 4 weeks ago. The problem occurs constantly. The problem has been unchanged. The heartburn duration is several minutes. The heartburn is located in the substernum. The symptoms are aggravated by certain foods. Associated symptoms include fatigue. Pertinent negatives include no anemia or melena. There are no known risk factors. He has tried nothing for the symptoms. The treatment provided mild relief. Past procedures do not include an abdominal ultrasound, esophageal pH monitoring or a UGI. Past invasive treatments do not include gastroplasty or reflux surgery.   Diabetes  Pertinent negatives for hypoglycemia include no confusion, dizziness, headaches, nervousness/anxiousness, speech difficulty or sweats. Associated symptoms include fatigue. Pertinent negatives for diabetes include no chest pain, no polydipsia, no polyuria and no weakness. Pertinent negatives for diabetic complications include no CVA, PVD or retinopathy.   Hyperlipidemia  This is a chronic problem. The current episode started more than 1 year ago. The problem is uncontrolled. Recent lipid tests were reviewed and are high. Exacerbating diseases include diabetes. He has  no history of chronic renal disease, liver disease or nephrotic syndrome. There are no known factors aggravating his hyperlipidemia. Associated symptoms include myalgias. Pertinent negatives include no chest pain, focal sensory loss, focal weakness or leg pain. Current antihyperlipidemic treatment includes statins. There are no compliance problems.  Risk factors for coronary artery disease include diabetes mellitus, dyslipidemia, male sex and hypertension.   Review of Systems   Constitutional:  Positive for fatigue. Negative for activity change, diaphoresis, malaise/fatigue and unexpected weight change.   HENT:  Positive for sore throat. Negative for nasal congestion, ear pain, mouth sores, rhinorrhea and voice change.    Eyes: Negative.  Negative for pain, discharge and visual disturbance.   Respiratory:  Positive for cough. Negative for apnea and wheezing.    Cardiovascular: Negative.  Negative for chest pain, palpitations and orthopnea.   Gastrointestinal:  Positive for heartburn. Negative for abdominal distention, abdominal pain, anal bleeding, change in bowel habit, diarrhea, melena, vomiting and change in bowel habit.   Endocrine: Negative.  Negative for cold intolerance, polydipsia and polyuria.   Genitourinary: Negative.  Negative for decreased urine volume, difficulty urinating, discharge, frequency and scrotal swelling.   Musculoskeletal:  Positive for arthralgias and myalgias. Negative for back pain, joint swelling, leg pain and neck stiffness.   Integumentary:  Negative for color change, itching and rash. Negative.   Allergic/Immunologic: Negative.  Negative for environmental allergies.   Neurological: Negative.  Negative for dizziness, vertigo, focal weakness, speech difficulty, weakness, light-headedness, numbness and headaches.   Hematological: Negative.    Psychiatric/Behavioral: Negative.  Negative for agitation, confusion, dysphoric mood and suicidal ideas. The patient is not nervous/anxious.         PMH/PSH/FH/SH/MED/ALLERGY reviewed    Objective:       Vitals:    02/14/23 0855   BP: (!) 104/58   Pulse: 85   Temp: 98.1 °F (36.7 °C)       Physical Exam  Constitutional:       Appearance: He is well-developed.   HENT:      Head: Normocephalic and atraumatic.      Right Ear: External ear normal.      Left Ear: External ear normal.      Nose: Nose normal.      Mouth/Throat:      Pharynx: No oropharyngeal exudate.   Eyes:      General: No scleral icterus.        Right eye: No discharge.         Left eye: No discharge.      Conjunctiva/sclera: Conjunctivae normal.      Pupils: Pupils are equal, round, and reactive to light.   Neck:      Thyroid: No thyromegaly.      Vascular: No JVD.      Trachea: No tracheal deviation.   Cardiovascular:      Rate and Rhythm: Regular rhythm. Bradycardia present.      Pulses:           Dorsalis pedis pulses are 1+ on the right side and 1+ on the left side.        Posterior tibial pulses are 1+ on the right side and 1+ on the left side.      Heart sounds: Normal heart sounds. No murmur heard.    No friction rub. No gallop.   Pulmonary:      Effort: Pulmonary effort is normal. No respiratory distress.      Breath sounds: Normal breath sounds. No stridor. No wheezing or rales.   Chest:      Chest wall: No tenderness.   Abdominal:      General: Bowel sounds are normal. There is no distension.      Palpations: Abdomen is soft. There is no mass.      Tenderness: There is no abdominal tenderness. There is no guarding or rebound.      Hernia: No hernia is present.   Musculoskeletal:         General: Tenderness (TTP left shoulder with restricted ROM) present. Normal range of motion.      Cervical back: Normal range of motion and neck supple.      Right foot: Normal range of motion. No deformity, bunion, Charcot foot, foot drop or prominent metatarsal heads.      Left foot: Normal range of motion. No deformity, bunion, Charcot foot, foot drop or prominent metatarsal heads.   Feet:      Right  foot:      Protective Sensation: 10 sites tested.  10 sites sensed.      Skin integrity: Skin integrity normal.      Toenail Condition: Right toenails are normal.      Left foot:      Protective Sensation: 10 sites tested.  10 sites sensed.      Skin integrity: Skin integrity normal.      Toenail Condition: Left toenails are normal.   Lymphadenopathy:      Cervical: No cervical adenopathy.   Skin:     General: Skin is warm and dry.      Coloration: Skin is not pale.      Findings: No erythema or rash.   Neurological:      Mental Status: He is alert and oriented to person, place, and time.      Cranial Nerves: No cranial nerve deficit.      Motor: No abnormal muscle tone.      Coordination: Coordination normal.      Deep Tendon Reflexes: Reflexes are normal and symmetric. Reflexes normal.   Psychiatric:         Behavior: Behavior normal.         Thought Content: Thought content normal.         Judgment: Judgment normal.       Assessment:       Problem List Items Addressed This Visit       Dyslipidemia associated with type 2 diabetes mellitus    Type 2 diabetes mellitus with stage 3a chronic kidney disease, without long-term current use of insulin - Primary    Relevant Orders    Comprehensive Metabolic Panel    Hemoglobin A1C    Stage 3b chronic kidney disease    Prostate cancer    Hypertension associated with diabetes    Coronary artery disease of native artery of native heart with stable angina pectoris    Relevant Orders    Ambulatory referral/consult to Cardiology     Other Visit Diagnoses       TRINH (dyspnea on exertion)        Relevant Orders    Ambulatory referral/consult to Cardiology            Plan:           Addy was seen today for sore throat.    Diagnoses and all orders for this visit:    Type 2 diabetes mellitus with stage 3a chronic kidney disease, without long-term current use of insulin  -     Comprehensive Metabolic Panel; Future  -     Hemoglobin A1C; Future    Dyslipidemia associated with type 2  diabetes mellitus    Prostate cancer    Hypertension associated with diabetes    Stage 3b chronic kidney disease    Coronary artery disease of native artery of native heart with stable angina pectoris  -     Ambulatory referral/consult to Cardiology; Future    TRINH (dyspnea on exertion)  -     Ambulatory referral/consult to Cardiology; Future    HTN   -at goal  -continue lisinopril to 20 mg/ day    DM II   -uncontrolled   -continue farxiga daily  -increase lantus to 15 units BID  -add ozempic weekly. Side effects of medications have been discussed and patient agreed to proceed with treatment and understands the risks and benefits.      CKD 3  -follow nephrology  -ER precautions given    HLD   -improving  -continue lipitor    GERD   -stable   -takes OTC PPI     ED'  -controlled        Spent adequate time in obtaining history and explaining differentials      25 minutes spent during this visit of which greater than 50% devoted to face-face counseling and coordination of care regarding diagnosis and management plan    Follow up in about 3 months (around 5/14/2023), or if symptoms worsen or fail to improve.

## 2023-03-09 ENCOUNTER — OFFICE VISIT (OUTPATIENT)
Dept: CARDIOLOGY | Facility: CLINIC | Age: 68
End: 2023-03-09
Attending: FAMILY MEDICINE
Payer: MEDICARE

## 2023-03-09 VITALS
HEART RATE: 62 BPM | OXYGEN SATURATION: 99 % | DIASTOLIC BLOOD PRESSURE: 69 MMHG | SYSTOLIC BLOOD PRESSURE: 118 MMHG | HEIGHT: 73 IN | WEIGHT: 202.81 LBS | BODY MASS INDEX: 26.88 KG/M2

## 2023-03-09 DIAGNOSIS — N18.4 CHRONIC KIDNEY DISEASE (CKD), STAGE IV (SEVERE): ICD-10-CM

## 2023-03-09 DIAGNOSIS — I15.2 HYPERTENSION ASSOCIATED WITH DIABETES: ICD-10-CM

## 2023-03-09 DIAGNOSIS — R00.1 SINUS BRADYCARDIA: ICD-10-CM

## 2023-03-09 DIAGNOSIS — I25.118 CORONARY ARTERY DISEASE OF NATIVE ARTERY OF NATIVE HEART WITH STABLE ANGINA PECTORIS: Primary | ICD-10-CM

## 2023-03-09 DIAGNOSIS — Z95.5 PRESENCE OF DRUG-ELUTING STENT IN LEFT CIRCUMFLEX CORONARY ARTERY: ICD-10-CM

## 2023-03-09 DIAGNOSIS — E11.69 DYSLIPIDEMIA ASSOCIATED WITH TYPE 2 DIABETES MELLITUS: ICD-10-CM

## 2023-03-09 DIAGNOSIS — I70.0 AORTIC ATHEROSCLEROSIS: ICD-10-CM

## 2023-03-09 DIAGNOSIS — E78.5 DYSLIPIDEMIA ASSOCIATED WITH TYPE 2 DIABETES MELLITUS: ICD-10-CM

## 2023-03-09 DIAGNOSIS — R06.09 DOE (DYSPNEA ON EXERTION): ICD-10-CM

## 2023-03-09 DIAGNOSIS — E11.59 HYPERTENSION ASSOCIATED WITH DIABETES: ICD-10-CM

## 2023-03-09 PROCEDURE — 99999 PR PBB SHADOW E&M-EST. PATIENT-LVL IV: ICD-10-PCS | Mod: PBBFAC,,, | Performed by: INTERNAL MEDICINE

## 2023-03-09 PROCEDURE — 99214 PR OFFICE/OUTPT VISIT, EST, LEVL IV, 30-39 MIN: ICD-10-PCS | Mod: S$GLB,,, | Performed by: INTERNAL MEDICINE

## 2023-03-09 PROCEDURE — 1159F MED LIST DOCD IN RCRD: CPT | Mod: CPTII,S$GLB,, | Performed by: INTERNAL MEDICINE

## 2023-03-09 PROCEDURE — 99999 PR PBB SHADOW E&M-EST. PATIENT-LVL IV: CPT | Mod: PBBFAC,,, | Performed by: INTERNAL MEDICINE

## 2023-03-09 PROCEDURE — 3008F PR BODY MASS INDEX (BMI) DOCUMENTED: ICD-10-PCS | Mod: CPTII,S$GLB,, | Performed by: INTERNAL MEDICINE

## 2023-03-09 PROCEDURE — 3008F BODY MASS INDEX DOCD: CPT | Mod: CPTII,S$GLB,, | Performed by: INTERNAL MEDICINE

## 2023-03-09 PROCEDURE — 1125F AMNT PAIN NOTED PAIN PRSNT: CPT | Mod: CPTII,S$GLB,, | Performed by: INTERNAL MEDICINE

## 2023-03-09 PROCEDURE — 3061F NEG MICROALBUMINURIA REV: CPT | Mod: CPTII,S$GLB,, | Performed by: INTERNAL MEDICINE

## 2023-03-09 PROCEDURE — 1159F PR MEDICATION LIST DOCUMENTED IN MEDICAL RECORD: ICD-10-PCS | Mod: CPTII,S$GLB,, | Performed by: INTERNAL MEDICINE

## 2023-03-09 PROCEDURE — 3074F PR MOST RECENT SYSTOLIC BLOOD PRESSURE < 130 MM HG: ICD-10-PCS | Mod: CPTII,S$GLB,, | Performed by: INTERNAL MEDICINE

## 2023-03-09 PROCEDURE — 3052F PR MOST RECENT HEMOGLOBIN A1C LEVEL 8.0 - < 9.0%: ICD-10-PCS | Mod: CPTII,S$GLB,, | Performed by: INTERNAL MEDICINE

## 2023-03-09 PROCEDURE — 3078F PR MOST RECENT DIASTOLIC BLOOD PRESSURE < 80 MM HG: ICD-10-PCS | Mod: CPTII,S$GLB,, | Performed by: INTERNAL MEDICINE

## 2023-03-09 PROCEDURE — 3061F PR NEG MICROALBUMINURIA RESULT DOCUMENTED/REVIEW: ICD-10-PCS | Mod: CPTII,S$GLB,, | Performed by: INTERNAL MEDICINE

## 2023-03-09 PROCEDURE — 3066F PR DOCUMENTATION OF TREATMENT FOR NEPHROPATHY: ICD-10-PCS | Mod: CPTII,S$GLB,, | Performed by: INTERNAL MEDICINE

## 2023-03-09 PROCEDURE — 3066F NEPHROPATHY DOC TX: CPT | Mod: CPTII,S$GLB,, | Performed by: INTERNAL MEDICINE

## 2023-03-09 PROCEDURE — 99214 OFFICE O/P EST MOD 30 MIN: CPT | Mod: S$GLB,,, | Performed by: INTERNAL MEDICINE

## 2023-03-09 PROCEDURE — 3074F SYST BP LT 130 MM HG: CPT | Mod: CPTII,S$GLB,, | Performed by: INTERNAL MEDICINE

## 2023-03-09 PROCEDURE — 3052F HG A1C>EQUAL 8.0%<EQUAL 9.0%: CPT | Mod: CPTII,S$GLB,, | Performed by: INTERNAL MEDICINE

## 2023-03-09 PROCEDURE — 3078F DIAST BP <80 MM HG: CPT | Mod: CPTII,S$GLB,, | Performed by: INTERNAL MEDICINE

## 2023-03-09 PROCEDURE — 1125F PR PAIN SEVERITY QUANTIFIED, PAIN PRESENT: ICD-10-PCS | Mod: CPTII,S$GLB,, | Performed by: INTERNAL MEDICINE

## 2023-03-09 NOTE — PROGRESS NOTES
Subjective:   @Patient ID:  Addy Redding is a 67 y.o. male who presents for evaluation of CAD, HTN, HLP    HPI:   He saw Dr. Arevalo in the past. He had exertional angina prompted stress test that was abnormal. [facility] 4/7/2022 with  RCA, significant OM disease, non obstructive prox LAD disease by iFR. Attempt to PCI RCA was unsuccessful (run through wire used). He was staged for PCI to OM 6/2022 ( 2.5 x 8 and 2.5x18) was complicated nu no reflow in the main lcx and it was stented, residual disease in ostial Om at bifurcation     He was diagnosed with prostate CA , got radiotherapy seeds    He still reports significant TRINH and low energy level. No chest pain   He has been compliant with his medications    He has bradycardia that's why  he is not on BB        Prior cardiovascular  Hx  --------------------------------  - EKG 12/2022 sinus bradycardia            Patient Active Problem List    Diagnosis Date Noted    Hyperparathyroidism, unspecified 01/27/2023    Prostate cancer     Stage 3b chronic kidney disease 10/04/2022    Coronary artery disease of native artery of native heart with stable angina pectoris 10/04/2022    Presence of drug-eluting stent in left circumflex coronary artery 09/01/2022    Preoperative clearance 09/01/2022    Chronic kidney disease (CKD), stage IV (severe) 07/06/2022    Coronary artery disease of native artery with stable angina pectoris 05/26/2022    Aortic atherosclerosis 03/22/2022    Sinus bradycardia 03/22/2022    Neck pain 10/22/2021    Decreased range of motion 10/22/2021    Type 2 diabetes mellitus with stage 3a chronic kidney disease, without long-term current use of insulin 02/22/2016    Left-sided low back pain with sciatica 02/22/2016    ED (erectile dysfunction)     Hypertension associated with diabetes 11/04/2014    Dyslipidemia associated with type 2 diabetes mellitus                     LAST HbA1c  Lab Results   Component Value Date    HGBA1C 8.6 (H) 02/13/2023       Lipid  panel  Lab Results   Component Value Date    CHOL 120 07/05/2022    CHOL 146 05/27/2022    CHOL 183 01/06/2022     Lab Results   Component Value Date    HDL 43 07/05/2022    HDL 39 (L) 05/27/2022    HDL 49 01/06/2022     Lab Results   Component Value Date    LDLCALC 66.2 07/05/2022    LDLCALC 92.6 05/27/2022    LDLCALC 121.0 01/06/2022     Lab Results   Component Value Date    TRIG 54 07/05/2022    TRIG 72 05/27/2022    TRIG 65 01/06/2022     Lab Results   Component Value Date    CHOLHDL 35.8 07/05/2022    CHOLHDL 26.7 05/27/2022    CHOLHDL 26.8 01/06/2022            Review of Systems   Constitutional: Negative for chills and fever.   HENT:  Negative for hearing loss and nosebleeds.    Eyes:  Negative for blurred vision.   Cardiovascular:         As in HPI   Respiratory:  Negative for hemoptysis and shortness of breath.    Hematologic/Lymphatic: Negative for bleeding problem.   Skin:  Negative for itching.   Musculoskeletal:  Negative for falls.   Gastrointestinal:  Negative for abdominal pain and hematochezia.   Genitourinary:  Positive for frequency. Negative for hematuria.        As in HPI    Neurological:  Negative for dizziness and loss of balance.   Psychiatric/Behavioral:  Negative for altered mental status and depression.      Objective:   Physical Exam  Constitutional:       Appearance: He is well-developed.   HENT:      Head: Normocephalic and atraumatic.   Eyes:      Conjunctiva/sclera: Conjunctivae normal.   Neck:      Vascular: No carotid bruit or JVD.   Cardiovascular:      Rate and Rhythm: Regular rhythm. Bradycardia present.      Pulses:           Carotid pulses are 2+ on the right side and 2+ on the left side.       Radial pulses are 2+ on the right side and 2+ on the left side.      Heart sounds: Normal heart sounds. No murmur heard.    No friction rub. No gallop.   Pulmonary:      Effort: Pulmonary effort is normal. No respiratory distress.      Breath sounds: Normal breath sounds. No stridor. No  wheezing.   Musculoskeletal:      Cervical back: Neck supple.   Skin:     General: Skin is warm and dry.   Neurological:      Mental Status: He is alert and oriented to person, place, and time.   Psychiatric:         Behavior: Behavior normal.       Assessment:     1. Dyslipidemia associated with type 2 diabetes mellitus    2. Hypertension associated with diabetes    3. Aortic atherosclerosis    4. Sinus bradycardia    5. Coronary artery disease of native artery of native heart with stable angina pectoris    6. Presence of drug-eluting stent in left circumflex coronary artery    7. Chronic kidney disease (CKD), stage IV (severe)        Plan:   Still with significant angina equivalent symptoms  Will proceed with Echo for EF  and PET stress test for ischemic evaluation. He has residual CAD prox LAD, ostial OM, and  RCA     LDL at goal   BP is well controlled  Continue ASA/Plavix   Continue statin and Zetia  Not on BB due to bradycardia   Continue Farxiga.   Continue Cardura, Norvasc, nitro prn   Chronic sinus bradycardia with good chronotropic response     3 months f/u      I spent 5-10 minutes asking, assessing, assisting, arranging and advising heart healthy diet improvements. This included low-salt meals, portion control and health food alternatives. I also encourage 30 minutes of moderate exercise 3-4x a week.      Pertinent cardiac images and EKG reviewed independently.    Continue with current medical plan and lifestyle changes.  Return sooner for concerns or questions. If symptoms persist go to the ED  I have reviewed all pertinent data including patient's medical history in detail and updated the computerized patient record.     No orders of the defined types were placed in this encounter.      Follow up as scheduled.     He expressed verbal understanding and agreed with the plan    Patient's Medications   New Prescriptions    No medications on file   Previous Medications    ADVANCED GLUC METER TEST STRIP  "STRP    USE TO TEST BLOOD SUGAR 4 TIMES DAILY.    AMLODIPINE (NORVASC) 5 MG TABLET    Take 1 tablet (5 mg total) by mouth 2 (two) times daily.    ASPIRIN (ECOTRIN) 81 MG EC TABLET    Take 1 tablet (81 mg total) by mouth once daily.    ATORVASTATIN (LIPITOR) 80 MG TABLET    Take 80 mg by mouth once daily.    BD ARIA 2ND GEN PEN NEEDLE 32 GAUGE X 5/32" NDLE        BLOOD SUGAR DIAGNOSTIC (BLOOD GLUCOSE TEST) STRP    Check sugar once daily    BLOOD-GLUCOSE METER KIT    Use as instructed    CLOPIDOGREL (PLAVIX) 75 MG TABLET    Take 1 tablet (75 mg total) by mouth once daily.    DAPAGLIFLOZIN (FARXIGA) 5 MG TAB TABLET    TAKE ONE TABLET BY MOUTH ONCE DAILY.    DOXAZOSIN (CARDURA) 2 MG TABLET    Take 1 tablet (2 mg total) by mouth every evening.    DULAGLUTIDE (TRULICITY) 1.5 MG/0.5 ML PEN INJECTOR    Inject 1.5 mg into the skin every 7 days.    EZETIMIBE (ZETIA) 10 MG TABLET    Take 1 tablet (10 mg total) by mouth once daily.    FLUTICASONE PROPIONATE (FLONASE) 50 MCG/ACTUATION NASAL SPRAY    2 sprays (100 mcg total) by Each Nostril route once daily.    IBUPROFEN (ADVIL,MOTRIN) 800 MG TABLET    take 1 tablet twice a day with food for 5 - 7 days    INSULIN (LANTUS SOLOSTAR U-100 INSULIN) GLARGINE 100 UNITS/ML SUBQ PEN    Inject 15 Units into the skin 2 (two) times a day.    LANCETS (ACCU-CHEK SOFTCLIX LANCETS) MISC    1 lancet by Misc.(Non-Drug; Combo Route) route 2 (two) times daily.    LATANOPROST 0.005 % OPHTHALMIC SOLUTION        NITROGLYCERIN (NITROSTAT) 0.4 MG SL TABLET    Place 1 tablet (0.4 mg total) under the tongue every 5 (five) minutes as needed for Chest pain.    PANTOPRAZOLE (PROTONIX) 40 MG TABLET    TAKE ONE TABLET BY MOUTH ONCE DAILY.    SILDENAFIL (REVATIO) 20 MG TAB    Take 1 tablet (20 mg total) by mouth daily as needed for ED    TAMSULOSIN (FLOMAX) 0.4 MG CAP    Take 1 capsule (0.4 mg total) by mouth once daily.   Modified Medications    No medications on file   Discontinued Medications    No " medications on file

## 2023-03-14 ENCOUNTER — LAB VISIT (OUTPATIENT)
Dept: LAB | Facility: HOSPITAL | Age: 68
End: 2023-03-14
Attending: FAMILY MEDICINE
Payer: MEDICARE

## 2023-03-14 DIAGNOSIS — E11.22 TYPE 2 DIABETES MELLITUS WITH STAGE 3A CHRONIC KIDNEY DISEASE, WITHOUT LONG-TERM CURRENT USE OF INSULIN: ICD-10-CM

## 2023-03-14 DIAGNOSIS — N18.31 TYPE 2 DIABETES MELLITUS WITH STAGE 3A CHRONIC KIDNEY DISEASE, WITHOUT LONG-TERM CURRENT USE OF INSULIN: ICD-10-CM

## 2023-03-14 LAB
ALBUMIN SERPL BCP-MCNC: 4 G/DL (ref 3.5–5.2)
ALP SERPL-CCNC: 52 U/L (ref 55–135)
ALT SERPL W/O P-5'-P-CCNC: 12 U/L (ref 10–44)
ANION GAP SERPL CALC-SCNC: 6 MMOL/L (ref 8–16)
AST SERPL-CCNC: 14 U/L (ref 10–40)
BILIRUB SERPL-MCNC: 0.4 MG/DL (ref 0.1–1)
BUN SERPL-MCNC: 19 MG/DL (ref 8–23)
CALCIUM SERPL-MCNC: 9.1 MG/DL (ref 8.7–10.5)
CHLORIDE SERPL-SCNC: 106 MMOL/L (ref 95–110)
CO2 SERPL-SCNC: 27 MMOL/L (ref 23–29)
CREAT SERPL-MCNC: 1.8 MG/DL (ref 0.5–1.4)
EST. GFR  (NO RACE VARIABLE): 40 ML/MIN/1.73 M^2
ESTIMATED AVG GLUCOSE: 146 MG/DL (ref 68–131)
GLUCOSE SERPL-MCNC: 107 MG/DL (ref 70–110)
HBA1C MFR BLD: 6.7 % (ref 4–5.6)
POTASSIUM SERPL-SCNC: 4 MMOL/L (ref 3.5–5.1)
PROT SERPL-MCNC: 7.1 G/DL (ref 6–8.4)
SODIUM SERPL-SCNC: 139 MMOL/L (ref 136–145)

## 2023-03-14 PROCEDURE — 80053 COMPREHEN METABOLIC PANEL: CPT | Performed by: FAMILY MEDICINE

## 2023-03-14 PROCEDURE — 36415 COLL VENOUS BLD VENIPUNCTURE: CPT | Performed by: FAMILY MEDICINE

## 2023-03-14 PROCEDURE — 83036 HEMOGLOBIN GLYCOSYLATED A1C: CPT | Performed by: FAMILY MEDICINE

## 2023-03-23 ENCOUNTER — HOSPITAL ENCOUNTER (OUTPATIENT)
Dept: CARDIOLOGY | Facility: HOSPITAL | Age: 68
Discharge: HOME | End: 2023-03-23
Attending: INTERNAL MEDICINE
Payer: MEDICARE

## 2023-03-23 VITALS — HEIGHT: 73 IN | WEIGHT: 202 LBS | BODY MASS INDEX: 26.77 KG/M2

## 2023-03-23 DIAGNOSIS — I25.118 CORONARY ARTERY DISEASE OF NATIVE ARTERY OF NATIVE HEART WITH STABLE ANGINA PECTORIS: ICD-10-CM

## 2023-03-23 DIAGNOSIS — I15.2 HYPERTENSION ASSOCIATED WITH DIABETES: ICD-10-CM

## 2023-03-23 DIAGNOSIS — E78.5 DYSLIPIDEMIA ASSOCIATED WITH TYPE 2 DIABETES MELLITUS: ICD-10-CM

## 2023-03-23 DIAGNOSIS — Z95.5 PRESENCE OF DRUG-ELUTING STENT IN LEFT CIRCUMFLEX CORONARY ARTERY: ICD-10-CM

## 2023-03-23 DIAGNOSIS — E11.69 DYSLIPIDEMIA ASSOCIATED WITH TYPE 2 DIABETES MELLITUS: ICD-10-CM

## 2023-03-23 DIAGNOSIS — I70.0 AORTIC ATHEROSCLEROSIS: ICD-10-CM

## 2023-03-23 DIAGNOSIS — R00.1 SINUS BRADYCARDIA: ICD-10-CM

## 2023-03-23 DIAGNOSIS — N18.4 CHRONIC KIDNEY DISEASE (CKD), STAGE IV (SEVERE): ICD-10-CM

## 2023-03-23 DIAGNOSIS — E11.59 HYPERTENSION ASSOCIATED WITH DIABETES: ICD-10-CM

## 2023-03-23 PROCEDURE — 93306 TTE W/DOPPLER COMPLETE: CPT | Mod: 26,,, | Performed by: INTERNAL MEDICINE

## 2023-03-23 PROCEDURE — 93306 TTE W/DOPPLER COMPLETE: CPT

## 2023-03-23 PROCEDURE — 93306 ECHO (CUPID ONLY): ICD-10-PCS | Mod: 26,,, | Performed by: INTERNAL MEDICINE

## 2023-03-24 LAB
ASCENDING AORTA: 2.57 CM
AV INDEX (PROSTH): 0.73
AV MEAN GRADIENT: 4 MMHG
AV PEAK GRADIENT: 6 MMHG
AV VALVE AREA: 2.77 CM2
AV VELOCITY RATIO: 0.78
BSA FOR ECHO PROCEDURE: 2.17 M2
CV ECHO LV RWT: 0.32 CM
DOP CALC AO PEAK VEL: 1.23 M/S
DOP CALC AO VTI: 30.7 CM
DOP CALC LVOT AREA: 3.8 CM2
DOP CALC LVOT DIAMETER: 2.2 CM
DOP CALC LVOT PEAK VEL: 0.96 M/S
DOP CALC LVOT STROKE VOLUME: 85.11 CM3
DOP CALC MV VTI: 35.1 CM
DOP CALCLVOT PEAK VEL VTI: 22.4 CM
E WAVE DECELERATION TIME: 175.51 MSEC
E/A RATIO: 0.95
E/E' RATIO: 8.21 M/S
ECHO LV POSTERIOR WALL: 0.82 CM (ref 0.6–1.1)
EJECTION FRACTION: 55 %
FRACTIONAL SHORTENING: 33 % (ref 28–44)
INTERVENTRICULAR SEPTUM: 0.65 CM (ref 0.6–1.1)
IVC DIAMETER: 1.4 CM
LA MAJOR: 6.05 CM
LA MINOR: 5.62 CM
LA WIDTH: 4.2 CM
LEFT ATRIUM SIZE: 3.5 CM
LEFT ATRIUM VOLUME INDEX MOD: 30.2 ML/M2
LEFT ATRIUM VOLUME INDEX: 33.7 ML/M2
LEFT ATRIUM VOLUME MOD: 65.19 CM3
LEFT ATRIUM VOLUME: 72.81 CM3
LEFT INTERNAL DIMENSION IN SYSTOLE: 3.46 CM (ref 2.1–4)
LEFT VENTRICLE DIASTOLIC VOLUME INDEX: 59.29 ML/M2
LEFT VENTRICLE DIASTOLIC VOLUME: 128.06 ML
LEFT VENTRICLE MASS INDEX: 60 G/M2
LEFT VENTRICLE SYSTOLIC VOLUME INDEX: 22.8 ML/M2
LEFT VENTRICLE SYSTOLIC VOLUME: 49.34 ML
LEFT VENTRICULAR INTERNAL DIMENSION IN DIASTOLE: 5.17 CM (ref 3.5–6)
LEFT VENTRICULAR MASS: 129.2 G
LV LATERAL E/E' RATIO: 7.8 M/S
LV SEPTAL E/E' RATIO: 8.67 M/S
LVOT MG: 2.19 MMHG
LVOT MV: 0.71 CM/S
MV PEAK A VEL: 0.82 M/S
MV PEAK E VEL: 0.78 M/S
MV PEAK GRADIENT: 3 MMHG
MV STENOSIS PRESSURE HALF TIME: 50.9 MS
MV VALVE AREA BY CONTINUITY EQUATION: 2.42 CM2
MV VALVE AREA P 1/2 METHOD: 4.32 CM2
PISA MRMAX VEL: 2.82 M/S
PISA TR MAX VEL: 2.25 M/S
PULM VEIN S/D RATIO: 0.98
PV PEAK D VEL: 0.43 M/S
PV PEAK S VEL: 0.42 M/S
PV PEAK VELOCITY: 1.09 CM/S
RA MAJOR: 5.42 CM
RA PRESSURE: 3 MMHG
RIGHT VENTRICULAR END-DIASTOLIC DIMENSION: 2.59 CM
STJ: 2.59 CM
TDI LATERAL: 0.1 M/S
TDI SEPTAL: 0.09 M/S
TDI: 0.1 M/S
TR MAX PG: 20 MMHG
TV REST PULMONARY ARTERY PRESSURE: 23 MMHG

## 2023-03-24 NOTE — PROGRESS NOTES
Echocardiogram results is within normal limits.  Normal heart pumping function.  We will await the stress test results.     Sincerely,  Je Fontanez MD.   Interventional Cardiologist  [facility], [place]

## 2023-03-27 ENCOUNTER — PATIENT MESSAGE (OUTPATIENT)
Dept: CARDIOLOGY | Facility: CLINIC | Age: 68
End: 2023-03-27
Payer: MEDICARE

## 2023-04-20 ENCOUNTER — LAB VISIT (OUTPATIENT)
Dept: LAB | Facility: HOSPITAL | Age: 68
End: 2023-04-20
Payer: MEDICARE

## 2023-04-20 DIAGNOSIS — C61 PROSTATE CANCER: ICD-10-CM

## 2023-04-20 LAB — COMPLEXED PSA SERPL-MCNC: 1 NG/ML (ref 0–4)

## 2023-04-20 PROCEDURE — 36415 COLL VENOUS BLD VENIPUNCTURE: CPT | Performed by: RADIOLOGY

## 2023-04-20 PROCEDURE — 84153 ASSAY OF PSA TOTAL: CPT | Performed by: RADIOLOGY

## 2023-04-27 ENCOUNTER — OFFICE VISIT (OUTPATIENT)
Dept: RADIATION ONCOLOGY | Facility: CLINIC | Age: 68
End: 2023-04-27
Payer: MEDICARE

## 2023-04-27 VITALS
RESPIRATION RATE: 18 BRPM | WEIGHT: 212.13 LBS | HEART RATE: 55 BPM | DIASTOLIC BLOOD PRESSURE: 61 MMHG | SYSTOLIC BLOOD PRESSURE: 115 MMHG | BODY MASS INDEX: 27.98 KG/M2

## 2023-04-27 DIAGNOSIS — C61 PROSTATE CANCER: Primary | ICD-10-CM

## 2023-04-27 PROCEDURE — 1101F PR PT FALLS ASSESS DOC 0-1 FALLS W/OUT INJ PAST YR: ICD-10-PCS | Mod: CPTII,S$GLB,, | Performed by: RADIOLOGY

## 2023-04-27 PROCEDURE — 99999 PR PBB SHADOW E&M-EST. PATIENT-LVL III: ICD-10-PCS | Mod: PBBFAC,,, | Performed by: RADIOLOGY

## 2023-04-27 PROCEDURE — 99999 PR PBB SHADOW E&M-EST. PATIENT-LVL III: CPT | Mod: PBBFAC,,, | Performed by: RADIOLOGY

## 2023-04-27 PROCEDURE — 1160F RVW MEDS BY RX/DR IN RCRD: CPT | Mod: CPTII,S$GLB,, | Performed by: RADIOLOGY

## 2023-04-27 PROCEDURE — 3061F NEG MICROALBUMINURIA REV: CPT | Mod: CPTII,S$GLB,, | Performed by: RADIOLOGY

## 2023-04-27 PROCEDURE — 3008F BODY MASS INDEX DOCD: CPT | Mod: CPTII,S$GLB,, | Performed by: RADIOLOGY

## 2023-04-27 PROCEDURE — 1159F MED LIST DOCD IN RCRD: CPT | Mod: CPTII,S$GLB,, | Performed by: RADIOLOGY

## 2023-04-27 PROCEDURE — 3074F SYST BP LT 130 MM HG: CPT | Mod: CPTII,S$GLB,, | Performed by: RADIOLOGY

## 2023-04-27 PROCEDURE — 1126F AMNT PAIN NOTED NONE PRSNT: CPT | Mod: CPTII,S$GLB,, | Performed by: RADIOLOGY

## 2023-04-27 PROCEDURE — 3078F PR MOST RECENT DIASTOLIC BLOOD PRESSURE < 80 MM HG: ICD-10-PCS | Mod: CPTII,S$GLB,, | Performed by: RADIOLOGY

## 2023-04-27 PROCEDURE — 99212 PR OFFICE/OUTPT VISIT, EST, LEVL II, 10-19 MIN: ICD-10-PCS | Mod: S$GLB,,, | Performed by: RADIOLOGY

## 2023-04-27 PROCEDURE — 3044F PR MOST RECENT HEMOGLOBIN A1C LEVEL <7.0%: ICD-10-PCS | Mod: CPTII,S$GLB,, | Performed by: RADIOLOGY

## 2023-04-27 PROCEDURE — 3288F FALL RISK ASSESSMENT DOCD: CPT | Mod: CPTII,S$GLB,, | Performed by: RADIOLOGY

## 2023-04-27 PROCEDURE — 3078F DIAST BP <80 MM HG: CPT | Mod: CPTII,S$GLB,, | Performed by: RADIOLOGY

## 2023-04-27 PROCEDURE — 3066F PR DOCUMENTATION OF TREATMENT FOR NEPHROPATHY: ICD-10-PCS | Mod: CPTII,S$GLB,, | Performed by: RADIOLOGY

## 2023-04-27 PROCEDURE — 3066F NEPHROPATHY DOC TX: CPT | Mod: CPTII,S$GLB,, | Performed by: RADIOLOGY

## 2023-04-27 PROCEDURE — 1101F PT FALLS ASSESS-DOCD LE1/YR: CPT | Mod: CPTII,S$GLB,, | Performed by: RADIOLOGY

## 2023-04-27 PROCEDURE — 3044F HG A1C LEVEL LT 7.0%: CPT | Mod: CPTII,S$GLB,, | Performed by: RADIOLOGY

## 2023-04-27 PROCEDURE — 3008F PR BODY MASS INDEX (BMI) DOCUMENTED: ICD-10-PCS | Mod: CPTII,S$GLB,, | Performed by: RADIOLOGY

## 2023-04-27 PROCEDURE — 3074F PR MOST RECENT SYSTOLIC BLOOD PRESSURE < 130 MM HG: ICD-10-PCS | Mod: CPTII,S$GLB,, | Performed by: RADIOLOGY

## 2023-04-27 PROCEDURE — 3288F PR FALLS RISK ASSESSMENT DOCUMENTED: ICD-10-PCS | Mod: CPTII,S$GLB,, | Performed by: RADIOLOGY

## 2023-04-27 PROCEDURE — 1160F PR REVIEW ALL MEDS BY PRESCRIBER/CLIN PHARMACIST DOCUMENTED: ICD-10-PCS | Mod: CPTII,S$GLB,, | Performed by: RADIOLOGY

## 2023-04-27 PROCEDURE — 1126F PR PAIN SEVERITY QUANTIFIED, NO PAIN PRESENT: ICD-10-PCS | Mod: CPTII,S$GLB,, | Performed by: RADIOLOGY

## 2023-04-27 PROCEDURE — 99212 OFFICE O/P EST SF 10 MIN: CPT | Mod: S$GLB,,, | Performed by: RADIOLOGY

## 2023-04-27 PROCEDURE — 1159F PR MEDICATION LIST DOCUMENTED IN MEDICAL RECORD: ICD-10-PCS | Mod: CPTII,S$GLB,, | Performed by: RADIOLOGY

## 2023-04-27 PROCEDURE — 3061F PR NEG MICROALBUMINURIA RESULT DOCUMENTED/REVIEW: ICD-10-PCS | Mod: CPTII,S$GLB,, | Performed by: RADIOLOGY

## 2023-04-27 NOTE — PROGRESS NOTES
Patient ID: Addy Redding is a 67 y.o. male.    Chief Complaint: No chief complaint on file.    This patient presents for follow up visit.     Mr. 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  Repeat PSA in February of 2022 returned at 9.1 ng/ml. MRI revealed a 1.3 cm T2 hypointense lesion in the apex of the Lt. transition zone, PI-RADS 4 with no extraprostatic extension. Biopsies on 10/5/22 revealed Parachute 7 (3+4) adenocarcinoma involving 75% of 2 of 2 cores from the Lt. apex, 30% of 2 of 2 cores from the Lt. mid gland, and 75% of 3 of 3 cores from the target lesion. The Parachute pattern 4 accounted for 20 - 30% of the tumor. There was perineural invasion. There was Parachute 6 (3+3) adenocarcinoma involving 10% of 1 of 2 cores from the Rt. apex.  On 1/12/22 he underwent transperineal implantation of the prostate gland using Iodine 125 seeds. A total of 78 seeds were placed in the prostate.  Today the patient states he feels well.  Still notes some slow urinary stream.  Denies dysuria and hematuria. Not taking tamsulosin    Review of Systems   Constitutional:  Negative for activity change, appetite change, chills and fatigue.   Gastrointestinal:  Negative for abdominal pain, constipation, diarrhea and fecal incontinence.   Genitourinary:  Positive for difficulty urinating. Negative for bladder incontinence, dysuria, frequency and hematuria.        Notes Erectile dysfunction, not using   Cialis secondary to heart issues.        Physical Exam  Constitutional:       General: He is not in acute distress.     Appearance: Normal appearance.   Abdominal:      General: Abdomen is flat. There is no distension.   Neurological:      Mental Status: He is alert and oriented to person, place, and time.   Psychiatric:         Mood and Affect: Mood normal.         Judgment: Judgment normal.      Latest Reference Range & Units 09/13/22 07:20 04/20/23 11:00   PSA Diagnostic 0.00 - 4.00 ng/mL  8.1 (H) 1.0   (H): Data is abnormally high    Problem List Items Addressed This Visit       Prostate cancer - Primary       Doing well. excellent response to treatment.  Will plan to restart tamsulosin.  Discussed ED issues.  The patient is planned for follow up with cardiology.  Explained he would need cardiac clearance for viagra.  Discussed other methods (Vacuum pump penile injection).  Plan follow up with us in 6 months with psa

## 2023-05-15 ENCOUNTER — OFFICE VISIT (OUTPATIENT)
Dept: FAMILY MEDICINE | Facility: CLINIC | Age: 68
End: 2023-05-15
Attending: FAMILY MEDICINE
Payer: MEDICARE

## 2023-05-15 VITALS
HEART RATE: 74 BPM | WEIGHT: 212.31 LBS | TEMPERATURE: 98 F | DIASTOLIC BLOOD PRESSURE: 65 MMHG | BODY MASS INDEX: 28.14 KG/M2 | HEIGHT: 73 IN | SYSTOLIC BLOOD PRESSURE: 110 MMHG | OXYGEN SATURATION: 98 %

## 2023-05-15 DIAGNOSIS — G89.29 CHRONIC LEFT SHOULDER PAIN: ICD-10-CM

## 2023-05-15 DIAGNOSIS — E78.5 DYSLIPIDEMIA ASSOCIATED WITH TYPE 2 DIABETES MELLITUS: ICD-10-CM

## 2023-05-15 DIAGNOSIS — I25.118 CORONARY ARTERY DISEASE OF NATIVE ARTERY WITH STABLE ANGINA PECTORIS, UNSPECIFIED WHETHER NATIVE OR TRANSPLANTED HEART: ICD-10-CM

## 2023-05-15 DIAGNOSIS — C61 PROSTATE CANCER: ICD-10-CM

## 2023-05-15 DIAGNOSIS — I15.2 HYPERTENSION ASSOCIATED WITH DIABETES: Primary | ICD-10-CM

## 2023-05-15 DIAGNOSIS — E11.22 TYPE 2 DIABETES MELLITUS WITH STAGE 3A CHRONIC KIDNEY DISEASE, WITHOUT LONG-TERM CURRENT USE OF INSULIN: ICD-10-CM

## 2023-05-15 DIAGNOSIS — M25.512 CHRONIC LEFT SHOULDER PAIN: ICD-10-CM

## 2023-05-15 DIAGNOSIS — N18.32 STAGE 3B CHRONIC KIDNEY DISEASE: ICD-10-CM

## 2023-05-15 DIAGNOSIS — E11.59 HYPERTENSION ASSOCIATED WITH DIABETES: Primary | ICD-10-CM

## 2023-05-15 DIAGNOSIS — N18.31 TYPE 2 DIABETES MELLITUS WITH STAGE 3A CHRONIC KIDNEY DISEASE, WITHOUT LONG-TERM CURRENT USE OF INSULIN: ICD-10-CM

## 2023-05-15 DIAGNOSIS — E11.69 DYSLIPIDEMIA ASSOCIATED WITH TYPE 2 DIABETES MELLITUS: ICD-10-CM

## 2023-05-15 DIAGNOSIS — I25.118 CORONARY ARTERY DISEASE OF NATIVE ARTERY OF NATIVE HEART WITH STABLE ANGINA PECTORIS: ICD-10-CM

## 2023-05-15 PROCEDURE — 99999 PR PBB SHADOW E&M-EST. PATIENT-LVL IV: ICD-10-PCS | Mod: PBBFAC,,, | Performed by: FAMILY MEDICINE

## 2023-05-15 PROCEDURE — 3061F PR NEG MICROALBUMINURIA RESULT DOCUMENTED/REVIEW: ICD-10-PCS | Mod: CPTII,S$GLB,, | Performed by: FAMILY MEDICINE

## 2023-05-15 PROCEDURE — 99215 PR OFFICE/OUTPT VISIT, EST, LEVL V, 40-54 MIN: ICD-10-PCS | Mod: S$GLB,,, | Performed by: FAMILY MEDICINE

## 2023-05-15 PROCEDURE — 3074F PR MOST RECENT SYSTOLIC BLOOD PRESSURE < 130 MM HG: ICD-10-PCS | Mod: CPTII,S$GLB,, | Performed by: FAMILY MEDICINE

## 2023-05-15 PROCEDURE — 3044F HG A1C LEVEL LT 7.0%: CPT | Mod: CPTII,S$GLB,, | Performed by: FAMILY MEDICINE

## 2023-05-15 PROCEDURE — 1160F RVW MEDS BY RX/DR IN RCRD: CPT | Mod: CPTII,S$GLB,, | Performed by: FAMILY MEDICINE

## 2023-05-15 PROCEDURE — 99215 OFFICE O/P EST HI 40 MIN: CPT | Mod: S$GLB,,, | Performed by: FAMILY MEDICINE

## 2023-05-15 PROCEDURE — 1159F PR MEDICATION LIST DOCUMENTED IN MEDICAL RECORD: ICD-10-PCS | Mod: CPTII,S$GLB,, | Performed by: FAMILY MEDICINE

## 2023-05-15 PROCEDURE — 3078F DIAST BP <80 MM HG: CPT | Mod: CPTII,S$GLB,, | Performed by: FAMILY MEDICINE

## 2023-05-15 PROCEDURE — 3061F NEG MICROALBUMINURIA REV: CPT | Mod: CPTII,S$GLB,, | Performed by: FAMILY MEDICINE

## 2023-05-15 PROCEDURE — 3066F NEPHROPATHY DOC TX: CPT | Mod: CPTII,S$GLB,, | Performed by: FAMILY MEDICINE

## 2023-05-15 PROCEDURE — 1159F MED LIST DOCD IN RCRD: CPT | Mod: CPTII,S$GLB,, | Performed by: FAMILY MEDICINE

## 2023-05-15 PROCEDURE — 3008F PR BODY MASS INDEX (BMI) DOCUMENTED: ICD-10-PCS | Mod: CPTII,S$GLB,, | Performed by: FAMILY MEDICINE

## 2023-05-15 PROCEDURE — 3066F PR DOCUMENTATION OF TREATMENT FOR NEPHROPATHY: ICD-10-PCS | Mod: CPTII,S$GLB,, | Performed by: FAMILY MEDICINE

## 2023-05-15 PROCEDURE — 99999 PR PBB SHADOW E&M-EST. PATIENT-LVL IV: CPT | Mod: PBBFAC,,, | Performed by: FAMILY MEDICINE

## 2023-05-15 PROCEDURE — 3078F PR MOST RECENT DIASTOLIC BLOOD PRESSURE < 80 MM HG: ICD-10-PCS | Mod: CPTII,S$GLB,, | Performed by: FAMILY MEDICINE

## 2023-05-15 PROCEDURE — 1160F PR REVIEW ALL MEDS BY PRESCRIBER/CLIN PHARMACIST DOCUMENTED: ICD-10-PCS | Mod: CPTII,S$GLB,, | Performed by: FAMILY MEDICINE

## 2023-05-15 PROCEDURE — 3008F BODY MASS INDEX DOCD: CPT | Mod: CPTII,S$GLB,, | Performed by: FAMILY MEDICINE

## 2023-05-15 PROCEDURE — 3074F SYST BP LT 130 MM HG: CPT | Mod: CPTII,S$GLB,, | Performed by: FAMILY MEDICINE

## 2023-05-15 PROCEDURE — 3044F PR MOST RECENT HEMOGLOBIN A1C LEVEL <7.0%: ICD-10-PCS | Mod: CPTII,S$GLB,, | Performed by: FAMILY MEDICINE

## 2023-05-15 RX ORDER — CLOPIDOGREL BISULFATE 75 MG/1
75 TABLET ORAL DAILY
Qty: 90 TABLET | Refills: 4 | Status: SHIPPED | OUTPATIENT
Start: 2023-05-15 | End: 2023-06-14 | Stop reason: SDUPTHER

## 2023-05-15 RX ORDER — ATORVASTATIN CALCIUM 80 MG/1
80 TABLET, FILM COATED ORAL DAILY
Qty: 90 TABLET | Refills: 3 | Status: SHIPPED | OUTPATIENT
Start: 2023-05-15 | End: 2024-02-20 | Stop reason: SDUPTHER

## 2023-05-15 NOTE — PROGRESS NOTES
Subjective:       Patient ID: Addy Redding is a 67 y.o. male.    Chief Complaint: Follow-up    66 yr old black male with DM II, HTN, HLD, GERD, CKD III, presents today for his three month follow up. C/O LEFT SHOULDER PAIN SINCE MORE THAN 6 MONTHS. NO TRAUMA OR FALL. TRIED REST AND OTC CREAM AND TYLENOL WITH NO RELIEF. HE IS OPEN FOR THERAPY. DETAILS AS FOLLOWS -        DM II - improved -   HGBA1C                   6.7 (H)             03/14/2023                       - complicated by CKD III                   - denies any hypoglycemic symptoms - on metformin - up to date with eye and foot screen - on metformin    HTN - controlled  - on lisinopril 10 mg daily - no side effects    HLD - controlled and improving -    LDLCALC                  66.2                07/05/2022                                 - on statin - compliant - need refill - not fully compliant with diet    GERD - stable - takes prilosec as needed    ED - stable - takes viagra as needed    Health maintenance  -labs due  -Flu and Pneumovax - up to date  -adacel due and done today  -colonoscopy due - wants to wait  -PSA UTD      Follow-up  This is a chronic problem. The current episode started more than 1 year ago. The problem occurs constantly. The problem has been gradually worsening. Associated symptoms include arthralgias and myalgias. Pertinent negatives include no abdominal pain, change in bowel habit, chest pain, congestion, coughing, diaphoresis, fatigue, headaches, joint swelling, numbness, rash, urinary symptoms, vertigo, vomiting or weakness.   Medication Refill  This is a chronic problem. The current episode started more than 1 year ago. The problem occurs constantly. The problem has been gradually improving. Associated symptoms include arthralgias and myalgias. Pertinent negatives include no abdominal pain, change in bowel habit, chest pain, congestion, coughing, diaphoresis, fatigue, headaches, joint swelling, numbness, rash, urinary  symptoms, vertigo, vomiting or weakness.   Hypertension  This is a chronic problem. The current episode started more than 1 year ago. The problem has been gradually worsening since onset. The problem is uncontrolled. Pertinent negatives include no anxiety, chest pain, headaches, malaise/fatigue, orthopnea, palpitations, peripheral edema or sweats. There are no associated agents to hypertension. Risk factors for coronary artery disease include dyslipidemia, diabetes mellitus, male gender and obesity. Past treatments include ACE inhibitors. The current treatment provides no improvement. There are no compliance problems.  There is no history of angina, CAD/MI, CVA, left ventricular hypertrophy, PVD or retinopathy. There is no history of chronic renal disease, coarctation of the aorta, hypercortisolism, pheochromocytoma, renovascular disease or a thyroid problem.   Arm Pain   There was no injury mechanism. The pain is present in the right shoulder and upper right arm. The quality of the pain is described as aching. The pain does not radiate. The pain is at a severity of 5/10. The pain is moderate. The pain has been Constant since the incident. Pertinent negatives include no chest pain or numbness. The symptoms are aggravated by movement, lifting and palpation. He has tried nothing for the symptoms. The treatment provided mild relief.   Shoulder Pain   The pain is present in the left shoulder. This is a chronic problem. The current episode started more than 1 month ago. There has been no history of extremity trauma. The problem occurs constantly. The problem has been unchanged. The quality of the pain is described as aching. The pain is at a severity of 8/10. The pain is severe. Associated symptoms include an inability to bear weight and a limited range of motion. Pertinent negatives include no headaches, itching, joint swelling or numbness. The symptoms are aggravated by activity. He has tried nothing for the symptoms.  The treatment provided no relief. His past medical history is significant for diabetes.   Gastroesophageal Reflux  He complains of heartburn. He reports no abdominal pain, no chest pain, no coughing or no wheezing. This is a new problem. The current episode started 1 to 4 weeks ago. The problem occurs constantly. The problem has been unchanged. The heartburn duration is several minutes. The heartburn is located in the substernum. The symptoms are aggravated by certain foods. Pertinent negatives include no anemia, fatigue or melena. There are no known risk factors. He has tried nothing for the symptoms. The treatment provided mild relief. Past procedures do not include an abdominal ultrasound, esophageal pH monitoring or a UGI. Past invasive treatments do not include gastroplasty or reflux surgery.   Diabetes  Pertinent negatives for hypoglycemia include no confusion, dizziness, headaches, nervousness/anxiousness, speech difficulty or sweats. Pertinent negatives for diabetes include no chest pain, no fatigue, no polydipsia, no polyuria and no weakness. Pertinent negatives for diabetic complications include no CVA, PVD or retinopathy.   Hyperlipidemia  This is a chronic problem. The current episode started more than 1 year ago. The problem is uncontrolled. Recent lipid tests were reviewed and are high. Exacerbating diseases include diabetes. He has no history of chronic renal disease, liver disease or nephrotic syndrome. There are no known factors aggravating his hyperlipidemia. Associated symptoms include myalgias. Pertinent negatives include no chest pain, focal sensory loss, focal weakness or leg pain. Current antihyperlipidemic treatment includes statins. There are no compliance problems.  Risk factors for coronary artery disease include diabetes mellitus, dyslipidemia, male sex and hypertension.   Review of Systems   Constitutional: Negative.  Negative for activity change, diaphoresis, fatigue, malaise/fatigue  and unexpected weight change.   HENT: Negative.  Negative for nasal congestion, ear pain, mouth sores, rhinorrhea and voice change.    Eyes: Negative.  Negative for pain, discharge and visual disturbance.   Respiratory: Negative.  Negative for apnea, cough and wheezing.    Cardiovascular: Negative.  Negative for chest pain, palpitations and orthopnea.   Gastrointestinal:  Positive for heartburn. Negative for abdominal distention, abdominal pain, anal bleeding, change in bowel habit, diarrhea, melena, vomiting and change in bowel habit.   Endocrine: Negative.  Negative for cold intolerance, polydipsia and polyuria.   Genitourinary: Negative.  Negative for decreased urine volume, difficulty urinating, discharge, frequency and scrotal swelling.   Musculoskeletal:  Positive for arthralgias and myalgias. Negative for back pain, joint swelling, leg pain and neck stiffness.   Integumentary:  Negative for color change, itching and rash. Negative.   Allergic/Immunologic: Negative.  Negative for environmental allergies.   Neurological: Negative.  Negative for dizziness, vertigo, focal weakness, speech difficulty, weakness, light-headedness, numbness and headaches.   Hematological: Negative.    Psychiatric/Behavioral: Negative.  Negative for agitation, confusion, dysphoric mood and suicidal ideas. The patient is not nervous/anxious.        PMH/PSH/FH/SH/MED/ALLERGY reviewed    Past Medical History:   Diagnosis Date    Costochondritis     Diabetic nephropathy associated with type 2 diabetes mellitus     Diabetic retinopathy     ED (erectile dysfunction)     GERD (gastroesophageal reflux disease)     Hyperlipidemia     Hypertension     Prostate cancer     Type II or unspecified type diabetes mellitus with renal manifestations, uncontrolled(250.42)        Past Surgical History:   Procedure Laterality Date    Bone biopsy right femur      CORONARY ANGIOGRAPHY N/A 06/16/2022    Procedure: ANGIOGRAM, CORONARY ARTERY;  Surgeon: Carter COREY  MD Mickey;  Location: [facility] CATH LAB/EP;  Service: Cardiology;  Laterality: N/A;    LEFT HEART CATHETERIZATION Left 2022    Procedure: Left heart cath;  Surgeon: Carter Arevalo MD;  Location: [facility] CATH LAB/EP;  Service: Cardiology;  Laterality: Left;    PROSTATE BIOPSY  10/05/2022    RADIOACTIVE SEED IMPLANTATION OF PROSTATE  2023    Procedure: INSERTION, RADIOACTIVE SEED, PROSTATE;  Surgeon: Scott Frias MD;  Location: [facility] OR [address] [place] [address];  Service: Urology;;    TRANSRECTAL ULTRASOUND EXAMINATION N/A 2023    Procedure: ULTRASOUND, RECTAL APPROACH;  Surgeon: Scott Frias MD;  Location: [facility] OR [address] [place] [address];  Service: Urology;  Laterality: N/A;       Family History   Problem Relation Age of Onset    Hypertension Mother     Diabetes Mother     Kidney disease Mother     Coronary artery disease Father          of an MI at age 60    Hyperlipidemia Father     Heart attack Father     Hypertension Sister     Diabetes Sister     Heart disease Sister     Heart attack Sister     Hyperlipidemia Sister     Coronary artery disease Sister     Diabetes Sister     Hypertension Sister     Dementia Sister     No Known Problems Sister     Stomach cancer Sister     Cancer Sister         stomach     Hypertension Brother     Stroke Brother     Lung cancer Brother     Cancer Brother         lung cancer    Diabetes Brother     Peripheral vascular disease Brother     Hypertension Brother     Hyperlipidemia Brother     No Known Problems Brother     Diabetes Brother     Stroke Brother     Hypertension Brother     Hyperlipidemia Brother     HIV Brother     Diabetes Brother     Hypertension Brother     No Known Problems Daughter     Asthma Son     Hypertension Son     Anesthesia problems Neg Hx        Social History     Socioeconomic History    Marital status:    Tobacco Use    Smoking status: Former     Packs/day: 0.50     Years: 26.00     Pack years: 13.00     Types: Cigarettes     Quit date: 1995     Years  since quittin.3     Passive exposure: Never    Smokeless tobacco: Never   Substance and Sexual Activity    Alcohol use: No    Drug use: No    Sexual activity: Yes     Partners: Female     Social Determinants of Health     Financial Resource Strain: Low Risk     Difficulty of Paying Living Expenses: Not hard at all   Food Insecurity: No Food Insecurity    Worried About Running Out of Food in the Last Year: Never true    Ran Out of Food in the Last Year: Never true   Transportation Needs: No Transportation Needs    Lack of Transportation (Medical): No    Lack of Transportation (Non-Medical): No   Physical Activity: Insufficiently Active    Days of Exercise per Week: 2 days    Minutes of Exercise per Session: 60 min   Stress: No Stress Concern Present    Feeling of Stress : Not at all   Social Connections: Moderately Isolated    Frequency of Communication with Friends and Family: More than three times a week    Frequency of Social Gatherings with Friends and Family: Three times a week    Attends Mandae[employer] Services: Never    Active Member of Clubs or Organizations: No    Attends Club or Organization Meetings: Never    Marital Status:    Housing Stability: Low Risk     Unable to Pay for Housing in the Last Year: No    Number of Places Lived in the Last Year: 1    Unstable Housing in the Last Year: No       Current Outpatient Medications   Medication Sig Dispense Refill    dapagliflozin (FARXIGA) 5 mg Tab tablet TAKE ONE TABLET BY MOUTH ONCE DAILY. 90 tablet 4    doxazosin (CARDURA) 2 MG tablet Take 1 tablet (2 mg total) by mouth every evening. 90 tablet 4    insulin (LANTUS SOLOSTAR U-100 INSULIN) glargine 100 units/mL SubQ pen Inject 15 Units into the skin 2 (two) times a day. 27 mL 3    pantoprazole (PROTONIX) 40 MG tablet TAKE ONE TABLET BY MOUTH ONCE DAILY. 90 tablet 3    tamsulosin (FLOMAX) 0.4 mg Cap Take 1 capsule (0.4 mg total) by mouth once daily. 90 capsule 2    ADVANCED GLUC METER TEST STRIP Strp  "USE TO TEST BLOOD SUGAR 4 TIMES DAILY. 100 strip 0    amLODIPine (NORVASC) 5 MG tablet Take 1 tablet (5 mg total) by mouth 2 (two) times daily. 180 tablet 4    aspirin (ECOTRIN) 81 MG EC tablet Take 1 tablet (81 mg total) by mouth once daily.      atorvastatin (LIPITOR) 80 MG tablet Take 1 tablet (80 mg total) by mouth once daily. 90 tablet 3    BD ARIA 2ND GEN PEN NEEDLE 32 gauge x 5/32" Ndle       blood sugar diagnostic (BLOOD GLUCOSE TEST) Strp Check sugar once daily 100 each 11    blood-glucose meter kit Use as instructed      clopidogreL (PLAVIX) 75 mg tablet Take 1 tablet (75 mg total) by mouth once daily. 90 tablet 4    ezetimibe (ZETIA) 10 mg tablet Take 1 tablet (10 mg total) by mouth once daily. 90 tablet 4    fluticasone propionate (FLONASE) 50 mcg/actuation nasal spray 2 sprays (100 mcg total) by Each Nostril route once daily. 16 g 2    lancets (ACCU-CHEK SOFTCLIX LANCETS) Misc 1 lancet by Misc.(Non-Drug; Combo Route) route 2 (two) times daily. 200 each 1    latanoprost 0.005 % ophthalmic solution       nitroGLYCERIN (NITROSTAT) 0.4 MG SL tablet Place 1 tablet (0.4 mg total) under the tongue every 5 (five) minutes as needed for Chest pain. 30 tablet 3    sildenafil (REVATIO) 20 mg Tab Take 1 tablet (20 mg total) by mouth daily as needed for ED (Patient not taking: Reported on 1/27/2023) 30 tablet 2     No current facility-administered medications for this visit.       Review of patient's allergies indicates:  No Known Allergies      Objective:       Vitals:    05/15/23 1015   BP: 110/65   Pulse: 74   Temp: 98 °F (36.7 °C)       Physical Exam  Constitutional:       Appearance: He is well-developed.   HENT:      Head: Normocephalic and atraumatic.      Right Ear: External ear normal.      Left Ear: External ear normal.      Nose: Nose normal.      Mouth/Throat:      Pharynx: No oropharyngeal exudate.   Eyes:      General: No scleral icterus.        Right eye: No discharge.         Left eye: No discharge. "      Conjunctiva/sclera: Conjunctivae normal.      Pupils: Pupils are equal, round, and reactive to light.   Neck:      Thyroid: No thyromegaly.      Vascular: No JVD.      Trachea: No tracheal deviation.   Cardiovascular:      Rate and Rhythm: Regular rhythm. Bradycardia present.      Pulses:           Dorsalis pedis pulses are 1+ on the right side and 1+ on the left side.        Posterior tibial pulses are 1+ on the right side and 1+ on the left side.      Heart sounds: Normal heart sounds. No murmur heard.    No friction rub. No gallop.   Pulmonary:      Effort: Pulmonary effort is normal. No respiratory distress.      Breath sounds: Normal breath sounds. No stridor. No wheezing or rales.   Chest:      Chest wall: No tenderness.   Abdominal:      General: Bowel sounds are normal. There is no distension.      Palpations: Abdomen is soft. There is no mass.      Tenderness: There is no abdominal tenderness. There is no guarding or rebound.      Hernia: No hernia is present.   Musculoskeletal:         General: Tenderness (TTP left shoulder with restricted ROM) present.      Cervical back: Normal range of motion and neck supple.      Right foot: Normal range of motion. No deformity, bunion, Charcot foot, foot drop or prominent metatarsal heads.      Left foot: Normal range of motion. No deformity, bunion, Charcot foot, foot drop or prominent metatarsal heads.   Feet:      Right foot:      Protective Sensation: 10 sites tested.  10 sites sensed.      Skin integrity: Skin integrity normal.      Toenail Condition: Right toenails are normal.      Left foot:      Protective Sensation: 10 sites tested.  10 sites sensed.      Skin integrity: Skin integrity normal.      Toenail Condition: Left toenails are normal.   Lymphadenopathy:      Cervical: No cervical adenopathy.   Skin:     General: Skin is warm and dry.      Coloration: Skin is not pale.      Findings: No erythema or rash.   Neurological:      Mental Status: He is  alert and oriented to person, place, and time.      Cranial Nerves: No cranial nerve deficit.      Motor: No abnormal muscle tone.      Coordination: Coordination normal.      Deep Tendon Reflexes: Reflexes are normal and symmetric. Reflexes normal.   Psychiatric:         Behavior: Behavior normal.         Thought Content: Thought content normal.         Judgment: Judgment normal.       Assessment:       Problem List Items Addressed This Visit       Dyslipidemia associated with type 2 diabetes mellitus    Relevant Medications    atorvastatin (LIPITOR) 80 MG tablet    Other Relevant Orders    CBC Auto Differential    Comprehensive Metabolic Panel    Hemoglobin A1C    Lipid Panel    Type 2 diabetes mellitus with stage 3a chronic kidney disease, without long-term current use of insulin    Relevant Orders    CBC Auto Differential    Comprehensive Metabolic Panel    Hemoglobin A1C    Lipid Panel    Stage 3b chronic kidney disease    Relevant Orders    CBC Auto Differential    Comprehensive Metabolic Panel    Hemoglobin A1C    Lipid Panel    Ambulatory referral/consult to Nephrology    Prostate cancer    Relevant Orders    CBC Auto Differential    Comprehensive Metabolic Panel    Hemoglobin A1C    Lipid Panel    Hypertension associated with diabetes - Primary    Relevant Orders    CBC Auto Differential    Comprehensive Metabolic Panel    Hemoglobin A1C    Lipid Panel    Coronary artery disease of native artery with stable angina pectoris    Relevant Medications    clopidogreL (PLAVIX) 75 mg tablet    Other Relevant Orders    CBC Auto Differential    Comprehensive Metabolic Panel    Hemoglobin A1C    Lipid Panel    CBC Auto Differential    Comprehensive Metabolic Panel    Hemoglobin A1C    Lipid Panel    Coronary artery disease of native artery of native heart with stable angina pectoris    Relevant Orders    CBC Auto Differential    Comprehensive Metabolic Panel    Hemoglobin A1C    Lipid Panel    Chronic left shoulder  pain    Relevant Orders    Ambulatory referral/consult to Physical/Occupational Therapy    CBC Auto Differential    Comprehensive Metabolic Panel    Hemoglobin A1C    Lipid Panel       Plan:           Addy was seen today for follow-up.    Diagnoses and all orders for this visit:    Hypertension associated with diabetes  -     CBC Auto Differential; Future  -     Comprehensive Metabolic Panel; Future  -     Hemoglobin A1C; Future  -     Lipid Panel; Future    Coronary artery disease of native artery with stable angina pectoris, unspecified whether native or transplanted heart  -     clopidogreL (PLAVIX) 75 mg tablet; Take 1 tablet (75 mg total) by mouth once daily.  -     CBC Auto Differential; Future  -     Comprehensive Metabolic Panel; Future  -     Hemoglobin A1C; Future  -     Lipid Panel; Future    Dyslipidemia associated with type 2 diabetes mellitus  -     atorvastatin (LIPITOR) 80 MG tablet; Take 1 tablet (80 mg total) by mouth once daily.  -     CBC Auto Differential; Future  -     Comprehensive Metabolic Panel; Future  -     Hemoglobin A1C; Future  -     Lipid Panel; Future    Stage 3b chronic kidney disease  -     CBC Auto Differential; Future  -     Comprehensive Metabolic Panel; Future  -     Hemoglobin A1C; Future  -     Lipid Panel; Future  -     Ambulatory referral/consult to Nephrology; Future    Coronary artery disease of native artery of native heart with stable angina pectoris  -     CBC Auto Differential; Future  -     Comprehensive Metabolic Panel; Future  -     Hemoglobin A1C; Future  -     Lipid Panel; Future    Type 2 diabetes mellitus with stage 3a chronic kidney disease, without long-term current use of insulin  -     CBC Auto Differential; Future  -     Comprehensive Metabolic Panel; Future  -     Hemoglobin A1C; Future  -     Lipid Panel; Future    Prostate cancer  -     CBC Auto Differential; Future  -     Comprehensive Metabolic Panel; Future  -     Hemoglobin A1C; Future  -     Lipid  Panel; Future    Chronic left shoulder pain  -     Ambulatory referral/consult to Physical/Occupational Therapy; Future  -     CBC Auto Differential; Future  -     Comprehensive Metabolic Panel; Future  -     Hemoglobin A1C; Future  -     Lipid Panel; Future    HTN   -at goal  -continue lisinopril to 20 mg/ day    DM II   -controlled   -continue farxiga daily    CKD 3  -follow nephrology  -ER precautions given    HLD   -improving  -continue lipitor    GERD   -stable   -takes OTC PPI     ED'  -controlled    Left shoulder pain  -x ray  -likely arthritis vs degeneration  -cannot r/o mets if prostate biopsy confirms CA  -ortho and OT precautions given  -REFER OT      Spent adequate time in obtaining history and explaining differentials      30 minutes spent during this visit of which greater than 50% devoted to face-face counseling and coordination of care regarding diagnosis and management plan    Rtc 3 m r prn

## 2023-05-17 ENCOUNTER — CLINICAL SUPPORT (OUTPATIENT)
Dept: REHABILITATION | Facility: HOSPITAL | Age: 68
End: 2023-05-17
Attending: FAMILY MEDICINE
Payer: MEDICARE

## 2023-05-17 DIAGNOSIS — M25.512 CHRONIC LEFT SHOULDER PAIN: ICD-10-CM

## 2023-05-17 DIAGNOSIS — M25.60 STIFFNESS IN JOINT: ICD-10-CM

## 2023-05-17 DIAGNOSIS — G89.29 CHRONIC LEFT SHOULDER PAIN: ICD-10-CM

## 2023-05-17 DIAGNOSIS — M79.602 PAIN OF LEFT UPPER EXTREMITY: ICD-10-CM

## 2023-05-17 DIAGNOSIS — R53.1 WEAKNESS: ICD-10-CM

## 2023-05-17 PROCEDURE — 97110 THERAPEUTIC EXERCISES: CPT | Mod: PN

## 2023-05-17 PROCEDURE — 97165 OT EVAL LOW COMPLEX 30 MIN: CPT | Mod: PN

## 2023-05-17 NOTE — PLAN OF CARE
[facility] Occupational Therapy  Initial Evaluation     Date: 5/17/2023  Name: Addy Redding  [facility] Number: [id number]    Therapy Diagnosis:   Encounter Diagnoses   Name Primary?    Chronic left shoulder pain     Pain of left upper extremity     Weakness     Stiffness in joint      Physician: Stephan Ramey MD    Physician Orders: OT evaluate and treat  Medical Diagnosis: M25.512,G89.29 (ICD-10-CM) - Chronic left shoulder pain  Date of Injury/Onset: about a year  Evaluation Date: 5/17/2023  Insurance Authorization Period Expiration: 12/31/2023  Plan of Care Expiration: 8/14/2023  Date of Return to MD: 8/15/2023  Visit # / Visits authorized: 1 / 1  FOTO: initial eval     Precautions:  Standard    Time In: 9:10  Time Out: 9:50  Total Appointment Time (timed & untimed codes): 40 minutes    Subjective     History of Current Condition/Mechanism of Injury: Addy reports: he states his shoulder has been hurting him for about a year and 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     Current Functional Status   Home/Living environment: lives with their spouse      Limitation of Functional Status as follows:   ADLs/IADLs:     - Feeding: Independent    - Bathing: Independent    - Dressing/Grooming: Independent    - Driving: Independent     Leisure: Independent with yard work, difficulty with sleeping    Pain:  Functional Pain Scale Rating 0-10: Current 7/10, worst 10/10, best 0/10   Location: Left lateral shoulder and into neck  Description: Aching  Aggravating Factors: Laying and Night Time  Easing Factors: rest    Patient's Goals for Therapy: to decrease pain and increase functional use    Medical History:   Past Medical History:   Diagnosis Date    Costochondritis     Diabetic  nephropathy associated with type 2 diabetes mellitus     Diabetic retinopathy     ED (erectile dysfunction)     GERD (gastroesophageal reflux disease)     Hyperlipidemia     Hypertension     Prostate cancer     Type II or unspecified type diabetes mellitus with renal manifestations, uncontrolled(250.42)        Surgical History:    has a past surgical history that includes Bone biopsy right femur; Left heart catheterization (Left, 04/07/2022); Coronary angiography (N/A, 06/16/2022); Prostate biopsy (10/05/2022); Radioactive seed implantation of prostate (1/12/2023); and Transrectal ultrasound examination (N/A, 1/12/2023).    Medications:   has a current medication list which includes the following prescription(s): advanced gluc meter test strip, amlodipine, aspirin, atorvastatin, bd iliana 2nd gen pen needle, blood sugar diagnostic, blood-glucose meter, clopidogrel, farxiga, doxazosin, ezetimibe, fluticasone propionate, insulin, lancets, latanoprost, nitroglycerin, pantoprazole, sildenafil, and tamsulosin.    Allergies:   Review of patient's allergies indicates:  No Known Allergies       Objective       Sensation Test: Patient denies any numbness/tingling    Observation/Inspection:  normal muscle tone for age    Range of Motion/Strength:   Shoulder  Left  Right  Pain/Dysfunction with Movement    AROM PROM MMT AROM MMT    Flexion 120 WNL 3-/5 WNL WNL    Extension 50 WNL 3/5 WNL WNL    Abduction 95 WNL 3-/5 WNL WNL    HorizAdduction unable WNL 2-/5 WNL WNL    Internal rotation L4 WNL 4-/5 WNL WNL    ER at 90° abd 75 WNL 3-/5 WNL WNL    ER at 0° abd 45 WNL 3-/5 WNL WNL    NT=Not tested  ROM Comments: Pain at end range and with descending motion    Tenderness upon Palpation:      Positive: Bicipital Groove and Supraspinatus Region    Special Tests:  AC Joint Left Right   AC Joint Compression Test - -   Empty Can Test + -   Drop Arm test + -   Champagne Toast + -   Resisted External Rotation 45* Internal Roatation +pain  "-weakness -   Bear Hug - -   Mary's - -   Hawkin's Kenndy + -   Neer's Test + -   Yergason's - -   Anterior Apprehension test - -     Scapular Control/Dyskinesis:    Normal / Subtle / Obvious  Comments    Left  Subtle -    Right  Normal -       CMS Impairment/Limitation/Restriction for FOTO Shoulder Survey    Therapist reviewed FOTO scores for Addy Redding on 5/17/2023.   FOTO documents entered into [employer] - see Media section.    Limitation Score: 55%  Category: Self Care         Treatment     Total Treatment time (time-based codes) separate from Evaluation: 10 minutes    Addy received therapeutic exercises for 10 minutes including:  -Shoulder flexion with the wand 5 repetitions, Sidelying Abduction 5 repetitions, Sidelying External Rotation 5 repetitions, Theraband pull downs 5 repetitions, Theraband Rows 5 repetitions, Theraband External Rotation 5 repetitions, Theraband Internal Rotation 5 repetitions, Theraband Horizontal Abduction 5 repetitions, corner pectoralis stretch 1/30", Internal rotation pulley stretch 1/30"    Patient Education and Home Exercises      Education provided:   - on current condition and home exercise program     Written Home Exercises Provided: yes.  Exercises were reviewed and Addy was able to demonstrate them prior to the end of the session.  Addy demonstrated good  understanding of the education provided. See EMR under Patient Instructions for exercises provided during therapy sessions.     Pt was advised to perform these exercises free of pain, and to stop performing them if pain occurs.    Patient/Family Education: role of OT, goals for OT, scheduling/cancellations - pt verbalized understanding. Discussed insurance limitations with patient.    Assessment     Addy Redding is a 67 y.o. male referred to outpatient occupational therapy and presents with a medical diagnosis of Left shoulder pain.  Patient presents with the following therapy deficits: Decreased ROM, Decreased muscle strength, " Increased pain, and Joint Stiffness and demonstrates limitations as described in the chart below. Following medical record review it is determined that pt will benefit from occupational therapy services in order to maximize pain free and/or functional use of left shoulder. The following goals were discussed with the patient and patient is in agreement with them as to be addressed in the treatment plan. The patient's rehab potential is Good.     Anticipated barriers to occupational therapy: none  Pt has no cultural, educational or language barriers to learning provided.    Profile and History Assessment of Occupational Performance Level of Clinical Decision Making Complexity Score   Occupational Profile:   Addy Redding is a 67 y.o. male who lives with their spouse and is currently employed Addy Redding has difficulty with  ADLs and IADLs as listed previously, which  Affecting hisdaily functional abilities.      Comorbidities:    has a past medical history of Costochondritis, Diabetic nephropathy associated with type 2 diabetes mellitus, Diabetic retinopathy, ED (erectile dysfunction), GERD (gastroesophageal reflux disease), Hyperlipidemia, Hypertension, Prostate cancer, and Type II or unspecified type diabetes mellitus with renal manifestations, uncontrolled(250.42).    Medical and Therapy History Review:   Brief               Performance Deficits    Physical:  Joint Mobility  Muscle Power/Strength  Muscle Endurance  Postural Control  Pain    Cognitive:  No Deficits    Psychosocial:    No Deficits     Clinical Decision Making:  low    Assessment Process:  Problem-Focused Assessments    Modification/Need for Assistance:  Not Necessary    Intervention Selection:  Several Treatment Options       low  Based on PMHX, co morbidities , data from assessments and functional level of assistance required with task and clinical presentation directly impacting function.         Goals:   The following goals were discussed with the  patient and patient is in agreement with them as to be addressed in the treatment plan.     Short Term Goals to be met in 4 weeks: (6/17/2023)  1) Initiate Hep   2) Pt will increase Left shoulder AROM by 10 degrees grossly for improved performance with overhead ADL's  3) Pt will report 6/10 pain in (Left)shoulder at worst  4) Pt will demonstrate increased MMT to 4-/5 grossly Left shoulder  5) Patient will be able to achieve less than or equal to 45% on FOTO shoulder survey demonstrating overall improved functional ability with upper extremity.     Long Term Goals to be met by discharge:  1) Independent with HEP  2) Pt will demonstrate (Left) shoulder AROM within functional limits  grossly for [place] with ADL's  3) Pt will demonstrate (Left) shoulder MMT within functional limits  grossly for [place] with functional activities  4) Independent and pain free with ADL's and IADL's  5) Patient will be able to achieve less than or equal to 25% on FOTO shoulder survey demonstrating overall improved functional ability with upper extremity.       Plan   Plan of Care Certification: 5/17/2023 to 8/14/2023.     Outpatient Occupational Therapy 2 times weekly for 12 weeks to include the following interventions: Manual therapy/joint mobilizations, Modalities for pain management, Therapeutic exercises/activities., Strengthening, Joint Protection, and Energy Conservation.      SCOTTY Mosley      I CERTIFY THE NEED FOR THESE SERVICES FURNISHED UNDER THIS PLAN OF TREATMENT AND WHILE UNDER MY CARE  Physician's comments:      Physician's Signature: ___________________________________________________

## 2023-05-18 LAB
LEFT EYE DM RETINOPATHY: NEGATIVE
RIGHT EYE DM RETINOPATHY: NEGATIVE

## 2023-05-19 ENCOUNTER — PATIENT OUTREACH (OUTPATIENT)
Dept: ADMINISTRATIVE | Facility: HOSPITAL | Age: 68
End: 2023-05-19
Payer: MEDICARE

## 2023-05-19 NOTE — PROGRESS NOTES
Non-compliant GAP report chart review - Chart review completed for the following HM test if overdue  (Mammogram, Colonoscopy, Cervical Cancer Screening,  Diabetic lab testing, and/or Dilated EYE EXAM)      Care Everywhere and Media reports - updates requested and reviewed.        Health Maintenance Due   Topic Date Due    COVID-19 Vaccine (5 - Booster for Moderna series) 06/29/2022

## 2023-05-24 NOTE — PROGRESS NOTES
"[facility]  Occupational Therapy Treatment Note     Date: 5/25/2023  Name: Addy Redding  [facility] Number: [id number]    Therapy Diagnosis:   Encounter Diagnoses   Name Primary?    Pain of left upper extremity Yes    Weakness     Stiffness in joint      Physician: Stephan Ramey MD    Physician Orders: OT evaluate and treat  Medical Diagnosis: M25.512,G89.29 (ICD-10-CM) - Chronic left shoulder pain  Date of Injury/Onset: about a year  Evaluation Date: 5/17/2023  Insurance Authorization Period Expiration: 12/31/2023  Plan of Care Expiration: 8/14/2023  Date of Return to MD: 8/15/2023  Visit # / Visits authorized: 2/ 1  FOTO: initial eval      Precautions:  Standard     Time In: 8:30  Time Out: 9:15  Total Appointment Time (timed & untimed codes): 45 minutes    Subjective     Pt reports: "I've been doing my exercises."  he was compliant with home exercise program given last session.   Response to previous treatment:able to progress with exercises  Functional change: able to progress with exercises    Pain: 0/10 at best; 10/10 at worst with certain motions  Location: left shoulder      Objective     Objective Measures updated at progress report unless specified.   Sensation Test: Patient denies any numbness/tingling     Observation/Inspection:  normal muscle tone for age     Range of Motion/Strength:   Shoulder   Left   Right   Pain/Dysfunction with Movement     AROM PROM MMT AROM MMT     Flexion 120 WNL 3-/5 WNL WNL     Extension 50 WNL 3/5 WNL WNL     Abduction 95 WNL 3-/5 WNL WNL     HorizAdduction unable WNL 2-/5 WNL WNL     Internal rotation L4 WNL 4-/5 WNL WNL     ER at 90° abd 75 WNL 3-/5 WNL WNL     ER at 0° abd 45 WNL 3-/5 WNL WNL     NT=Not tested  ROM Comments: Pain at end range and with descending motion     Tenderness upon Palpation:      Positive: Bicipital Groove and Supraspinatus Region     Special Tests:  AC Joint Left Right   AC Joint Compression Test - -   Empty Can Test + - "   Drop Arm test + -   Champagne Toast + -   Resisted External Rotation 45* Internal Roatation +pain -weakness -   Bear Hug - -   Mary's - -   Hawkin's Kenndy + -   Neer's Test + -   Yergason's - -   Anterior Apprehension test - -      Scapular Control/Dyskinesis:     Normal / Subtle / Obvious  Comments    Left  Subtle -    Right  Normal -         CMS Impairment/Limitation/Restriction for FOTO Shoulder Survey     Therapist reviewed FOTO scores for Addy Redding on 5/17/2023.   FOTO documents entered into [employer] - see Media section.     Limitation Score: 55%  Category: Self Care         Treatment     Addy received the treatments listed below:     Manual therapy techniques: Soft tissue Mobilization x  10 minutes, including:  consisting of patient supine for Left biceps and upper trapezius soft tissue mobilization, pectoralis lift, subscapularis myofascial release and stretch, PROM with endrange stretching, glenohumeral joint inferior anterior posterior glides grade I-II, gentle shoulder oscillations    Therapeutic exercises to develop strength and ROM for 35 minutes, including:  -Scifit UBE forward/reverse 3 minutes each Level 1  -passive range of motion left shoulder all motions   -supine dowel chest press 2/15  -supine dowel flexion 2/15  -sidelying external rotation 2/15  -standing dowel abduction 2/15  -pulleys 3 minutes   -wall slides 2/15  -Red theraband extension pulls 2/15  -Red theraband rows 2/15  -Red theraband external rotation 2/15    Patient Education and Home Exercises     Education provided:   - Progress towards goals     Written Home Exercises Provided: Patient instructed to cont prior HEP.  Exercises were reviewed and Addy was able to demonstrate them prior to the end of the session.  Addy demonstrated good  understanding of the HEP provided.   .   See EMR under Patient Instructions for exercises provided prior visit.      Assessment   Pt with good participation this date. Pain report low in session.  Pt able to progress with exercises completing all in a pain free range of motion and good technique. Plan to issue updated home exercise program if no c/o next session.     Addy is progressing well towards his goals and there are no updates to goals at this time. Pt prognosis is Good.     Pt will continue to benefit from skilled outpatient occupational therapy to address the deficits listed in the problem list on initial evaluation provide pt/family education and to maximize pt's level of independence in the home and community environment.     Anticipated barriers to occupational therapy: none    Pt's spiritual, cultural and educational needs considered and pt agreeable to plan of care and goals.    Goals:  Short Term Goals to be met in 4 weeks: (6/17/2023)  1) Initiate Hep -met, ongoing  2) Pt will increase Left shoulder AROM by 10 degrees grossly for improved performance with overhead activities of daily living's -Not met, progressing  3) Pt will report 6/10 pain in (Left)shoulder at worst -Not met, progressing  4) Pt will demonstrate increased MMT to 4-/5 grossly Left shoulder -Not met, progressing  5) Patient will be able to achieve less than or equal to 45% on FOTO shoulder survey demonstrating overall improved functional ability with upper extremity. -Not met, progressing     Long Term Goals to be met by discharge:  1) Independent with home exercise program -Not met, progressing  2) Pt will demonstrate (Left) shoulder AROM within functional limits  grossly for [place] with activities of daily living's -Not met, progressing  3) Pt will demonstrate (Left) shoulder MMT within functional limits  grossly for [place] with functional activities -Not met, progressing  4) Independent and pain free with ADL's and IADL's -Not met, progressing  5) Patient will be able to achieve less than or equal to 25% on FOTO shoulder survey demonstrating overall improved functional ability with upper extremity. -Not met,  progressing       Plan   Continue with OT plan of care   Updates/Grading for next session: progress as able    SCOTTY Mosley

## 2023-05-25 ENCOUNTER — CLINICAL SUPPORT (OUTPATIENT)
Dept: REHABILITATION | Facility: HOSPITAL | Age: 68
End: 2023-05-25
Payer: MEDICARE

## 2023-05-25 DIAGNOSIS — M79.602 PAIN OF LEFT UPPER EXTREMITY: Primary | ICD-10-CM

## 2023-05-25 DIAGNOSIS — R53.1 WEAKNESS: ICD-10-CM

## 2023-05-25 DIAGNOSIS — M25.60 STIFFNESS IN JOINT: ICD-10-CM

## 2023-05-25 PROCEDURE — 97110 THERAPEUTIC EXERCISES: CPT | Mod: PN

## 2023-05-25 PROCEDURE — 97140 MANUAL THERAPY 1/> REGIONS: CPT | Mod: PN

## 2023-05-30 ENCOUNTER — CLINICAL SUPPORT (OUTPATIENT)
Dept: REHABILITATION | Facility: HOSPITAL | Age: 68
End: 2023-05-30
Payer: MEDICARE

## 2023-05-30 DIAGNOSIS — M25.60 STIFFNESS IN JOINT: ICD-10-CM

## 2023-05-30 DIAGNOSIS — M79.602 PAIN OF LEFT UPPER EXTREMITY: Primary | ICD-10-CM

## 2023-05-30 DIAGNOSIS — R53.1 WEAKNESS: ICD-10-CM

## 2023-05-30 PROCEDURE — 97110 THERAPEUTIC EXERCISES: CPT | Mod: PN

## 2023-05-30 PROCEDURE — 97140 MANUAL THERAPY 1/> REGIONS: CPT | Mod: PN

## 2023-05-30 NOTE — PROGRESS NOTES
"[facility]  Occupational Therapy Treatment Note     Date: 5/30/2023  Name: Addy Redding  [facility] Number: [id number]    Therapy Diagnosis:   Encounter Diagnoses   Name Primary?    Pain of left upper extremity Yes    Weakness     Stiffness in joint      Physician: Stephan Ramey MD    Physician Orders: OT evaluate and treat  Medical Diagnosis: M25.512,G89.29 (ICD-10-CM) - Chronic left shoulder pain  Date of Injury/Onset: about a year  Evaluation Date: 5/17/2023  Insurance Authorization Period Expiration: 12/31/2023  Plan of Care Expiration: 8/14/2023  Date of Return to MD: 8/15/2023  Visit # / Visits authorized: 2/ 1  FOTO: initial eval      Precautions:  Standard     Time In: 8:30  Time Out: 9:15  Total Appointment Time (timed & untimed codes): 45 minutes    Subjective     Pt reports: "My shoulder is feeling better today."  he was compliant with home exercise program given last session.   Response to previous treatment: decreased pain  Functional change: able to progress with exercises, issued updated home exercise program     Pain: 3-4/10 at worst  Location: left shoulder      Objective     Objective Measures updated at progress report unless specified.   Sensation Test: Patient denies any numbness/tingling     Observation/Inspection:  normal muscle tone for age     Range of Motion/Strength:   Shoulder   Left   Right   Pain/Dysfunction with Movement     AROM PROM MMT AROM MMT     Flexion 120 WNL 3-/5 WNL WNL     Extension 50 WNL 3/5 WNL WNL     Abduction 95 WNL 3-/5 WNL WNL     HorizAdduction unable WNL 2-/5 WNL WNL     Internal rotation L4 WNL 4-/5 WNL WNL     ER at 90° abd 75 WNL 3-/5 WNL WNL     ER at 0° abd 45 WNL 3-/5 WNL WNL     NT=Not tested  ROM Comments: Pain at end range and with descending motion     Tenderness upon Palpation:      Positive: Bicipital Groove and Supraspinatus Region     Special Tests:  AC Joint Left Right   AC Joint Compression Test - -   Empty Can Test + -   Drop " Arm test + -   Champagne Toast + -   Resisted External Rotation 45* Internal Roatation +pain -weakness -   Bear Hug - -   Mary's - -   Hawkin's Kenndy + -   Neer's Test + -   Yergason's - -   Anterior Apprehension test - -      Scapular Control/Dyskinesis:     Normal / Subtle / Obvious  Comments    Left  Subtle -    Right  Normal -         CMS Impairment/Limitation/Restriction for FOTO Shoulder Survey     Therapist reviewed FOTO scores for Addy Redding on 5/17/2023.   FOTO documents entered into [employer] - see Media section.     Limitation Score: 55%  Category: Self Care         Treatment     Addy received the treatments listed below:     Manual therapy techniques: Soft tissue Mobilization x  10 minutes, including:  consisting of patient supine for Left biceps and upper trapezius soft tissue mobilization, pectoralis lift, subscapularis myofascial release and stretch, PROM with endrange stretching, glenohumeral joint inferior anterior posterior glides grade I-II, gentle shoulder oscillations    Therapeutic exercises to develop strength and ROM for 35 minutes, including:  -Scifit UBE forward/reverse 3 minutes each Level 1  -passive range of motion left shoulder all motions   -supine dowel chest press 1# 2/15  -supine dowel flexion 1# 2/15  -sidelying external rotation 1# 2/15  -sidelying abduction 2/15  -pulleys 3 minutes   -wall slides 2/15  -Green theraband extension pulls 2/15  -Green theraband rows 2/15  -Red theraband external rotation 2/15    Patient Education and Home Exercises     Education provided:   - Progress towards goals     Written Home Exercises Provided: Patient instructed to cont prior HEP.  Exercises were reviewed and Addy was able to demonstrate them prior to the end of the session.  Addy demonstrated good  understanding of the HEP provided.   .   See EMR under Patient Instructions for exercises provided prior visit.      Assessment   Pt with good participation this date. Pain report continues to  improve each session and is low. Pt able to progress with exercises completing all in a pain free range of motion and good technique. Issued updated home exercise program. Pt motivated.    Addy is progressing well towards his goals and there are no updates to goals at this time. Pt prognosis is Good.     Pt will continue to benefit from skilled outpatient occupational therapy to address the deficits listed in the problem list on initial evaluation provide pt/family education and to maximize pt's level of independence in the home and community environment.     Anticipated barriers to occupational therapy: none    Pt's spiritual, cultural and educational needs considered and pt agreeable to plan of care and goals.    Goals:  Short Term Goals to be met in 4 weeks: (6/17/2023)  1) Initiate Hep -met, ongoing  2) Pt will increase Left shoulder AROM by 10 degrees grossly for improved performance with overhead activities of daily living's -Not met, progressing  3) Pt will report 6/10 pain in (Left)shoulder at worst -Not met, progressing  4) Pt will demonstrate increased MMT to 4-/5 grossly Left shoulder -Not met, progressing  5) Patient will be able to achieve less than or equal to 45% on FOTO shoulder survey demonstrating overall improved functional ability with upper extremity. -Not met, progressing     Long Term Goals to be met by discharge:  1) Independent with home exercise program -Not met, progressing  2) Pt will demonstrate (Left) shoulder AROM within functional limits  grossly for [place] with activities of daily living's -Not met, progressing  3) Pt will demonstrate (Left) shoulder MMT within functional limits  grossly for [place] with functional activities -Not met, progressing  4) Independent and pain free with ADL's and IADL's -Not met, progressing  5) Patient will be able to achieve less than or equal to 25% on FOTO shoulder survey demonstrating overall improved functional ability with upper  extremity. -Not met, progressing       Plan   Continue with OT plan of care   Updates/Grading for next session: progress as able    SCOTTY Mosley

## 2023-05-31 NOTE — PROGRESS NOTES
"[facility]  Occupational Therapy Treatment Note     Date: 6/1/2023  Name: Addy Redding  [facility] Number: [id number]    Therapy Diagnosis:   Encounter Diagnoses   Name Primary?    Pain of left upper extremity Yes    Weakness     Stiffness in joint      Physician: Stephan Ramey MD    Physician Orders: OT evaluate and treat  Medical Diagnosis: M25.512,G89.29 (ICD-10-CM) - Chronic left shoulder pain  Date of Injury/Onset: about a year  Evaluation Date: 5/17/2023  Insurance Authorization Period Expiration: 12/31/2023  Plan of Care Expiration: 8/14/2023  Date of Return to MD: 8/15/2023  Visit # / Visits authorized: 4/ 1  FOTO: initial eval      Precautions:  Standard     Time In: 8:30  Time Out: 9:15  Total Appointment Time (timed & untimed codes): 45 minutes    Subjective     Pt reports: "I still have trouble moving this arm but overall it's improving."  he was compliant with home exercise program given last session.   Response to previous treatment: same pain  Functional change: able to progress with exercises, increased range of motion     Pain: 3-4/10 at worst  Location: left shoulder      Objective     Objective Measures updated at progress report unless specified.   Sensation Test: Patient denies any numbness/tingling     Observation/Inspection:  normal muscle tone for age     Range of Motion/Strength:   Shoulder   Left   Right   Pain/Dysfunction with Movement     AROM PROM MMT AROM MMT     Flexion 145(+25) WNL 3-/5 WNL WNL     Extension 55(+5) WNL 3/5 WNL WNL     Abduction 145(+40) WNL 3-/5 WNL WNL     HorizAdduction unable WNL 2-/5 WNL WNL     Internal rotation L4(=) WNL 4-/5 WNL WNL     ER at 90° abd 75(=) WNL 3-/5 WNL WNL     ER at 0° abd 45(=) WNL 3-/5 WNL WNL     NT=Not tested  ROM Comments: Pain at end range and with descending motion     Tenderness upon Palpation:      Positive: Bicipital Groove and Supraspinatus Region     Special Tests:  AC Joint Left Right   AC Joint Compression " Test - -   Empty Can Test + -   Drop Arm test + -   Champagne Toast + -   Resisted External Rotation 45* Internal Roatation +pain -weakness -   Bear Hug - -   Mary's - -   Hawkin's Kenndy + -   Neer's Test + -   Yergason's - -   Anterior Apprehension test - -      Scapular Control/Dyskinesis:     Normal / Subtle / Obvious  Comments    Left  Subtle -    Right  Normal -         CMS Impairment/Limitation/Restriction for FOTO Shoulder Survey     Therapist reviewed FOTO scores for Addy Redding on 5/17/2023.   FOTO documents entered into [employer] - see Media section.     Limitation Score: 55%  Category: Self Care         Treatment     Addy received the treatments listed below:     Manual therapy techniques: Soft tissue Mobilization x  10 minutes, including:  consisting of patient supine for Left biceps and upper trapezius soft tissue mobilization, pectoralis lift, subscapularis myofascial release and stretch, PROM with endrange stretching, glenohumeral joint inferior anterior posterior glides grade I-II, gentle shoulder oscillations    Therapeutic exercises to develop strength and ROM for 35 minutes, including:  -Scifit UBE forward/reverse 3 minutes each Level 2  -passive range of motion left shoulder all motions   -supine dowel chest press 2# 2/15  -supine dowel flexion 2# 2/15  -sidelying external rotation 2# 2/15  -sidelying abduction 1# 2/15  -pulleys 3 minutes   -wall slides 2/15  -Green theraband extension pulls 2/15  -Green theraband rows 2/15  -Green theraband external rotation 2/15  -Green theraband horizontal abduction 2/15    Patient Education and Home Exercises     Education provided:   - Progress towards goals     Written Home Exercises Provided: Patient instructed to cont prior HEP.  Exercises were reviewed and Addy was able to demonstrate them prior to the end of the session.  Addy demonstrated good  understanding of the HEP provided.   .   See EMR under Patient Instructions for exercises provided prior  visit.      Assessment   Pt with good participation this date. Pain report remains the same as previous session however is low.  Pt able to progress with exercises completing all in a pain free range of motion and good technique. Able to increase weight and resistance with exercises without c/o. Range of motion steadily improving. Pt motivated.    Addy is progressing well towards his goals and there are no updates to goals at this time. Pt prognosis is Good.     Pt will continue to benefit from skilled outpatient occupational therapy to address the deficits listed in the problem list on initial evaluation provide pt/family education and to maximize pt's level of independence in the home and community environment.     Anticipated barriers to occupational therapy: none    Pt's spiritual, cultural and educational needs considered and pt agreeable to plan of care and goals.    Goals:  Short Term Goals to be met in 4 weeks: (6/17/2023)  1) Initiate Hep -met, ongoing  2) Pt will increase Left shoulder AROM by 10 degrees grossly for improved performance with overhead activities of daily living's -Not met, progressing  3) Pt will report 6/10 pain in (Left)shoulder at worst -Not met, progressing  4) Pt will demonstrate increased MMT to 4-/5 grossly Left shoulder -Not met, progressing  5) Patient will be able to achieve less than or equal to 45% on FOTO shoulder survey demonstrating overall improved functional ability with upper extremity. -Not met, progressing     Long Term Goals to be met by discharge:  1) Independent with home exercise program -Not met, progressing  2) Pt will demonstrate (Left) shoulder AROM within functional limits  grossly for [place] with activities of daily living's -Not met, progressing  3) Pt will demonstrate (Left) shoulder MMT within functional limits  grossly for [place] with functional activities -Not met, progressing  4) Independent and pain free with ADL's and IADL's -Not met,  progressing  5) Patient will be able to achieve less than or equal to 25% on FOTO shoulder survey demonstrating overall improved functional ability with upper extremity. -Not met, progressing       Plan   Continue with OT plan of care   Updates/Grading for next session: progress as able    SCOTTY Mosley

## 2023-06-01 ENCOUNTER — CLINICAL SUPPORT (OUTPATIENT)
Dept: REHABILITATION | Facility: HOSPITAL | Age: 68
End: 2023-06-01
Payer: MEDICARE

## 2023-06-01 ENCOUNTER — HOSPITAL ENCOUNTER (OUTPATIENT)
Dept: CARDIOLOGY | Facility: HOSPITAL | Age: 68
Discharge: HOME | End: 2023-06-01
Attending: INTERNAL MEDICINE
Payer: MEDICARE

## 2023-06-01 VITALS
WEIGHT: 216 LBS | HEART RATE: 52 BPM | BODY MASS INDEX: 28.63 KG/M2 | HEIGHT: 73 IN | DIASTOLIC BLOOD PRESSURE: 69 MMHG | SYSTOLIC BLOOD PRESSURE: 152 MMHG

## 2023-06-01 DIAGNOSIS — Z95.5 PRESENCE OF DRUG-ELUTING STENT IN LEFT CIRCUMFLEX CORONARY ARTERY: ICD-10-CM

## 2023-06-01 DIAGNOSIS — R53.1 WEAKNESS: ICD-10-CM

## 2023-06-01 DIAGNOSIS — M79.602 PAIN OF LEFT UPPER EXTREMITY: Primary | ICD-10-CM

## 2023-06-01 DIAGNOSIS — M25.60 STIFFNESS IN JOINT: ICD-10-CM

## 2023-06-01 DIAGNOSIS — I25.118 CORONARY ARTERY DISEASE OF NATIVE ARTERY OF NATIVE HEART WITH STABLE ANGINA PECTORIS: ICD-10-CM

## 2023-06-01 LAB
CFR FLOW - ANTERIOR: 1.47
CFR FLOW - INFERIOR: 1.35
CFR FLOW - LATERAL: 1.65
CFR FLOW - MAX: 1.99
CFR FLOW - MIN: 0.84
CFR FLOW - SEPTAL: 1.52
CFR FLOW - WHOLE HEART: 1.5
CV PHARM DOSE: 0.4 MG
CV STRESS BASE HR: 52 BPM
DIASTOLIC BLOOD PRESSURE: 69 MMHG
EJECTION FRACTION- HIGH: 59 %
END DIASTOLIC INDEX-HIGH: 155 ML/M2
END DIASTOLIC INDEX-LOW: 91 ML/M2
END SYSTOLIC INDEX-HIGH: 78 ML/M2
END SYSTOLIC INDEX-LOW: 40 ML/M2
NUC REST DIASTOLIC VOLUME INDEX: 118
NUC REST EJECTION FRACTION: 60
NUC REST SYSTOLIC VOLUME INDEX: 48
NUC STRESS DIASTOLIC VOLUME INDEX: 116
NUC STRESS EJECTION FRACTION: 62 %
NUC STRESS SYSTOLIC VOLUME INDEX: 44
OHS CV CPX 85 PERCENT MAX PREDICTED HEART RATE MALE: 128
OHS CV CPX MAX PREDICTED HEART RATE: 151
OHS CV CPX PATIENT IS FEMALE: 0
OHS CV CPX PATIENT IS MALE: 1
OHS CV CPX PEAK DIASTOLIC BLOOD PRESSURE: 31 MMHG
OHS CV CPX PEAK HEAR RATE: 53 BPM
OHS CV CPX PEAK RATE PRESSURE PRODUCT: 5989
OHS CV CPX PEAK SYSTOLIC BLOOD PRESSURE: 113 MMHG
OHS CV CPX PERCENT MAX PREDICTED HEART RATE ACHIEVED: 35
OHS CV CPX RATE PRESSURE PRODUCT PRESENTING: 7904
REST FLOW - ANTERIOR: 0.55 CC/MIN/G
REST FLOW - INFERIOR: 0.53 CC/MIN/G
REST FLOW - LATERAL: 0.59 CC/MIN/G
REST FLOW - MAX: 0.75 CC/MIN/G
REST FLOW - MIN: 0.35 CC/MIN/G
REST FLOW - SEPTAL: 0.5 CC/MIN/G
REST FLOW - WHOLE HEART: 0.54 CC/MIN/G
RETIRED EF AND QEF - SEE NOTES: 47 %
STRESS FLOW - ANTERIOR: 0.81 CC/MIN/G
STRESS FLOW - INFERIOR: 0.71 CC/MIN/G
STRESS FLOW - LATERAL: 0.97 CC/MIN/G
STRESS FLOW - MAX: 1.11 CC/MIN/G
STRESS FLOW - MIN: 0.38 CC/MIN/G
STRESS FLOW - SEPTAL: 0.76 CC/MIN/G
STRESS FLOW - WHOLE HEART: 0.81 CC/MIN/G
SYSTOLIC BLOOD PRESSURE: 152 MMHG

## 2023-06-01 PROCEDURE — 78434 AQMBF PET REST & RX STRESS: CPT

## 2023-06-01 PROCEDURE — 97140 MANUAL THERAPY 1/> REGIONS: CPT | Mod: PN

## 2023-06-01 PROCEDURE — 97110 THERAPEUTIC EXERCISES: CPT | Mod: PN

## 2023-06-01 PROCEDURE — 63600175 PHARM REV CODE 636 W HCPCS: Performed by: INTERNAL MEDICINE

## 2023-06-01 PROCEDURE — 78431 CARDIAC PET SCAN STRESS (CUPID ONLY): ICD-10-PCS | Mod: 26,,, | Performed by: INTERNAL MEDICINE

## 2023-06-01 PROCEDURE — 78431 MYOCRD IMG PET RST&STRS CT: CPT | Mod: 26,,, | Performed by: INTERNAL MEDICINE

## 2023-06-01 PROCEDURE — 78431 MYOCRD IMG PET RST&STRS CT: CPT

## 2023-06-01 PROCEDURE — 78434 CARDIAC PET SCAN STRESS (CUPID ONLY): ICD-10-PCS | Mod: 26,,, | Performed by: INTERNAL MEDICINE

## 2023-06-01 PROCEDURE — 93016 CARDIAC PET SCAN STRESS (CUPID ONLY): ICD-10-PCS | Mod: ,,, | Performed by: INTERNAL MEDICINE

## 2023-06-01 PROCEDURE — 93016 CV STRESS TEST SUPVJ ONLY: CPT | Mod: ,,, | Performed by: INTERNAL MEDICINE

## 2023-06-01 PROCEDURE — 93018 CARDIAC PET SCAN STRESS (CUPID ONLY): ICD-10-PCS | Mod: ,,, | Performed by: INTERNAL MEDICINE

## 2023-06-01 PROCEDURE — 78434 AQMBF PET REST & RX STRESS: CPT | Mod: 26,,, | Performed by: INTERNAL MEDICINE

## 2023-06-01 PROCEDURE — 93018 CV STRESS TEST I&R ONLY: CPT | Mod: ,,, | Performed by: INTERNAL MEDICINE

## 2023-06-01 RX ORDER — REGADENOSON 0.08 MG/ML
0.4 INJECTION, SOLUTION INTRAVENOUS
Status: COMPLETED | OUTPATIENT
Start: 2023-06-01 | End: 2023-06-01

## 2023-06-01 RX ADMIN — REGADENOSON 0.4 MG: 0.08 INJECTION, SOLUTION INTRAVENOUS at 12:06

## 2023-06-05 NOTE — PROGRESS NOTES
"[facility]  Occupational Therapy Treatment Note     Date: 6/6/2023  Name: Addy Redding  [facility] Number: [id number]    Therapy Diagnosis:   Encounter Diagnoses   Name Primary?    Pain of left upper extremity Yes    Weakness     Stiffness in joint      Physician: Stephan Ramey MD    Physician Orders: OT evaluate and treat  Medical Diagnosis: M25.512,G89.29 (ICD-10-CM) - Chronic left shoulder pain  Date of Injury/Onset: about a year  Evaluation Date: 5/17/2023  Insurance Authorization Period Expiration: 12/31/2023  Plan of Care Expiration: 8/14/2023  Date of Return to MD: 8/15/2023  Visit # / Visits authorized: 5/ 1  FOTO: initial eval      Precautions:  Standard     Time In: 8:30  Time Out: 9:15  Total Appointment Time (timed & untimed codes): 45 minutes    Subjective     Pt reports: "My motion is getting better but I still have pain with certain motions."  he was compliant with home exercise program given last session.   Response to previous treatment: increased pain  Functional change: improved FOTO score    Pain: 7/10 at worst  Location: left shoulder      Objective     Objective Measures updated at progress report unless specified.   Sensation Test: Patient denies any numbness/tingling     Observation/Inspection:  normal muscle tone for age     Range of Motion/Strength:   Shoulder   Left   Right   Pain/Dysfunction with Movement     AROM PROM MMT AROM MMT     Flexion 145(+25) WNL 3-/5 WNL WNL     Extension 55(+5) WNL 3/5 WNL WNL     Abduction 145(+40) WNL 3-/5 WNL WNL     HorizAdduction unable WNL 2-/5 WNL WNL     Internal rotation L4(=) WNL 4-/5 WNL WNL     ER at 90° abd 75(=) WNL 3-/5 WNL WNL     ER at 0° abd 45(=) WNL 3-/5 WNL WNL     NT=Not tested  ROM Comments: Pain at end range and with descending motion     Tenderness upon Palpation:      Positive: Bicipital Groove and Supraspinatus Region     Special Tests:  AC Joint Left Right   AC Joint Compression Test - -   Empty Can Test + - "   Drop Arm test + -   Champagne Toast + -   Resisted External Rotation 45* Internal Roatation +pain -weakness -   Bear Hug - -   Mary's - -   Hawkin's Kenndy + -   Neer's Test + -   Yergason's - -   Anterior Apprehension test - -      Scapular Control/Dyskinesis:     Normal / Subtle / Obvious  Comments    Left  Subtle -    Right  Normal -         CMS Impairment/Limitation/Restriction for FOTO Shoulder Survey     Therapist reviewed FOTO scores for Addy Redding on 5/17/2023.   FOTO documents entered into [employer] - see Media section.     Limitation Score: 55%  5th visit: 40%         Treatment     Addy received the treatments listed below:     Manual therapy techniques: Soft tissue Mobilization x  10 minutes, including:  consisting of patient supine for Left biceps and upper trapezius soft tissue mobilization, pectoralis lift, subscapularis myofascial release and stretch, PROM with endrange stretching, glenohumeral joint inferior anterior posterior glides grade I-II, gentle shoulder oscillations    Therapeutic exercises to develop strength and ROM for 35 minutes, including:  -Scifit UBE forward/reverse 3 minutes each Level 2  -passive range of motion left shoulder all motions   -supine dowel chest press 2# 2/15  -supine dowel flexion 2# 2/15  -sidelying external rotation 2# 2/15  -sidelying abduction 1# 2/15  -pulleys 3 minutes   -wall slides 2/15  -Green theraband extension pulls 2/15  -Green theraband rows 2/15  -Green theraband external rotation 2/15  -Green theraband horizontal abduction 2/15    Patient Education and Home Exercises     Education provided:   - Progress towards goals     Written Home Exercises Provided: Patient instructed to cont prior HEP.  Exercises were reviewed and Addy was able to demonstrate them prior to the end of the session.  Addy demonstrated good  understanding of the HEP provided.   .   See EMR under Patient Instructions for exercises provided prior visit.      Assessment   Pt with good  participation this date. Pain report increased from previous session and remains high with certain motions. Good endurance with exercises. Pt with an improved FOTO score indicating increased functional use Left upper extremity with self care tasks. Pt motivated, however frustrated that he is still in so much pain. Instructed pt to follow up with MD regarding continued pain.    Addy is progressing well towards his goals and there are no updates to goals at this time. Pt prognosis is Good.     Pt will continue to benefit from skilled outpatient occupational therapy to address the deficits listed in the problem list on initial evaluation provide pt/family education and to maximize pt's level of independence in the home and community environment.     Anticipated barriers to occupational therapy: none    Pt's spiritual, cultural and educational needs considered and pt agreeable to plan of care and goals.    Goals:  Short Term Goals to be met in 4 weeks: (6/17/2023)  1) Initiate Hep -met, ongoing  2) Pt will increase Left shoulder AROM by 10 degrees grossly for improved performance with overhead activities of daily living's -Not met, progressing  3) Pt will report 6/10 pain in (Left)shoulder at worst -Not met, progressing  4) Pt will demonstrate increased MMT to 4-/5 grossly Left shoulder -Not met, progressing  5) Patient will be able to achieve less than or equal to 45% on FOTO shoulder survey demonstrating overall improved functional ability with upper extremity. -Not met, progressing     Long Term Goals to be met by discharge:  1) Independent with home exercise program -Not met, progressing  2) Pt will demonstrate (Left) shoulder AROM within functional limits  grossly for [place] with activities of daily living's -Not met, progressing  3) Pt will demonstrate (Left) shoulder MMT within functional limits  grossly for [place] with functional activities -Not met, progressing  4) Independent and pain free with  ADL's and IADL's -Not met, progressing  5) Patient will be able to achieve less than or equal to 25% on FOTO shoulder survey demonstrating overall improved functional ability with upper extremity. -Not met, progressing       Plan   Continue with OT plan of care   Updates/Grading for next session: progress as able, pt to follow up with MD. May benefit from an ortho consult.     SCOTTY Mosley

## 2023-06-06 ENCOUNTER — CLINICAL SUPPORT (OUTPATIENT)
Dept: REHABILITATION | Facility: HOSPITAL | Age: 68
End: 2023-06-06
Payer: MEDICARE

## 2023-06-06 DIAGNOSIS — M79.602 PAIN OF LEFT UPPER EXTREMITY: Primary | ICD-10-CM

## 2023-06-06 DIAGNOSIS — M25.60 STIFFNESS IN JOINT: ICD-10-CM

## 2023-06-06 DIAGNOSIS — R53.1 WEAKNESS: ICD-10-CM

## 2023-06-06 PROCEDURE — 97140 MANUAL THERAPY 1/> REGIONS: CPT | Mod: PN

## 2023-06-06 PROCEDURE — 97110 THERAPEUTIC EXERCISES: CPT | Mod: PN

## 2023-06-12 NOTE — H&P (VIEW-ONLY)
"Subjective:   @Patient ID:  Addy Redding is a 67 y.o. male who presents for evaluation of CAD, HTN, HLP    HPI:   6/14/2023: F/U. Stress test with concerning balanced ischemia. Significant shortness of breath. Getting worse. He has chronic sinus bradycardia with good chronotropic response by stress test in 3/2022.     3/9/2023: He saw Dr. Arevalo in the past. He had exertional angina prompted stress test that was abnormal. [facility] 4/7/2022 with  RCA, significant OM disease, non obstructive prox LAD disease by iFR. Attempt to PCI RCA was unsuccessful (run through wire used). He was staged for PCI to OM 6/2022 ( 2.5 x 8 and 2.5x18) was complicated nu no reflow in the main lcx and it was stented, residual disease in ostial Om at bifurcation     He was diagnosed with prostate CA , got radiotherapy seeds. Stable     He still reports significant TRINH and low energy level. No chest pain   He has been compliant with his medications    He has bradycardia that's why  he is not on BB        Prior cardiovascular  Hx  --------------------------------  - EKG 12/2022 sinus bradycardia     - PET stress test 6/1/2023     The myocardial perfusion images are normal without evidence of scar.    Stress myocardial blood flow is severely reduced diffusely throughout the heart consistent with three vessel "balanced" ischemia.  The RCA territory has severely reduced flow capacity consistent with a known  with inadequate collateralization. The anterior and anterolateral wall have moderately reduced flow capacity and are on the border of ischemic thresholds. The remainder of the myocardium has mildly reduced flow capacity.    The whole heart absolute myocardial perfusion values averaged 0.54 cc/min/g at rest, which is reduced; 0.81 cc/min/g at stress, which is severely reduced; and CFR is 1.50 , which is moderately reduced.    CT attenuation images demonstrate moderate diffuse coronary calcifications in the LAD and LCX territory and mild " diffuse aortic calcifications in the descending aorta.    The gated perfusion images showed an ejection fraction of 60% at rest and 62% during stress. A normal ejection fraction is greater than 47%.    The wall motion is normal at rest and during stress.    The LV cavity size is normal at rest and stress.    The ECG portion of the study is negative for ischemia.    There were no arrhythmias during stress.    The patient reported no chest pain during the stress test.    There are no prior studies for comparison.     - Echo 3/23/2023   Normal systolic function.  The estimated ejection fraction is 55%.  Normal left ventricular diastolic function.  Normal right ventricular size with normal right ventricular systolic function.  Normal central venous pressure (3 mmHg).  The estimated PA systolic pressure is 23 mmHg.       Patient Active Problem List    Diagnosis Date Noted    Pain of left upper extremity 05/17/2023    Weakness 05/17/2023    Stiffness in joint 05/17/2023    Chronic left shoulder pain 05/15/2023    Hyperparathyroidism, unspecified 01/27/2023    Prostate cancer     Stage 3b chronic kidney disease 10/04/2022    Coronary artery disease of native artery of native heart with stable angina pectoris 10/04/2022    Presence of drug-eluting stent in left circumflex coronary artery 09/01/2022    Preoperative clearance 09/01/2022    Chronic kidney disease (CKD), stage IV (severe) 07/06/2022    Coronary artery disease of native artery with stable angina pectoris 05/26/2022    Aortic atherosclerosis 03/22/2022    Sinus bradycardia 03/22/2022    Neck pain 10/22/2021    Decreased range of motion 10/22/2021    Type 2 diabetes mellitus with stage 3a chronic kidney disease, without long-term current use of insulin 02/22/2016    Left-sided low back pain with sciatica 02/22/2016    ED (erectile dysfunction)     Hypertension associated with diabetes 11/04/2014    Dyslipidemia associated with type 2 diabetes mellitus                      LAST HbA1c  Lab Results   Component Value Date    HGBA1C 6.7 (H) 03/14/2023       Lipid panel  Lab Results   Component Value Date    CHOL 120 07/05/2022    CHOL 146 05/27/2022    CHOL 183 01/06/2022     Lab Results   Component Value Date    HDL 43 07/05/2022    HDL 39 (L) 05/27/2022    HDL 49 01/06/2022     Lab Results   Component Value Date    LDLCALC 66.2 07/05/2022    LDLCALC 92.6 05/27/2022    LDLCALC 121.0 01/06/2022     Lab Results   Component Value Date    TRIG 54 07/05/2022    TRIG 72 05/27/2022    TRIG 65 01/06/2022     Lab Results   Component Value Date    CHOLHDL 35.8 07/05/2022    CHOLHDL 26.7 05/27/2022    CHOLHDL 26.8 01/06/2022            Review of Systems   Constitutional: Negative for chills and fever.   HENT:  Negative for hearing loss and nosebleeds.    Eyes:  Negative for blurred vision.   Cardiovascular:         As in HPI   Respiratory:  Negative for hemoptysis and shortness of breath.    Hematologic/Lymphatic: Negative for bleeding problem.   Skin:  Negative for itching.   Musculoskeletal:  Negative for falls.   Gastrointestinal:  Negative for abdominal pain and hematochezia.   Genitourinary:  Positive for frequency. Negative for hematuria.        As in HPI    Neurological:  Negative for dizziness and loss of balance.   Psychiatric/Behavioral:  Negative for altered mental status and depression.      Objective:   Physical Exam  Constitutional:       Appearance: He is well-developed.   HENT:      Head: Normocephalic and atraumatic.   Eyes:      Conjunctiva/sclera: Conjunctivae normal.   Neck:      Vascular: No carotid bruit or JVD.   Cardiovascular:      Rate and Rhythm: Regular rhythm. Bradycardia present.      Pulses:           Carotid pulses are 2+ on the right side and 2+ on the left side.       Radial pulses are 2+ on the right side and 2+ on the left side.      Heart sounds: Normal heart sounds. No murmur heard.    No friction rub. No gallop.   Pulmonary:      Effort: Pulmonary  effort is normal. No respiratory distress.      Breath sounds: Normal breath sounds. No stridor. No wheezing.   Musculoskeletal:      Cervical back: Neck supple.   Skin:     General: Skin is warm and dry.   Neurological:      Mental Status: He is alert and oriented to person, place, and time.   Psychiatric:         Behavior: Behavior normal.       Assessment:     1. Dyslipidemia associated with type 2 diabetes mellitus    2. Hypertension associated with diabetes    3. Aortic atherosclerosis    4. Coronary artery disease of native artery of native heart with stable angina pectoris    5. Sinus bradycardia    6. Presence of drug-eluting stent in left circumflex coronary artery    7. Chronic kidney disease (CKD), stage IV (severe)    8. Prostate cancer    9. Type 2 diabetes mellitus with stage 3a chronic kidney disease, without long-term current use of insulin        Plan:   Still with significant angina equivalent symptoms. PET stress test with concerning balanced ischemia. Given his symptoms and his history will proceed with [facility] to evaluate any worsening of his underlying CAD.     He has residual CAD prox LAD, ostial OM, and  RCA     LDL at goal   BP is well controlled  Continue ASA/Plavix   Continue statin and Zetia  Not on BB due to bradycardia   Continue Farxiga.   Continue Cardura, Norvasc, nitro prn   Chronic sinus bradycardia with good chronotropic response          I spent 5-10 minutes asking, assessing, assisting, arranging and advising heart healthy diet improvements. This included low-salt meals, portion control and health food alternatives. I also encourage 30 minutes of moderate exercise 3-4x a week.      I will proceed with Cardiac Catheterization and coronary intervention if indicated. Options including medical therapy were discussed with the patient. Risk and benefits of the procedure were also discussed in details with the patient, including infection, bleeding, nerve damage, limb loss, arrhythmia  "requiring defibrillation, heart attack, stroke, renal failure, allergy to contrast material and rare possibility of death. Patient has voiced understanding and will be proceeding with cardiac catheterization. All questions were answered at this time.  I will keep the patient NPO after midnight the day before cardiac cath.       Pertinent cardiac images and EKG reviewed independently.    Continue with current medical plan and lifestyle changes.  Return sooner for concerns or questions. If symptoms persist go to the ED  I have reviewed all pertinent data including patient's medical history in detail and updated the computerized patient record.     No orders of the defined types were placed in this encounter.      Follow up as scheduled.     He expressed verbal understanding and agreed with the plan    Patient's Medications   New Prescriptions    No medications on file   Previous Medications    ADVANCED GLUC METER TEST STRIP STRP    USE TO TEST BLOOD SUGAR 4 TIMES DAILY.    AMLODIPINE (NORVASC) 5 MG TABLET    Take 2 tablets (10 mg total) by mouth once daily.    ASPIRIN (ECOTRIN) 81 MG EC TABLET    Take 1 tablet (81 mg total) by mouth once daily.    ATORVASTATIN (LIPITOR) 80 MG TABLET    Take 1 tablet (80 mg total) by mouth once daily.    BD ARIA 2ND GEN PEN NEEDLE 32 GAUGE X 5/32" NDLE        BLOOD SUGAR DIAGNOSTIC (BLOOD GLUCOSE TEST) STRP    Check sugar once daily    BLOOD-GLUCOSE METER KIT    Use as instructed    BLOOD-GLUCOSE METER MISC    by Misc.(Non-Drug; Combo Route) route.    BLOOD-GLUCOSE METER,CONTINUOUS (DEXCOM G6 ) MISC    1 Device by Misc.(Non-Drug; Combo Route) route continuous.    BLOOD-GLUCOSE SENSOR (DEXCOM G6 SENSOR) DEVONTE    1 Device by Misc.(Non-Drug; Combo Route) route continuous.    BLOOD-GLUCOSE TRANSMITTER (DEXCOM G6 TRANSMITTER) DEVONTE    1 Device by Misc.(Non-Drug; Combo Route) route continuous.    CLOPIDOGREL (PLAVIX) 75 MG TABLET    Take 1 tablet (75 mg total) by mouth once daily.    " DAPAGLIFLOZIN (FARXIGA) 10 MG TABLET    Take 1 tablet (10 mg total) by mouth once daily.    DOXAZOSIN (CARDURA) 2 MG TABLET    Take 1 tablet (2 mg total) by mouth every evening.    EZETIMIBE (ZETIA) 10 MG TABLET    Take 1 tablet (10 mg total) by mouth once daily.    FLUTICASONE PROPIONATE (FLONASE) 50 MCG/ACTUATION NASAL SPRAY    2 sprays (100 mcg total) by Each Nostril route once daily.    INSULIN (LANTUS SOLOSTAR U-100 INSULIN) GLARGINE 100 UNITS/ML SUBQ PEN    Inject 15 Units into the skin 2 (two) times a day.    LANCETS (ACCU-CHEK SOFTCLIX LANCETS) MISC    1 lancet by Misc.(Non-Drug; Combo Route) route 2 (two) times daily.    LATANOPROST 0.005 % OPHTHALMIC SOLUTION        NITROGLYCERIN (NITROSTAT) 0.4 MG SL TABLET    Place 1 tablet (0.4 mg total) under the tongue every 5 (five) minutes as needed for Chest pain.    PANTOPRAZOLE (PROTONIX) 40 MG TABLET    TAKE ONE TABLET BY MOUTH ONCE DAILY.    SEMAGLUTIDE (OZEMPIC) 0.25 MG OR 0.5 MG (2 MG/3 ML) PEN INJECTOR    Inject 0.25 mg into the skin every 7 days.    SILDENAFIL (REVATIO) 20 MG TAB    Take 1 tablet (20 mg total) by mouth daily as needed for ED    TAMSULOSIN (FLOMAX) 0.4 MG CAP    Take 1 capsule (0.4 mg total) by mouth once daily.   Modified Medications    No medications on file   Discontinued Medications    No medications on file

## 2023-06-12 NOTE — PROGRESS NOTES
"Subjective:   @Patient ID:  Addy Redding is a 67 y.o. male who presents for evaluation of CAD, HTN, HLP    HPI:   6/14/2023: F/U. Stress test with concerning balanced ischemia. Significant shortness of breath. Getting worse. He has chronic sinus bradycardia with good chronotropic response by stress test in 3/2022.     3/9/2023: He saw Dr. Arevalo in the past. He had exertional angina prompted stress test that was abnormal. [facility] 4/7/2022 with  RCA, significant OM disease, non obstructive prox LAD disease by iFR. Attempt to PCI RCA was unsuccessful (run through wire used). He was staged for PCI to OM 6/2022 ( 2.5 x 8 and 2.5x18) was complicated nu no reflow in the main lcx and it was stented, residual disease in ostial Om at bifurcation     He was diagnosed with prostate CA , got radiotherapy seeds. Stable     He still reports significant TRINH and low energy level. No chest pain   He has been compliant with his medications    He has bradycardia that's why  he is not on BB        Prior cardiovascular  Hx  --------------------------------  - EKG 12/2022 sinus bradycardia     - PET stress test 6/1/2023     The myocardial perfusion images are normal without evidence of scar.    Stress myocardial blood flow is severely reduced diffusely throughout the heart consistent with three vessel "balanced" ischemia.  The RCA territory has severely reduced flow capacity consistent with a known  with inadequate collateralization. The anterior and anterolateral wall have moderately reduced flow capacity and are on the border of ischemic thresholds. The remainder of the myocardium has mildly reduced flow capacity.    The whole heart absolute myocardial perfusion values averaged 0.54 cc/min/g at rest, which is reduced; 0.81 cc/min/g at stress, which is severely reduced; and CFR is 1.50 , which is moderately reduced.    CT attenuation images demonstrate moderate diffuse coronary calcifications in the LAD and LCX territory and mild " diffuse aortic calcifications in the descending aorta.    The gated perfusion images showed an ejection fraction of 60% at rest and 62% during stress. A normal ejection fraction is greater than 47%.    The wall motion is normal at rest and during stress.    The LV cavity size is normal at rest and stress.    The ECG portion of the study is negative for ischemia.    There were no arrhythmias during stress.    The patient reported no chest pain during the stress test.    There are no prior studies for comparison.     - Echo 3/23/2023   Normal systolic function.  The estimated ejection fraction is 55%.  Normal left ventricular diastolic function.  Normal right ventricular size with normal right ventricular systolic function.  Normal central venous pressure (3 mmHg).  The estimated PA systolic pressure is 23 mmHg.       Patient Active Problem List    Diagnosis Date Noted    Pain of left upper extremity 05/17/2023    Weakness 05/17/2023    Stiffness in joint 05/17/2023    Chronic left shoulder pain 05/15/2023    Hyperparathyroidism, unspecified 01/27/2023    Prostate cancer     Stage 3b chronic kidney disease 10/04/2022    Coronary artery disease of native artery of native heart with stable angina pectoris 10/04/2022    Presence of drug-eluting stent in left circumflex coronary artery 09/01/2022    Preoperative clearance 09/01/2022    Chronic kidney disease (CKD), stage IV (severe) 07/06/2022    Coronary artery disease of native artery with stable angina pectoris 05/26/2022    Aortic atherosclerosis 03/22/2022    Sinus bradycardia 03/22/2022    Neck pain 10/22/2021    Decreased range of motion 10/22/2021    Type 2 diabetes mellitus with stage 3a chronic kidney disease, without long-term current use of insulin 02/22/2016    Left-sided low back pain with sciatica 02/22/2016    ED (erectile dysfunction)     Hypertension associated with diabetes 11/04/2014    Dyslipidemia associated with type 2 diabetes mellitus                      LAST HbA1c  Lab Results   Component Value Date    HGBA1C 6.7 (H) 03/14/2023       Lipid panel  Lab Results   Component Value Date    CHOL 120 07/05/2022    CHOL 146 05/27/2022    CHOL 183 01/06/2022     Lab Results   Component Value Date    HDL 43 07/05/2022    HDL 39 (L) 05/27/2022    HDL 49 01/06/2022     Lab Results   Component Value Date    LDLCALC 66.2 07/05/2022    LDLCALC 92.6 05/27/2022    LDLCALC 121.0 01/06/2022     Lab Results   Component Value Date    TRIG 54 07/05/2022    TRIG 72 05/27/2022    TRIG 65 01/06/2022     Lab Results   Component Value Date    CHOLHDL 35.8 07/05/2022    CHOLHDL 26.7 05/27/2022    CHOLHDL 26.8 01/06/2022            Review of Systems   Constitutional: Negative for chills and fever.   HENT:  Negative for hearing loss and nosebleeds.    Eyes:  Negative for blurred vision.   Cardiovascular:         As in HPI   Respiratory:  Negative for hemoptysis and shortness of breath.    Hematologic/Lymphatic: Negative for bleeding problem.   Skin:  Negative for itching.   Musculoskeletal:  Negative for falls.   Gastrointestinal:  Negative for abdominal pain and hematochezia.   Genitourinary:  Positive for frequency. Negative for hematuria.        As in HPI    Neurological:  Negative for dizziness and loss of balance.   Psychiatric/Behavioral:  Negative for altered mental status and depression.      Objective:   Physical Exam  Constitutional:       Appearance: He is well-developed.   HENT:      Head: Normocephalic and atraumatic.   Eyes:      Conjunctiva/sclera: Conjunctivae normal.   Neck:      Vascular: No carotid bruit or JVD.   Cardiovascular:      Rate and Rhythm: Regular rhythm. Bradycardia present.      Pulses:           Carotid pulses are 2+ on the right side and 2+ on the left side.       Radial pulses are 2+ on the right side and 2+ on the left side.      Heart sounds: Normal heart sounds. No murmur heard.    No friction rub. No gallop.   Pulmonary:      Effort: Pulmonary  effort is normal. No respiratory distress.      Breath sounds: Normal breath sounds. No stridor. No wheezing.   Musculoskeletal:      Cervical back: Neck supple.   Skin:     General: Skin is warm and dry.   Neurological:      Mental Status: He is alert and oriented to person, place, and time.   Psychiatric:         Behavior: Behavior normal.       Assessment:     1. Dyslipidemia associated with type 2 diabetes mellitus    2. Hypertension associated with diabetes    3. Aortic atherosclerosis    4. Coronary artery disease of native artery of native heart with stable angina pectoris    5. Sinus bradycardia    6. Presence of drug-eluting stent in left circumflex coronary artery    7. Chronic kidney disease (CKD), stage IV (severe)    8. Prostate cancer    9. Type 2 diabetes mellitus with stage 3a chronic kidney disease, without long-term current use of insulin        Plan:   Still with significant angina equivalent symptoms. PET stress test with concerning balanced ischemia. Given his symptoms and his history will proceed with [facility] to evaluate any worsening of his underlying CAD.     He has residual CAD prox LAD, ostial OM, and  RCA     LDL at goal   BP is well controlled  Continue ASA/Plavix   Continue statin and Zetia  Not on BB due to bradycardia   Continue Farxiga.   Continue Cardura, Norvasc, nitro prn   Chronic sinus bradycardia with good chronotropic response          I spent 5-10 minutes asking, assessing, assisting, arranging and advising heart healthy diet improvements. This included low-salt meals, portion control and health food alternatives. I also encourage 30 minutes of moderate exercise 3-4x a week.      I will proceed with Cardiac Catheterization and coronary intervention if indicated. Options including medical therapy were discussed with the patient. Risk and benefits of the procedure were also discussed in details with the patient, including infection, bleeding, nerve damage, limb loss, arrhythmia  "requiring defibrillation, heart attack, stroke, renal failure, allergy to contrast material and rare possibility of death. Patient has voiced understanding and will be proceeding with cardiac catheterization. All questions were answered at this time.  I will keep the patient NPO after midnight the day before cardiac cath.       Pertinent cardiac images and EKG reviewed independently.    Continue with current medical plan and lifestyle changes.  Return sooner for concerns or questions. If symptoms persist go to the ED  I have reviewed all pertinent data including patient's medical history in detail and updated the computerized patient record.     No orders of the defined types were placed in this encounter.      Follow up as scheduled.     He expressed verbal understanding and agreed with the plan    Patient's Medications   New Prescriptions    No medications on file   Previous Medications    ADVANCED GLUC METER TEST STRIP STRP    USE TO TEST BLOOD SUGAR 4 TIMES DAILY.    AMLODIPINE (NORVASC) 5 MG TABLET    Take 2 tablets (10 mg total) by mouth once daily.    ASPIRIN (ECOTRIN) 81 MG EC TABLET    Take 1 tablet (81 mg total) by mouth once daily.    ATORVASTATIN (LIPITOR) 80 MG TABLET    Take 1 tablet (80 mg total) by mouth once daily.    BD ARIA 2ND GEN PEN NEEDLE 32 GAUGE X 5/32" NDLE        BLOOD SUGAR DIAGNOSTIC (BLOOD GLUCOSE TEST) STRP    Check sugar once daily    BLOOD-GLUCOSE METER KIT    Use as instructed    BLOOD-GLUCOSE METER MISC    by Misc.(Non-Drug; Combo Route) route.    BLOOD-GLUCOSE METER,CONTINUOUS (DEXCOM G6 ) MISC    1 Device by Misc.(Non-Drug; Combo Route) route continuous.    BLOOD-GLUCOSE SENSOR (DEXCOM G6 SENSOR) DEVONTE    1 Device by Misc.(Non-Drug; Combo Route) route continuous.    BLOOD-GLUCOSE TRANSMITTER (DEXCOM G6 TRANSMITTER) DEVONTE    1 Device by Misc.(Non-Drug; Combo Route) route continuous.    CLOPIDOGREL (PLAVIX) 75 MG TABLET    Take 1 tablet (75 mg total) by mouth once daily.    " DAPAGLIFLOZIN (FARXIGA) 10 MG TABLET    Take 1 tablet (10 mg total) by mouth once daily.    DOXAZOSIN (CARDURA) 2 MG TABLET    Take 1 tablet (2 mg total) by mouth every evening.    EZETIMIBE (ZETIA) 10 MG TABLET    Take 1 tablet (10 mg total) by mouth once daily.    FLUTICASONE PROPIONATE (FLONASE) 50 MCG/ACTUATION NASAL SPRAY    2 sprays (100 mcg total) by Each Nostril route once daily.    INSULIN (LANTUS SOLOSTAR U-100 INSULIN) GLARGINE 100 UNITS/ML SUBQ PEN    Inject 15 Units into the skin 2 (two) times a day.    LANCETS (ACCU-CHEK SOFTCLIX LANCETS) MISC    1 lancet by Misc.(Non-Drug; Combo Route) route 2 (two) times daily.    LATANOPROST 0.005 % OPHTHALMIC SOLUTION        NITROGLYCERIN (NITROSTAT) 0.4 MG SL TABLET    Place 1 tablet (0.4 mg total) under the tongue every 5 (five) minutes as needed for Chest pain.    PANTOPRAZOLE (PROTONIX) 40 MG TABLET    TAKE ONE TABLET BY MOUTH ONCE DAILY.    SEMAGLUTIDE (OZEMPIC) 0.25 MG OR 0.5 MG (2 MG/3 ML) PEN INJECTOR    Inject 0.25 mg into the skin every 7 days.    SILDENAFIL (REVATIO) 20 MG TAB    Take 1 tablet (20 mg total) by mouth daily as needed for ED    TAMSULOSIN (FLOMAX) 0.4 MG CAP    Take 1 capsule (0.4 mg total) by mouth once daily.   Modified Medications    No medications on file   Discontinued Medications    No medications on file

## 2023-06-14 ENCOUNTER — OFFICE VISIT (OUTPATIENT)
Dept: CARDIOLOGY | Facility: CLINIC | Age: 68
End: 2023-06-14
Payer: MEDICARE

## 2023-06-14 VITALS
SYSTOLIC BLOOD PRESSURE: 133 MMHG | BODY MASS INDEX: 28.6 KG/M2 | HEIGHT: 73 IN | WEIGHT: 215.81 LBS | HEART RATE: 48 BPM | OXYGEN SATURATION: 97 % | DIASTOLIC BLOOD PRESSURE: 66 MMHG

## 2023-06-14 DIAGNOSIS — I25.118 CORONARY ARTERY DISEASE OF NATIVE ARTERY WITH STABLE ANGINA PECTORIS, UNSPECIFIED WHETHER NATIVE OR TRANSPLANTED HEART: ICD-10-CM

## 2023-06-14 DIAGNOSIS — I25.118 CORONARY ARTERY DISEASE OF NATIVE ARTERY OF NATIVE HEART WITH STABLE ANGINA PECTORIS: Primary | ICD-10-CM

## 2023-06-14 DIAGNOSIS — E11.22 TYPE 2 DIABETES MELLITUS WITH STAGE 3A CHRONIC KIDNEY DISEASE, WITHOUT LONG-TERM CURRENT USE OF INSULIN: ICD-10-CM

## 2023-06-14 DIAGNOSIS — E11.59 HYPERTENSION ASSOCIATED WITH DIABETES: ICD-10-CM

## 2023-06-14 DIAGNOSIS — E11.69 DYSLIPIDEMIA ASSOCIATED WITH TYPE 2 DIABETES MELLITUS: ICD-10-CM

## 2023-06-14 DIAGNOSIS — R94.39 ABNORMAL CARDIOVASCULAR STRESS TEST: ICD-10-CM

## 2023-06-14 DIAGNOSIS — C61 PROSTATE CANCER: ICD-10-CM

## 2023-06-14 DIAGNOSIS — E78.5 DYSLIPIDEMIA ASSOCIATED WITH TYPE 2 DIABETES MELLITUS: ICD-10-CM

## 2023-06-14 DIAGNOSIS — Z95.5 PRESENCE OF DRUG-ELUTING STENT IN LEFT CIRCUMFLEX CORONARY ARTERY: ICD-10-CM

## 2023-06-14 DIAGNOSIS — I15.2 HYPERTENSION ASSOCIATED WITH DIABETES: ICD-10-CM

## 2023-06-14 DIAGNOSIS — R00.1 SINUS BRADYCARDIA: ICD-10-CM

## 2023-06-14 DIAGNOSIS — I70.0 AORTIC ATHEROSCLEROSIS: ICD-10-CM

## 2023-06-14 DIAGNOSIS — N18.31 TYPE 2 DIABETES MELLITUS WITH STAGE 3A CHRONIC KIDNEY DISEASE, WITHOUT LONG-TERM CURRENT USE OF INSULIN: ICD-10-CM

## 2023-06-14 DIAGNOSIS — N18.4 CHRONIC KIDNEY DISEASE (CKD), STAGE IV (SEVERE): ICD-10-CM

## 2023-06-14 PROCEDURE — 99999 PR PBB SHADOW E&M-EST. PATIENT-LVL II: ICD-10-PCS | Mod: PBBFAC,,, | Performed by: INTERNAL MEDICINE

## 2023-06-14 PROCEDURE — 99999 PR PBB SHADOW E&M-EST. PATIENT-LVL II: CPT | Mod: PBBFAC,,, | Performed by: INTERNAL MEDICINE

## 2023-06-14 PROCEDURE — 3078F PR MOST RECENT DIASTOLIC BLOOD PRESSURE < 80 MM HG: ICD-10-PCS | Mod: CPTII,S$GLB,, | Performed by: INTERNAL MEDICINE

## 2023-06-14 PROCEDURE — 3075F SYST BP GE 130 - 139MM HG: CPT | Mod: CPTII,S$GLB,, | Performed by: INTERNAL MEDICINE

## 2023-06-14 PROCEDURE — 3008F PR BODY MASS INDEX (BMI) DOCUMENTED: ICD-10-PCS | Mod: CPTII,S$GLB,, | Performed by: INTERNAL MEDICINE

## 2023-06-14 PROCEDURE — 99214 OFFICE O/P EST MOD 30 MIN: CPT | Mod: S$GLB,,, | Performed by: INTERNAL MEDICINE

## 2023-06-14 PROCEDURE — 3008F BODY MASS INDEX DOCD: CPT | Mod: CPTII,S$GLB,, | Performed by: INTERNAL MEDICINE

## 2023-06-14 PROCEDURE — 3078F DIAST BP <80 MM HG: CPT | Mod: CPTII,S$GLB,, | Performed by: INTERNAL MEDICINE

## 2023-06-14 PROCEDURE — 1125F PR PAIN SEVERITY QUANTIFIED, PAIN PRESENT: ICD-10-PCS | Mod: CPTII,S$GLB,, | Performed by: INTERNAL MEDICINE

## 2023-06-14 PROCEDURE — 3075F PR MOST RECENT SYSTOLIC BLOOD PRESS GE 130-139MM HG: ICD-10-PCS | Mod: CPTII,S$GLB,, | Performed by: INTERNAL MEDICINE

## 2023-06-14 PROCEDURE — 3061F NEG MICROALBUMINURIA REV: CPT | Mod: CPTII,S$GLB,, | Performed by: INTERNAL MEDICINE

## 2023-06-14 PROCEDURE — 3066F PR DOCUMENTATION OF TREATMENT FOR NEPHROPATHY: ICD-10-PCS | Mod: CPTII,S$GLB,, | Performed by: INTERNAL MEDICINE

## 2023-06-14 PROCEDURE — 3066F NEPHROPATHY DOC TX: CPT | Mod: CPTII,S$GLB,, | Performed by: INTERNAL MEDICINE

## 2023-06-14 PROCEDURE — 3061F PR NEG MICROALBUMINURIA RESULT DOCUMENTED/REVIEW: ICD-10-PCS | Mod: CPTII,S$GLB,, | Performed by: INTERNAL MEDICINE

## 2023-06-14 PROCEDURE — 3044F PR MOST RECENT HEMOGLOBIN A1C LEVEL <7.0%: ICD-10-PCS | Mod: CPTII,S$GLB,, | Performed by: INTERNAL MEDICINE

## 2023-06-14 PROCEDURE — 99214 PR OFFICE/OUTPT VISIT, EST, LEVL IV, 30-39 MIN: ICD-10-PCS | Mod: S$GLB,,, | Performed by: INTERNAL MEDICINE

## 2023-06-14 PROCEDURE — 3044F HG A1C LEVEL LT 7.0%: CPT | Mod: CPTII,S$GLB,, | Performed by: INTERNAL MEDICINE

## 2023-06-14 PROCEDURE — 1125F AMNT PAIN NOTED PAIN PRSNT: CPT | Mod: CPTII,S$GLB,, | Performed by: INTERNAL MEDICINE

## 2023-06-14 RX ORDER — DIPHENHYDRAMINE HCL 50 MG
50 CAPSULE ORAL ONCE
Status: CANCELLED | OUTPATIENT
Start: 2023-06-14 | End: 2023-06-14

## 2023-06-14 RX ORDER — SODIUM CHLORIDE 9 MG/ML
INJECTION, SOLUTION INTRAVENOUS CONTINUOUS
Status: CANCELLED | OUTPATIENT
Start: 2023-06-14 | End: 2023-06-14

## 2023-06-14 RX ORDER — CLOPIDOGREL BISULFATE 75 MG/1
75 TABLET ORAL DAILY
Qty: 90 TABLET | Refills: 4 | Status: SHIPPED | OUTPATIENT
Start: 2023-06-14 | End: 2023-06-20 | Stop reason: SDUPTHER

## 2023-06-14 RX ORDER — SODIUM CHLORIDE 0.9 % (FLUSH) 0.9 %
10 SYRINGE (ML) INJECTION
Status: DISCONTINUED | OUTPATIENT
Start: 2023-06-14 | End: 2023-07-21 | Stop reason: ALTCHOICE

## 2023-06-16 DIAGNOSIS — N52.9 ERECTILE DYSFUNCTION, UNSPECIFIED ERECTILE DYSFUNCTION TYPE: ICD-10-CM

## 2023-06-16 RX ORDER — SILDENAFIL CITRATE 20 MG/1
20 TABLET ORAL DAILY PRN
Qty: 30 TABLET | Refills: 2 | Status: SHIPPED | OUTPATIENT
Start: 2023-06-16 | End: 2023-12-05

## 2023-06-16 NOTE — TELEPHONE ENCOUNTER
Care Due:                  Date            Visit Type   Department     Provider  --------------------------------------------------------------------------------                                EP -                              [facility]    Stephan Davalosrayray  Last Visit: 05-      CARE ([facility])   MEDICINE       Tanvir                              [facility]  Next Visit: 08-      CARE ([facility])   MEDICINE       Tanvir                                                            Last  Test          Frequency    Reason                     Performed    Due Date  --------------------------------------------------------------------------------    Lipid Panel.  12 months..  atorvastatin.............  07- 06-    [facility] Embedded Care Due Messages. Reference number: [id number].   6/16/2023 11:10:45 AM CDT

## 2023-06-20 DIAGNOSIS — I25.118 CORONARY ARTERY DISEASE OF NATIVE ARTERY WITH STABLE ANGINA PECTORIS, UNSPECIFIED WHETHER NATIVE OR TRANSPLANTED HEART: ICD-10-CM

## 2023-06-20 RX ORDER — CLOPIDOGREL BISULFATE 75 MG/1
75 TABLET ORAL DAILY
Qty: 90 TABLET | Refills: 4 | Status: SHIPPED | OUTPATIENT
Start: 2023-06-20 | End: 2023-11-08 | Stop reason: SDUPTHER

## 2023-06-26 ENCOUNTER — LAB VISIT (OUTPATIENT)
Dept: LAB | Facility: HOSPITAL | Age: 68
End: 2023-06-26
Attending: INTERNAL MEDICINE
Payer: MEDICARE

## 2023-06-26 DIAGNOSIS — Z01.810 PRE-OPERATIVE CARDIOVASCULAR EXAMINATION: ICD-10-CM

## 2023-06-26 LAB
ANION GAP SERPL CALC-SCNC: 11 MMOL/L (ref 8–16)
BASOPHILS # BLD AUTO: 0.04 K/UL (ref 0–0.2)
BASOPHILS NFR BLD: 0.7 % (ref 0–1.9)
BUN SERPL-MCNC: 35 MG/DL (ref 8–23)
CALCIUM SERPL-MCNC: 9 MG/DL (ref 8.7–10.5)
CHLORIDE SERPL-SCNC: 109 MMOL/L (ref 95–110)
CO2 SERPL-SCNC: 19 MMOL/L (ref 23–29)
CREAT SERPL-MCNC: 2.4 MG/DL (ref 0.5–1.4)
DIFFERENTIAL METHOD: ABNORMAL
EOSINOPHIL # BLD AUTO: 0.4 K/UL (ref 0–0.5)
EOSINOPHIL NFR BLD: 6.8 % (ref 0–8)
ERYTHROCYTE [DISTWIDTH] IN BLOOD BY AUTOMATED COUNT: 13.3 % (ref 11.5–14.5)
EST. GFR  (NO RACE VARIABLE): 28 ML/MIN/1.73 M^2
GLUCOSE SERPL-MCNC: 103 MG/DL (ref 70–110)
HCT VFR BLD AUTO: 39.3 % (ref 40–54)
HGB BLD-MCNC: 12.7 G/DL (ref 14–18)
IMM GRANULOCYTES # BLD AUTO: 0.02 K/UL (ref 0–0.04)
IMM GRANULOCYTES NFR BLD AUTO: 0.3 % (ref 0–0.5)
LYMPHOCYTES # BLD AUTO: 1.4 K/UL (ref 1–4.8)
LYMPHOCYTES NFR BLD: 23.5 % (ref 18–48)
MCH RBC QN AUTO: 28.3 PG (ref 27–31)
MCHC RBC AUTO-ENTMCNC: 32.3 G/DL (ref 32–36)
MCV RBC AUTO: 88 FL (ref 82–98)
MONOCYTES # BLD AUTO: 0.8 K/UL (ref 0.3–1)
MONOCYTES NFR BLD: 12.8 % (ref 4–15)
NEUTROPHILS # BLD AUTO: 3.3 K/UL (ref 1.8–7.7)
NEUTROPHILS NFR BLD: 55.9 % (ref 38–73)
NRBC BLD-RTO: 0 /100 WBC
PLATELET # BLD AUTO: 261 K/UL (ref 150–450)
PMV BLD AUTO: 10.1 FL (ref 9.2–12.9)
POTASSIUM SERPL-SCNC: 4.5 MMOL/L (ref 3.5–5.1)
RBC # BLD AUTO: 4.49 M/UL (ref 4.6–6.2)
SODIUM SERPL-SCNC: 139 MMOL/L (ref 136–145)
WBC # BLD AUTO: 5.87 K/UL (ref 3.9–12.7)

## 2023-06-26 PROCEDURE — 80048 BASIC METABOLIC PNL TOTAL CA: CPT | Performed by: INTERNAL MEDICINE

## 2023-06-26 PROCEDURE — 85025 COMPLETE CBC W/AUTO DIFF WBC: CPT | Performed by: INTERNAL MEDICINE

## 2023-06-26 PROCEDURE — 36415 COLL VENOUS BLD VENIPUNCTURE: CPT | Performed by: INTERNAL MEDICINE

## 2023-06-27 ENCOUNTER — HOSPITAL ENCOUNTER (OUTPATIENT)
Facility: HOSPITAL | Age: 68
Discharge: HOME | End: 2023-06-27
Attending: INTERNAL MEDICINE | Admitting: INTERNAL MEDICINE
Payer: MEDICARE

## 2023-06-27 VITALS
BODY MASS INDEX: 28.63 KG/M2 | DIASTOLIC BLOOD PRESSURE: 75 MMHG | RESPIRATION RATE: 22 BRPM | TEMPERATURE: 97 F | SYSTOLIC BLOOD PRESSURE: 150 MMHG | HEIGHT: 73 IN | HEART RATE: 55 BPM | WEIGHT: 216 LBS | OXYGEN SATURATION: 96 %

## 2023-06-27 DIAGNOSIS — R94.39 ABNORMAL CARDIOVASCULAR STRESS TEST: ICD-10-CM

## 2023-06-27 DIAGNOSIS — E11.69 DYSLIPIDEMIA ASSOCIATED WITH TYPE 2 DIABETES MELLITUS: ICD-10-CM

## 2023-06-27 DIAGNOSIS — E78.5 DYSLIPIDEMIA ASSOCIATED WITH TYPE 2 DIABETES MELLITUS: ICD-10-CM

## 2023-06-27 DIAGNOSIS — Z01.810 PRE-OPERATIVE CARDIOVASCULAR EXAMINATION: Primary | ICD-10-CM

## 2023-06-27 DIAGNOSIS — Z01.818 PRE-OP EVALUATION: ICD-10-CM

## 2023-06-27 DIAGNOSIS — I25.118 CORONARY ARTERY DISEASE OF NATIVE ARTERY OF NATIVE HEART WITH STABLE ANGINA PECTORIS: ICD-10-CM

## 2023-06-27 LAB — POCT GLUCOSE: 95 MG/DL (ref 70–110)

## 2023-06-27 PROCEDURE — 99152 MOD SED SAME PHYS/QHP 5/>YRS: CPT | Mod: ,,, | Performed by: INTERNAL MEDICINE

## 2023-06-27 PROCEDURE — 93458 PR CATH PLACE/CORON ANGIO, IMG SUPER/INTERP,W LEFT HEART VENTRICULOGRAPHY: ICD-10-PCS | Mod: 26,,, | Performed by: INTERNAL MEDICINE

## 2023-06-27 PROCEDURE — 25000003 PHARM REV CODE 250: Performed by: INTERNAL MEDICINE

## 2023-06-27 PROCEDURE — 93571 IV DOP VEL&/PRESS C FLO 1ST: CPT | Mod: LD | Performed by: INTERNAL MEDICINE

## 2023-06-27 PROCEDURE — C1769 GUIDE WIRE: HCPCS | Performed by: INTERNAL MEDICINE

## 2023-06-27 PROCEDURE — 93458 L HRT ARTERY/VENTRICLE ANGIO: CPT | Mod: 26,,, | Performed by: INTERNAL MEDICINE

## 2023-06-27 PROCEDURE — 93010 ELECTROCARDIOGRAM REPORT: CPT | Mod: ,,, | Performed by: INTERNAL MEDICINE

## 2023-06-27 PROCEDURE — 99152 MOD SED SAME PHYS/QHP 5/>YRS: CPT | Performed by: INTERNAL MEDICINE

## 2023-06-27 PROCEDURE — 99152 PR MOD CONSCIOUS SEDATION, SAME PHYS, 5+ YRS, FIRST 15 MIN: ICD-10-PCS | Mod: ,,, | Performed by: INTERNAL MEDICINE

## 2023-06-27 PROCEDURE — 93458 L HRT ARTERY/VENTRICLE ANGIO: CPT | Performed by: INTERNAL MEDICINE

## 2023-06-27 PROCEDURE — C1894 INTRO/SHEATH, NON-LASER: HCPCS | Performed by: INTERNAL MEDICINE

## 2023-06-27 PROCEDURE — 93005 ELECTROCARDIOGRAM TRACING: CPT | Mod: 59

## 2023-06-27 PROCEDURE — 63600175 PHARM REV CODE 636 W HCPCS: Performed by: INTERNAL MEDICINE

## 2023-06-27 PROCEDURE — 25500020 PHARM REV CODE 255: Performed by: INTERNAL MEDICINE

## 2023-06-27 PROCEDURE — C1887 CATHETER, GUIDING: HCPCS | Performed by: INTERNAL MEDICINE

## 2023-06-27 PROCEDURE — 93571 IV DOP VEL&/PRESS C FLO 1ST: CPT | Mod: 26,LD,, | Performed by: INTERNAL MEDICINE

## 2023-06-27 PROCEDURE — 99153 MOD SED SAME PHYS/QHP EA: CPT | Performed by: INTERNAL MEDICINE

## 2023-06-27 PROCEDURE — 93571 PR HEART FLOW RESERV MEASURE,INIT VESSL: ICD-10-PCS | Mod: 26,LD,, | Performed by: INTERNAL MEDICINE

## 2023-06-27 PROCEDURE — 93010 EKG 12-LEAD: ICD-10-PCS | Mod: ,,, | Performed by: INTERNAL MEDICINE

## 2023-06-27 RX ORDER — HEPARIN SODIUM 200 [USP'U]/100ML
INJECTION, SOLUTION INTRAVENOUS
Status: DISCONTINUED | OUTPATIENT
Start: 2023-06-27 | End: 2023-06-27 | Stop reason: HOSPADM

## 2023-06-27 RX ORDER — SODIUM CHLORIDE 9 MG/ML
INJECTION, SOLUTION INTRAVENOUS CONTINUOUS
Status: ACTIVE | OUTPATIENT
Start: 2023-06-27 | End: 2023-06-27

## 2023-06-27 RX ORDER — IODIXANOL 320 MG/ML
INJECTION, SOLUTION INTRAVASCULAR
Status: DISCONTINUED | OUTPATIENT
Start: 2023-06-27 | End: 2023-06-27 | Stop reason: HOSPADM

## 2023-06-27 RX ORDER — VERAPAMIL HYDROCHLORIDE 2.5 MG/ML
INJECTION, SOLUTION INTRAVENOUS
Status: DISCONTINUED | OUTPATIENT
Start: 2023-06-27 | End: 2023-06-27 | Stop reason: HOSPADM

## 2023-06-27 RX ORDER — HEPARIN SODIUM 1000 [USP'U]/ML
INJECTION, SOLUTION INTRAVENOUS; SUBCUTANEOUS
Status: DISCONTINUED | OUTPATIENT
Start: 2023-06-27 | End: 2023-06-27 | Stop reason: HOSPADM

## 2023-06-27 RX ORDER — SODIUM CHLORIDE 9 MG/ML
INJECTION, SOLUTION INTRAVENOUS CONTINUOUS
Status: DISCONTINUED | OUTPATIENT
Start: 2023-06-27 | End: 2023-06-27 | Stop reason: HOSPADM

## 2023-06-27 RX ORDER — LIDOCAINE HYDROCHLORIDE 10 MG/ML
INJECTION, SOLUTION EPIDURAL; INFILTRATION; INTRACAUDAL; PERINEURAL
Status: DISCONTINUED | OUTPATIENT
Start: 2023-06-27 | End: 2023-06-27 | Stop reason: HOSPADM

## 2023-06-27 RX ORDER — ADENOSINE 3 MG/ML
INJECTION, SOLUTION INTRAVENOUS
Status: DISCONTINUED | OUTPATIENT
Start: 2023-06-27 | End: 2023-06-27 | Stop reason: HOSPADM

## 2023-06-27 RX ORDER — MIDAZOLAM HYDROCHLORIDE 1 MG/ML
INJECTION, SOLUTION INTRAMUSCULAR; INTRAVENOUS
Status: DISCONTINUED | OUTPATIENT
Start: 2023-06-27 | End: 2023-06-27 | Stop reason: HOSPADM

## 2023-06-27 RX ORDER — FENTANYL CITRATE 50 UG/ML
INJECTION, SOLUTION INTRAMUSCULAR; INTRAVENOUS
Status: DISCONTINUED | OUTPATIENT
Start: 2023-06-27 | End: 2023-06-27 | Stop reason: HOSPADM

## 2023-06-27 RX ADMIN — SODIUM CHLORIDE 100 ML/HR: 0.9 INJECTION, SOLUTION INTRAVENOUS at 09:06

## 2023-06-27 NOTE — NURSING
Patient cleared for discharge to home w/ spouse and she is present; Written discharge instructions w/ follow up appts given to pt; Patient dressed self and ambulated to [facility] for discharge; Patient AAO and has no complaints; Pateint escorted to pov to spouse per [facility] w/ nurse; Right radial access site alisha willard

## 2023-06-27 NOTE — NURSING
2 cc of air removed from right radial vasc band. No hematoma noted. Will continue to monitor. Reviewed discharge instructions and pt verbalized understanding

## 2023-06-27 NOTE — NURSING
Patient to cath lab recovery per stretcher with nurse Reyes and bedside report received; Patient connected to monitors; Dexcom glucose reading 94; patient AAO; sinus on monitor; skin wm dry color pk; post procedure teaching begun and verbalizes understanding; NS infusing in hand off report at 120 ml per hour and on pump; Right radial vasc band in place w/ brisk cap refill and palpable pulse-- no hematoma nor bleeding; will continue to monitor

## 2023-06-27 NOTE — DISCHARGE INSTRUCTIONS
Discharge Instructions:    Do not drive a car, operate heavy equipment, care for a young child, etc for the next 12-24 hours.   Avoid drinking alcohol for 24 hours.  Do not make any important decisions for 24 hours.    Drink fluids to keep hydrated. Resume your usual diet as tolerated.     Rest for today then activity as tolerated.   Do not lift anything over 5 pounds for the first 3 days after procedure.    Remove dressing tomorrow then may shower with warm soapy water. Do not scrub site. Pat dry.   May apply bandaid for 2 days.  No tub baths.  Do not submerge wound in water for 3 days.     Call MD for any unrelieved pain, excessive nausea or vomiting, redness around site, bleeding, or pus or foul smelling drainage, or any other questions or concerns.    Go to the ER for any difficulty breathing or chest pain.      If site swells or bleeds, hold direct pressure to area for 10 full minutes.   If site continues to bleed, continue to hold pressure to site and have someone bring you to the ER. Understanding Transradial Cardiac Catheterization    Cardiac catheterization (cardiac cath) is a common, non-surgical procedure. During the procedure, your doctor will insert a long, thin tube (catheter) into an artery and move it up into your heart. Transradial means the catheter is inserted into an artery in the wrist (the radial artery). This procedure can be used to diagnose and treat certain heart problems.  Why do I need a transradial cardiac cath?  You may need a cardiac cath if signs indicate a problem with your heart. These may include:  Symptoms of chest pain, tightness, or heaviness (known as angina). This is a common symptom of blocked heart arteries, known as coronary artery disease.  Symptoms of weakness, dizziness, trouble breathing, or swollen legs or feet. These may be symptoms of a problem with a heart valve or the heart muscle.  Other test results show heart problems. Tests may include stress tests, heart  scans, and echocardiography.  During a cardiac cath, your doctor can see the condition of the coronary arteries and heart valves. He or she can also check how well the heart pumps and the flow of blood through the heart. Your doctor can also measure pressures and take blood samples. And, if needed, he or she can open blocked arteries. This can help reduce symptoms of angina.  Cardiac cath is often done using a catheter inserted into an artery in the groin. During transradial cardiac cath, the catheter is inserted into an artery in the wrist. This can mean less bleeding and a faster recovery. Some people may have blockages in the groin arteries as well as in the heart arteries, making it difficult to reach the heart. The transradial approach can be used to get around this problem.   What happens during a transradial cardiac cath?  The procedure is done in the hospital or a surgery center. First, an IV line is put in your arm or hand to deliver fluids and medicines. You will likely be given medicine to relax you and make you drowsy. When the procedure begins:  You lie on an X-ray table.  The skin over the insertion site in your wrist is numbed.  The doctor makes a tiny puncture or incision into the artery in the wrist. He or she then inserts a catheter and threads it through the blood vessel into your heart.    The doctor may inject a contrast fluid through the catheter into the arteries. This fluid makes the arteries show up better on X-rays.  Tests may be done to check the condition of your heart and arteries. If needed, the doctor can clear blockages in the arteries or do other repairs.  When the doctor is finished, he or she will remove the catheter and put direct pressure on the site to prevent bleeding.  You will stay for a time to recover, and then go home.  What are the risks of transradial cardiac cath?  These include:  Bleeding, bruising, infection, or blood clots  Damage to the radial artery that may cause  injury to the hand  Allergic reaction to the contrast fluid  Abnormal heartbeat (arrhythmia)  Damage to blood vessels or tissues  Kidney damage or failure  The need for emergency heart surgery  Heart attack, stroke, or death  Date Last Reviewed: 5/1/2016 © 2000-2017 [employer]. [address] [place] [address]. All rights reserved. This information is not intended as a substitute for professional medical care. Always follow your healthcare professional's instructions.

## 2023-06-27 NOTE — BRIEF OP NOTE
"POST CATH NOTE    s/p catheterization secondary to: Abnormal PET stress test.    Cath Results:  Access: Rt Radial   LM:  SCARLET III  flow mild disease   LAD: SCARLET- flow ostial LAD 80%. FFR 0.71   LCx:  SCARLET- flow  patent LCX/OM stents  RCA: SCARLET- flow   LVgram: LVEDP 7  Intervention: iFR LAD 0.71    Closure device: Vasc band     Patient tolerated procedure well, no complications    Post Cath Exam:  BP (!) 145/73   Pulse (!) 49   Temp 97.4 °F (36.3 °C) (Skin)   Resp 12   Ht 6' 1" (1.854 m)   Wt 98 kg (216 lb)   SpO2 98%   BMI 28.50 kg/m²     Assessment:   Multivessel CAD  inluding ostial LAD  2.  Type II DM  Plan:   - Given MVCAD including ostial LAD and type II DM. Will refer for Bypass evaluation   - Continue medical therapy   - Risk factors modification      "

## 2023-06-27 NOTE — DISCHARGE SUMMARY
"[facility] - Cath Lab (Hospital)  Discharge Note  Short Stay    Procedure(s) (LRB):  Left heart cath (Left)  Instantaneous Wave-Free Ratio (IFR) (N/A)  Fractional Flow Funk (FFR), Coronary      OUTCOME: Patient tolerated treatment/procedure well without complication and is now ready for discharge.  POST CATH NOTE     s/p catheterization secondary to: Abnormal PET stress test.     Cath Results:  Access: Rt Radial   LM:  SCARLET III  flow mild disease   LAD: SCARLET- flow ostial LAD 80%. FFR 0.71   LCx:  SCARLET- flow  patent LCX/OM stents  RCA: SCARLET- flow   LVgram: LVEDP 7  Intervention: iFR LAD 0.71     Closure device: Vasc band      Patient tolerated procedure well, no complications     Post Cath Exam:  BP (!) 145/73   Pulse (!) 49   Temp 97.4 °F (36.3 °C) (Skin)   Resp 12   Ht 6' 1" (1.854 m)   Wt 98 kg (216 lb)   SpO2 98%   BMI 28.50 kg/m²      Assessment:   Multivessel CAD  inluding ostial LAD  2.  Type II DM  Plan:   - Given MVCAD including ostial LAD and type II DM. Will refer for Bypass evaluation   - Continue medical therapy   - Risk factors modification     DISPOSITION: Home or Self Care    FINAL DIAGNOSIS:  <principal problem not specified>    FOLLOWUP: In clinic    DISCHARGE INSTRUCTIONS:    Discharge Procedure Orders   BASIC METABOLIC PANEL   Standing Status: Future Number of Occurrences: 1 Standing Exp. Date: 08/17/24     CBC W/ AUTO DIFFERENTIAL   Standing Status: Future Number of Occurrences: 1 Standing Exp. Date: 08/17/24         Clinical Reference Documents Added to Patient Instructions         Document    CARDIAC CATHETERIZATION DISCHARGE INSTRUCTIONS (ENGLISH)    PROCEDURAL SEDATION, ADULT ED (ENGLISH)            TIME SPENT ON DISCHARGE: 35 minutes  "

## 2023-06-27 NOTE — Clinical Note
The catheter was repositioned into the ostium   right coronary artery. An angiography was performed of the right coronary arteries. The angiography was performed via hand injection with 5 mL of contrast.

## 2023-06-27 NOTE — NURSING
----- Message from Palak Sensor [id number] - Parking Tickets    [phone]; OK to leave message on voicemail  ---------------------------------------------------------------------------  -------------- Elevated head and po fluids and meal tray served to patient; no pain reported. Dexcom reading----97

## 2023-06-27 NOTE — NURSING
Patients dexcom glucose reading -----98; Patient resting quietly in bed; npo maintained; Patients spouse updated in waiting room

## 2023-06-27 NOTE — NURSING
Discussion/Summary   normal test results        Verified Results  MRI KNEE RT WO/W CON 13Mar2017 02:47PM NEDA MORGAN   Ordering Provider: NEDA MORGAN.    Reason For Study: bone cyct/lesion,bone cyct/lesion.   [Mar 13, 2017 7:04PM NEDA MORGAN]  nl     Test Name Result Flag Reference   MRI KNEE RT WO/W CON (Report)     Accession #    [id number]    EXAM DATE: 3/13/2017 1:30 PM    PROCEDURE: MRI KNEE RT WO/W CON    INDICATION: Female, Age, 39 years. bone cyct/lesion, lump posteriorly    COMPARISON: The ultrasound 03/09/2017, knee radiographs 03/03/2017    TECHNIQUE: Pre and postcontrast multiplanar multisequence MRI of the right knee was performed.   Approximate 7.5 mL Gadavist contrast were infused intravenously for the postcontrast portion of   the exam. Sequences include sagittal T1, sagittal T2 fat-sat, sagittal PD fat-sat, sagittal T1   fat-sat postcontrast, coronal T1, coronal PD fat-sat, sagittal gradient, axial PD fat-sat, and   axial T1 fat-sat postcontrast.    FINDINGS:    TENDONS AND LIGAMENTS:  No discrete acute tear of the extensor mechanism, cruciate ligaments, or collateral ligaments.   The medial patellofemoral ligament and patellar retinacula are intact.    MENISCI:  No discrete meniscal tear detected.    OSTEOARTICULAR STRUCTURES:  No significant joint effusion or Baker's cyst. No evidence of fracture, avascular necrosis, or   osteochondral lesion.    Patellofemoral Compartment: The TT-TG distance measures within normal limits. No significant   lateral patellar tilt, translation, or patella doreen. No significant chondromalacia.  Medial Compartment: No significant chondromalacia.  Lateral Compartment: No significant chondromalacia.    OTHER:  Heterogeneous bone marrow in the distal femoral diaphysis remains hyperintense to muscle is most   consistent with red marrow reconversion. No discrete bone lesion is identified with particular   attention to the directed to the lateral  Patient resting quietly in bed; Patients spouse in waiting room updated; Belongings at bedside; Will continue to monitor; Patient gcs 15 and in no distress   tibia.    IMPRESSION:  1. Bone lesion in the lateral tibia on the comparison radiographs is not identified on this MRI,   and was most likely projectional artifact.    2. No Baker's cyst or other soft tissue mass to explain the patient's posterior lump.  3. No evidence of internal derangement.  4. Red marrow reconversion is nonspecific but commonly seen with smoking, anemia, obesity, and   CHF among others.  **** F I N A L ****    Transcribed By: KEVIN   03/13/17 3:10 pm    Dictated By:      REJI MATIAS MD    Electronically Reviewed and Approved By:      REJI MATIAS MD 03/13/17 6:06 pm     US KNEE GARCIA'S CYST JIMENEZ 09Mar2017 06:10PM NEDA MORGAN   Ordering Provider: NEDA MORGAN.    Reason For Study: bilateral knee pain,bilateral knee pain.   [Mar 13, 2017 7:04PM NEDA MORGAN]  nl   [Mar 3, 2017 1:01PM NEDA MORGAN]  r/o baker's cyst b/l     Test Name Result Flag Reference   US KNEE BAKERS CYST JIMENEZ (Report)     Accession #    [id number]    DATE OF EXAM: 3/9/2017 5:37 PM    EXAMINATION: US KNEE GARCIA'S CYST JIMENEZ    HISTORY: Bilateral knee pain.    TECHNIQUE: Ultrasound of the bilateral popliteal fossa.    COMPARISON: None.    FINDINGS:    Ultrasound examination of the popliteal fossae reveals no evidence of a cystic fluid collection   to suggest Baker's cyst.    IMPRESSION:  No evidence of Baker's cyst in the popliteal fossae.    **** F I N A L ****    Transcribed By: KEVIN   03/10/17 10:35 am    Dictated By:      AMMON PAEZ MD    Electronically Reviewed and Approved By:      AMMON PAEZ MD 03/10/17 10:39 am     MA FFDM SCREEN JIMENEZ W FABIAN W CAD 09Mar2017 04:58PM NEDA MORGAN   Ordering Provider: NEDA MORGAN.    Reason For Study: SCREEN,SCREEN.   [Mar 13, 2017 7:04PM NEDA MORGAN]  nl     Test Name Result Flag Reference   MA FFDM SCREEN JIMENEZ W FABIAN W CAD (Report)     Accession #    [id number]    #[id number] - MA FFDM SCREEN JIMENEZ W FABIAN W CAD  BILATERAL DIGITAL SCREENING MAMMOGRAM  3D/2D WITH CAD: 3/9/2017    CLINICAL HISTORY:Baseline Screening with Tomosynthesis.    COMPARISON:   None, baseline.    FINDINGS:  The tissue of both breasts is heterogeneously dense. This may lower the sensitivity of   mammography.    No significant masses, calcifications, or other findings are seen in either breast.  Current study was also evaluated with a Computer Aided Detection (CAD) system.  Digital Breast Tomosynthesis (DBT) images were obtained and used to assist in the interpretation   of this examination.    IMPRESSION: NEGATIVE    There is no mammographic evidence of malignancy. A 1 year screening mammogram is recommended.    MAMMOGRAPHY BI-RADS: 1 NEGATIVE    Santana domingo/handy:3/10/2017 07:14:48    Imaging Technologist: Charity Najera, [facility]  letter sent: Normal Single Exam     **** FINAL ****    Dictated By:   BUDDY HERZOG, SANTANA HEWITT    Electronically Reviewed and Approved By:  BUDDY HERZOG, SANTANA HEWITT

## 2023-06-28 DIAGNOSIS — I25.118 CORONARY ARTERY DISEASE OF NATIVE ARTERY OF NATIVE HEART WITH STABLE ANGINA PECTORIS: ICD-10-CM

## 2023-06-28 DIAGNOSIS — R07.9 CHEST PAIN, UNSPECIFIED TYPE: Primary | ICD-10-CM

## 2023-06-30 ENCOUNTER — HOSPITAL ENCOUNTER (OUTPATIENT)
Dept: RADIOLOGY | Facility: HOSPITAL | Age: 68
Discharge: HOME | End: 2023-06-30
Attending: THORACIC SURGERY (CARDIOTHORACIC VASCULAR SURGERY)
Payer: MEDICARE

## 2023-06-30 DIAGNOSIS — R07.9 CHEST PAIN, UNSPECIFIED TYPE: ICD-10-CM

## 2023-06-30 PROCEDURE — 71250 CT THORAX DX C-: CPT | Mod: TC

## 2023-07-05 ENCOUNTER — HOSPITAL ENCOUNTER (OUTPATIENT)
Dept: VASCULAR SURGERY | Facility: CLINIC | Age: 68
Discharge: HOME | End: 2023-07-05
Attending: THORACIC SURGERY (CARDIOTHORACIC VASCULAR SURGERY)
Payer: MEDICARE

## 2023-07-05 ENCOUNTER — OFFICE VISIT (OUTPATIENT)
Dept: CARDIOTHORACIC SURGERY | Facility: CLINIC | Age: 68
End: 2023-07-05
Payer: MEDICARE

## 2023-07-05 VITALS
WEIGHT: 215.19 LBS | HEIGHT: 73 IN | BODY MASS INDEX: 28.52 KG/M2 | DIASTOLIC BLOOD PRESSURE: 66 MMHG | HEART RATE: 59 BPM | OXYGEN SATURATION: 99 % | RESPIRATION RATE: 18 BRPM | SYSTOLIC BLOOD PRESSURE: 119 MMHG

## 2023-07-05 DIAGNOSIS — I25.118 CORONARY ARTERY DISEASE OF NATIVE ARTERY OF NATIVE HEART WITH STABLE ANGINA PECTORIS: Primary | ICD-10-CM

## 2023-07-05 DIAGNOSIS — I65.23 BILATERAL CAROTID ARTERY STENOSIS: ICD-10-CM

## 2023-07-05 DIAGNOSIS — Z01.818 PRE-OP EXAM: Primary | ICD-10-CM

## 2023-07-05 DIAGNOSIS — I25.118 CORONARY ARTERY DISEASE OF NATIVE ARTERY OF NATIVE HEART WITH STABLE ANGINA PECTORIS: ICD-10-CM

## 2023-07-05 PROCEDURE — 99999 PR PBB SHADOW E&M-EST. PATIENT-LVL IV: ICD-10-PCS | Mod: PBBFAC,,, | Performed by: THORACIC SURGERY (CARDIOTHORACIC VASCULAR SURGERY)

## 2023-07-05 PROCEDURE — 99205 PR OFFICE/OUTPT VISIT, NEW, LEVL V, 60-74 MIN: ICD-10-PCS | Mod: S$GLB,,, | Performed by: THORACIC SURGERY (CARDIOTHORACIC VASCULAR SURGERY)

## 2023-07-05 PROCEDURE — 99999 PR PBB SHADOW E&M-EST. PATIENT-LVL IV: CPT | Mod: PBBFAC,,, | Performed by: THORACIC SURGERY (CARDIOTHORACIC VASCULAR SURGERY)

## 2023-07-05 PROCEDURE — 1159F PR MEDICATION LIST DOCUMENTED IN MEDICAL RECORD: ICD-10-PCS | Mod: CPTII,S$GLB,, | Performed by: THORACIC SURGERY (CARDIOTHORACIC VASCULAR SURGERY)

## 2023-07-05 PROCEDURE — 4010F PR ACE/ARB THEARPY RXD/TAKEN: ICD-10-PCS | Mod: CPTII,S$GLB,, | Performed by: THORACIC SURGERY (CARDIOTHORACIC VASCULAR SURGERY)

## 2023-07-05 PROCEDURE — 3008F BODY MASS INDEX DOCD: CPT | Mod: CPTII,S$GLB,, | Performed by: THORACIC SURGERY (CARDIOTHORACIC VASCULAR SURGERY)

## 2023-07-05 PROCEDURE — 3008F PR BODY MASS INDEX (BMI) DOCUMENTED: ICD-10-PCS | Mod: CPTII,S$GLB,, | Performed by: THORACIC SURGERY (CARDIOTHORACIC VASCULAR SURGERY)

## 2023-07-05 PROCEDURE — 3078F PR MOST RECENT DIASTOLIC BLOOD PRESSURE < 80 MM HG: ICD-10-PCS | Mod: CPTII,S$GLB,, | Performed by: THORACIC SURGERY (CARDIOTHORACIC VASCULAR SURGERY)

## 2023-07-05 PROCEDURE — 4010F ACE/ARB THERAPY RXD/TAKEN: CPT | Mod: CPTII,S$GLB,, | Performed by: THORACIC SURGERY (CARDIOTHORACIC VASCULAR SURGERY)

## 2023-07-05 PROCEDURE — 1125F PR PAIN SEVERITY QUANTIFIED, PAIN PRESENT: ICD-10-PCS | Mod: CPTII,S$GLB,, | Performed by: THORACIC SURGERY (CARDIOTHORACIC VASCULAR SURGERY)

## 2023-07-05 PROCEDURE — 3044F PR MOST RECENT HEMOGLOBIN A1C LEVEL <7.0%: ICD-10-PCS | Mod: CPTII,S$GLB,, | Performed by: THORACIC SURGERY (CARDIOTHORACIC VASCULAR SURGERY)

## 2023-07-05 PROCEDURE — 3044F HG A1C LEVEL LT 7.0%: CPT | Mod: CPTII,S$GLB,, | Performed by: THORACIC SURGERY (CARDIOTHORACIC VASCULAR SURGERY)

## 2023-07-05 PROCEDURE — 93880 PR DUPLEX SCAN EXTRACRANIAL,BILAT: ICD-10-PCS | Mod: S$GLB,,, | Performed by: SURGERY

## 2023-07-05 PROCEDURE — 3074F PR MOST RECENT SYSTOLIC BLOOD PRESSURE < 130 MM HG: ICD-10-PCS | Mod: CPTII,S$GLB,, | Performed by: THORACIC SURGERY (CARDIOTHORACIC VASCULAR SURGERY)

## 2023-07-05 PROCEDURE — 93880 EXTRACRANIAL BILAT STUDY: CPT | Mod: S$GLB,,, | Performed by: SURGERY

## 2023-07-05 PROCEDURE — 3066F NEPHROPATHY DOC TX: CPT | Mod: CPTII,S$GLB,, | Performed by: THORACIC SURGERY (CARDIOTHORACIC VASCULAR SURGERY)

## 2023-07-05 PROCEDURE — 3074F SYST BP LT 130 MM HG: CPT | Mod: CPTII,S$GLB,, | Performed by: THORACIC SURGERY (CARDIOTHORACIC VASCULAR SURGERY)

## 2023-07-05 PROCEDURE — 3078F DIAST BP <80 MM HG: CPT | Mod: CPTII,S$GLB,, | Performed by: THORACIC SURGERY (CARDIOTHORACIC VASCULAR SURGERY)

## 2023-07-05 PROCEDURE — 99205 OFFICE O/P NEW HI 60 MIN: CPT | Mod: S$GLB,,, | Performed by: THORACIC SURGERY (CARDIOTHORACIC VASCULAR SURGERY)

## 2023-07-05 PROCEDURE — 1159F MED LIST DOCD IN RCRD: CPT | Mod: CPTII,S$GLB,, | Performed by: THORACIC SURGERY (CARDIOTHORACIC VASCULAR SURGERY)

## 2023-07-05 PROCEDURE — 3061F PR NEG MICROALBUMINURIA RESULT DOCUMENTED/REVIEW: ICD-10-PCS | Mod: CPTII,S$GLB,, | Performed by: THORACIC SURGERY (CARDIOTHORACIC VASCULAR SURGERY)

## 2023-07-05 PROCEDURE — 3066F PR DOCUMENTATION OF TREATMENT FOR NEPHROPATHY: ICD-10-PCS | Mod: CPTII,S$GLB,, | Performed by: THORACIC SURGERY (CARDIOTHORACIC VASCULAR SURGERY)

## 2023-07-05 PROCEDURE — 3061F NEG MICROALBUMINURIA REV: CPT | Mod: CPTII,S$GLB,, | Performed by: THORACIC SURGERY (CARDIOTHORACIC VASCULAR SURGERY)

## 2023-07-05 PROCEDURE — 1125F AMNT PAIN NOTED PAIN PRSNT: CPT | Mod: CPTII,S$GLB,, | Performed by: THORACIC SURGERY (CARDIOTHORACIC VASCULAR SURGERY)

## 2023-07-05 NOTE — PROGRESS NOTES
"Subjective:      Patient ID: Addy Redding is a 67 y.o. male.    Chief Complaint: No chief complaint on file.      HPI:  Addy Redding is a 67 y.o. male who presents for surgical evaluation of CAD. Medical conditions inlcude CKD4 (creatinine 2.4), DM2 (A1C 6.7), GERD, HLD, HTN, prostate cancer, PCI . Patient referred by Dr. Perez. Was having increasing shortness of breath / anginal equivalent. Had a stress test with concerning balanced ischemia so LHC was completed. Patient states that he gets short of breath and fatigued with showering. Especially when lifting up his legs to wash. Denies any shortness of breath when walking on a flat surface. Says that his legs and back get tired. His spouse is here and seems to disagree with this. Reports some dizziness with standing and bending over. Says that he has had chronic neck pain / heaviness for some time now. Denies chest pain per say, but says that he gets a pain under his right breast at times. Sometimes in the morning he may have a pain when he wakes up but attributes this to 'hunger pains.'  Denies any lower extremity swelling. No prior strokes, seizures, blood clots, or sternotomies. Quit smoking and drinking ~ 25 years ago. Strong family history of CAD, dad, and several siblings had bypass surgery or  from heart attacks.     Family and social history reviewed    Current Outpatient Medications   Medication Instructions    ADVANCED GLUC METER TEST STRIP Strp USE TO TEST BLOOD SUGAR 4 TIMES DAILY.    amLODIPine (NORVASC) 10 mg, Oral, Daily    aspirin (ECOTRIN) 81 mg, Oral, Daily    atorvastatin (LIPITOR) 80 mg, Oral, Daily    BD ARIA 2ND GEN PEN NEEDLE 32 gauge x " Ndle No dose, route, or frequency recorded.    blood sugar diagnostic (BLOOD GLUCOSE TEST) Strp Check sugar once daily    blood-glucose meter Misc Misc.(Non-Drug; Combo Route)    blood-glucose meter,continuous (DEXCOM G6 ) Misc 1 Device, Misc.(Non-Drug; Combo Route), Continuous    " blood-glucose sensor (DEXCOM G6 SENSOR) Tia 1 Device, Misc.(Non-Drug; Combo Route), Continuous    blood-glucose transmitter (DEXCOM G6 TRANSMITTER) Tia 1 Device, Misc.(Non-Drug; Combo Route), Continuous    clopidogreL (PLAVIX) 75 mg, Oral, Daily    dapagliflozin propanediol (FARXIGA) 10 mg, Oral, Daily    doxazosin (CARDURA) 2 mg, Oral, Nightly    ergocalciferol (ERGOCALCIFEROL) 50,000 Units, Oral, Every 7 days    ezetimibe (ZETIA) 10 mg, Oral, Daily    fluticasone propionate (FLONASE) 100 mcg, Each Nostril, Daily    insulin (LANTUS SOLOSTAR U-100 INSULIN) 12 Units, Subcutaneous, 2 times daily    lancets (ACCU-CHEK SOFTCLIX LANCETS) Misc 1 lancet, Misc.(Non-Drug; Combo Route), 2 times daily    latanoprost 0.005 % ophthalmic solution No dose, route, or frequency recorded.    nitroGLYCERIN (NITROSTAT) 0.4 mg, Sublingual, Every 5 min PRN    olmesartan (BENICAR) 5 mg, Oral, Daily    OZEMPIC 0.25 mg, Subcutaneous, Every 7 days    pantoprazole (PROTONIX) 40 MG tablet TAKE ONE TABLET BY MOUTH ONCE DAILY.    sildenafil (REVATIO) 20 mg Tab Take 1 tablet (20 mg total) by mouth daily as needed for ED    sodium bicarbonate 1,300 mg, Oral, 2 times daily    tamsulosin (FLOMAX) 0.4 mg Cap Oral, Daily         Review of patient's allergies indicates:  No Known Allergies  Past Medical History:   Diagnosis Date    CKD (chronic kidney disease) stage 3, GFR 30-59 ml/min     Costochondritis     Diabetic nephropathy associated with type 2 diabetes mellitus     Diabetic retinopathy     ED (erectile dysfunction)     GERD (gastroesophageal reflux disease)     Hyperlipidemia     Hypertension     Prostate cancer     Type II or unspecified type diabetes mellitus with renal manifestations, uncontrolled(250.42)      Past Surgical History:   Procedure Laterality Date    Bone biopsy right femur      CORONARY ANGIOGRAPHY N/A 06/16/2022    Procedure: ANGIOGRAM, CORONARY ARTERY;  Surgeon: Carter Arevalo MD;  Location: [facility] CATH LAB/EP;  Service:  Cardiology;  Laterality: N/A;    FRACTIONAL FLOW RESERVE (FFR), CORONARY  6/27/2023    Procedure: Fractional Flow [place] (FFR), Coronary;  Surgeon: Je Fontanez MD;  Location: [facility] CATH LAB/EP;  Service: Cardiology;;    INSTANTANEOUS WAVE-FREE RATIO (IFR) N/A 6/27/2023    Procedure: Instantaneous Wave-Free Ratio (IFR);  Surgeon: Je Fontanez MD;  Location: [facility] CATH LAB/EP;  Service: Cardiology;  Laterality: N/A;    LEFT HEART CATHETERIZATION Left 04/07/2022    Procedure: Left heart cath;  Surgeon: Carter Arevalo MD;  Location: [facility] CATH LAB/EP;  Service: Cardiology;  Laterality: Left;    LEFT HEART CATHETERIZATION Left 6/27/2023    Procedure: Left heart cath;  Surgeon: Je Fontanez MD;  Location: [facility] CATH LAB/EP;  Service: Cardiology;  Laterality: Left;    PROSTATE BIOPSY  10/05/2022    RADIOACTIVE SEED IMPLANTATION OF PROSTATE  1/12/2023    Procedure: INSERTION, RADIOACTIVE SEED, PROSTATE;  Surgeon: Scott Frias MD;  Location: [facility] OR [address] [place] [address];  Service: Urology;;    TRANSRECTAL ULTRASOUND EXAMINATION N/A 1/12/2023    Procedure: ULTRASOUND, RECTAL APPROACH;  Surgeon: Scott Frias MD;  Location: [facility] OR [address] [place] [address];  Service: Urology;  Laterality: N/A;     Family History       Problem Relation (Age of Onset)    Asthma Son    Cancer Sister, Brother    Coronary artery disease Father, Sister    Dementia Sister    Diabetes Mother, Sister, Sister, Brother, Brother, Brother    HIV Brother    Heart attack Father, Sister    Heart disease Sister    Hyperlipidemia Father, Sister, Brother, Brother    Hypertension Mother, Sister, Sister, Brother, Brother, Brother, Brother, Son    Kidney disease Mother    Lung cancer Brother    No Known Problems Sister, Brother, Daughter    Peripheral vascular disease Brother    Stomach cancer Sister    Stroke Brother, Brother          Social History     Socioeconomic History    Marital status:    Tobacco Use    Smoking status: Former     Packs/day: 0.50      Years: 26.00     Pack years: 13.00     Types: Cigarettes     Quit date:      Years since quittin.5     Passive exposure: Never    Smokeless tobacco: Never   Substance and Sexual Activity    Alcohol use: No    Drug use: No    Sexual activity: Yes     Partners: Female     Social Determinants of Health     Financial Resource Strain: Low Risk     Difficulty of Paying Living Expenses: Not hard at all   Food Insecurity: No Food Insecurity    Worried About Running Out of Food in the Last Year: Never true    Ran Out of Food in the Last Year: Never true   Transportation Needs: No Transportation Needs    Lack of Transportation (Medical): No    Lack of Transportation (Non-Medical): No   Physical Activity: Insufficiently Active    Days of Exercise per Week: 2 days    Minutes of Exercise per Session: 60 min   Stress: No Stress Concern Present    Feeling of Stress : Not at all   Social Connections: Moderately Isolated    Frequency of Communication with Friends and Family: More than three times a week    Frequency of Social Gatherings with Friends and Family: Three times a week    Attends [employer] Services: Never    Active Member of Clubs or Organizations: No    Attends Club or Organization Meetings: Never    Marital Status:    Housing Stability: Low Risk     Unable to Pay for Housing in the Last Year: No    Number of Places Lived in the Last Year: 1    Unstable Housing in the Last Year: No       Current medications Reviewed    Review of Systems   Constitutional:  Positive for activity change and fatigue.   HENT:  Negative for nosebleeds.    Eyes:  Negative for visual disturbance.   Respiratory:  Positive for shortness of breath.    Cardiovascular:  Negative for chest pain and leg swelling.   Gastrointestinal:  Negative for nausea.   Musculoskeletal:  Negative for gait problem.   Skin:  Negative for color change.   Neurological:  Positive for dizziness. Negative for seizures.   Hematological:  Does not  "bruise/bleed easily.   Psychiatric/Behavioral:  Negative for sleep disturbance.    Objective:   Physical Exam  Vitals reviewed.   Constitutional:       General: He is not in acute distress.     Appearance: He is well-developed. He is not diaphoretic.   HENT:      Head: Normocephalic and atraumatic.   Eyes:      Pupils: Pupils are equal, round, and reactive to light.   Neck:      Vascular: No JVD.   Cardiovascular:      Rate and Rhythm: Normal rate.   Pulmonary:      Effort: Pulmonary effort is normal. No respiratory distress.   Abdominal:      General: There is no distension.   Musculoskeletal:         General: Normal range of motion.      Cervical back: Normal range of motion.   Skin:     Coloration: Skin is not pale.   Neurological:      General: No focal deficit present.      Mental Status: He is alert.   Psychiatric:         Speech: Speech normal.         Behavior: Behavior normal.         Thought Content: Thought content normal.         Judgment: Judgment normal.       Diagnostic Results: reviewed   CT Chest reviewed     Carotid US 7/5/23  RIGHT SIDE:   40-59% Right ICA stenosis.   Calcific plaque noted in the right internal carotid artery.   Homogeneous plaque noted in the right common carotid artery.   Antegrade flow noted in the right vertebral artery.     LEFT SIDE:   60-79% Left ICA stenosis.   Calcific plaque noted in the left internal carotid artery.   Heterogeneous plaque noted in the left common carotid artery.   Antegrade flow in the left vertebral artery.       [facility] 6/27/23  Multivessel CAD  inluding ostial LAD  2.    Type II DM  Plan:   - Given MVCAD including ostial LAD and type II DM. Will refer for Bypass evaluation   - Continue medical therapy   - Risk factors modification       Pet Stress 6/1/23    The myocardial perfusion images are normal without evidence of scar.    Stress myocardial blood flow is severely reduced diffusely throughout the heart consistent with three vessel "balanced" " ischemia.  The RCA territory has severely reduced flow capacity consistent with a known  with inadequate collateralization. The anterior and anterolateral wall have moderately reduced flow capacity and are on the border of ischemic thresholds. The remainder of the myocardium has mildly reduced flow capacity.    The whole heart absolute myocardial perfusion values averaged 0.54 cc/min/g at rest, which is reduced; 0.81 cc/min/g at stress, which is severely reduced; and CFR is 1.50 , which is moderately reduced.    CT attenuation images demonstrate moderate diffuse coronary calcifications in the LAD and LCX territory and mild diffuse aortic calcifications in the descending aorta.    The gated perfusion images showed an ejection fraction of 60% at rest and 62% during stress. A normal ejection fraction is greater than 47%.    The wall motion is normal at rest and during stress.    The LV cavity size is normal at rest and stress.    The ECG portion of the study is negative for ischemia.    There were no arrhythmias during stress.    The patient reported no chest pain during the stress test.    There are no prior studies for comparison.    TTE 3/23/23  Normal systolic function.  The estimated ejection fraction is 55%.  Normal left ventricular diastolic function.  Normal right ventricular size with normal right ventricular systolic function.  Normal central venous pressure (3 mmHg).  The estimated PA systolic pressure is 23 mmHg.  Assessment:   CAD  Bilateral carotid stenosis, L > R   Plan:     I have seen the patient and reviewed the physician assistant's note above. I have personally interviewed and examined the patient at bedside and agree with the findings.     Mr. Redding is a 67 year-old male former smoker (1ppd x 25 years, quit 25 years ago) with a history of coronary artery disease s/p LCx+OM stents (2022), carotid stenosis, stage 4 chronic kidney disease, hypertension, and diabetes (HbA1C 6.7).  Recently, he has  been symptomatic with dyspnea on exertion symptoms interfering with his quality of life.  His most recent echocardiogram showed 55% ejection fraction with no significant valvulopathy.  Angiogram showed 80% ostial LAD with a moderate sized mid and distal LAD, nonsignificant LCx disease (patent stents), and totally occluded ostial RCA with diffuse mid and distal RCA disease and small PDA (fills via left to right collaterals).    CT chest noncontrast shows minimal ascending aortic and mild aortic arch calcifications. Carotid ultrasound shows 40-59% right sided disease and 60-79% left sided disease.    We had an extensive discussion with him regarding the ACC/AHA guidelines and we agreed that he has class I indications for surgery involving off pump coronary artery bypass surgery (LIMA-LAD, possible SVG-PDA).  We went through the risks and benefits as well as the perioperative expectations and we agreed he is an appropriate surgical candidate.  We have tentatively scheduled his surgery for Friday, July 21, 2023, and preop on Wednesday, July 19, 2023.      Jass Uriostegui MD  Cardiothoracic Surgery  [facility]

## 2023-07-05 NOTE — LETTER
[facility] - Cardiovasc Surg 2nd Fl  [address]  [facility] [address]  Phone: [phone]               July 5, 2023      Patient: Addy Redding   MR Number: [id number]   YOB: 1955   Date of Visit: 7/5/2023     Je Fontanez MD  [address] [facility] [address]    Dear Dr. Fontanez,     It has been a privilege for us to see your patient, Mr. Redding, at our [facility].  We had a chance to meet with him and went over the history, echocardiographic, as well as angiographic findings in detail.  As you know, he is a 67 year-old male former smoker (1ppd x 25 years, quit 25 years ago) with a history of coronary artery disease s/p LCx+OM stents (2022), carotid stenosis, stage 4 chronic kidney disease, hypertension, and diabetes (HbA1C 6.7).  Recently, he has been symptomatic with dyspnea on exertion symptoms interfering with his quality of life.  His most recent echocardiogram showed 55% ejection fraction with no significant valvulopathy.  Angiogram showed 80% ostial LAD with a moderate sized mid and distal LAD, nonsignificant LCx disease (patent stents), and totally occluded ostial RCA with diffuse mid and distal RCA disease and small PDA (fills via left to right collaterals).    CT chest noncontrast shows minimal ascending aortic and mild aortic arch calcifications. Carotid ultrasound shows 40-59% right sided disease and 60-79% left sided disease.    We had an extensive discussion with him regarding the ACC/AHA guidelines and we agreed that he has class I indications for surgery involving off pump coronary artery bypass surgery (LIMA-LAD, possible SVG-PDA).  We went through the risks and benefits as well as the perioperative expectations and we agreed he is an appropriate surgical candidate.  We have tentatively scheduled his surgery for Friday, July 21, 2023, and preop on Wednesday, July 19, 2023. I will be delighted to contact you around the time of his surgery.    Thank  you for referring Mr. Redding and for your trust and confidence in our [facility].    Sincerely,     Jass Uriostegui MD  Cardiothoracic Surgery  [facility]

## 2023-07-06 DIAGNOSIS — Z01.818 PRE-OP TESTING: Primary | ICD-10-CM

## 2023-07-06 DIAGNOSIS — R07.89 OTHER CHEST PAIN: ICD-10-CM

## 2023-07-06 DIAGNOSIS — I25.118 CORONARY ARTERY DISEASE OF NATIVE ARTERY OF NATIVE HEART WITH STABLE ANGINA PECTORIS: ICD-10-CM

## 2023-07-10 ENCOUNTER — TELEPHONE (OUTPATIENT)
Dept: CARDIOTHORACIC SURGERY | Facility: CLINIC | Age: 68
End: 2023-07-10
Payer: MEDICARE

## 2023-07-10 ENCOUNTER — PATIENT MESSAGE (OUTPATIENT)
Dept: CARDIOTHORACIC SURGERY | Facility: CLINIC | Age: 68
End: 2023-07-10
Payer: MEDICARE

## 2023-07-10 NOTE — TELEPHONE ENCOUNTER
Pt returned my call. Reviewed medication list and appointment times.     Julie Haase RN  [facility] Nurse Navigator [phone]

## 2023-07-10 NOTE — PRE-PROCEDURE INSTRUCTIONS
GAVE PATIENT DIABETIC MEDICATION INSTRUCTIONS PRIOR TO SURGERY. PATIENT VERBALIZED UNDERSTANDING.

## 2023-07-14 ENCOUNTER — TELEPHONE (OUTPATIENT)
Dept: CARDIOTHORACIC SURGERY | Facility: CLINIC | Age: 68
End: 2023-07-14
Payer: MEDICARE

## 2023-07-14 NOTE — TELEPHONE ENCOUNTER
Called patient to remind him to stop Plavix and take his last dose of doxazosin on Saturday July 15th.    Left detailed VM with phone number and will call again this afternoon.    Julie Haase RN  [facility] Nurse Navigator [phone]

## 2023-07-19 ENCOUNTER — HOSPITAL ENCOUNTER (OUTPATIENT)
Dept: RADIOLOGY | Facility: HOSPITAL | Age: 68
Discharge: HOME | DRG: 236 | End: 2023-07-19
Attending: THORACIC SURGERY (CARDIOTHORACIC VASCULAR SURGERY)
Payer: MEDICARE

## 2023-07-19 ENCOUNTER — DOCUMENTATION ONLY (OUTPATIENT)
Dept: CARDIOTHORACIC SURGERY | Facility: CLINIC | Age: 68
End: 2023-07-19

## 2023-07-19 ENCOUNTER — HOSPITAL ENCOUNTER (OUTPATIENT)
Dept: CARDIOLOGY | Facility: CLINIC | Age: 68
Discharge: HOME | DRG: 236 | End: 2023-07-19
Payer: MEDICARE

## 2023-07-19 ENCOUNTER — OFFICE VISIT (OUTPATIENT)
Dept: CARDIOTHORACIC SURGERY | Facility: CLINIC | Age: 68
DRG: 236 | End: 2023-07-19
Payer: MEDICARE

## 2023-07-19 VITALS
HEART RATE: 55 BPM | HEIGHT: 73 IN | DIASTOLIC BLOOD PRESSURE: 67 MMHG | SYSTOLIC BLOOD PRESSURE: 128 MMHG | BODY MASS INDEX: 28.43 KG/M2 | WEIGHT: 214.5 LBS | OXYGEN SATURATION: 99 %

## 2023-07-19 DIAGNOSIS — I25.118 CORONARY ARTERY DISEASE OF NATIVE ARTERY OF NATIVE HEART WITH STABLE ANGINA PECTORIS: Primary | ICD-10-CM

## 2023-07-19 DIAGNOSIS — Z01.818 PRE-OP TESTING: ICD-10-CM

## 2023-07-19 DIAGNOSIS — I25.118 CORONARY ARTERY DISEASE OF NATIVE ARTERY OF NATIVE HEART WITH STABLE ANGINA PECTORIS: ICD-10-CM

## 2023-07-19 DIAGNOSIS — I65.23 BILATERAL CAROTID ARTERY STENOSIS: ICD-10-CM

## 2023-07-19 PROCEDURE — 99999 PR PBB SHADOW E&M-EST. PATIENT-LVL V: CPT | Mod: PBBFAC,,, | Performed by: THORACIC SURGERY (CARDIOTHORACIC VASCULAR SURGERY)

## 2023-07-19 PROCEDURE — 93005 EKG 12-LEAD: ICD-10-PCS | Mod: S$GLB,,, | Performed by: THORACIC SURGERY (CARDIOTHORACIC VASCULAR SURGERY)

## 2023-07-19 PROCEDURE — 93005 ELECTROCARDIOGRAM TRACING: CPT | Mod: S$GLB,,, | Performed by: THORACIC SURGERY (CARDIOTHORACIC VASCULAR SURGERY)

## 2023-07-19 PROCEDURE — 71046 X-RAY EXAM CHEST 2 VIEWS: CPT | Mod: 26,,, | Performed by: RADIOLOGY

## 2023-07-19 PROCEDURE — 99499 NO LOS: ICD-10-PCS | Mod: S$GLB,,, | Performed by: THORACIC SURGERY (CARDIOTHORACIC VASCULAR SURGERY)

## 2023-07-19 PROCEDURE — 99499 UNLISTED E&M SERVICE: CPT | Mod: S$GLB,,, | Performed by: THORACIC SURGERY (CARDIOTHORACIC VASCULAR SURGERY)

## 2023-07-19 PROCEDURE — 93010 EKG 12-LEAD: ICD-10-PCS | Mod: S$GLB,,, | Performed by: INTERNAL MEDICINE

## 2023-07-19 PROCEDURE — 93010 ELECTROCARDIOGRAM REPORT: CPT | Mod: S$GLB,,, | Performed by: INTERNAL MEDICINE

## 2023-07-19 PROCEDURE — 71046 XR CHEST PA AND LATERAL: ICD-10-PCS | Mod: 26,,, | Performed by: RADIOLOGY

## 2023-07-19 PROCEDURE — 99999 PR PBB SHADOW E&M-EST. PATIENT-LVL V: ICD-10-PCS | Mod: PBBFAC,,, | Performed by: THORACIC SURGERY (CARDIOTHORACIC VASCULAR SURGERY)

## 2023-07-19 PROCEDURE — 71046 X-RAY EXAM CHEST 2 VIEWS: CPT | Mod: TC,FY

## 2023-07-19 RX ORDER — METOCLOPRAMIDE HYDROCHLORIDE 5 MG/ML
5 INJECTION INTRAMUSCULAR; INTRAVENOUS EVERY 6 HOURS PRN
Status: CANCELLED | OUTPATIENT
Start: 2023-07-19

## 2023-07-19 RX ORDER — CEFAZOLIN SODIUM 2 G/50ML
2 SOLUTION INTRAVENOUS
Status: CANCELLED | OUTPATIENT
Start: 2023-07-19

## 2023-07-19 RX ORDER — ATORVASTATIN CALCIUM 40 MG/1
40 TABLET, FILM COATED ORAL NIGHTLY
Status: CANCELLED | OUTPATIENT
Start: 2023-07-19

## 2023-07-19 RX ORDER — METOPROLOL TARTRATE 25 MG/1
25 TABLET, FILM COATED ORAL
Status: CANCELLED | OUTPATIENT
Start: 2023-07-19

## 2023-07-19 RX ORDER — ALBUMIN HUMAN 50 G/1000ML
25 SOLUTION INTRAVENOUS ONCE AS NEEDED
Status: CANCELLED | OUTPATIENT
Start: 2023-07-19 | End: 2034-12-15

## 2023-07-19 RX ORDER — MUPIROCIN 20 MG/G
1 OINTMENT TOPICAL 2 TIMES DAILY
Status: CANCELLED | OUTPATIENT
Start: 2023-07-19 | End: 2023-07-24

## 2023-07-19 RX ORDER — ASPIRIN 325 MG
325 TABLET, DELAYED RELEASE (ENTERIC COATED) ORAL DAILY
Status: CANCELLED | OUTPATIENT
Start: 2023-07-19

## 2023-07-19 RX ORDER — DOCUSATE SODIUM 100 MG/1
100 CAPSULE, LIQUID FILLED ORAL 2 TIMES DAILY
Status: CANCELLED | OUTPATIENT
Start: 2023-07-19

## 2023-07-19 RX ORDER — CEFAZOLIN SODIUM 2 G/50ML
2 SOLUTION INTRAVENOUS
Status: CANCELLED | OUTPATIENT
Start: 2023-07-19 | End: 2023-07-21

## 2023-07-19 RX ORDER — FENTANYL CITRATE 50 UG/ML
50 INJECTION, SOLUTION INTRAMUSCULAR; INTRAVENOUS
Status: CANCELLED | OUTPATIENT
Start: 2023-07-21

## 2023-07-19 RX ORDER — IPRATROPIUM BROMIDE AND ALBUTEROL SULFATE 2.5; .5 MG/3ML; MG/3ML
3 SOLUTION RESPIRATORY (INHALATION) EVERY 4 HOURS PRN
Status: CANCELLED | OUTPATIENT
Start: 2023-07-19 | End: 2023-07-20

## 2023-07-19 RX ORDER — FAMOTIDINE 10 MG/ML
20 INJECTION INTRAVENOUS 2 TIMES DAILY
Status: CANCELLED | OUTPATIENT
Start: 2023-07-19

## 2023-07-19 RX ORDER — NAPROXEN SODIUM 220 MG/1
81 TABLET, FILM COATED ORAL DAILY
Status: CANCELLED | OUTPATIENT
Start: 2023-07-19

## 2023-07-19 RX ORDER — POLYETHYLENE GLYCOL 3350 17 G/17G
17 POWDER, FOR SOLUTION ORAL DAILY
Status: CANCELLED | OUTPATIENT
Start: 2023-07-19

## 2023-07-19 RX ORDER — IPRATROPIUM BROMIDE AND ALBUTEROL SULFATE 2.5; .5 MG/3ML; MG/3ML
3 SOLUTION RESPIRATORY (INHALATION) EVERY 4 HOURS
Status: CANCELLED | OUTPATIENT
Start: 2023-07-19 | End: 2023-07-20

## 2023-07-19 RX ORDER — FENTANYL CITRATE 50 UG/ML
25 INJECTION, SOLUTION INTRAMUSCULAR; INTRAVENOUS
Status: CANCELLED | OUTPATIENT
Start: 2023-07-19 | End: 2023-07-20

## 2023-07-19 RX ORDER — SODIUM CHLORIDE 9 MG/ML
INJECTION, SOLUTION INTRAVENOUS CONTINUOUS
Status: CANCELLED | OUTPATIENT
Start: 2023-07-19

## 2023-07-19 RX ORDER — ASPIRIN 300 MG/1
300 SUPPOSITORY RECTAL ONCE AS NEEDED
Status: CANCELLED | OUTPATIENT
Start: 2023-07-19 | End: 2034-12-15

## 2023-07-19 RX ORDER — OXYCODONE HYDROCHLORIDE 10 MG/1
10 TABLET ORAL EVERY 4 HOURS PRN
Status: CANCELLED | OUTPATIENT
Start: 2023-07-19

## 2023-07-19 RX ORDER — POTASSIUM CHLORIDE 29.8 MG/ML
40 INJECTION INTRAVENOUS
Status: CANCELLED | OUTPATIENT
Start: 2023-07-19

## 2023-07-19 RX ORDER — POTASSIUM CHLORIDE 14.9 MG/ML
20 INJECTION INTRAVENOUS
Status: CANCELLED | OUTPATIENT
Start: 2023-07-19

## 2023-07-19 RX ORDER — DEXTROSE MONOHYDRATE, SODIUM CHLORIDE, AND POTASSIUM CHLORIDE 50; 1.49; 4.5 G/1000ML; G/1000ML; G/1000ML
INJECTION, SOLUTION INTRAVENOUS CONTINUOUS
Status: CANCELLED | OUTPATIENT
Start: 2023-07-19

## 2023-07-19 RX ORDER — NAPROXEN SODIUM 220 MG/1
81 TABLET, FILM COATED ORAL ONCE
Status: CANCELLED | OUTPATIENT
Start: 2023-07-19 | End: 2023-07-19

## 2023-07-19 RX ORDER — POTASSIUM CHLORIDE 14.9 MG/ML
60 INJECTION INTRAVENOUS
Status: CANCELLED | OUTPATIENT
Start: 2023-07-19

## 2023-07-19 RX ORDER — SODIUM CHLORIDE 0.9 % (FLUSH) 0.9 %
10 SYRINGE (ML) INJECTION
Status: CANCELLED | OUTPATIENT
Start: 2023-07-19

## 2023-07-19 RX ORDER — PROPOFOL 10 MG/ML
0-50 INJECTION, EMULSION INTRAVENOUS CONTINUOUS
Status: CANCELLED | OUTPATIENT
Start: 2023-07-19

## 2023-07-19 RX ORDER — MAGNESIUM SULFATE HEPTAHYDRATE 40 MG/ML
2 INJECTION, SOLUTION INTRAVENOUS
Status: CANCELLED | OUTPATIENT
Start: 2023-07-19

## 2023-07-19 RX ORDER — ONDANSETRON 2 MG/ML
4 INJECTION INTRAMUSCULAR; INTRAVENOUS EVERY 12 HOURS PRN
Status: CANCELLED | OUTPATIENT
Start: 2023-07-19

## 2023-07-19 RX ORDER — MAGNESIUM SULFATE/D5W 2 G/50 ML
4 INTRAVENOUS SOLUTION, PIGGYBACK (ML) INTRAVENOUS
Status: CANCELLED | OUTPATIENT
Start: 2023-07-19

## 2023-07-19 RX ORDER — BISACODYL 10 MG
10 SUPPOSITORY, RECTAL RECTAL DAILY PRN
Status: CANCELLED | OUTPATIENT
Start: 2023-07-19

## 2023-07-19 RX ORDER — LIDOCAINE HYDROCHLORIDE 10 MG/ML
1 INJECTION, SOLUTION EPIDURAL; INFILTRATION; INTRACAUDAL; PERINEURAL
Status: CANCELLED | OUTPATIENT
Start: 2023-07-19

## 2023-07-19 RX ORDER — MUPIROCIN 20 MG/G
1 OINTMENT TOPICAL
Status: CANCELLED | OUTPATIENT
Start: 2023-07-19

## 2023-07-19 RX ORDER — FAMOTIDINE 20 MG/1
20 TABLET, FILM COATED ORAL 2 TIMES DAILY
Status: CANCELLED | OUTPATIENT
Start: 2023-07-19

## 2023-07-19 RX ORDER — ACETAMINOPHEN 325 MG/1
650 TABLET ORAL EVERY 4 HOURS PRN
Status: CANCELLED | OUTPATIENT
Start: 2023-07-19

## 2023-07-19 RX ORDER — OXYCODONE HYDROCHLORIDE 5 MG/1
5 TABLET ORAL EVERY 4 HOURS PRN
Status: CANCELLED | OUTPATIENT
Start: 2023-07-19

## 2023-07-19 RX ORDER — FENTANYL CITRATE 50 UG/ML
25 INJECTION, SOLUTION INTRAMUSCULAR; INTRAVENOUS
Status: CANCELLED | OUTPATIENT
Start: 2023-07-19

## 2023-07-19 NOTE — PROGRESS NOTES
Subjective:      Patient ID: Addy Redding is a 67 y.o. male.    Chief Complaint: No chief complaint on file.      HPI:  Addy Redding is a 67 y.o. male who presents to preop for CABG off PUMP. Medical conditions inlcude CKD4 (creatinine 2.4), DM2 (A1C 6.7), GERD, HLD, HTN, prostate cancer, PCI . Patient referred by Dr. Perez. Was having increasing shortness of breath / anginal equivalent. Had a stress test with concerning balanced ischemia so LHC was completed. Patient states that he gets short of breath and fatigued with showering. Especially when lifting up his legs to wash. Denies any shortness of breath when walking on a flat surface. Says that his legs and back get tired. His spouse is here and seems to disagree with this. Reports some dizziness with standing and bending over. Says that he has had chronic neck pain / heaviness for some time now. Denies chest pain per say, but says that he gets a pain under his right breast at times. Sometimes in the morning he may have a pain when he wakes up but attributes this to 'hunger pains.'  Denies any lower extremity swelling. No prior strokes, seizures, blood clots, or sternotomies. Quit smoking and drinking ~ 25 years ago. Strong family history of CAD, dad, and several siblings had bypass surgery or  from heart attacks.     Family and social history reviewed    Review of patient's allergies indicates:  No Known Allergies  Past Medical History:   Diagnosis Date    CKD (chronic kidney disease) stage 3, GFR 30-59 ml/min     Costochondritis     Diabetic nephropathy associated with type 2 diabetes mellitus     Diabetic retinopathy     ED (erectile dysfunction)     GERD (gastroesophageal reflux disease)     Hyperlipidemia     Hypertension     Prostate cancer     Type II or unspecified type diabetes mellitus with renal manifestations, uncontrolled(250.42)      Past Surgical History:   Procedure Laterality Date    Bone biopsy right femur      CORONARY ANGIOGRAPHY N/A  06/16/2022    Procedure: ANGIOGRAM, CORONARY ARTERY;  Surgeon: Carter Arevalo MD;  Location: [facility] CATH LAB/EP;  Service: Cardiology;  Laterality: N/A;    FRACTIONAL FLOW RESERVE (FFR), CORONARY  6/27/2023    Procedure: Fractional Flow [place] (FFR), Coronary;  Surgeon: Je Fontanez MD;  Location: [facility] CATH LAB/EP;  Service: Cardiology;;    INSTANTANEOUS WAVE-FREE RATIO (IFR) N/A 6/27/2023    Procedure: Instantaneous Wave-Free Ratio (IFR);  Surgeon: Je Fontanez MD;  Location: [facility] CATH LAB/EP;  Service: Cardiology;  Laterality: N/A;    LEFT HEART CATHETERIZATION Left 04/07/2022    Procedure: Left heart cath;  Surgeon: Carter Arevalo MD;  Location: [facility] CATH LAB/EP;  Service: Cardiology;  Laterality: Left;    LEFT HEART CATHETERIZATION Left 6/27/2023    Procedure: Left heart cath;  Surgeon: Je Fontanez MD;  Location: [facility] CATH LAB/EP;  Service: Cardiology;  Laterality: Left;    PROSTATE BIOPSY  10/05/2022    RADIOACTIVE SEED IMPLANTATION OF PROSTATE  1/12/2023    Procedure: INSERTION, RADIOACTIVE SEED, PROSTATE;  Surgeon: Scott Frias MD;  Location: [facility] OR [address] [place] [address];  Service: Urology;;    TRANSRECTAL ULTRASOUND EXAMINATION N/A 1/12/2023    Procedure: ULTRASOUND, RECTAL APPROACH;  Surgeon: Scott Frias MD;  [facility];  Service: Urology;  Laterality: N/A;     Family History       Problem Relation (Age of Onset)    Asthma Son    Cancer Sister, Brother    Coronary artery disease Father, Sister    Dementia Sister    Diabetes Mother, Sister, Sister, Brother, Brother, Brother    HIV Brother    Heart attack Father, Sister    Heart disease Sister    Hyperlipidemia Father, Sister, Brother, Brother    Hypertension Mother, Sister, Sister, Brother, Brother, Brother, Brother, Son    Kidney disease Mother    Lung cancer Brother    No Known Problems Sister, Brother, Daughter    Peripheral vascular disease Brother    Stomach cancer Sister    Stroke Brother, Brother          Social  History     Socioeconomic History    Marital status:    Tobacco Use    Smoking status: Former     Packs/day: 0.50     Years: 26.00     Pack years: 13.00     Types: Cigarettes     Quit date:      Years since quittin.5     Passive exposure: Never    Smokeless tobacco: Never   Substance and Sexual Activity    Alcohol use: No    Drug use: No    Sexual activity: Yes     Partners: Female     Social Determinants of Health     Financial Resource Strain: Low Risk     Difficulty of Paying Living Expenses: Not hard at all   Food Insecurity: No Food Insecurity    Worried About Running Out of Food in the Last Year: Never true    Ran Out of Food in the Last Year: Never true   Transportation Needs: No Transportation Needs    Lack of Transportation (Medical): No    Lack of Transportation (Non-Medical): No   Physical Activity: Insufficiently Active    Days of Exercise per Week: 2 days    Minutes of Exercise per Session: 60 min   Stress: No Stress Concern Present    Feeling of Stress : Not at all   Social Connections: Moderately Isolated    Frequency of Communication with Friends and Family: More than three times a week    Frequency of Social Gatherings with Friends and Family: Three times a week    Attends Gnosticist Services: Never    Active Member of Clubs or Organizations: No    Attends Club or Organization Meetings: Never    Marital Status:    Housing Stability: Low Risk     Unable to Pay for Housing in the Last Year: No    Number of Places Lived in the Last Year: 1    Unstable Housing in the Last Year: No     Current Outpatient Medications on File Prior to Visit   Medication Sig Dispense Refill    ADVANCED GLUC METER TEST STRIP Strp USE TO TEST BLOOD SUGAR 4 TIMES DAILY. 100 strip 0    amLODIPine (NORVASC) 5 MG tablet Take 2 tablets (10 mg total) by mouth once daily. 180 tablet 4    aspirin (ECOTRIN) 81 MG EC tablet Take 1 tablet (81 mg total) by mouth once daily.      atorvastatin (LIPITOR) 80 MG tablet  "Take 1 tablet (80 mg total) by mouth once daily. 90 tablet 3    BD ARIA 2ND GEN PEN NEEDLE 32 gauge x 5/32" Ndle       blood sugar diagnostic (BLOOD GLUCOSE TEST) Strp Check sugar once daily 100 each 11    blood-glucose meter Misc by Misc.(Non-Drug; Combo Route) route.      blood-glucose meter,continuous (DEXCOM G6 ) Misc 1 Device by Misc.(Non-Drug; Combo Route) route continuous. 1 each 0    blood-glucose sensor (DEXCOM G6 SENSOR) Tia 1 Device by Misc.(Non-Drug; Combo Route) route continuous. 4 each 11    blood-glucose transmitter (DEXCOM G6 TRANSMITTER) Tia 1 Device by Misc.(Non-Drug; Combo Route) route continuous. 1 each 0    clopidogreL (PLAVIX) 75 mg tablet Take 1 tablet (75 mg total) by mouth once daily. 90 tablet 4    dapagliflozin (FARXIGA) 10 mg tablet Take 1 tablet (10 mg total) by mouth once daily. 90 tablet 3    doxazosin (CARDURA) 2 MG tablet Take 1 tablet (2 mg total) by mouth every evening. 90 tablet 4    ergocalciferol (ERGOCALCIFEROL) 50,000 unit Cap Take 1 capsule (50,000 Units total) by mouth every 7 days. 12 capsule 3    ezetimibe (ZETIA) 10 mg tablet Take 1 tablet (10 mg total) by mouth once daily. (Patient not taking: Reported on 7/5/2023) 90 tablet 4    fluticasone propionate (FLONASE) 50 mcg/actuation nasal spray 2 sprays (100 mcg total) by Each Nostril route once daily. 16 g 2    insulin (LANTUS SOLOSTAR U-100 INSULIN) glargine 100 units/mL SubQ pen Inject 12 Units into the skin 2 (two) times a day. 21.6 mL 3    lancets (ACCU-CHEK SOFTCLIX LANCETS) Misc 1 lancet by Misc.(Non-Drug; Combo Route) route 2 (two) times daily. 200 each 1    latanoprost 0.005 % ophthalmic solution       nitroGLYCERIN (NITROSTAT) 0.4 MG SL tablet Place 1 tablet (0.4 mg total) under the tongue every 5 (five) minutes as needed for Chest pain. 30 tablet 3    olmesartan (BENICAR) 5 MG Tab Take 1 tablet (5 mg total) by mouth once daily. 90 tablet 3    pantoprazole (PROTONIX) 40 MG tablet TAKE ONE TABLET BY MOUTH " ONCE DAILY. 90 tablet 3    semaglutide (OZEMPIC) 0.25 mg or 0.5 mg (2 mg/3 mL) pen injector Inject 0.25 mg into the skin every 7 days. 1 each 11    sildenafil (REVATIO) 20 mg Tab Take 1 tablet (20 mg total) by mouth daily as needed for ED 30 tablet 2    sodium bicarbonate 650 MG tablet Take 2 tablets (1,300 mg total) by mouth 2 (two) times daily. 360 tablet 3    tamsulosin (FLOMAX) 0.4 mg Cap Take by mouth once daily.       Current Facility-Administered Medications on File Prior to Visit   Medication Dose Route Frequency Provider Last Rate Last Admin    sodium chloride 0.9% flush 10 mL  10 mL Intravenous PRN Je Fontanez MD           Current medications Reviewed     Review of Systems   Constitutional:  Positive for activity change and fatigue.   HENT:  Negative for nosebleeds.    Eyes:  Negative for visual disturbance.   Respiratory:  Positive for shortness of breath.    Cardiovascular:  Negative for chest pain and leg swelling.   Gastrointestinal:  Negative for nausea.   Musculoskeletal:  Negative for gait problem.   Skin:  Negative for color change.   Neurological:  Positive for dizziness. Negative for seizures.   Hematological:  Does not bruise/bleed easily.   Psychiatric/Behavioral:  Negative for sleep disturbance.   Objective:   Physical Exam  Vitals reviewed.   Constitutional:       General: He is not in acute distress.     Appearance: He is well-developed. He is not diaphoretic.   HENT:      Head: Normocephalic and atraumatic.   Eyes:      Pupils: Pupils are equal, round, and reactive to light.   Neck:      Vascular: No JVD.   Cardiovascular:      Rate and Rhythm: Normal rate.   Pulmonary:      Effort: Pulmonary effort is normal. No respiratory distress.   Abdominal:      General: There is no distension.   Musculoskeletal:         General: Normal range of motion.      Cervical back: Normal range of motion.   Skin:     Coloration: Skin is not pale.   Neurological:      General: No focal deficit present.  "     Mental Status: He is alert.   Psychiatric:         Speech: Speech normal.         Behavior: Behavior normal.         Thought Content: Thought content normal.         Judgment: Judgment normal.         Diagnostic Results: reviewed   CT Chest reviewed      Carotid US 7/5/23  RIGHT SIDE:   40-59% Right ICA stenosis.   Calcific plaque noted in the right internal carotid artery.   Homogeneous plaque noted in the right common carotid artery.   Antegrade flow noted in the right vertebral artery.     LEFT SIDE:   60-79% Left ICA stenosis.   Calcific plaque noted in the left internal carotid artery.   Heterogeneous plaque noted in the left common carotid artery.   Antegrade flow in the left vertebral artery.         [facility] 6/27/23  Multivessel CAD  inluding ostial LAD  2.    Type II DM  Plan:   - Given MVCAD including ostial LAD and type II DM. Will refer for Bypass evaluation   - Continue medical therapy   - Risk factors modification        [facility] 6/1/23    The myocardial perfusion images are normal without evidence of scar.    Stress myocardial blood flow is severely reduced diffusely throughout the heart consistent with three vessel "balanced" ischemia.  The RCA territory has severely reduced flow capacity consistent with a known  with inadequate collateralization. The anterior and anterolateral wall have moderately reduced flow capacity and are on the border of ischemic thresholds. The remainder of the myocardium has mildly reduced flow capacity.    The whole heart absolute myocardial perfusion values averaged 0.54 cc/min/g at rest, which is reduced; 0.81 cc/min/g at stress, which is severely reduced; and CFR is 1.50 , which is moderately reduced.    CT attenuation images demonstrate moderate diffuse coronary calcifications in the LAD and LCX territory and mild diffuse aortic calcifications in the descending aorta.    The gated perfusion images showed an ejection fraction of 60% at rest and 62% during stress. A " normal ejection fraction is greater than 47%.    The wall motion is normal at rest and during stress.    The LV cavity size is normal at rest and stress.    The ECG portion of the study is negative for ischemia.    There were no arrhythmias during stress.    The patient reported no chest pain during the stress test.    There are no prior studies for comparison.     TTE 3/23/23  Normal systolic function.  The estimated ejection fraction is 55%.  Normal left ventricular diastolic function.  Normal right ventricular size with normal right ventricular systolic function.  Normal central venous pressure (3 mmHg).  The estimated PA systolic pressure is 23 mmHg.  Assessment:   CAD  Bilateral carotid stenosis, L > R   Plan:     I have seen the patient and reviewed the nurse practitioner's note above. I have personally interviewed and examined the patient at bedside and agree with the findings.     Mr. Redding is a 67 year-old male former smoker (1ppd x 25 years, quit 25 years ago) with a history of coronary artery disease s/p LCx+OM stents (2022), carotid stenosis, stage 4 chronic kidney disease, hypertension, and diabetes (HbA1C 6.7).  Recently, he has been symptomatic with dyspnea on exertion symptoms interfering with his quality of life.  His most recent echocardiogram showed 55% ejection fraction with no significant valvulopathy.  Angiogram showed 80% ostial LAD with a moderate sized mid and distal LAD, nonsignificant LCx disease (patent stents), and totally occluded ostial RCA with diffuse mid and distal RCA disease and small PDA (fills via left to right collaterals).     CT chest noncontrast shows minimal ascending aortic and mild aortic arch calcifications. Carotid ultrasound shows 40-59% right sided disease and 60-79% left sided disease.     We had an extensive discussion with him regarding the ACC/AHA guidelines and we agreed that he has class I indications for surgery involving off pump coronary artery bypass surgery  (LIMA-LAD, possible SVG-PDA).  We went through the risks and benefits as well as the perioperative expectations and we agreed he is an appropriate surgical candidate.  We have tentatively scheduled his surgery for Friday, July 21, 2023.      Jass Uriostegui MD  Cardiothoracic Surgery  [facility]

## 2023-07-19 NOTE — H&P (VIEW-ONLY)
Subjective:      Patient ID: Addy Redding is a 67 y.o. male.    Chief Complaint: No chief complaint on file.      HPI:  Addy Redding is a 67 y.o. male who presents to preop for CABG off PUMP. Medical conditions inlcude CKD4 (creatinine 2.4), DM2 (A1C 6.7), GERD, HLD, HTN, prostate cancer, PCI . Patient referred by Dr. Perez. Was having increasing shortness of breath / anginal equivalent. Had a stress test with concerning balanced ischemia so LHC was completed. Patient states that he gets short of breath and fatigued with showering. Especially when lifting up his legs to wash. Denies any shortness of breath when walking on a flat surface. Says that his legs and back get tired. His spouse is here and seems to disagree with this. Reports some dizziness with standing and bending over. Says that he has had chronic neck pain / heaviness for some time now. Denies chest pain per say, but says that he gets a pain under his right breast at times. Sometimes in the morning he may have a pain when he wakes up but attributes this to 'hunger pains.'  Denies any lower extremity swelling. No prior strokes, seizures, blood clots, or sternotomies. Quit smoking and drinking ~ 25 years ago. Strong family history of CAD, dad, and several siblings had bypass surgery or  from heart attacks.     Family and social history reviewed    Review of patient's allergies indicates:  No Known Allergies  Past Medical History:   Diagnosis Date    CKD (chronic kidney disease) stage 3, GFR 30-59 ml/min     Costochondritis     Diabetic nephropathy associated with type 2 diabetes mellitus     Diabetic retinopathy     ED (erectile dysfunction)     GERD (gastroesophageal reflux disease)     Hyperlipidemia     Hypertension     Prostate cancer     Type II or unspecified type diabetes mellitus with renal manifestations, uncontrolled(250.42)      Past Surgical History:   Procedure Laterality Date    Bone biopsy right femur      CORONARY ANGIOGRAPHY N/A  06/16/2022    Procedure: ANGIOGRAM, CORONARY ARTERY;  Surgeon: Carter Arevalo MD;  Location: [facility] CATH LAB/EP;  Service: Cardiology;  Laterality: N/A;    FRACTIONAL FLOW RESERVE (FFR), CORONARY  6/27/2023    Procedure: Fractional Flow [place] (FFR), Coronary;  Surgeon: Je Fontanez MD;  Location: [facility] CATH LAB/EP;  Service: Cardiology;;    INSTANTANEOUS WAVE-FREE RATIO (IFR) N/A 6/27/2023    Procedure: Instantaneous Wave-Free Ratio (IFR);  Surgeon: Je Fontanez MD;  Location: [facility] CATH LAB/EP;  Service: Cardiology;  Laterality: N/A;    LEFT HEART CATHETERIZATION Left 04/07/2022    Procedure: Left heart cath;  Surgeon: Carter Arevalo MD;  Location: [facility] CATH LAB/EP;  Service: Cardiology;  Laterality: Left;    LEFT HEART CATHETERIZATION Left 6/27/2023    Procedure: Left heart cath;  Surgeon: Je Fontanez MD;  Location: [facility] CATH LAB/EP;  Service: Cardiology;  Laterality: Left;    PROSTATE BIOPSY  10/05/2022    RADIOACTIVE SEED IMPLANTATION OF PROSTATE  1/12/2023    Procedure: INSERTION, RADIOACTIVE SEED, PROSTATE;  Surgeon: Scott Frias MD;  Location: [facility] OR [address] [place] [address];  Service: Urology;;    TRANSRECTAL ULTRASOUND EXAMINATION N/A 1/12/2023    Procedure: ULTRASOUND, RECTAL APPROACH;  Surgeon: Scott Frias MD;  [facility];  Service: Urology;  Laterality: N/A;     Family History       Problem Relation (Age of Onset)    Asthma Son    Cancer Sister, Brother    Coronary artery disease Father, Sister    Dementia Sister    Diabetes Mother, Sister, Sister, Brother, Brother, Brother    HIV Brother    Heart attack Father, Sister    Heart disease Sister    Hyperlipidemia Father, Sister, Brother, Brother    Hypertension Mother, Sister, Sister, Brother, Brother, Brother, Brother, Son    Kidney disease Mother    Lung cancer Brother    No Known Problems Sister, Brother, Daughter    Peripheral vascular disease Brother    Stomach cancer Sister    Stroke Brother, Brother          Social  History     Socioeconomic History    Marital status:    Tobacco Use    Smoking status: Former     Packs/day: 0.50     Years: 26.00     Pack years: 13.00     Types: Cigarettes     Quit date:      Years since quittin.5     Passive exposure: Never    Smokeless tobacco: Never   Substance and Sexual Activity    Alcohol use: No    Drug use: No    Sexual activity: Yes     Partners: Female     Social Determinants of Health     Financial Resource Strain: Low Risk     Difficulty of Paying Living Expenses: Not hard at all   Food Insecurity: No Food Insecurity    Worried About Running Out of Food in the Last Year: Never true    Ran Out of Food in the Last Year: Never true   Transportation Needs: No Transportation Needs    Lack of Transportation (Medical): No    Lack of Transportation (Non-Medical): No   Physical Activity: Insufficiently Active    Days of Exercise per Week: 2 days    Minutes of Exercise per Session: 60 min   Stress: No Stress Concern Present    Feeling of Stress : Not at all   Social Connections: Moderately Isolated    Frequency of Communication with Friends and Family: More than three times a week    Frequency of Social Gatherings with Friends and Family: Three times a week    Attends Sabianist Services: Never    Active Member of Clubs or Organizations: No    Attends Club or Organization Meetings: Never    Marital Status:    Housing Stability: Low Risk     Unable to Pay for Housing in the Last Year: No    Number of Places Lived in the Last Year: 1    Unstable Housing in the Last Year: No     Current Outpatient Medications on File Prior to Visit   Medication Sig Dispense Refill    ADVANCED GLUC METER TEST STRIP Strp USE TO TEST BLOOD SUGAR 4 TIMES DAILY. 100 strip 0    amLODIPine (NORVASC) 5 MG tablet Take 2 tablets (10 mg total) by mouth once daily. 180 tablet 4    aspirin (ECOTRIN) 81 MG EC tablet Take 1 tablet (81 mg total) by mouth once daily.      atorvastatin (LIPITOR) 80 MG tablet  "Take 1 tablet (80 mg total) by mouth once daily. 90 tablet 3    BD ARIA 2ND GEN PEN NEEDLE 32 gauge x 5/32" Ndle       blood sugar diagnostic (BLOOD GLUCOSE TEST) Strp Check sugar once daily 100 each 11    blood-glucose meter Misc by Misc.(Non-Drug; Combo Route) route.      blood-glucose meter,continuous (DEXCOM G6 ) Misc 1 Device by Misc.(Non-Drug; Combo Route) route continuous. 1 each 0    blood-glucose sensor (DEXCOM G6 SENSOR) Tia 1 Device by Misc.(Non-Drug; Combo Route) route continuous. 4 each 11    blood-glucose transmitter (DEXCOM G6 TRANSMITTER) Tia 1 Device by Misc.(Non-Drug; Combo Route) route continuous. 1 each 0    clopidogreL (PLAVIX) 75 mg tablet Take 1 tablet (75 mg total) by mouth once daily. 90 tablet 4    dapagliflozin (FARXIGA) 10 mg tablet Take 1 tablet (10 mg total) by mouth once daily. 90 tablet 3    doxazosin (CARDURA) 2 MG tablet Take 1 tablet (2 mg total) by mouth every evening. 90 tablet 4    ergocalciferol (ERGOCALCIFEROL) 50,000 unit Cap Take 1 capsule (50,000 Units total) by mouth every 7 days. 12 capsule 3    ezetimibe (ZETIA) 10 mg tablet Take 1 tablet (10 mg total) by mouth once daily. (Patient not taking: Reported on 7/5/2023) 90 tablet 4    fluticasone propionate (FLONASE) 50 mcg/actuation nasal spray 2 sprays (100 mcg total) by Each Nostril route once daily. 16 g 2    insulin (LANTUS SOLOSTAR U-100 INSULIN) glargine 100 units/mL SubQ pen Inject 12 Units into the skin 2 (two) times a day. 21.6 mL 3    lancets (ACCU-CHEK SOFTCLIX LANCETS) Misc 1 lancet by Misc.(Non-Drug; Combo Route) route 2 (two) times daily. 200 each 1    latanoprost 0.005 % ophthalmic solution       nitroGLYCERIN (NITROSTAT) 0.4 MG SL tablet Place 1 tablet (0.4 mg total) under the tongue every 5 (five) minutes as needed for Chest pain. 30 tablet 3    olmesartan (BENICAR) 5 MG Tab Take 1 tablet (5 mg total) by mouth once daily. 90 tablet 3    pantoprazole (PROTONIX) 40 MG tablet TAKE ONE TABLET BY MOUTH " ONCE DAILY. 90 tablet 3    semaglutide (OZEMPIC) 0.25 mg or 0.5 mg (2 mg/3 mL) pen injector Inject 0.25 mg into the skin every 7 days. 1 each 11    sildenafil (REVATIO) 20 mg Tab Take 1 tablet (20 mg total) by mouth daily as needed for ED 30 tablet 2    sodium bicarbonate 650 MG tablet Take 2 tablets (1,300 mg total) by mouth 2 (two) times daily. 360 tablet 3    tamsulosin (FLOMAX) 0.4 mg Cap Take by mouth once daily.       Current Facility-Administered Medications on File Prior to Visit   Medication Dose Route Frequency Provider Last Rate Last Admin    sodium chloride 0.9% flush 10 mL  10 mL Intravenous PRN Je Fontanez MD           Current medications Reviewed     Review of Systems   Constitutional:  Positive for activity change and fatigue.   HENT:  Negative for nosebleeds.    Eyes:  Negative for visual disturbance.   Respiratory:  Positive for shortness of breath.    Cardiovascular:  Negative for chest pain and leg swelling.   Gastrointestinal:  Negative for nausea.   Musculoskeletal:  Negative for gait problem.   Skin:  Negative for color change.   Neurological:  Positive for dizziness. Negative for seizures.   Hematological:  Does not bruise/bleed easily.   Psychiatric/Behavioral:  Negative for sleep disturbance.   Objective:   Physical Exam  Vitals reviewed.   Constitutional:       General: He is not in acute distress.     Appearance: He is well-developed. He is not diaphoretic.   HENT:      Head: Normocephalic and atraumatic.   Eyes:      Pupils: Pupils are equal, round, and reactive to light.   Neck:      Vascular: No JVD.   Cardiovascular:      Rate and Rhythm: Normal rate.   Pulmonary:      Effort: Pulmonary effort is normal. No respiratory distress.   Abdominal:      General: There is no distension.   Musculoskeletal:         General: Normal range of motion.      Cervical back: Normal range of motion.   Skin:     Coloration: Skin is not pale.   Neurological:      General: No focal deficit present.  "     Mental Status: He is alert.   Psychiatric:         Speech: Speech normal.         Behavior: Behavior normal.         Thought Content: Thought content normal.         Judgment: Judgment normal.         Diagnostic Results: reviewed   CT Chest reviewed      Carotid US 7/5/23  RIGHT SIDE:   40-59% Right ICA stenosis.   Calcific plaque noted in the right internal carotid artery.   Homogeneous plaque noted in the right common carotid artery.   Antegrade flow noted in the right vertebral artery.     LEFT SIDE:   60-79% Left ICA stenosis.   Calcific plaque noted in the left internal carotid artery.   Heterogeneous plaque noted in the left common carotid artery.   Antegrade flow in the left vertebral artery.         [facility] 6/27/23  Multivessel CAD  inluding ostial LAD  2.    Type II DM  Plan:   - Given MVCAD including ostial LAD and type II DM. Will refer for Bypass evaluation   - Continue medical therapy   - Risk factors modification        Pet Stress 6/1/23    The myocardial perfusion images are normal without evidence of scar.    Stress myocardial blood flow is severely reduced diffusely throughout the heart consistent with three vessel "balanced" ischemia.  The RCA territory has severely reduced flow capacity consistent with a known  with inadequate collateralization. The anterior and anterolateral wall have moderately reduced flow capacity and are on the border of ischemic thresholds. The remainder of the myocardium has mildly reduced flow capacity.    The whole heart absolute myocardial perfusion values averaged 0.54 cc/min/g at rest, which is reduced; 0.81 cc/min/g at stress, which is severely reduced; and CFR is 1.50 , which is moderately reduced.    CT attenuation images demonstrate moderate diffuse coronary calcifications in the LAD and LCX territory and mild diffuse aortic calcifications in the descending aorta.    The gated perfusion images showed an ejection fraction of 60% at rest and 62% during stress. A " normal ejection fraction is greater than 47%.    The wall motion is normal at rest and during stress.    The LV cavity size is normal at rest and stress.    The ECG portion of the study is negative for ischemia.    There were no arrhythmias during stress.    The patient reported no chest pain during the stress test.    There are no prior studies for comparison.     TTE 3/23/23  Normal systolic function.  The estimated ejection fraction is 55%.  Normal left ventricular diastolic function.  Normal right ventricular size with normal right ventricular systolic function.  Normal central venous pressure (3 mmHg).  The estimated PA systolic pressure is 23 mmHg.  Assessment:   CAD  Bilateral carotid stenosis, L > R   Plan:     I have seen the patient and reviewed the nurse practitioner's note above. I have personally interviewed and examined the patient at bedside and agree with the findings.     Mr. Redding is a 67 year-old male former smoker (1ppd x 25 years, quit 25 years ago) with a history of coronary artery disease s/p LCx+OM stents (2022), carotid stenosis, stage 4 chronic kidney disease, hypertension, and diabetes (HbA1C 6.7).  Recently, he has been symptomatic with dyspnea on exertion symptoms interfering with his quality of life.  His most recent echocardiogram showed 55% ejection fraction with no significant valvulopathy.  Angiogram showed 80% ostial LAD with a moderate sized mid and distal LAD, nonsignificant LCx disease (patent stents), and totally occluded ostial RCA with diffuse mid and distal RCA disease and small PDA (fills via left to right collaterals).     CT chest noncontrast shows minimal ascending aortic and mild aortic arch calcifications. Carotid ultrasound shows 40-59% right sided disease and 60-79% left sided disease.     We had an extensive discussion with him regarding the ACC/AHA guidelines and we agreed that he has class I indications for surgery involving off pump coronary artery bypass surgery  (LIMA-LAD, possible SVG-PDA).  We went through the risks and benefits as well as the perioperative expectations and we agreed he is an appropriate surgical candidate.  We have tentatively scheduled his surgery for Friday, July 21, 2023.      Jass Uriostegui MD  Cardiothoracic Surgery  [facility]

## 2023-07-19 NOTE — PROGRESS NOTES
"Pt and spouse verbally instructed and given written instructions given for surgery.      PREPARING FOR SURGERY    Your surgery has been scheduled for:    Day: Friday   Date:  7/21    Arrival Time: 5am    Start Time: 7am    You should report to the second floor surgery center, located on the [facility] side of the second floor of the [facility]. The phone number is [phone].           THE NIGHT BEFORE SURGERY    Eat a light supper on the night before your surgery, no greasy or fatty foods.    DO NOT EAT OR DRINK ANYTHING AFTER MIDNIGHT, INCLUDING GUM, HARD CANDY, MINTS, OR CHEWING TOBACCO    Take a complete shower. Wash your body from the neck down with Hibiclens (chlorhexidine gluconate) soap. Hibiclens soap may be purchased over the counter at the pharmacy. Keep the soap away from your eyes, ears, and mouth. After washing with Hibiclens, rinse thoroughly. You may also use any soap labeled "antibacterial". Shampoo your hair with your regular shampoo.         THE DAY OF SURGERY    Take another shower with Hibiclens or any antibacterial soap, to reduce the change of infection.    Diabetic medication instructions will be given by the preop center. They will call you before your surgery.    You may brush your teeth and rinse your mouth, but do not shallow any water.    Do not apply perfume, powder, body lotions or deodorant on the day of surgery.    Do not wear any makeup, including mascara and false eyelashes.    Nail polish should be removed.    Wear comfortable clothes, such as button front shirt and loose-fitting pants.    Leave all jewelry, including body piercings and valuables at home.    Hairpins and clasps must be removed before you enter the operating room.    You may wear glasses, dentures and hearing aids before and after surgery. They may need to be removed before going into the operating room. Contact lenses worn before surgery must be removed before entering the operating room. Please " bring a case for your hearing aids, glasses and/or contacts.    Bring any devices you will need after surgery such as crutches or canes.    If you have sleep apnea, please bring your CPAP machine.    If you have an implantable device, such as a pacemaker or AICD, please bring the device information card, if you have one.       If you have any questions or concerns, please don't hesitate to call.     Pt and spouse verbalized understanding.    Julie Haase RN  [facility] Nurse Navigator [phone]

## 2023-07-20 ENCOUNTER — ANESTHESIA EVENT (OUTPATIENT)
Dept: SURGERY | Facility: HOSPITAL | Age: 68
DRG: 236 | End: 2023-07-20
Payer: MEDICARE

## 2023-07-20 NOTE — ANESTHESIA PREPROCEDURE EVALUATION
[facility]  Anesthesia Pre-Operative Evaluation         Patient Name: Addy Redding  YOB: 1955  MRN: [id number]    SUBJECTIVE:     Pre-operative evaluation for Procedure(s) (LRB):  CABG, WITHOUT CARDIOPULMONARY PUMP OXYGENATION (N/A)     07/20/2023    Addy Redding is a 67 y.o. male w/ a significant PMHx of former smoker (1ppd x 25 years, quit 25 years ago) with a history of coronary artery disease s/p LCx+OM stents (2022), carotid stenosis, stage 4 chronic kidney disease, hypertension, and diabetes (HbA1C 6.7).  Angiogram showed 80% ostial LAD with a moderate sized mid and distal LAD, nonsignificant LCx disease (patent stents), and totally occluded ostial RCA with diffuse mid and distal RCA disease and small PDA (fills via left to right collaterals). CT chest noncontrast shows minimal ascending aortic and mild aortic arch calcifications. Decision made to proceed with off pump coronary artery bypass surgery (LIMA-LAD, possible SVG-PDA).      Patient now presents for the above procedure(s).      TTE 3/23/23   Normal systolic function.   The estimated ejection fraction is 55%.   Normal left ventricular diastolic function.   Normal right ventricular size with normal right ventricular systolic function.   Normal central venous pressure (3 mmHg).   The estimated PA systolic pressure is 23 mmHg.        [facility] 6/27/23  LM:  SCARELT III  flow mild disease   LAD: SCARLET- flow ostial LAD 80%. FFR 0.71   LCx:  SCARLET- flow  patent LCX/OM stents  RCA: SCARLET- flow   LVgram: LVEDP 7       Carotid U/S 7/5/23  40-59% right sided disease and 60-79% left sided disease.       Prev airway: None documented.        Patient Active Problem List   Diagnosis    Dyslipidemia associated with type 2 diabetes mellitus    Hypertension associated with diabetes    ED (erectile dysfunction)    Type 2 diabetes mellitus with stage 3a chronic kidney disease, without long-term current use of insulin    Left-sided low  "back pain with sciatica    Neck pain    Decreased range of motion    Aortic atherosclerosis    Sinus bradycardia    Coronary artery disease of native artery with stable angina pectoris    Chronic kidney disease (CKD), stage IV (severe)    Presence of drug-eluting stent in left circumflex coronary artery    Preoperative clearance    Stage 3b chronic kidney disease    Coronary artery disease of native artery of native heart with stable angina pectoris    Prostate cancer    Hyperparathyroidism, unspecified    Chronic left shoulder pain    Pain of left upper extremity    Weakness    Stiffness in joint    Bilateral carotid artery stenosis    Pre-op exam       Review of patient's allergies indicates:  No Known Allergies    Current Inpatient Medications:      Current Facility-Administered Medications on File Prior to Encounter   Medication Dose Route Frequency Provider Last Rate Last Admin    sodium chloride 0.9% flush 10 mL  10 mL Intravenous PRN Je Fontanez MD         Current Outpatient Medications on File Prior to Encounter   Medication Sig Dispense Refill    ADVANCED GLUC METER TEST STRIP Strp USE TO TEST BLOOD SUGAR 4 TIMES DAILY. 100 strip 0    amLODIPine (NORVASC) 5 MG tablet Take 2 tablets (10 mg total) by mouth once daily. 180 tablet 4    aspirin (ECOTRIN) 81 MG EC tablet Take 1 tablet (81 mg total) by mouth once daily.      atorvastatin (LIPITOR) 80 MG tablet Take 1 tablet (80 mg total) by mouth once daily. 90 tablet 3    BD AIRA 2ND GEN PEN NEEDLE 32 gauge x 5/32" Ndle       blood sugar diagnostic (BLOOD GLUCOSE TEST) Strp Check sugar once daily 100 each 11    blood-glucose meter Misc by Misc.(Non-Drug; Combo Route) route.      blood-glucose meter,continuous (DEXCOM G6 ) Misc 1 Device by Misc.(Non-Drug; Combo Route) route continuous. 1 each 0    blood-glucose sensor (DEXCOM G6 SENSOR) Tia 1 Device by Misc.(Non-Drug; Combo Route) route continuous. 4 each 11    " blood-glucose transmitter (DEXCOM G6 TRANSMITTER) Tia 1 Device by Misc.(Non-Drug; Combo Route) route continuous. 1 each 0    clopidogreL (PLAVIX) 75 mg tablet Take 1 tablet (75 mg total) by mouth once daily. 90 tablet 4    dapagliflozin (FARXIGA) 10 mg tablet Take 1 tablet (10 mg total) by mouth once daily. 90 tablet 3    doxazosin (CARDURA) 2 MG tablet Take 1 tablet (2 mg total) by mouth every evening. 90 tablet 4    ergocalciferol (ERGOCALCIFEROL) 50,000 unit Cap Take 1 capsule (50,000 Units total) by mouth every 7 days. 12 capsule 3    ezetimibe (ZETIA) 10 mg tablet Take 1 tablet (10 mg total) by mouth once daily. 90 tablet 4    fluticasone propionate (FLONASE) 50 mcg/actuation nasal spray 2 sprays (100 mcg total) by Each Nostril route once daily. 16 g 2    insulin (LANTUS SOLOSTAR U-100 INSULIN) glargine 100 units/mL SubQ pen Inject 12 Units into the skin 2 (two) times a day. 21.6 mL 3    lancets (ACCU-CHEK SOFTCLIX LANCETS) Misc 1 lancet by Misc.(Non-Drug; Combo Route) route 2 (two) times daily. 200 each 1    latanoprost 0.005 % ophthalmic solution       nitroGLYCERIN (NITROSTAT) 0.4 MG SL tablet Place 1 tablet (0.4 mg total) under the tongue every 5 (five) minutes as needed for Chest pain. 30 tablet 3    olmesartan (BENICAR) 5 MG Tab Take 1 tablet (5 mg total) by mouth once daily. 90 tablet 3    pantoprazole (PROTONIX) 40 MG tablet TAKE ONE TABLET BY MOUTH ONCE DAILY. 90 tablet 3    semaglutide (OZEMPIC) 0.25 mg or 0.5 mg (2 mg/3 mL) pen injector Inject 0.25 mg into the skin every 7 days. 1 each 11    sildenafil (REVATIO) 20 mg Tab Take 1 tablet (20 mg total) by mouth daily as needed for ED 30 tablet 2    sodium bicarbonate 650 MG tablet Take 2 tablets (1,300 mg total) by mouth 2 (two) times daily. 360 tablet 3    tamsulosin (FLOMAX) 0.4 mg Cap Take by mouth once daily.         Past Surgical History:   Procedure Laterality Date    Bone biopsy right femur      CORONARY ANGIOGRAPHY N/A  2022    Procedure: ANGIOGRAM, CORONARY ARTERY;  Surgeon: Carter Arevalo MD;  Location: [facility] CATH LAB/EP;  Service: Cardiology;  Laterality: N/A;    FRACTIONAL FLOW RESERVE (FFR), CORONARY  2023    Procedure: Fractional Flow [place] (FFR), Coronary;  Surgeon: Je Fontanez MD;  Location: [facility] CATH LAB/EP;  Service: Cardiology;;    INSTANTANEOUS WAVE-FREE RATIO (IFR) N/A 2023    Procedure: Instantaneous Wave-Free Ratio (IFR);  Surgeon: Je Fontanez MD;  Location: [facility] CATH LAB/EP;  Service: Cardiology;  Laterality: N/A;    LEFT HEART CATHETERIZATION Left 2022    Procedure: Left heart cath;  Surgeon: Carter Arevalo MD;  Location: [facility] CATH LAB/EP;  Service: Cardiology;  Laterality: Left;    LEFT HEART CATHETERIZATION Left 2023    Procedure: Left heart cath;  Surgeon: Je Fontanez MD;  Location: [facility] CATH LAB/EP;  Service: Cardiology;  Laterality: Left;    PROSTATE BIOPSY  10/05/2022    RADIOACTIVE SEED IMPLANTATION OF PROSTATE  2023    Procedure: INSERTION, RADIOACTIVE SEED, PROSTATE;  Surgeon: Scott Frias MD;  Location: [facility] OR [address] [place] [address];  Service: Urology;;    TRANSRECTAL ULTRASOUND EXAMINATION N/A 2023    Procedure: ULTRASOUND, RECTAL APPROACH;  Surgeon: Scott Frias MD;  Location: [facility] OR [address] [place] [address];  Service: Urology;  Laterality: N/A;       Social History     Socioeconomic History    Marital status:    Tobacco Use    Smoking status: Former     Packs/day: 0.50     Years: 26.00     Pack years: 13.00     Types: Cigarettes     Quit date:      Years since quittin.     Passive exposure: Never    Smokeless tobacco: Never   Substance and Sexual Activity    Alcohol use: No    Drug use: No    Sexual activity: Yes     Partners: Female     Social Determinants of Health     Financial Resource Strain: Low Risk     Difficulty of Paying Living Expenses: Not hard at all   Food Insecurity: No Food Insecurity    Worried About Running Out  of Food in the Last Year: Never true    Ran Out of Food in the Last Year: Never true   Transportation Needs: No Transportation Needs    Lack of Transportation (Medical): No    Lack of Transportation (Non-Medical): No   Physical Activity: Insufficiently Active    Days of Exercise per Week: 2 days    Minutes of Exercise per Session: 60 min   Stress: No Stress Concern Present    Feeling of Stress : Not at all   Social Connections: Moderately Isolated    Frequency of Communication with Friends and Family: More than three times a week    Frequency of Social Gatherings with Friends and Family: Three times a week    Attends Sabianist Services: Never    Active Member of Clubs or Organizations: No    Attends Club or Organization Meetings: Never    Marital Status:    Housing Stability: Low Risk     Unable to Pay for Housing in the Last Year: No    Number of Places Lived in the Last Year: 1    Unstable Housing in the Last Year: No       OBJECTIVE:     Vital Signs Range (Last 24H):         Significant Labs:  Lab Results   Component Value Date    WBC 4.66 07/19/2023    HGB 13.4 (L) 07/19/2023    HCT 41.3 07/19/2023     07/19/2023    CHOL 120 07/05/2022    TRIG 54 07/05/2022    HDL 43 07/05/2022    ALT 13 07/19/2023    AST 16 07/19/2023     07/19/2023    K 4.9 07/19/2023     07/19/2023    CREATININE 2.2 (H) 07/19/2023    BUN 35 (H) 07/19/2023    CO2 26 07/19/2023    TSH 2.865 12/08/2022    PSA 7.2 (H) 06/15/2020    INR 1.0 07/19/2023    HGBA1C 6.7 (H) 03/14/2023       Diagnostic Studies: No relevant studies.    EKG:   Results for orders placed or performed during the hospital encounter of 07/19/23   EKG 12-lead    Collection Time: 07/19/23  7:51 AM    Narrative    Test Reason : Z01.818,I25.118,    Vent. Rate : 052 BPM     Atrial Rate : 052 BPM     P-R Int : 162 ms          QRS Dur : 096 ms      QT Int : 442 ms       P-R-T Axes : 025 034 -01 degrees     QTc Int : 411 ms    Sinus  bradycardia  Inferior infarct ,age undetermined  Abnormal ECG  When compared with ECG of 27-JUN-2023 09:54,  Inferior infarct is now Present  Non-specific change in ST segment in Inferior leads  Confirmed by DENEEN COE MD ([id number]) on 7/19/2023 10:53:27 AM    Referred By: JAMEY GONZALEZ           Confirmed By:DENEEN COE MD       2D ECHO:  TTE:  Results for orders placed or performed during the hospital encounter of 03/23/23   Echo   Result Value Ref Range    BSA 2.17 m2    TDI SEPTAL 0.09 m/s    LV LATERAL E/E' RATIO 7.80 m/s    LV SEPTAL E/E' RATIO 8.67 m/s    LA WIDTH 4.20 cm    IVC diameter 1.40 cm    Left Ventricular Outflow Tract Mean Velocity 0.71 cm/s    Left Ventricular Outflow Tract Mean Gradient 2.19 mmHg    TDI LATERAL 0.10 m/s    PV PEAK VELOCITY 1.09 cm/s    LVIDd 5.17 3.5 - 6.0 cm    IVS 0.65 0.6 - 1.1 cm    Posterior Wall 0.82 0.6 - 1.1 cm    LVIDs 3.46 2.1 - 4.0 cm    FS 33 28 - 44 %    LA volume 72.81 cm3    STJ 2.59 cm    Ascending aorta 2.57 cm    LV mass 129.20 g    LA size 3.50 cm    RVDD 2.59 cm    Left Ventricle Relative Wall Thickness 0.32 cm    AV mean gradient 4 mmHg    AV valve area 2.77 cm2    AV Velocity Ratio 0.78     AV index (prosthetic) 0.73     MV valve area p 1/2 method 4.32 cm2    MV valve area by continuity eq 2.42 cm2    E/A ratio 0.95     Mean e' 0.10 m/s    E wave deceleration time 175.51 msec    Pulm vein S/D ratio 0.98     LVOT diameter 2.20 cm    LVOT area 3.8 cm2    LVOT peak aldo 0.96 m/s    LVOT peak VTI 22.40 cm    Ao peak aldo 1.23 m/s    Ao VTI 30.7 cm    Mr max aldo 2.82 m/s    LVOT stroke volume 85.11 cm3    AV peak gradient 6 mmHg    MV peak gradient 3 mmHg    E/E' ratio 8.21 m/s    MV Peak E Aldo 0.78 m/s    TR Max Aldo 2.25 m/s    MV VTI 35.1 cm    MV stenosis pressure 1/2 time 50.90 ms    MV Peak A Aldo 0.82 m/s    PV Peak S Aldo 0.42 m/s    PV Peak D Aldo 0.43 m/s    LV Systolic Volume 49.34 mL    LV Systolic Volume Index 22.8 mL/m2    LV Diastolic Volume 128.06  mL    LV Diastolic Volume Index 59.29 mL/m2    LA Volume Index 33.7 mL/m2    LV Mass Index 60 g/m2    RA Major Axis 5.42 cm    Left Atrium Minor Axis 5.62 cm    Left Atrium Major Axis 6.05 cm    Triscuspid Valve Regurgitation Peak Gradient 20 mmHg    LA Volume Index (Mod) 30.2 mL/m2    LA volume (mod) 65.19 cm3    Right Atrial Pressure (from IVC) 3 mmHg    EF 55 %    TV rest pulmonary artery pressure 23 mmHg    Narrative    · Normal systolic function.  · The estimated ejection fraction is 55%.  · Normal left ventricular diastolic function.  · Normal right ventricular size with normal right ventricular systolic   function.  · Normal central venous pressure (3 mmHg).  · The estimated PA systolic pressure is 23 mmHg.          STEPHANIE:  No results found for this or any previous visit.    ASSESSMENT/PLAN:           Pre-op Assessment    I have reviewed the Patient Summary Reports.     I have reviewed the Nursing Notes. I have reviewed the NPO Status.   I have reviewed the Medications.     Review of Systems  Anesthesia Hx:  Denies Family Hx of Anesthesia complications.   Denies Personal Hx of Anesthesia complications.   Hematology/Oncology:  Hematology Normal   Oncology Normal     EENT/Dental:EENT/Dental Normal   Cardiovascular:   Hypertension CAD      Pulmonary:  Pulmonary Normal    Renal/:   Chronic Renal Disease, CKD    Hepatic/GI:   GERD    Musculoskeletal:  Musculoskeletal Normal    Neurological:   Neuromuscular Disease,    Endocrine:   Diabetes    Dermatological:  Skin Normal    Psych:  Psychiatric Normal           Physical Exam  General: Well nourished and Cooperative    Airway:  Mallampati: I   Mouth Opening: Normal  TM Distance: Normal  Tongue: Normal  Neck ROM: Normal ROM    Dental:  Intact, Partial Dentures, Caps / Implants        Anesthesia Plan  Type of Anesthesia, risks & benefits discussed:    Anesthesia Type: Gen ETT  Intra-op Monitoring Plan: Standard ASA Monitors, Art Line, Central Line and STEPHANIE  Post Op  Pain Control Plan: multimodal analgesia and IV/PO Opioids PRN  Induction:  IV  Airway Plan: Direct and Video, Post-Induction  Informed Consent: Informed consent signed with the Patient and all parties understand the risks and agree with anesthesia plan.  All questions answered. Patient consented to blood products? Yes  ASA Score: 4  Day of Surgery Review of History & Physical: H&P Update referred to the surgeon/provider.    Ready For Surgery From Anesthesia Perspective.     .

## 2023-07-21 ENCOUNTER — HOSPITAL ENCOUNTER (INPATIENT)
Facility: HOSPITAL | Age: 68
LOS: 5 days | Discharge: HOME | DRG: 236 | End: 2023-07-26
Attending: THORACIC SURGERY (CARDIOTHORACIC VASCULAR SURGERY) | Admitting: THORACIC SURGERY (CARDIOTHORACIC VASCULAR SURGERY)
Payer: MEDICARE

## 2023-07-21 ENCOUNTER — ANESTHESIA (OUTPATIENT)
Dept: SURGERY | Facility: HOSPITAL | Age: 68
DRG: 236 | End: 2023-07-21
Payer: MEDICARE

## 2023-07-21 DIAGNOSIS — E11.59 HYPERTENSION ASSOCIATED WITH DIABETES: ICD-10-CM

## 2023-07-21 DIAGNOSIS — Z95.1 S/P CABG (CORONARY ARTERY BYPASS GRAFT): Primary | ICD-10-CM

## 2023-07-21 DIAGNOSIS — I25.118 CORONARY ARTERY DISEASE OF NATIVE ARTERY OF NATIVE HEART WITH STABLE ANGINA PECTORIS: ICD-10-CM

## 2023-07-21 DIAGNOSIS — Z98.890 HISTORY OF HEART SURGERY: ICD-10-CM

## 2023-07-21 DIAGNOSIS — N18.31 TYPE 2 DIABETES MELLITUS WITH STAGE 3A CHRONIC KIDNEY DISEASE, WITHOUT LONG-TERM CURRENT USE OF INSULIN: Primary | ICD-10-CM

## 2023-07-21 DIAGNOSIS — E11.69 DYSLIPIDEMIA ASSOCIATED WITH TYPE 2 DIABETES MELLITUS: ICD-10-CM

## 2023-07-21 DIAGNOSIS — I15.2 HYPERTENSION ASSOCIATED WITH DIABETES: ICD-10-CM

## 2023-07-21 DIAGNOSIS — R00.0 TACHYCARDIA: ICD-10-CM

## 2023-07-21 DIAGNOSIS — I48.91 A-FIB: ICD-10-CM

## 2023-07-21 DIAGNOSIS — E11.22 TYPE 2 DIABETES MELLITUS WITH STAGE 3A CHRONIC KIDNEY DISEASE, WITHOUT LONG-TERM CURRENT USE OF INSULIN: Primary | ICD-10-CM

## 2023-07-21 DIAGNOSIS — I49.9 ARRHYTHMIA: ICD-10-CM

## 2023-07-21 DIAGNOSIS — E78.5 DYSLIPIDEMIA ASSOCIATED WITH TYPE 2 DIABETES MELLITUS: ICD-10-CM

## 2023-07-21 PROBLEM — I65.29 CAROTID STENOSIS: Status: ACTIVE | Noted: 2023-07-21

## 2023-07-21 PROBLEM — D62 ACUTE BLOOD LOSS ANEMIA: Status: ACTIVE | Noted: 2023-07-21

## 2023-07-21 LAB
ABO + RH BLD: NORMAL
ALBUMIN SERPL BCP-MCNC: 3.6 G/DL (ref 3.5–5.2)
ALLENS TEST: ABNORMAL
ALLENS TEST: NORMAL
ALP SERPL-CCNC: 44 U/L (ref 55–135)
ALT SERPL W/O P-5'-P-CCNC: 10 U/L (ref 10–44)
ANION GAP SERPL CALC-SCNC: 10 MMOL/L (ref 8–16)
ANION GAP SERPL CALC-SCNC: 6 MMOL/L (ref 8–16)
APTT PPP: 27.1 SEC (ref 21–32)
AST SERPL-CCNC: 15 U/L (ref 10–40)
BASOPHILS # BLD AUTO: 0.06 K/UL (ref 0–0.2)
BASOPHILS NFR BLD: 0.3 % (ref 0–1.9)
BILIRUB SERPL-MCNC: 0.4 MG/DL (ref 0.1–1)
BLD GP AB SCN CELLS X3 SERPL QL: NORMAL
BUN SERPL-MCNC: 27 MG/DL (ref 8–23)
BUN SERPL-MCNC: 33 MG/DL (ref 8–23)
CALCIUM SERPL-MCNC: 7.8 MG/DL (ref 8.7–10.5)
CALCIUM SERPL-MCNC: 8.1 MG/DL (ref 8.7–10.5)
CHLORIDE SERPL-SCNC: 110 MMOL/L (ref 95–110)
CHLORIDE SERPL-SCNC: 112 MMOL/L (ref 95–110)
CO2 SERPL-SCNC: 17 MMOL/L (ref 23–29)
CO2 SERPL-SCNC: 20 MMOL/L (ref 23–29)
CREAT SERPL-MCNC: 1.6 MG/DL (ref 0.5–1.4)
CREAT SERPL-MCNC: 1.9 MG/DL (ref 0.5–1.4)
DELSYS: ABNORMAL
DIFFERENTIAL METHOD: ABNORMAL
EOSINOPHIL # BLD AUTO: 0.2 K/UL (ref 0–0.5)
EOSINOPHIL NFR BLD: 0.9 % (ref 0–8)
ERYTHROCYTE [DISTWIDTH] IN BLOOD BY AUTOMATED COUNT: 14.1 % (ref 11.5–14.5)
ERYTHROCYTE [SEDIMENTATION RATE] IN BLOOD BY WESTERGREN METHOD: 18 MM/H
ERYTHROCYTE [SEDIMENTATION RATE] IN BLOOD BY WESTERGREN METHOD: 18 MM/H
EST. GFR  (NO RACE VARIABLE): 37.7 ML/MIN/1.73 M^2
EST. GFR  (NO RACE VARIABLE): 46.4 ML/MIN/1.73 M^2
FIO2: 100
FIO2: 50
GLUCOSE SERPL-MCNC: 150 MG/DL (ref 70–110)
GLUCOSE SERPL-MCNC: 152 MG/DL (ref 70–110)
GLUCOSE SERPL-MCNC: 90 MG/DL (ref 70–110)
HCO3 UR-SCNC: 18.4 MMOL/L (ref 24–28)
HCO3 UR-SCNC: 19.8 MMOL/L (ref 24–28)
HCO3 UR-SCNC: 19.9 MMOL/L (ref 24–28)
HCO3 UR-SCNC: 20.4 MMOL/L (ref 24–28)
HCO3 UR-SCNC: 20.5 MMOL/L (ref 24–28)
HCO3 UR-SCNC: 21.2 MMOL/L (ref 24–28)
HCO3 UR-SCNC: 23.6 MMOL/L (ref 24–28)
HCT VFR BLD AUTO: 32.1 % (ref 40–54)
HCT VFR BLD CALC: 28 %PCV (ref 36–54)
HCT VFR BLD CALC: 30 %PCV (ref 36–54)
HCT VFR BLD CALC: 32 %PCV (ref 36–54)
HCT VFR BLD CALC: 33 %PCV (ref 36–54)
HCT VFR BLD CALC: 33 %PCV (ref 36–54)
HCT VFR BLD CALC: 34 %PCV (ref 36–54)
HCT VFR BLD CALC: 34 %PCV (ref 36–54)
HGB BLD-MCNC: 10.8 G/DL (ref 14–18)
IMM GRANULOCYTES # BLD AUTO: 0.12 K/UL (ref 0–0.04)
IMM GRANULOCYTES NFR BLD AUTO: 0.6 % (ref 0–0.5)
INR PPP: 1.2 (ref 0.8–1.2)
LDH SERPL L TO P-CCNC: 0.61 MMOL/L (ref 0.36–1.25)
LDH SERPL L TO P-CCNC: 0.66 MMOL/L (ref 0.36–1.25)
LDH SERPL L TO P-CCNC: 0.73 MMOL/L (ref 0.36–1.25)
LDH SERPL L TO P-CCNC: 0.78 MMOL/L (ref 0.36–1.25)
LDH SERPL L TO P-CCNC: 0.79 MMOL/L (ref 0.36–1.25)
LDH SERPL L TO P-CCNC: 0.94 MMOL/L (ref 0.36–1.25)
LDH SERPL L TO P-CCNC: 0.97 MMOL/L (ref 0.36–1.25)
LDH SERPL L TO P-CCNC: 1.12 MMOL/L (ref 0.36–1.25)
LDH SERPL L TO P-CCNC: 1.5 MMOL/L (ref 0.36–1.25)
LYMPHOCYTES # BLD AUTO: 1.2 K/UL (ref 1–4.8)
LYMPHOCYTES NFR BLD: 6 % (ref 18–48)
MAGNESIUM SERPL-MCNC: 1.8 MG/DL (ref 1.6–2.6)
MAGNESIUM SERPL-MCNC: 2.5 MG/DL (ref 1.6–2.6)
MCH RBC QN AUTO: 29.1 PG (ref 27–31)
MCHC RBC AUTO-ENTMCNC: 33.6 G/DL (ref 32–36)
MCV RBC AUTO: 87 FL (ref 82–98)
MIN VOL: 9.3
MIN VOL: 9.59
MODE: ABNORMAL
MONOCYTES # BLD AUTO: 1 K/UL (ref 0.3–1)
MONOCYTES NFR BLD: 5.2 % (ref 4–15)
NEUTROPHILS # BLD AUTO: 16.7 K/UL (ref 1.8–7.7)
NEUTROPHILS NFR BLD: 87 % (ref 38–73)
NRBC BLD-RTO: 0 /100 WBC
PCO2 BLDA: 39.5 MMHG (ref 35–45)
PCO2 BLDA: 40.2 MMHG (ref 35–45)
PCO2 BLDA: 41.1 MMHG (ref 35–45)
PCO2 BLDA: 41.1 MMHG (ref 35–45)
PCO2 BLDA: 41.2 MMHG (ref 35–45)
PCO2 BLDA: 41.8 MMHG (ref 35–45)
PCO2 BLDA: 44.7 MMHG (ref 35–45)
PCO2 BLDA: 45.7 MMHG (ref 35–45)
PCO2 BLDA: 46.4 MMHG (ref 35–45)
PEEP: 5
PH SMN: 7.25 [PH] (ref 7.35–7.45)
PH SMN: 7.26 [PH] (ref 7.35–7.45)
PH SMN: 7.26 [PH] (ref 7.35–7.45)
PH SMN: 7.29 [PH] (ref 7.35–7.45)
PH SMN: 7.3 [PH] (ref 7.35–7.45)
PH SMN: 7.3 [PH] (ref 7.35–7.45)
PH SMN: 7.31 [PH] (ref 7.35–7.45)
PH SMN: 7.31 [PH] (ref 7.35–7.45)
PH SMN: 7.32 [PH] (ref 7.35–7.45)
PHOSPHATE SERPL-MCNC: 2.8 MG/DL (ref 2.7–4.5)
PHOSPHATE SERPL-MCNC: 2.8 MG/DL (ref 2.7–4.5)
PHOSPHATE SERPL-MCNC: 2.9 MG/DL (ref 2.7–4.5)
PIP: 20
PIP: 26
PLATELET # BLD AUTO: 193 K/UL (ref 150–450)
PMV BLD AUTO: 10.7 FL (ref 9.2–12.9)
PO2 BLDA: 102 MMHG (ref 80–100)
PO2 BLDA: 147 MMHG (ref 80–100)
PO2 BLDA: 155 MMHG (ref 80–100)
PO2 BLDA: 317 MMHG (ref 80–100)
PO2 BLDA: 515 MMHG (ref 80–100)
PO2 BLDA: 67 MMHG (ref 80–100)
PO2 BLDA: 73 MMHG (ref 80–100)
PO2 BLDA: 74 MMHG (ref 80–100)
PO2 BLDA: 85 MMHG (ref 80–100)
POC BE: -3 MMOL/L
POC BE: -5 MMOL/L
POC BE: -6 MMOL/L
POC BE: -6 MMOL/L
POC BE: -7 MMOL/L
POC BE: -8 MMOL/L
POC BE: -9 MMOL/L
POC IONIZED CALCIUM: 1.15 MMOL/L (ref 1.06–1.42)
POC IONIZED CALCIUM: 1.15 MMOL/L (ref 1.06–1.42)
POC IONIZED CALCIUM: 1.16 MMOL/L (ref 1.06–1.42)
POC IONIZED CALCIUM: 1.16 MMOL/L (ref 1.06–1.42)
POC IONIZED CALCIUM: 1.18 MMOL/L (ref 1.06–1.42)
POC IONIZED CALCIUM: 1.19 MMOL/L (ref 1.06–1.42)
POC IONIZED CALCIUM: 1.2 MMOL/L (ref 1.06–1.42)
POC SATURATED O2: 100 % (ref 95–100)
POC SATURATED O2: 100 % (ref 95–100)
POC SATURATED O2: 91 % (ref 95–100)
POC SATURATED O2: 92 % (ref 95–100)
POC SATURATED O2: 93 % (ref 95–100)
POC SATURATED O2: 95 % (ref 95–100)
POC SATURATED O2: 97 % (ref 95–100)
POC SATURATED O2: 99 % (ref 95–100)
POC SATURATED O2: 99 % (ref 95–100)
POC TCO2: 20 MMOL/L (ref 23–27)
POC TCO2: 21 MMOL/L (ref 23–27)
POC TCO2: 22 MMOL/L (ref 23–27)
POC TCO2: 25 MMOL/L (ref 23–27)
POCT GLUCOSE: 129 MG/DL (ref 70–110)
POCT GLUCOSE: 132 MG/DL (ref 70–110)
POCT GLUCOSE: 133 MG/DL (ref 70–110)
POCT GLUCOSE: 134 MG/DL (ref 70–110)
POCT GLUCOSE: 138 MG/DL (ref 70–110)
POCT GLUCOSE: 139 MG/DL (ref 70–110)
POCT GLUCOSE: 152 MG/DL (ref 70–110)
POCT GLUCOSE: 154 MG/DL (ref 70–110)
POCT GLUCOSE: 155 MG/DL (ref 70–110)
POCT GLUCOSE: 156 MG/DL (ref 70–110)
POCT GLUCOSE: 156 MG/DL (ref 70–110)
POCT GLUCOSE: 158 MG/DL (ref 70–110)
POCT GLUCOSE: 158 MG/DL (ref 70–110)
POCT GLUCOSE: 84 MG/DL (ref 70–110)
POTASSIUM BLD-SCNC: 4.2 MMOL/L (ref 3.5–5.1)
POTASSIUM BLD-SCNC: 4.3 MMOL/L (ref 3.5–5.1)
POTASSIUM BLD-SCNC: 4.5 MMOL/L (ref 3.5–5.1)
POTASSIUM BLD-SCNC: 4.6 MMOL/L (ref 3.5–5.1)
POTASSIUM BLD-SCNC: 4.7 MMOL/L (ref 3.5–5.1)
POTASSIUM BLD-SCNC: 4.7 MMOL/L (ref 3.5–5.1)
POTASSIUM BLD-SCNC: 4.8 MMOL/L (ref 3.5–5.1)
POTASSIUM BLD-SCNC: 4.9 MMOL/L (ref 3.5–5.1)
POTASSIUM BLD-SCNC: 5 MMOL/L (ref 3.5–5.1)
POTASSIUM SERPL-SCNC: 4.6 MMOL/L (ref 3.5–5.1)
POTASSIUM SERPL-SCNC: 4.9 MMOL/L (ref 3.5–5.1)
POTASSIUM SERPL-SCNC: 5 MMOL/L (ref 3.5–5.1)
PROT SERPL-MCNC: 6 G/DL (ref 6–8.4)
PROTHROMBIN TIME: 12.4 SEC (ref 9–12.5)
PS: 10
RBC # BLD AUTO: 3.71 M/UL (ref 4.6–6.2)
SAMPLE: ABNORMAL
SAMPLE: NORMAL
SITE: ABNORMAL
SITE: NORMAL
SODIUM BLD-SCNC: 138 MMOL/L (ref 136–145)
SODIUM BLD-SCNC: 139 MMOL/L (ref 136–145)
SODIUM BLD-SCNC: 139 MMOL/L (ref 136–145)
SODIUM BLD-SCNC: 140 MMOL/L (ref 136–145)
SODIUM SERPL-SCNC: 137 MMOL/L (ref 136–145)
SODIUM SERPL-SCNC: 138 MMOL/L (ref 136–145)
SP02: 100
SP02: 100
SPECIMEN OUTDATE: NORMAL
SPONT RATE: 15
VOL: 741
VT: 480
VT: 480
WBC # BLD AUTO: 19.24 K/UL (ref 3.9–12.7)

## 2023-07-21 PROCEDURE — P9045 ALBUMIN (HUMAN), 5%, 250 ML: HCPCS | Mod: JZ,JG

## 2023-07-21 PROCEDURE — 63600175 PHARM REV CODE 636 W HCPCS

## 2023-07-21 PROCEDURE — 25000003 PHARM REV CODE 250

## 2023-07-21 PROCEDURE — 94761 N-INVAS EAR/PLS OXIMETRY MLT: CPT

## 2023-07-21 PROCEDURE — 80048 BASIC METABOLIC PNL TOTAL CA: CPT | Mod: XB

## 2023-07-21 PROCEDURE — 82330 ASSAY OF CALCIUM: CPT

## 2023-07-21 PROCEDURE — 82803 BLOOD GASES ANY COMBINATION: CPT

## 2023-07-21 PROCEDURE — 27201037 HC PRESSURE MONITORING SET UP

## 2023-07-21 PROCEDURE — 80053 COMPREHEN METABOLIC PANEL: CPT

## 2023-07-21 PROCEDURE — 27000191 HC C-V MONITORING

## 2023-07-21 PROCEDURE — 37799 UNLISTED PX VASCULAR SURGERY: CPT

## 2023-07-21 PROCEDURE — 37000008 HC ANESTHESIA 1ST 15 MINUTES: Performed by: THORACIC SURGERY (CARDIOTHORACIC VASCULAR SURGERY)

## 2023-07-21 PROCEDURE — 36556 CENTRAL LINE: ICD-10-PCS | Mod: 59,,, | Performed by: ANESTHESIOLOGY

## 2023-07-21 PROCEDURE — 84295 ASSAY OF SERUM SODIUM: CPT

## 2023-07-21 PROCEDURE — 36620 ARTERIAL: ICD-10-PCS | Mod: 59,,, | Performed by: ANESTHESIOLOGY

## 2023-07-21 PROCEDURE — 36620 INSERTION CATHETER ARTERY: CPT | Mod: 59,,, | Performed by: ANESTHESIOLOGY

## 2023-07-21 PROCEDURE — 27000445 HC TEMPORARY PACEMAKER LEADS

## 2023-07-21 PROCEDURE — 25000003 PHARM REV CODE 250: Performed by: NURSE PRACTITIONER

## 2023-07-21 PROCEDURE — 82962 GLUCOSE BLOOD TEST: CPT | Performed by: THORACIC SURGERY (CARDIOTHORACIC VASCULAR SURGERY)

## 2023-07-21 PROCEDURE — 25000003 PHARM REV CODE 250: Performed by: STUDENT IN AN ORGANIZED HEALTH CARE EDUCATION/TRAINING PROGRAM

## 2023-07-21 PROCEDURE — 27201423 OPTIME MED/SURG SUP & DEVICES STERILE SUPPLY: Performed by: THORACIC SURGERY (CARDIOTHORACIC VASCULAR SURGERY)

## 2023-07-21 PROCEDURE — 82800 BLOOD PH: CPT

## 2023-07-21 PROCEDURE — 99291 CRITICAL CARE FIRST HOUR: CPT | Mod: 25,,, | Performed by: ANESTHESIOLOGY

## 2023-07-21 PROCEDURE — 83605 ASSAY OF LACTIC ACID: CPT

## 2023-07-21 PROCEDURE — 25000242 PHARM REV CODE 250 ALT 637 W/ HCPCS: Performed by: NURSE PRACTITIONER

## 2023-07-21 PROCEDURE — 99223 1ST HOSP IP/OBS HIGH 75: CPT | Mod: ,,, | Performed by: NURSE PRACTITIONER

## 2023-07-21 PROCEDURE — 36556 INSERT NON-TUNNEL CV CATH: CPT | Mod: 59,,, | Performed by: ANESTHESIOLOGY

## 2023-07-21 PROCEDURE — 99223 PR INITIAL HOSPITAL CARE,LEVL III: ICD-10-PCS | Mod: ,,, | Performed by: NURSE PRACTITIONER

## 2023-07-21 PROCEDURE — 93010 ELECTROCARDIOGRAM REPORT: CPT | Mod: ,,, | Performed by: INTERNAL MEDICINE

## 2023-07-21 PROCEDURE — 63600175 PHARM REV CODE 636 W HCPCS: Mod: JZ,JG

## 2023-07-21 PROCEDURE — D9220A PRA ANESTHESIA: Mod: ,,, | Performed by: ANESTHESIOLOGY

## 2023-07-21 PROCEDURE — 36000713 HC OR TIME LEV V EA ADD 15 MIN: Performed by: THORACIC SURGERY (CARDIOTHORACIC VASCULAR SURGERY)

## 2023-07-21 PROCEDURE — 85610 PROTHROMBIN TIME: CPT

## 2023-07-21 PROCEDURE — 85014 HEMATOCRIT: CPT

## 2023-07-21 PROCEDURE — 93320 DOPPLER ECHO COMPLETE: CPT | Mod: 26,,, | Performed by: ANESTHESIOLOGY

## 2023-07-21 PROCEDURE — 83735 ASSAY OF MAGNESIUM: CPT | Performed by: NURSE PRACTITIONER

## 2023-07-21 PROCEDURE — 84100 ASSAY OF PHOSPHORUS: CPT | Performed by: NURSE PRACTITIONER

## 2023-07-21 PROCEDURE — 33533 CABG ARTERIAL SINGLE: CPT | Mod: ,,, | Performed by: THORACIC SURGERY (CARDIOTHORACIC VASCULAR SURGERY)

## 2023-07-21 PROCEDURE — C9248 INJ, CLEVIDIPINE BUTYRATE: HCPCS | Mod: JZ,JG

## 2023-07-21 PROCEDURE — 93325 DOPPLER ECHO COLOR FLOW MAPG: CPT | Mod: 26,,, | Performed by: ANESTHESIOLOGY

## 2023-07-21 PROCEDURE — 83735 ASSAY OF MAGNESIUM: CPT | Mod: 91

## 2023-07-21 PROCEDURE — 93312 ECHO TRANSESOPHAGEAL: CPT | Mod: 26,59,, | Performed by: ANESTHESIOLOGY

## 2023-07-21 PROCEDURE — 85730 THROMBOPLASTIN TIME PARTIAL: CPT

## 2023-07-21 PROCEDURE — 63600175 PHARM REV CODE 636 W HCPCS: Performed by: STUDENT IN AN ORGANIZED HEALTH CARE EDUCATION/TRAINING PROGRAM

## 2023-07-21 PROCEDURE — 93005 ELECTROCARDIOGRAM TRACING: CPT

## 2023-07-21 PROCEDURE — 93312 TEE: ICD-10-PCS | Mod: 26,59,, | Performed by: ANESTHESIOLOGY

## 2023-07-21 PROCEDURE — 94002 VENT MGMT INPAT INIT DAY: CPT

## 2023-07-21 PROCEDURE — 99291 PR CRITICAL CARE, E/M 30-74 MINUTES: ICD-10-PCS | Mod: 25,,, | Performed by: ANESTHESIOLOGY

## 2023-07-21 PROCEDURE — 84100 ASSAY OF PHOSPHORUS: CPT | Mod: 91

## 2023-07-21 PROCEDURE — 84132 ASSAY OF SERUM POTASSIUM: CPT

## 2023-07-21 PROCEDURE — 94640 AIRWAY INHALATION TREATMENT: CPT

## 2023-07-21 PROCEDURE — 85520 HEPARIN ASSAY: CPT

## 2023-07-21 PROCEDURE — 99900035 HC TECH TIME PER 15 MIN (STAT)

## 2023-07-21 PROCEDURE — 63600175 PHARM REV CODE 636 W HCPCS: Performed by: ANESTHESIOLOGY

## 2023-07-21 PROCEDURE — 25000003 PHARM REV CODE 250: Performed by: ANESTHESIOLOGY

## 2023-07-21 PROCEDURE — 33533 PR CABG, ARTERIAL, SINGLE: ICD-10-PCS | Mod: ,,, | Performed by: THORACIC SURGERY (CARDIOTHORACIC VASCULAR SURGERY)

## 2023-07-21 PROCEDURE — 93325 PR DOPPLER COLOR FLOW VELOCITY MAP: ICD-10-PCS | Mod: 26,,, | Performed by: ANESTHESIOLOGY

## 2023-07-21 PROCEDURE — D9220A PRA ANESTHESIA: ICD-10-PCS | Mod: ,,, | Performed by: ANESTHESIOLOGY

## 2023-07-21 PROCEDURE — 37000009 HC ANESTHESIA EA ADD 15 MINS: Performed by: THORACIC SURGERY (CARDIOTHORACIC VASCULAR SURGERY)

## 2023-07-21 PROCEDURE — 86900 BLOOD TYPING SEROLOGIC ABO: CPT | Performed by: NURSE PRACTITIONER

## 2023-07-21 PROCEDURE — 93320 PR DOPPLER ECHO HEART,COMPLETE: ICD-10-PCS | Mod: 26,,, | Performed by: ANESTHESIOLOGY

## 2023-07-21 PROCEDURE — 27000239 HC STAND-BY BYPASS PUMP

## 2023-07-21 PROCEDURE — 93010 EKG 12-LEAD: ICD-10-PCS | Mod: ,,, | Performed by: INTERNAL MEDICINE

## 2023-07-21 PROCEDURE — 25000003 PHARM REV CODE 250: Performed by: THORACIC SURGERY (CARDIOTHORACIC VASCULAR SURGERY)

## 2023-07-21 PROCEDURE — 85025 COMPLETE CBC W/AUTO DIFF WBC: CPT | Performed by: NURSE PRACTITIONER

## 2023-07-21 PROCEDURE — 84132 ASSAY OF SERUM POTASSIUM: CPT | Performed by: NURSE PRACTITIONER

## 2023-07-21 PROCEDURE — C1729 CATH, DRAINAGE: HCPCS | Performed by: THORACIC SURGERY (CARDIOTHORACIC VASCULAR SURGERY)

## 2023-07-21 PROCEDURE — 27201041 HC RESERVOIR, CARDIOTOMY

## 2023-07-21 PROCEDURE — 63600175 PHARM REV CODE 636 W HCPCS: Performed by: THORACIC SURGERY (CARDIOTHORACIC VASCULAR SURGERY)

## 2023-07-21 PROCEDURE — 63600175 PHARM REV CODE 636 W HCPCS: Performed by: NURSE PRACTITIONER

## 2023-07-21 PROCEDURE — 20000000 HC ICU ROOM

## 2023-07-21 PROCEDURE — 27000221 HC OXYGEN, UP TO 24 HOURS

## 2023-07-21 PROCEDURE — 36000712 HC OR TIME LEV V 1ST 15 MIN: Performed by: THORACIC SURGERY (CARDIOTHORACIC VASCULAR SURGERY)

## 2023-07-21 RX ORDER — ALBUMIN HUMAN 50 G/1000ML
SOLUTION INTRAVENOUS
Status: COMPLETED
Start: 2023-07-21 | End: 2023-07-21

## 2023-07-21 RX ORDER — ACETAMINOPHEN 500 MG
1000 TABLET ORAL EVERY 8 HOURS
Status: DISCONTINUED | OUTPATIENT
Start: 2023-07-21 | End: 2023-07-26 | Stop reason: HOSPADM

## 2023-07-21 RX ORDER — HEPARIN SODIUM 5000 [USP'U]/ML
5000 INJECTION, SOLUTION INTRAVENOUS; SUBCUTANEOUS EVERY 8 HOURS
Status: DISCONTINUED | OUTPATIENT
Start: 2023-07-22 | End: 2023-07-26 | Stop reason: HOSPADM

## 2023-07-21 RX ORDER — IPRATROPIUM BROMIDE AND ALBUTEROL SULFATE 2.5; .5 MG/3ML; MG/3ML
3 SOLUTION RESPIRATORY (INHALATION) EVERY 4 HOURS PRN
Status: DISCONTINUED | OUTPATIENT
Start: 2023-07-21 | End: 2023-07-22

## 2023-07-21 RX ORDER — METOCLOPRAMIDE HYDROCHLORIDE 5 MG/ML
5 INJECTION INTRAMUSCULAR; INTRAVENOUS EVERY 6 HOURS PRN
Status: DISCONTINUED | OUTPATIENT
Start: 2023-07-21 | End: 2023-07-26 | Stop reason: HOSPADM

## 2023-07-21 RX ORDER — DEXTROSE MONOHYDRATE, SODIUM CHLORIDE, AND POTASSIUM CHLORIDE 50; 1.49; 4.5 G/1000ML; G/1000ML; G/1000ML
INJECTION, SOLUTION INTRAVENOUS CONTINUOUS
Status: DISCONTINUED | OUTPATIENT
Start: 2023-07-21 | End: 2023-07-25

## 2023-07-21 RX ORDER — FENTANYL CITRATE 50 UG/ML
INJECTION, SOLUTION INTRAMUSCULAR; INTRAVENOUS
Status: DISCONTINUED | OUTPATIENT
Start: 2023-07-21 | End: 2023-07-21

## 2023-07-21 RX ORDER — PROPOFOL 10 MG/ML
0-50 INJECTION, EMULSION INTRAVENOUS CONTINUOUS
Status: DISCONTINUED | OUTPATIENT
Start: 2023-07-21 | End: 2023-07-21

## 2023-07-21 RX ORDER — MUPIROCIN 20 MG/G
1 OINTMENT TOPICAL
Status: COMPLETED | OUTPATIENT
Start: 2023-07-21 | End: 2023-07-21

## 2023-07-21 RX ORDER — NAPROXEN SODIUM 220 MG/1
81 TABLET, FILM COATED ORAL DAILY
Status: DISCONTINUED | OUTPATIENT
Start: 2023-07-22 | End: 2023-07-26 | Stop reason: HOSPADM

## 2023-07-21 RX ORDER — FAMOTIDINE 10 MG/ML
20 INJECTION INTRAVENOUS 2 TIMES DAILY
Status: DISCONTINUED | OUTPATIENT
Start: 2023-07-21 | End: 2023-07-21

## 2023-07-21 RX ORDER — LIDOCAINE HYDROCHLORIDE 10 MG/ML
1 INJECTION, SOLUTION EPIDURAL; INFILTRATION; INTRACAUDAL; PERINEURAL
Status: COMPLETED | OUTPATIENT
Start: 2023-07-21 | End: 2023-07-21

## 2023-07-21 RX ORDER — CLOPIDOGREL BISULFATE 75 MG/1
75 TABLET ORAL DAILY
Status: DISCONTINUED | OUTPATIENT
Start: 2023-07-21 | End: 2023-07-26 | Stop reason: HOSPADM

## 2023-07-21 RX ORDER — PAPAVERINE HYDROCHLORIDE 30 MG/ML
INJECTION INTRAMUSCULAR; INTRAVENOUS
Status: DISCONTINUED | OUTPATIENT
Start: 2023-07-21 | End: 2023-07-21 | Stop reason: HOSPADM

## 2023-07-21 RX ORDER — DEXMEDETOMIDINE HYDROCHLORIDE 100 UG/ML
INJECTION, SOLUTION INTRAVENOUS
Status: DISCONTINUED | OUTPATIENT
Start: 2023-07-21 | End: 2023-07-21

## 2023-07-21 RX ORDER — SODIUM CHLORIDE 0.9 % (FLUSH) 0.9 %
10 SYRINGE (ML) INJECTION
Status: DISCONTINUED | OUTPATIENT
Start: 2023-07-21 | End: 2023-07-26 | Stop reason: HOSPADM

## 2023-07-21 RX ORDER — METOPROLOL TARTRATE 25 MG/1
25 TABLET, FILM COATED ORAL
Status: DISCONTINUED | OUTPATIENT
Start: 2023-07-21 | End: 2023-07-21 | Stop reason: HOSPADM

## 2023-07-21 RX ORDER — POLYETHYLENE GLYCOL 3350 17 G/17G
17 POWDER, FOR SOLUTION ORAL DAILY
Status: DISCONTINUED | OUTPATIENT
Start: 2023-07-21 | End: 2023-07-24

## 2023-07-21 RX ORDER — FENTANYL CITRATE 50 UG/ML
50 INJECTION, SOLUTION INTRAMUSCULAR; INTRAVENOUS
Status: DISCONTINUED | OUTPATIENT
Start: 2023-07-23 | End: 2023-07-21

## 2023-07-21 RX ORDER — POTASSIUM CHLORIDE 14.9 MG/ML
60 INJECTION INTRAVENOUS
Status: DISCONTINUED | OUTPATIENT
Start: 2023-07-21 | End: 2023-07-21

## 2023-07-21 RX ORDER — FENTANYL CITRATE 50 UG/ML
25 INJECTION, SOLUTION INTRAMUSCULAR; INTRAVENOUS
Status: DISCONTINUED | OUTPATIENT
Start: 2023-07-21 | End: 2023-07-21

## 2023-07-21 RX ORDER — MAGNESIUM SULFATE HEPTAHYDRATE 40 MG/ML
2 INJECTION, SOLUTION INTRAVENOUS
Status: DISCONTINUED | OUTPATIENT
Start: 2023-07-21 | End: 2023-07-21

## 2023-07-21 RX ORDER — ALBUMIN HUMAN 50 G/1000ML
SOLUTION INTRAVENOUS CONTINUOUS PRN
Status: DISCONTINUED | OUTPATIENT
Start: 2023-07-21 | End: 2023-07-21

## 2023-07-21 RX ORDER — POTASSIUM CHLORIDE 29.8 MG/ML
40 INJECTION INTRAVENOUS
Status: DISCONTINUED | OUTPATIENT
Start: 2023-07-21 | End: 2023-07-21

## 2023-07-21 RX ORDER — FENTANYL CITRATE 50 UG/ML
INJECTION, SOLUTION INTRAMUSCULAR; INTRAVENOUS
Status: COMPLETED
Start: 2023-07-21 | End: 2023-07-21

## 2023-07-21 RX ORDER — ACETAMINOPHEN 325 MG/1
650 TABLET ORAL EVERY 4 HOURS PRN
Status: DISCONTINUED | OUTPATIENT
Start: 2023-07-21 | End: 2023-07-21

## 2023-07-21 RX ORDER — DEXMEDETOMIDINE HYDROCHLORIDE 4 UG/ML
INJECTION, SOLUTION INTRAVENOUS
Status: COMPLETED
Start: 2023-07-21 | End: 2023-07-21

## 2023-07-21 RX ORDER — ALBUMIN HUMAN 50 G/1000ML
25 SOLUTION INTRAVENOUS ONCE AS NEEDED
Status: DISCONTINUED | OUTPATIENT
Start: 2023-07-21 | End: 2023-07-21

## 2023-07-21 RX ORDER — OXYCODONE HYDROCHLORIDE 5 MG/1
5 TABLET ORAL EVERY 4 HOURS PRN
Status: DISCONTINUED | OUTPATIENT
Start: 2023-07-21 | End: 2023-07-26 | Stop reason: HOSPADM

## 2023-07-21 RX ORDER — ROCURONIUM BROMIDE 10 MG/ML
INJECTION, SOLUTION INTRAVENOUS
Status: DISCONTINUED | OUTPATIENT
Start: 2023-07-21 | End: 2023-07-21

## 2023-07-21 RX ORDER — ONDANSETRON 2 MG/ML
4 INJECTION INTRAMUSCULAR; INTRAVENOUS EVERY 6 HOURS PRN
Status: DISCONTINUED | OUTPATIENT
Start: 2023-07-21 | End: 2023-07-26 | Stop reason: HOSPADM

## 2023-07-21 RX ORDER — NAPROXEN SODIUM 220 MG/1
81 TABLET, FILM COATED ORAL ONCE
Status: DISCONTINUED | OUTPATIENT
Start: 2023-07-21 | End: 2023-07-21

## 2023-07-21 RX ORDER — CEFAZOLIN SODIUM 1 G/3ML
INJECTION, POWDER, FOR SOLUTION INTRAMUSCULAR; INTRAVENOUS
Status: DISCONTINUED | OUTPATIENT
Start: 2023-07-21 | End: 2023-07-21

## 2023-07-21 RX ORDER — TAMSULOSIN HYDROCHLORIDE 0.4 MG/1
0.4 CAPSULE ORAL DAILY
Status: DISCONTINUED | OUTPATIENT
Start: 2023-07-22 | End: 2023-07-26 | Stop reason: HOSPADM

## 2023-07-21 RX ORDER — PANTOPRAZOLE SODIUM 40 MG/1
40 TABLET, DELAYED RELEASE ORAL DAILY
Status: DISCONTINUED | OUTPATIENT
Start: 2023-07-22 | End: 2023-07-26 | Stop reason: HOSPADM

## 2023-07-21 RX ORDER — ONDANSETRON 2 MG/ML
INJECTION INTRAMUSCULAR; INTRAVENOUS
Status: DISCONTINUED | OUTPATIENT
Start: 2023-07-21 | End: 2023-07-21

## 2023-07-21 RX ORDER — ACETAMINOPHEN 500 MG
1000 TABLET ORAL
Status: COMPLETED | OUTPATIENT
Start: 2023-07-21 | End: 2023-07-21

## 2023-07-21 RX ORDER — PROPOFOL 10 MG/ML
VIAL (ML) INTRAVENOUS
Status: DISCONTINUED | OUTPATIENT
Start: 2023-07-21 | End: 2023-07-21

## 2023-07-21 RX ORDER — CLOPIDOGREL BISULFATE 75 MG/1
75 TABLET ORAL DAILY
Status: DISCONTINUED | OUTPATIENT
Start: 2023-07-22 | End: 2023-07-21

## 2023-07-21 RX ORDER — BISACODYL 10 MG
10 SUPPOSITORY, RECTAL RECTAL DAILY PRN
Status: DISCONTINUED | OUTPATIENT
Start: 2023-07-21 | End: 2023-07-26 | Stop reason: HOSPADM

## 2023-07-21 RX ORDER — IPRATROPIUM BROMIDE AND ALBUTEROL SULFATE 2.5; .5 MG/3ML; MG/3ML
3 SOLUTION RESPIRATORY (INHALATION) EVERY 4 HOURS
Status: COMPLETED | OUTPATIENT
Start: 2023-07-21 | End: 2023-07-22

## 2023-07-21 RX ORDER — NAPROXEN SODIUM 220 MG/1
81 TABLET, FILM COATED ORAL ONCE
Status: COMPLETED | OUTPATIENT
Start: 2023-07-21 | End: 2023-07-21

## 2023-07-21 RX ORDER — SODIUM CHLORIDE 9 MG/ML
INJECTION, SOLUTION INTRAVENOUS CONTINUOUS PRN
Status: DISCONTINUED | OUTPATIENT
Start: 2023-07-21 | End: 2023-07-21

## 2023-07-21 RX ORDER — ATORVASTATIN CALCIUM 40 MG/1
80 TABLET, FILM COATED ORAL NIGHTLY
Status: DISCONTINUED | OUTPATIENT
Start: 2023-07-21 | End: 2023-07-26 | Stop reason: HOSPADM

## 2023-07-21 RX ORDER — ATORVASTATIN CALCIUM 10 MG/1
40 TABLET, FILM COATED ORAL NIGHTLY
Status: DISCONTINUED | OUTPATIENT
Start: 2023-07-21 | End: 2023-07-21

## 2023-07-21 RX ORDER — ONDANSETRON 2 MG/ML
4 INJECTION INTRAMUSCULAR; INTRAVENOUS EVERY 12 HOURS PRN
Status: DISCONTINUED | OUTPATIENT
Start: 2023-07-21 | End: 2023-07-21

## 2023-07-21 RX ORDER — OXYCODONE HYDROCHLORIDE 10 MG/1
10 TABLET ORAL EVERY 4 HOURS PRN
Status: DISCONTINUED | OUTPATIENT
Start: 2023-07-21 | End: 2023-07-26 | Stop reason: HOSPADM

## 2023-07-21 RX ORDER — FAMOTIDINE 20 MG/1
20 TABLET, FILM COATED ORAL 2 TIMES DAILY
Status: DISCONTINUED | OUTPATIENT
Start: 2023-07-21 | End: 2023-07-21

## 2023-07-21 RX ORDER — ALBUMIN HUMAN 50 G/1000ML
25 SOLUTION INTRAVENOUS EVERY 4 HOURS
Status: COMPLETED | OUTPATIENT
Start: 2023-07-21 | End: 2023-07-21

## 2023-07-21 RX ORDER — DEXMEDETOMIDINE HYDROCHLORIDE 4 UG/ML
0-1.4 INJECTION, SOLUTION INTRAVENOUS CONTINUOUS
Status: DISCONTINUED | OUTPATIENT
Start: 2023-07-21 | End: 2023-07-21

## 2023-07-21 RX ORDER — TRANEXAMIC ACID 100 MG/ML
INJECTION, SOLUTION INTRAVENOUS CONTINUOUS PRN
Status: DISCONTINUED | OUTPATIENT
Start: 2023-07-21 | End: 2023-07-21

## 2023-07-21 RX ORDER — MAGNESIUM SULFATE HEPTAHYDRATE 40 MG/ML
4 INJECTION, SOLUTION INTRAVENOUS
Status: DISCONTINUED | OUTPATIENT
Start: 2023-07-21 | End: 2023-07-21

## 2023-07-21 RX ORDER — SODIUM CHLORIDE 9 MG/ML
INJECTION, SOLUTION INTRAVENOUS CONTINUOUS
Status: DISCONTINUED | OUTPATIENT
Start: 2023-07-21 | End: 2023-07-21

## 2023-07-21 RX ORDER — KETAMINE HCL IN 0.9 % NACL 50 MG/5 ML
SYRINGE (ML) INTRAVENOUS
Status: DISCONTINUED | OUTPATIENT
Start: 2023-07-21 | End: 2023-07-21

## 2023-07-21 RX ORDER — HYDROMORPHONE HYDROCHLORIDE 1 MG/ML
1 INJECTION, SOLUTION INTRAMUSCULAR; INTRAVENOUS; SUBCUTANEOUS EVERY 6 HOURS PRN
Status: DISCONTINUED | OUTPATIENT
Start: 2023-07-21 | End: 2023-07-26 | Stop reason: HOSPADM

## 2023-07-21 RX ORDER — PROPOFOL 10 MG/ML
VIAL (ML) INTRAVENOUS CONTINUOUS PRN
Status: DISCONTINUED | OUTPATIENT
Start: 2023-07-21 | End: 2023-07-21

## 2023-07-21 RX ORDER — DOCUSATE SODIUM 100 MG/1
100 CAPSULE, LIQUID FILLED ORAL 2 TIMES DAILY
Status: DISCONTINUED | OUTPATIENT
Start: 2023-07-21 | End: 2023-07-24

## 2023-07-21 RX ORDER — POTASSIUM CHLORIDE 14.9 MG/ML
20 INJECTION INTRAVENOUS
Status: DISCONTINUED | OUTPATIENT
Start: 2023-07-21 | End: 2023-07-21

## 2023-07-21 RX ORDER — MUPIROCIN 20 MG/G
1 OINTMENT TOPICAL 2 TIMES DAILY
Status: COMPLETED | OUTPATIENT
Start: 2023-07-21 | End: 2023-07-25

## 2023-07-21 RX ORDER — NAPROXEN SODIUM 220 MG/1
81 TABLET, FILM COATED ORAL DAILY
Status: DISCONTINUED | OUTPATIENT
Start: 2023-07-21 | End: 2023-07-21

## 2023-07-21 RX ORDER — ASPIRIN 300 MG/1
300 SUPPOSITORY RECTAL ONCE AS NEEDED
Status: DISCONTINUED | OUTPATIENT
Start: 2023-07-21 | End: 2023-07-21

## 2023-07-21 RX ORDER — ESMOLOL HYDROCHLORIDE 10 MG/ML
INJECTION INTRAVENOUS
Status: DISCONTINUED | OUTPATIENT
Start: 2023-07-21 | End: 2023-07-21

## 2023-07-21 RX ORDER — ASPIRIN 325 MG
325 TABLET, DELAYED RELEASE (ENTERIC COATED) ORAL DAILY
Status: DISCONTINUED | OUTPATIENT
Start: 2023-07-21 | End: 2023-07-21

## 2023-07-21 RX ADMIN — DEXMEDETOMIDINE 4 MCG: 100 INJECTION, SOLUTION, CONCENTRATE INTRAVENOUS at 09:07

## 2023-07-21 RX ADMIN — DOCUSATE SODIUM 100 MG: 100 CAPSULE, LIQUID FILLED ORAL at 08:07

## 2023-07-21 RX ADMIN — FENTANYL CITRATE 25 MCG: 50 INJECTION INTRAMUSCULAR; INTRAVENOUS at 10:07

## 2023-07-21 RX ADMIN — INSULIN HUMAN 1 UNITS/HR: 1 INJECTION, SOLUTION INTRAVENOUS at 08:07

## 2023-07-21 RX ADMIN — CEFAZOLIN 2 G: 2 INJECTION, POWDER, FOR SOLUTION INTRAMUSCULAR; INTRAVENOUS at 11:07

## 2023-07-21 RX ADMIN — PROPOFOL 100 MG: 10 INJECTION, EMULSION INTRAVENOUS at 07:07

## 2023-07-21 RX ADMIN — MUPIROCIN 1 G: 20 OINTMENT TOPICAL at 11:07

## 2023-07-21 RX ADMIN — PROPOFOL 50 MCG/KG/MIN: 10 INJECTION, EMULSION INTRAVENOUS at 09:07

## 2023-07-21 RX ADMIN — ALBUMIN (HUMAN) 25 G: 12.5 SOLUTION INTRAVENOUS at 11:07

## 2023-07-21 RX ADMIN — ACETAMINOPHEN 1000 MG: 500 TABLET ORAL at 06:07

## 2023-07-21 RX ADMIN — CEFAZOLIN 2 G: 2 INJECTION, POWDER, FOR SOLUTION INTRAMUSCULAR; INTRAVENOUS at 04:07

## 2023-07-21 RX ADMIN — ALBUMIN (HUMAN) 25 G: 12.5 SOLUTION INTRAVENOUS at 05:07

## 2023-07-21 RX ADMIN — LIDOCAINE HYDROCHLORIDE 0.2 MG: 10 INJECTION, SOLUTION EPIDURAL; INFILTRATION; INTRACAUDAL; PERINEURAL at 06:07

## 2023-07-21 RX ADMIN — MUPIROCIN 1 G: 20 OINTMENT TOPICAL at 08:07

## 2023-07-21 RX ADMIN — METHADONE HYDROCHLORIDE 20 MG: 10 INJECTION, SOLUTION INTRAMUSCULAR; INTRAVENOUS; SUBCUTANEOUS at 07:07

## 2023-07-21 RX ADMIN — NOREPINEPHRINE BITARTRATE 0.02 MCG/KG/MIN: 1 INJECTION, SOLUTION, CONCENTRATE INTRAVENOUS at 07:07

## 2023-07-21 RX ADMIN — FENTANYL CITRATE 175 MCG: 50 INJECTION, SOLUTION INTRAMUSCULAR; INTRAVENOUS at 07:07

## 2023-07-21 RX ADMIN — ASPIRIN 81 MG 81 MG: 81 TABLET ORAL at 04:07

## 2023-07-21 RX ADMIN — ALBUMIN HUMAN: 50 SOLUTION INTRAVENOUS at 08:07

## 2023-07-21 RX ADMIN — DEXMEDETOMIDINE HYDROCHLORIDE 0.8 MCG/KG/HR: 4 INJECTION, SOLUTION INTRAVENOUS at 11:07

## 2023-07-21 RX ADMIN — SODIUM CHLORIDE, SODIUM GLUCONATE, SODIUM ACETATE, POTASSIUM CHLORIDE, MAGNESIUM CHLORIDE, SODIUM PHOSPHATE, DIBASIC, AND POTASSIUM PHOSPHATE: .53; .5; .37; .037; .03; .012; .00082 INJECTION, SOLUTION INTRAVENOUS at 07:07

## 2023-07-21 RX ADMIN — DEXMEDETOMIDINE HYDROCHLORIDE 0.8 MCG/KG/HR: 4 INJECTION INTRAVENOUS at 11:07

## 2023-07-21 RX ADMIN — MUPIROCIN 1 G: 20 OINTMENT TOPICAL at 06:07

## 2023-07-21 RX ADMIN — Medication 10 MG: at 09:07

## 2023-07-21 RX ADMIN — ACETAMINOPHEN 1000 MG: 500 TABLET ORAL at 09:07

## 2023-07-21 RX ADMIN — OXYCODONE HYDROCHLORIDE 10 MG: 10 TABLET ORAL at 08:07

## 2023-07-21 RX ADMIN — IPRATROPIUM BROMIDE AND ALBUTEROL SULFATE 3 ML: .5; 3 SOLUTION RESPIRATORY (INHALATION) at 03:07

## 2023-07-21 RX ADMIN — OXYCODONE HYDROCHLORIDE 10 MG: 10 TABLET ORAL at 11:07

## 2023-07-21 RX ADMIN — ACETAMINOPHEN 1000 MG: 500 TABLET ORAL at 02:07

## 2023-07-21 RX ADMIN — ATORVASTATIN CALCIUM 80 MG: 40 TABLET, FILM COATED ORAL at 08:07

## 2023-07-21 RX ADMIN — IPRATROPIUM BROMIDE AND ALBUTEROL SULFATE 3 ML: .5; 3 SOLUTION RESPIRATORY (INHALATION) at 11:07

## 2023-07-21 RX ADMIN — TRANEXAMIC ACID 1 MG/KG/HR: 100 INJECTION INTRAVENOUS at 07:07

## 2023-07-21 RX ADMIN — CLEVIPIDINE 6 MG/HR: 0.5 EMULSION INTRAVENOUS at 12:07

## 2023-07-21 RX ADMIN — Medication 30 MG: at 07:07

## 2023-07-21 RX ADMIN — PROPOFOL 40 MCG/KG/MIN: 10 INJECTION, EMULSION INTRAVENOUS at 11:07

## 2023-07-21 RX ADMIN — SODIUM CHLORIDE: 0.9 INJECTION, SOLUTION INTRAVENOUS at 06:07

## 2023-07-21 RX ADMIN — OXYCODONE HYDROCHLORIDE 10 MG: 10 TABLET ORAL at 04:07

## 2023-07-21 RX ADMIN — CEFAZOLIN 2 G: 2 INJECTION, POWDER, FOR SOLUTION INTRAMUSCULAR; INTRAVENOUS at 08:07

## 2023-07-21 RX ADMIN — IPRATROPIUM BROMIDE AND ALBUTEROL SULFATE 3 ML: .5; 3 SOLUTION RESPIRATORY (INHALATION) at 08:07

## 2023-07-21 RX ADMIN — MAGNESIUM SULFATE 2 G: 2 INJECTION INTRAVENOUS at 01:07

## 2023-07-21 RX ADMIN — HYDROMORPHONE HYDROCHLORIDE 1 MG: 1 INJECTION, SOLUTION INTRAMUSCULAR; INTRAVENOUS; SUBCUTANEOUS at 02:07

## 2023-07-21 RX ADMIN — SODIUM CHLORIDE: 9 INJECTION, SOLUTION INTRAVENOUS at 06:07

## 2023-07-21 RX ADMIN — ONDANSETRON 4 MG: 2 INJECTION INTRAMUSCULAR; INTRAVENOUS at 09:07

## 2023-07-21 RX ADMIN — FAMOTIDINE 20 MG: 10 INJECTION, SOLUTION INTRAVENOUS at 11:07

## 2023-07-21 RX ADMIN — FENTANYL CITRATE 25 MCG: 50 INJECTION, SOLUTION INTRAMUSCULAR; INTRAVENOUS at 07:07

## 2023-07-21 RX ADMIN — CLOPIDOGREL BISULFATE 75 MG: 75 TABLET ORAL at 04:07

## 2023-07-21 RX ADMIN — ESMOLOL HYDROCHLORIDE 5 MG: 100 INJECTION, SOLUTION INTRAVENOUS at 07:07

## 2023-07-21 RX ADMIN — ALBUMIN (HUMAN) 25 G: 12.5 SOLUTION INTRAVENOUS at 01:07

## 2023-07-21 RX ADMIN — FENTANYL CITRATE 25 MCG: 50 INJECTION, SOLUTION INTRAMUSCULAR; INTRAVENOUS at 10:07

## 2023-07-21 RX ADMIN — ROCURONIUM BROMIDE 100 MG: 10 INJECTION, SOLUTION INTRAVENOUS at 07:07

## 2023-07-21 NOTE — ANESTHESIA POSTPROCEDURE EVALUATION
Anesthesia Post Evaluation    Patient: Addy Redding    Procedure(s) Performed: Procedure(s) (LRB):  CABG, WITHOUT CARDIOPULMONARY PUMP OXYGENATION (N/A)    Final Anesthesia Type: general      Patient location during evaluation: ICU  Patient participation: No - Unable to Participate, Intubation  Level of consciousness: sedated  Post-procedure vital signs: reviewed and stable  Pain management: adequate  Airway patency: patent  MONTRELL mitigation strategies: Intraoperative administration of CPAP, nasopharyngeal airway, or oral appliance during sedation and Verification of full reversal of neuromuscular block  PONV status at discharge: No PONV  Anesthetic complications: no      Cardiovascular status: hemodynamically stable  Respiratory status: ventilator and ETT  Hydration status: euvolemic  Follow-up not needed.          Vitals Value Taken Time   /55 07/21/23 1048   Temp ** 07/21/23 1048   Pulse 54 07/21/23 1047   Resp 19 07/21/23 1047   SpO2 100 % 07/21/23 1047   Vitals shown include unvalidated device data.      No case tracking events are documented in the log.      Pain/Elena Score: Pain Rating Prior to Med Admin: 0 (7/21/2023  6:21 AM)

## 2023-07-21 NOTE — ANESTHESIA PROCEDURE NOTES
Central Line    Diagnosis: CAD  Patient location during procedure: done in OR    Staffing  Authorizing Provider: Stephen Hill MD  Performing Provider: Kyle Yu MD    Staffing  Performed: resident/CRNA   Anesthesiologist was present at the time of the procedure.  Preanesthetic Checklist  Completed: patient identified, IV checked, site marked, risks and benefits discussed, surgical consent, monitors and equipment checked, pre-op evaluation, timeout performed and anesthesia consent given  Indication   Indication: hemodynamic monitoring, vascular access, med administration     Anesthesia   general anesthesia    Central Line   Skin Prep: skin prepped with ChloraPrep, skin prep agent completely dried prior to procedure  Sterile Barriers Followed: Yes    All five maximal barriers used- gloves, gown, cap, mask, and large sterile sheet    hand hygiene performed prior to central venous catheter insertion  Location: left subclavian.   Catheter type: quad lumen  Catheter Size: 8.5 Fr  Ultrasound: vascular probe with ultrasound   Vessel Caliber: medium, patent, compressibility normal  Needle advanced into vessel with real time Ultrasound guidance.  Guidewire confirmed in vessel.  sterile gel and probe cover used in ultrasound-guided central venous catheter insertion   Manometry: Venous cannualation confirmed by visual estimation of blood vessel pressure using manometry.  Insertion Attempts: 1   Securement:line sutured, chlorhexidine patch, sterile dressing applied and blood return through all ports    Post-Procedure    Adverse Events:none      Guidewire  Guidewire removed intact, verified with nurse.

## 2023-07-21 NOTE — SUBJECTIVE & OBJECTIVE
Interval HPI:   Overnight events: No acute events overnight. Patient in room 19359/04136 A. Blood glucose stable. BG at goal on current insulin regimen (IIP- not yet started). Steroid use- None. Day of Surgery  Renal function- Abnormal - Creatinine 1.9   Vasopressors-  None       Endocrine will continue to follow and manage insulin orders inpatient.         Diet NPO Except for: Sips with Medication     Eating:   NPO  Nausea: No  Hypoglycemia and intervention: No  Fever: No  TPN and/or TF: No    PMH, PSH, FH, SH updated and reviewed     ROS:  Review of Systems  Constitutional: Negative for weight changes.  Eyes: Negative for visual disturbance.  Respiratory: Negative for cough.   Cardiovascular: Negative for chest pain.  Gastrointestinal: Negative for nausea.  Endocrine: Negative for polyuria, polydipsia.  Musculoskeletal: Negative for back pain.  Skin: Negative for rash.  Neurological: Negative for syncope.  Psychiatric/Behavioral: Negative for depression.    Current Medications and/or Treatments Impacting Glycemic Control  Immunotherapy:    Immunosuppressants       None          Steroids:   Hormones (From admission, onward)      None          Pressors:    Autonomic Drugs (From admission, onward)      None          Hyperglycemia/Diabetes Medications:   Antihyperglycemics (From admission, onward)      Start     Stop Route Frequency Ordered    07/21/23 1030  insulin regular in 0.9 % NaCl 100 unit/100 mL (1 unit/mL) infusion        Question Answer Comment   Insulin rate changes (DO NOT MODIFY ANSWER) \\[email or web address] [id number]    Enter initial dose (Units/hr): 1        -- IV Continuous 07/21/23 1022             PHYSICAL EXAMINATION:  Vitals:    07/21/23 1523   BP:    Pulse: 77   Resp: 19   Temp:      Body mass index is 28.57 kg/m².     Physical Exam   Constitutional: Well developed, well nourished, NAD.  ENT: External ears no masses with nose patent  Neck: Supple; trachea  midline  Cardiovascular: Normal heart sounds, no LE edema. DP +2 bilaterally.  Lungs: Normal effort; lungs anterior bilaterally clear to auscultation.  Abdomen: Soft, no masses, no hernias.  Hypoactive BS noted.  MS: No clubbing or cyanosis of nails noted; unable to assess gait.  Skin: No rashes, lesions, or ulcers; no nodules.  Mid-sternal incision with telfa island dressing, CDI.  CT x 2.  Psychiatric: Good judgement and insight; normal mood and affect.  Neurological: Cranial nerves are grossly intact. Normal vibration sense in the bilateral lower extremities.  Foot: Nails in good condition, no amputations noted.

## 2023-07-21 NOTE — ASSESSMENT & PLAN NOTE
Mr Redding 67 year-old male former smoker (1ppd x 25 years, quit 25 years ago) with a history of coronary artery disease s/p LCx+OM stents (2022), carotid stenosis, stage 4 chronic kidney disease, hypertension, and diabetes (HbA1C 6.7) now sp single vessel off pump CABG. Weaning to extubate, off of pressors.      Neuro/Psych:     - Sedation: propofol, precedex    - Pain:    - Scheduled Tylenol 1g q8h   - Fentanyl PRN while intubated, Oxy PRN             Cardiac:     - S/P off pump CABG x1  with Dr. Uriostegui on 7/21    - BP Goal: SBP <150, MAP >70    - Cleviprex PRN    - Pressors: levo 0.06, wean as able    - Anti-HTNs: Will start when appropriate    - Rhythm: NSR    - Beta blocker: Will start when appropriate    - Statin: Atorvastatin 40 mg QD      Pulmonary:     - Goal SpO2 >92%    - Will wean ventilator support as tolerated to extubate    - Chest Tubes x 2 (1 Med & 1 Pleural)    - ABGs q1 for 6 hrs, then q3 x3      Renal:    - Trend BUN/Cr     - Maintain Bowman, record strict Is/Os    Recent Labs   Lab 07/19/23  0805   BUN 35*   CREATININE 2.2*         FEN / GI:     - Daily CMP, PRN K/Mag/Phos per protocol     - Replace electrolytes as needed    - Nutrition: NPO pending extubation    - Bowel Regimen: Miralax, docusate      ID:     - Afebrile    - WBC stable    - Abx: Complete perioperative cefazolin 2g Q8H x 5 doses    Recent Labs   Lab 07/19/23  0805   WBC 4.66         Heme/Onc:     - Hgb 13.4 pre-operatively    - CBC daily    - ASA 81mg , plavix 75 daily    Recent Labs   Lab 07/19/23  0805   HGB 13.4*      APTT 27.6   INR 1.0         Endocrine:     - CTS Goal -140    - HgbA1c: 6.7    - Endocrinology consulted for insulin management      PPx:   Feeding: NPO pending extubation   Analgesia/Sedation: propofol, precedex while intubated  Thromboembolic Prevention: subcu heparin beginning tomorrow pm  HOB >30: Yes  Stress Ulcer: famotidine   Glucose Control: Yes, insulin management per  Endocrinology  Bowel reg: miralax, docusate  Invasive Lines/Drains/Airway:    ETT   Left radial arterial line   Left subclavian CVC   Bowman   Chest Tubes: 2 (1 Mediastinal, 1 Pleural)      Deescalation: wean to extubate     Dispo/Code Status/Palliative:     - Continue SICU Care    - Full Code

## 2023-07-21 NOTE — TRANSFER OF CARE
"Anesthesia Transfer of Care Note    Patient: Addy Redding    Procedure(s) Performed: Procedure(s) (LRB):  CABG, WITHOUT CARDIOPULMONARY PUMP OXYGENATION (N/A)    Patient location: ICU    Anesthesia Type: general    Transport from OR: Transported from OR intubated on 100% O2 by AMBU with adequate controlled ventilation. Upon arrival to PACU/ICU, patient attached to ventilator and auscultated to confirm bilateral breath sounds and adequate TV. Continuous ECG monitoring in transport. Continuous SpO2 monitoring in transport. Continuos invasive BP monitoring in transport    Post pain: adequate analgesia    Post assessment: no apparent anesthetic complications and tolerated procedure well    Post vital signs: stable    Level of consciousness: sedated    Nausea/Vomiting: no nausea/vomiting    Complications: none    Transfer of care protocol was followed      Last vitals:   Visit Vitals  BP (!) 154/73 (BP Location: Left arm, Patient Position: Lying)   Pulse (!) 49   Temp 36.7 °C (98.1 °F) (Oral)   Resp 20   Ht 6' 1" (1.854 m)   Wt 98 kg (216 lb)   SpO2 100%   BMI 28.50 kg/m²     "

## 2023-07-21 NOTE — INTERVAL H&P NOTE
The patient has been examined and the H&P has been reviewed:    I concur with the findings and no changes have occurred since H&P was written.    Surgery risks, benefits and alternative options discussed and understood by patient/family.          There are no hospital problems to display for this patient.    Danica Alegre MD  Cardiothoracic Surgery Fellow   [phone]

## 2023-07-21 NOTE — HPI
Addy Redding is a 67 y.o. male with a PMHx of former smoker (1ppd x 25 years, quit 25 years ago) with a history of coronary artery disease s/p LCx+OM stents (2022), carotid stenosis, stage 4 chronic kidney disease, hypertension, and diabetes (HbA1C 6.7).  He presents to the SICU s/p Single vessel off pump coronary artery bypass grafting (OPCAB)-Left internal mammary artery to the distal left anterior descending artery with Dr. Uriostegui. On admission, he is intubated, sedated with propofol, and in stable condition. Inotropic and vasopressor requirements upon admission are  0.06 mcg/kg/min of norepinephrine to maintain blood pressure at a MAP 70 and SBP < 140. Central access includes a Lt subclavian, arterial access includes a lt radial arterial line, and peripheral access includes 18G*2 left and right UL. He also has 1 pleural and 2 mediastinal chest tubes with 15ml output.   Intraoperatively, he received 2L of crystalloid, no blood products. Urine output intraoperatively was recorded as 200cc. His pre-operative echocardiogram was notable for EF 55%.    Immediate post-operative plans include hemodynamic stabilization and weaning of cardiac and respiratory support. Plan to wean vasopressors and inotropes as tolerated, wean ventilator support with goal of early extubation, and closely monitor fluid status with strict Is/Os and continued fluid resuscitation as needed.

## 2023-07-21 NOTE — ASSESSMENT & PLAN NOTE
Mr Redding 67 year-old male former smoker (1ppd x 25 years, quit 25 years ago) with a history of coronary artery disease s/p LCx+OM stents (2022), carotid stenosis, stage 4 chronic kidney disease, hypertension, and diabetes (HbA1C 6.7) now sp single vessel off pump CABG.       Neuro/Psych:     - Sedation: off    - Pain:    - Scheduled Tylenol 1g q8h   - Dilaudid PRN, Oxy PRN             Cardiac:     - S/P off pump CABG x1  with Dr. Uriostegui on 7/21    - BP Goal: SBP <150, MAP >65    - Cleviprex PRN    - Pressors:off    - Anti-HTNs: Will start when appropriate    - Rhythm: NSR    - Beta blocker:Start metoprolol 50mg daily    - Statin: Atorvastatin 40 mg QD      Pulmonary:     - Goal SpO2 >92%    - Extubated POD0    - Chest Tubes x 2 (1 Med & 1 Pleural)    - ABGs PRN      Renal:     CKD 4    - Trend BUN/Cr     - Maintain Bowman, record strict Is/Os    Recent Labs   Lab 07/19/23  0805   BUN 35*   CREATININE 2.2*         FEN / GI:     - Daily CMP, PRN K/Mag/Phos per protocol     - Replace electrolytes as needed    - Nutrition: Clears     - Bowel Regimen: Miralax, docusate      ID:     - Afebrile    - WBC stable    - Abx: Complete perioperative cefazolin 2g Q8H x 5 doses    Recent Labs   Lab 07/19/23  0805 07/21/23  1041   WBC 4.66 19.24*         Heme/Onc:     - Hgb 13.4 pre-operatively    - CBC daily    - ASA 81mg , plavix 75 daily    Recent Labs   Lab 07/19/23  0805 07/21/23  1041   HGB 13.4* 10.8*    193   APTT 27.6 27.1   INR 1.0 1.2         Endocrine:     - CTS Goal -140    - HgbA1c: 6.7    - Endocrinology consulted for insulin management      PPx:   Feeding: Clears  Analgesia/Sedation: dilaudid PRN, oxy PRN,tylenol   Thromboembolic Prevention: subcu heparin   HOB >30: Yes  Stress Ulcer: pantop  Glucose Control: Yes, insulin management per Endocrinology  Bowel reg: miralax, docusate  Invasive Lines/Drains/Airway:    Left radial arterial line   Left subclavian CVC   Bowman   Chest Tubes: 2 (1  Mediastinal, 1 Pleural)           Dispo/Code Status/Palliative:     - Continue SICU Care    - Full Code

## 2023-07-21 NOTE — ANESTHESIA PROCEDURE NOTES
Arterial    Diagnosis: CAD    Patient location during procedure: done in OR    Staffing  Authorizing Provider: Stephen Hill MD  Performing Provider: Kyle Yu MD    Anesthesiologist was present at the time of the procedure.    Preanesthetic Checklist  Completed: patient identified, IV checked, site marked, risks and benefits discussed, surgical consent, monitors and equipment checked, pre-op evaluation, timeout performed and anesthesia consent givenArterial  Skin Prep: chlorhexidine gluconate  Local Infiltration: lidocaine  Orientation: left  Location: radial    Catheter Size: 20 G  Catheter placement by Ultrasound guidance. Heme positive aspiration all ports.   Vessel Caliber: medium, patent, compressibility normal  Needle advanced into vessel with real time Ultrasound guidance.Insertion Attempts: 1  Assessment  Dressing: secured with tape and tegaderm  Patient: Tolerated well

## 2023-07-21 NOTE — ASSESSMENT & PLAN NOTE
Hgb prior to surgery 13.4, arrived with hgb 10.8. Expected blood loss in post-operative setting.     -- daily CBC  -- transfuse hgb < 7, symptomatic anemia

## 2023-07-21 NOTE — ANESTHESIA PROCEDURE NOTES
Intubation    Date/Time: 7/21/2023 7:11 AM  Performed by: Kyle Yu MD  Authorized by: Stephen Hill MD     Intubation:     Induction:  Intravenous    Intubated:  Postinduction    Mask Ventilation:  Easy with oral airway    Attempts:  1    Attempted By:  Resident anesthesiologist    Method of Intubation:  Video laryngoscopy    Blade:  Bay 4    Laryngeal View Grade: Grade I - full view of cords      Difficult Airway Encountered?: No      Complications:  None    Airway Device:  Oral endotracheal tube    Airway Device Size:  7.5    Style/Cuff Inflation:  Cuffed    Tube secured:  22    Secured at:  The lips    Placement Verified By:  Capnometry    Complicating Factors:  None    Findings Post-Intubation:  BS equal bilateral and atraumatic/condition of teeth unchanged

## 2023-07-21 NOTE — PROGRESS NOTES
Perfusion Standby Note:      07/21/2023  Perfusionist:  Leslie Pulliam  [facility]  Surgeon(s) and Role:     * Jass Uriostegui MD - Primary  Anesthesiologist:  Stephen Hill MD    Past Medical History:   Diagnosis Date    CKD (chronic kidney disease) stage 3, GFR 30-59 ml/min     Costochondritis     Diabetic nephropathy associated with type 2 diabetes mellitus     Diabetic retinopathy     ED (erectile dysfunction)     GERD (gastroesophageal reflux disease)     Hyperlipidemia     Hypertension     Prostate cancer     Type II or unspecified type diabetes mellitus with renal manifestations, uncontrolled(250.42)          Perfusion department standby was requested for this case, utilizing either a full cardiopulmonary machine or ECMO pump.      All pre-op checklists were completed, all equipment was available, as were cannulae and other disposables.  A TAVR instrument tray was also verified as available, per the checklist, for any case requiring ECMO standby.    I fully participated in the briefing and time-out for this procedure.  I was present in the room for all critical portions of this case during which mechanical circulatory support could be required.  Please see cardiopulmonary bypass record for details.  There were no complications.    9:36 AM

## 2023-07-21 NOTE — CONSULTS
[facility] - Surgical Intensive Care  Endocrinology  Diabetes Consult Note    Consult Requested by: Jass Uriostegui MD   Reason for admit: Coronary artery disease of native artery of native heart with stable angina pectoris    HISTORY OF PRESENT ILLNESS:  Reason for Consult: Management of T2DM, Hyperglycemia     Surgical Procedure and Date: s/p CABG on 7/21/2023    Diabetes diagnosis year: 2006    Home Diabetes Medications:  Farxiga 10 mg; Lantus 12 units BID; and Ozempic 0.5 mg weekly.     How often checking glucose at home? >4 x day   BG readings on regimen: 's  Hypoglycemia on the regimen?  No  Missed doses on regimen?  No    Diabetes Complications include:     Hyperglycemia    Complicating diabetes co morbidities:   CTS      HPI: Addy Redding is a 67 y.o. male with a PMHx of former smoker (1ppd x 25 years, quit 25 years ago) with a history of coronary artery disease s/p LCx+OM stents (2022), carotid stenosis, stage 4 chronic kidney disease, hypertension, and diabetes (HbA1C 6.7). He presents to the SICU s/p Single vessel off pump coronary artery bypass grafting (OPCAB)-Left internal mammary artery to the distal left anterior descending artery with Dr. Uriostegui. Endocrine consulted to manage hyperglycemia and type 2 diabetes.     Lab Results   Component Value Date    HGBA1C 6.7 (H) 03/14/2023                 Interval HPI:   Overnight events: No acute events overnight. Patient in room 88776/07159 A. Blood glucose stable. BG at goal on current insulin regimen (IIP- not yet started). Steroid use- None. Day of Surgery  Renal function- Abnormal - Creatinine 1.9   Vasopressors-  None       Endocrine will continue to follow and manage insulin orders inpatient.         Diet NPO Except for: Sips with Medication     Eating:   NPO  Nausea: No  Hypoglycemia and intervention: No  Fever: No  TPN and/or TF: No    PMH, PSH, FH, SH updated and reviewed     ROS:  Review of Systems  Constitutional: Negative for weight  changes.  Eyes: Negative for visual disturbance.  Respiratory: Negative for cough.   Cardiovascular: Negative for chest pain.  Gastrointestinal: Negative for nausea.  Endocrine: Negative for polyuria, polydipsia.  Musculoskeletal: Negative for back pain.  Skin: Negative for rash.  Neurological: Negative for syncope.  Psychiatric/Behavioral: Negative for depression.    Current Medications and/or Treatments Impacting Glycemic Control  Immunotherapy:    Immunosuppressants       None          Steroids:   Hormones (From admission, onward)      None          Pressors:    Autonomic Drugs (From admission, onward)      None          Hyperglycemia/Diabetes Medications:   Antihyperglycemics (From admission, onward)      Start     Stop Route Frequency Ordered    07/21/23 1030  insulin regular in 0.9 % NaCl 100 unit/100 mL (1 unit/mL) infusion        Question Answer Comment   Insulin rate changes (DO NOT MODIFY ANSWER) [email or web address] [id number]    Enter initial dose (Units/hr): 1        -- IV Continuous 07/21/23 1022             PHYSICAL EXAMINATION:  Vitals:    07/21/23 1523   BP:    Pulse: 77   Resp: 19   Temp:      Body mass index is 28.57 kg/m².     Physical Exam   Constitutional: Well developed, well nourished, NAD.  ENT: External ears no masses with nose patent  Neck: Supple; trachea midline  Cardiovascular: Normal heart sounds, no LE edema. DP +2 bilaterally.  Lungs: Normal effort; lungs anterior bilaterally clear to auscultation.  Abdomen: Soft, no masses, no hernias.  Hypoactive BS noted.  MS: No clubbing or cyanosis of nails noted; unable to assess gait.  Skin: No rashes, lesions, or ulcers; no nodules.  Mid-sternal incision with telfa island dressing, CDI.  CT x 2.  Psychiatric: Good judgement and insight; normal mood and affect.  Neurological: Cranial nerves are grossly intact. Normal vibration sense in the bilateral lower extremities.  Foot: Nails in good condition,  no amputations noted.        Labs Reviewed and Include   Recent Labs   Lab 07/21/23  1041   *   CALCIUM 8.1*   ALBUMIN 3.6   PROT 6.0      K 5.0   CO2 17*      BUN 33*   CREATININE 1.9*   ALKPHOS 44*   ALT 10   AST 15   BILITOT 0.4     Lab Results   Component Value Date    WBC 19.24 (H) 07/21/2023    HGB 10.8 (L) 07/21/2023    HCT 34 (L) 07/21/2023    MCV 87 07/21/2023     07/21/2023     No results for input(s): TSH, FREET4 in the last 168 hours.  Lab Results   Component Value Date    HGBA1C 6.7 (H) 03/14/2023       Nutritional status:   Body mass index is 28.57 kg/m².  Lab Results   Component Value Date    ALBUMIN 3.6 07/21/2023    ALBUMIN 3.7 07/19/2023    ALBUMIN 3.8 06/08/2023     No results found for: PREALBUMIN    Estimated Creatinine Clearance: 45.3 mL/min (A) (based on SCr of 1.9 mg/dL (H)).    Accu-Checks  Recent Labs     07/21/23  0634 07/21/23  1122 07/21/23  1202 07/21/23  1401 07/21/23  1515   POCTGLUCOSE 84 134* 129* 152* 138*        ASSESSMENT and PLAN    Cardiac/Vascular  * Coronary artery disease of native artery of native heart with stable angina pectoris  Managed per primary team  Avoid hypoglycemia  Optimize BG control to improve wound healing        Hypertension associated with diabetes  On an ARB per ADA guidelines.       Dyslipidemia associated with type 2 diabetes mellitus  On statin therapy per ADA guidelines.       Endocrine  Type 2 diabetes mellitus with stage 3a chronic kidney disease, without long-term current use of insulin  Endocrinology consulted for BG management.   BG goal 110-140 CTS    - IIP  - Requires intensive BG monitoring.   - BG checks q1hr  - Hypoglycemia protocol in place    - Notify endocrine if patient becomes hypokalemic (K < 3.3) and/or is not responding to replacement.   - Notify endocrine if patient requiring > 20 units/hr.      ** Please notify Endocrine for any change and/or advance in diet**  ** Please call Endocrine for any BG related  issues **    Discharge Planning:   TBD. Please notify endocrinology prior to discharge.            Plan discussed with patient, family, and RN at bedside.        Thomas Choudhary, DNP, FNP  Endocrinology  [facility] - [facility]

## 2023-07-21 NOTE — RESPIRATORY THERAPY
Patient extubated per MD order, placed on 5 LPM nasal cannula, change tolerated well. Will continue to monitor status.

## 2023-07-21 NOTE — ASSESSMENT & PLAN NOTE
A1c 6.7. Home medications oral hypoglycemics. Will hold while inpatient. Endocrinology consulted for insulin management.     -- BG goal 110-140  -- hypoglycemia protocol

## 2023-07-21 NOTE — NURSING
Pt extubated to 5 L Nasal Cannula by RT per MD order. Following extubation, pt is AAO x4, bilateral breath sounds noted.  Pt's family brought to bedside, updated on the PoC for remainder of shift.

## 2023-07-21 NOTE — RESPIRATORY THERAPY
Patient transferred to SICU via anesthesia team on transport vent with 7.5 ETT secured 21 cm  located at right teeth area. Patient placed on  ventilator with documented setting noted, change tolerated well without any distress noted. ABG/LAC obtained and read back to physician. Will continue to monitor patient status.

## 2023-07-21 NOTE — OP NOTE
[facility]  Cardiothoracic Surgery Operative Report    Patient Name:  Addy Redding; [id number]    Preoperative Diagnosis: Coronary artery disease    Postoperative Diagnosis:  Same    Date of Operation:  07/21/2023    Operation:  Single vessel off pump coronary artery bypass grafting (OPCAB)  Left internal mammary artery to the distal left anterior descending artery    Surgeon:  Jass Uriostegui MD    Assistant Surgeon:  none    Fellow:  none    Anesthesiologist:  Dada Hill MD      ---------------------------------------------------------------------------------------------------------------------      Indications for surgery: Mr. Redding is a 67 year-old male former smoker (1ppd x 25 years, quit 25 years ago) with a history of coronary artery disease s/p LCx+OM stents (2022), carotid stenosis, stage 4 chronic kidney disease, hypertension, and diabetes (HbA1C 6.7).  Recently, he has been symptomatic with dyspnea on exertion symptoms interfering with his quality of life.  His most recent echocardiogram showed 55% ejection fraction with no significant valvulopathy.  Angiogram showed 80% ostial LAD with a moderate sized mid and distal LAD, nonsignificant LCx disease (patent stents), and totally occluded ostial RCA with diffuse mid and distal RCA disease and small PDA (fills via left to right collaterals).     CT chest noncontrast shows minimal ascending aortic and mild aortic arch calcifications. Carotid ultrasound shows 40-59% right sided disease and 60-79% left sided disease.     We had an extensive discussion with him regarding the ACC/AHA guidelines and we agreed that he has class I indications for surgery involving off pump coronary artery bypass surgery (LIMA-LAD, possible SVG-PDA).   The risks and benefits were explained and informed consent was obtained.     Gross findings: No pericardial adhesions.  Moderate LAD target, small and nonbypassable PDA target.  Good biventricular function pre and post  anastomosis.      Procedure:  The patient was brought to the holding area and the indications for surgery were reviewed.  He was placed supine on the operating room table and prepped and draped in the usual sterile fashion. A surgical time out was performed.     A midline incision was followed with division of the sternum. The left internal mammary artery was harvested.  ACT guided heparinization was administered for a goal ACT of 400 seconds.      Attention was turned to the distal left anterior descending artery.  The off pump surgery suction device was placed just lateral to the apex of the heart. The heart was positioned and the LAD was identified.  The off pump surgery stabilizer device was placed on the epicardium and surrounding the vessel. A deep silastic suture was placed around the vessel in a figure of eight fashion proximal to the target anastomotic site.  This silastic suture was then tightened. The vessel was opened and the arteriotomy elongated with scissors.  The LIMA was brought to the field and prepared for use.  The LIMA was sewn end to side to the LAD with continuous 7-0 prolene. The vessel was briefly unclamped to deair, tied, and then unclamped again to assess for hemostasis.  After ensuring good hemostasis, the vessel remained unclamped and the silastic suture was removed.  Minimal bleeding was noted from the exit sites.    Once appropriate hemostasis was confirmed, heparin was reversed.   Drainage tubes were placed behind the sternum and in the pleural space. The chest was closed with steel wires and the soft tissue was closed with absorbable suture.  A sterile dressing was applied and the patient was brought to the ICU in stable condition.      I was present in the operating room for the entire operation and immediately available thereafter.

## 2023-07-21 NOTE — ANESTHESIA PROCEDURE NOTES
STEPHANIE    Diagnosis: CAD I25.10  Patient location during procedure: OR  Procedure start time: 7/21/2023 7:30 AM  Timeout: 7/21/2023 7:30 AM  Procedure end time: 7/21/2023 7:45 AM  Surgery related to: CABG x1 off pump  Exam type: Baseline    Staffing  Performed: anesthesiologist     Anesthesiologist: Stephen Hill MD        Anesthesiologist Present  Yes      Setup & Induction  Patient preparation: bite block inserted  Probe Insertion: easy  Exam: completeDoppler Echo: 2D, continuous wave Doppler, pulse wave Doppler and color flow mapping.  Exam     Left Heart  Left Atruim: normal and normal (men 3.0-4.0; women 2.7-3.8)    Left Ventricle: 4.8 cm, normal (men 4.2-5.9; women 3.8-5.2)  LV Wall Thickness (posterior wall):cm, normal (men 0.6-1.0 cm; women 0.6-0.9 cm)    LVAD:no  Estimated Ejection Fraction: > 55% normal  Regional Wall Abnormalities: no RWMA            Right Heart  Right Ventricle: normal  Right Ventricle Function: normal  Right Atria:  normal    Intra Atrial Septum  PFO: no shunt by color flow doppler  no IAS aneurysm  no lipomatous hypertrophy  no Atrial Septal Defect (Asd)    Right Ventricle  Size: normal    Aortic Valve:  Morphology: trileaflet    Regurgitation: no aortic valve regurgitation      Mitral Valve:   Morphology:normal  Jet Description: none    Tricuspid Valve:  Morphology: normal  Regurgitation: none    Pulmonic Valve:  Morphology:normal  Regurgitation(color flow): none    Great Vessels  Ascending Aorta Atherosclerosis: 2=mild dz (<3mm)  Aortic Arch Atherosclerosis: 2=mild dz (<3mm)  IABP: no  Descending Aorta Atherosclerosis: 3=sessile dz (3-5mm)  Aorta    Descending aorta IABP: no    Effusions none    SummaryFindings discussed with surgeon.    Other Findings   LIMA-LAD off pump    Infusions: .06 levo    LV systolic function is normal (LVEF 65%)  There are no segmental wall motion abnormalities  LVEDD 4.8 cm  RV systolic function is normal  Normal valvular structure and function  No  evidence of PFO via color flow doppler   No evidence of AMINAH thrombus     Exam unchanged during and after LAD occlusion    Probe removed atraumatically

## 2023-07-21 NOTE — H&P
[facility]  Critical Care - Surgery  History & Physical    Patient Name: Addy Redding  MRN: [id number]  Admission Date: 7/21/2023  Code Status: Full Code  Attending Physician: Jass Uriostegui MD   Primary Care Provider: Stephan Ramey MD   Principal Problem: Coronary artery disease of native artery of native heart with stable angina pectoris    Subjective:     HPI:  Addy Redding is a 67 y.o. male with a PMHx of former smoker (1ppd x 25 years, quit 25 years ago) with a history of coronary artery disease s/p LCx+OM stents (2022), carotid stenosis, stage 4 chronic kidney disease, hypertension, and diabetes (HbA1C 6.7).  He presents to the SICU s/p Single vessel off pump coronary artery bypass grafting (OPCAB)-Left internal mammary artery to the distal left anterior descending artery with Dr. Uriostegui. On admission, he is intubated, sedated with propofol, and in stable condition. Inotropic and vasopressor requirements upon admission are  0.06 mcg/kg/min of norepinephrine to maintain blood pressure at a MAP 70 and SBP < 150. Central access includes a Lt subclavian, arterial access includes a lt radial arterial line, and peripheral access includes 18G*2 left and right UL. He also has 1 pleural and 2 mediastinal chest tubes with 15ml output.   Intraoperatively, he received 2L of crystalloid, no blood products. Urine output intraoperatively was recorded as 200cc. His pre-operative echocardiogram was notable for EF 55%.    Immediate post-operative plans include hemodynamic stabilization and weaning of cardiac and respiratory support. Plan to wean vasopressors and inotropes as tolerated, wean ventilator support with goal of early extubation, and closely monitor fluid status with strict Is/Os and continued fluid resuscitation as needed.          Follow-up For: Procedure(s) (LRB):  CABG, WITHOUT CARDIOPULMONARY PUMP OXYGENATION (N/A)    Post-Operative Day: Day of Surgery    Objective:     Vital Signs (Most  Recent):  Temp: 98.1 °F (36.7 °C) (07/21/23 0545)  Pulse: (!) 55 (07/21/23 1041)  Resp: 20 (07/21/23 1059)  BP: (!) 154/73 (07/21/23 0545)  SpO2: 100 % (07/21/23 1041) Vital Signs (24h Range):  Temp:  [98.1 °F (36.7 °C)] 98.1 °F (36.7 °C)  Pulse:  [49-55] 55  Resp:  [20-21] 20  SpO2:  [100 %] 100 %  BP: (154)/(73) 154/73     Weight: 98 kg (216 lb)  Body mass index is 28.5 kg/m².      Intake/Output Summary (Last 24 hours) at 7/21/2023 1101  Last data filed at 7/21/2023 1038  Gross per 24 hour   Intake 1810 ml   Output 200 ml   Net 1610 ml          Physical Exam  Constitutional:       Appearance: Normal appearance.      Interventions: He is sedated and intubated.   HENT:      Head: Normocephalic and atraumatic.   Eyes:      Pupils: Pupils are equal, round, and reactive to light.   Cardiovascular:      Rate and Rhythm: Normal rate and regular rhythm.      Comments: Midline sternotomy  CT in place  Left subclavian CVC  Left radial a line  Pulmonary:      Effort: He is intubated.   Abdominal:      General: Abdomen is flat.      Palpations: Abdomen is soft.   Musculoskeletal:         General: Normal range of motion.      Cervical back: Normal range of motion and neck supple.   Skin:     General: Skin is warm.   Neurological:      Comments: Sedated            Vents:  Vent Mode: A/C (07/21/23 1041)  Set Rate: 18 BPM (07/21/23 1041)  Vt Set: 480 mL (07/21/23 1041)  PEEP/CPAP: 5 cmH20 (07/21/23 1041)  Oxygen Concentration (%): 50 (07/21/23 1041)  Peak Airway Pressure: 21 cmH20 (07/21/23 1041)  Plateau Pressure: 0 cmH20 (07/21/23 1041)  Total Ve: 9.1 L/m (07/21/23 1041)  Negative Inspiratory Force (cm H2O): 0 (07/21/23 1041)  F/VT Ratio<105 (RSBI): (!) 41.67 (07/21/23 1041)    Lines/Drains/Airways       Central Venous Catheter Line  Duration                  Percutaneous Central Line Insertion/Assessment - Quad lumen  07/21/23 0801 Subclavian Left <1 day    Percutaneous Central Line Insertion/Assessment - Quad Lumen  07/21/23  0801 Subclavian Left <1 day              Drain  Duration                  Chest Tube 07/21/23 Tube - 3 Left Pleural 19 Fr. <1 day         Urethral Catheter 07/21/23 0720 Temperature probe;Non-latex 14 Fr. <1 day         Y Chest Tube 1 and 2 07/21/23 1 Anterior Mediastinal 19 Fr. 2 Anterior Mediastinal 19 Fr. <1 day              Airway  Duration                  Airway - Non-Surgical 07/21/23 0711 <1 day              Arterial Line  Duration             Arterial Line 07/21/23 0801 Left Radial <1 day              Peripheral Intravenous Line  Duration                  Peripheral IV - Single Lumen 07/21/23 0632 18 G Anterior;Left Forearm <1 day                    Significant Labs:    CBC/Anemia Profile:  Recent Labs   Lab 07/21/23  1034   HCT 33*        Chemistries:  No results for input(s): NA, K, CL, CO2, BUN, CREATININE, CALCIUM, ALBUMIN, PROT, BILITOT, ALKPHOS, ALT, AST, GLUCOSE, MG, PHOS in the last 48 hours.    All pertinent labs within the past 24 hours have been reviewed.    Significant Imaging:  I have reviewed all pertinent imaging results/findings within the past 24 hours.    Assessment/Plan:     Cardiac/Vascular  Coronary artery disease of native artery of native heart with stable angina pectoris  Mr Redding 67 year-old male former smoker (1ppd x 25 years, quit 25 years ago) with a history of coronary artery disease s/p LCx+OM stents (2022), carotid stenosis, stage 4 chronic kidney disease, hypertension, and diabetes (HbA1C 6.7) now sp single vessel off pump CABG. Weaning to extubate, off of pressors.      Neuro/Psych:     - Sedation: propofol, precedex    - Pain:    - Scheduled Tylenol 1g q8h   - Fentanyl PRN while intubated, Oxy PRN             Cardiac:     - S/P off pump CABG x1  with Dr. Uriostegui on 7/21    - BP Goal: SBP <150, MAP >70    - Cleviprex PRN    - Pressors: levo 0.06, wean as able    - Anti-HTNs: Will start when appropriate    - Rhythm: NSR    - Beta blocker: Will start when appropriate    -  Statin: Atorvastatin 40 mg QD      Pulmonary:     - Goal SpO2 >92%    - Will wean ventilator support as tolerated to extubate    - Chest Tubes x 2 (1 Med & 1 Pleural)    - ABGs q1 for 6 hrs, then q3 x3      Renal:    - Trend BUN/Cr     - Maintain Bowman, record strict Is/Os    Recent Labs   Lab 07/19/23  0805   BUN 35*   CREATININE 2.2*         FEN / GI:     - Daily CMP, PRN K/Mag/Phos per protocol     - Replace electrolytes as needed    - Nutrition: NPO pending extubation    - Bowel Regimen: Miralax, docusate      ID:     - Afebrile    - WBC stable    - Abx: Complete perioperative cefazolin 2g Q8H x 5 doses    Recent Labs   Lab 07/19/23  0805   WBC 4.66         Heme/Onc:     - Hgb 13.4 pre-operatively    - CBC daily    - ASA 81mg , plavix 75 daily    Recent Labs   Lab 07/19/23  0805   HGB 13.4*      APTT 27.6   INR 1.0         Endocrine:     - CTS Goal -140    - HgbA1c: 6.7    - Endocrinology consulted for insulin management      PPx:   Feeding: NPO pending extubation   Analgesia/Sedation: propofol, precedex while intubated  Thromboembolic Prevention: subcu heparin beginning tomorrow pm  HOB >30: Yes  Stress Ulcer: famotidine   Glucose Control: Yes, insulin management per Endocrinology  Bowel reg: miralax, docusate  Invasive Lines/Drains/Airway:    ETT   Left radial arterial line   Left subclavian CVC   Bowman   Chest Tubes: 2 (1 Mediastinal, 1 Pleural)      Deescalation: wean to extubate     Dispo/Code Status/Palliative:     - Continue SICU Care    - Full Code         Critical care was time spent personally by me on the following activities: development of treatment plan with patient or surrogate and bedside caregivers, discussions with consultants, evaluation of patient's response to treatment, examination of patient, ordering and performing treatments and interventions, ordering and review of laboratory studies, ordering and review of radiographic studies, pulse oximetry, re-evaluation of patient's  condition.  This critical care time did not overlap with that of any other provider or involve time for any procedures.     Indu Nieves MD  Critical Care - Surgery  [facility]

## 2023-07-21 NOTE — NURSING
"      SICU PLAN OF CARE NOTE    Dx: Coronary artery disease of native artery of native heart with stable angina pectoris    Shift Events: Admitted to SICU; weaned ventilator and extubated to 5l NC per MD order. 1.5L Albumin    Goals of Care: MAP > 70 SBP < 150    Neuro: AAO x4    Vital Signs: /66 (BP Location: Right arm, Patient Position: Lying)   Pulse 79   Temp 98.9 °F (37.2 °C) (Oral)   Resp 15   Ht 6' 1" (1.854 m)   Wt 98.2 kg (216 lb 8.9 oz)   SpO2 96%   BMI 28.57 kg/m²     Respiratory: Nasal Cannula    Diet: NPO and Sips with Meds    Gtts: N/A    Urine Output: Urinary Catheter 100-125 cc/hour    Drains: Chest Tube, total output 20-40 cc /  hour       Labs/Accuchecks: Hourly accu; Insulin gtt not initiated per order.    Skin: No new skin breakdown noted. Pt repositioned q2h and PRN. Protective foams intact.     Nurses Note -- 4 Eyes      7/21/2023   6:09 PM      Skin assessed during: Admit      [x] No Altered Skin Integrity Present    [x]Prevention Measures Documented      [] Yes- Altered Skin Integrity Present or Discovered   [] LDA Added if Not in Epic (Describe Wound)   [] New Altered Skin Integrity was Present on Admit and Documented in LDA   [] Wound Image Taken    Wound Care Consulted? No    Attending Nurse:  Praveen Edouard RN     Second RN/Staff Member:  Praveen Kidd RN        " pt DTV by 3PM, has not voided yet, denies any pressure to void

## 2023-07-21 NOTE — HPI
Reason for Consult: Management of T2DM, Hyperglycemia     Surgical Procedure and Date: s/p CABG on 7/21/2023    Diabetes diagnosis year: 2006    Home Diabetes Medications:  Farxiga 10 mg; Lantus 12 units BID; and Ozempic 0.5 mg weekly.     How often checking glucose at home? >4 x day   BG readings on regimen: 's  Hypoglycemia on the regimen?  No  Missed doses on regimen?  No    Diabetes Complications include:     Hyperglycemia    Complicating diabetes co morbidities:   CTS      HPI: Addy Redding is a 67 y.o. male with a PMHx of former smoker (1ppd x 25 years, quit 25 years ago) with a history of coronary artery disease s/p LCx+OM stents (2022), carotid stenosis, stage 4 chronic kidney disease, hypertension, and diabetes (HbA1C 6.7). He presents to the SICU s/p Single vessel off pump coronary artery bypass grafting (OPCAB)-Left internal mammary artery to the distal left anterior descending artery with Dr. Uriostegui. Endocrine consulted to manage hyperglycemia and type 2 diabetes.     Lab Results   Component Value Date    HGBA1C 6.7 (H) 03/14/2023

## 2023-07-21 NOTE — ASSESSMENT & PLAN NOTE
Home antihypertensives include amlodipine, cardura, olmesartan. Will hold in immediate post-operative setting and restart when clinically appropriate.    -- SBP goal < 140  -- PRN cleviprex

## 2023-07-21 NOTE — ASSESSMENT & PLAN NOTE
Baseline sCr ~1.8-2.4.    -- renally dose medications, avoid nephrotoxic agents  -- MAP >65  -- trend sCr, BUN

## 2023-07-21 NOTE — SUBJECTIVE & OBJECTIVE
Follow-up For: Procedure(s) (LRB):  CABG, WITHOUT CARDIOPULMONARY PUMP OXYGENATION (N/A)    Post-Operative Day: Day of Surgery    Objective:     Vital Signs (Most Recent):  Temp: 98.1 °F (36.7 °C) (07/21/23 0545)  Pulse: (!) 55 (07/21/23 1041)  Resp: 20 (07/21/23 1059)  BP: (!) 154/73 (07/21/23 0545)  SpO2: 100 % (07/21/23 1041) Vital Signs (24h Range):  Temp:  [98.1 °F (36.7 °C)] 98.1 °F (36.7 °C)  Pulse:  [49-55] 55  Resp:  [20-21] 20  SpO2:  [100 %] 100 %  BP: (154)/(73) 154/73     Weight: 98 kg (216 lb)  Body mass index is 28.5 kg/m².      Intake/Output Summary (Last 24 hours) at 7/21/2023 1101  Last data filed at 7/21/2023 1038  Gross per 24 hour   Intake 1810 ml   Output 200 ml   Net 1610 ml          Physical Exam  Constitutional:       Appearance: Normal appearance.      Interventions: He is sedated and intubated.   HENT:      Head: Normocephalic and atraumatic.   Eyes:      Pupils: Pupils are equal, round, and reactive to light.   Cardiovascular:      Rate and Rhythm: Normal rate and regular rhythm.      Comments: Midline sternotomy  CT in place  Left subclavian CVC  Left radial a line  Pulmonary:      Effort: He is intubated.   Abdominal:      General: Abdomen is flat.      Palpations: Abdomen is soft.   Musculoskeletal:         General: Normal range of motion.      Cervical back: Normal range of motion and neck supple.   Skin:     General: Skin is warm.   Neurological:      Comments: Sedated            Vents:  Vent Mode: A/C (07/21/23 1041)  Set Rate: 18 BPM (07/21/23 1041)  Vt Set: 480 mL (07/21/23 1041)  PEEP/CPAP: 5 cmH20 (07/21/23 1041)  Oxygen Concentration (%): 50 (07/21/23 1041)  Peak Airway Pressure: 21 cmH20 (07/21/23 1041)  Plateau Pressure: 0 cmH20 (07/21/23 1041)  Total Ve: 9.1 L/m (07/21/23 1041)  Negative Inspiratory Force (cm H2O): 0 (07/21/23 1041)  F/VT Ratio<105 (RSBI): (!) 41.67 (07/21/23 1041)    Lines/Drains/Airways       Central Venous Catheter Line  Duration                   Percutaneous Central Line Insertion/Assessment - Quad lumen  07/21/23 0801 Subclavian Left <1 day    Percutaneous Central Line Insertion/Assessment - Quad Lumen  07/21/23 0801 Subclavian Left <1 day              Drain  Duration                  Chest Tube 07/21/23 Tube - 3 Left Pleural 19 Fr. <1 day         Urethral Catheter 07/21/23 0720 Temperature probe;Non-latex 14 Fr. <1 day         Y Chest Tube 1 and 2 07/21/23 1 Anterior Mediastinal 19 Fr. 2 Anterior Mediastinal 19 Fr. <1 day              Airway  Duration                  Airway - Non-Surgical 07/21/23 0711 <1 day              Arterial Line  Duration             Arterial Line 07/21/23 0801 Left Radial <1 day              Peripheral Intravenous Line  Duration                  Peripheral IV - Single Lumen 07/21/23 0632 18 G Anterior;Left Forearm <1 day                    Significant Labs:    CBC/Anemia Profile:  Recent Labs   Lab 07/21/23  1034   HCT 33*        Chemistries:  No results for input(s): NA, K, CL, CO2, BUN, CREATININE, CALCIUM, ALBUMIN, PROT, BILITOT, ALKPHOS, ALT, AST, GLUCOSE, MG, PHOS in the last 48 hours.    All pertinent labs within the past 24 hours have been reviewed.    Significant Imaging:  I have reviewed all pertinent imaging results/findings within the past 24 hours.

## 2023-07-22 LAB
ALBUMIN SERPL BCP-MCNC: 3.7 G/DL (ref 3.5–5.2)
ALBUMIN SERPL BCP-MCNC: 4.1 G/DL (ref 3.5–5.2)
ALLENS TEST: ABNORMAL
ALLENS TEST: NORMAL
ALLENS TEST: NORMAL
ALP SERPL-CCNC: 37 U/L (ref 55–135)
ALP SERPL-CCNC: 40 U/L (ref 55–135)
ALT SERPL W/O P-5'-P-CCNC: 7 U/L (ref 10–44)
ALT SERPL W/O P-5'-P-CCNC: 9 U/L (ref 10–44)
ANION GAP SERPL CALC-SCNC: 10 MMOL/L (ref 8–16)
ANION GAP SERPL CALC-SCNC: 10 MMOL/L (ref 8–16)
APTT PPP: 30.6 SEC (ref 21–32)
AST SERPL-CCNC: 19 U/L (ref 10–40)
AST SERPL-CCNC: 26 U/L (ref 10–40)
BASOPHILS # BLD AUTO: 0.02 K/UL (ref 0–0.2)
BASOPHILS NFR BLD: 0.2 % (ref 0–1.9)
BILIRUB SERPL-MCNC: 0.4 MG/DL (ref 0.1–1)
BILIRUB SERPL-MCNC: 0.6 MG/DL (ref 0.1–1)
BUN SERPL-MCNC: 27 MG/DL (ref 8–23)
BUN SERPL-MCNC: 29 MG/DL (ref 8–23)
CALCIUM SERPL-MCNC: 8.1 MG/DL (ref 8.7–10.5)
CALCIUM SERPL-MCNC: 8.4 MG/DL (ref 8.7–10.5)
CHLORIDE SERPL-SCNC: 109 MMOL/L (ref 95–110)
CHLORIDE SERPL-SCNC: 111 MMOL/L (ref 95–110)
CO2 SERPL-SCNC: 17 MMOL/L (ref 23–29)
CO2 SERPL-SCNC: 18 MMOL/L (ref 23–29)
CREAT SERPL-MCNC: 1.9 MG/DL (ref 0.5–1.4)
CREAT SERPL-MCNC: 2.1 MG/DL (ref 0.5–1.4)
DELSYS: ABNORMAL
DELSYS: NORMAL
DIFFERENTIAL METHOD: ABNORMAL
EOSINOPHIL # BLD AUTO: 0 K/UL (ref 0–0.5)
EOSINOPHIL NFR BLD: 0.1 % (ref 0–8)
ERYTHROCYTE [DISTWIDTH] IN BLOOD BY AUTOMATED COUNT: 14.3 % (ref 11.5–14.5)
EST. GFR  (NO RACE VARIABLE): 33.4 ML/MIN/1.73 M^2
EST. GFR  (NO RACE VARIABLE): 37.7 ML/MIN/1.73 M^2
FLOW: 3
GLUCOSE SERPL-MCNC: 106 MG/DL (ref 70–110)
GLUCOSE SERPL-MCNC: 155 MG/DL (ref 70–110)
HCO3 UR-SCNC: 18.7 MMOL/L (ref 24–28)
HCO3 UR-SCNC: 19.3 MMOL/L (ref 24–28)
HCO3 UR-SCNC: 19.5 MMOL/L (ref 24–28)
HCO3 UR-SCNC: 20.8 MMOL/L (ref 24–28)
HCT VFR BLD AUTO: 28 % (ref 40–54)
HCT VFR BLD CALC: 29 %PCV (ref 36–54)
HCT VFR BLD CALC: 30 %PCV (ref 36–54)
HCT VFR BLD CALC: 31 %PCV (ref 36–54)
HCT VFR BLD CALC: 31 %PCV (ref 36–54)
HGB BLD-MCNC: 9.3 G/DL (ref 14–18)
IMM GRANULOCYTES # BLD AUTO: 0.03 K/UL (ref 0–0.04)
IMM GRANULOCYTES NFR BLD AUTO: 0.3 % (ref 0–0.5)
INR PPP: 1.1 (ref 0.8–1.2)
LDH SERPL L TO P-CCNC: 0.73 MMOL/L (ref 0.36–1.25)
LDH SERPL L TO P-CCNC: 1.01 MMOL/L (ref 0.36–1.25)
LDH SERPL L TO P-CCNC: 1.29 MMOL/L (ref 0.36–1.25)
LDH SERPL L TO P-CCNC: 1.73 MMOL/L (ref 0.36–1.25)
LYMPHOCYTES # BLD AUTO: 0.8 K/UL (ref 1–4.8)
LYMPHOCYTES NFR BLD: 9.5 % (ref 18–48)
MAGNESIUM SERPL-MCNC: 2.3 MG/DL (ref 1.6–2.6)
MAGNESIUM SERPL-MCNC: 2.4 MG/DL (ref 1.6–2.6)
MCH RBC QN AUTO: 29 PG (ref 27–31)
MCHC RBC AUTO-ENTMCNC: 33.2 G/DL (ref 32–36)
MCV RBC AUTO: 87 FL (ref 82–98)
MODE: ABNORMAL
MODE: NORMAL
MONOCYTES # BLD AUTO: 1.1 K/UL (ref 0.3–1)
MONOCYTES NFR BLD: 12.7 % (ref 4–15)
NEUTROPHILS # BLD AUTO: 6.7 K/UL (ref 1.8–7.7)
NEUTROPHILS NFR BLD: 77.2 % (ref 38–73)
NRBC BLD-RTO: 0 /100 WBC
PCO2 BLDA: 35.6 MMHG (ref 35–45)
PCO2 BLDA: 36 MMHG (ref 35–45)
PCO2 BLDA: 37.9 MMHG (ref 35–45)
PCO2 BLDA: 41.4 MMHG (ref 35–45)
PH SMN: 7.31 [PH] (ref 7.35–7.45)
PH SMN: 7.31 [PH] (ref 7.35–7.45)
PH SMN: 7.32 [PH] (ref 7.35–7.45)
PH SMN: 7.35 [PH] (ref 7.35–7.45)
PHOSPHATE SERPL-MCNC: 2.3 MG/DL (ref 2.7–4.5)
PLATELET # BLD AUTO: 170 K/UL (ref 150–450)
PMV BLD AUTO: 10.9 FL (ref 9.2–12.9)
PO2 BLDA: 104 MMHG (ref 80–100)
PO2 BLDA: 68 MMHG (ref 80–100)
PO2 BLDA: 69 MMHG (ref 80–100)
PO2 BLDA: 79 MMHG (ref 80–100)
POC BE: -6 MMOL/L
POC BE: -6 MMOL/L
POC BE: -7 MMOL/L
POC BE: -7 MMOL/L
POC IONIZED CALCIUM: 1.2 MMOL/L (ref 1.06–1.42)
POC IONIZED CALCIUM: 1.21 MMOL/L (ref 1.06–1.42)
POC IONIZED CALCIUM: 1.21 MMOL/L (ref 1.06–1.42)
POC IONIZED CALCIUM: 1.22 MMOL/L (ref 1.06–1.42)
POC SATURATED O2: 92 % (ref 95–100)
POC SATURATED O2: 93 % (ref 95–100)
POC SATURATED O2: 94 % (ref 95–100)
POC SATURATED O2: 97 % (ref 95–100)
POC TCO2: 20 MMOL/L (ref 23–27)
POC TCO2: 20 MMOL/L (ref 23–27)
POC TCO2: 21 MMOL/L (ref 23–27)
POC TCO2: 22 MMOL/L (ref 23–27)
POCT GLUCOSE: 100 MG/DL (ref 70–110)
POCT GLUCOSE: 115 MG/DL (ref 70–110)
POCT GLUCOSE: 115 MG/DL (ref 70–110)
POCT GLUCOSE: 122 MG/DL (ref 70–110)
POCT GLUCOSE: 124 MG/DL (ref 70–110)
POCT GLUCOSE: 128 MG/DL (ref 70–110)
POCT GLUCOSE: 136 MG/DL (ref 70–110)
POCT GLUCOSE: 139 MG/DL (ref 70–110)
POCT GLUCOSE: 145 MG/DL (ref 70–110)
POCT GLUCOSE: 94 MG/DL (ref 70–110)
POTASSIUM BLD-SCNC: 4.5 MMOL/L (ref 3.5–5.1)
POTASSIUM BLD-SCNC: 4.7 MMOL/L (ref 3.5–5.1)
POTASSIUM BLD-SCNC: 4.7 MMOL/L (ref 3.5–5.1)
POTASSIUM BLD-SCNC: 4.8 MMOL/L (ref 3.5–5.1)
POTASSIUM SERPL-SCNC: 4.5 MMOL/L (ref 3.5–5.1)
POTASSIUM SERPL-SCNC: 4.5 MMOL/L (ref 3.5–5.1)
POTASSIUM SERPL-SCNC: 4.7 MMOL/L (ref 3.5–5.1)
POTASSIUM SERPL-SCNC: 4.9 MMOL/L (ref 3.5–5.1)
PROT SERPL-MCNC: 6.2 G/DL (ref 6–8.4)
PROT SERPL-MCNC: 6.3 G/DL (ref 6–8.4)
PROTHROMBIN TIME: 12 SEC (ref 9–12.5)
RBC # BLD AUTO: 3.21 M/UL (ref 4.6–6.2)
SAMPLE: ABNORMAL
SAMPLE: NORMAL
SAMPLE: NORMAL
SITE: ABNORMAL
SITE: NORMAL
SITE: NORMAL
SODIUM BLD-SCNC: 139 MMOL/L (ref 136–145)
SODIUM BLD-SCNC: 139 MMOL/L (ref 136–145)
SODIUM BLD-SCNC: 140 MMOL/L (ref 136–145)
SODIUM BLD-SCNC: 140 MMOL/L (ref 136–145)
SODIUM SERPL-SCNC: 137 MMOL/L (ref 136–145)
SODIUM SERPL-SCNC: 138 MMOL/L (ref 136–145)
SP02: 100
SP02: 100
SP02: 95
SP02: 95
SP02: 97
SP02: 97
WBC # BLD AUTO: 8.71 K/UL (ref 3.9–12.7)

## 2023-07-22 PROCEDURE — 97535 SELF CARE MNGMENT TRAINING: CPT

## 2023-07-22 PROCEDURE — 93005 ELECTROCARDIOGRAM TRACING: CPT

## 2023-07-22 PROCEDURE — 82330 ASSAY OF CALCIUM: CPT

## 2023-07-22 PROCEDURE — 25000003 PHARM REV CODE 250

## 2023-07-22 PROCEDURE — 25000003 PHARM REV CODE 250: Performed by: NURSE PRACTITIONER

## 2023-07-22 PROCEDURE — 85347 COAGULATION TIME ACTIVATED: CPT

## 2023-07-22 PROCEDURE — 84295 ASSAY OF SERUM SODIUM: CPT

## 2023-07-22 PROCEDURE — 84132 ASSAY OF SERUM POTASSIUM: CPT | Performed by: NURSE PRACTITIONER

## 2023-07-22 PROCEDURE — 27000221 HC OXYGEN, UP TO 24 HOURS

## 2023-07-22 PROCEDURE — 37799 UNLISTED PX VASCULAR SURGERY: CPT

## 2023-07-22 PROCEDURE — 80053 COMPREHEN METABOLIC PANEL: CPT | Mod: 91 | Performed by: THORACIC SURGERY (CARDIOTHORACIC VASCULAR SURGERY)

## 2023-07-22 PROCEDURE — 85014 HEMATOCRIT: CPT

## 2023-07-22 PROCEDURE — 82800 BLOOD PH: CPT

## 2023-07-22 PROCEDURE — 82565 ASSAY OF CREATININE: CPT

## 2023-07-22 PROCEDURE — 97165 OT EVAL LOW COMPLEX 30 MIN: CPT

## 2023-07-22 PROCEDURE — 20000000 HC ICU ROOM

## 2023-07-22 PROCEDURE — 85730 THROMBOPLASTIN TIME PARTIAL: CPT | Performed by: NURSE PRACTITIONER

## 2023-07-22 PROCEDURE — 94761 N-INVAS EAR/PLS OXIMETRY MLT: CPT

## 2023-07-22 PROCEDURE — 83605 ASSAY OF LACTIC ACID: CPT

## 2023-07-22 PROCEDURE — 83735 ASSAY OF MAGNESIUM: CPT | Mod: 91 | Performed by: THORACIC SURGERY (CARDIOTHORACIC VASCULAR SURGERY)

## 2023-07-22 PROCEDURE — 97161 PT EVAL LOW COMPLEX 20 MIN: CPT

## 2023-07-22 PROCEDURE — 99232 PR SUBSEQUENT HOSPITAL CARE,LEVL II: ICD-10-PCS | Mod: ,,, | Performed by: NURSE PRACTITIONER

## 2023-07-22 PROCEDURE — 63600175 PHARM REV CODE 636 W HCPCS: Performed by: NURSE PRACTITIONER

## 2023-07-22 PROCEDURE — 82803 BLOOD GASES ANY COMBINATION: CPT

## 2023-07-22 PROCEDURE — 63600175 PHARM REV CODE 636 W HCPCS

## 2023-07-22 PROCEDURE — 99232 SBSQ HOSP IP/OBS MODERATE 35: CPT | Mod: ,,, | Performed by: NURSE PRACTITIONER

## 2023-07-22 PROCEDURE — 85610 PROTHROMBIN TIME: CPT | Performed by: NURSE PRACTITIONER

## 2023-07-22 PROCEDURE — 99291 PR CRITICAL CARE, E/M 30-74 MINUTES: ICD-10-PCS | Mod: ,,, | Performed by: ANESTHESIOLOGY

## 2023-07-22 PROCEDURE — 97116 GAIT TRAINING THERAPY: CPT

## 2023-07-22 PROCEDURE — 83735 ASSAY OF MAGNESIUM: CPT | Performed by: NURSE PRACTITIONER

## 2023-07-22 PROCEDURE — 93010 EKG 12-LEAD: ICD-10-PCS | Mod: ,,, | Performed by: INTERNAL MEDICINE

## 2023-07-22 PROCEDURE — 84132 ASSAY OF SERUM POTASSIUM: CPT

## 2023-07-22 PROCEDURE — 93010 ELECTROCARDIOGRAM REPORT: CPT | Mod: ,,, | Performed by: INTERNAL MEDICINE

## 2023-07-22 PROCEDURE — 99291 CRITICAL CARE FIRST HOUR: CPT | Mod: ,,, | Performed by: ANESTHESIOLOGY

## 2023-07-22 PROCEDURE — 25000242 PHARM REV CODE 250 ALT 637 W/ HCPCS: Performed by: NURSE PRACTITIONER

## 2023-07-22 PROCEDURE — 99900035 HC TECH TIME PER 15 MIN (STAT)

## 2023-07-22 PROCEDURE — 25000003 PHARM REV CODE 250: Performed by: THORACIC SURGERY (CARDIOTHORACIC VASCULAR SURGERY)

## 2023-07-22 PROCEDURE — 80053 COMPREHEN METABOLIC PANEL: CPT | Performed by: NURSE PRACTITIONER

## 2023-07-22 PROCEDURE — 63600175 PHARM REV CODE 636 W HCPCS: Performed by: STUDENT IN AN ORGANIZED HEALTH CARE EDUCATION/TRAINING PROGRAM

## 2023-07-22 PROCEDURE — 85025 COMPLETE CBC W/AUTO DIFF WBC: CPT | Performed by: NURSE PRACTITIONER

## 2023-07-22 PROCEDURE — 84100 ASSAY OF PHOSPHORUS: CPT | Performed by: THORACIC SURGERY (CARDIOTHORACIC VASCULAR SURGERY)

## 2023-07-22 PROCEDURE — 94640 AIRWAY INHALATION TREATMENT: CPT

## 2023-07-22 RX ORDER — SODIUM,POTASSIUM PHOSPHATES 280-250MG
2 POWDER IN PACKET (EA) ORAL EVERY 4 HOURS
Status: COMPLETED | OUTPATIENT
Start: 2023-07-22 | End: 2023-07-23

## 2023-07-22 RX ORDER — IBUPROFEN 200 MG
24 TABLET ORAL
Status: DISCONTINUED | OUTPATIENT
Start: 2023-07-22 | End: 2023-07-22

## 2023-07-22 RX ORDER — INSULIN ASPART 100 [IU]/ML
1-10 INJECTION, SOLUTION INTRAVENOUS; SUBCUTANEOUS
Status: DISCONTINUED | OUTPATIENT
Start: 2023-07-22 | End: 2023-07-26 | Stop reason: HOSPADM

## 2023-07-22 RX ORDER — GLUCAGON 1 MG
1 KIT INJECTION
Status: DISCONTINUED | OUTPATIENT
Start: 2023-07-22 | End: 2023-07-26 | Stop reason: HOSPADM

## 2023-07-22 RX ORDER — IBUPROFEN 200 MG
16 TABLET ORAL
Status: DISCONTINUED | OUTPATIENT
Start: 2023-07-22 | End: 2023-07-22

## 2023-07-22 RX ORDER — FUROSEMIDE 10 MG/ML
20 INJECTION INTRAMUSCULAR; INTRAVENOUS ONCE
Status: COMPLETED | OUTPATIENT
Start: 2023-07-22 | End: 2023-07-22

## 2023-07-22 RX ORDER — METOPROLOL SUCCINATE 50 MG/1
50 TABLET, EXTENDED RELEASE ORAL DAILY
Status: DISCONTINUED | OUTPATIENT
Start: 2023-07-22 | End: 2023-07-23

## 2023-07-22 RX ORDER — DEXTROSE 40 %
16 GEL (GRAM) ORAL
Status: DISCONTINUED | OUTPATIENT
Start: 2023-07-22 | End: 2023-07-26 | Stop reason: HOSPADM

## 2023-07-22 RX ORDER — DEXTROSE 40 %
24 GEL (GRAM) ORAL
Status: DISCONTINUED | OUTPATIENT
Start: 2023-07-22 | End: 2023-07-26 | Stop reason: HOSPADM

## 2023-07-22 RX ADMIN — OXYCODONE HYDROCHLORIDE 10 MG: 10 TABLET ORAL at 05:07

## 2023-07-22 RX ADMIN — METOPROLOL SUCCINATE 50 MG: 50 TABLET, EXTENDED RELEASE ORAL at 08:07

## 2023-07-22 RX ADMIN — CLOPIDOGREL BISULFATE 75 MG: 75 TABLET ORAL at 08:07

## 2023-07-22 RX ADMIN — FUROSEMIDE 20 MG: 10 INJECTION, SOLUTION INTRAMUSCULAR; INTRAVENOUS at 09:07

## 2023-07-22 RX ADMIN — MUPIROCIN 1 G: 20 OINTMENT TOPICAL at 08:07

## 2023-07-22 RX ADMIN — ACETAMINOPHEN 1000 MG: 500 TABLET ORAL at 09:07

## 2023-07-22 RX ADMIN — IPRATROPIUM BROMIDE AND ALBUTEROL SULFATE 3 ML: .5; 3 SOLUTION RESPIRATORY (INHALATION) at 03:07

## 2023-07-22 RX ADMIN — HYDROMORPHONE HYDROCHLORIDE 1 MG: 1 INJECTION, SOLUTION INTRAMUSCULAR; INTRAVENOUS; SUBCUTANEOUS at 06:07

## 2023-07-22 RX ADMIN — DOCUSATE SODIUM 100 MG: 100 CAPSULE, LIQUID FILLED ORAL at 08:07

## 2023-07-22 RX ADMIN — HEPARIN SODIUM 5000 UNITS: 5000 INJECTION INTRAVENOUS; SUBCUTANEOUS at 09:07

## 2023-07-22 RX ADMIN — ASPIRIN 81 MG 81 MG: 81 TABLET ORAL at 08:07

## 2023-07-22 RX ADMIN — POTASSIUM & SODIUM PHOSPHATES POWDER PACK 280-160-250 MG 2 PACKET: 280-160-250 PACK at 11:07

## 2023-07-22 RX ADMIN — CEFAZOLIN 2 G: 2 INJECTION, POWDER, FOR SOLUTION INTRAMUSCULAR; INTRAVENOUS at 11:07

## 2023-07-22 RX ADMIN — POLYETHYLENE GLYCOL 3350 17 G: 17 POWDER, FOR SOLUTION ORAL at 08:07

## 2023-07-22 RX ADMIN — OXYCODONE HYDROCHLORIDE 5 MG: 5 TABLET ORAL at 12:07

## 2023-07-22 RX ADMIN — PANTOPRAZOLE SODIUM 40 MG: 40 TABLET, DELAYED RELEASE ORAL at 08:07

## 2023-07-22 RX ADMIN — HEPARIN SODIUM 5000 UNITS: 5000 INJECTION INTRAVENOUS; SUBCUTANEOUS at 11:07

## 2023-07-22 RX ADMIN — CEFAZOLIN 2 G: 2 INJECTION, POWDER, FOR SOLUTION INTRAMUSCULAR; INTRAVENOUS at 04:07

## 2023-07-22 RX ADMIN — ACETAMINOPHEN 1000 MG: 500 TABLET ORAL at 01:07

## 2023-07-22 RX ADMIN — IPRATROPIUM BROMIDE AND ALBUTEROL SULFATE 3 ML: .5; 3 SOLUTION RESPIRATORY (INHALATION) at 07:07

## 2023-07-22 RX ADMIN — MUPIROCIN 1 G: 20 OINTMENT TOPICAL at 09:07

## 2023-07-22 RX ADMIN — CEFAZOLIN 2 G: 2 INJECTION, POWDER, FOR SOLUTION INTRAMUSCULAR; INTRAVENOUS at 07:07

## 2023-07-22 RX ADMIN — TAMSULOSIN HYDROCHLORIDE 0.4 MG: 0.4 CAPSULE ORAL at 08:07

## 2023-07-22 RX ADMIN — ATORVASTATIN CALCIUM 80 MG: 40 TABLET, FILM COATED ORAL at 09:07

## 2023-07-22 RX ADMIN — DOCUSATE SODIUM 100 MG: 100 CAPSULE, LIQUID FILLED ORAL at 09:07

## 2023-07-22 NOTE — PLAN OF CARE
Problem: Physical Therapy  Goal: Physical Therapy Goal  Description: Goals to be met by: 23     Patient will increase functional independence with mobility by performin. Supine to sit with Stand-by Assistance  2. Sit to stand transfer with Supervision  3. Gait  x 250 feet with Supervision .     Outcome: Ongoing, Progressing   Evaluation completed and goals appropriate. 2023

## 2023-07-22 NOTE — PROGRESS NOTES
"[facility]  Endocrinology  Progress Note    Admit Date: 2023     Reason for Consult: Management of T2DM, Hyperglycemia     Surgical Procedure and Date: s/p CABG on 2023    Diabetes diagnosis year:     Home Diabetes Medications:  Farxiga 10 mg; Lantus 12 units BID; and Ozempic 0.5 mg weekly.     How often checking glucose at home? >4 x day   BG readings on regimen: 's  Hypoglycemia on the regimen?  No  Missed doses on regimen?  No    Diabetes Complications include:     Hyperglycemia    Complicating diabetes co morbidities:   CTS      HPI: Addy Redding is a 67 y.o. male with a PMHx of former smoker (1ppd x 25 years, quit 25 years ago) with a history of coronary artery disease s/p LCx+OM stents (), carotid stenosis, stage 4 chronic kidney disease, hypertension, and diabetes (HbA1C 6.7). He presents to the SICU s/p Single vessel off pump coronary artery bypass grafting (OPCAB)-Left internal mammary artery to the distal left anterior descending artery with Dr. Uriostegui. Endocrine consulted to manage hyperglycemia and type 2 diabetes.     Lab Results   Component Value Date    HGBA1C 6.7 (H) 2023                 Interval HPI:   Overnight events: No acute events overnight. Patient in room 65268/71503 A. Blood glucose stable. BG at goal on current insulin regimen (IIP). Steroid use- None. 1 Day Post-Op  Renal function- Abnormal - Creatinine 1.9   Vasopressors-  None       Endocrine will continue to follow and manage insulin orders inpatient.         Diet clear liquid Fluid - 1500mL     Eatin%  Nausea: No  Hypoglycemia and intervention: No  Fever: No  TPN and/or TF: No      BP (!) 108/55 (BP Location: Right arm, Patient Position: Sitting)   Pulse 67   Temp 98.9 °F (37.2 °C) (Oral)   Resp 13   Ht 6' 1" (1.854 m)   Wt 98.2 kg (216 lb 8.9 oz)   SpO2 96%   BMI 28.57 kg/m²     Labs Reviewed and Include    Recent Labs   Lab 23  0243      CALCIUM 8.1* "   ALBUMIN 4.1   PROT 6.2      K 4.9   CO2 17*   *   BUN 27*   CREATININE 1.9*   ALKPHOS 37*   ALT 9*   AST 19   BILITOT 0.6     Lab Results   Component Value Date    WBC 8.71 07/22/2023    HGB 9.3 (L) 07/22/2023    HCT 30 (L) 07/22/2023    MCV 87 07/22/2023     07/22/2023     No results for input(s): TSH, FREET4 in the last 168 hours.  Lab Results   Component Value Date    HGBA1C 6.7 (H) 03/14/2023       Nutritional status:   Body mass index is 28.57 kg/m².  Lab Results   Component Value Date    ALBUMIN 4.1 07/22/2023    ALBUMIN 3.6 07/21/2023    ALBUMIN 3.7 07/19/2023     No results found for: PREALBUMIN    Estimated Creatinine Clearance: 45.3 mL/min (A) (based on SCr of 1.9 mg/dL (H)).    Accu-Checks  Recent Labs     07/21/23  2256 07/21/23  2353 07/22/23  0057 07/22/23  0216 07/22/23  0236 07/22/23  0302 07/22/23  0444 07/22/23  0511 07/22/23  0549 07/22/23  0708   POCTGLUCOSE 133* 132* 100 94 115* 115* 124* 128* 122* 139*       Current Medications and/or Treatments Impacting Glycemic Control  Immunotherapy:    Immunosuppressants       None          Steroids:   Hormones (From admission, onward)      None          Pressors:    Autonomic Drugs (From admission, onward)      None          Hyperglycemia/Diabetes Medications:   Antihyperglycemics (From admission, onward)      Start     Stop Route Frequency Ordered    07/22/23 0853  insulin aspart U-100 pen 1-10 Units         -- SubQ Before meals & nightly PRN 07/22/23 0753            ASSESSMENT and PLAN    Cardiac/Vascular  * Coronary artery disease of native artery of native heart with stable angina pectoris  Managed per primary team  Avoid hypoglycemia  Optimize BG control to improve wound healing        Hypertension associated with diabetes  On an ARB per ADA guidelines.       Dyslipidemia associated with type 2 diabetes mellitus  On statin therapy per ADA guidelines.       Endocrine  Type 2 diabetes mellitus with stage 3a chronic kidney disease,  without long-term current use of insulin  Endocrinology consulted for BG management.   BG goal 110-140 (CTS Protocol)     - D/C IIP  - Novolog (Insulin Aspart) prn for BG excursions MDC SSI (150/25)  - BG checks AC/HS  - Hypoglycemia protocol in place    ** Please notify Endocrine for any change and/or advance in diet**  ** Please call Endocrine for any BG related issues **    Discharge Planning:   TBD. Please notify endocrinology prior to discharge.               Thomas Choudhary, DNP, FNP  Endocrinology  [facility] - Surgical Intensive Care

## 2023-07-22 NOTE — PROGRESS NOTES
[facility]  Critical Care - Surgery  Progress Note    Patient Name: Addy Redding  MRN: [id number]  Admission Date: 7/21/2023  Hospital Length of Stay: 1 days  Code Status: Full Code  Attending Provider: Jass Uriostegui MD  Primary Care Provider: Stephan Ramey MD   Principal Problem: Coronary artery disease of native artery of native heart with stable angina pectoris    Subjective:       Interval History/Significant Events:   POD 1 s/p OPCABG *1  - NAEON  - UOP 40 to 50 per hour  - Pain well controlled  - CT output acceptable    Follow-up For: Procedure(s) (LRB):  CABG, WITHOUT CARDIOPULMONARY PUMP OXYGENATION (N/A)    Post-Operative Day: 1 Day Post-Op    Objective:     Vital Signs (Most Recent):  Temp: 98.9 °F (37.2 °C) (07/22/23 0700)  Pulse: 69 (07/22/23 0700)  Resp: 14 (07/22/23 0700)  BP: (!) 104/57 (07/22/23 0700)  SpO2: 95 % (07/22/23 0700) Vital Signs (24h Range):  Temp:  [97.6 °F (36.4 °C)-100.2 °F (37.9 °C)] 98.9 °F (37.2 °C)  Pulse:  [46-89] 69  Resp:  [8-30] 14  SpO2:  [89 %-100 %] 95 %  BP: (100-132)/(54-68) 104/57  Arterial Line BP: ()/(39-56) 101/40     Weight: 98.2 kg (216 lb 8.9 oz)  Body mass index is 28.57 kg/m².      Intake/Output Summary (Last 24 hours) at 7/22/2023 0720  Last data filed at 7/22/2023 0700  Gross per 24 hour   Intake 3950.24 ml   Output 2285 ml   Net 1665.24 ml          Physical Exam  Constitutional:       Appearance: Normal appearance.   HENT:      Head: Normocephalic and atraumatic.   Eyes:      Pupils: Pupils are equal, round, and reactive to light.   Cardiovascular:      Rate and Rhythm: Normal rate and regular rhythm.      Comments: Midline sternotomy  CT in place  Left subclavian CVC  Left radial a line  Abdominal:      General: Abdomen is flat.      Palpations: Abdomen is soft.   Musculoskeletal:         General: Normal range of motion.      Cervical back: Normal range of motion and neck supple.   Skin:     General: Skin is warm.   Neurological:       General: No focal deficit present.            Vents:  Vent Mode: A/C (07/21/23 1158)  Set Rate: 18 BPM (07/21/23 1158)  Vt Set: 480 mL (07/21/23 1158)  Pressure Support: 10 cmH20 (07/21/23 1158)  PEEP/CPAP: 5 cmH20 (07/21/23 1158)  Oxygen Concentration (%): 40 (07/21/23 1230)  Peak Airway Pressure: 22 cmH20 (07/21/23 1158)  Plateau Pressure: 0 cmH20 (07/21/23 1158)  Total Ve: 9.52 L/m (07/21/23 1158)  Negative Inspiratory Force (cm H2O): 0 (07/21/23 1158)  F/VT Ratio<105 (RSBI): (!) 26.87 (07/21/23 1158)    Lines/Drains/Airways       Central Venous Catheter Line  Duration                  Percutaneous Central Line Insertion/Assessment - Quad lumen  07/21/23 0801 Subclavian Left <1 day    Percutaneous Central Line Insertion/Assessment - Quad Lumen  07/21/23 0801 Subclavian Left <1 day              Drain  Duration                  Chest Tube 07/21/23 Tube - 3 Left Pleural 19 Fr. 1 day         Urethral Catheter 07/21/23 0720 Temperature probe;Non-latex 14 Fr. 1 day         Y Chest Tube 1 and 2 07/21/23 1 Anterior Mediastinal 19 Fr. 2 Anterior Mediastinal 19 Fr. 1 day              Airway  Duration                  Airway - Non-Surgical 07/21/23 0711 1 day              Arterial Line  Duration             Arterial Line 07/21/23 0801 Left Radial <1 day              Peripheral Intravenous Line  Duration                  Peripheral IV - Single Lumen 07/21/23 0632 18 G Anterior;Left Forearm 1 day         Peripheral IV - Single Lumen 07/21/23 1002 16 G Anterior;Right Forearm <1 day                    Significant Labs:    CBC/Anemia Profile:  Recent Labs   Lab 07/21/23  1041 07/21/23  1127 07/21/23  2255 07/22/23  0243 07/22/23  0306   WBC 19.24*  --   --  8.71  --    HGB 10.8*  --   --  9.3*  --    HCT 32.1*   < > 28* 28.0* 29*     --   --  170  --    MCV 87  --   --  87  --    RDW 14.1  --   --  14.3  --     < > = values in this interval not displayed.        Chemistries:  Recent Labs   Lab 07/21/23  4964  07/21/23  1547 07/21/23  1932 07/22/23  0243    138  --  138   K 5.0 4.6 4.9 4.9    112*  --  111*   CO2 17* 20*  --  17*   BUN 33* 27*  --  27*   CREATININE 1.9* 1.6*  --  1.9*   CALCIUM 8.1* 7.8*  --  8.1*   ALBUMIN 3.6  --   --  4.1   PROT 6.0  --   --  6.2   BILITOT 0.4  --   --  0.6   ALKPHOS 44*  --   --  37*   ALT 10  --   --  9*   AST 15  --   --  19   MG 1.8 2.5  --  2.4   PHOS 2.8  2.8 2.9  --   --        None    Significant Imaging:  I have reviewed all pertinent imaging results/findings within the past 24 hours.    Assessment/Plan:     Cardiac/Vascular  * Coronary artery disease of native artery of native heart with stable angina pectoris  Mr Redding 67 year-old male former smoker (1ppd x 25 years, quit 25 years ago) with a history of coronary artery disease s/p LCx+OM stents (2022), carotid stenosis, stage 4 chronic kidney disease, hypertension, and diabetes (HbA1C 6.7) now sp single vessel off pump CABG.       Neuro/Psych:     - Sedation: off    - Pain:    - Scheduled Tylenol 1g q8h   - Dilaudid PRN, Oxy PRN             Cardiac:     - S/P off pump CABG x1  with Dr. Uriostegui on 7/21    - BP Goal: SBP <150, MAP >65    - Cleviprex PRN    - Pressors:off    - Anti-HTNs: Will start when appropriate    - Rhythm: NSR    - Beta blocker:Start metoprolol 50mg daily    - Statin: Atorvastatin 40 mg QD      Pulmonary:     - Goal SpO2 >92%    - Extubated POD0    - Chest Tubes x 2 (1 Med & 1 Pleural)    - ABGs PRN      Renal:     CKD 4    - Trend BUN/Cr     - Maintain Bowman, record strict Is/Os    Recent Labs   Lab 07/19/23  0805   BUN 35*   CREATININE 2.2*         FEN / GI:     - Daily CMP, PRN K/Mag/Phos per protocol     - Replace electrolytes as needed    - Nutrition: Clears     - Bowel Regimen: Miralax, docusate      ID:     - Afebrile    - WBC stable    - Abx: Complete perioperative cefazolin 2g Q8H x 5 doses    Recent Labs   Lab 07/19/23  0805 07/21/23  1041   WBC 4.66 19.24*         Heme/Onc:      - Hgb 13.4 pre-operatively    - CBC daily    - ASA 81mg , plavix 75 daily    Recent Labs   Lab 07/19/23  0805 07/21/23  1041   HGB 13.4* 10.8*    193   APTT 27.6 27.1   INR 1.0 1.2         Endocrine:     - CTS Goal -140    - HgbA1c: 6.7    - Endocrinology consulted for insulin management      PPx:   Feeding: Clears  Analgesia/Sedation: dilaudid PRN, oxy PRN,tylenol   Thromboembolic Prevention: subcu heparin   HOB >30: Yes  Stress Ulcer: pantop  Glucose Control: Yes, insulin management per Endocrinology  Bowel reg: miralax, docusate  Invasive Lines/Drains/Airway:    Left radial arterial line   Left subclavian CVC   Bowman   Chest Tubes: 2 (1 Mediastinal, 1 Pleural)           Dispo/Code Status/Palliative:     - Continue SICU Care    - Full Code      Carotid stenosis  R ICA 40-59%  L ICA 60-70%     -- MAP >65  -- continue asa/statin    Hypertension associated with diabetes  Home antihypertensives include amlodipine, cardura, olmesartan. Will hold in immediate post-operative setting and restart when clinically appropriate.    -- SBP goal < 140  -- PRN cleviprex       Dyslipidemia associated with type 2 diabetes mellitus  Continue statin     Renal/  Chronic kidney disease (CKD), stage IV (severe)  Baseline sCr ~1.8-2.4.    -- renally dose medications, avoid nephrotoxic agents  -- MAP >65  -- trend sCr, BUN      Oncology  Acute blood loss anemia  Hgb prior to surgery 13.4, arrived with hgb 10.8. Expected blood loss in post-operative setting.     -- daily CBC  -- transfuse hgb < 7, symptomatic anemia      Endocrine  Type 2 diabetes mellitus with stage 3a chronic kidney disease, without long-term current use of insulin  A1c 6.7. Home medications oral hypoglycemics. Will hold while inpatient. Endocrinology consulted for insulin management.     -- BG goal 110-140  -- hypoglycemia protocol           Britany Prieto MD  Critical Care - Surgery  [facility]

## 2023-07-22 NOTE — PT/OT/SLP EVAL
Occupational Therapy  Co Evaluation    Name: Addy Redding  MRN: [id number]  Admitting Diagnosis: Coronary artery disease of native artery of native heart with stable angina pectoris  Recent Surgery: Procedure(s) (LRB):  CABG, WITHOUT CARDIOPULMONARY PUMP OXYGENATION (N/A) 1 Day Post-Op    Recommendations:     Discharge Recommendations:   Return home with family support.   Assessment:     Addy Redding is a 67 y.o. male with a medical diagnosis of Coronary artery disease of native artery of native heart with stable angina pectoris.  Performance deficits affecting function: weakness, impaired endurance, impaired self care skills, impaired functional mobility, gait instability, impaired balance, pain.    Pt tolerated session well with good effort and performance. Anticipate pt will progress well and will be ready for d/c home with family support when medically ready   Rehab Prognosis: Good; patient would benefit from acute skilled OT services to address these deficits and reach maximum level of function.       Plan:     Patient to be seen 5 x/week to address the above listed problems via self-care/home management, therapeutic activities  Plan of Care Expires:    Plan of Care Reviewed with: patient    Subjective     Pt agreeable to therapy.     Occupational Profile:  Pt resides with spouse in one story home with no DAVID. Pt was independent prior to arrival. Pt drives and is retired.   Spouse to assist upon d/c.   Pt has no AE/DME.     Pain/Comfort:  Pain Rating 1: 6/10  Location 1: chest  Pain Addressed 1: Reposition, Distraction    Patients cultural, spiritual, Voodoo conflicts given the current situation: no    Objective:     Communicated with: harris prior to session.  Patient found in chair with tele, pulse ox, BP cuff, 3 LPM oxygen via NC, harris, CT and A-line.   Spouse arriving to room during session.   Coeval completed this date to optimize functional performance and safety given impaired tolerance for activity in setting  of ICU.     General Precautions: Standard, fall, sternal      Occupational Performance:    Functional Mobility/Transfers:  Sit>stand with CGA   Pt able to take few steps in room with CGA>     Activities of Daily Living:  Feeding: set-up  G/H: standing with SBA. Pt using mostly right dominant UE for self care with support of left UE on table. Pt tolerated standing approx 6 min with SBA.      Cognitive/Visual Perceptual  Pt awake, alert and following commands. Pt with pleasant mood/affect.     Physical Exam:  Pt demo WFL UE strength/ROM, coordination and session.     AMPAC 6 Click ADL:  AMPAC Total Score: 16    Treatment & Education:  Education provided re: sternal precautions and implications on functional activity.   Education provided re: role of OT and safety with functional mobility/ADL skills.   Nsg indicates mildly labile BP; however, pt did remain within BP parameters during session without c/o of dizziness.       Patient left up in chair with all lines intact, call button in reach, nsg notified, and spouse present    GOALS:   Multidisciplinary Problems       Occupational Therapy Goals          Problem: Occupational Therapy    Goal Priority Disciplines Outcome Interventions   Occupational Therapy Goal     OT, PT/OT Ongoing, Progressing    Description: Goals to be met by: 7 days 7/29/23      Patient will increase functional independence with ADLs by performing:    Pt to complete standing g/h skills with supervision.   Pt to complete t/f bed, chair and commode with supervision.   Pt to complete UE dressing with set-up  Pt to complete LE dressing with SBA  Pt to complete toileting with SBA                        History:     Past Medical History:   Diagnosis Date    CKD (chronic kidney disease) stage 3, GFR 30-59 ml/min     Costochondritis     Diabetic nephropathy associated with type 2 diabetes mellitus     Diabetic retinopathy     ED (erectile dysfunction)     GERD (gastroesophageal reflux disease)      Hyperlipidemia     Hypertension     Prostate cancer     Type II or unspecified type diabetes mellitus with renal manifestations, uncontrolled(250.42)          Past Surgical History:   Procedure Laterality Date    Bone biopsy right femur      CORONARY ANGIOGRAPHY N/A 06/16/2022    Procedure: ANGIOGRAM, CORONARY ARTERY;  Surgeon: Carter Arevalo MD;  Location: [facility] CATH LAB/EP;  Service: Cardiology;  Laterality: N/A;    FRACTIONAL FLOW RESERVE (FFR), CORONARY  6/27/2023    Procedure: Fractional Flow [place] (FFR), Coronary;  Surgeon: Je Fontanez MD;  Location: [facility] CATH LAB/EP;  Service: Cardiology;;    INSTANTANEOUS WAVE-FREE RATIO (IFR) N/A 6/27/2023    Procedure: Instantaneous Wave-Free Ratio (IFR);  Surgeon: Je Fontanez MD;  Location: [facility] CATH LAB/EP;  Service: Cardiology;  Laterality: N/A;    LEFT HEART CATHETERIZATION Left 04/07/2022    Procedure: Left heart cath;  Surgeon: Carter Arevalo MD;  Location: [facility] CATH LAB/EP;  Service: Cardiology;  Laterality: Left;    LEFT HEART CATHETERIZATION Left 6/27/2023    Procedure: Left heart cath;  Surgeon: Je Fontanez MD;  Location: [facility] CATH LAB/EP;  Service: Cardiology;  Laterality: Left;    PROSTATE BIOPSY  10/05/2022    RADIOACTIVE SEED IMPLANTATION OF PROSTATE  1/12/2023    Procedure: INSERTION, RADIOACTIVE SEED, PROSTATE;  Surgeon: Scott Frias MD;  Location: [facility] OR [address] [place] [address];  Service: Urology;;    TRANSRECTAL ULTRASOUND EXAMINATION N/A 1/12/2023    Procedure: ULTRASOUND, RECTAL APPROACH;  Surgeon: Scott Frias MD;  Location: [facility] OR [address] [place] [address];  Service: Urology;  Laterality: N/A;       Time Tracking:     OT Date of Treatment: 07/22/23  OT Start Time: 0755  OT Stop Time: 0812  OT Total Time (min): 17 min    Billable Minutes:Evaluation 9  Self Care/Home Management 8    7/22/2023

## 2023-07-22 NOTE — SUBJECTIVE & OBJECTIVE
"Interval HPI:   Overnight events: No acute events overnight. Patient in room 81376/53025 A. Blood glucose stable. BG at goal on current insulin regimen (IIP). Steroid use- None. 1 Day Post-Op  Renal function- Abnormal - Creatinine 1.9   Vasopressors-  None       Endocrine will continue to follow and manage insulin orders inpatient.         Diet clear liquid Fluid - 1500mL     Eatin%  Nausea: No  Hypoglycemia and intervention: No  Fever: No  TPN and/or TF: No      BP (!) 108/55 (BP Location: Right arm, Patient Position: Sitting)   Pulse 67   Temp 98.9 °F (37.2 °C) (Oral)   Resp 13   Ht 6' 1" (1.854 m)   Wt 98.2 kg (216 lb 8.9 oz)   SpO2 96%   BMI 28.57 kg/m²     Labs Reviewed and Include    Recent Labs   Lab 233      CALCIUM 8.1*   ALBUMIN 4.1   PROT 6.2      K 4.9   CO2 17*   *   BUN 27*   CREATININE 1.9*   ALKPHOS 37*   ALT 9*   AST 19   BILITOT 0.6     Lab Results   Component Value Date    WBC 8.71 2023    HGB 9.3 (L) 2023    HCT 30 (L) 2023    MCV 87 2023     2023     No results for input(s): TSH, FREET4 in the last 168 hours.  Lab Results   Component Value Date    HGBA1C 6.7 (H) 2023       Nutritional status:   Body mass index is 28.57 kg/m².  Lab Results   Component Value Date    ALBUMIN 4.1 2023    ALBUMIN 3.6 2023    ALBUMIN 3.7 2023     No results found for: PREALBUMIN    Estimated Creatinine Clearance: 45.3 mL/min (A) (based on SCr of 1.9 mg/dL (H)).    Accu-Checks  Recent Labs     23  2256 23  2353 23  0057 23  0216 23  0236 23  0302 23  0444 23  0511 23  0549 23  0708   POCTGLUCOSE 133* 132* 100 94 115* 115* 124* 128* 122* 139*       Current Medications and/or Treatments Impacting Glycemic Control  Immunotherapy:    Immunosuppressants       None          Steroids:   Hormones (From admission, onward)      None          Pressors:    Autonomic " Drugs (From admission, onward)      None          Hyperglycemia/Diabetes Medications:   Antihyperglycemics (From admission, onward)      Start     Stop Route Frequency Ordered    07/22/23 0820  insulin aspart U-100 pen 1-10 Units         -- SubQ Before meals & nightly PRN 07/22/23 4438

## 2023-07-22 NOTE — ASSESSMENT & PLAN NOTE
Endocrinology consulted for BG management.   BG goal 110-140 (CTS Protocol)     - D/C IIP  - Novolog (Insulin Aspart) prn for BG excursions [facility] SSI (150/25)  - BG checks AC/HS  - Hypoglycemia protocol in place    ** Please notify Endocrine for any change and/or advance in diet**  ** Please call Endocrine for any BG related issues **    Discharge Planning:   TBD. Please notify endocrinology prior to discharge.

## 2023-07-22 NOTE — PLAN OF CARE
Problem: Occupational Therapy  Goal: Occupational Therapy Goal  Description: Goals to be met by: 7 days 7/29/23      Patient will increase functional independence with ADLs by performing:    Pt to complete standing g/h skills with supervision.   Pt to complete t/f bed, chair and commode with supervision.   Pt to complete UE dressing with set-up  Pt to complete LE dressing with SBA  Pt to complete toileting with SBA   Outcome: Ongoing, Progressing     Problem: Occupational Therapy  Goal: Occupational Therapy Goal  Description: Goals to be met by: 7 days 7/29/23      Patient will increase functional independence with ADLs by performing:    Pt to complete standing g/h skills with supervision.   Pt to complete t/f bed, chair and commode with supervision.   Pt to complete UE dressing with set-up  Pt to complete LE dressing with SBA  Pt to complete toileting with SBA   Outcome: Ongoing, Progressing

## 2023-07-22 NOTE — PLAN OF CARE
"      SICU PLAN OF CARE NOTE    Dx: Coronary artery disease of native artery of native heart with stable angina pectoris    Shift Events: NAEON. Post-op day 1, minimal chest tube output. Insulin drip started and stopped overnight, BG managed. 500 cc Albumin given at beginning of shift to increase UOP. UOP maintained at 40-50cc/hr. No BM. Minimal incisional pain, controlled with PRN analgesics. OOBTC in AM.    Goals of Care: MAP >65, SBP <150    Neuro: AAO x4, Follows Commands, and Moves All Extremities    Vital Signs: BP (!) 112/58   Pulse 67   Temp 99.8 °F (37.7 °C) (Oral)   Resp 14   Ht 6' 1" (1.854 m)   Wt 98.2 kg (216 lb 8.9 oz)   SpO2 97%   BMI 28.57 kg/m²     Respiratory: Nasal Cannula    Diet: NPO    Gtts: Insulin    Urine Output: Urinary Catheter 650 cc/shift    Drains: Chest Tube, total output 200 cc /  shift     Labs/Accuchecks: Accuchecks Q1.    Skin: incisions per documentation. Sacral and heel foams in place. Weight shifting assistance provided as needed.      "

## 2023-07-22 NOTE — PLAN OF CARE
Pt free from falls and injury this shift. All VSS. Pain controlled with prn medication. Sats >95% on 3L NC. SBP maintained <150 and MAP above 65 without issues. No continuous drips. Abdomen rounded/nondistended, no BM this shift. Bowel regimen started. Midsternal incision covered with island/border intact. Chest tubes with 30cc, serosanguinous output this shift. Bowman intact, uop marginal 30-50cc/hr. OOB to chair all shift, worked well with PT/OT. Ambulated in room. No other significant events this shift. POC reviewed with pt and family, all questions answered.

## 2023-07-22 NOTE — SUBJECTIVE & OBJECTIVE
Interval History/Significant Events:   POD 1 s/p OPCABG *1  - NAEON  - UOP 40 to 50 per hour  - Pain well controlled  - CT output acceptable    Follow-up For: Procedure(s) (LRB):  CABG, WITHOUT CARDIOPULMONARY PUMP OXYGENATION (N/A)    Post-Operative Day: 1 Day Post-Op    Objective:     Vital Signs (Most Recent):  Temp: 98.9 °F (37.2 °C) (07/22/23 0700)  Pulse: 69 (07/22/23 0700)  Resp: 14 (07/22/23 0700)  BP: (!) 104/57 (07/22/23 0700)  SpO2: 95 % (07/22/23 0700) Vital Signs (24h Range):  Temp:  [97.6 °F (36.4 °C)-100.2 °F (37.9 °C)] 98.9 °F (37.2 °C)  Pulse:  [46-89] 69  Resp:  [8-30] 14  SpO2:  [89 %-100 %] 95 %  BP: (100-132)/(54-68) 104/57  Arterial Line BP: ()/(39-56) 101/40     Weight: 98.2 kg (216 lb 8.9 oz)  Body mass index is 28.57 kg/m².      Intake/Output Summary (Last 24 hours) at 7/22/2023 0720  Last data filed at 7/22/2023 0700  Gross per 24 hour   Intake 3950.24 ml   Output 2285 ml   Net 1665.24 ml          Physical Exam  Constitutional:       Appearance: Normal appearance.   HENT:      Head: Normocephalic and atraumatic.   Eyes:      Pupils: Pupils are equal, round, and reactive to light.   Cardiovascular:      Rate and Rhythm: Normal rate and regular rhythm.      Comments: Midline sternotomy  CT in place  Left subclavian CVC  Left radial a line  Abdominal:      General: Abdomen is flat.      Palpations: Abdomen is soft.   Musculoskeletal:         General: Normal range of motion.      Cervical back: Normal range of motion and neck supple.   Skin:     General: Skin is warm.   Neurological:      General: No focal deficit present.            Vents:  Vent Mode: A/C (07/21/23 1158)  Set Rate: 18 BPM (07/21/23 1158)  Vt Set: 480 mL (07/21/23 1158)  Pressure Support: 10 cmH20 (07/21/23 1158)  PEEP/CPAP: 5 cmH20 (07/21/23 1158)  Oxygen Concentration (%): 40 (07/21/23 1230)  Peak Airway Pressure: 22 cmH20 (07/21/23 1158)  Plateau Pressure: 0 cmH20 (07/21/23 1158)  Total Ve: 9.52 L/m (07/21/23  1158)  Negative Inspiratory Force (cm H2O): 0 (07/21/23 1158)  F/VT Ratio<105 (RSBI): (!) 26.87 (07/21/23 1158)    Lines/Drains/Airways       Central Venous Catheter Line  Duration                  Percutaneous Central Line Insertion/Assessment - Quad lumen  07/21/23 0801 Subclavian Left <1 day    Percutaneous Central Line Insertion/Assessment - Quad Lumen  07/21/23 0801 Subclavian Left <1 day              Drain  Duration                  Chest Tube 07/21/23 Tube - 3 Left Pleural 19 Fr. 1 day         Urethral Catheter 07/21/23 0720 Temperature probe;Non-latex 14 Fr. 1 day         Y Chest Tube 1 and 2 07/21/23 1 Anterior Mediastinal 19 Fr. 2 Anterior Mediastinal 19 Fr. 1 day              Airway  Duration                  Airway - Non-Surgical 07/21/23 0711 1 day              Arterial Line  Duration             Arterial Line 07/21/23 0801 Left Radial <1 day              Peripheral Intravenous Line  Duration                  Peripheral IV - Single Lumen 07/21/23 0632 18 G Anterior;Left Forearm 1 day         Peripheral IV - Single Lumen 07/21/23 1002 16 G Anterior;Right Forearm <1 day                    Significant Labs:    CBC/Anemia Profile:  Recent Labs   Lab 07/21/23  1041 07/21/23  1127 07/21/23  2255 07/22/23  0243 07/22/23  0306   WBC 19.24*  --   --  8.71  --    HGB 10.8*  --   --  9.3*  --    HCT 32.1*   < > 28* 28.0* 29*     --   --  170  --    MCV 87  --   --  87  --    RDW 14.1  --   --  14.3  --     < > = values in this interval not displayed.        Chemistries:  Recent Labs   Lab 07/21/23  1041 07/21/23  1547 07/21/23  1932 07/22/23  0243    138  --  138   K 5.0 4.6 4.9 4.9    112*  --  111*   CO2 17* 20*  --  17*   BUN 33* 27*  --  27*   CREATININE 1.9* 1.6*  --  1.9*   CALCIUM 8.1* 7.8*  --  8.1*   ALBUMIN 3.6  --   --  4.1   PROT 6.0  --   --  6.2   BILITOT 0.4  --   --  0.6   ALKPHOS 44*  --   --  37*   ALT 10  --   --  9*   AST 15  --   --  19   MG 1.8 2.5  --  2.4   PHOS 2.8  2.8  2.9  --   --        None    Significant Imaging:  I have reviewed all pertinent imaging results/findings within the past 24 hours.

## 2023-07-22 NOTE — PT/OT/SLP EVAL
Physical Therapy  Co-Evaluation and treatment with OT    Patient Name:  Addy Redding   MRN:  [id number]    Recommendations:     Discharge Recommendations: home   Discharge Equipment Recommendations: none   Barriers to discharge: None    Assessment:     Addy Redding is a 67 y.o. male admitted with a medical diagnosis of Coronary artery disease of native artery of native heart with stable angina pectoris.  He presents with the following impairments/functional limitations: impaired balance, decreased safety awareness, impaired endurance, impaired functional mobility, gait instability pt tolerated treatment well and will benefit from skilled PT 5x/wk to progress physically. Pt will be able to discharge home with no needs. Pt is s/p CABG 7/21/23.    Rehab Prognosis: Good; patient would benefit from acute skilled PT services to address these deficits and reach maximum level of function.    Recent Surgery: Procedure(s) (LRB):  CABG, WITHOUT CARDIOPULMONARY PUMP OXYGENATION (N/A) 1 Day Post-Op    Plan:     During this hospitalization, patient to be seen 5 x/week to address the identified rehab impairments via gait training, therapeutic activities and progress toward the following goals:    Plan of Care Expires:  08/20/23    Subjective     Chief Complaint: pt c/o pain during treatment.   Patient/Family Comments/goals: to get better and go home.   Pain/Comfort:  Pain Rating 1: 6/10 (chest)  Pain Addressed 1: Reposition, Distraction  Pain Rating Post-Intervention 1: 6/10 (chest)    Patients cultural, spiritual, [employer] conflicts given the current situation: no    Living Environment:  Pt is retired and lives with his retired wife and adult granddaughter. They live in [address].   Prior to admission, patients level of function was Independent .  Equipment used at home:  (walk in tub).  DME owned (not currently used): none.  Upon discharge, patient will have assistance from wife.    Objective:     Communicated with nurse  prior to  session.  Patient found up in chair with telemetry, arterial line, pulse ox (continuous), blood pressure cuff, chest tube, harris catheter, central line, oxygen (CVP)  upon PT entry to room.    General Precautions: Standard, fall, sternal  Orthopedic Precautions:    Braces:    Respiratory Status: Nasal cannula, flow 3 L/min    Exams:  Cognitive Exam:  Patient is oriented to Person, Place, Time, and Situation  RLE ROM: WFL  RLE Strength: WFL  LLE ROM: WFL  LLE Strength: WFL    Functional Mobility:  Transfers:   pt needed verbal cues for functional mobility with sternal precautions.   Sit to Stand:  contact guard assistance with hand-held assist    Gait: pt received gait training ~ 3 steps forward/backward x 2 reps with CGA.     Balance: pt stood at bedside and performed ADLS with OT with SBA.     Due to pt complex medical condition, the skill of 2 licensed therapists is needed to maximize treatment session and progression towards goals  Pt white board updated with current therapists name and level of mobility assistance needed.         AM-PAC 6 CLICK MOBILITY  Total Score:17       Treatment & Education:  Pt and wife received verbal instructions in role of PT and POC, verbal and written instructions in sternal precautions. Both verbally expressed understanding of such.     Patient left up in chair with all lines intact and call button in reach.    GOALS:   Multidisciplinary Problems       Physical Therapy Goals          Problem: Physical Therapy    Goal Priority Disciplines Outcome Goal Variances Interventions   Physical Therapy Goal     PT, PT/OT Ongoing, Progressing     Description: Goals to be met by: 23     Patient will increase functional independence with mobility by performin. Supine to sit with Stand-by Assistance  2. Sit to stand transfer with Supervision  3. Gait  x 250 feet with Supervision .                          History:     Past Medical History:   Diagnosis Date    CKD (chronic kidney  disease) stage 3, GFR 30-59 ml/min     Costochondritis     Diabetic nephropathy associated with type 2 diabetes mellitus     Diabetic retinopathy     ED (erectile dysfunction)     GERD (gastroesophageal reflux disease)     Hyperlipidemia     Hypertension     Prostate cancer     Type II or unspecified type diabetes mellitus with renal manifestations, uncontrolled(250.42)        Past Surgical History:   Procedure Laterality Date    Bone biopsy right femur      CORONARY ANGIOGRAPHY N/A 06/16/2022    Procedure: ANGIOGRAM, CORONARY ARTERY;  Surgeon: Carter Arevalo MD;  Location: [facility] CATH LAB/EP;  Service: Cardiology;  Laterality: N/A;    FRACTIONAL FLOW RESERVE (FFR), CORONARY  6/27/2023    Procedure: Fractional Flow [place] (FFR), Coronary;  Surgeon: Je Fontanez MD;  Location: [facility] CATH LAB/EP;  Service: Cardiology;;    INSTANTANEOUS WAVE-FREE RATIO (IFR) N/A 6/27/2023    Procedure: Instantaneous Wave-Free Ratio (IFR);  Surgeon: Je Fontanez MD;  Location: [facility] CATH LAB/EP;  Service: Cardiology;  Laterality: N/A;    LEFT HEART CATHETERIZATION Left 04/07/2022    Procedure: Left heart cath;  Surgeon: Carter Arevalo MD;  Location: [facility] CATH LAB/EP;  Service: Cardiology;  Laterality: Left;    LEFT HEART CATHETERIZATION Left 6/27/2023    Procedure: Left heart cath;  Surgeon: Je Fontanez MD;  Location: [facility] CATH LAB/EP;  Service: Cardiology;  Laterality: Left;    PROSTATE BIOPSY  10/05/2022    RADIOACTIVE SEED IMPLANTATION OF PROSTATE  1/12/2023    Procedure: INSERTION, RADIOACTIVE SEED, PROSTATE;  Surgeon: Scott Frias MD;  Location: [facility] OR [address] [place] [address];  Service: Urology;;    TRANSRECTAL ULTRASOUND EXAMINATION N/A 1/12/2023    Procedure: ULTRASOUND, RECTAL APPROACH;  Surgeon: Scott Frias MD;  Location: [facility] OR [address] [place] [address];  Service: Urology;  Laterality: N/A;       Time Tracking:     PT Received On: 07/22/23  PT Start Time: 0758     PT Stop Time: 0814  PT Total Time (min): 16 min     Billable  Minutes: Evaluation 8 min  and Gait Training 8 min       07/22/2023

## 2023-07-23 PROBLEM — I49.5 TACHYCARDIA-BRADYCARDIA SYNDROME: Status: ACTIVE | Noted: 2023-07-23

## 2023-07-23 LAB
ALBUMIN SERPL BCP-MCNC: 3.5 G/DL (ref 3.5–5.2)
ALBUMIN SERPL BCP-MCNC: 3.7 G/DL (ref 3.5–5.2)
ALP SERPL-CCNC: 40 U/L (ref 55–135)
ALP SERPL-CCNC: 45 U/L (ref 55–135)
ALT SERPL W/O P-5'-P-CCNC: 6 U/L (ref 10–44)
ALT SERPL W/O P-5'-P-CCNC: 8 U/L (ref 10–44)
ANION GAP SERPL CALC-SCNC: 10 MMOL/L (ref 8–16)
ANION GAP SERPL CALC-SCNC: 8 MMOL/L (ref 8–16)
AST SERPL-CCNC: 25 U/L (ref 10–40)
AST SERPL-CCNC: 27 U/L (ref 10–40)
BASOPHILS # BLD AUTO: 0.02 K/UL (ref 0–0.2)
BASOPHILS # BLD AUTO: 0.03 K/UL (ref 0–0.2)
BASOPHILS NFR BLD: 0.2 % (ref 0–1.9)
BASOPHILS NFR BLD: 0.3 % (ref 0–1.9)
BILIRUB SERPL-MCNC: 0.5 MG/DL (ref 0.1–1)
BILIRUB SERPL-MCNC: 0.5 MG/DL (ref 0.1–1)
BUN SERPL-MCNC: 29 MG/DL (ref 8–23)
BUN SERPL-MCNC: 30 MG/DL (ref 8–23)
CALCIUM SERPL-MCNC: 8.4 MG/DL (ref 8.7–10.5)
CALCIUM SERPL-MCNC: 9 MG/DL (ref 8.7–10.5)
CHLORIDE SERPL-SCNC: 107 MMOL/L (ref 95–110)
CHLORIDE SERPL-SCNC: 108 MMOL/L (ref 95–110)
CO2 SERPL-SCNC: 18 MMOL/L (ref 23–29)
CO2 SERPL-SCNC: 19 MMOL/L (ref 23–29)
CREAT SERPL-MCNC: 2.1 MG/DL (ref 0.5–1.4)
CREAT SERPL-MCNC: 2.1 MG/DL (ref 0.5–1.4)
DIFFERENTIAL METHOD: ABNORMAL
DIFFERENTIAL METHOD: ABNORMAL
EOSINOPHIL # BLD AUTO: 0 K/UL (ref 0–0.5)
EOSINOPHIL # BLD AUTO: 0 K/UL (ref 0–0.5)
EOSINOPHIL NFR BLD: 0.3 % (ref 0–8)
EOSINOPHIL NFR BLD: 0.4 % (ref 0–8)
ERYTHROCYTE [DISTWIDTH] IN BLOOD BY AUTOMATED COUNT: 14.2 % (ref 11.5–14.5)
ERYTHROCYTE [DISTWIDTH] IN BLOOD BY AUTOMATED COUNT: 14.4 % (ref 11.5–14.5)
EST. GFR  (NO RACE VARIABLE): 33.4 ML/MIN/1.73 M^2
EST. GFR  (NO RACE VARIABLE): 33.4 ML/MIN/1.73 M^2
GLUCOSE SERPL-MCNC: 125 MG/DL (ref 70–110)
GLUCOSE SERPL-MCNC: 134 MG/DL (ref 70–110)
HCT VFR BLD AUTO: 29.2 % (ref 40–54)
HCT VFR BLD AUTO: 30.2 % (ref 40–54)
HGB BLD-MCNC: 10 G/DL (ref 14–18)
HGB BLD-MCNC: 10.3 G/DL (ref 14–18)
IMM GRANULOCYTES # BLD AUTO: 0.02 K/UL (ref 0–0.04)
IMM GRANULOCYTES # BLD AUTO: 0.02 K/UL (ref 0–0.04)
IMM GRANULOCYTES NFR BLD AUTO: 0.2 % (ref 0–0.5)
IMM GRANULOCYTES NFR BLD AUTO: 0.2 % (ref 0–0.5)
LYMPHOCYTES # BLD AUTO: 0.8 K/UL (ref 1–4.8)
LYMPHOCYTES # BLD AUTO: 1 K/UL (ref 1–4.8)
LYMPHOCYTES NFR BLD: 8.2 % (ref 18–48)
LYMPHOCYTES NFR BLD: 9.7 % (ref 18–48)
MAGNESIUM SERPL-MCNC: 2.4 MG/DL (ref 1.6–2.6)
MAGNESIUM SERPL-MCNC: 2.5 MG/DL (ref 1.6–2.6)
MCH RBC QN AUTO: 28.9 PG (ref 27–31)
MCH RBC QN AUTO: 29.1 PG (ref 27–31)
MCHC RBC AUTO-ENTMCNC: 34.1 G/DL (ref 32–36)
MCHC RBC AUTO-ENTMCNC: 34.2 G/DL (ref 32–36)
MCV RBC AUTO: 85 FL (ref 82–98)
MCV RBC AUTO: 85 FL (ref 82–98)
MONOCYTES # BLD AUTO: 1.2 K/UL (ref 0.3–1)
MONOCYTES # BLD AUTO: 1.4 K/UL (ref 0.3–1)
MONOCYTES NFR BLD: 12 % (ref 4–15)
MONOCYTES NFR BLD: 14.4 % (ref 4–15)
NEUTROPHILS # BLD AUTO: 7.5 K/UL (ref 1.8–7.7)
NEUTROPHILS # BLD AUTO: 7.7 K/UL (ref 1.8–7.7)
NEUTROPHILS NFR BLD: 75.2 % (ref 38–73)
NEUTROPHILS NFR BLD: 78.9 % (ref 38–73)
NRBC BLD-RTO: 0 /100 WBC
NRBC BLD-RTO: 0 /100 WBC
PHOSPHATE SERPL-MCNC: 2.9 MG/DL (ref 2.7–4.5)
PLATELET # BLD AUTO: 162 K/UL (ref 150–450)
PLATELET # BLD AUTO: 175 K/UL (ref 150–450)
PMV BLD AUTO: 10.6 FL (ref 9.2–12.9)
PMV BLD AUTO: 10.7 FL (ref 9.2–12.9)
POCT GLUCOSE: 137 MG/DL (ref 70–110)
POCT GLUCOSE: 138 MG/DL (ref 70–110)
POCT GLUCOSE: 148 MG/DL (ref 70–110)
POCT GLUCOSE: 153 MG/DL (ref 70–110)
POCT GLUCOSE: 156 MG/DL (ref 70–110)
POTASSIUM SERPL-SCNC: 4.6 MMOL/L (ref 3.5–5.1)
POTASSIUM SERPL-SCNC: 4.6 MMOL/L (ref 3.5–5.1)
POTASSIUM SERPL-SCNC: 4.7 MMOL/L (ref 3.5–5.1)
PROT SERPL-MCNC: 6.2 G/DL (ref 6–8.4)
PROT SERPL-MCNC: 6.4 G/DL (ref 6–8.4)
RBC # BLD AUTO: 3.44 M/UL (ref 4.6–6.2)
RBC # BLD AUTO: 3.57 M/UL (ref 4.6–6.2)
SODIUM SERPL-SCNC: 135 MMOL/L (ref 136–145)
SODIUM SERPL-SCNC: 135 MMOL/L (ref 136–145)
TROPONIN I SERPL DL<=0.01 NG/ML-MCNC: 0.5 NG/ML (ref 0–0.03)
WBC # BLD AUTO: 10 K/UL (ref 3.9–12.7)
WBC # BLD AUTO: 9.81 K/UL (ref 3.9–12.7)

## 2023-07-23 PROCEDURE — 25000003 PHARM REV CODE 250: Performed by: THORACIC SURGERY (CARDIOTHORACIC VASCULAR SURGERY)

## 2023-07-23 PROCEDURE — 84132 ASSAY OF SERUM POTASSIUM: CPT | Performed by: NURSE PRACTITIONER

## 2023-07-23 PROCEDURE — 94761 N-INVAS EAR/PLS OXIMETRY MLT: CPT

## 2023-07-23 PROCEDURE — 93005 ELECTROCARDIOGRAM TRACING: CPT

## 2023-07-23 PROCEDURE — 63600175 PHARM REV CODE 636 W HCPCS

## 2023-07-23 PROCEDURE — 84100 ASSAY OF PHOSPHORUS: CPT

## 2023-07-23 PROCEDURE — 25000003 PHARM REV CODE 250: Performed by: NURSE PRACTITIONER

## 2023-07-23 PROCEDURE — 93010 EKG 12-LEAD: ICD-10-PCS | Mod: ,,, | Performed by: INTERNAL MEDICINE

## 2023-07-23 PROCEDURE — 93010 ELECTROCARDIOGRAM REPORT: CPT | Mod: 76,,, | Performed by: INTERNAL MEDICINE

## 2023-07-23 PROCEDURE — 94799 UNLISTED PULMONARY SVC/PX: CPT

## 2023-07-23 PROCEDURE — 25000003 PHARM REV CODE 250

## 2023-07-23 PROCEDURE — 99900035 HC TECH TIME PER 15 MIN (STAT)

## 2023-07-23 PROCEDURE — 27000221 HC OXYGEN, UP TO 24 HOURS

## 2023-07-23 PROCEDURE — 20000000 HC ICU ROOM

## 2023-07-23 PROCEDURE — 99233 PR SUBSEQUENT HOSPITAL CARE,LEVL III: ICD-10-PCS | Mod: ,,, | Performed by: INTERNAL MEDICINE

## 2023-07-23 PROCEDURE — 83735 ASSAY OF MAGNESIUM: CPT

## 2023-07-23 PROCEDURE — 83735 ASSAY OF MAGNESIUM: CPT | Mod: 91 | Performed by: NURSE PRACTITIONER

## 2023-07-23 PROCEDURE — 84484 ASSAY OF TROPONIN QUANT: CPT

## 2023-07-23 PROCEDURE — 63600175 PHARM REV CODE 636 W HCPCS: Performed by: NURSE PRACTITIONER

## 2023-07-23 PROCEDURE — 93010 ELECTROCARDIOGRAM REPORT: CPT | Mod: ,,, | Performed by: INTERNAL MEDICINE

## 2023-07-23 PROCEDURE — 85025 COMPLETE CBC W/AUTO DIFF WBC: CPT

## 2023-07-23 PROCEDURE — 85025 COMPLETE CBC W/AUTO DIFF WBC: CPT | Mod: 91 | Performed by: NURSE PRACTITIONER

## 2023-07-23 PROCEDURE — 99291 PR CRITICAL CARE, E/M 30-74 MINUTES: ICD-10-PCS | Mod: ,,, | Performed by: ANESTHESIOLOGY

## 2023-07-23 PROCEDURE — 63600175 PHARM REV CODE 636 W HCPCS: Performed by: THORACIC SURGERY (CARDIOTHORACIC VASCULAR SURGERY)

## 2023-07-23 PROCEDURE — 80053 COMPREHEN METABOLIC PANEL: CPT

## 2023-07-23 PROCEDURE — 99291 CRITICAL CARE FIRST HOUR: CPT | Mod: ,,, | Performed by: ANESTHESIOLOGY

## 2023-07-23 PROCEDURE — 99233 SBSQ HOSP IP/OBS HIGH 50: CPT | Mod: ,,, | Performed by: INTERNAL MEDICINE

## 2023-07-23 PROCEDURE — 80053 COMPREHEN METABOLIC PANEL: CPT | Mod: 91 | Performed by: NURSE PRACTITIONER

## 2023-07-23 RX ORDER — POTASSIUM CHLORIDE 20 MEQ/1
20 TABLET, EXTENDED RELEASE ORAL DAILY
Status: DISCONTINUED | OUTPATIENT
Start: 2023-07-23 | End: 2023-07-26 | Stop reason: HOSPADM

## 2023-07-23 RX ORDER — NITROGLYCERIN 0.4 MG/1
TABLET SUBLINGUAL
Status: DISCONTINUED
Start: 2023-07-23 | End: 2023-07-23 | Stop reason: WASHOUT

## 2023-07-23 RX ORDER — MORPHINE SULFATE 2 MG/ML
INJECTION, SOLUTION INTRAMUSCULAR; INTRAVENOUS
Status: DISCONTINUED
Start: 2023-07-23 | End: 2023-07-23 | Stop reason: WASHOUT

## 2023-07-23 RX ORDER — FUROSEMIDE 10 MG/ML
40 INJECTION INTRAMUSCULAR; INTRAVENOUS 2 TIMES DAILY
Status: DISCONTINUED | OUTPATIENT
Start: 2023-07-23 | End: 2023-07-24

## 2023-07-23 RX ADMIN — HYDROMORPHONE HYDROCHLORIDE 1 MG: 1 INJECTION, SOLUTION INTRAMUSCULAR; INTRAVENOUS; SUBCUTANEOUS at 08:07

## 2023-07-23 RX ADMIN — HEPARIN SODIUM 5000 UNITS: 5000 INJECTION INTRAVENOUS; SUBCUTANEOUS at 09:07

## 2023-07-23 RX ADMIN — DOCUSATE SODIUM 100 MG: 100 CAPSULE, LIQUID FILLED ORAL at 08:07

## 2023-07-23 RX ADMIN — INSULIN ASPART 2 UNITS: 100 INJECTION, SOLUTION INTRAVENOUS; SUBCUTANEOUS at 04:07

## 2023-07-23 RX ADMIN — POLYETHYLENE GLYCOL 3350 17 G: 17 POWDER, FOR SOLUTION ORAL at 08:07

## 2023-07-23 RX ADMIN — TAMSULOSIN HYDROCHLORIDE 0.4 MG: 0.4 CAPSULE ORAL at 08:07

## 2023-07-23 RX ADMIN — HEPARIN SODIUM 5000 UNITS: 5000 INJECTION INTRAVENOUS; SUBCUTANEOUS at 02:07

## 2023-07-23 RX ADMIN — HEPARIN SODIUM 5000 UNITS: 5000 INJECTION INTRAVENOUS; SUBCUTANEOUS at 05:07

## 2023-07-23 RX ADMIN — DOCUSATE SODIUM 100 MG: 100 CAPSULE, LIQUID FILLED ORAL at 09:07

## 2023-07-23 RX ADMIN — FUROSEMIDE 40 MG: 10 INJECTION, SOLUTION INTRAMUSCULAR; INTRAVENOUS at 08:07

## 2023-07-23 RX ADMIN — MUPIROCIN 1 G: 20 OINTMENT TOPICAL at 08:07

## 2023-07-23 RX ADMIN — ATORVASTATIN CALCIUM 80 MG: 40 TABLET, FILM COATED ORAL at 09:07

## 2023-07-23 RX ADMIN — AMIODARONE HYDROCHLORIDE 150 MG: 1.5 INJECTION, SOLUTION INTRAVENOUS at 12:07

## 2023-07-23 RX ADMIN — ACETAMINOPHEN 1000 MG: 500 TABLET ORAL at 05:07

## 2023-07-23 RX ADMIN — FUROSEMIDE 40 MG: 10 INJECTION, SOLUTION INTRAMUSCULAR; INTRAVENOUS at 05:07

## 2023-07-23 RX ADMIN — AMIODARONE HYDROCHLORIDE 1 MG/MIN: 1.8 INJECTION, SOLUTION INTRAVENOUS at 12:07

## 2023-07-23 RX ADMIN — ACETAMINOPHEN 1000 MG: 500 TABLET ORAL at 09:07

## 2023-07-23 RX ADMIN — INSULIN ASPART 1 UNITS: 100 INJECTION, SOLUTION INTRAVENOUS; SUBCUTANEOUS at 09:07

## 2023-07-23 RX ADMIN — AMIODARONE HYDROCHLORIDE 0.5 MG/MIN: 1.8 INJECTION, SOLUTION INTRAVENOUS at 06:07

## 2023-07-23 RX ADMIN — ASPIRIN 81 MG 81 MG: 81 TABLET ORAL at 08:07

## 2023-07-23 RX ADMIN — ACETAMINOPHEN 1000 MG: 500 TABLET ORAL at 02:07

## 2023-07-23 RX ADMIN — CLOPIDOGREL BISULFATE 75 MG: 75 TABLET ORAL at 08:07

## 2023-07-23 RX ADMIN — POTASSIUM CHLORIDE 20 MEQ: 1500 TABLET, EXTENDED RELEASE ORAL at 08:07

## 2023-07-23 RX ADMIN — MUPIROCIN 1 G: 20 OINTMENT TOPICAL at 09:07

## 2023-07-23 RX ADMIN — PANTOPRAZOLE SODIUM 40 MG: 40 TABLET, DELAYED RELEASE ORAL at 08:07

## 2023-07-23 RX ADMIN — OXYCODONE HYDROCHLORIDE 5 MG: 5 TABLET ORAL at 02:07

## 2023-07-23 RX ADMIN — POTASSIUM & SODIUM PHOSPHATES POWDER PACK 280-160-250 MG 2 PACKET: 280-160-250 PACK at 02:07

## 2023-07-23 NOTE — HPI
Addy Redding is a 67 y.o. male with a PMHx of former smoker (1ppd x 25 years, quit 25 years ago) with a history of coronary artery disease s/p LCx+OM stents (2022), carotid stenosis, stage 4 chronic kidney disease, hypertension, and diabetes (HbA1C 6.7).  Patient underwent single vessel off pump coronary artery bypass grafting (OPCAB)-Left internal mammary artery to the distal left anterior descending artery with Dr. Uriostegui on 7/21.   Morning of 7/23 patient developed short runs of Afib with RVR followed by sinus bradycardia. Patient was hemodynamically stable and asymptomatic during events. EP consulted for possible TVP.

## 2023-07-23 NOTE — CARE UPDATE
Care Update:     No acute events overnight. Patient on the SICU in room 29351/59476 A. Blood glucose stable. BG at goal on current insulin regimen (SSI ). Steroid use- None. 2 Days Post-Op  Renal function- Normal   Vasopressors-  None       Diet Cardiac Fluid - 1500mL     POCT Glucose   Date Value Ref Range Status   07/23/2023 138 (H) 70 - 110 mg/dL Final   07/22/2023 145 (H) 70 - 110 mg/dL Final   07/22/2023 136 (H) 70 - 110 mg/dL Final   07/22/2023 139 (H) 70 - 110 mg/dL Final   07/22/2023 122 (H) 70 - 110 mg/dL Final   07/22/2023 128 (H) 70 - 110 mg/dL Final   07/22/2023 124 (H) 70 - 110 mg/dL Final   07/22/2023 115 (H) 70 - 110 mg/dL Final   07/22/2023 115 (H) 70 - 110 mg/dL Final   07/22/2023 94 70 - 110 mg/dL Final   07/22/2023 100 70 - 110 mg/dL Final   07/21/2023 132 (H) 70 - 110 mg/dL Final   07/21/2023 133 (H) 70 - 110 mg/dL Final   07/21/2023 156 (H) 70 - 110 mg/dL Final   07/21/2023 154 (H) 70 - 110 mg/dL Final   07/21/2023 155 (H) 70 - 110 mg/dL Final   07/21/2023 158 (H) 70 - 110 mg/dL Final   07/21/2023 158 (H) 70 - 110 mg/dL Final   07/21/2023 156 (H) 70 - 110 mg/dL Final   07/21/2023 139 (H) 70 - 110 mg/dL Final   07/21/2023 138 (H) 70 - 110 mg/dL Final   07/21/2023 152 (H) 70 - 110 mg/dL Final   07/21/2023 129 (H) 70 - 110 mg/dL Final   07/21/2023 134 (H) 70 - 110 mg/dL Final   07/21/2023 84 70 - 110 mg/dL Final     Lab Results   Component Value Date    HGBA1C 6.7 (H) 03/14/2023       Diabetes Medications               dapagliflozin (FARXIGA) 10 mg tablet Take 1 tablet (10 mg total) by mouth once daily.    insulin (LANTUS SOLOSTAR U-100 INSULIN) glargine 100 units/mL SubQ pen Inject 12 Units into the skin 2 (two) times a day.    semaglutide (OZEMPIC) 0.25 mg or 0.5 mg (2 mg/3 mL) pen injector Inject 0.25 mg into the skin every 7 days.          Endocrine  Type 2 diabetes mellitus with stage 3a chronic kidney disease, without long-term current use of insulin  Endocrinology consulted for BG  management.   BG goal 110-140 (CTS Protocol)      - Novolog (Insulin Aspart) prn for BG excursions [facility] SSI (150/25)  - BG checks AC/HS  - Hypoglycemia protocol in place    ** Please notify Endocrine for any change and/or advance in diet**  ** Please call Endocrine for any BG related issues **    Discharge Planning:   TBD. Please notify endocrinology prior to discharge.      Thomas Choudhary DNP, FNP-C  Department of Endocrinology  Inpatient Glycemic Management

## 2023-07-23 NOTE — SUBJECTIVE & OBJECTIVE
Past Medical History:   Diagnosis Date    CKD (chronic kidney disease) stage 3, GFR 30-59 ml/min     Costochondritis     Diabetic nephropathy associated with type 2 diabetes mellitus     Diabetic retinopathy     ED (erectile dysfunction)     GERD (gastroesophageal reflux disease)     Hyperlipidemia     Hypertension     Prostate cancer     Type II or unspecified type diabetes mellitus with renal manifestations, uncontrolled(250.42)        Past Surgical History:   Procedure Laterality Date    Bone biopsy right femur      CORONARY ANGIOGRAPHY N/A 06/16/2022    Procedure: ANGIOGRAM, CORONARY ARTERY;  Surgeon: Carter Arevalo MD;  Location: [facility] CATH LAB/EP;  Service: Cardiology;  Laterality: N/A;    FRACTIONAL FLOW RESERVE (FFR), CORONARY  6/27/2023    Procedure: Fractional Flow [place] (FFR), Coronary;  Surgeon: Je Fontanez MD;  Location: [facility] CATH LAB/EP;  Service: Cardiology;;    INSTANTANEOUS WAVE-FREE RATIO (IFR) N/A 6/27/2023    Procedure: Instantaneous Wave-Free Ratio (IFR);  Surgeon: Je Fontanez MD;  Location: [facility] CATH LAB/EP;  Service: Cardiology;  Laterality: N/A;    LEFT HEART CATHETERIZATION Left 04/07/2022    Procedure: Left heart cath;  Surgeon: Carter Arevalo MD;  Location: [facility] CATH LAB/EP;  Service: Cardiology;  Laterality: Left;    LEFT HEART CATHETERIZATION Left 6/27/2023    Procedure: Left heart cath;  Surgeon: Je Fontanez MD;  Location: [facility] CATH LAB/EP;  Service: Cardiology;  Laterality: Left;    PROSTATE BIOPSY  10/05/2022    RADIOACTIVE SEED IMPLANTATION OF PROSTATE  1/12/2023    Procedure: INSERTION, RADIOACTIVE SEED, PROSTATE;  Surgeon: Scott Frias MD;  Location: [facility] OR [address] [place] [address];  Service: Urology;;    TRANSRECTAL ULTRASOUND EXAMINATION N/A 1/12/2023    Procedure: ULTRASOUND, RECTAL APPROACH;  Surgeon: Scott Frias MD;  Location: [facility] OR [address] [place] [address];  Service: Urology;  Laterality: N/A;       Review of patient's allergies indicates:  No Known Allergies    No  "current facility-administered medications on file prior to encounter.     Current Outpatient Medications on File Prior to Encounter   Medication Sig    amLODIPine (NORVASC) 5 MG tablet Take 2 tablets (10 mg total) by mouth once daily.    atorvastatin (LIPITOR) 80 MG tablet Take 1 tablet (80 mg total) by mouth once daily.    clopidogreL (PLAVIX) 75 mg tablet Take 1 tablet (75 mg total) by mouth once daily.    ergocalciferol (ERGOCALCIFEROL) 50,000 unit Cap Take 1 capsule (50,000 Units total) by mouth every 7 days.    insulin (LANTUS SOLOSTAR U-100 INSULIN) glargine 100 units/mL SubQ pen Inject 12 Units into the skin 2 (two) times a day.    olmesartan (BENICAR) 5 MG Tab Take 1 tablet (5 mg total) by mouth once daily.    pantoprazole (PROTONIX) 40 MG tablet TAKE ONE TABLET BY MOUTH ONCE DAILY.    semaglutide (OZEMPIC) 0.25 mg or 0.5 mg (2 mg/3 mL) pen injector Inject 0.25 mg into the skin every 7 days.    tamsulosin (FLOMAX) 0.4 mg Cap Take by mouth once daily.    ADVANCED GLUC METER TEST STRIP Strp USE TO TEST BLOOD SUGAR 4 TIMES DAILY.    aspirin (ECOTRIN) 81 MG EC tablet Take 1 tablet (81 mg total) by mouth once daily.    BD ARIA 2ND GEN PEN NEEDLE 32 gauge x 5/32" Ndle     blood sugar diagnostic (BLOOD GLUCOSE TEST) Strp Check sugar once daily    blood-glucose meter Misc by Misc.(Non-Drug; Combo Route) route.    blood-glucose meter,continuous (DEXCOM G6 ) Misc 1 Device by Misc.(Non-Drug; Combo Route) route continuous.    blood-glucose sensor (DEXCOM G6 SENSOR) Tia 1 Device by Misc.(Non-Drug; Combo Route) route continuous.    blood-glucose transmitter (DEXCOM G6 TRANSMITTER) Tia 1 Device by Misc.(Non-Drug; Combo Route) route continuous.    dapagliflozin (FARXIGA) 10 mg tablet Take 1 tablet (10 mg total) by mouth once daily.    doxazosin (CARDURA) 2 MG tablet Take 1 tablet (2 mg total) by mouth every evening.    fluticasone propionate (FLONASE) 50 mcg/actuation nasal spray 2 sprays (100 mcg total) by Each " Nostril route once daily.    lancets (ACCU-CHEK SOFTCLIX LANCETS) Misc 1 lancet by Misc.(Non-Drug; Combo Route) route 2 (two) times daily.    latanoprost 0.005 % ophthalmic solution     nitroGLYCERIN (NITROSTAT) 0.4 MG SL tablet Place 1 tablet (0.4 mg total) under the tongue every 5 (five) minutes as needed for Chest pain.    sildenafil (REVATIO) 20 mg Tab Take 1 tablet (20 mg total) by mouth daily as needed for ED    sodium bicarbonate 650 MG tablet Take 2 tablets (1,300 mg total) by mouth 2 (two) times daily.     Family History       Problem Relation (Age of Onset)    Asthma Son    Cancer Sister, Brother    Coronary artery disease Father, Sister    Dementia Sister    Diabetes Mother, Sister, Sister, Brother, Brother, Brother    HIV Brother    Heart attack Father, Sister    Heart disease Sister    Hyperlipidemia Father, Sister, Brother, Brother    Hypertension Mother, Sister, Sister, Brother, Brother, Brother, Brother, Son    Kidney disease Mother    Lung cancer Brother    No Known Problems Sister, Brother, Daughter    Peripheral vascular disease Brother    Stomach cancer Sister    Stroke Brother, Brother          Tobacco Use    Smoking status: Former     Packs/day: 0.50     Years: 26.00     Pack years: 13.00     Types: Cigarettes     Quit date:      Years since quittin.5     Passive exposure: Never    Smokeless tobacco: Never   Substance and Sexual Activity    Alcohol use: Not Currently    Drug use: No    Sexual activity: Yes     Partners: Female     Review of Systems   Constitutional: Negative for malaise/fatigue.   HENT: Negative.     Eyes: Negative.    Cardiovascular:  Negative for chest pain, leg swelling, near-syncope and syncope.   Respiratory: Negative.     Endocrine: Negative.    Musculoskeletal: Negative.    Genitourinary: Negative.    Neurological: Negative.    Psychiatric/Behavioral: Negative.     Objective:     Vital Signs (Most Recent):  Temp: 99.4 °F (37.4 °C) (23 0300)  Pulse: 70  (07/23/23 0430)  Resp: 18 (07/23/23 0430)  BP: (!) 144/66 (07/23/23 0400)  SpO2: 95 % (07/23/23 0430) Vital Signs (24h Range):  Temp:  [98.9 °F (37.2 °C)-100 °F (37.8 °C)] 99.4 °F (37.4 °C)  Pulse:  [48-86] 70  Resp:  [11-30] 18  SpO2:  [90 %-100 %] 95 %  BP: (100-144)/(51-89) 144/66  Arterial Line BP: ()/(37-58) 126/50       Weight: 98.2 kg (216 lb 8.9 oz)  Body mass index is 28.57 kg/m².    SpO2: 95 %        Physical Exam  Cardiovascular:      Rate and Rhythm: Rhythm irregular.      Comments: Midline sternotomy   Pulmonary:      Effort: Pulmonary effort is normal.      Breath sounds: Normal breath sounds.   Abdominal:      Palpations: Abdomen is soft.   Musculoskeletal:      Right lower leg: No edema.      Left lower leg: No edema.   Neurological:      General: No focal deficit present.      Mental Status: He is alert and oriented to person, place, and time.          Significant Labs: All pertinent lab results from the last 24 hours have been reviewed.    Significant Imaging:     Cath Results 6/27/23:  Access: Rt Radial   LM:  SCARLET III  flow mild disease   LAD: SCARLET- flow ostial LAD 80%. FFR 0.71   LCx:  SCARLET- flow  patent LCX/OM stents  RCA: SCARLET- flow   LVgram: LVEDP 7  Intervention: iFR LAD 0.71      TTE 3/9/2023  Normal systolic function.  The estimated ejection fraction is 55%.  Normal left ventricular diastolic function.  Normal right ventricular size with normal right ventricular systolic function.  Normal central venous pressure (3 mmHg).  The estimated PA systolic pressure is 23 mmHg.

## 2023-07-23 NOTE — PROGRESS NOTES
[facility] - [facility]ensive [facility]  Critical Care - Surgery  Progress Note    Patient Name: Addy Redding  MRN: [id number]  Admission Date: 7/21/2023  Hospital Length of Stay: 2 days  Code Status: Full Code  Attending Provider: Jass Uriostegui MD  Primary Care Provider: Stephan Ramey MD   Principal Problem: Coronary artery disease of native artery of native heart with stable angina pectoris    Subjective:       Interval History/Significant Events:   - iv lasix 20mg once at 10pm, good UOP  - tachy brendon  syndrome, MAPs >65, asymtomatic-EP no interventions      Follow-up For: Procedure(s) (LRB):  CABG, WITHOUT CARDIOPULMONARY PUMP OXYGENATION (N/A)    Post-Operative Day: 2 Days Post-Op    Objective:     Vital Signs (Most Recent):  Temp: 99.4 °F (37.4 °C) (07/23/23 0300)  Pulse: 60 (07/23/23 0315)  Resp: 20 (07/23/23 0315)  BP: (!) 121/58 (07/23/23 0300)  SpO2: 96 % (07/23/23 0315) Vital Signs (24h Range):  Temp:  [98.9 °F (37.2 °C)-100 °F (37.8 °C)] 99.4 °F (37.4 °C)  Pulse:  [58-86] 60  Resp:  [11-30] 20  SpO2:  [90 %-100 %] 96 %  BP: (100-134)/(51-89) 121/58  Arterial Line BP: ()/(37-55) 136/53     Weight: 98.2 kg (216 lb 8.9 oz)  Body mass index is 28.57 kg/m².      Intake/Output Summary (Last 24 hours) at 7/23/2023 0352  Last data filed at 7/23/2023 0300  Gross per 24 hour   Intake 145.91 ml   Output 1665 ml   Net -1519.09 ml          Physical Exam  Constitutional:       Appearance: Normal appearance.   HENT:      Head: Normocephalic and atraumatic.   Eyes:      Pupils: Pupils are equal, round, and reactive to light.   Cardiovascular:      Rate and Rhythm: Normal rate and regular rhythm.      Comments: Midline sternotomy  CT in place  Left subclavian CVC  Left radial a line  Abdominal:      General: Abdomen is flat.      Palpations: Abdomen is soft.   Musculoskeletal:         General: Normal range of motion.      Cervical back: Normal range of motion and neck supple.   Skin:     General: Skin is warm.    Neurological:      General: No focal deficit present.            Vents:  Vent Mode: A/C (07/21/23 1158)  Set Rate: 18 BPM (07/21/23 1158)  Vt Set: 480 mL (07/21/23 1158)  Pressure Support: 10 cmH20 (07/21/23 1158)  PEEP/CPAP: 5 cmH20 (07/21/23 1158)  Oxygen Concentration (%): 40 (07/21/23 1230)  Peak Airway Pressure: 22 cmH20 (07/21/23 1158)  Plateau Pressure: 0 cmH20 (07/21/23 1158)  Total Ve: 9.52 L/m (07/21/23 1158)  Negative Inspiratory Force (cm H2O): 0 (07/21/23 1158)  F/VT Ratio<105 (RSBI): (!) 26.87 (07/21/23 1158)    Lines/Drains/Airways       Central Venous Catheter Line  Duration                  Percutaneous Central Line Insertion/Assessment - Quad lumen  07/21/23 0801 Subclavian Left 1 day    Percutaneous Central Line Insertion/Assessment - Quad Lumen  07/21/23 0801 Subclavian Left 1 day              Drain  Duration                  Chest Tube 07/21/23 Tube - 3 Left Pleural 19 Fr. 2 days         Y Chest Tube 1 and 2 07/21/23 1 Anterior Mediastinal 19 Fr. 2 Anterior Mediastinal 19 Fr. 2 days         Urethral Catheter 07/21/23 0720 Temperature probe;Non-latex 14 Fr. 1 day              Airway  Duration                  Airway - Non-Surgical 07/21/23 0711 1 day              Arterial Line  Duration             Arterial Line 07/21/23 0801 Left Radial 1 day              Peripheral Intravenous Line  Duration                  Peripheral IV - Single Lumen 07/21/23 0632 18 G Anterior;Left Forearm 1 day         Peripheral IV - Single Lumen 07/21/23 1002 16 G Anterior;Right Forearm 1 day                    Significant Labs:    CBC/Anemia Profile:  Recent Labs   Lab 07/21/23  1041 07/21/23  1127 07/22/23  0243 07/22/23  0306 07/22/23  1038 07/22/23  1416 07/23/23  0311   WBC 19.24*  --  8.71  --   --   --  9.81   HGB 10.8*  --  9.3*  --   --   --  10.0*   HCT 32.1*   < > 28.0*   < > 31* 31* 29.2*     --  170  --   --   --  162   MCV 87  --  87  --   --   --  85   RDW 14.1  --  14.3  --   --   --  14.2    < >  = values in this interval not displayed.        Chemistries:  Recent Labs   Lab 07/21/23  1041 07/21/23  1547 07/21/23  1932 07/22/23  0243 07/22/23  0827 07/22/23  1919 07/22/23  2221 07/23/23  0311    138  --  138  --   --  137 135*   K 5.0 4.6   < > 4.9   < > 4.5 4.7 4.6    112*  --  111*  --   --  109 108   CO2 17* 20*  --  17*  --   --  18* 19*   BUN 33* 27*  --  27*  --   --  29* 29*   CREATININE 1.9* 1.6*  --  1.9*  --   --  2.1* 2.1*   CALCIUM 8.1* 7.8*  --  8.1*  --   --  8.4* 8.4*   ALBUMIN 3.6  --   --  4.1  --   --  3.7 3.5   PROT 6.0  --   --  6.2  --   --  6.3 6.2   BILITOT 0.4  --   --  0.6  --   --  0.4 0.5   ALKPHOS 44*  --   --  37*  --   --  40* 40*   ALT 10  --   --  9*  --   --  7* 6*   AST 15  --   --  19  --   --  26 25   MG 1.8 2.5  --  2.4  --   --  2.3 2.5   PHOS 2.8  2.8 2.9  --   --   --   --  2.3*  --     < > = values in this interval not displayed.       All pertinent labs within the past 24 hours have been reviewed.    Significant Imaging:  I have reviewed all pertinent imaging results/findings within the past 24 hours.    Assessment/Plan:     Cardiac/Vascular  * Coronary artery disease of native artery of native heart with stable angina pectoris  Mr Redding 67 year-old male former smoker (1ppd x 25 years, quit 25 years ago) with a history of coronary artery disease s/p LCx+OM stents (2022), carotid stenosis, stage 4 chronic kidney disease, hypertension, and diabetes (HbA1C 6.7) now sp single vessel off pump CABG on 7/21.       Neuro/Psych:     - Sedation: off    - Pain:    - Scheduled Tylenol 1g q8h   - Dilaudid PRN, Oxy PRN             Cardiac:     - S/P off pump CABG x1  with Dr. Uriostegui on 7/21    - BP Goal: SBP <150, MAP >65   -  Jose tachy syndrome-EP consulted, no active interventions now as patient asymptomatic with MAP more than 65    - Anti-HTNs: Will start when appropriate    - Rhythm: SR    - Beta blocker: metoprolol 50mg- hold per cards    - Statin: Atorvastatin  40 mg QD      Pulmonary:     - Goal SpO2 >92%    - Extubated POD0    - Chest Tubes x 2 (1 Med & 1 Pleural)    - ABGs PRN      Renal:     CKD 4    - Trend BUN/Cr     - Maintain Bowman, record strict Is/Os    - Good UOP to lasix 20 iv    - Lasix 40mg iv bid    Recent Labs   Lab 07/22/23  0243 07/22/23  2221 07/23/23  0311   BUN 27* 29* 29*   CREATININE 1.9* 2.1* 2.1*         FEN / GI:     - Daily CMP, PRN K/Mag/Phos per protocol     - Replace electrolytes as needed    - Nutrition: Cardiac diet    - Bowel Regimen: Miralax, docusate      ID:     - Afebrile    - WBC stable    - Abx: Completed perioperative cefazolin 2g Q8H x 5 doses    Recent Labs   Lab 07/21/23  1041 07/22/23  0243 07/23/23  0311   WBC 19.24* 8.71 9.81         Heme/Onc:     - Hgb 13.4 pre-operatively    - CBC daily    - ASA 81mg , plavix 75 daily    Recent Labs   Lab 07/19/23  0805 07/21/23  1041 07/22/23  0243 07/23/23  0311   HGB 13.4* 10.8* 9.3* 10.0*    193 170 162   APTT 27.6 27.1 30.6  --    INR 1.0 1.2 1.1  --          Endocrine:     - CTS Goal -140    - HgbA1c: 6.7    - Endocrinology consulted for insulin management      PPx:   Feeding: Cardiac  Analgesia/Sedation: dilaudid PRN, oxy PRN,tylenol   Thromboembolic Prevention: heparin   HOB >30: Yes  Stress Ulcer: pantop  Glucose Control: Yes, insulin management per Endocrinology  Bowel reg: miralax, docusate  Invasive Lines/Drains/Airway:    Left radial arterial line   Left subclavian CVC   Bowman   Chest Tubes: 2 (1 Mediastinal, 1 Pleural)- d/c today          Dispo/Code Status/Palliative:     - Continue SICU Care    - Full Code      Britany Prieto MD  Critical Care - Surgery  [facility] - Surgical Intensive Care

## 2023-07-23 NOTE — NURSING
Pt noted to be going in and out of afib with rvr in the 120s, as well as episodes of bradycardia in the 40s. Pt reporting chest pain with inspiration. BP and O2 saturation WDL. Indu Nieves MD notified and at bedside. EKG ordered. Labs sent. No further orders carried out. SICU plan of care continued. EP consulted.

## 2023-07-23 NOTE — SUBJECTIVE & OBJECTIVE
Interval History/Significant Events:   - iv lasix 20mg once at 10pm, good UOP  - tachy brendon  syndrome, MAPs >65, asymtomatic-EP no interventions      Follow-up For: Procedure(s) (LRB):  CABG, WITHOUT CARDIOPULMONARY PUMP OXYGENATION (N/A)    Post-Operative Day: 2 Days Post-Op    Objective:     Vital Signs (Most Recent):  Temp: 99.4 °F (37.4 °C) (07/23/23 0300)  Pulse: 60 (07/23/23 0315)  Resp: 20 (07/23/23 0315)  BP: (!) 121/58 (07/23/23 0300)  SpO2: 96 % (07/23/23 0315) Vital Signs (24h Range):  Temp:  [98.9 °F (37.2 °C)-100 °F (37.8 °C)] 99.4 °F (37.4 °C)  Pulse:  [58-86] 60  Resp:  [11-30] 20  SpO2:  [90 %-100 %] 96 %  BP: (100-134)/(51-89) 121/58  Arterial Line BP: ()/(37-55) 136/53     Weight: 98.2 kg (216 lb 8.9 oz)  Body mass index is 28.57 kg/m².      Intake/Output Summary (Last 24 hours) at 7/23/2023 0352  Last data filed at 7/23/2023 0300  Gross per 24 hour   Intake 145.91 ml   Output 1665 ml   Net -1519.09 ml          Physical Exam  Constitutional:       Appearance: Normal appearance.   HENT:      Head: Normocephalic and atraumatic.   Eyes:      Pupils: Pupils are equal, round, and reactive to light.   Cardiovascular:      Rate and Rhythm: Normal rate and regular rhythm.      Comments: Midline sternotomy  CT in place  Left subclavian CVC  Left radial a line  Abdominal:      General: Abdomen is flat.      Palpations: Abdomen is soft.   Musculoskeletal:         General: Normal range of motion.      Cervical back: Normal range of motion and neck supple.   Skin:     General: Skin is warm.   Neurological:      General: No focal deficit present.            Vents:  Vent Mode: A/C (07/21/23 1158)  Set Rate: 18 BPM (07/21/23 1158)  Vt Set: 480 mL (07/21/23 1158)  Pressure Support: 10 cmH20 (07/21/23 1158)  PEEP/CPAP: 5 cmH20 (07/21/23 1158)  Oxygen Concentration (%): 40 (07/21/23 1230)  Peak Airway Pressure: 22 cmH20 (07/21/23 1158)  Plateau Pressure: 0 cmH20 (07/21/23 1158)  Total Ve: 9.52 L/m (07/21/23  1158)  Negative Inspiratory Force (cm H2O): 0 (07/21/23 1158)  F/VT Ratio<105 (RSBI): (!) 26.87 (07/21/23 1158)    Lines/Drains/Airways       Central Venous Catheter Line  Duration                  Percutaneous Central Line Insertion/Assessment - Quad lumen  07/21/23 0801 Subclavian Left 1 day    Percutaneous Central Line Insertion/Assessment - Quad Lumen  07/21/23 0801 Subclavian Left 1 day              Drain  Duration                  Chest Tube 07/21/23 Tube - 3 Left Pleural 19 Fr. 2 days         Y Chest Tube 1 and 2 07/21/23 1 Anterior Mediastinal 19 Fr. 2 Anterior Mediastinal 19 Fr. 2 days         Urethral Catheter 07/21/23 0720 Temperature probe;Non-latex 14 Fr. 1 day              Airway  Duration                  Airway - Non-Surgical 07/21/23 0711 1 day              Arterial Line  Duration             Arterial Line 07/21/23 0801 Left Radial 1 day              Peripheral Intravenous Line  Duration                  Peripheral IV - Single Lumen 07/21/23 0632 18 G Anterior;Left Forearm 1 day         Peripheral IV - Single Lumen 07/21/23 1002 16 G Anterior;Right Forearm 1 day                    Significant Labs:    CBC/Anemia Profile:  Recent Labs   Lab 07/21/23  1041 07/21/23  1127 07/22/23  0243 07/22/23  0306 07/22/23  1038 07/22/23  1416 07/23/23  0311   WBC 19.24*  --  8.71  --   --   --  9.81   HGB 10.8*  --  9.3*  --   --   --  10.0*   HCT 32.1*   < > 28.0*   < > 31* 31* 29.2*     --  170  --   --   --  162   MCV 87  --  87  --   --   --  85   RDW 14.1  --  14.3  --   --   --  14.2    < > = values in this interval not displayed.        Chemistries:  Recent Labs   Lab 07/21/23  1041 07/21/23  1547 07/21/23  1932 07/22/23  0243 07/22/23  0827 07/22/23  1919 07/22/23  2221 07/23/23  0311    138  --  138  --   --  137 135*   K 5.0 4.6   < > 4.9   < > 4.5 4.7 4.6    112*  --  111*  --   --  109 108   CO2 17* 20*  --  17*  --   --  18* 19*   BUN 33* 27*  --  27*  --   --  29* 29*   CREATININE  1.9* 1.6*  --  1.9*  --   --  2.1* 2.1*   CALCIUM 8.1* 7.8*  --  8.1*  --   --  8.4* 8.4*   ALBUMIN 3.6  --   --  4.1  --   --  3.7 3.5   PROT 6.0  --   --  6.2  --   --  6.3 6.2   BILITOT 0.4  --   --  0.6  --   --  0.4 0.5   ALKPHOS 44*  --   --  37*  --   --  40* 40*   ALT 10  --   --  9*  --   --  7* 6*   AST 15  --   --  19  --   --  26 25   MG 1.8 2.5  --  2.4  --   --  2.3 2.5   PHOS 2.8  2.8 2.9  --   --   --   --  2.3*  --     < > = values in this interval not displayed.       All pertinent labs within the past 24 hours have been reviewed.    Significant Imaging:  I have reviewed all pertinent imaging results/findings within the past 24 hours.

## 2023-07-23 NOTE — ASSESSMENT & PLAN NOTE
Patient developed Tachy-brendon syndrome 2 days post op. Fluctuating between atrial fibrillation and sinus bradycardia. No prolonged pauses noted on telemetry. Patient with normal blood pressures MAPS>65 during episodes. Patient asymptomatic during events.     Recommendation:  -No need for emergent TVP at this given he is hemodynamically stable. Page EP if hemodynamic changes occur  -Continue telemetry   -Hold BB for now  -Monitor electrolytes closely. Maintain Mg >2 and K >4

## 2023-07-23 NOTE — ASSESSMENT & PLAN NOTE
Mr Redding 67 year-old male former smoker (1ppd x 25 years, quit 25 years ago) with a history of coronary artery disease s/p LCx+OM stents (2022), carotid stenosis, stage 4 chronic kidney disease, hypertension, and diabetes (HbA1C 6.7) now sp single vessel off pump CABG on 7/21.       Neuro/Psych:     - Sedation: off    - Pain:    - Scheduled Tylenol 1g q8h   - Dilaudid PRN, Oxy PRN             Cardiac:     - S/P off pump CABG x1  with Dr. Uriostegui on 7/21    - BP Goal: SBP <150, MAP >65    - Anti-HTNs: Will start when appropriate    - Rhythm: A fib with RVR early AM 7/23    - Beta blocker: metoprolol 50mg daily- hold per cards    - Statin: Atorvastatin 40 mg QD      Pulmonary:     - Goal SpO2 >92%    - Extubated POD0    - Chest Tubes x 2 (1 Med & 1 Pleural)    - ABGs PRN      Renal:     CKD 4    - Trend BUN/Cr     - Maintain Bowman, record strict Is/Os     - Good UOP to lasix 20 iv    Recent Labs   Lab 07/22/23  0243 07/22/23  2221 07/23/23  0311   BUN 27* 29* 29*   CREATININE 1.9* 2.1* 2.1*         FEN / GI:     - Daily CMP, PRN K/Mag/Phos per protocol     - Replace electrolytes as needed    - Nutrition: Cardiac diet    - Bowel Regimen: Miralax, docusate      ID:     - Afebrile    - WBC stable    - Abx: Complete perioperative cefazolin 2g Q8H x 5 doses    Recent Labs   Lab 07/21/23  1041 07/22/23  0243 07/23/23  0311   WBC 19.24* 8.71 9.81         Heme/Onc:     - Hgb 13.4 pre-operatively    - CBC daily    - ASA 81mg , plavix 75 daily    Recent Labs   Lab 07/19/23  0805 07/21/23  1041 07/22/23  0243 07/23/23  0311   HGB 13.4* 10.8* 9.3* 10.0*    193 170 162   APTT 27.6 27.1 30.6  --    INR 1.0 1.2 1.1  --          Endocrine:     - CTS Goal -140    - HgbA1c: 6.7    - Endocrinology consulted for insulin management      PPx:   Feeding: Cardiac  Analgesia/Sedation: dilaudid PRN, oxy PRN,tylenol   Thromboembolic Prevention: heparin   HOB >30: Yes  Stress Ulcer: pantop  Glucose Control: Yes, insulin  management per Endocrinology  Bowel reg: miralax, docusate  Invasive Lines/Drains/Airway:    Left radial arterial line   Left subclavian CVC   Bowman   Chest Tubes: 2 (1 Mediastinal, 1 Pleural)           Dispo/Code Status/Palliative:     - Continue SICU Care    - Full Code

## 2023-07-23 NOTE — PLAN OF CARE
Pt free from falls and injury this shift. All VSS. Pain controlled with prn medication. Sats >95% on 3L NC. SBP maintained <150 and MAP above 65 without issues. Amiodarone infusing. Abdomen rounded/nondistended, no BM this shift. Bowel regimen continued. Midsternal incision covered with island/border intact. Chest tubes discontinued per MD Ezequiel this shift. Bowman intact, uop adequate 1.1L/shift. OOB to chair all shift. Ambulated in room. No other significant events this shift. POC reviewed with pt and family, all questions answered.

## 2023-07-23 NOTE — NURSING
Pt showing frequent ectopy on cardiac monitor, Indu Nieves MD notified of cardiac abnormality. EKG order placed, and CMP sent. No further orders.

## 2023-07-23 NOTE — PLAN OF CARE
"      SICU PLAN OF CARE NOTE    Dx: Coronary artery disease of native artery of native heart with stable angina pectoris    Shift Events: Diuresed overnight. Afebrile, BP WDL. Pt experienced episodes of afib rvr with episodes of bradycardia. Pt V/S stable during these episodes. Minimal incisional pain. EP and Cards consulted. OOBTC in AM.    Goals of Care: MAP >65, SBP <150    Neuro: AAO x4, Follows Commands, and Moves All Extremities    Vital Signs: /62   Pulse 64   Temp 99.4 °F (37.4 °C) (Oral)   Resp 15   Ht 6' 1" (1.854 m)   Wt 98.2 kg (216 lb 8.9 oz)   SpO2 98%   BMI 28.57 kg/m²     Respiratory: Nasal Cannula    Diet: Cardiac Diet    Urine Output: Urinary Catheter 1155 cc/shift    Drains: Chest Tube, total output 50 cc /  shift     Labs/Accuchecks: Accuchecks ACHS.    Skin: incisions per documentation. Heel and sacral foams on. Weight shifting assistance provided as needed.      "

## 2023-07-23 NOTE — PLAN OF CARE
Patient with new post op paroxysmal atrial fibrillation. After spontaneous conversion to sinus he has brief sinus bradycardia to low 40s. HD stable and asymptomatic during both AF and sinus bradycardia.    Recs  - IV amiodarone loading protocol for 24 hours followed by 200mg qd for one month   - discontinue all other AV nelda blockers   - no indication for TVP or permanent ppm  - would anticoagulate given elevated chadsvasc score, once felt safe from post op bleeding stain point   - will need follow up with general cardiology on discharge

## 2023-07-23 NOTE — CONSULTS
[facility] - Surgical Intensive Care  Cardiac Electrophysiology  Consult Note    Admission Date: 7/21/2023  Code Status: Full Code   Attending Provider: Jass Uriostegui MD  Consulting Provider: Bobbi Perry MD  Principal Problem:Coronary artery disease of native artery of native heart with stable angina pectoris    Inpatient consult to Electrophysiology  Consult performed by: Bobbi Perry MD  Consult ordered by: Indu Nieves MD        Subjective:     Chief Complaint:  Tachy/Jose syndrome     HPI:   Addy Redding is a 67 y.o. male with a PMHx of former smoker (1ppd x 25 years, quit 25 years ago) with a history of coronary artery disease s/p LCx+OM stents (2022), carotid stenosis, stage 4 chronic kidney disease, hypertension, and diabetes (HbA1C 6.7).  Patient underwent single vessel off pump coronary artery bypass grafting (OPCAB)-Left internal mammary artery to the distal left anterior descending artery with Dr. Uriostegui on 7/21.   Morning of 7/23 patient developed short runs of Afib with RVR followed by sinus bradycardia. Patient was hemodynamically stable and asymptomatic during events. EP consulted for possible TVP.         Past Medical History:   Diagnosis Date    CKD (chronic kidney disease) stage 3, GFR 30-59 ml/min     Costochondritis     Diabetic nephropathy associated with type 2 diabetes mellitus     Diabetic retinopathy     ED (erectile dysfunction)     GERD (gastroesophageal reflux disease)     Hyperlipidemia     Hypertension     Prostate cancer     Type II or unspecified type diabetes mellitus with renal manifestations, uncontrolled(250.42)        Past Surgical History:   Procedure Laterality Date    Bone biopsy right femur      CORONARY ANGIOGRAPHY N/A 06/16/2022    Procedure: ANGIOGRAM, CORONARY ARTERY;  Surgeon: Carter Arevalo MD;  Location: [facility] CATH LAB/EP;  Service: Cardiology;  Laterality: N/A;    FRACTIONAL FLOW RESERVE (FFR), CORONARY   6/27/2023    Procedure: Fractional Flow [place] (FFR), Coronary;  Surgeon: Je Fontanez MD;  Location: [facility] CATH LAB/EP;  Service: Cardiology;;    INSTANTANEOUS WAVE-FREE RATIO (IFR) N/A 6/27/2023    Procedure: Instantaneous Wave-Free Ratio (IFR);  Surgeon: Je Fontanez MD;  Location: [facility] CATH LAB/EP;  Service: Cardiology;  Laterality: N/A;    LEFT HEART CATHETERIZATION Left 04/07/2022    Procedure: Left heart cath;  Surgeon: Carter Arevalo MD;  Location: [facility] CATH LAB/EP;  Service: Cardiology;  Laterality: Left;    LEFT HEART CATHETERIZATION Left 6/27/2023    Procedure: Left heart cath;  Surgeon: Je Fontanez MD;  Location: [facility] CATH LAB/EP;  Service: Cardiology;  Laterality: Left;    PROSTATE BIOPSY  10/05/2022    RADIOACTIVE SEED IMPLANTATION OF PROSTATE  1/12/2023    Procedure: INSERTION, RADIOACTIVE SEED, PROSTATE;  Surgeon: Scott Frias MD;  Location: [facility] OR [address] [place] [address];  Service: Urology;;    TRANSRECTAL ULTRASOUND EXAMINATION N/A 1/12/2023    Procedure: ULTRASOUND, RECTAL APPROACH;  Surgeon: Scott Frias MD;  Location: [facility] OR [address] [place] [address];  Service: Urology;  Laterality: N/A;       Review of patient's allergies indicates:  No Known Allergies    No current facility-administered medications on file prior to encounter.     Current Outpatient Medications on File Prior to Encounter   Medication Sig    amLODIPine (NORVASC) 5 MG tablet Take 2 tablets (10 mg total) by mouth once daily.    atorvastatin (LIPITOR) 80 MG tablet Take 1 tablet (80 mg total) by mouth once daily.    clopidogreL (PLAVIX) 75 mg tablet Take 1 tablet (75 mg total) by mouth once daily.    ergocalciferol (ERGOCALCIFEROL) 50,000 unit Cap Take 1 capsule (50,000 Units total) by mouth every 7 days.    insulin (LANTUS SOLOSTAR U-100 INSULIN) glargine 100 units/mL SubQ pen Inject 12 Units into the skin 2 (two) times a day.    olmesartan (BENICAR) 5 MG Tab Take 1 tablet (5 mg total) by mouth once daily.     "pantoprazole (PROTONIX) 40 MG tablet TAKE ONE TABLET BY MOUTH ONCE DAILY.    semaglutide (OZEMPIC) 0.25 mg or 0.5 mg (2 mg/3 mL) pen injector Inject 0.25 mg into the skin every 7 days.    tamsulosin (FLOMAX) 0.4 mg Cap Take by mouth once daily.    ADVANCED GLUC METER TEST STRIP Strp USE TO TEST BLOOD SUGAR 4 TIMES DAILY.    aspirin (ECOTRIN) 81 MG EC tablet Take 1 tablet (81 mg total) by mouth once daily.    BD ARIA 2ND GEN PEN NEEDLE 32 gauge x 5/32" Ndle     blood sugar diagnostic (BLOOD GLUCOSE TEST) Strp Check sugar once daily    blood-glucose meter Misc by Misc.(Non-Drug; Combo Route) route.    blood-glucose meter,continuous (DEXCOM G6 ) Misc 1 Device by Misc.(Non-Drug; Combo Route) route continuous.    blood-glucose sensor (DEXCOM G6 SENSOR) Tia 1 Device by Misc.(Non-Drug; Combo Route) route continuous.    blood-glucose transmitter (DEXCOM G6 TRANSMITTER) Tia 1 Device by Misc.(Non-Drug; Combo Route) route continuous.    dapagliflozin (FARXIGA) 10 mg tablet Take 1 tablet (10 mg total) by mouth once daily.    doxazosin (CARDURA) 2 MG tablet Take 1 tablet (2 mg total) by mouth every evening.    fluticasone propionate (FLONASE) 50 mcg/actuation nasal spray 2 sprays (100 mcg total) by Each Nostril route once daily.    lancets (ACCU-CHEK SOFTCLIX LANCETS) Misc 1 lancet by Misc.(Non-Drug; Combo Route) route 2 (two) times daily.    latanoprost 0.005 % ophthalmic solution     nitroGLYCERIN (NITROSTAT) 0.4 MG SL tablet Place 1 tablet (0.4 mg total) under the tongue every 5 (five) minutes as needed for Chest pain.    sildenafil (REVATIO) 20 mg Tab Take 1 tablet (20 mg total) by mouth daily as needed for ED    sodium bicarbonate 650 MG tablet Take 2 tablets (1,300 mg total) by mouth 2 (two) times daily.     Family History       Problem Relation (Age of Onset)    Asthma Son    Cancer Sister, Brother    Coronary artery disease Father, Sister    Dementia Sister    Diabetes Mother, Sister, Sister, " Brother, Brother, Brother    HIV Brother    Heart attack Father, Sister    Heart disease Sister    Hyperlipidemia Father, Sister, Brother, Brother    Hypertension Mother, Sister, Sister, Brother, Brother, Brother, Brother, Son    Kidney disease Mother    Lung cancer Brother    No Known Problems Sister, Brother, Daughter    Peripheral vascular disease Brother    Stomach cancer Sister    Stroke Brother, Brother          Tobacco Use    Smoking status: Former     Packs/day: 0.50     Years: 26.00     Pack years: 13.00     Types: Cigarettes     Quit date:      Years since quittin.5     Passive exposure: Never    Smokeless tobacco: Never   Substance and Sexual Activity    Alcohol use: Not Currently    Drug use: No    Sexual activity: Yes     Partners: Female     Review of Systems   Constitutional: Negative for malaise/fatigue.   HENT: Negative.     Eyes: Negative.    Cardiovascular:  Negative for chest pain, leg swelling, near-syncope and syncope.   Respiratory: Negative.     Endocrine: Negative.    Musculoskeletal: Negative.    Genitourinary: Negative.    Neurological: Negative.    Psychiatric/Behavioral: Negative.     Objective:     Vital Signs (Most Recent):  Temp: 99.4 °F (37.4 °C) (23 0300)  Pulse: 70 (23 0430)  Resp: 18 (23 0430)  BP: (!) 144/66 (23 0400)  SpO2: 95 % (23 0430) Vital Signs (24h Range):  Temp:  [98.9 °F (37.2 °C)-100 °F (37.8 °C)] 99.4 °F (37.4 °C)  Pulse:  [48-86] 70  Resp:  [11-30] 18  SpO2:  [90 %-100 %] 95 %  BP: (100-144)/(51-89) 144/66  Arterial Line BP: ()/(37-58) 126/50       Weight: 98.2 kg (216 lb 8.9 oz)  Body mass index is 28.57 kg/m².    SpO2: 95 %        Physical Exam  Cardiovascular:      Rate and Rhythm: Rhythm irregular.      Comments: Midline sternotomy   Pulmonary:      Effort: Pulmonary effort is normal.      Breath sounds: Normal breath sounds.   Abdominal:      Palpations: Abdomen is soft.   Musculoskeletal:      Right lower leg:  No edema.      Left lower leg: No edema.   Neurological:      General: No focal deficit present.      Mental Status: He is alert and oriented to person, place, and time.          Significant Labs: All pertinent lab results from the last 24 hours have been reviewed.    Significant Imaging:     Cath Results 6/27/23:  Access: Rt Radial   LM:  SCARLET III  flow mild disease   LAD: SCARLET- flow ostial LAD 80%. FFR 0.71   LCx:  SCARLET- flow  patent LCX/OM stents  RCA: SCARLET- flow   LVgram: LVEDP 7  Intervention: iFR LAD 0.71      TTE 3/9/2023   Normal systolic function.   The estimated ejection fraction is 55%.   Normal left ventricular diastolic function.   Normal right ventricular size with normal right ventricular systolic function.   Normal central venous pressure (3 mmHg).   The estimated PA systolic pressure is 23 mmHg.              Assessment and Plan:     Tachycardia-bradycardia syndrome  Patient developed Tachy-brendon syndrome 2 days post op. Fluctuating between atrial fibrillation and sinus bradycardia. No prolonged pauses noted on telemetry. Patient with normal blood pressures MAPS>65 during episodes. Patient asymptomatic during events.     Recommendation:  -No need for emergent TVP at this given he is hemodynamically stable. Page EP if hemodynamic changes occur  -Continue telemetry   -Hold BB for now  -Monitor electrolytes closely. Maintain Mg >2 and K >4        Staffed overnight with EP fellow Dr. Silverman     Thank you for your consult. I will follow-up with patient. Please contact us if you have any additional questions.    Bobbi Perry MD  Cardiac Electrophysiology  [facility] - Surgical Intensive Care

## 2023-07-24 PROBLEM — I48.0 PAROXYSMAL A-FIB: Status: ACTIVE | Noted: 2023-07-24

## 2023-07-24 PROBLEM — Z95.1 S/P CABG (CORONARY ARTERY BYPASS GRAFT): Status: ACTIVE | Noted: 2023-07-24

## 2023-07-24 LAB
ALBUMIN SERPL BCP-MCNC: 3.2 G/DL (ref 3.5–5.2)
ALP SERPL-CCNC: 47 U/L (ref 55–135)
ALT SERPL W/O P-5'-P-CCNC: <5 U/L (ref 10–44)
ANION GAP SERPL CALC-SCNC: 8 MMOL/L (ref 8–16)
AST SERPL-CCNC: 23 U/L (ref 10–40)
BASOPHILS # BLD AUTO: 0.03 K/UL (ref 0–0.2)
BASOPHILS NFR BLD: 0.4 % (ref 0–1.9)
BILIRUB SERPL-MCNC: 0.3 MG/DL (ref 0.1–1)
BUN SERPL-MCNC: 33 MG/DL (ref 8–23)
CALCIUM SERPL-MCNC: 8.5 MG/DL (ref 8.7–10.5)
CHLORIDE SERPL-SCNC: 104 MMOL/L (ref 95–110)
CO2 SERPL-SCNC: 21 MMOL/L (ref 23–29)
CREAT SERPL-MCNC: 2 MG/DL (ref 0.5–1.4)
DIFFERENTIAL METHOD: ABNORMAL
EOSINOPHIL # BLD AUTO: 0.1 K/UL (ref 0–0.5)
EOSINOPHIL NFR BLD: 1.5 % (ref 0–8)
ERYTHROCYTE [DISTWIDTH] IN BLOOD BY AUTOMATED COUNT: 14 % (ref 11.5–14.5)
EST. GFR  (NO RACE VARIABLE): 35.5 ML/MIN/1.73 M^2
GLUCOSE SERPL-MCNC: 135 MG/DL (ref 70–110)
GLUCOSE SERPL-MCNC: 161 MG/DL (ref 70–110)
HCO3 UR-SCNC: 23.7 MMOL/L (ref 24–28)
HCT VFR BLD AUTO: 29.9 % (ref 40–54)
HCT VFR BLD CALC: 32 %PCV (ref 36–54)
HGB BLD-MCNC: 9.9 G/DL (ref 14–18)
IMM GRANULOCYTES # BLD AUTO: 0.02 K/UL (ref 0–0.04)
IMM GRANULOCYTES NFR BLD AUTO: 0.3 % (ref 0–0.5)
LYMPHOCYTES # BLD AUTO: 0.8 K/UL (ref 1–4.8)
LYMPHOCYTES NFR BLD: 10.3 % (ref 18–48)
MAGNESIUM SERPL-MCNC: 2.2 MG/DL (ref 1.6–2.6)
MCH RBC QN AUTO: 28.1 PG (ref 27–31)
MCHC RBC AUTO-ENTMCNC: 33.1 G/DL (ref 32–36)
MCV RBC AUTO: 85 FL (ref 82–98)
MONOCYTES # BLD AUTO: 1 K/UL (ref 0.3–1)
MONOCYTES NFR BLD: 12.8 % (ref 4–15)
NEUTROPHILS # BLD AUTO: 5.9 K/UL (ref 1.8–7.7)
NEUTROPHILS NFR BLD: 74.7 % (ref 38–73)
NRBC BLD-RTO: 0 /100 WBC
PCO2 BLDA: 57.7 MMHG (ref 35–45)
PH SMN: 7.22 [PH] (ref 7.35–7.45)
PLATELET # BLD AUTO: 183 K/UL (ref 150–450)
PMV BLD AUTO: 11 FL (ref 9.2–12.9)
PO2 BLDA: 438 MMHG (ref 80–100)
POC BE: -4 MMOL/L
POC IONIZED CALCIUM: 1.14 MMOL/L (ref 1.06–1.42)
POC SATURATED O2: 100 % (ref 95–100)
POC TCO2: 25 MMOL/L (ref 23–27)
POCT GLUCOSE: 137 MG/DL (ref 70–110)
POCT GLUCOSE: 141 MG/DL (ref 70–110)
POCT GLUCOSE: 158 MG/DL (ref 70–110)
POCT GLUCOSE: 193 MG/DL (ref 70–110)
POTASSIUM BLD-SCNC: 4.5 MMOL/L (ref 3.5–5.1)
POTASSIUM SERPL-SCNC: 4 MMOL/L (ref 3.5–5.1)
POTASSIUM SERPL-SCNC: 4.1 MMOL/L (ref 3.5–5.1)
POTASSIUM SERPL-SCNC: 4.2 MMOL/L (ref 3.5–5.1)
POTASSIUM SERPL-SCNC: 4.2 MMOL/L (ref 3.5–5.1)
PROT SERPL-MCNC: 6.2 G/DL (ref 6–8.4)
RBC # BLD AUTO: 3.52 M/UL (ref 4.6–6.2)
SAMPLE: ABNORMAL
SODIUM BLD-SCNC: 139 MMOL/L (ref 136–145)
SODIUM SERPL-SCNC: 133 MMOL/L (ref 136–145)
WBC # BLD AUTO: 7.86 K/UL (ref 3.9–12.7)

## 2023-07-24 PROCEDURE — 25000003 PHARM REV CODE 250: Performed by: THORACIC SURGERY (CARDIOTHORACIC VASCULAR SURGERY)

## 2023-07-24 PROCEDURE — 25000003 PHARM REV CODE 250

## 2023-07-24 PROCEDURE — 94761 N-INVAS EAR/PLS OXIMETRY MLT: CPT

## 2023-07-24 PROCEDURE — 99900035 HC TECH TIME PER 15 MIN (STAT)

## 2023-07-24 PROCEDURE — 63600175 PHARM REV CODE 636 W HCPCS

## 2023-07-24 PROCEDURE — 94799 UNLISTED PULMONARY SVC/PX: CPT

## 2023-07-24 PROCEDURE — 99233 SBSQ HOSP IP/OBS HIGH 50: CPT | Mod: GC,,, | Performed by: INTERNAL MEDICINE

## 2023-07-24 PROCEDURE — 99233 PR SUBSEQUENT HOSPITAL CARE,LEVL III: ICD-10-PCS | Mod: ,,, | Performed by: INTERNAL MEDICINE

## 2023-07-24 PROCEDURE — 63600175 PHARM REV CODE 636 W HCPCS: Performed by: THORACIC SURGERY (CARDIOTHORACIC VASCULAR SURGERY)

## 2023-07-24 PROCEDURE — 84132 ASSAY OF SERUM POTASSIUM: CPT | Performed by: NURSE PRACTITIONER

## 2023-07-24 PROCEDURE — 83735 ASSAY OF MAGNESIUM: CPT | Performed by: NURSE PRACTITIONER

## 2023-07-24 PROCEDURE — 97535 SELF CARE MNGMENT TRAINING: CPT

## 2023-07-24 PROCEDURE — 11000001 HC ACUTE MED/SURG PRIVATE ROOM

## 2023-07-24 PROCEDURE — 99233 PR SUBSEQUENT HOSPITAL CARE,LEVL III: ICD-10-PCS | Mod: GC,,, | Performed by: INTERNAL MEDICINE

## 2023-07-24 PROCEDURE — 27000221 HC OXYGEN, UP TO 24 HOURS

## 2023-07-24 PROCEDURE — 85025 COMPLETE CBC W/AUTO DIFF WBC: CPT | Performed by: NURSE PRACTITIONER

## 2023-07-24 PROCEDURE — 97116 GAIT TRAINING THERAPY: CPT

## 2023-07-24 PROCEDURE — 99233 SBSQ HOSP IP/OBS HIGH 50: CPT | Mod: ,,, | Performed by: INTERNAL MEDICINE

## 2023-07-24 PROCEDURE — 97530 THERAPEUTIC ACTIVITIES: CPT

## 2023-07-24 PROCEDURE — 80053 COMPREHEN METABOLIC PANEL: CPT | Performed by: NURSE PRACTITIONER

## 2023-07-24 PROCEDURE — 25000003 PHARM REV CODE 250: Performed by: NURSE PRACTITIONER

## 2023-07-24 RX ORDER — DOCUSATE SODIUM 100 MG/1
100 CAPSULE, LIQUID FILLED ORAL 2 TIMES DAILY
Status: DISCONTINUED | OUTPATIENT
Start: 2023-07-24 | End: 2023-07-26 | Stop reason: HOSPADM

## 2023-07-24 RX ORDER — FUROSEMIDE 10 MG/ML
80 INJECTION INTRAMUSCULAR; INTRAVENOUS 2 TIMES DAILY
Status: DISCONTINUED | OUTPATIENT
Start: 2023-07-24 | End: 2023-07-24

## 2023-07-24 RX ORDER — AMIODARONE HYDROCHLORIDE 200 MG/1
200 TABLET ORAL DAILY
Status: DISCONTINUED | OUTPATIENT
Start: 2023-07-25 | End: 2023-07-26 | Stop reason: HOSPADM

## 2023-07-24 RX ORDER — FUROSEMIDE 10 MG/ML
40 INJECTION INTRAMUSCULAR; INTRAVENOUS 2 TIMES DAILY
Status: DISCONTINUED | OUTPATIENT
Start: 2023-07-24 | End: 2023-07-24

## 2023-07-24 RX ORDER — FUROSEMIDE 10 MG/ML
40 INJECTION INTRAMUSCULAR; INTRAVENOUS 2 TIMES DAILY
Status: DISCONTINUED | OUTPATIENT
Start: 2023-07-25 | End: 2023-07-25

## 2023-07-24 RX ORDER — FUROSEMIDE 10 MG/ML
20 INJECTION INTRAMUSCULAR; INTRAVENOUS ONCE
Status: DISCONTINUED | OUTPATIENT
Start: 2023-07-24 | End: 2023-07-24

## 2023-07-24 RX ORDER — AMIODARONE HYDROCHLORIDE 200 MG/1
200 TABLET ORAL DAILY
Status: DISCONTINUED | OUTPATIENT
Start: 2023-07-24 | End: 2023-07-24

## 2023-07-24 RX ORDER — DOCUSATE SODIUM 100 MG/1
100 CAPSULE, LIQUID FILLED ORAL DAILY
Status: DISCONTINUED | OUTPATIENT
Start: 2023-07-25 | End: 2023-07-24

## 2023-07-24 RX ORDER — POLYETHYLENE GLYCOL 3350 17 G/17G
17 POWDER, FOR SOLUTION ORAL 2 TIMES DAILY
Status: DISCONTINUED | OUTPATIENT
Start: 2023-07-24 | End: 2023-07-26 | Stop reason: HOSPADM

## 2023-07-24 RX ADMIN — ACETAMINOPHEN 1000 MG: 500 TABLET ORAL at 05:07

## 2023-07-24 RX ADMIN — AMIODARONE HYDROCHLORIDE 200 MG: 200 TABLET ORAL at 08:07

## 2023-07-24 RX ADMIN — MUPIROCIN 1 G: 20 OINTMENT TOPICAL at 08:07

## 2023-07-24 RX ADMIN — ATORVASTATIN CALCIUM 80 MG: 40 TABLET, FILM COATED ORAL at 09:07

## 2023-07-24 RX ADMIN — MUPIROCIN 1 G: 20 OINTMENT TOPICAL at 09:07

## 2023-07-24 RX ADMIN — ASPIRIN 81 MG 81 MG: 81 TABLET ORAL at 08:07

## 2023-07-24 RX ADMIN — INSULIN ASPART 2 UNITS: 100 INJECTION, SOLUTION INTRAVENOUS; SUBCUTANEOUS at 11:07

## 2023-07-24 RX ADMIN — ACETAMINOPHEN 1000 MG: 500 TABLET ORAL at 06:07

## 2023-07-24 RX ADMIN — ACETAMINOPHEN 1000 MG: 500 TABLET ORAL at 09:07

## 2023-07-24 RX ADMIN — POLYETHYLENE GLYCOL 3350 17 G: 17 POWDER, FOR SOLUTION ORAL at 08:07

## 2023-07-24 RX ADMIN — POTASSIUM CHLORIDE 20 MEQ: 1500 TABLET, EXTENDED RELEASE ORAL at 08:07

## 2023-07-24 RX ADMIN — DOCUSATE SODIUM 100 MG: 100 CAPSULE, LIQUID FILLED ORAL at 09:07

## 2023-07-24 RX ADMIN — HEPARIN SODIUM 5000 UNITS: 5000 INJECTION INTRAVENOUS; SUBCUTANEOUS at 03:07

## 2023-07-24 RX ADMIN — DOCUSATE SODIUM 100 MG: 100 CAPSULE, LIQUID FILLED ORAL at 08:07

## 2023-07-24 RX ADMIN — OXYCODONE HYDROCHLORIDE 5 MG: 5 TABLET ORAL at 10:07

## 2023-07-24 RX ADMIN — FUROSEMIDE 80 MG: 10 INJECTION, SOLUTION INTRAMUSCULAR; INTRAVENOUS at 08:07

## 2023-07-24 RX ADMIN — INSULIN ASPART 2 UNITS: 100 INJECTION, SOLUTION INTRAVENOUS; SUBCUTANEOUS at 07:07

## 2023-07-24 RX ADMIN — HEPARIN SODIUM 5000 UNITS: 5000 INJECTION INTRAVENOUS; SUBCUTANEOUS at 10:07

## 2023-07-24 RX ADMIN — TAMSULOSIN HYDROCHLORIDE 0.4 MG: 0.4 CAPSULE ORAL at 08:07

## 2023-07-24 RX ADMIN — OXYCODONE HYDROCHLORIDE 5 MG: 5 TABLET ORAL at 06:07

## 2023-07-24 RX ADMIN — HEPARIN SODIUM 5000 UNITS: 5000 INJECTION INTRAVENOUS; SUBCUTANEOUS at 06:07

## 2023-07-24 RX ADMIN — POLYETHYLENE GLYCOL 3350 17 G: 17 POWDER, FOR SOLUTION ORAL at 09:07

## 2023-07-24 RX ADMIN — PANTOPRAZOLE SODIUM 40 MG: 40 TABLET, DELAYED RELEASE ORAL at 08:07

## 2023-07-24 RX ADMIN — CLOPIDOGREL BISULFATE 75 MG: 75 TABLET ORAL at 08:07

## 2023-07-24 RX ADMIN — AMIODARONE HYDROCHLORIDE 0.5 MG/MIN: 1.8 INJECTION, SOLUTION INTRAVENOUS at 06:07

## 2023-07-24 NOTE — PROGRESS NOTES
[facility] - Surgical Intensive Care  Cardiac Electrophysiology  Progress Note    Admission Date: 7/21/2023  Code Status: Full Code   Attending Physician: Jass Uriostegui MD   Expected Discharge Date:   Principal Problem:Coronary artery disease of native artery of native heart with stable angina pectoris    Subjective:     Interval History: NAEO. Patient consistently in sinus rhythm.    Objective:     Vital Signs (Most Recent):  Temp: 99 °F (37.2 °C) (07/24/23 0300)  Pulse: 64 (07/24/23 0900)  Resp: 19 (07/24/23 0900)  BP: 133/62 (07/24/23 0900)  SpO2: (!) 94 % (07/24/23 0900) Vital Signs (24h Range):  Temp:  [98.2 °F (36.8 °C)-99.4 °F (37.4 °C)] 99 °F (37.2 °C)  Pulse:  [53-81] 64  Resp:  [11-34] 19  SpO2:  [91 %-99 %] 94 %  BP: (104-158)/(56-67) 133/62  Arterial Line BP: (110-160)/(35-58) 160/51     Weight: 98.2 kg (216 lb 8.9 oz)  Body mass index is 28.57 kg/m².     SpO2: (!) 94 %        Physical Exam  Cardiovascular:      Rate and Rhythm: Normal rate and regular rhythm.      Comments: Midline sternotomy   Pulmonary:      Effort: Pulmonary effort is normal.      Breath sounds: Normal breath sounds.   Abdominal:      Palpations: Abdomen is soft.   Musculoskeletal:      Right lower leg: No edema.      Left lower leg: No edema.   Neurological:      General: No focal deficit present.      Mental Status: He is alert and oriented to person, place, and time.          Significant Labs: All pertinent lab results from the last 24 hours have been reviewed.        Assessment and Plan:     Tachycardia-bradycardia syndrome  - Continue oral amiodarone 200mg qd for one month    - Start oral anticoagulation as soon as appropriate from post-surgical standpoint  - follow up with general cardiology in 2-4 weeks  EP will sign off        Fernanda Manzanares MD  Cardiac Electrophysiology  [facility] - Surgical Intensive Care

## 2023-07-24 NOTE — PT/OT/SLP PROGRESS
Physical Therapy   Progress Note    Patient Name:  Addy Redding  MRN: [id number]    Admit Date: 7/21/2023  Admitting Diagnosis:  Coronary artery disease of native artery of native heart with stable angina pectoris  Length of Stay: 3 days  Recent Surgery: Procedure(s) (LRB):  CABG, WITHOUT CARDIOPULMONARY PUMP OXYGENATION (N/A) 3 Days Post-Op    Recommendations:     Discharge Recommendations: Other  Equipment recommendations: none  Barriers to discharge: None Identified     Assessment:     Addy Redding is a 67 y.o. male admitted to [facility] on 7/21/2023 with medical diagnosis of Coronary artery disease of native artery of native heart with stable angina pectoris. Pt presents with impaired endurance, impaired functional mobility, gait instability, impaired balance, decreased coordination, impaired cardiopulmonary response to activity. Pt is progressing towards goals, but has not yet reached prior level of function.     Pt is agreeable to therapy session with wife at bedside. Pt in recliner. Reviewed sternal precautions with pt able to recall 3/3. During 1st STS, pt requires Susnana with mild unsteadiness while standing but able to correct with Susanna. While static standing, pt stated he felt lightheaded and dizzy. Returned to sitting after 30 seconds with BP taken: 103/59. Allowed pt to recover for ~5 min. Trialed STS with CGA. Pt able to statically stand for ~1 min. Pt endorsed feeling lightheaded again. Standing BP taken: 85/48. Pt transferred to eob with CGA with RN in room. Pt assisted into supine with RN taking vitals at this time. VSS at end of session with RN at room. Will continue to progress pt as able.    Addy Redding would benefit from continued acute PT intervention to improve quality of life, focus on recovery of impairments, provide patient/caregiver education, reduce fall risk, and maximize (I) and safety with functional mobility. Once medically stable, recommending pt discharge to Other (Home no needs).      Rehab  "Prognosis: Good    Plan:     During this hospitalization, patient to be seen 5 x/week to address the identified rehab impairments via therapeutic activities, gait training, therapeutic exercises, neuromuscular re-education and progress towards stated goals.     Plan of Care Expires:  08/20/23  Plan of Care reviewed with: patient and spouse    This plan of care has been discussed with the patient/caregiver, who was included in its development and is in agreement with the identified goals and treatment plan.     Subjective     Communicated with RN prior to session.  Patient found up in chair upon PT entry to room, agreeable to therapy session. Pt's wife present during session.    Patient/Family Comments/goals: "I really want to walk"    Pain/Comfort:  Pain Rating 1: 0/10  Pain Rating Post-Intervention 1: 0/10    Patients cultural, spiritual, [facility] conflicts given the current situation: None identified     Objective:     Patient found with: harris catheter, oxygen, PICC line, blood pressure cuff, pulse ox (continuous), telemetry    General Precautions: Standard, fall, sternal   Orthopedic Precautions:N/A   Braces: N/A   Oxygen Device: nasal cannula - 2.5L    Cognition:  Pt is Alert during session.    Therapist provided skilled verbal and tactile cueing to facilitate the following functional mobility tasks. Listed tasks are focused on recovery of impairments and improving pt's independence and efficiency with bed mobility, transfers and ambulation as able.     Bed Mobility:  Not assessed d/t pt in recliner    Transfers:   Sit <> Stand Transfer:   Minimal Assistance from recliner with no AD   Contact Guard Assistance from recliner with no AD   Bed <> Chair: Contact Guard Assistance with no AD                  Gait:  Deferred d/t pt feeling lightheaded/dizzy    Balance:  Dynamic Sitting: GOOD: Maintains balance through MODERATE excursions of active trunk movement  Standing:  Static: FAIR+: Takes MINIMAL challenges " from all directions   2 bouts: 30 seconds & 1 min respectively  Dynamic: FAIR: Needs CONTACT GUARD during gait    Outcome Measure: AM-PAC 6 CLICK MOBILITY  Total Score:19     Patient/Caregiver Education and Additional Therapeutic Activities/Exercises   Therapist reinforced education re:   Post-op sternal precautions, including no lifting > 5 lbs, pulling or pushing with BUEs.    Modifying daily activities and functional mobility tasks to maintain sternal precautions appropriately   Importance of participation in therapy and engaging in increased OOB mobility with assistance to improve endurance       Provided pt/caregiver education regarding:   PT POC and goals for therapy   Safety with mobility and fall risk   Safe management of AD as needed   Energy conservation techniques   Instruction on use of call button and importance of calling nursing staff for assistance with mobility     Patient/caregiver able to verbalize understanding; will follow-up with pt/caregiver during current admit for additional questions/concerns within scope of practice.     White board updated.     Patient left supine with all lines intact, call button in reach, and RN present.    OT present for cotreat due to pt's multiple medical comorbidities and functional/cognition deficits requiring two skilled therapists to appropriately progress pt's musculoskeletal strength, neuromuscular control, and endurance while taking into consideration medical acuity and pt safety.    Goals:     Multidisciplinary Problems       Physical Therapy Goals          Problem: Physical Therapy    Goal Priority Disciplines Outcome Goal Variances Interventions   Physical Therapy Goal     PT, PT/OT Ongoing, Progressing     Description: Goals to be met by: 23     Patient will increase functional independence with mobility by performin. Supine to sit with Stand-by Assistance  2. Sit to stand transfer with Supervision  3. Gait  x 250 feet with Supervision .                           Time Tracking:       PT Received On: 07/24/23  PT Start Time: 1037     PT Stop Time: 1100  PT Total Time (min): 23 min     Billable Minutes: Gait Training 8 and Therapeutic Activity 15    07/24/2023

## 2023-07-24 NOTE — PT/OT/SLP PROGRESS
Occupational Therapy   Co-Treatment  Co-treatment performed due to patient's multiple deficits requiring two skilled therapists to appropriately and safely assess patient's strength and endurance while facilitating functional tasks in addition to accommodating for patient's activity tolerance.      Name: Addy Redding  MRN: [id number]  Admitting Diagnosis:  Coronary artery disease of native artery of native heart with stable angina pectoris  3 Days Post-Op    Recommendations:     Discharge Recommendations: other (see comments)  Discharge Equipment Recommendations:  none  Barriers to discharge:  None    Assessment:     Addy Redding is a 67 y.o. male with a medical diagnosis of Coronary artery disease of native artery of native heart with stable angina pectoris.  He presents with the following performance deficits affecting function are weakness, impaired endurance, impaired self care skills, impaired functional mobility, gait instability, impaired balance, pain, decreased safety awareness, impaired cardiopulmonary response to activity. Pt met sitting upright in chair with wife at bedside and agreeable to therapy. He was able to recall 2/3 sternal precautions. Pt was limited with c/o of lightheadedness in static standing with BP measured at 103/59 in sitting, 85/48 in standing, and 167/74 in trendelenburg (however unsure of accuracy due to pt visibly shivering) with RN present throughout. Pt would continue to benefit from skilled OT services to maximize functional independence with ADLs and functional mobility, reduce caregiver burden, and facilitate safe discharge in the least restrictive environment. OT recommending home with no further therapy needs post hospitalization once medically appropriate for discharge.      Rehab Prognosis:  Good; patient would benefit from acute skilled OT services to address these deficits and reach maximum level of function.       Plan:     Patient to be seen 5 x/week to address the above listed  problems via self-care/home management, therapeutic activities, therapeutic exercises  Plan of Care Expires: 08/21/23  Plan of Care Reviewed with: patient, spouse    Subjective     Chief Complaint: lightheadedness in standing  Patient/Family Comments/goals: to get better  Pain/Comfort:  Pain Rating 1: 0/10  Pain Rating Post-Intervention 1: 0/10    Objective:     Communicated with: RNEugene prior to session.  Patient found up in chair with blood pressure cuff, oxygen, harris catheter, pulse ox (continuous), telemetry, peripheral IV upon OT entry to room.    General Precautions: Standard, fall, sternal    Orthopedic Precautions:N/A  Braces: N/A  Respiratory Status: Nasal cannula, flow 2.5 L/min     Occupational Performance:     Bed Mobility:    Patient completed Scooting/Bridging with stand by assistance  Patient completed Sit to Supine with moderate assistance for B LE management  HOB flat    Functional Mobility/Transfers:  Patient completed Sit <> Stand Transfer with minimum assistance and of 1-2 persons  with  no assistive device   Cues to avoid use of UE in order to adhere to sternal precautions  1st trial: Min x2 people to rise  2nd trial: Min x1 person  Pt tolerated static stance for ~1 minute with c/o of lightheadedness  Patient completed Bed < Chair Transfer using Step Transfer technique with contact guard assistance with no assistive device    Activities of Daily Living:  Upper Body Dressing: minimum assistance to don hospital gown due to IV line  Lower Body Dressing: stand by assistance as pt managed (L) sock above heel using figure 4 technique while sitting in chair      [facility] 6 Click ADL: 18    Treatment & Education:  -Sternal precautions and application to functional tasks / ADLs reviewed: no lifting > 5 lbs, pulling or pushing with B UE  -Education on energy conservation and task modification to maximize safety and (I) during ADLs and mobility  -Education on importance of OOB activity to improve overall  activity tolerance and promote recovery  -Pt educated to call for assistance and to transfer with hospital staff only  -Provided education regarding role of OT, POC, & discharge recommendations with pt & spouse verbalizing understanding.  Pt had no further questions & when asked whether there were any concerns pt reported none.     Patient left HOB elevated with all lines intact, call button in reach, and RN and wife present    GOALS:   Multidisciplinary Problems       Occupational Therapy Goals          Problem: Occupational Therapy    Goal Priority Disciplines Outcome Interventions   Occupational Therapy Goal     OT, PT/OT Ongoing, Progressing    Description: Goals to be met by: 7 days 7/29/23      Patient will increase functional independence with ADLs by performing:    Pt to complete standing g/h skills with supervision.   Pt to complete t/f bed, chair and commode with supervision.   Pt to complete UE dressing with set-up  Pt to complete LE dressing with SBA  Pt to complete toileting with SBA                        Time Tracking:     OT Date of Treatment: 07/24/23  OT Start Time: 1037  OT Stop Time: 1100  OT Total Time (min): 23 min    Billable Minutes:Self Care/Home Management 10  Therapeutic Activity 13    OT/ALANA: OT          7/24/2023

## 2023-07-24 NOTE — HOSPITAL COURSE
On 7/21/23, the patient was taken to the Operating Room for the above stated procedure. Please see the previously dictated operative report for complete details. Postoperatively, the patient was taken from the  Operating Room to the ICU where the vital signs were monitored and pain was kept under control. The patient was weaned from the drips and extubated in the ICU per protocol. Once hemodynamically stable, the patient was transferred to the Cardiac Step-Down floor for continued strengthening and ambulation. On postoperative day 5, the patient was ready for discharge to home. At the time of discharge, the patient was ambulating unassisted. Pain was well controlled with oral analgesics and the patient was tolerating the diet. On 7/23/ 23, patient developed Tachy-brendon syndrome, fluctuating between atrial fibrillation and sinus bradycardia, in which EP was following and self converted back to NSR. Per recommendations, he was taken off of beta blockers and placed on IV amiodarone loading protocol for 24 hours followed by 200mg qd for one month. At the time of discharge, patient had sCr 2.4 with  adequate urine production. He is schedule to repeat BMP on Friday 7/28/23 to reassess renal function.     BB contraindicated due to bradycardia.   Patient not placed on ASA, discharged with Xarelto and Plavix.  Patient not placed on Ace-Inhibitor at the time of discharge due to potential for hypotension           MOBILITY AND ACTIVITY: As tolerated. Patient may shower. No heavy lifting of greater than 5 pounds and no driving.     DIET: An 1800-calorie ADA with a 1500 mL fluid restriction.     WOUND CARE INSTRUCTIONS: Check for redness, swelling and drainage around the  incision or wound. Patient is to call for any obvious bleeding, drainage, pus from the wound, unusual problems or difficulties or temperature of greater than 101   degrees.     FOLLOWUP: Follow up with Dr. Uriostegui in approximately 6 weeks. Prior to this   appointment, the patient will have an EKG.  Follow up with Dr. Fontanez in ~ 1 month.          DISCHARGE CONDITION: At the time of discharge, the patient was in sinus rhythm and afebrile with stable vital signs.

## 2023-07-24 NOTE — SUBJECTIVE & OBJECTIVE
Interval History: NAEO. Patient consistently in sinus rhythm.    Objective:     Vital Signs (Most Recent):  Temp: 99 °F (37.2 °C) (07/24/23 0300)  Pulse: 64 (07/24/23 0900)  Resp: 19 (07/24/23 0900)  BP: 133/62 (07/24/23 0900)  SpO2: (!) 94 % (07/24/23 0900) Vital Signs (24h Range):  Temp:  [98.2 °F (36.8 °C)-99.4 °F (37.4 °C)] 99 °F (37.2 °C)  Pulse:  [53-81] 64  Resp:  [11-34] 19  SpO2:  [91 %-99 %] 94 %  BP: (104-158)/(56-67) 133/62  Arterial Line BP: (110-160)/(35-58) 160/51     Weight: 98.2 kg (216 lb 8.9 oz)  Body mass index is 28.57 kg/m².     SpO2: (!) 94 %        Physical Exam  Cardiovascular:      Rate and Rhythm: Normal rate and regular rhythm.      Comments: Midline sternotomy   Pulmonary:      Effort: Pulmonary effort is normal.      Breath sounds: Normal breath sounds.   Abdominal:      Palpations: Abdomen is soft.   Musculoskeletal:      Right lower leg: No edema.      Left lower leg: No edema.   Neurological:      General: No focal deficit present.      Mental Status: He is alert and oriented to person, place, and time.          Significant Labs: All pertinent lab results from the last 24 hours have been reviewed.

## 2023-07-24 NOTE — PLAN OF CARE
Problem: Physical Therapy  Goal: Physical Therapy Goal  Description: Goals to be met by: 23     Patient will increase functional independence with mobility by performin. Supine to sit with Stand-by Assistance  2. Sit to stand transfer with Supervision  3. Gait  x 250 feet with Supervision .     Outcome: Ongoing, Progressing     2023

## 2023-07-24 NOTE — ASSESSMENT & PLAN NOTE
Mr Redding 67 year-old male former smoker (1ppd x 25 years, quit 25 years ago) with a history of coronary artery disease s/p LCx+OM stents (2022), carotid stenosis, stage 4 chronic kidney disease, hypertension, and diabetes (HbA1C 6.7) now sp single vessel off pump CABG on 7/21.       Neuro/Psych:     - Sedation: off    - Pain:    - Scheduled Tylenol 1g q8h   - Dilaudid PRN, Oxy PRN             Cardiac:     - S/P off pump CABG x1  with Dr. Uriostegui on 7/21    - BP Goal: SBP <150, MAP >65    - Anti-HTNs: Will start when appropriate    - Rhythm: NSR    - Beta blocker: hold due to brendon tachy syndrome    - Statin: Atorvastatin 40 mg QD      Pulmonary:     - Goal SpO2 >92%    - NC 1L    - Extubated POD0    - ABGs PRN      Renal:     CKD 4    - Trend BUN/Cr     - Maintain Bowman, record strict Is/Os    - Good UOP 2.3L    Recent Labs   Lab 07/23/23  0311 07/23/23  0400 07/24/23  0320   BUN 29* 30* 33*   CREATININE 2.1* 2.1* 2.0*         FEN / GI:     - Daily CMP, PRN K/Mag/Phos per protocol     - Replace electrolytes as needed    - Nutrition: Cardiac diet    - Bowel Regimen: Miralax, docusate      ID:     - Afebrile    - WBC stable    - Abx: Completed perioperative cefazolin 2g Q8H x 5 doses    Recent Labs   Lab 07/23/23  0311 07/23/23  0400 07/24/23  0320   WBC 9.81 10.00 7.86         Heme/Onc:     - Hgb 13.4 pre-operatively    - CBC daily    - ASA 81mg , plavix 75 daily    Recent Labs   Lab 07/19/23  0805 07/21/23  1041 07/22/23  0243 07/23/23  0311 07/23/23  0400 07/24/23  0320   HGB 13.4* 10.8* 9.3* 10.0* 10.3* 9.9*    193 170 162 175 183   APTT 27.6 27.1 30.6  --   --   --    INR 1.0 1.2 1.1  --   --   --          Endocrine:     - CTS Goal -140    - HgbA1c: 6.7    - Endocrinology consulted for insulin management      PPx:   Feeding: Cardiac  Analgesia/Sedation: dilaudid PRN, oxy PRN,tylenol   Thromboembolic Prevention: heparin   HOB >30: Yes  Stress Ulcer: pantop  Glucose Control: Yes, insulin  management per Endocrinology  Bowel reg: miralax, docusate  Invasive Lines/Drains/Airway:    Left radial arterial line   Left subclavian CVC   Bowman   Chest Tubes: 2 (1 Mediastinal, 1 Pleural)           Dispo/Code Status/Palliative:     - Continue SICU Care    - Full Code

## 2023-07-24 NOTE — NURSING
"While working with OT and standing, pt w/ decrease in SBP and increase in heart rate and states is "dizzy".  Pt returned to bed by OT, and returns to normotensive state after being placed supine (with pt stating full relief obtained).  Dr. Nieves notified of situation and presents to bedside to assess.  "

## 2023-07-24 NOTE — SUBJECTIVE & OBJECTIVE
Interval History/Significant Events:   NAEON    Follow-up For: Procedure(s) (LRB):  CABG, WITHOUT CARDIOPULMONARY PUMP OXYGENATION (N/A)    Post-Operative Day: 3 Days Post-Op    Objective:     Vital Signs (Most Recent):  Temp: 99 °F (37.2 °C) (07/24/23 0300)  Pulse: (!) 58 (07/24/23 0645)  Resp: 18 (07/24/23 0645)  BP: 125/63 (07/24/23 0600)  SpO2: (!) 93 % (07/24/23 0645) Vital Signs (24h Range):  Temp:  [98.2 °F (36.8 °C)-99.4 °F (37.4 °C)] 99 °F (37.2 °C)  Pulse:  [] 58  Resp:  [11-35] 18  SpO2:  [91 %-99 %] 93 %  BP: (104-158)/(56-67) 125/63  Arterial Line BP: (102-160)/(35-58) 160/51     Weight: 98.2 kg (216 lb 8.9 oz)  Body mass index is 28.57 kg/m².      Intake/Output Summary (Last 24 hours) at 7/24/2023 0646  Last data filed at 7/24/2023 0600  Gross per 24 hour   Intake 469.38 ml   Output 2400 ml   Net -1930.62 ml          Physical Exam  Constitutional:       Appearance: Normal appearance.   HENT:      Head: Normocephalic and atraumatic.   Eyes:      Pupils: Pupils are equal, round, and reactive to light.   Cardiovascular:      Rate and Rhythm: Normal rate and regular rhythm.      Comments: Midline sternotomy  Left subclavian CVC  Left radial a line  Abdominal:      General: Abdomen is flat.      Palpations: Abdomen is soft.   Musculoskeletal:         General: Normal range of motion.      Cervical back: Normal range of motion and neck supple.   Skin:     General: Skin is warm.   Neurological:      General: No focal deficit present.            Vents:  Vent Mode: A/C (07/21/23 1158)  Set Rate: 18 BPM (07/21/23 1158)  Vt Set: 480 mL (07/21/23 1158)  Pressure Support: 10 cmH20 (07/21/23 1158)  PEEP/CPAP: 5 cmH20 (07/21/23 1158)  Oxygen Concentration (%): 32 (07/23/23 2230)  Peak Airway Pressure: 22 cmH20 (07/21/23 1158)  Plateau Pressure: 0 cmH20 (07/21/23 1158)  Total Ve: 9.52 L/m (07/21/23 1158)  Negative Inspiratory Force (cm H2O): 0 (07/21/23 1158)  F/VT Ratio<105 (RSBI): (!) 26.87 (07/21/23  1158)    Lines/Drains/Airways       Central Venous Catheter Line  Duration                  Percutaneous Central Line Insertion/Assessment - Quad lumen  07/21/23 0801 Subclavian Left 2 days    Percutaneous Central Line Insertion/Assessment - Quad Lumen  07/21/23 0801 Subclavian Left 2 days              Drain  Duration                  Urethral Catheter 07/21/23 0720 Temperature probe;Non-latex 14 Fr. 2 days              Arterial Line  Duration             Arterial Line 07/21/23 0801 Left Radial 2 days              Peripheral Intravenous Line  Duration                  Peripheral IV - Single Lumen 07/21/23 0632 18 G Anterior;Left Forearm 3 days         Peripheral IV - Single Lumen 07/21/23 1002 16 G Anterior;Right Forearm 2 days                    Significant Labs:    CBC/Anemia Profile:  Recent Labs   Lab 07/23/23  0311 07/23/23  0400 07/24/23  0320   WBC 9.81 10.00 7.86   HGB 10.0* 10.3* 9.9*   HCT 29.2* 30.2* 29.9*    175 183   MCV 85 85 85   RDW 14.2 14.4 14.0        Chemistries:  Recent Labs   Lab 07/22/23  2221 07/23/23  0311 07/23/23  0400 07/23/23  0746 07/23/23  2158 07/24/23  0320    135* 135*  --   --  133*   K 4.7 4.6 4.7 4.6 4.2 4.0    108 107  --   --  104   CO2 18* 19* 18*  --   --  21*   BUN 29* 29* 30*  --   --  33*   CREATININE 2.1* 2.1* 2.1*  --   --  2.0*   CALCIUM 8.4* 8.4* 9.0  --   --  8.5*   ALBUMIN 3.7 3.5 3.7  --   --  3.2*   PROT 6.3 6.2 6.4  --   --  6.2   BILITOT 0.4 0.5 0.5  --   --  0.3   ALKPHOS 40* 40* 45*  --   --  47*   ALT 7* 6* 8*  --   --  <5*   AST 26 25 27  --   --  23   MG 2.3 2.5 2.4  --   --  2.2   PHOS 2.3*  --  2.9  --   --   --        All pertinent labs within the past 24 hours have been reviewed.    Significant Imaging:  I have reviewed all pertinent imaging results/findings within the past 24 hours.

## 2023-07-24 NOTE — HPI
Mr. Redding is a 67 year-old male former smoker (1ppd x 25 years, quit 25 years ago) with a history of coronary artery disease s/p LCx+OM stents (2022), carotid stenosis, stage 4 chronic kidney disease, hypertension, and diabetes (HbA1C 6.7).  Recently, he has been symptomatic with dyspnea on exertion symptoms interfering with his quality of life.  His most recent echocardiogram showed 55% ejection fraction with no significant valvulopathy.  Angiogram showed 80% ostial LAD with a moderate sized mid and distal LAD, nonsignificant LCx disease (patent stents), and totally occluded ostial RCA with diffuse mid and distal RCA disease and small PDA (fills via left to right collaterals).     CT chest noncontrast shows minimal ascending aortic and mild aortic arch calcifications. Carotid ultrasound shows 40-59% right sided disease and 60-79% left sided disease.     We had an extensive discussion with him regarding the ACC/AHA guidelines and we agreed that he has class I indications for surgery involving off pump coronary artery bypass surgery (LIMA-LAD, possible SVG-PDA).   The risks and benefits were explained and informed consent was obtained.

## 2023-07-24 NOTE — CARE UPDATE
Care Update:     No acute events overnight. Patient on the SICU in room 78183/39589 A. Blood glucose stable. BG at goal on current insulin regimen (SSI ). Steroid use- None. 3 Days Post-Op  Renal function- Normal   Vasopressors-  None       Diet Cardiac Fluid - 1500mL     POCT Glucose   Date Value Ref Range Status   07/23/2023 153 (H) 70 - 110 mg/dL Final   07/23/2023 156 (H) 70 - 110 mg/dL Final   07/23/2023 148 (H) 70 - 110 mg/dL Final   07/23/2023 137 (H) 70 - 110 mg/dL Final   07/23/2023 138 (H) 70 - 110 mg/dL Final   07/22/2023 145 (H) 70 - 110 mg/dL Final   07/22/2023 136 (H) 70 - 110 mg/dL Final   07/22/2023 139 (H) 70 - 110 mg/dL Final   07/22/2023 122 (H) 70 - 110 mg/dL Final   07/22/2023 128 (H) 70 - 110 mg/dL Final   07/22/2023 124 (H) 70 - 110 mg/dL Final   07/22/2023 115 (H) 70 - 110 mg/dL Final   07/22/2023 115 (H) 70 - 110 mg/dL Final   07/22/2023 94 70 - 110 mg/dL Final   07/22/2023 100 70 - 110 mg/dL Final   07/21/2023 132 (H) 70 - 110 mg/dL Final   07/21/2023 133 (H) 70 - 110 mg/dL Final   07/21/2023 156 (H) 70 - 110 mg/dL Final   07/21/2023 154 (H) 70 - 110 mg/dL Final   07/21/2023 155 (H) 70 - 110 mg/dL Final   07/21/2023 158 (H) 70 - 110 mg/dL Final   07/21/2023 158 (H) 70 - 110 mg/dL Final   07/21/2023 156 (H) 70 - 110 mg/dL Final   07/21/2023 139 (H) 70 - 110 mg/dL Final   07/21/2023 138 (H) 70 - 110 mg/dL Final   07/21/2023 152 (H) 70 - 110 mg/dL Final   07/21/2023 129 (H) 70 - 110 mg/dL Final   07/21/2023 134 (H) 70 - 110 mg/dL Final     Lab Results   Component Value Date    HGBA1C 6.7 (H) 03/14/2023       Diabetes Medications               dapagliflozin (FARXIGA) 10 mg tablet Take 1 tablet (10 mg total) by mouth once daily.    insulin (LANTUS SOLOSTAR U-100 INSULIN) glargine 100 units/mL SubQ pen Inject 12 Units into the skin 2 (two) times a day.    semaglutide (OZEMPIC) 0.25 mg or 0.5 mg (2 mg/3 mL) pen injector Inject 0.25 mg into the skin every 7 days.          Endocrine  Type 2 diabetes  mellitus with stage 3a chronic kidney disease, without long-term current use of insulin  Endocrinology consulted for BG management.   BG goal 110-140 (CTS Protocol)      - Novolog (Insulin Aspart) prn for BG excursions MDC SSI (150/25)  - BG checks AC/HS  - Hypoglycemia protocol in place    ** Please notify Endocrine for any change and/or advance in diet**  ** Please call Endocrine for any BG related issues **    Discharge Planning:   TBD. Please notify endocrinology prior to discharge.      Thomas Choudhary DNP, FNP-C  Department of Endocrinology  Inpatient Glycemic Management

## 2023-07-24 NOTE — PROGRESS NOTES
[facility] - Surgical Intensive Care  Critical Care - Surgery  Progress Note    Patient Name: Addy Redding  MRN: [id number]  Admission Date: 7/21/2023  Hospital Length of Stay: 3 days  Code Status: Full Code  Attending Provider: Jass Uriostegui MD  Primary Care Provider: Stephan Ramey MD   Principal Problem: Coronary artery disease of native artery of native heart with stable angina pectoris    Subjective:       Interval History/Significant Events:   NAEON    Follow-up For: Procedure(s) (LRB):  CABG, WITHOUT CARDIOPULMONARY PUMP OXYGENATION (N/A)    Post-Operative Day: 3 Days Post-Op    Objective:     Vital Signs (Most Recent):  Temp: 99 °F (37.2 °C) (07/24/23 0300)  Pulse: (!) 58 (07/24/23 0645)  Resp: 18 (07/24/23 0645)  BP: 125/63 (07/24/23 0600)  SpO2: (!) 93 % (07/24/23 0645) Vital Signs (24h Range):  Temp:  [98.2 °F (36.8 °C)-99.4 °F (37.4 °C)] 99 °F (37.2 °C)  Pulse:  [] 58  Resp:  [11-35] 18  SpO2:  [91 %-99 %] 93 %  BP: (104-158)/(56-67) 125/63  Arterial Line BP: (102-160)/(35-58) 160/51     Weight: 98.2 kg (216 lb 8.9 oz)  Body mass index is 28.57 kg/m².      Intake/Output Summary (Last 24 hours) at 7/24/2023 0646  Last data filed at 7/24/2023 0600  Gross per 24 hour   Intake 469.38 ml   Output 2400 ml   Net -1930.62 ml          Physical Exam  Constitutional:       Appearance: Normal appearance.   HENT:      Head: Normocephalic and atraumatic.   Eyes:      Pupils: Pupils are equal, round, and reactive to light.   Cardiovascular:      Rate and Rhythm: Normal rate and regular rhythm.      Comments: Midline sternotomy  Left subclavian CVC  Left radial a line  Abdominal:      General: Abdomen is flat.      Palpations: Abdomen is soft.   Musculoskeletal:         General: Normal range of motion.      Cervical back: Normal range of motion and neck supple.   Skin:     General: Skin is warm.   Neurological:      General: No focal deficit present.            Vents:  Vent Mode: A/C (07/21/23 1158)  Set Rate: 18 BPM  (07/21/23 1158)  Vt Set: 480 mL (07/21/23 1158)  Pressure Support: 10 cmH20 (07/21/23 1158)  PEEP/CPAP: 5 cmH20 (07/21/23 1158)  Oxygen Concentration (%): 32 (07/23/23 2230)  Peak Airway Pressure: 22 cmH20 (07/21/23 1158)  Plateau Pressure: 0 cmH20 (07/21/23 1158)  Total Ve: 9.52 L/m (07/21/23 1158)  Negative Inspiratory Force (cm H2O): 0 (07/21/23 1158)  F/VT Ratio<105 (RSBI): (!) 26.87 (07/21/23 1158)    Lines/Drains/Airways       Central Venous Catheter Line  Duration                  Percutaneous Central Line Insertion/Assessment - Quad lumen  07/21/23 0801 Subclavian Left 2 days    Percutaneous Central Line Insertion/Assessment - Quad Lumen  07/21/23 0801 Subclavian Left 2 days              Drain  Duration                  Urethral Catheter 07/21/23 0720 Temperature probe;Non-latex 14 Fr. 2 days              Arterial Line  Duration             Arterial Line 07/21/23 0801 Left Radial 2 days              Peripheral Intravenous Line  Duration                  Peripheral IV - Single Lumen 07/21/23 0632 18 G Anterior;Left Forearm 3 days         Peripheral IV - Single Lumen 07/21/23 1002 16 G Anterior;Right Forearm 2 days                    Significant Labs:    CBC/Anemia Profile:  Recent Labs   Lab 07/23/23  0311 07/23/23  0400 07/24/23  0320   WBC 9.81 10.00 7.86   HGB 10.0* 10.3* 9.9*   HCT 29.2* 30.2* 29.9*    175 183   MCV 85 85 85   RDW 14.2 14.4 14.0        Chemistries:  Recent Labs   Lab 07/22/23  2221 07/23/23  0311 07/23/23  0400 07/23/23  0746 07/23/23  2158 07/24/23  0320    135* 135*  --   --  133*   K 4.7 4.6 4.7 4.6 4.2 4.0    108 107  --   --  104   CO2 18* 19* 18*  --   --  21*   BUN 29* 29* 30*  --   --  33*   CREATININE 2.1* 2.1* 2.1*  --   --  2.0*   CALCIUM 8.4* 8.4* 9.0  --   --  8.5*   ALBUMIN 3.7 3.5 3.7  --   --  3.2*   PROT 6.3 6.2 6.4  --   --  6.2   BILITOT 0.4 0.5 0.5  --   --  0.3   ALKPHOS 40* 40* 45*  --   --  47*   ALT 7* 6* 8*  --   --  <5*   AST 26 25 27  --   --   23   MG 2.3 2.5 2.4  --   --  2.2   PHOS 2.3*  --  2.9  --   --   --        All pertinent labs within the past 24 hours have been reviewed.    Significant Imaging:  I have reviewed all pertinent imaging results/findings within the past 24 hours.    Assessment/Plan:     Cardiac/Vascular  * Coronary artery disease of native artery of native heart with stable angina pectoris  Mr Redding 67 year-old male former smoker (1ppd x 25 years, quit 25 years ago) with a history of coronary artery disease s/p LCx+OM stents (2022), carotid stenosis, stage 4 chronic kidney disease, hypertension, and diabetes (HbA1C 6.7) now sp single vessel off pump CABG on 7/21.       Neuro/Psych:     - Sedation: off    - Pain:    - Scheduled Tylenol 1g q8h   - Dilaudid PRN, Oxy PRN             Cardiac:     - S/P off pump CABG x1  with Dr. Uriostegui on 7/21    - BP Goal: SBP <150, MAP >65    - Anti-HTNs: Will start when appropriate    - Rhythm: NSR    - Beta blocker: hold due to brendon tachy syndrome    - Statin: Atorvastatin 40 mg QD      Pulmonary:     - Goal SpO2 >92%    - NC 1L    - Extubated POD0    - ABGs PRN      Renal:     CKD 4    - Trend BUN/Cr     - Maintain Bowman, record strict Is/Os    - Good UOP 2.3L    - Lasix 80 bid iv    Recent Labs   Lab 07/23/23  0311 07/23/23  0400 07/24/23  0320   BUN 29* 30* 33*   CREATININE 2.1* 2.1* 2.0*         FEN / GI:     - Daily CMP, PRN K/Mag/Phos per protocol     - Replace electrolytes as needed    - Nutrition: Cardiac diet    - Bowel Regimen: Miralax, docusate      ID:     - Afebrile    - WBC stable    - Abx: Completed perioperative cefazolin 2g Q8H x 5 doses    Recent Labs   Lab 07/23/23  0311 07/23/23  0400 07/24/23  0320   WBC 9.81 10.00 7.86         Heme/Onc:     - Hgb 13.4 pre-operatively    - CBC daily    - ASA 81mg , plavix 75 daily    Recent Labs   Lab 07/19/23  0805 07/21/23  1041 07/22/23  0243 07/23/23  0311 07/23/23  0400 07/24/23  0320   HGB 13.4* 10.8* 9.3* 10.0* 10.3* 9.9*    193 170  162 175 183   APTT 27.6 27.1 30.6  --   --   --    INR 1.0 1.2 1.1  --   --   --          Endocrine:     - CTS Goal -140    - HgbA1c: 6.7    - Endocrinology consulted for insulin management      PPx:   Feeding: Cardiac  Analgesia/Sedation: dilaudid PRN, oxy PRN,tylenol   Thromboembolic Prevention: heparin   HOB >30: Yes  Stress Ulcer: pantop  Glucose Control: Yes, insulin management per Endocrinology  Bowel reg: miralax, docusate  Invasive Lines/Drains/Airway:    Left radial arterial line- to d/c today   Left subclavian CVC   Bowman to d/c today          Dispo/Code Status/Palliative:     - Possible step down    - Full Code           Britany Prieto MD  Critical Care - Surgery  [facility] - Surgical Intensive Care

## 2023-07-24 NOTE — ASSESSMENT & PLAN NOTE
- Continue oral amiodarone 200mg qd for one month    - Start oral anticoagulation as soon as appropriate from post-surgical standpoint  - follow up with general cardiology in 2-4 weeks  EP will sign off

## 2023-07-24 NOTE — CARE UPDATE
Active Problem List with Overview Notes    Diagnosis Date Noted    S/P CABG (coronary artery bypass graft) 07/24/2023    Paroxysmal A-fib 07/24/2023     This is not a post operative complication. afib happen during post operative period      Tachycardia-bradycardia syndrome 07/23/2023    Carotid stenosis 07/21/2023    Acute blood loss anemia 07/21/2023    Bilateral carotid artery stenosis 07/05/2023    Pre-op exam 07/05/2023    Pain of left upper extremity 05/17/2023    Weakness 05/17/2023    Stiffness in joint 05/17/2023    Chronic left shoulder pain 05/15/2023    Hyperparathyroidism, unspecified 01/27/2023    Prostate cancer     Stage 3b chronic kidney disease 10/04/2022    Coronary artery disease of native artery of native heart with stable angina pectoris 10/04/2022    Presence of drug-eluting stent in left circumflex coronary artery 09/01/2022    Preoperative clearance 09/01/2022    Chronic kidney disease (CKD), stage IV (severe) 07/06/2022    Coronary artery disease of native artery with stable angina pectoris 05/26/2022    Aortic atherosclerosis 03/22/2022    Sinus bradycardia 03/22/2022    Neck pain 10/22/2021    Decreased range of motion 10/22/2021    Type 2 diabetes mellitus with stage 3a chronic kidney disease, without long-term current use of insulin 02/22/2016    Left-sided low back pain with sciatica 02/22/2016    ED (erectile dysfunction)     Hypertension associated with diabetes 11/04/2014    Dyslipidemia associated with type 2 diabetes mellitus        [facility] Virtual Round Query:  - paroxysmal AF is not a post operative complication  - patient has CKD 4 not 3a           All Queries discussed with attending Cardiothoracic Surgeon and in agreement.

## 2023-07-24 NOTE — PLAN OF CARE
Plan of care reviewed with pt and pt's family present at bedside, and verbalization of understanding obtained.  Emotional support offered.    Problem: Adult Inpatient Plan of Care  Goal: Plan of Care Review  Outcome: Ongoing, Progressing

## 2023-07-24 NOTE — PLAN OF CARE
[facility]  Initial Discharge Assessment       Primary Care Provider: Stephan Ramey MD    Admission Diagnosis: Coronary artery disease of native artery of native heart with stable angina pectoris [I25.118]    Admission Date: 7/21/2023  Expected Discharge Date:     Transition of Care Barriers: None    Payor: [employer]ICARE / Plan: [employer] CHOICES 65 / Product Type: Medicare Advantage /     Extended Emergency Contact Information  Primary Emergency Contact: Carmelita Redding  Address: [address] [place] [address] [place] [address] [facility]  Home Phone: [phone]  Mobile Phone: [phone]  Relation: Spouse    Discharge Plan A: Home, Home with family  Discharge Plan B: Home, Home with family, Home Health      [id number] - [place] - [address]  [address] [place] [address]  Phone: [phone] Fax: [phone]    [facility]  [address]  [place] [address]  Phone: [phone] Fax: [phone]    [id number] - [place] - [address]  [address] [address]  Phone: [phone] Fax: [phone]      Initial Assessment (most recent)       Adult Discharge Assessment - 07/24/23 1011          Discharge Assessment    Assessment Type Discharge Planning Assessment     Confirmed/corrected address, phone number and insurance Yes     Confirmed Demographics Correct on Facesheet     Source of Information patient;family     Communicated CARO with patient/caregiver Date not available/Unable to determine     Do you expect to return to your current living situation? Yes     Do you have help at home or someone to help you manage your care at home? Yes     Prior to hospitilization cognitive status: No Deficits     Current cognitive status: No Deficits     Home Layout Able to live on 1st floor     Equipment Currently Used at Home none     Do you currently have service(s) that help you manage  your care at home? No     Do you take prescription medications? Yes     Do you have prescription coverage? Yes     Do you have any problems affording any of your prescribed medications? No     Is the patient taking medications as prescribed? yes     Who is going to help you get home at discharge? SPOUSE [phone] [id number]     How do you get to doctors appointments? car, drives self;family or friend will provide     Are you on dialysis? No     Do you take coumadin? No     Discharge Plan A Home;Home with family     Discharge Plan B Home;Home with family;Home Health     DME Needed Upon Discharge  none     Discharge Plan discussed with: Spouse/sig other;Patient     Transition of Care Barriers None        Physical Activity    On average, how many days per week do you engage in moderate to strenuous exercise (like a brisk walk)? 0 days     On average, how many minutes do you engage in exercise at this level? 0 min        Financial Resource Strain    How hard is it for you to pay for the very basics like food, housing, medical care, and heating? Not hard at all        Housing Stability    In the last 12 months, was there a time when you were not able to pay the mortgage or rent on time? No     In the last 12 months, was there a time when you did not have a steady place to sleep or slept in a shelter (including now)? Patient refused        Transportation Needs    In the past 12 months, has lack of transportation kept you from medical appointments or from getting medications? No     In the past 12 months, has lack of transportation kept you from meetings, work, or from getting things needed for daily living? Patient refused        Food Insecurity    Within the past 12 months, you worried that your food would run out before you got the money to buy more. Patient refused     Within the past 12 months, the food you bought just didn't last and you didn't have money to get more. Patient refused        Stress    Do you feel stress  - tense, restless, nervous, or anxious, or unable to sleep at night because your mind is troubled all the time - these days? Patient refused        Social Connections    In a typical week, how many times do you talk on the phone with family, friends, or neighbors? Patient refused     How often do you get together with friends or relatives? Patient refused     How often do you attend Moravian or Adventism services? Patient refused     Do you belong to any clubs or organizations such as Moravian groups, unions, fraternal or athletic groups, or school groups? No     How often do you attend meetings of the clubs or organizations you belong to? Never     Are you , , , , never , or living with a partner?         Alcohol Use    Q1: How often do you have a drink containing alcohol? Patient refused     Q2: How many drinks containing alcohol do you have on a typical day when you are drinking? Patient refused     Q3: How often do you have six or more drinks on one occasion? Patient refused                      Patient lives in a 1 story house with his spouse and 19 y.o. grand daughter he is not on dialysis and does not take coumadin he has a ride home

## 2023-07-24 NOTE — PLAN OF CARE
"      SICU PLAN OF CARE NOTE    Dx: Coronary artery disease of native artery of native heart with stable angina pectoris    Shift Events: NAEON. HR, BP, and O2 WDL. NSR and sinus bradycardia while asleep. Amio drip continued with no episodes of Afib. Pt denies pain. Diuresed at start of shift, UOP adequate. OOBTC in AM.    Goals of Care: MAP >65, SBP <150    Neuro: AAO x4, Follows Commands, and Moves All Extremities    Vital Signs: BP (!) 158/67   Pulse (!) 54   Temp 99 °F (37.2 °C) (Oral)   Resp 18   Ht 6' 1" (1.854 m)   Wt 98.2 kg (216 lb 8.9 oz)   SpO2 95%   BMI 28.57 kg/m²     Respiratory: Nasal Cannula    Diet: Cardiac Diet    Gtts: Amiodarone    Urine Output: Urinary Catheter 1145 cc/shift     Labs/Accuchecks: Accuchecks ACHS.    Skin: incisions per documentation. Waffle mattress in place. Heel and sacral foams on. Weight shifting assistance provided as needed.      "

## 2023-07-25 LAB
ABO + RH BLD: NORMAL
ALBUMIN SERPL BCP-MCNC: 3.1 G/DL (ref 3.5–5.2)
ALP SERPL-CCNC: 47 U/L (ref 55–135)
ALT SERPL W/O P-5'-P-CCNC: 5 U/L (ref 10–44)
ANION GAP SERPL CALC-SCNC: 11 MMOL/L (ref 8–16)
AST SERPL-CCNC: 22 U/L (ref 10–40)
BASOPHILS # BLD AUTO: 0.03 K/UL (ref 0–0.2)
BASOPHILS NFR BLD: 0.5 % (ref 0–1.9)
BILIRUB SERPL-MCNC: 0.7 MG/DL (ref 0.1–1)
BLD GP AB SCN CELLS X3 SERPL QL: NORMAL
BUN SERPL-MCNC: 32 MG/DL (ref 8–23)
CALCIUM SERPL-MCNC: 9.1 MG/DL (ref 8.7–10.5)
CHLORIDE SERPL-SCNC: 104 MMOL/L (ref 95–110)
CO2 SERPL-SCNC: 23 MMOL/L (ref 23–29)
CREAT SERPL-MCNC: 2 MG/DL (ref 0.5–1.4)
DIFFERENTIAL METHOD: ABNORMAL
EOSINOPHIL # BLD AUTO: 0.2 K/UL (ref 0–0.5)
EOSINOPHIL NFR BLD: 3.3 % (ref 0–8)
ERYTHROCYTE [DISTWIDTH] IN BLOOD BY AUTOMATED COUNT: 14.4 % (ref 11.5–14.5)
EST. GFR  (NO RACE VARIABLE): 35.5 ML/MIN/1.73 M^2
GLUCOSE SERPL-MCNC: 116 MG/DL (ref 70–110)
HCT VFR BLD AUTO: 29.6 % (ref 40–54)
HGB BLD-MCNC: 10 G/DL (ref 14–18)
IMM GRANULOCYTES # BLD AUTO: 0.02 K/UL (ref 0–0.04)
IMM GRANULOCYTES NFR BLD AUTO: 0.3 % (ref 0–0.5)
LYMPHOCYTES # BLD AUTO: 1.1 K/UL (ref 1–4.8)
LYMPHOCYTES NFR BLD: 17.3 % (ref 18–48)
MAGNESIUM SERPL-MCNC: 2.1 MG/DL (ref 1.6–2.6)
MCH RBC QN AUTO: 28.2 PG (ref 27–31)
MCHC RBC AUTO-ENTMCNC: 33.8 G/DL (ref 32–36)
MCV RBC AUTO: 83 FL (ref 82–98)
MONOCYTES # BLD AUTO: 0.9 K/UL (ref 0.3–1)
MONOCYTES NFR BLD: 14.1 % (ref 4–15)
NEUTROPHILS # BLD AUTO: 4.1 K/UL (ref 1.8–7.7)
NEUTROPHILS NFR BLD: 64.5 % (ref 38–73)
NRBC BLD-RTO: 0 /100 WBC
PLATELET # BLD AUTO: 215 K/UL (ref 150–450)
PMV BLD AUTO: 11.2 FL (ref 9.2–12.9)
POCT GLUCOSE: 150 MG/DL (ref 70–110)
POCT GLUCOSE: 255 MG/DL (ref 70–110)
POTASSIUM SERPL-SCNC: 3.9 MMOL/L (ref 3.5–5.1)
POTASSIUM SERPL-SCNC: 4.1 MMOL/L (ref 3.5–5.1)
PROT SERPL-MCNC: 6.4 G/DL (ref 6–8.4)
RBC # BLD AUTO: 3.55 M/UL (ref 4.6–6.2)
SODIUM SERPL-SCNC: 138 MMOL/L (ref 136–145)
SPECIMEN OUTDATE: NORMAL
WBC # BLD AUTO: 6.4 K/UL (ref 3.9–12.7)

## 2023-07-25 PROCEDURE — 25000003 PHARM REV CODE 250: Performed by: THORACIC SURGERY (CARDIOTHORACIC VASCULAR SURGERY)

## 2023-07-25 PROCEDURE — 63600175 PHARM REV CODE 636 W HCPCS

## 2023-07-25 PROCEDURE — 94761 N-INVAS EAR/PLS OXIMETRY MLT: CPT

## 2023-07-25 PROCEDURE — 99900035 HC TECH TIME PER 15 MIN (STAT)

## 2023-07-25 PROCEDURE — 25000003 PHARM REV CODE 250

## 2023-07-25 PROCEDURE — 63600175 PHARM REV CODE 636 W HCPCS: Performed by: STUDENT IN AN ORGANIZED HEALTH CARE EDUCATION/TRAINING PROGRAM

## 2023-07-25 PROCEDURE — 97530 THERAPEUTIC ACTIVITIES: CPT

## 2023-07-25 PROCEDURE — 97110 THERAPEUTIC EXERCISES: CPT

## 2023-07-25 PROCEDURE — 86900 BLOOD TYPING SEROLOGIC ABO: CPT

## 2023-07-25 PROCEDURE — 85025 COMPLETE CBC W/AUTO DIFF WBC: CPT | Performed by: NURSE PRACTITIONER

## 2023-07-25 PROCEDURE — 25000003 PHARM REV CODE 250: Performed by: NURSE PRACTITIONER

## 2023-07-25 PROCEDURE — 27000221 HC OXYGEN, UP TO 24 HOURS

## 2023-07-25 PROCEDURE — 99233 PR SUBSEQUENT HOSPITAL CARE,LEVL III: ICD-10-PCS | Mod: GC,,, | Performed by: INTERNAL MEDICINE

## 2023-07-25 PROCEDURE — 36415 COLL VENOUS BLD VENIPUNCTURE: CPT | Performed by: THORACIC SURGERY (CARDIOTHORACIC VASCULAR SURGERY)

## 2023-07-25 PROCEDURE — 83735 ASSAY OF MAGNESIUM: CPT | Performed by: NURSE PRACTITIONER

## 2023-07-25 PROCEDURE — 84132 ASSAY OF SERUM POTASSIUM: CPT | Performed by: THORACIC SURGERY (CARDIOTHORACIC VASCULAR SURGERY)

## 2023-07-25 PROCEDURE — 63600175 PHARM REV CODE 636 W HCPCS: Performed by: THORACIC SURGERY (CARDIOTHORACIC VASCULAR SURGERY)

## 2023-07-25 PROCEDURE — 20600001 HC STEP DOWN PRIVATE ROOM

## 2023-07-25 PROCEDURE — 80053 COMPREHEN METABOLIC PANEL: CPT | Performed by: NURSE PRACTITIONER

## 2023-07-25 PROCEDURE — 99233 SBSQ HOSP IP/OBS HIGH 50: CPT | Mod: GC,,, | Performed by: INTERNAL MEDICINE

## 2023-07-25 RX ORDER — FUROSEMIDE 10 MG/ML
40 INJECTION INTRAMUSCULAR; INTRAVENOUS 2 TIMES DAILY
Status: DISCONTINUED | OUTPATIENT
Start: 2023-07-25 | End: 2023-07-26 | Stop reason: HOSPADM

## 2023-07-25 RX ORDER — FUROSEMIDE 10 MG/ML
80 INJECTION INTRAMUSCULAR; INTRAVENOUS 2 TIMES DAILY
Status: DISCONTINUED | OUTPATIENT
Start: 2023-07-25 | End: 2023-07-25

## 2023-07-25 RX ADMIN — INSULIN ASPART 6 UNITS: 100 INJECTION, SOLUTION INTRAVENOUS; SUBCUTANEOUS at 11:07

## 2023-07-25 RX ADMIN — ACETAMINOPHEN 1000 MG: 500 TABLET ORAL at 02:07

## 2023-07-25 RX ADMIN — TAMSULOSIN HYDROCHLORIDE 0.4 MG: 0.4 CAPSULE ORAL at 08:07

## 2023-07-25 RX ADMIN — ACETAMINOPHEN 1000 MG: 500 TABLET ORAL at 06:07

## 2023-07-25 RX ADMIN — ASPIRIN 81 MG 81 MG: 81 TABLET ORAL at 08:07

## 2023-07-25 RX ADMIN — POLYETHYLENE GLYCOL 3350 17 G: 17 POWDER, FOR SOLUTION ORAL at 09:07

## 2023-07-25 RX ADMIN — HEPARIN SODIUM 5000 UNITS: 5000 INJECTION INTRAVENOUS; SUBCUTANEOUS at 06:07

## 2023-07-25 RX ADMIN — PANTOPRAZOLE SODIUM 40 MG: 40 TABLET, DELAYED RELEASE ORAL at 08:07

## 2023-07-25 RX ADMIN — MUPIROCIN 1 G: 20 OINTMENT TOPICAL at 09:07

## 2023-07-25 RX ADMIN — OXYCODONE HYDROCHLORIDE 10 MG: 10 TABLET ORAL at 03:07

## 2023-07-25 RX ADMIN — POTASSIUM CHLORIDE 20 MEQ: 1500 TABLET, EXTENDED RELEASE ORAL at 09:07

## 2023-07-25 RX ADMIN — HEPARIN SODIUM 5000 UNITS: 5000 INJECTION INTRAVENOUS; SUBCUTANEOUS at 02:07

## 2023-07-25 RX ADMIN — DOCUSATE SODIUM 100 MG: 100 CAPSULE, LIQUID FILLED ORAL at 09:07

## 2023-07-25 RX ADMIN — CLOPIDOGREL BISULFATE 75 MG: 75 TABLET ORAL at 08:07

## 2023-07-25 RX ADMIN — HEPARIN SODIUM 5000 UNITS: 5000 INJECTION INTRAVENOUS; SUBCUTANEOUS at 09:07

## 2023-07-25 RX ADMIN — ACETAMINOPHEN 1000 MG: 500 TABLET ORAL at 09:07

## 2023-07-25 RX ADMIN — FUROSEMIDE 80 MG: 10 INJECTION, SOLUTION INTRAVENOUS at 06:07

## 2023-07-25 RX ADMIN — AMIODARONE HYDROCHLORIDE 200 MG: 200 TABLET ORAL at 09:07

## 2023-07-25 RX ADMIN — ATORVASTATIN CALCIUM 80 MG: 40 TABLET, FILM COATED ORAL at 09:07

## 2023-07-25 RX ADMIN — FUROSEMIDE 40 MG: 10 INJECTION, SOLUTION INTRAVENOUS at 05:07

## 2023-07-25 NOTE — PROGRESS NOTES
[facility]  Critical Care - Surgery  Progress Note    Patient Name: Addy Redding  MRN: [id number]  Admission Date: 7/21/2023  Hospital Length of Stay: 4 days  Code Status: Full Code  Attending Provider: Jass Uriostegui MD  Primary Care Provider: Stephan Ramey MD   Principal Problem: Coronary artery disease of native artery of native heart with stable angina pectoris    Subjective:       Interval History/Significant Events:   NAEON.  1 episode of orthostatic hypotension SBP 80s in afternoon after iv 80mg lasix once with UOP ~900ml in 2 hours.     Follow-up For: Procedure(s) (LRB):  CABG, WITHOUT CARDIOPULMONARY PUMP OXYGENATION (N/A)    Post-Operative Day: 4 Days Post-Op    Objective:     Vital Signs (Most Recent):  Temp: 98.5 °F (36.9 °C) (07/25/23 0315)  Pulse: (!) 55 (07/25/23 0345)  Resp: 15 (07/25/23 0345)  BP: (!) 140/67 (07/25/23 0315)  SpO2: (!) 92 % (07/25/23 0345) Vital Signs (24h Range):  Temp:  [98.1 °F (36.7 °C)-98.6 °F (37 °C)] 98.5 °F (36.9 °C)  Pulse:  [53-89] 55  Resp:  [11-32] 15  SpO2:  [91 %-98 %] 92 %  BP: ()/(48-73) 140/67  Arterial Line BP: (123-160)/(39-56) 130/47     Weight: 98.2 kg (216 lb 8.9 oz)  Body mass index is 28.57 kg/m².      Intake/Output Summary (Last 24 hours) at 7/25/2023 0618  Last data filed at 7/25/2023 0345  Gross per 24 hour   Intake 1387.25 ml   Output 1840 ml   Net -452.75 ml          Physical Exam  Constitutional:       Appearance: Normal appearance.   HENT:      Head: Normocephalic and atraumatic.   Eyes:      Pupils: Pupils are equal, round, and reactive to light.   Cardiovascular:      Rate and Rhythm: Normal rate and regular rhythm.      Comments: Midline sternotomy  Left subclavian CVC    Abdominal:      General: Abdomen is flat.      Palpations: Abdomen is soft.   Musculoskeletal:         General: Normal range of motion.      Cervical back: Normal range of motion and neck supple.   Skin:     General: Skin is warm.   Neurological:       General: No focal deficit present.            Vents:  Vent Mode: A/C (07/21/23 1158)  Set Rate: 18 BPM (07/21/23 1158)  Vt Set: 480 mL (07/21/23 1158)  Pressure Support: 10 cmH20 (07/21/23 1158)  PEEP/CPAP: 5 cmH20 (07/21/23 1158)  Oxygen Concentration (%): 32 (07/23/23 2230)  Peak Airway Pressure: 22 cmH20 (07/21/23 1158)  Plateau Pressure: 0 cmH20 (07/21/23 1158)  Total Ve: 9.52 L/m (07/21/23 1158)  Negative Inspiratory Force (cm H2O): 0 (07/21/23 1158)  F/VT Ratio<105 (RSBI): (!) 26.87 (07/21/23 1158)    Lines/Drains/Airways       Central Venous Catheter Line  Duration                  Percutaneous Central Line Insertion/Assessment - Quad lumen  07/21/23 0801 Subclavian Left 3 days    Percutaneous Central Line Insertion/Assessment - Quad Lumen  07/21/23 0801 Subclavian Left 3 days              Peripheral Intravenous Line  Duration                  Peripheral IV - Single Lumen 07/21/23 1002 16 G Anterior;Right Forearm 3 days         Peripheral IV - Single Lumen 07/25/23 0300 20 G Anterior;Left Forearm <1 day                    Significant Labs:    CBC/Anemia Profile:  Recent Labs   Lab 07/24/23  0320 07/25/23  0245   WBC 7.86 6.40   HGB 9.9* 10.0*   HCT 29.9* 29.6*    215   MCV 85 83   RDW 14.0 14.4        Chemistries:  Recent Labs   Lab 07/24/23  0320 07/24/23  0800 07/24/23  1930 07/25/23  0245   *  --   --  138   K 4.0 4.2 4.1 4.1     --   --  104   CO2 21*  --   --  23   BUN 33*  --   --  32*   CREATININE 2.0*  --   --  2.0*   CALCIUM 8.5*  --   --  9.1   ALBUMIN 3.2*  --   --  3.1*   PROT 6.2  --   --  6.4   BILITOT 0.3  --   --  0.7   ALKPHOS 47*  --   --  47*   ALT <5*  --   --  5*   AST 23  --   --  22   MG 2.2  --   --  2.1       All pertinent labs within the past 24 hours have been reviewed.    Significant Imaging:  I have reviewed all pertinent imaging results/findings within the past 24 hours.    Assessment/Plan:     Cardiac/Vascular  * Coronary artery disease of native artery of  native heart with stable angina pectoris  Mr Redding 67 year-old male former smoker (1ppd x 25 years, quit 25 years ago) with a history of coronary artery disease s/p LCx+OM stents (2022), carotid stenosis, stage 4 chronic kidney disease, hypertension, and diabetes (HbA1C 6.7) now sp single vessel off pump CABG on 7/21.       Neuro/Psych:     - Sedation: off    - Pain: well controlled   - Scheduled Tylenol 1g q8h   - Dilaudid PRN, Oxy PRN             Cardiac:     - S/P off pump CABG x1  with Dr. 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 per EP recs    - DOAC to begin when appropriate from surgery stand point per EP recs    - Beta blocker: hold off due to brendon tachy syndrome    - Statin: Atorvastatin 40 mg QD      Pulmonary:     - Goal SpO2 >92%    - NC 2L    - Extubated POD0    - ABGs PRN      Renal:     CKD 4    - Trend BUN/Cr     - Discontinued Bowman    - Good UOP 1840ml    Recent Labs   Lab 07/23/23  0400 07/24/23  0320 07/25/23  0245   BUN 30* 33* 32*   CREATININE 2.1* 2.0* 2.0*         FEN / GI:     - Daily CMP, PRN K/Mag/Phos per protocol     - Replace electrolytes as needed    - Nutrition: Cardiac diet    - Bowel Regimen: Miralax, docusate    - Passing gas      ID:     - Afebrile    - WBC stable    - Abx: Completed perioperative cefazolin 2g Q8H x 5 doses    Recent Labs   Lab 07/23/23  0400 07/24/23  0320 07/25/23  0245   WBC 10.00 7.86 6.40         Heme/Onc:     - Hgb 13.4 pre-operatively    - CBC daily    - ASA 81mg , plavix 75 daily    Recent Labs   Lab 07/19/23  0805 07/21/23  1041 07/22/23  0243 07/23/23  0311 07/23/23  0400 07/24/23  0320 07/25/23  0245   HGB 13.4* 10.8* 9.3*   < > 10.3* 9.9* 10.0*    193 170   < > 175 183 215   APTT 27.6 27.1 30.6  --   --   --   --    INR 1.0 1.2 1.1  --   --   --   --     < > = values in this interval not displayed.         Endocrine:     - CTS Goal -140    - HgbA1c:  6.7    - Endocrinology consulted for insulin management      PPx:   Feeding: Cardiac  Analgesia/Sedation: dilaudid PRN, oxy PRN,tylenol   Thromboembolic Prevention: heparin   HOB >30: Yes  Stress Ulcer: pantop  Glucose Control: Yes, insulin management per Endocrinology  Bowel reg: miralax, docusate  Invasive Lines/Drains/Airway:    Left subclavian CVC        Dispo/Code Status/Palliative:     - Step down    - Full Code         Britany Prieto MD  Critical Care - Surgery  [facility] - Surgical Intensive Care

## 2023-07-25 NOTE — ASSESSMENT & PLAN NOTE
Mr Redding 67 year-old male former smoker (1ppd x 25 years, quit 25 years ago) with a history of coronary artery disease s/p LCx+OM stents (2022), carotid stenosis, stage 4 chronic kidney disease, hypertension, and diabetes (HbA1C 6.7) now sp single vessel off pump CABG on 7/21.       Neuro/Psych:     - Sedation: off    - Pain: well controlled   - Scheduled Tylenol 1g q8h   - Dilaudid PRN, Oxy PRN             Cardiac:     - S/P off pump CABG x1  with Dr. 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 per EP recs    - DOAC to begin when appropriate from surgery stand point per EP recs    - Beta blocker: hold off due to brendon tachy syndrome    - Statin: Atorvastatin 40 mg QD      Pulmonary:     - Goal SpO2 >92%    - NC 2L    - Extubated POD0    - ABGs PRN      Renal:     CKD 4    - Trend BUN/Cr     - Discontinued Bowman    - Good UOP 1840ml    Recent Labs   Lab 07/23/23  0400 07/24/23  0320 07/25/23  0245   BUN 30* 33* 32*   CREATININE 2.1* 2.0* 2.0*         FEN / GI:     - Daily CMP, PRN K/Mag/Phos per protocol     - Replace electrolytes as needed    - Nutrition: Cardiac diet    - Bowel Regimen: Miralax, docusate    - Passing gas      ID:     - Afebrile    - WBC stable    - Abx: Completed perioperative cefazolin 2g Q8H x 5 doses    Recent Labs   Lab 07/23/23  0400 07/24/23  0320 07/25/23  0245   WBC 10.00 7.86 6.40         Heme/Onc:     - Hgb 13.4 pre-operatively    - CBC daily    - ASA 81mg , plavix 75 daily    Recent Labs   Lab 07/19/23  0805 07/21/23  1041 07/22/23  0243 07/23/23  0311 07/23/23  0400 07/24/23  0320 07/25/23  0245   HGB 13.4* 10.8* 9.3*   < > 10.3* 9.9* 10.0*    193 170   < > 175 183 215   APTT 27.6 27.1 30.6  --   --   --   --    INR 1.0 1.2 1.1  --   --   --   --     < > = values in this interval not displayed.         Endocrine:     - CTS Goal -140    - HgbA1c: 6.7    - Endocrinology consulted for insulin  management      PPx:   Feeding: Cardiac  Analgesia/Sedation: dilaudid PRN, oxy PRN,tylenol   Thromboembolic Prevention: heparin   HOB >30: Yes  Stress Ulcer: pantop  Glucose Control: Yes, insulin management per Endocrinology  Bowel reg: miralax, docusate  Invasive Lines/Drains/Airway:    Left subclavian CVC        Dispo/Code Status/Palliative:     - Step down    - Full Code

## 2023-07-25 NOTE — CARE UPDATE
Care Update:     No acute events overnight. Patient on the SICU in room 96902/75857 A. Blood glucose stable. BG at goal on current insulin regimen (SSI ). Steroid use- None. 4 Days Post-Op  Renal function- Abnormal- Creatinine 2.0l   Vasopressors-  None       Diet Cardiac Fluid - 1500mL     POCT Glucose   Date Value Ref Range Status   07/24/2023 137 (H) 70 - 110 mg/dL Final   07/24/2023 141 (H) 70 - 110 mg/dL Final   07/24/2023 193 (H) 70 - 110 mg/dL Final   07/24/2023 158 (H) 70 - 110 mg/dL Final   07/23/2023 153 (H) 70 - 110 mg/dL Final   07/23/2023 156 (H) 70 - 110 mg/dL Final   07/23/2023 148 (H) 70 - 110 mg/dL Final   07/23/2023 137 (H) 70 - 110 mg/dL Final   07/23/2023 138 (H) 70 - 110 mg/dL Final   07/22/2023 145 (H) 70 - 110 mg/dL Final   07/22/2023 136 (H) 70 - 110 mg/dL Final     Lab Results   Component Value Date    HGBA1C 6.7 (H) 03/14/2023       Diabetes Medications               dapagliflozin (FARXIGA) 10 mg tablet Take 1 tablet (10 mg total) by mouth once daily.    insulin (LANTUS SOLOSTAR U-100 INSULIN) glargine 100 units/mL SubQ pen Inject 12 Units into the skin 2 (two) times a day.    semaglutide (OZEMPIC) 0.25 mg or 0.5 mg (2 mg/3 mL) pen injector Inject 0.25 mg into the skin every 7 days.          Endocrine  Type 2 diabetes mellitus with stage 3a chronic kidney disease, without long-term current use of insulin  Endocrinology consulted for BG management.   BG goal 110-140 (CTS Protocol)      - Novolog (Insulin Aspart) prn for BG excursions MDC SSI (150/25)  - BG checks AC/HS  - Hypoglycemia protocol in place    ** Please notify Endocrine for any change and/or advance in diet**  ** Please call Endocrine for any BG related issues **    Discharge Planning:   TBD. Please notify endocrinology prior to discharge.      Thomas Choudhary DNP, FNP-C  Department of Endocrinology  Inpatient Glycemic Management

## 2023-07-25 NOTE — PT/OT/SLP PROGRESS
Physical Therapy   Progress Note    Patient Name:  Adyd Redding  MRN: [id number]    Admit Date: 2023  Admitting Diagnosis:  Coronary artery disease of native artery of native heart with stable angina pectoris  Length of Stay: 4 days  Recent Surgery: Procedure(s) (LRB):  CABG, WITHOUT CARDIOPULMONARY PUMP OXYGENATION (N/A) 4 Days Post-Op    Recommendations:     Discharge Recommendations: Other  Equipment recommendations: none  Barriers to discharge: None Identified     Assessment:     Addy Redding is a 67 y.o. male admitted to [facility] on 2023 with medical diagnosis of Coronary artery disease of native artery of native heart with stable angina pectoris. Pt presents with impaired endurance, impaired functional mobility, impaired balance, gait instability, impaired cardiopulmonary response to activity. Pt is progressing towards goals, but has not yet reached prior level of function.     Pt up in recliner with wife at bedside. Reviewed sternal precautions and pt verbalized 3/3. Pt able to scoot forward to edge of chair and stand with CGA. BP taken while standin/55. Pt statically stood for 2 min with sx of lightheaded/dizziness increasing. Returned to sitting and BP: 139/62. While up in chair, performed therex and encouraged to perform 3-4x/day while up in chair. RN in room and aware of orthostatics.    Addy Redding would benefit from continued acute PT intervention to improve quality of life, focus on recovery of impairments, provide patient/caregiver education, reduce fall risk, and maximize (I) and safety with functional mobility. Once medically stable, recommending pt discharge to Other (Home).      Rehab Prognosis: Good    Plan:     During this hospitalization, patient to be seen 5 x/week to address the identified rehab impairments via gait training, therapeutic activities, therapeutic exercises, neuromuscular re-education and progress towards stated goals.     Plan of Care Expires:  23  Plan of Care reviewed  "with: patient and spouse    This plan of care has been discussed with the patient/caregiver, who was included in its development and is in agreement with the identified goals and treatment plan.     Subjective     Communicated with RN prior to session.  Patient found up in chair upon PT entry to room, agreeable to therapy session. Pt's wife present during session.    Patient/Family Comments/goals: "Is my pressure better"    Pain/Comfort:  Pain Rating 1: 0/10  Pain Rating Post-Intervention 1: 0/10    Patients cultural, spiritual, Buddhism conflicts given the current situation: None identified     Objective:     Patient found with: telemetry, pulse ox (continuous), blood pressure cuff    General Precautions: Standard, fall, sternal   Orthopedic Precautions:N/A   Braces: N/A   Oxygen Device: room air    Cognition:  Pt is Alert during session.    Therapist provided skilled verbal and tactile cueing to facilitate the following functional mobility tasks. Listed tasks are focused on recovery of impairments and improving pt's independence and efficiency with bed mobility, transfers and ambulation as able.     Bed Mobility:  Deferred d/t in recliner    Transfers:   Sit <> Stand Transfer: Contact Guard Assistance from recliner with no AD     Balance:  Dynamic Sitting: GOOD: Maintains balance through MODERATE excursions of active trunk movement  Standing:  Static: FAIR+: Takes MINIMAL challenges from all directions     Outcome Measure: AM-PAC 6 CLICK MOBILITY  Total Score:18     Patient/Caregiver Education and Additional Therapeutic Activities/Exercises     Seated vamshi and FAMILIA x10 BLE    Provided pt/caregiver education regarding:   PT POC and goals for therapy   Safety with mobility and fall risk   Safe management of AD as needed   Energy conservation techniques   Instruction on use of call button and importance of calling nursing staff for assistance with mobility     Patient/caregiver able to verbalize understanding; " will follow-up with pt/caregiver during current admit for additional questions/concerns within scope of practice.     White board updated.     Patient left up in chair with all lines intact, call button in reach, and wife present.    OT present for cotreat due to pt's multiple medical comorbidities and functional/cognition deficits requiring two skilled therapists while in ICU    Goals:     Multidisciplinary Problems       Physical Therapy Goals          Problem: Physical Therapy    Goal Priority Disciplines Outcome Goal Variances Interventions   Physical Therapy Goal     PT, PT/OT Ongoing, Progressing     Description: Goals to be met by: 23     Patient will increase functional independence with mobility by performin. Supine to sit with Stand-by Assistance  2. Sit to stand transfer with Supervision  3. Gait  x 250 feet with Supervision .                          Time Tracking:       PT Received On: 23  PT Start Time: 1119     PT Stop Time: 1135  PT Total Time (min): 16 min     Billable Minutes: Therapeutic Activity 16    2023

## 2023-07-25 NOTE — CARE UPDATE
Plan of care reviewed with patient/spouse. Pt is AAOx4. Sats >95% on , Midsternal chest incision remains cdi. ALL VSS, pt up OOB to chair, tolerating well, episode of orthostatic hypotension, team aware. Lasix amount reduced. Pt tolerating diet, no s/s of N/V, BM+ today. Stepdown orders in place, WCTM.

## 2023-07-25 NOTE — PLAN OF CARE
"      SICU PLAN OF CARE NOTE    Dx: Coronary artery disease of native artery of native heart with stable angina pectoris    Vital Signs: /64 (BP Location: Right arm, Patient Position: Lying)   Pulse (!) 56   Temp 98.5 °F (36.9 °C) (Oral)   Resp 14   Ht 6' 1" (1.854 m)   Wt 98.2 kg (216 lb 8.9 oz)   SpO2 (!) 94%   BMI 28.57 kg/m²     SHIFT EVENTS:  VSS / No acute events this shift.     Neuro: AAO x4, Follows Commands, and Moves All Extremities    Respiratory: Nasal Cannula    Cardiac: Normal Sinus Rhythm    Diet: Cardiac Diet    Urine Output: Voids Spontaneously 500 ml/shift     Labs/Accuchecks: ACHS accuchecks and daily labs    Skin: Mid-sternal chest incision open to air.    Skin precautions maintained including:  Sacrum and heels with foam dressing in place for pressure protection. Frequent weight shift encouraged Q2 hr to prevent breakdown. Bed plugged in and mattress inflated; continuous rotational turning bed feature. Adhesive use limited. Heels elevated off bed. Pressure points protected and positioning supports utilized.  Skin-to-device areas padded. Skin-to-skin areas padded    POC reviewed with patient; questions and concerns addressed. See flow sheets for full assessment details.    "

## 2023-07-25 NOTE — PT/OT/SLP PROGRESS
Occupational Therapy   CoTreatment    Name: Addy Redding  MRN: [id number]  Admitting Diagnosis:  Coronary artery disease of native artery of native heart with stable angina pectoris  4 Days Post-Op    Recommendations:     Discharge Recommendations: other (see comments)  Return home with family support.     Assessment:     Addy Redding is a 67 y.o. male with a medical diagnosis of Coronary artery disease of native artery of native heart with stable angina pectoris.  Performance deficits affecting function are weakness, impaired endurance, impaired self care skills, impaired functional mobility, gait instability, impaired balance.   Pt tolerated session fair. Pt with good effort; however, pt limited by decreased with BP in standing with symptoms of SOB and dizziness. Nsg aware.   Continue to anticipate pt will be appropriate for d/c home with family support when medically stable.   Rehab Prognosis:  Good; patient would benefit from acute skilled OT services to address these deficits and reach maximum level of function.       Plan:     Patient to be seen 5 x/week to address the above listed problems via self-care/home management, therapeutic activities, therapeutic exercises  Plan of Care Expires: 08/21/23  Plan of Care Reviewed with: patient, spouse    Subjective     Pt agreeable to therapy   Pain/Comfort:  Pain Rating 1: 2/10  Location 1: chest  Pain Addressed 1: Reposition, Distraction    Objective:     Communicated with: nsg  prior to session.  Patient found in chair with family in room. Tele, pulse ox and Bp cuff in place.   Cotx completed this date to optimize functional performance and safety given impaired tolerance for activity in setting of ICU     General Precautions: Standard, fall, sternal      Occupational Performance:     Functional Mobility/Transfers:  Sit>stand with CGA  Stand>sit with CGA     Activities of Daily Living:  Feeding: Independent  G?H; seated with set-upon provided re: LE dressing techniques given  sternal precautions and pt verb/demo understanding.     [facility] 6 Click ADL: 17    Treatment & Education:  Pt verb 3/3 sternal precautions this date and demo good understanding.   Pt able to stand approx 2 min with SBA but had slow onset of symptoms such as dizziness and SOB. BP did decreased with systolic declining>20  Upon seated in chair, BP returned to baseline and symptoms resolved with nsg arriving to room and aware.     Education provided and pt completed UE AROM exs 10 reps x 3 planes with exs written on board in room as visual reminder. Pt completed HEP without c/o and good effort.     Education provided re: OT POC and safety with functional mobility/ADL skills.     Patient left up in chair with all lines intact, call button in reach, nsg notified, and spouse present    GOALS:   Multidisciplinary Problems       Occupational Therapy Goals          Problem: Occupational Therapy    Goal Priority Disciplines Outcome Interventions   Occupational Therapy Goal     OT, PT/OT Ongoing, Progressing    Description: Goals to be met by: 7 days 7/29/23      Patient will increase functional independence with ADLs by performing:    Pt to complete standing g/h skills with supervision.   Pt to complete t/f bed, chair and commode with supervision.   Pt to complete UE dressing with set-up  Pt to complete LE dressing with SBA  Pt to complete toileting with SBA                        Time Tracking:     OT Date of Treatment: 07/25/23  OT Start Time: 1120  OT Stop Time: 1140  OT Total Time (min): 20 min    Billable Minutes:Therapeutic Exercise 20    OT/ALANA: OT          7/25/2023

## 2023-07-25 NOTE — SUBJECTIVE & OBJECTIVE
Interval History/Significant Events:   NAEON.  1 episode of orthostatic hypotension SBP 80s in afternoon after iv 80mg lasix once with UOP ~900ml in 2 hours.     Follow-up For: Procedure(s) (LRB):  CABG, WITHOUT CARDIOPULMONARY PUMP OXYGENATION (N/A)    Post-Operative Day: 4 Days Post-Op    Objective:     Vital Signs (Most Recent):  Temp: 98.5 °F (36.9 °C) (07/25/23 0315)  Pulse: (!) 55 (07/25/23 0345)  Resp: 15 (07/25/23 0345)  BP: (!) 140/67 (07/25/23 0315)  SpO2: (!) 92 % (07/25/23 0345) Vital Signs (24h Range):  Temp:  [98.1 °F (36.7 °C)-98.6 °F (37 °C)] 98.5 °F (36.9 °C)  Pulse:  [53-89] 55  Resp:  [11-32] 15  SpO2:  [91 %-98 %] 92 %  BP: ()/(48-73) 140/67  Arterial Line BP: (123-160)/(39-56) 130/47     Weight: 98.2 kg (216 lb 8.9 oz)  Body mass index is 28.57 kg/m².      Intake/Output Summary (Last 24 hours) at 7/25/2023 0618  Last data filed at 7/25/2023 0345  Gross per 24 hour   Intake 1387.25 ml   Output 1840 ml   Net -452.75 ml          Physical Exam  Constitutional:       Appearance: Normal appearance.   HENT:      Head: Normocephalic and atraumatic.   Eyes:      Pupils: Pupils are equal, round, and reactive to light.   Cardiovascular:      Rate and Rhythm: Normal rate and regular rhythm.      Comments: Midline sternotomy  Left subclavian CVC    Abdominal:      General: Abdomen is flat.      Palpations: Abdomen is soft.   Musculoskeletal:         General: Normal range of motion.      Cervical back: Normal range of motion and neck supple.   Skin:     General: Skin is warm.   Neurological:      General: No focal deficit present.            Vents:  Vent Mode: A/C (07/21/23 1158)  Set Rate: 18 BPM (07/21/23 1158)  Vt Set: 480 mL (07/21/23 1158)  Pressure Support: 10 cmH20 (07/21/23 1158)  PEEP/CPAP: 5 cmH20 (07/21/23 1158)  Oxygen Concentration (%): 32 (07/23/23 2230)  Peak Airway Pressure: 22 cmH20 (07/21/23 1158)  Plateau Pressure: 0 cmH20 (07/21/23 1158)  Total Ve: 9.52 L/m (07/21/23 1158)  Negative  Inspiratory Force (cm H2O): 0 (07/21/23 1158)  F/VT Ratio<105 (RSBI): (!) 26.87 (07/21/23 1158)    Lines/Drains/Airways       Central Venous Catheter Line  Duration                  Percutaneous Central Line Insertion/Assessment - Quad lumen  07/21/23 0801 Subclavian Left 3 days    Percutaneous Central Line Insertion/Assessment - Quad Lumen  07/21/23 0801 Subclavian Left 3 days              Peripheral Intravenous Line  Duration                  Peripheral IV - Single Lumen 07/21/23 1002 16 G Anterior;Right Forearm 3 days         Peripheral IV - Single Lumen 07/25/23 0300 20 G Anterior;Left Forearm <1 day                    Significant Labs:    CBC/Anemia Profile:  Recent Labs   Lab 07/24/23  0320 07/25/23  0245   WBC 7.86 6.40   HGB 9.9* 10.0*   HCT 29.9* 29.6*    215   MCV 85 83   RDW 14.0 14.4        Chemistries:  Recent Labs   Lab 07/24/23  0320 07/24/23  0800 07/24/23  1930 07/25/23  0245   *  --   --  138   K 4.0 4.2 4.1 4.1     --   --  104   CO2 21*  --   --  23   BUN 33*  --   --  32*   CREATININE 2.0*  --   --  2.0*   CALCIUM 8.5*  --   --  9.1   ALBUMIN 3.2*  --   --  3.1*   PROT 6.2  --   --  6.4   BILITOT 0.3  --   --  0.7   ALKPHOS 47*  --   --  47*   ALT <5*  --   --  5*   AST 23  --   --  22   MG 2.2  --   --  2.1       All pertinent labs within the past 24 hours have been reviewed.    Significant Imaging:  I have reviewed all pertinent imaging results/findings within the past 24 hours.

## 2023-07-26 VITALS
SYSTOLIC BLOOD PRESSURE: 118 MMHG | DIASTOLIC BLOOD PRESSURE: 63 MMHG | OXYGEN SATURATION: 96 % | WEIGHT: 216.56 LBS | HEIGHT: 73 IN | BODY MASS INDEX: 28.7 KG/M2 | RESPIRATION RATE: 18 BRPM | HEART RATE: 71 BPM | TEMPERATURE: 99 F

## 2023-07-26 DIAGNOSIS — Z95.1 S/P CABG (CORONARY ARTERY BYPASS GRAFT): Primary | ICD-10-CM

## 2023-07-26 LAB
ALBUMIN SERPL BCP-MCNC: 3.2 G/DL (ref 3.5–5.2)
ALP SERPL-CCNC: 51 U/L (ref 55–135)
ALT SERPL W/O P-5'-P-CCNC: 6 U/L (ref 10–44)
ANION GAP SERPL CALC-SCNC: 11 MMOL/L (ref 8–16)
AST SERPL-CCNC: 22 U/L (ref 10–40)
BASOPHILS # BLD AUTO: 0.03 K/UL (ref 0–0.2)
BASOPHILS NFR BLD: 0.5 % (ref 0–1.9)
BILIRUB SERPL-MCNC: 0.7 MG/DL (ref 0.1–1)
BUN SERPL-MCNC: 39 MG/DL (ref 8–23)
CALCIUM SERPL-MCNC: 9.1 MG/DL (ref 8.7–10.5)
CHLORIDE SERPL-SCNC: 104 MMOL/L (ref 95–110)
CO2 SERPL-SCNC: 22 MMOL/L (ref 23–29)
CREAT SERPL-MCNC: 2.4 MG/DL (ref 0.5–1.4)
DIFFERENTIAL METHOD: ABNORMAL
EOSINOPHIL # BLD AUTO: 0.3 K/UL (ref 0–0.5)
EOSINOPHIL NFR BLD: 4.7 % (ref 0–8)
ERYTHROCYTE [DISTWIDTH] IN BLOOD BY AUTOMATED COUNT: 13.8 % (ref 11.5–14.5)
EST. GFR  (NO RACE VARIABLE): 28.5 ML/MIN/1.73 M^2
GLUCOSE SERPL-MCNC: 131 MG/DL (ref 70–110)
HCT VFR BLD AUTO: 30.2 % (ref 40–54)
HGB BLD-MCNC: 10.2 G/DL (ref 14–18)
IMM GRANULOCYTES # BLD AUTO: 0.03 K/UL (ref 0–0.04)
IMM GRANULOCYTES NFR BLD AUTO: 0.5 % (ref 0–0.5)
LYMPHOCYTES # BLD AUTO: 1.1 K/UL (ref 1–4.8)
LYMPHOCYTES NFR BLD: 18.2 % (ref 18–48)
MAGNESIUM SERPL-MCNC: 2.1 MG/DL (ref 1.6–2.6)
MCH RBC QN AUTO: 28.3 PG (ref 27–31)
MCHC RBC AUTO-ENTMCNC: 33.8 G/DL (ref 32–36)
MCV RBC AUTO: 84 FL (ref 82–98)
MONOCYTES # BLD AUTO: 0.9 K/UL (ref 0.3–1)
MONOCYTES NFR BLD: 14 % (ref 4–15)
NEUTROPHILS # BLD AUTO: 3.8 K/UL (ref 1.8–7.7)
NEUTROPHILS NFR BLD: 62.1 % (ref 38–73)
NRBC BLD-RTO: 0 /100 WBC
PLATELET # BLD AUTO: 245 K/UL (ref 150–450)
PMV BLD AUTO: 10.3 FL (ref 9.2–12.9)
POCT GLUCOSE: 137 MG/DL (ref 70–110)
POTASSIUM SERPL-SCNC: 4.1 MMOL/L (ref 3.5–5.1)
PROT SERPL-MCNC: 6.7 G/DL (ref 6–8.4)
RBC # BLD AUTO: 3.6 M/UL (ref 4.6–6.2)
SODIUM SERPL-SCNC: 137 MMOL/L (ref 136–145)
WBC # BLD AUTO: 6.16 K/UL (ref 3.9–12.7)

## 2023-07-26 PROCEDURE — 97530 THERAPEUTIC ACTIVITIES: CPT

## 2023-07-26 PROCEDURE — 80053 COMPREHEN METABOLIC PANEL: CPT | Performed by: NURSE PRACTITIONER

## 2023-07-26 PROCEDURE — 94761 N-INVAS EAR/PLS OXIMETRY MLT: CPT

## 2023-07-26 PROCEDURE — 63600175 PHARM REV CODE 636 W HCPCS: Performed by: STUDENT IN AN ORGANIZED HEALTH CARE EDUCATION/TRAINING PROGRAM

## 2023-07-26 PROCEDURE — 97535 SELF CARE MNGMENT TRAINING: CPT

## 2023-07-26 PROCEDURE — 63600175 PHARM REV CODE 636 W HCPCS

## 2023-07-26 PROCEDURE — 85025 COMPLETE CBC W/AUTO DIFF WBC: CPT | Performed by: NURSE PRACTITIONER

## 2023-07-26 PROCEDURE — 36415 COLL VENOUS BLD VENIPUNCTURE: CPT | Performed by: NURSE PRACTITIONER

## 2023-07-26 PROCEDURE — 25000003 PHARM REV CODE 250

## 2023-07-26 PROCEDURE — 25000003 PHARM REV CODE 250: Performed by: THORACIC SURGERY (CARDIOTHORACIC VASCULAR SURGERY)

## 2023-07-26 PROCEDURE — 97116 GAIT TRAINING THERAPY: CPT

## 2023-07-26 PROCEDURE — 83735 ASSAY OF MAGNESIUM: CPT | Performed by: NURSE PRACTITIONER

## 2023-07-26 RX ORDER — FUROSEMIDE 20 MG/1
80 TABLET ORAL DAILY
Qty: 12 TABLET | Refills: 0 | Status: SHIPPED | OUTPATIENT
Start: 2023-07-26 | End: 2023-08-30

## 2023-07-26 RX ORDER — AMIODARONE HYDROCHLORIDE 200 MG/1
TABLET ORAL
Qty: 30 TABLET | Refills: 0 | Status: SHIPPED | OUTPATIENT
Start: 2023-07-26 | End: 2023-08-30

## 2023-07-26 RX ORDER — OXYCODONE HYDROCHLORIDE 5 MG/1
5 TABLET ORAL EVERY 4 HOURS PRN
Qty: 42 TABLET | Refills: 0 | Status: SHIPPED | OUTPATIENT
Start: 2023-07-26 | End: 2023-08-30

## 2023-07-26 RX ORDER — DOCUSATE SODIUM 100 MG/1
100 CAPSULE, LIQUID FILLED ORAL 2 TIMES DAILY PRN
Qty: 30 CAPSULE | Refills: 0 | Status: SHIPPED | OUTPATIENT
Start: 2023-07-26 | End: 2023-08-30

## 2023-07-26 RX ORDER — ACETAMINOPHEN 500 MG
1000 TABLET ORAL EVERY 8 HOURS PRN
Qty: 30 TABLET | Refills: 0 | Status: SHIPPED | OUTPATIENT
Start: 2023-07-26 | End: 2023-08-30

## 2023-07-26 RX ORDER — POTASSIUM CHLORIDE 20 MEQ/1
40 TABLET, EXTENDED RELEASE ORAL 2 TIMES DAILY
Qty: 12 TABLET | Refills: 0 | Status: SHIPPED | OUTPATIENT
Start: 2023-07-26 | End: 2023-07-29

## 2023-07-26 RX ADMIN — POLYETHYLENE GLYCOL 3350 17 G: 17 POWDER, FOR SOLUTION ORAL at 08:07

## 2023-07-26 RX ADMIN — FUROSEMIDE 40 MG: 10 INJECTION, SOLUTION INTRAVENOUS at 08:07

## 2023-07-26 RX ADMIN — DOCUSATE SODIUM 100 MG: 100 CAPSULE, LIQUID FILLED ORAL at 08:07

## 2023-07-26 RX ADMIN — CLOPIDOGREL BISULFATE 75 MG: 75 TABLET ORAL at 08:07

## 2023-07-26 RX ADMIN — TAMSULOSIN HYDROCHLORIDE 0.4 MG: 0.4 CAPSULE ORAL at 08:07

## 2023-07-26 RX ADMIN — AMIODARONE HYDROCHLORIDE 200 MG: 200 TABLET ORAL at 08:07

## 2023-07-26 RX ADMIN — POTASSIUM CHLORIDE 20 MEQ: 1500 TABLET, EXTENDED RELEASE ORAL at 08:07

## 2023-07-26 RX ADMIN — HEPARIN SODIUM 5000 UNITS: 5000 INJECTION INTRAVENOUS; SUBCUTANEOUS at 06:07

## 2023-07-26 RX ADMIN — ACETAMINOPHEN 1000 MG: 500 TABLET ORAL at 06:07

## 2023-07-26 RX ADMIN — PANTOPRAZOLE SODIUM 40 MG: 40 TABLET, DELAYED RELEASE ORAL at 08:07

## 2023-07-26 RX ADMIN — ASPIRIN 81 MG 81 MG: 81 TABLET ORAL at 08:07

## 2023-07-26 NOTE — CARE UPDATE
-Glucose Goal 110-140    -A1C:   Hemoglobin A1C   Date Value Ref Range Status   03/14/2023 6.7 (H) 4.0 - 5.6 % Final     Comment:     ADA Screening Guidelines:  5.7-6.4%  Consistent with prediabetes  >or=6.5%  Consistent with diabetes    High levels of fetal hemoglobin interfere with the HbA1C  assay. Heterozygous hemoglobin variants (HbS, HgC, etc)do  not significantly interfere with this assay.   However, presence of multiple variants may affect accuracy.           -HOME REGIMEN: Farxiga 10 mg; Lantus 12 units BID; and Ozempic 0.5 mg weekly.     -GLUCOSE TREND FOR THE PAST 24HRS:   Recent Labs   Lab 07/24/23  1157 07/24/23  1705 07/24/23  2228 07/25/23  1110 07/25/23  1444 07/26/23  0737   POCTGLUCOSE 193* 141* 137* 255* 150* 137*         -NO HYPOGYCEMIAS NOTED     - Diet  Diet Cardiac Fluid - 1500mL    T2DM.  S/p CABG.  BG stable on correction insulin.      - Novolog (Insulin Aspart) prn for BG excursions MDC SSI (150/25)  - BG checks AC/HS  - Hypoglycemia protocol in place      ** Please notify Endocrine for any change and/or advance in diet**  ** Please call Endocrine for any BG related issues **     Discharge Planning:   Notified by primary. Plan for d/c today.  Recommend that he returns on home DM regimen.  A1C at goal.

## 2023-07-26 NOTE — PT/OT/SLP PROGRESS
Physical Therapy Treatment/Discharge    Patient Name:  Addy Redding   MRN:  [id number]    Recommendations:     Discharge Recommendations: home  Discharge Equipment Recommendations: none  Barriers to discharge: None    Assessment:     Addy Redding is a 67 y.o. male admitted with a medical diagnosis of Coronary artery disease of native artery of native heart with stable angina pectoris.  He presents with the following impairments/functional limitations:  (no rehab issues) pt tolerated treatment well but report of dizziness with gait training limited functional mobility. Pt will be able to discharge home with no needs when medically stable.pt is s/p CABG 7/21/23.    Rehab Prognosis: Good; patient would benefit from acute skilled PT services to address these deficits and reach maximum level of function.    Recent Surgery: Procedure(s) (LRB):  CABG, WITHOUT CARDIOPULMONARY PUMP OXYGENATION (N/A) 5 Days Post-Op    Plan:     During this hospitalization, patient to be seen  (pt is being discharged from PT.) to address the identified rehab impairments via  (pt is being discharged from PT) and progress toward the following goals:    Plan of Care Expires:  07/26/23    Subjective     Chief Complaint: pt c/o dizziness with gait training but later stated he felt it was SOB.   Patient/Family Comments/goals: to go home.   Pain/Comfort:  Pain Rating 1: 0/10  Pain Rating Post-Intervention 1: 0/10      Objective:     Communicated with nurse prior to session.  Patient found supine with telemetry (hep lock IV) upon PT entry to room.     General Precautions: Standard, fall, sternal  Orthopedic Precautions: N/A  Braces: N/A  Respiratory Status: Room air     Functional Mobility:  Bed Mobility:    Rolling to Right: stand by assistance.    Supine to sit.: stand by assistance    Transfers:     Sit to Stand:  stand by assistance with no AD    Gait: pt received gait training ~ 50 ft with SBA. Pt c/o dizziness and was seated in chair. Pt /59. Pt  received gait training ~ 134 ft with SBA.     Pt was able to recall 3/3 sternal precautions.       AM-PAC 6 CLICK MOBILITY  Turning over in bed (including adjusting bedclothes, sheets and blankets)?: 3  Sitting down on and standing up from a chair with arms (e.g., wheelchair, bedside commode, etc.): 3  Moving from lying on back to sitting on the side of the bed?: 3  Moving to and from a bed to a chair (including a wheelchair)?: 3  Need to walk in hospital room?: 3  Climbing 3-5 steps with a railing?: 3  Basic Mobility Total Score: 18       Treatment & Education:  Pt received verbal instructions for mobility at home and remaining active. Pt verbally expressed understanding of such.     Patient left up in chair with all lines intact and call button in reach..    GOALS:   Multidisciplinary Problems       Physical Therapy Goals       Not on file              Multidisciplinary Problems (Resolved)          Problem: Physical Therapy    Goal Priority Disciplines Outcome Goal Variances Interventions   Physical Therapy Goal   (Resolved)     PT, PT/OT Met     Description: Goals to be met by: 23     Patient will increase functional independence with mobility by performin. Supine to sit with Stand-by Assistance-met   2. Sit to stand transfer with Supervision-met   3. Gait  x 250 feet with Supervision . -met                          Time Tracking:     PT Received On: 23  PT Start Time: 811     PT Stop Time: 825  PT Total Time (min): 14 min     Billable Minutes: Gait Training 14 min    Treatment Type: Treatment (discharge)  PT/PTA: PT     Number of PTA visits since last PT visit: 0     2023

## 2023-07-26 NOTE — PLAN OF CARE
[facility] Stepdown  Discharge Final Note    Primary Care Provider: Stephan Ramey MD    Expected Discharge Date: 7/26/2023    Final Discharge Note (most recent)       Final Note - 07/26/23 1110          Final Note    Assessment Type Final Discharge Note     Anticipated Discharge Disposition Home or Self Care                 Edie Moss LMSW [facility]  [id number]      Future Appointments   Date Time Provider Department Center   7/28/2023 10:00 AM APPOINTMENT LAB, [facility]   8/8/2023  1:45 PM Preethi Brito MD [facility]   8/14/2023  7:00 AM APPOINTMENT LAB, [facility] [place] [facility]   8/15/2023  9:20 AM Stephan Ramey MD [facility] FAM MED [facility]   8/23/2023  8:20 AM Je Fontanez MD [facility] CARDIO [facility]   8/30/2023  8:15 AM EKG, APPT [facility]   8/30/2023  9:20 AM Jass Uriostegui MD [facility] CARDVAS [facility]   10/24/2023 11:30 AM APPOINTMENT LAB, [facility]   10/31/2023 11:15 AM Saroj Stallworth Jr., MD [facility] RADONC2 [facility]       Contact Info       [facility] - Cardiology - 3rd Fl   Specialty: Cardiology    [address]  [facility] [address]   Phone: [phone]       Next Steps: Follow up    Instructions: CHW called to schedule Cards f/uGonzález sent message to clinic to call patient with appt.

## 2023-07-26 NOTE — PT/OT/SLP PROGRESS
Occupational Therapy   Treatment    Name: Addy Redding  MRN: [id number]  Admitting Diagnosis:  Coronary artery disease of native artery of native heart with stable angina pectoris  5 Days Post-Op    Recommendations:     Discharge Recommendations: home  Discharge Equipment Recommendations:  none  Barriers to discharge:  None    Assessment:     Addy Redding is a 67 y.o. male with a medical diagnosis of Coronary artery disease of native artery of native heart with stable angina pectoris.  He presents with impaired ADL and mobility performance deficits. Pt found upright in chair and agreeable for therapy. Session focused on fx'l mobility while maintaining sternal precautions and performance of toileting and dressing to prepare for d/c. Pt was SBA for all mobility with cues required for RUPINDER and pacing of steps. Pt would benefit from continued OT skilled services 3x/wk to improve daily living skills to optimize QOL.Pt is recommended to discharge to home  at this time. Performance deficits affecting function are weakness, impaired endurance, impaired self care skills, impaired functional mobility, gait instability, impaired balance.     Rehab Prognosis:  Good; patient would benefit from acute skilled OT services to address these deficits and reach maximum level of function.       Plan:     Patient to be seen 5 x/week to address the above listed problems via self-care/home management, therapeutic activities, therapeutic exercises  Plan of Care Expires: 08/21/23  Plan of Care Reviewed with: patient    Subjective     Chief Complaint: balance  Patient/Family Comments/goals: return home   Pain/Comfort:  Pain Rating 1: 0/10  Pain Rating Post-Intervention 1: 0/10  Pain Rating Post-Intervention 2: 0/10    Objective:     Communicated with: RN prior to session.  Patient found up in chair with telemetry, peripheral IV upon OT entry to room.    General Precautions: Standard, fall, sternal    Orthopedic Precautions:N/A  Braces:  N/A  Respiratory Status: Room air     Occupational Performance:     Functional Mobility/Transfers:  Patient completed Sit <> Stand Transfer with stand by assistance  with  no assistive device   Patient completed Toilet Transfer Step Transfer technique with stand by assistance with  no AD  Functional Mobility: Pt mobilized in room and into restroom for toileting with cues for maintaining sternal prec. Pt then completed grooming at sink and UB/LB dressing with seated breaks in chair required.    Activities of Daily Living:  Grooming: stand by assistance washing hands at sink  Upper Body Dressing: stand by assistance donning shirt  Lower Body Dressing: stand by assistance donning pants   Toileting: stand by assistance for seated toileting      [facility] 6 Click ADL: 17    Treatment & Education:  Pt educated on role of occupational therapy, POC, and safety during ADLs and functional mobility. Pt and OT discussed importance of safe, continued mobility to optimize daily living skills. Pt verbalized understanding.     White board updated during session. Pt given instruction to call for medical staff/nurse for assistance.       Patient left up in chair with all lines intact, call button in reach, RN notified, and spouse present    GOALS:   Multidisciplinary Problems       Occupational Therapy Goals          Problem: Occupational Therapy    Goal Priority Disciplines Outcome Interventions   Occupational Therapy Goal     OT, PT/OT Ongoing, Progressing    Description: Goals to be met by: 7 days 7/29/23      Patient will increase functional independence with ADLs by performing:    Pt to complete standing g/h skills with supervision.   Pt to complete t/f bed, chair and commode with supervision.   Pt to complete UE dressing with set-up  Pt to complete LE dressing with SBA  Pt to complete toileting with SBA                        Time Tracking:     OT Date of Treatment: 07/26/23  OT Start Time: 1000  OT Stop Time: 1024  OT Total Time  (min): 24 min    Billable Minutes:Self Care/Home Management 14 min  Therapeutic Activity 10 min    OT/ALANA: OT          7/26/2023

## 2023-07-26 NOTE — PLAN OF CARE
Problem: Adult Inpatient Plan of Care  Goal: Plan of Care Review  Outcome: Met  Goal: Patient-Specific Goal (Individualized)  Outcome: Met  Goal: Absence of Hospital-Acquired Illness or Injury  Outcome: Met  Goal: Optimal Comfort and Wellbeing  Outcome: Met  Goal: Readiness for Transition of Care  Outcome: Met     Problem: Diabetes Comorbidity  Goal: Blood Glucose Level Within Targeted Range  Outcome: Met     Problem: Activity Intolerance (Cardiovascular Surgery)  Goal: Improved Activity Tolerance  Outcome: Met     Problem: Adjustment to Surgery (Cardiovascular Surgery)  Goal: Optimal Coping with Heart Surgery  Outcome: Met     Problem: Bleeding (Cardiovascular Surgery)  Goal: Bleeding (Cardiovascular Surgery)  Outcome: Met     Problem: Bowel Motility Impaired (Cardiovascular Surgery)  Goal: Effective Bowel Elimination (Cardiovascular Surgery)  Outcome: Met     Problem: Cardiac Function Impaired (Cardiovascular Surgery)  Goal: Effective Cardiac Function  Outcome: Met     Problem: Cerebral Tissue Perfusion (Cardiovascular Surgery)  Goal: Optimal Cerebral Tissue Perfusion (Cardiovascular Surgery)  Outcome: Met     Problem: Fluid and Electrolyte Imbalance (Cardiovascular Surgery)  Goal: Fluid and Electrolyte Balance (Cardiovascular Surgery)  Outcome: Met     Problem: Glycemic Control Impaired (Cardiovascular Surgery)  Goal: Blood Glucose Level Within Targeted Range (Cardiovascular Surgery)  Outcome: Met     Problem: Infection (Cardiovascular Surgery)  Goal: Absence of Infection Signs and Symptoms  Outcome: Met     Problem: Ongoing Anesthesia Effects (Cardiovascular Surgery)  Goal: Anesthesia/Sedation Recovery  Outcome: Met     Problem: Pain (Cardiovascular Surgery)  Goal: Acceptable Pain Control  Outcome: Met     Problem: Postoperative Nausea and Vomiting (Cardiovascular Surgery)  Goal: Nausea and Vomiting Relief (Cardiovascular Surgery)  Outcome: Met     Problem: Postoperative Urinary Retention (Cardiovascular  Surgery)  Goal: Effective Urinary Elimination (Cardiovascular Surgery)  Outcome: Met     Problem: Respiratory Compromise (Cardiovascular Surgery)  Goal: Effective Oxygenation and Ventilation (Cardiovascular Surgery)  Outcome: Met     Problem: Infection  Goal: Absence of Infection Signs and Symptoms  Outcome: Met     Problem: Noninvasive Ventilation Acute  Goal: Effective Unassisted Ventilation and Oxygenation  Outcome: Met     Problem: Fall Injury Risk  Goal: Absence of Fall and Fall-Related Injury  Outcome: Met     Problem: Skin Injury Risk Increased  Goal: Skin Health and Integrity  Outcome: Met

## 2023-07-26 NOTE — NURSING
Discharge instructions provided to pt and spouse at this time.  Understanding verbalized and questions denied.  IV and Cardiac monitor removed at this time, pt tolerated well.  Awaiting arrival of pt's medications, instructed pt to call with arrival.  Understanding verbalized and questions denied

## 2023-07-26 NOTE — PLAN OF CARE
Problem: Physical Therapy  Goal: Physical Therapy Goal  Description: Goals to be met by: 23     Patient will increase functional independence with mobility by performin. Supine to sit with Stand-by Assistance-met   2. Sit to stand transfer with Supervision-met   3. Gait  x 250 feet with Supervision . -met     Outcome: Met   Goals met and pt being discharged. 2023

## 2023-07-26 NOTE — DISCHARGE SUMMARY
[facility] - Cardiology Stepdown  Cardiothoracic Surgery  Discharge Summary      Patient Name: Addy Redding   MRN: [id number]  Admission Date: 7/21/2023  Hospital Length of Stay: 5 days  Discharge Date and Time:  07/26/2023 10:07 AM  Attending Physician: Jass Uriostegui MD   Discharging Provider: Ness Quintero PA-C  Primary Care Provider: Stephan Ramey MD    HPI:   Mr. Redding is a 67 year-old male former smoker (1ppd x 25 years, quit 25 years ago) with a history of coronary artery disease s/p LCx+OM stents (2022), carotid stenosis, stage 4 chronic kidney disease, hypertension, and diabetes (HbA1C 6.7).  Recently, he has been symptomatic with dyspnea on exertion symptoms interfering with his quality of life.  His most recent echocardiogram showed 55% ejection fraction with no significant valvulopathy.  Angiogram showed 80% ostial LAD with a moderate sized mid and distal LAD, nonsignificant LCx disease (patent stents), and totally occluded ostial RCA with diffuse mid and distal RCA disease and small PDA (fills via left to right collaterals).     CT chest noncontrast shows minimal ascending aortic and mild aortic arch calcifications. Carotid ultrasound shows 40-59% right sided disease and 60-79% left sided disease.     We had an extensive discussion with him regarding the ACC/AHA guidelines and we agreed that he has class I indications for surgery involving off pump coronary artery bypass surgery (LIMA-LAD, possible SVG-PDA).   The risks and benefits were explained and informed consent was obtained.       Procedure(s) (LRB):  CABG, WITHOUT CARDIOPULMONARY PUMP OXYGENATION (N/A)      Indwelling Lines/Drains at time of discharge:   Lines/Drains/Airways     None               Hospital Course: On 7/21/23, the patient was taken to the Operating Room for the above stated procedure. Please see the previously dictated operative report for complete details. Postoperatively, the patient was taken from the  Operating Room to the ICU  where the vital signs were monitored and pain was kept under control. The patient was weaned from the drips and extubated in the ICU per protocol. Once hemodynamically stable, the patient was transferred to the Cardiac Step-Down floor for continued strengthening and ambulation. On postoperative day 5, the patient was ready for discharge to home. At the time of discharge, the patient was ambulating unassisted. Pain was well controlled with oral analgesics and the patient was tolerating the diet. On 7/23/ 23, patient developed Tachy-brendon syndrome, fluctuating between atrial fibrillation and sinus bradycardia, in which EP was following and self converted back to NSR. Per recommendations, he was taken off of beta blockers and placed on IV amiodarone loading protocol for 24 hours followed by 200mg qd for one month. At the time of discharge, patient had sCr 2.4 with  adequate urine production. He is schedule to repeat BMP on Friday 7/28/23 to reassess renal function.     BB contraindicated due to bradycardia.   Patient not placed on ASA, discharged with Xarelto and Plavix.  Patient not placed on Ace-Inhibitor at the time of discharge due to potential for hypotension     Hold viagra for 6 weeks       MOBILITY AND ACTIVITY: As tolerated. Patient may shower. No heavy lifting of greater than 5 pounds and no driving.     DIET: An 1800-calorie ADA with a 1500 mL fluid restriction.     WOUND CARE INSTRUCTIONS: Check for redness, swelling and drainage around the  incision or wound. Patient is to call for any obvious bleeding, drainage, pus from the wound, unusual problems or difficulties or temperature of greater than 101   degrees.     FOLLOWUP: Follow up with Dr. Uriostegui in approximately 6 weeks. Prior to this  appointment, the patient will have an EKG.  Follow up with Dr. Fontanez in ~ 1 month.          DISCHARGE CONDITION: At the time of discharge, the patient was in sinus rhythm and afebrile with stable vital signs.            Goals of Care Treatment Preferences:  Code Status: Full Code      Consults (From admission, onward)        Status Ordering Provider     Inpatient consult to Electrophysiology  Once        Provider:  (Not yet assigned)    Completed SHAHLA MADISON     Consult to Endocrinology  Once        Provider:  (Not yet assigned)    Completed RUSSELL VAZQUEZ     Consult Case Management/Social Work  Once        Provider:  (Not yet assigned)    Acknowledged RUSSELL VAZQUEZ            No new Assessment & Plan notes have been filed under this hospital service since the last note was generated.  Service: Cardiothoracic Surgery    Final Active Diagnoses:    Diagnosis Date Noted POA    PRINCIPAL PROBLEM:  Coronary artery disease of native artery of native heart with stable angina pectoris [I25.118] 10/04/2022 Yes    S/P CABG (coronary artery bypass graft) [Z95.1] 07/24/2023 Not Applicable    Paroxysmal A-fib [I48.0] 07/24/2023 Unknown    Tachycardia-bradycardia syndrome [I49.5] 07/23/2023 Unknown    Carotid stenosis [I65.29] 07/21/2023 Yes    Acute blood loss anemia [D62] 07/21/2023 Yes    Chronic kidney disease (CKD), stage IV (severe) [N18.4] 07/06/2022 Yes    Type 2 diabetes mellitus with stage 3a chronic kidney disease, without long-term current use of insulin [E11.22, N18.31] 02/22/2016 Yes    Hypertension associated with diabetes [E11.59, I15.2] 11/04/2014 Yes    Dyslipidemia associated with type 2 diabetes mellitus [E11.69, E78.5]  Yes      Problems Resolved During this Admission:      Discharged Condition: stable    Disposition: Home or Self Care    Follow Up:   Follow-up Information     [facility] - Cardiology - [facility] Follow up.    Specialty: Cardiology  Why: [facility] called to schedule Cards f/u, González sent message to clinic to call patient with appt.  Contact information:  [address] [facility]  [facility] [address] [phone]  Additional information:  Cardiology Services Clinics - [facility]  "floor                     Patient Instructions:   No discharge procedures on file.  Medications:  Reconciled Home Medications:      Medication List      START taking these medications    acetaminophen 500 MG tablet  Commonly known as: TYLENOL  Take 2 tablets (1,000 mg total) by mouth every 8 (eight) hours as needed for Pain.     amiodarone 200 MG Tab  Commonly known as: PACERONE  Take one pill daily for 1 month     docusate sodium 100 MG capsule  Commonly known as: COLACE  Take 1 capsule (100 mg total) by mouth 2 (two) times daily as needed for Constipation.     furosemide 20 MG tablet  Commonly known as: LASIX  Take 4 tablets (80 mg total) by mouth once daily. for 3 days     oxyCODONE 5 MG immediate release tablet  Commonly known as: ROXICODONE  Take 1 tablet (5 mg total) by mouth every 4 (four) hours as needed for Pain.     potassium chloride SA 20 MEQ tablet  Commonly known as: K-DUR,KLOR-CON  Take 2 tablets (40 mEq total) by mouth 2 (two) times daily. for 3 days     rivaroxaban 15 mg Tab  Commonly known as: XARELTO  Take 1 tablet (15 mg total) by mouth daily with dinner or evening meal.        CONTINUE taking these medications    * ADVANCED GLUC METER TEST STRIP Strp  Generic drug: blood sugar diagnostic  USE TO TEST BLOOD SUGAR 4 TIMES DAILY.     * blood sugar diagnostic Strp  Commonly known as: BLOOD GLUCOSE TEST  Check sugar once daily     atorvastatin 80 MG tablet  Commonly known as: LIPITOR  Take 1 tablet (80 mg total) by mouth once daily.     BD ARIA 2ND GEN PEN NEEDLE 32 gauge x 5/32" Ndle  Generic drug: pen needle, diabetic     blood-glucose meter Misc  by Misc.(Non-Drug; Combo Route) route.     clopidogreL 75 mg tablet  Commonly known as: PLAVIX  Take 1 tablet (75 mg total) by mouth once daily.     dapagliflozin propanediol 10 mg tablet  Commonly known as: FARXIGA  Take 1 tablet (10 mg total) by mouth once daily.     DEXCOM G6  Misc  Generic drug: blood-glucose meter,continuous  1 Device by " Misc.(Non-Drug; Combo Route) route continuous.     DEXCOM G6 SENSOR Tia  Generic drug: blood-glucose sensor  1 Device by Misc.(Non-Drug; Combo Route) route continuous.     DEXCOM G6 TRANSMITTER Tia  Generic drug: blood-glucose transmitter  1 Device by Misc.(Non-Drug; Combo Route) route continuous.     ergocalciferol 50,000 unit Cap  Commonly known as: ERGOCALCIFEROL  Take 1 capsule (50,000 Units total) by mouth every 7 days.     fluticasone propionate 50 mcg/actuation nasal spray  Commonly known as: FLONASE  2 sprays (100 mcg total) by Each Nostril route once daily.     insulin glargine 100 units/mL SubQ pen  Commonly known as: LANTUS SOLOSTAR U-100 INSULIN  Inject 12 Units into the skin 2 (two) times a day.     lancets Misc  Commonly known as: ACCU-CHEK SOFTCLIX LANCETS  1 lancet by Misc.(Non-Drug; Combo Route) route 2 (two) times daily.     latanoprost 0.005 % ophthalmic solution     OZEMPIC 0.25 mg or 0.5 mg (2 mg/3 mL) pen injector  Generic drug: semaglutide  Inject 0.25 mg into the skin every 7 days.     pantoprazole 40 MG tablet  Commonly known as: PROTONIX  TAKE ONE TABLET BY MOUTH ONCE DAILY.     sodium bicarbonate 650 MG tablet  Take 2 tablets (1,300 mg total) by mouth 2 (two) times daily.     tamsulosin 0.4 mg Cap  Commonly known as: FLOMAX  Take by mouth once daily.         * This list has 2 medication(s) that are the same as other medications prescribed for you. Read the directions carefully, and ask your doctor or other care provider to review them with you.            STOP taking these medications    amLODIPine 5 MG tablet  Commonly known as: NORVASC     aspirin 81 MG EC tablet  Commonly known as: ECOTRIN     doxazosin 2 MG tablet  Commonly known as: CARDURA     nitroGLYCERIN 0.4 MG SL tablet  Commonly known as: NITROSTAT     olmesartan 5 MG Tab  Commonly known as: BENICAR        ASK your doctor about these medications    sildenafil 20 mg Tab  Commonly known as: REVATIO  Take 1 tablet (20 mg total)  by mouth daily as needed for ED          Time spent on the discharge of patient: 60 minutes    Ness Quintero PA-C  Cardiothoracic Surgery  [facility] - Cardiology Stepdown

## 2023-07-26 NOTE — PLAN OF CARE
Problem: Adult Inpatient Plan of Care  Goal: Plan of Care Review  Outcome: Ongoing, Progressing  Goal: Patient-Specific Goal (Individualized)  Outcome: Ongoing, Progressing  Goal: Absence of Hospital-Acquired Illness or Injury  Outcome: Ongoing, Progressing  Goal: Optimal Comfort and Wellbeing  Outcome: Ongoing, Progressing  Goal: Readiness for Transition of Care  Outcome: Ongoing, Progressing     Problem: Diabetes Comorbidity  Goal: Blood Glucose Level Within Targeted Range  Outcome: Ongoing, Progressing     Problem: Activity Intolerance (Cardiovascular Surgery)  Goal: Improved Activity Tolerance  Outcome: Ongoing, Progressing     Problem: Adjustment to Surgery (Cardiovascular Surgery)  Goal: Optimal Coping with Heart Surgery  Outcome: Ongoing, Progressing     Problem: Bleeding (Cardiovascular Surgery)  Goal: Bleeding (Cardiovascular Surgery)  Outcome: Ongoing, Progressing     Problem: Bowel Motility Impaired (Cardiovascular Surgery)  Goal: Effective Bowel Elimination (Cardiovascular Surgery)  Outcome: Ongoing, Progressing     Problem: Cardiac Function Impaired (Cardiovascular Surgery)  Goal: Effective Cardiac Function  Outcome: Ongoing, Progressing     Problem: Cerebral Tissue Perfusion (Cardiovascular Surgery)  Goal: Optimal Cerebral Tissue Perfusion (Cardiovascular Surgery)  Outcome: Ongoing, Progressing     Problem: Fluid and Electrolyte Imbalance (Cardiovascular Surgery)  Goal: Fluid and Electrolyte Balance (Cardiovascular Surgery)  Outcome: Ongoing, Progressing     Problem: Glycemic Control Impaired (Cardiovascular Surgery)  Goal: Blood Glucose Level Within Targeted Range (Cardiovascular Surgery)  Outcome: Ongoing, Progressing     Problem: Infection (Cardiovascular Surgery)  Goal: Absence of Infection Signs and Symptoms  Outcome: Ongoing, Progressing     Problem: Ongoing Anesthesia Effects (Cardiovascular Surgery)  Goal: Anesthesia/Sedation Recovery  Outcome: Ongoing, Progressing     Problem: Pain  (Cardiovascular Surgery)  Goal: Acceptable Pain Control  Outcome: Ongoing, Progressing     Problem: Postoperative Nausea and Vomiting (Cardiovascular Surgery)  Goal: Nausea and Vomiting Relief (Cardiovascular Surgery)  Outcome: Ongoing, Progressing     Problem: Postoperative Urinary Retention (Cardiovascular Surgery)  Goal: Effective Urinary Elimination (Cardiovascular Surgery)  Outcome: Ongoing, Progressing     Problem: Respiratory Compromise (Cardiovascular Surgery)  Goal: Effective Oxygenation and Ventilation (Cardiovascular Surgery)  Outcome: Ongoing, Progressing     Problem: Infection  Goal: Absence of Infection Signs and Symptoms  Outcome: Ongoing, Progressing     Problem: Noninvasive Ventilation Acute  Goal: Effective Unassisted Ventilation and Oxygenation  Outcome: Ongoing, Progressing     Problem: Fall Injury Risk  Goal: Absence of Fall and Fall-Related Injury  Outcome: Ongoing, Progressing     Problem: Skin Injury Risk Increased  Goal: Skin Health and Integrity  Outcome: Ongoing, Progressing

## 2023-07-27 ENCOUNTER — PATIENT OUTREACH (OUTPATIENT)
Dept: ADMINISTRATIVE | Facility: CLINIC | Age: 68
End: 2023-07-27
Payer: MEDICARE

## 2023-07-27 ENCOUNTER — TELEPHONE (OUTPATIENT)
Dept: CARDIOTHORACIC SURGERY | Facility: CLINIC | Age: 68
End: 2023-07-27
Payer: MEDICARE

## 2023-07-27 NOTE — PROGRESS NOTES
C3 nurse attempted to contact Addy Redding  for a TCC post hospital discharge follow up call. No answer. Left voicemail with callback information. The patient does not have a scheduled HOSFU appointment. Message sent to PCP staff for assistance with scheduling visit with patient.

## 2023-07-27 NOTE — TELEPHONE ENCOUNTER
Spoke with patient and reviewed d/c instructions and wound care instructions with patient.    Reiterated the need for pt to clean his incision everyday with soap and water. Explained he should dry it thoroughly. Instructed him to call me if he noticed any redness, swelling, warmth, drainage, or wound opening. He verbalized understanding.    Reminded pt walk as much as he can.      Reminded pt not to drive for the first 4 weeks after surgery, and to refrain from lifting, pushing, and pulling anything greater than 5 pounds for the first 6 weeks following his surgery.      Instructed pt to perform daily weights.  Pt instructed to notify the clinic if he has a weight gain greater than 3 pounds in one day.     Instructed pt on use of IS and that he should be using it every 1-2 hours.    Pt reminded about post-op appts on 7/28 for labs and on 8/30 for follow up.  Pt instructed to call the clinic with any questions or concerns.  Pt verbalized understanding to all instructions.       Julie Haase RN  CTS Nurse Navigator [phone]

## 2023-07-27 NOTE — PROGRESS NOTES
2nd Attempt made to reach patient for TCC call. Left voicemail please call [phone] leave first name, last name, and  for Blanquita I will return your call.

## 2023-07-28 ENCOUNTER — LAB VISIT (OUTPATIENT)
Dept: LAB | Facility: HOSPITAL | Age: 68
End: 2023-07-28
Payer: MEDICARE

## 2023-07-28 ENCOUNTER — TELEPHONE (OUTPATIENT)
Dept: CARDIOTHORACIC SURGERY | Facility: CLINIC | Age: 68
End: 2023-07-28
Payer: MEDICARE

## 2023-07-28 DIAGNOSIS — Z95.1 S/P CABG (CORONARY ARTERY BYPASS GRAFT): Primary | ICD-10-CM

## 2023-07-28 DIAGNOSIS — N18.4 CHRONIC KIDNEY DISEASE (CKD), STAGE IV (SEVERE): ICD-10-CM

## 2023-07-28 DIAGNOSIS — Z95.1 S/P CABG (CORONARY ARTERY BYPASS GRAFT): ICD-10-CM

## 2023-07-28 LAB
ANION GAP SERPL CALC-SCNC: 12 MMOL/L (ref 8–16)
BUN SERPL-MCNC: 44 MG/DL (ref 8–23)
CALCIUM SERPL-MCNC: 9.9 MG/DL (ref 8.7–10.5)
CHLORIDE SERPL-SCNC: 102 MMOL/L (ref 95–110)
CO2 SERPL-SCNC: 23 MMOL/L (ref 23–29)
CREAT SERPL-MCNC: 2.6 MG/DL (ref 0.5–1.4)
EST. GFR  (NO RACE VARIABLE): 26 ML/MIN/1.73 M^2
GLUCOSE SERPL-MCNC: 190 MG/DL (ref 70–110)
POTASSIUM SERPL-SCNC: 4.9 MMOL/L (ref 3.5–5.1)
SODIUM SERPL-SCNC: 137 MMOL/L (ref 136–145)

## 2023-07-28 PROCEDURE — 80048 BASIC METABOLIC PNL TOTAL CA: CPT

## 2023-07-28 PROCEDURE — 36415 COLL VENOUS BLD VENIPUNCTURE: CPT

## 2023-07-28 NOTE — TELEPHONE ENCOUNTER
Informed patient that he will need to go to lab on Tuesday to get repeat BMP. Also instructed patient to make sure he is drinking plenty of water and staying hydrated. He verbalized understanding.    Julie Haase RN  [facility] Nurse Navigator [phone]

## 2023-07-28 NOTE — PROGRESS NOTES
C3 nurse spoke with Addy Redding  for a TCC post hospital discharge follow up call. The patient has a scheduled [facility] appointment with Yesenia Mcdonald on 8/2/23 @ 0800.

## 2023-07-28 NOTE — PROGRESS NOTES
3rd Attempt made to reach patient for TCC call. Left voicemail please call [phone] leave first name, last name, and  for Blanquita I will return your call.

## 2023-08-01 ENCOUNTER — LAB VISIT (OUTPATIENT)
Dept: LAB | Facility: HOSPITAL | Age: 68
End: 2023-08-01
Payer: MEDICARE

## 2023-08-01 DIAGNOSIS — Z95.1 S/P CABG (CORONARY ARTERY BYPASS GRAFT): ICD-10-CM

## 2023-08-01 DIAGNOSIS — N18.4 CHRONIC KIDNEY DISEASE (CKD), STAGE IV (SEVERE): ICD-10-CM

## 2023-08-01 LAB
ANION GAP SERPL CALC-SCNC: 12 MMOL/L (ref 8–16)
BUN SERPL-MCNC: 39 MG/DL (ref 8–23)
CALCIUM SERPL-MCNC: 9.6 MG/DL (ref 8.7–10.5)
CHLORIDE SERPL-SCNC: 102 MMOL/L (ref 95–110)
CO2 SERPL-SCNC: 23 MMOL/L (ref 23–29)
CREAT SERPL-MCNC: 2.7 MG/DL (ref 0.5–1.4)
EST. GFR  (NO RACE VARIABLE): 25 ML/MIN/1.73 M^2
GLUCOSE SERPL-MCNC: 141 MG/DL (ref 70–110)
POTASSIUM SERPL-SCNC: 5 MMOL/L (ref 3.5–5.1)
SODIUM SERPL-SCNC: 137 MMOL/L (ref 136–145)

## 2023-08-01 PROCEDURE — 36415 COLL VENOUS BLD VENIPUNCTURE: CPT

## 2023-08-01 PROCEDURE — 80048 BASIC METABOLIC PNL TOTAL CA: CPT

## 2023-08-02 ENCOUNTER — OFFICE VISIT (OUTPATIENT)
Dept: HOME HEALTH SERVICES | Facility: CLINIC | Age: 68
End: 2023-08-02
Payer: MEDICARE

## 2023-08-02 ENCOUNTER — TELEPHONE (OUTPATIENT)
Dept: CARDIOTHORACIC SURGERY | Facility: CLINIC | Age: 68
End: 2023-08-02
Payer: MEDICARE

## 2023-08-02 DIAGNOSIS — Z95.1 S/P CABG (CORONARY ARTERY BYPASS GRAFT): Primary | ICD-10-CM

## 2023-08-02 DIAGNOSIS — N18.4 CHRONIC KIDNEY DISEASE (CKD), STAGE IV (SEVERE): Primary | ICD-10-CM

## 2023-08-02 PROCEDURE — 4010F ACE/ARB THERAPY RXD/TAKEN: CPT | Mod: CPTII,S$GLB,, | Performed by: NURSE PRACTITIONER

## 2023-08-02 PROCEDURE — 99497 ADVNCD CARE PLAN 30 MIN: CPT | Mod: S$GLB,,, | Performed by: NURSE PRACTITIONER

## 2023-08-02 PROCEDURE — 4010F PR ACE/ARB THEARPY RXD/TAKEN: ICD-10-PCS | Mod: CPTII,S$GLB,, | Performed by: NURSE PRACTITIONER

## 2023-08-02 PROCEDURE — 3078F DIAST BP <80 MM HG: CPT | Mod: CPTII,S$GLB,, | Performed by: NURSE PRACTITIONER

## 2023-08-02 PROCEDURE — 3066F NEPHROPATHY DOC TX: CPT | Mod: CPTII,S$GLB,, | Performed by: NURSE PRACTITIONER

## 2023-08-02 PROCEDURE — 3074F PR MOST RECENT SYSTOLIC BLOOD PRESSURE < 130 MM HG: ICD-10-PCS | Mod: CPTII,S$GLB,, | Performed by: NURSE PRACTITIONER

## 2023-08-02 PROCEDURE — 99495 TRANSJ CARE MGMT MOD F2F 14D: CPT | Mod: S$GLB,,, | Performed by: NURSE PRACTITIONER

## 2023-08-02 PROCEDURE — 3078F PR MOST RECENT DIASTOLIC BLOOD PRESSURE < 80 MM HG: ICD-10-PCS | Mod: CPTII,S$GLB,, | Performed by: NURSE PRACTITIONER

## 2023-08-02 PROCEDURE — 3061F PR NEG MICROALBUMINURIA RESULT DOCUMENTED/REVIEW: ICD-10-PCS | Mod: CPTII,S$GLB,, | Performed by: NURSE PRACTITIONER

## 2023-08-02 PROCEDURE — 99497 PR ADVNCD CARE PLAN 30 MIN: ICD-10-PCS | Mod: S$GLB,,, | Performed by: NURSE PRACTITIONER

## 2023-08-02 PROCEDURE — 3061F NEG MICROALBUMINURIA REV: CPT | Mod: CPTII,S$GLB,, | Performed by: NURSE PRACTITIONER

## 2023-08-02 PROCEDURE — 3044F PR MOST RECENT HEMOGLOBIN A1C LEVEL <7.0%: ICD-10-PCS | Mod: CPTII,S$GLB,, | Performed by: NURSE PRACTITIONER

## 2023-08-02 PROCEDURE — 3074F SYST BP LT 130 MM HG: CPT | Mod: CPTII,S$GLB,, | Performed by: NURSE PRACTITIONER

## 2023-08-02 PROCEDURE — 99495 TCM SERVICES (MODERATE COMPLEXITY): ICD-10-PCS | Mod: S$GLB,,, | Performed by: NURSE PRACTITIONER

## 2023-08-02 PROCEDURE — 3044F HG A1C LEVEL LT 7.0%: CPT | Mod: CPTII,S$GLB,, | Performed by: NURSE PRACTITIONER

## 2023-08-02 PROCEDURE — 3066F PR DOCUMENTATION OF TREATMENT FOR NEPHROPATHY: ICD-10-PCS | Mod: CPTII,S$GLB,, | Performed by: NURSE PRACTITIONER

## 2023-08-03 NOTE — PROGRESS NOTES
[facility]  Transition of Care Home Visit    Visit Date: 8/2/23  Encounter Provider: Yesenia Mcdonald   PCP:  Stephan Ramey MD    PRESENTING HISTORY      Patient ID: Addy Redding is a 67 y.o. male.    Consult Requested By:  No ref. provider found  Reason for Consult:  Hospital Follow Up.    Addy is being seen at home due to being seen at home due to physical debility that presents a taxing effort to leave the home, to mitigate high risk of hospital readmission and/or due to the limited availability of reliable or safe options for transportation to the point of access to the provider. Prior to treatment on this visit the chart was reviewed and patient verbal consent was obtained.      Chief Complaint: s/p CABG        History of Present Illness: Mr. Addy Redding is a 67 y.o. male who was recently admitted to the hospital.    Admission Date: 7/21/2023  Hospital Length of Stay: 5 days  Discharge Date and Time:  07/26/2023     HPI:   Mr. Redding is a 67 year-old male former smoker (1ppd x 25 years, quit 25 years ago) with a history of coronary artery disease s/p LCx+OM stents (2022), carotid stenosis, stage 4 chronic kidney disease, hypertension, and diabetes (HbA1C 6.7).  Recently, he has been symptomatic with dyspnea on exertion symptoms interfering with his quality of life.  His most recent echocardiogram showed 55% ejection fraction with no significant valvulopathy.  Angiogram showed 80% ostial LAD with a moderate sized mid and distal LAD, nonsignificant LCx disease (patent stents), and totally occluded ostial RCA with diffuse mid and distal RCA disease and small PDA (fills via left to right collaterals).     CT chest noncontrast shows minimal ascending aortic and mild aortic arch calcifications. Carotid ultrasound shows 40-59% right sided disease and 60-79% left sided disease.     We had an extensive discussion with him regarding the ACC/AHA guidelines and we agreed that he has class I indications for surgery  involving off pump coronary artery bypass surgery (LIMA-LAD, possible SVG-PDA).   The risks and benefits were explained and informed consent was obtained.         Procedure(s) (LRB):  CABG, WITHOUT CARDIOPULMONARY PUMP OXYGENATION (N/A)      Indwelling Lines/Drains at time of discharge:       Lines/Drains/Airways      None                   Hospital Course: On 7/21/23, the patient was taken to the Operating Room for the above stated procedure. Please see the previously dictated operative report for complete details. Postoperatively, the patient was taken from the  Operating Room to the ICU where the vital signs were monitored and pain was kept under control. The patient was weaned from the drips and extubated in the ICU per protocol. Once hemodynamically stable, the patient was transferred to the Cardiac Step-Down floor for continued strengthening and ambulation. On postoperative day 5, the patient was ready for discharge to home. At the time of discharge, the patient was ambulating unassisted. Pain was well controlled with oral analgesics and the patient was tolerating the diet. On 7/23/ 23, patient developed Tachy-brendon syndrome, fluctuating between atrial fibrillation and sinus bradycardia, in which EP was following and self converted back to NSR. Per recommendations, he was taken off of beta blockers and placed on IV amiodarone loading protocol for 24 hours followed by 200mg qd for one month. At the time of discharge, patient had sCr 2.4 with  adequate urine production. He is schedule to repeat BMP on Friday 7/28/23 to reassess renal function.      BB contraindicated due to bradycardia.   Patient not placed on ASA, discharged with Xarelto and Plavix.  Patient not placed on Ace-Inhibitor at the time of discharge due to potential for hypotension      Hold viagra for 6 weeks      ___________________________________________________________________    Today:Mr. Redding is being evaluated for a hospital follow up visit,  see hospital course above.    He is AAOX3 on exam, sitting up in his recliner chair at home, his wife is present during the visit.   He reports he feels he is making good progress post hospitalization, his surgical scar is midsternum, CDI, no redness or drainage.  He reports his appetite is back to baseline, he denies N/V, denies constipation and reports normal bowel movements.  He denies episodes of chest pain.    His PC is Dr. Ramey, Dr. Koroma is is Cardiothoracic surgeon. He is aware of all follow up appointments.     [facility] is working with patient over the last few weeks as well as  to help with patient needs. Education completed ~ 15 minutes. Plan to follow up.     VSS. Denies fever, chest pain, shortness of breath, nausea, vomiting, diarrhea. Risks of environmental exposure to coronavirus discussed including: social distancing, hand hygiene, and limiting departures from the home for necessities only.  Reports understanding and willingness to comply.  All hospital discharge orders reviewed and being followed, all medications reconciled and reviewed, patient and family verbalized understanding. No other needs identified at this time.     I initiated the process of advance care planning today and explained the importance of this process to the patient and family.  I introduced the concept of advance directives to the patient, as well. Then the patient received detailed information about the importance of designating a Health Care Power of  (HCPOA). She was also instructed to communicate with this person about his wishes for future healthcare, should he become sick and lose decision-making capacity. FULL CODE STATUS    I Introduced [place] form with patient/family, explaining this is the patient's wishes, and this form will be uploaded into the patient's [facility] and the [place] Registry.     We spoke about ACP for 20 minutes.    Attestation: Screening criteria to assess  the level of the patient's risk for infection with COVID-19 as recommended by the CDC at the time of the above documented home visit concluded appropriateness to proceed. Universal precautions were maintained at all times, including provider use of 60% alcohol gel hand  immediately prior to entry and upon departing the patient's home.      Review of Systems   Constitutional:  Negative for diaphoresis and fever.   HENT:  Negative for congestion.    Eyes:  Negative for pain.   Respiratory:  Negative for choking and shortness of breath.    Cardiovascular:  Negative for chest pain and palpitations.   Gastrointestinal:  Negative for abdominal distention, diarrhea, nausea and vomiting.   Endocrine: Negative for polydipsia.   Genitourinary:  Negative for difficulty urinating.   Musculoskeletal:  Negative for arthralgias and gait problem.   Skin:  Positive for wound. Negative for color change and rash.        Midsternum CDI   Neurological:  Negative for weakness.   Psychiatric/Behavioral:  Negative for agitation.        Assessments:  Environmental: Lives in single story with no steps to enter  Functional Status: Independent with ADLs and mobility  Safety: Fall precautions  Nutritional: Heart Healthy  Home Health/DME/Supplies: walker    PAST HISTORY:     Past Medical History:   Diagnosis Date    CKD (chronic kidney disease) stage 3, GFR 30-59 ml/min     Costochondritis     Diabetic nephropathy associated with type 2 diabetes mellitus     Diabetic retinopathy     ED (erectile dysfunction)     GERD (gastroesophageal reflux disease)     Hyperlipidemia     Hypertension     Prostate cancer     Type II or unspecified type diabetes mellitus with renal manifestations, uncontrolled(250.42)        Past Surgical History:   Procedure Laterality Date    Bone biopsy right femur      CORONARY ANGIOGRAPHY N/A 06/16/2022    Procedure: ANGIOGRAM, CORONARY ARTERY;  Surgeon: Carter Arevalo MD;  Location: [facility] CATH LAB/EP;  Service:  Cardiology;  Laterality: N/A;    CREATION OF BYPASS OF CORONARY ARTERY USING GRAFT WITHOUT CARDIOPULMONARY PUMP OXYGENATION N/A 2023    Procedure: CABG, WITHOUT CARDIOPULMONARY PUMP OXYGENATION;  Surgeon: Jass Uriostegui MD;  Location: [facility] OR [address] [place] [address];  Service: Cardiovascular;  Laterality: N/A;  CABG x1 off pump    FRACTIONAL FLOW RESERVE (FFR), CORONARY  2023    Procedure: Fractional Flow [place] (FFR), Coronary;  Surgeon: Je Fontanez MD;  Location: [facility] CATH LAB/EP;  Service: Cardiology;;    INSTANTANEOUS WAVE-FREE RATIO (IFR) N/A 2023    Procedure: Instantaneous Wave-Free Ratio (IFR);  Surgeon: Je Fontanez MD;  Location: [facility] CATH LAB/EP;  Service: Cardiology;  Laterality: N/A;    LEFT HEART CATHETERIZATION Left 2022    Procedure: Left heart cath;  Surgeon: Carter Arevalo MD;  Location: [facility] CATH LAB/EP;  Service: Cardiology;  Laterality: Left;    LEFT HEART CATHETERIZATION Left 2023    Procedure: Left heart cath;  Surgeon: Je Fontanez MD;  Location: [facility] CATH LAB/EP;  Service: Cardiology;  Laterality: Left;    PROSTATE BIOPSY  10/05/2022    RADIOACTIVE SEED IMPLANTATION OF PROSTATE  2023    Procedure: INSERTION, RADIOACTIVE SEED, PROSTATE;  Surgeon: Scott Frias MD;  Location: [facility] OR [address] [place] [address];  Service: Urology;;    TRANSRECTAL ULTRASOUND EXAMINATION N/A 2023    Procedure: ULTRASOUND, RECTAL APPROACH;  Surgeon: Scott Frias MD;  Location: [facility] OR [address] [place] [address];  Service: Urology;  Laterality: N/A;       Family History   Problem Relation Age of Onset    Hypertension Mother     Diabetes Mother     Kidney disease Mother     Coronary artery disease Father          of an MI at age 60    Hyperlipidemia Father     Heart attack Father     Hypertension Sister     Diabetes Sister     Heart disease Sister     Heart attack Sister     Hyperlipidemia Sister     Coronary artery disease Sister     Diabetes Sister     Hypertension Sister     Dementia Sister      No Known Problems Sister     Stomach cancer Sister     Cancer Sister         stomach     Hypertension Brother     Stroke Brother     Lung cancer Brother     Cancer Brother         lung cancer    Diabetes Brother     Peripheral vascular disease Brother     Hypertension Brother     Hyperlipidemia Brother     No Known Problems Brother     Diabetes Brother     Stroke Brother     Hypertension Brother     Hyperlipidemia Brother     HIV Brother     Diabetes Brother     Hypertension Brother     No Known Problems Daughter     Asthma Son     Hypertension Son     Anesthesia problems Neg Hx        Social History     Socioeconomic History    Marital status:    Tobacco Use    Smoking status: Former     Current packs/day: 0.00     Average packs/day: 0.5 packs/day for 26.0 years (13.0 ttl pk-yrs)     Types: Cigarettes     Start date:      Quit date:      Years since quittin.6     Passive exposure: Never    Smokeless tobacco: Never   Substance and Sexual Activity    Alcohol use: Not Currently    Drug use: No    Sexual activity: Yes     Partners: Female     Social Determinants of Health     Financial Resource Strain: Low Risk  (2023)    Overall Financial Resource Strain (CARDIA)     Difficulty of Paying Living Expenses: Not hard at all   Food Insecurity: Unknown (2023)    Hunger Vital Sign     Worried About Running Out of Food in the Last Year: Patient refused     Ran Out of Food in the Last Year: Patient refused   Transportation Needs: Unknown (2023)    PRAPARE - Transportation     Lack of Transportation (Medical): No     Lack of Transportation (Non-Medical): Patient refused   Physical Activity: Inactive (2023)    Exercise Vital Sign     Days of Exercise per Week: 0 days     Minutes of Exercise per Session: 0 min   Stress: Unknown (2023)    [place] [employer] - Occupational Stress Questionnaire     Feeling of Stress : Patient refused   Social Connections: Unknown  "(7/24/2023)    Social Connection and Isolation Panel [NHANES]     Frequency of Communication with Friends and Family: Patient refused     Frequency of Social Gatherings with Friends and Family: Patient refused     Attends Scientology Services: Patient refused     Active Member of Clubs or Organizations: No     Attends Club or Organization Meetings: Never     Marital Status:    Housing Stability: Unknown (7/24/2023)    Housing Stability Vital Sign     Unable to Pay for Housing in the Last Year: No     Number of Places Lived in the Last Year: 1     Unstable Housing in the Last Year: Patient refused       MEDICATIONS & ALLERGIES:     Current Outpatient Medications on File Prior to Visit   Medication Sig Dispense Refill    acetaminophen (TYLENOL) 500 MG tablet Take 2 tablets (1,000 mg total) by mouth every 8 (eight) hours as needed for Pain. 30 tablet 0    ADVANCED GLUC METER TEST STRIP Strp USE TO TEST BLOOD SUGAR 4 TIMES DAILY. 100 strip 0    amiodarone (PACERONE) 200 MG Tab Take one tablet by mouth daily for 1 month 30 tablet 0    atorvastatin (LIPITOR) 80 MG tablet Take 1 tablet (80 mg total) by mouth once daily. 90 tablet 3    BD ARIA 2ND GEN PEN NEEDLE 32 gauge x 5/32" Ndle       blood sugar diagnostic (BLOOD GLUCOSE TEST) Strp Check sugar once daily 100 each 11    blood-glucose meter Misc by Misc.(Non-Drug; Combo Route) route.      blood-glucose meter,continuous (DEXCOM G6 ) Misc 1 Device by Misc.(Non-Drug; Combo Route) route continuous. 1 each 0    blood-glucose sensor (DEXCOM G6 SENSOR) Tia 1 Device by Misc.(Non-Drug; Combo Route) route continuous. 4 each 11    blood-glucose transmitter (DEXCOM G6 TRANSMITTER) Tia 1 Device by Misc.(Non-Drug; Combo Route) route continuous. 1 each 0    clopidogreL (PLAVIX) 75 mg tablet Take 1 tablet (75 mg total) by mouth once daily. 90 tablet 4    dapagliflozin (FARXIGA) 10 mg tablet Take 1 tablet (10 mg total) by mouth once daily. 90 tablet 3    docusate sodium " (COLACE) 100 MG capsule Take 1 capsule (100 mg total) by mouth 2 (two) times daily as needed for Constipation. (Patient taking differently: Take 100 mg by mouth as needed for Constipation.) 30 capsule 0    ergocalciferol (ERGOCALCIFEROL) 50,000 unit Cap Take 1 capsule (50,000 Units total) by mouth every 7 days. 12 capsule 3    fluticasone propionate (FLONASE) 50 mcg/actuation nasal spray 2 sprays (100 mcg total) by Each Nostril route once daily. (Patient not taking: Reported on 8/15/2023) 16 g 2    furosemide (LASIX) 20 MG tablet Take 4 tablets (80 mg total) by mouth once daily. for 3 days 12 tablet 0    insulin (LANTUS SOLOSTAR U-100 INSULIN) glargine 100 units/mL SubQ pen Inject 12 Units into the skin 2 (two) times a day. 21.6 mL 3    lancets (ACCU-CHEK SOFTCLIX LANCETS) Misc 1 lancet by Misc.(Non-Drug; Combo Route) route 2 (two) times daily. 200 each 1    latanoprost 0.005 % ophthalmic solution       oxyCODONE (ROXICODONE) 5 MG immediate release tablet Take 1 tablet (5 mg total) by mouth every 4 (four) hours as needed for Pain. 42 tablet 0    pantoprazole (PROTONIX) 40 MG tablet TAKE ONE TABLET BY MOUTH ONCE DAILY. 90 tablet 3    rivaroxaban (XARELTO) 15 mg Tab Take 1 tablet (15 mg total) by mouth daily with dinner or evening meal. (Patient not taking: Reported on 8/15/2023) 30 tablet 11    semaglutide (OZEMPIC) 0.25 mg or 0.5 mg (2 mg/3 mL) pen injector Inject 0.25 mg into the skin every 7 days. 1 each 11    sildenafil (REVATIO) 20 mg Tab Take 1 tablet (20 mg total) by mouth daily as needed for ED 30 tablet 2    sodium bicarbonate 650 MG tablet Take 2 tablets (1,300 mg total) by mouth 2 (two) times daily. 360 tablet 3    tamsulosin (FLOMAX) 0.4 mg Cap Take by mouth once daily.       No current facility-administered medications on file prior to visit.        Review of patient's allergies indicates:  No Known Allergies    OBJECTIVE:     Vital Signs:  Vitals:    08/02/23 1214   BP: 118/70   Pulse: 64   Resp: 18  "  Temp: 98 °F (36.7 °C)     There is no height or weight on file to calculate BMI.     Physical Exam:  Physical Exam  HENT:      Head: Atraumatic.      Mouth/Throat:      Mouth: Mucous membranes are moist.   Cardiovascular:      Rate and Rhythm: Normal rate.      Pulses: Normal pulses.   Pulmonary:      Effort: Pulmonary effort is normal.   Abdominal:      General: Bowel sounds are normal.   Musculoskeletal:         General: Normal range of motion.   Skin:     General: Skin is warm and dry.      Capillary Refill: Capillary refill takes less than 2 seconds.   Neurological:      Mental Status: He is alert and oriented to person, place, and time.   Psychiatric:         Mood and Affect: Mood normal.         Laboratory  Lab Results   Component Value Date    WBC 5.11 08/14/2023    HGB 11.7 (L) 08/14/2023    HCT 35.9 (L) 08/14/2023    MCV 88 08/14/2023     08/14/2023     Lab Results   Component Value Date    INR 1.1 07/22/2023    INR 1.2 07/21/2023    INR 1.0 07/19/2023     Lab Results   Component Value Date    HGBA1C 6.5 (H) 08/14/2023     No results for input(s): "POCTGLUCOSE" in the last 72 hours.    Diagnostic Results:  N/a    TRANSITION OF CARE:     [facility] Contact Note: 07/27/2023    Family and/or Caretaker present at visit?  Yes.  Diagnostic tests reviewed/disposition: No diagnosic tests pending after this hospitalization.  Disease/illness education: Fall precautions  Home health/community services discussion/referrals: Patient has home health established at [facility] .   Establishment or re-establishment of referral orders for community resources: No other necessary community resources.   Discussion with other health care providers: No discussion with other health care providers necessary.     Transition of Care Visit:  I have reviewed and updated the history and problem list.  I have reconciled the medication list.  I have discussed the hospitalization and current medical issues, prognosis and plans with " the patient/family.  I  spent more than 50% of time discussing the care with the patient/family.  Total Face-to-Face Encounter: 60 minutes.    Medications Reconciliation:   I have reconciled the patient's home medications and discharge medications with the patient/family. I have updated all changes.  Refer to After-Visit Medication List.    ASSESSMENT & PLAN:     HIGH RISK CONDITION(S):  Patient has a condition that poses threat to life and bodily function:     1. S/P CABG (coronary artery bypass graft)    Surgical incision CDI  PT in progress, doing well  Denies chest pain  The current medical regimen is effective;  continue present plan and medications.     2. CAD  S/p CABG    Were controlled substances prescribed?  No      Patient Instructions Given:  - Continue all medications, treatments and therapies as ordered.   - Follow all instructions, recommendations as discussed.  - Maintain Safety Precautions at all times.  - Attend all medical appointments as scheduled.  - For worsening symptoms: call Primary Care Physician or Nurse Practitioner.  - For emergencies, call 911 or immediately report to the nearest emergency room.    After Visit Medication List :     Medication List            Accurate as of August 2, 2023 11:59 PM. If you have any questions, ask your nurse or doctor.                CONTINUE taking these medications      acetaminophen 500 MG tablet  Commonly known as: TYLENOL  Take 2 tablets (1,000 mg total) by mouth every 8 (eight) hours as needed for Pain.     * ADVANCED GLUC METER TEST STRIP Strp  Generic drug: blood sugar diagnostic  USE TO TEST BLOOD SUGAR 4 TIMES DAILY.     * blood sugar diagnostic Strp  Commonly known as: BLOOD GLUCOSE TEST  Check sugar once daily     amiodarone 200 MG Tab  Commonly known as: PACERONE  Take one tablet by mouth daily for 1 month     atorvastatin 80 MG tablet  Commonly known as: LIPITOR  Take 1 tablet (80 mg total) by mouth once daily.     BD ARIA 2ND GEN PEN NEEDLE  "32 gauge x 5/32" Ndle  Generic drug: pen needle, diabetic     blood-glucose meter Misc     clopidogreL 75 mg tablet  Commonly known as: PLAVIX  Take 1 tablet (75 mg total) by mouth once daily.     dapagliflozin propanediol 10 mg tablet  Commonly known as: FARXIGA  Take 1 tablet (10 mg total) by mouth once daily.     DEXCOM G6  Misc  Generic drug: blood-glucose meter,continuous  1 Device by Misc.(Non-Drug; Combo Route) route continuous.     DEXCOM G6 SENSOR Tia  Generic drug: blood-glucose sensor  1 Device by Misc.(Non-Drug; Combo Route) route continuous.     DEXCOM G6 TRANSMITTER Tia  Generic drug: blood-glucose transmitter  1 Device by Misc.(Non-Drug; Combo Route) route continuous.     docusate sodium 100 MG capsule  Commonly known as: COLACE  Take 1 capsule (100 mg total) by mouth 2 (two) times daily as needed for Constipation.     ergocalciferol 50,000 unit Cap  Commonly known as: ERGOCALCIFEROL  Take 1 capsule (50,000 Units total) by mouth every 7 days.     fluticasone propionate 50 mcg/actuation nasal spray  Commonly known as: FLONASE  2 sprays (100 mcg total) by Each Nostril route once daily.     furosemide 20 MG tablet  Commonly known as: LASIX  Take 4 tablets (80 mg total) by mouth once daily. for 3 days     insulin glargine 100 units/mL SubQ pen  Commonly known as: LANTUS SOLOSTAR U-100 INSULIN  Inject 12 Units into the skin 2 (two) times a day.     lancets Misc  Commonly known as: ACCU-CHEK SOFTCLIX LANCETS  1 lancet by Misc.(Non-Drug; Combo Route) route 2 (two) times daily.     latanoprost 0.005 % ophthalmic solution     oxyCODONE 5 MG immediate release tablet  Commonly known as: ROXICODONE  Take 1 tablet (5 mg total) by mouth every 4 (four) hours as needed for Pain.     OZEMPIC 0.25 mg or 0.5 mg (2 mg/3 mL) pen injector  Generic drug: semaglutide  Inject 0.25 mg into the skin every 7 days.     pantoprazole 40 MG tablet  Commonly known as: PROTONIX  TAKE ONE TABLET BY MOUTH ONCE DAILY.   "   sildenafil 20 mg Tab  Commonly known as: REVATIO  Take 1 tablet (20 mg total) by mouth daily as needed for ED     sodium bicarbonate 650 MG tablet  Take 2 tablets (1,300 mg total) by mouth 2 (two) times daily.     tamsulosin 0.4 mg Cap  Commonly known as: FLOMAX     XARELTO 15 mg Tab  Generic drug: rivaroxaban  Take 1 tablet (15 mg total) by mouth daily with dinner or evening meal.           * This list has 2 medication(s) that are the same as other medications prescribed for you. Read the directions carefully, and ask your doctor or other care provider to review them with you.                Future Appointments   Date Time Provider Department Center   8/21/2023  6:15 AM [facility] MRI1 500 LB LIMIT [facility] MRI [facility]   8/21/2023  2:00 PM Regina Vallejo MD [facility] SPORTS [place]   8/23/2023  8:20 AM Je Fontanez MD [facility] CARDIO [facility]   8/30/2023  8:15 AM EKG, APPT [facility] EKG [facility]   8/30/2023  9:20 AM Jass Uriostegui MD [facility] CARDVAS [facility]   9/25/2023  1:15 PM Preethi Brito MD [facility]   10/24/2023 11:30 AM APPOINTMENT LABPHOEBE [facility] LAB [facility]   10/31/2023 11:15 AM Saroj Stallworth Jr., MD [facility] RADONC2 [facility]   11/14/2023  9:00 AM Stephan Ramey MD [facility] FAM MED [facility]     Risks of environmental exposure to coronavirus discussed including: social distancing, hand hygiene, and limiting departures from the home for necessities only. Reports understanding and willingness to comply.     Signature:    Yesenia Mcdonald, MSN, APRN, FNP-C  [facility]

## 2023-08-07 ENCOUNTER — LAB VISIT (OUTPATIENT)
Dept: LAB | Facility: HOSPITAL | Age: 68
End: 2023-08-07
Payer: MEDICARE

## 2023-08-07 DIAGNOSIS — N18.4 CHRONIC KIDNEY DISEASE (CKD), STAGE IV (SEVERE): ICD-10-CM

## 2023-08-07 LAB
ANION GAP SERPL CALC-SCNC: 12 MMOL/L (ref 8–16)
BUN SERPL-MCNC: 28 MG/DL (ref 8–23)
CALCIUM SERPL-MCNC: 9.5 MG/DL (ref 8.7–10.5)
CHLORIDE SERPL-SCNC: 102 MMOL/L (ref 95–110)
CO2 SERPL-SCNC: 22 MMOL/L (ref 23–29)
CREAT SERPL-MCNC: 2.5 MG/DL (ref 0.5–1.4)
EST. GFR  (NO RACE VARIABLE): 27 ML/MIN/1.73 M^2
GLUCOSE SERPL-MCNC: 196 MG/DL (ref 70–110)
POTASSIUM SERPL-SCNC: 4.4 MMOL/L (ref 3.5–5.1)
SODIUM SERPL-SCNC: 136 MMOL/L (ref 136–145)

## 2023-08-07 PROCEDURE — 80048 BASIC METABOLIC PNL TOTAL CA: CPT

## 2023-08-07 PROCEDURE — 36415 COLL VENOUS BLD VENIPUNCTURE: CPT

## 2023-08-08 ENCOUNTER — TELEPHONE (OUTPATIENT)
Dept: CARDIOTHORACIC SURGERY | Facility: CLINIC | Age: 68
End: 2023-08-08
Payer: MEDICARE

## 2023-08-08 NOTE — TELEPHONE ENCOUNTER
Received message from DAIJA Quintero that patient's CR was trending down and he didn't need anymore blood work for now. He should call clinic if he notices he is not making urine.    Called pt and relayed information above to patient.     Julie Haase RN  [facility] Nurse Navigator [phone]

## 2023-08-14 ENCOUNTER — LAB VISIT (OUTPATIENT)
Dept: LAB | Facility: HOSPITAL | Age: 68
End: 2023-08-14
Attending: FAMILY MEDICINE
Payer: MEDICARE

## 2023-08-14 DIAGNOSIS — M25.512 CHRONIC LEFT SHOULDER PAIN: ICD-10-CM

## 2023-08-14 DIAGNOSIS — N18.32 STAGE 3B CHRONIC KIDNEY DISEASE: ICD-10-CM

## 2023-08-14 DIAGNOSIS — E78.5 DYSLIPIDEMIA ASSOCIATED WITH TYPE 2 DIABETES MELLITUS: ICD-10-CM

## 2023-08-14 DIAGNOSIS — I25.118 CORONARY ARTERY DISEASE OF NATIVE ARTERY OF NATIVE HEART WITH STABLE ANGINA PECTORIS: ICD-10-CM

## 2023-08-14 DIAGNOSIS — N18.31 TYPE 2 DIABETES MELLITUS WITH STAGE 3A CHRONIC KIDNEY DISEASE, WITHOUT LONG-TERM CURRENT USE OF INSULIN: ICD-10-CM

## 2023-08-14 DIAGNOSIS — C61 PROSTATE CANCER: ICD-10-CM

## 2023-08-14 DIAGNOSIS — I15.2 HYPERTENSION ASSOCIATED WITH DIABETES: ICD-10-CM

## 2023-08-14 DIAGNOSIS — G89.29 CHRONIC LEFT SHOULDER PAIN: ICD-10-CM

## 2023-08-14 DIAGNOSIS — E11.22 TYPE 2 DIABETES MELLITUS WITH STAGE 3A CHRONIC KIDNEY DISEASE, WITHOUT LONG-TERM CURRENT USE OF INSULIN: ICD-10-CM

## 2023-08-14 DIAGNOSIS — E11.69 DYSLIPIDEMIA ASSOCIATED WITH TYPE 2 DIABETES MELLITUS: ICD-10-CM

## 2023-08-14 DIAGNOSIS — I25.118 CORONARY ARTERY DISEASE OF NATIVE ARTERY WITH STABLE ANGINA PECTORIS, UNSPECIFIED WHETHER NATIVE OR TRANSPLANTED HEART: ICD-10-CM

## 2023-08-14 DIAGNOSIS — E11.59 HYPERTENSION ASSOCIATED WITH DIABETES: ICD-10-CM

## 2023-08-14 LAB
ALBUMIN SERPL BCP-MCNC: 3.7 G/DL (ref 3.5–5.2)
ALP SERPL-CCNC: 66 U/L (ref 55–135)
ALT SERPL W/O P-5'-P-CCNC: 9 U/L (ref 10–44)
ANION GAP SERPL CALC-SCNC: 9 MMOL/L (ref 8–16)
AST SERPL-CCNC: 13 U/L (ref 10–40)
BASOPHILS # BLD AUTO: 0.03 K/UL (ref 0–0.2)
BASOPHILS NFR BLD: 0.6 % (ref 0–1.9)
BILIRUB SERPL-MCNC: 0.3 MG/DL (ref 0.1–1)
BUN SERPL-MCNC: 26 MG/DL (ref 8–23)
CALCIUM SERPL-MCNC: 9.3 MG/DL (ref 8.7–10.5)
CHLORIDE SERPL-SCNC: 107 MMOL/L (ref 95–110)
CHOLEST SERPL-MCNC: 150 MG/DL (ref 120–199)
CHOLEST/HDLC SERPL: 3.8 {RATIO} (ref 2–5)
CO2 SERPL-SCNC: 20 MMOL/L (ref 23–29)
CREAT SERPL-MCNC: 2.3 MG/DL (ref 0.5–1.4)
DIFFERENTIAL METHOD: ABNORMAL
EOSINOPHIL # BLD AUTO: 0.3 K/UL (ref 0–0.5)
EOSINOPHIL NFR BLD: 6.7 % (ref 0–8)
ERYTHROCYTE [DISTWIDTH] IN BLOOD BY AUTOMATED COUNT: 16.1 % (ref 11.5–14.5)
EST. GFR  (NO RACE VARIABLE): 30 ML/MIN/1.73 M^2
ESTIMATED AVG GLUCOSE: 140 MG/DL (ref 68–131)
GLUCOSE SERPL-MCNC: 144 MG/DL (ref 70–110)
HBA1C MFR BLD: 6.5 % (ref 4–5.6)
HCT VFR BLD AUTO: 35.9 % (ref 40–54)
HDLC SERPL-MCNC: 39 MG/DL (ref 40–75)
HDLC SERPL: 26 % (ref 20–50)
HGB BLD-MCNC: 11.7 G/DL (ref 14–18)
IMM GRANULOCYTES # BLD AUTO: 0.03 K/UL (ref 0–0.04)
IMM GRANULOCYTES NFR BLD AUTO: 0.6 % (ref 0–0.5)
LDLC SERPL CALC-MCNC: 91.8 MG/DL (ref 63–159)
LYMPHOCYTES # BLD AUTO: 1.3 K/UL (ref 1–4.8)
LYMPHOCYTES NFR BLD: 26.2 % (ref 18–48)
MCH RBC QN AUTO: 28.7 PG (ref 27–31)
MCHC RBC AUTO-ENTMCNC: 32.6 G/DL (ref 32–36)
MCV RBC AUTO: 88 FL (ref 82–98)
MONOCYTES # BLD AUTO: 0.5 K/UL (ref 0.3–1)
MONOCYTES NFR BLD: 10.4 % (ref 4–15)
NEUTROPHILS # BLD AUTO: 2.8 K/UL (ref 1.8–7.7)
NEUTROPHILS NFR BLD: 55.5 % (ref 38–73)
NONHDLC SERPL-MCNC: 111 MG/DL
NRBC BLD-RTO: 0 /100 WBC
PLATELET # BLD AUTO: 370 K/UL (ref 150–450)
PMV BLD AUTO: 9.7 FL (ref 9.2–12.9)
POTASSIUM SERPL-SCNC: 4.3 MMOL/L (ref 3.5–5.1)
PROT SERPL-MCNC: 7.3 G/DL (ref 6–8.4)
RBC # BLD AUTO: 4.08 M/UL (ref 4.6–6.2)
SODIUM SERPL-SCNC: 136 MMOL/L (ref 136–145)
TRIGL SERPL-MCNC: 96 MG/DL (ref 30–150)
WBC # BLD AUTO: 5.11 K/UL (ref 3.9–12.7)

## 2023-08-14 PROCEDURE — 85025 COMPLETE CBC W/AUTO DIFF WBC: CPT | Performed by: FAMILY MEDICINE

## 2023-08-14 PROCEDURE — 80061 LIPID PANEL: CPT | Performed by: FAMILY MEDICINE

## 2023-08-14 PROCEDURE — 36415 COLL VENOUS BLD VENIPUNCTURE: CPT | Performed by: FAMILY MEDICINE

## 2023-08-14 PROCEDURE — 80053 COMPREHEN METABOLIC PANEL: CPT | Performed by: FAMILY MEDICINE

## 2023-08-14 PROCEDURE — 83036 HEMOGLOBIN GLYCOSYLATED A1C: CPT | Performed by: FAMILY MEDICINE

## 2023-08-15 ENCOUNTER — OFFICE VISIT (OUTPATIENT)
Dept: FAMILY MEDICINE | Facility: CLINIC | Age: 68
End: 2023-08-15
Attending: FAMILY MEDICINE
Payer: MEDICARE

## 2023-08-15 VITALS
SYSTOLIC BLOOD PRESSURE: 128 MMHG | DIASTOLIC BLOOD PRESSURE: 74 MMHG | WEIGHT: 205.25 LBS | BODY MASS INDEX: 27.2 KG/M2 | HEIGHT: 73 IN | HEART RATE: 61 BPM | OXYGEN SATURATION: 98 %

## 2023-08-15 DIAGNOSIS — G89.29 CHRONIC LEFT SHOULDER PAIN: ICD-10-CM

## 2023-08-15 DIAGNOSIS — Z12.11 ENCOUNTER FOR FIT (FECAL IMMUNOCHEMICAL TEST) SCREENING: ICD-10-CM

## 2023-08-15 DIAGNOSIS — N18.32 STAGE 3B CHRONIC KIDNEY DISEASE: ICD-10-CM

## 2023-08-15 DIAGNOSIS — E11.69 DYSLIPIDEMIA ASSOCIATED WITH TYPE 2 DIABETES MELLITUS: ICD-10-CM

## 2023-08-15 DIAGNOSIS — E78.5 DYSLIPIDEMIA ASSOCIATED WITH TYPE 2 DIABETES MELLITUS: ICD-10-CM

## 2023-08-15 DIAGNOSIS — N18.31 TYPE 2 DIABETES MELLITUS WITH STAGE 3A CHRONIC KIDNEY DISEASE, WITHOUT LONG-TERM CURRENT USE OF INSULIN: ICD-10-CM

## 2023-08-15 DIAGNOSIS — M25.512 CHRONIC LEFT SHOULDER PAIN: ICD-10-CM

## 2023-08-15 DIAGNOSIS — E11.22 TYPE 2 DIABETES MELLITUS WITH STAGE 3A CHRONIC KIDNEY DISEASE, WITHOUT LONG-TERM CURRENT USE OF INSULIN: ICD-10-CM

## 2023-08-15 DIAGNOSIS — M75.42 ROTATOR CUFF IMPINGEMENT SYNDROME OF LEFT SHOULDER: ICD-10-CM

## 2023-08-15 DIAGNOSIS — I25.118 CORONARY ARTERY DISEASE OF NATIVE ARTERY OF NATIVE HEART WITH STABLE ANGINA PECTORIS: ICD-10-CM

## 2023-08-15 DIAGNOSIS — C61 PROSTATE CANCER: ICD-10-CM

## 2023-08-15 DIAGNOSIS — E11.59 HYPERTENSION ASSOCIATED WITH DIABETES: Primary | ICD-10-CM

## 2023-08-15 DIAGNOSIS — J43.2 CENTRILOBULAR EMPHYSEMA: ICD-10-CM

## 2023-08-15 DIAGNOSIS — I15.2 HYPERTENSION ASSOCIATED WITH DIABETES: Primary | ICD-10-CM

## 2023-08-15 PROCEDURE — 3074F SYST BP LT 130 MM HG: CPT | Mod: CPTII,S$GLB,, | Performed by: FAMILY MEDICINE

## 2023-08-15 PROCEDURE — 99215 OFFICE O/P EST HI 40 MIN: CPT | Mod: S$GLB,,, | Performed by: FAMILY MEDICINE

## 2023-08-15 PROCEDURE — 3066F NEPHROPATHY DOC TX: CPT | Mod: CPTII,S$GLB,, | Performed by: FAMILY MEDICINE

## 2023-08-15 PROCEDURE — 3074F PR MOST RECENT SYSTOLIC BLOOD PRESSURE < 130 MM HG: ICD-10-PCS | Mod: CPTII,S$GLB,, | Performed by: FAMILY MEDICINE

## 2023-08-15 PROCEDURE — 1160F PR REVIEW ALL MEDS BY PRESCRIBER/CLIN PHARMACIST DOCUMENTED: ICD-10-PCS | Mod: CPTII,S$GLB,, | Performed by: FAMILY MEDICINE

## 2023-08-15 PROCEDURE — 1159F PR MEDICATION LIST DOCUMENTED IN MEDICAL RECORD: ICD-10-PCS | Mod: CPTII,S$GLB,, | Performed by: FAMILY MEDICINE

## 2023-08-15 PROCEDURE — 3078F DIAST BP <80 MM HG: CPT | Mod: CPTII,S$GLB,, | Performed by: FAMILY MEDICINE

## 2023-08-15 PROCEDURE — 3078F PR MOST RECENT DIASTOLIC BLOOD PRESSURE < 80 MM HG: ICD-10-PCS | Mod: CPTII,S$GLB,, | Performed by: FAMILY MEDICINE

## 2023-08-15 PROCEDURE — 3288F PR FALLS RISK ASSESSMENT DOCUMENTED: ICD-10-PCS | Mod: CPTII,S$GLB,, | Performed by: FAMILY MEDICINE

## 2023-08-15 PROCEDURE — 3008F PR BODY MASS INDEX (BMI) DOCUMENTED: ICD-10-PCS | Mod: CPTII,S$GLB,, | Performed by: FAMILY MEDICINE

## 2023-08-15 PROCEDURE — 1101F PT FALLS ASSESS-DOCD LE1/YR: CPT | Mod: CPTII,S$GLB,, | Performed by: FAMILY MEDICINE

## 2023-08-15 PROCEDURE — 4010F PR ACE/ARB THEARPY RXD/TAKEN: ICD-10-PCS | Mod: CPTII,S$GLB,, | Performed by: FAMILY MEDICINE

## 2023-08-15 PROCEDURE — 1125F PR PAIN SEVERITY QUANTIFIED, PAIN PRESENT: ICD-10-PCS | Mod: CPTII,S$GLB,, | Performed by: FAMILY MEDICINE

## 2023-08-15 PROCEDURE — 1159F MED LIST DOCD IN RCRD: CPT | Mod: CPTII,S$GLB,, | Performed by: FAMILY MEDICINE

## 2023-08-15 PROCEDURE — 99999 PR PBB SHADOW E&M-EST. PATIENT-LVL V: ICD-10-PCS | Mod: PBBFAC,,, | Performed by: FAMILY MEDICINE

## 2023-08-15 PROCEDURE — 99999 PR PBB SHADOW E&M-EST. PATIENT-LVL V: CPT | Mod: PBBFAC,,, | Performed by: FAMILY MEDICINE

## 2023-08-15 PROCEDURE — 1111F PR DISCHARGE MEDS RECONCILED W/ CURRENT OUTPATIENT MED LIST: ICD-10-PCS | Mod: CPTII,S$GLB,, | Performed by: FAMILY MEDICINE

## 2023-08-15 PROCEDURE — 3044F PR MOST RECENT HEMOGLOBIN A1C LEVEL <7.0%: ICD-10-PCS | Mod: CPTII,S$GLB,, | Performed by: FAMILY MEDICINE

## 2023-08-15 PROCEDURE — 4010F ACE/ARB THERAPY RXD/TAKEN: CPT | Mod: CPTII,S$GLB,, | Performed by: FAMILY MEDICINE

## 2023-08-15 PROCEDURE — 1160F RVW MEDS BY RX/DR IN RCRD: CPT | Mod: CPTII,S$GLB,, | Performed by: FAMILY MEDICINE

## 2023-08-15 PROCEDURE — 1125F AMNT PAIN NOTED PAIN PRSNT: CPT | Mod: CPTII,S$GLB,, | Performed by: FAMILY MEDICINE

## 2023-08-15 PROCEDURE — 1111F DSCHRG MED/CURRENT MED MERGE: CPT | Mod: CPTII,S$GLB,, | Performed by: FAMILY MEDICINE

## 2023-08-15 PROCEDURE — 3044F HG A1C LEVEL LT 7.0%: CPT | Mod: CPTII,S$GLB,, | Performed by: FAMILY MEDICINE

## 2023-08-15 PROCEDURE — 3288F FALL RISK ASSESSMENT DOCD: CPT | Mod: CPTII,S$GLB,, | Performed by: FAMILY MEDICINE

## 2023-08-15 PROCEDURE — 99215 PR OFFICE/OUTPT VISIT, EST, LEVL V, 40-54 MIN: ICD-10-PCS | Mod: S$GLB,,, | Performed by: FAMILY MEDICINE

## 2023-08-15 PROCEDURE — 3066F PR DOCUMENTATION OF TREATMENT FOR NEPHROPATHY: ICD-10-PCS | Mod: CPTII,S$GLB,, | Performed by: FAMILY MEDICINE

## 2023-08-15 PROCEDURE — 3061F NEG MICROALBUMINURIA REV: CPT | Mod: CPTII,S$GLB,, | Performed by: FAMILY MEDICINE

## 2023-08-15 PROCEDURE — 3061F PR NEG MICROALBUMINURIA RESULT DOCUMENTED/REVIEW: ICD-10-PCS | Mod: CPTII,S$GLB,, | Performed by: FAMILY MEDICINE

## 2023-08-15 PROCEDURE — 3008F BODY MASS INDEX DOCD: CPT | Mod: CPTII,S$GLB,, | Performed by: FAMILY MEDICINE

## 2023-08-15 PROCEDURE — 1101F PR PT FALLS ASSESS DOC 0-1 FALLS W/OUT INJ PAST YR: ICD-10-PCS | Mod: CPTII,S$GLB,, | Performed by: FAMILY MEDICINE

## 2023-08-15 NOTE — PROGRESS NOTES
Subjective:       Patient ID: Addy Redding is a 67 y.o. male.    Chief Complaint: Follow-up (3 month )    66 yr old black male with DM II, HTN, HLD, GERD, CKD III, presents today for his three month follow up. C/O LEFT SHOULDER PAIN SINCE MORE THAN 6 MONTHS. NO TRAUMA OR FALL. TRIED REST AND OTC CREAM AND TYLENOL WITH NO RELIEF. HE IS OPEN FOR THERAPY. DETAILS AS FOLLOWS -        DM II - improved -   HGBA1C                   6.7 (H)             03/14/2023                       - complicated by CKD III                   - denies any hypoglycemic symptoms - on metformin - up to date with eye and foot screen - on metformin    HTN - controlled  - on lisinopril 10 mg daily - no side effects    HLD - controlled and improving -    LDLCALC                  66.2                07/05/2022                                 - on statin - compliant - need refill - not fully compliant with diet    GERD - stable - takes prilosec as needed    ED - stable - takes viagra as needed    Health maintenance  -labs due  -Flu and Pneumovax - up to date  -adacel due and done today  -colonoscopy due - wants to wait  -PSA UTD      Follow-up  This is a chronic problem. The current episode started more than 1 year ago. The problem occurs constantly. The problem has been gradually worsening. Associated symptoms include arthralgias and myalgias. Pertinent negatives include no abdominal pain, change in bowel habit, chest pain, congestion, coughing, diaphoresis, fatigue, headaches, joint swelling, numbness, rash, urinary symptoms, vertigo, vomiting or weakness.   Medication Refill  This is a chronic problem. The current episode started more than 1 year ago. The problem occurs constantly. The problem has been gradually improving. Associated symptoms include arthralgias and myalgias. Pertinent negatives include no abdominal pain, change in bowel habit, chest pain, congestion, coughing, diaphoresis, fatigue, headaches, joint swelling, numbness, rash,  urinary symptoms, vertigo, vomiting or weakness.   Hypertension  This is a chronic problem. The current episode started more than 1 year ago. The problem has been gradually worsening since onset. The problem is uncontrolled. Pertinent negatives include no anxiety, chest pain, headaches, malaise/fatigue, orthopnea, palpitations, peripheral edema or sweats. There are no associated agents to hypertension. Risk factors for coronary artery disease include dyslipidemia, diabetes mellitus, male gender and obesity. Past treatments include ACE inhibitors. The current treatment provides no improvement. There are no compliance problems.  There is no history of angina, CAD/MI, CVA, left ventricular hypertrophy, PVD or retinopathy. There is no history of chronic renal disease, coarctation of the aorta, hypercortisolism, pheochromocytoma, renovascular disease or a thyroid problem.   Arm Pain   There was no injury mechanism. The pain is present in the right shoulder and upper right arm. The quality of the pain is described as aching. The pain does not radiate. The pain is at a severity of 5/10. The pain is moderate. The pain has been Constant since the incident. Pertinent negatives include no chest pain or numbness. The symptoms are aggravated by movement, lifting and palpation. He has tried nothing for the symptoms. The treatment provided mild relief.   Shoulder Pain   The pain is present in the left shoulder. This is a chronic problem. The current episode started more than 1 month ago. There has been no history of extremity trauma. The problem occurs constantly. The problem has been unchanged. The quality of the pain is described as aching. The pain is at a severity of 8/10. The pain is severe. Associated symptoms include an inability to bear weight and a limited range of motion. Pertinent negatives include no headaches, itching, joint swelling or numbness. The symptoms are aggravated by activity. He has tried nothing for the  symptoms. The treatment provided no relief. His past medical history is significant for diabetes.   Gastroesophageal Reflux  He complains of heartburn. He reports no abdominal pain, no chest pain, no coughing or no wheezing. This is a new problem. The current episode started 1 to 4 weeks ago. The problem occurs constantly. The problem has been unchanged. The heartburn duration is several minutes. The heartburn is located in the substernum. The symptoms are aggravated by certain foods. Pertinent negatives include no anemia, fatigue or melena. There are no known risk factors. He has tried nothing for the symptoms. The treatment provided mild relief. Past procedures do not include an abdominal ultrasound, esophageal pH monitoring or a UGI. Past invasive treatments do not include gastroplasty or reflux surgery.   Diabetes  Pertinent negatives for hypoglycemia include no confusion, dizziness, headaches, nervousness/anxiousness, speech difficulty or sweats. Pertinent negatives for diabetes include no chest pain, no fatigue, no polydipsia, no polyuria and no weakness. Pertinent negatives for diabetic complications include no CVA, PVD or retinopathy.   Hyperlipidemia  This is a chronic problem. The current episode started more than 1 year ago. The problem is uncontrolled. Recent lipid tests were reviewed and are high. Exacerbating diseases include diabetes. He has no history of chronic renal disease, liver disease or nephrotic syndrome. There are no known factors aggravating his hyperlipidemia. Associated symptoms include myalgias. Pertinent negatives include no chest pain, focal sensory loss, focal weakness or leg pain. Current antihyperlipidemic treatment includes statins. There are no compliance problems.  Risk factors for coronary artery disease include diabetes mellitus, dyslipidemia, male sex and hypertension.     Review of Systems   Constitutional: Negative.  Negative for activity change, diaphoresis, fatigue,  malaise/fatigue and unexpected weight change.   HENT: Negative.  Negative for nasal congestion, ear pain, mouth sores, rhinorrhea and voice change.    Eyes: Negative.  Negative for pain, discharge and visual disturbance.   Respiratory: Negative.  Negative for apnea, cough and wheezing.    Cardiovascular: Negative.  Negative for chest pain, palpitations and orthopnea.   Gastrointestinal:  Positive for heartburn. Negative for abdominal distention, abdominal pain, anal bleeding, change in bowel habit, diarrhea, melena, vomiting and change in bowel habit.   Endocrine: Negative.  Negative for cold intolerance, polydipsia and polyuria.   Genitourinary: Negative.  Negative for decreased urine volume, difficulty urinating, discharge, frequency and scrotal swelling.   Musculoskeletal:  Positive for arthralgias and myalgias. Negative for back pain, joint swelling, leg pain and neck stiffness.   Integumentary:  Negative for color change, itching and rash. Negative.   Allergic/Immunologic: Negative.  Negative for environmental allergies.   Neurological: Negative.  Negative for dizziness, vertigo, focal weakness, speech difficulty, weakness, light-headedness, numbness and headaches.   Hematological: Negative.    Psychiatric/Behavioral: Negative.  Negative for agitation, confusion, dysphoric mood and suicidal ideas. The patient is not nervous/anxious.          PMH/PSH/FH/SH/MED/ALLERGY reviewed    Past Medical History:   Diagnosis Date    CKD (chronic kidney disease) stage 3, GFR 30-59 ml/min     Costochondritis     Diabetic nephropathy associated with type 2 diabetes mellitus     Diabetic retinopathy     ED (erectile dysfunction)     GERD (gastroesophageal reflux disease)     Hyperlipidemia     Hypertension     Prostate cancer     Type II or unspecified type diabetes mellitus with renal manifestations, uncontrolled(250.42)        Past Surgical History:   Procedure Laterality Date    Bone biopsy right femur      CORONARY  ANGIOGRAPHY N/A 2022    Procedure: ANGIOGRAM, CORONARY ARTERY;  Surgeon: Carter Arevalo MD;  Location: [facility] CATH LAB/EP;  Service: Cardiology;  Laterality: N/A;    CREATION OF BYPASS OF CORONARY ARTERY USING GRAFT WITHOUT CARDIOPULMONARY PUMP OXYGENATION N/A 2023    Procedure: CABG, WITHOUT CARDIOPULMONARY PUMP OXYGENATION;  Surgeon: Jass Uriostegui MD;  Location: [facility] OR 2ND FLR;  Service: Cardiovascular;  Laterality: N/A;  CABG x1 off pump    FRACTIONAL FLOW RESERVE (FFR), CORONARY  2023    Procedure: Fractional Flow [place] (FFR), Coronary;  Surgeon: Je Fontanez MD;  Location: [facility] CATH LAB/EP;  Service: Cardiology;;    INSTANTANEOUS WAVE-FREE RATIO (IFR) N/A 2023    Procedure: Instantaneous Wave-Free Ratio (IFR);  Surgeon: Je Fontanez MD;  Location: [facility] CATH LAB/EP;  Service: Cardiology;  Laterality: N/A;    LEFT HEART CATHETERIZATION Left 2022    Procedure: Left heart cath;  Surgeon: Carter Arevalo MD;  Location: [facility] CATH LAB/EP;  Service: Cardiology;  Laterality: Left;    LEFT HEART CATHETERIZATION Left 2023    Procedure: Left heart cath;  Surgeon: Je Fontanez MD;  Location: [facility] CATH LAB/EP;  Service: Cardiology;  Laterality: Left;    PROSTATE BIOPSY  10/05/2022    RADIOACTIVE SEED IMPLANTATION OF PROSTATE  2023    Procedure: INSERTION, RADIOACTIVE SEED, PROSTATE;  Surgeon: Scott Frias MD;  Location: [facility] OR [address] [place] [address];  Service: Urology;;    TRANSRECTAL ULTRASOUND EXAMINATION N/A 2023    Procedure: ULTRASOUND, RECTAL APPROACH;  Surgeon: Scott Frias MD;  Location: [facility] OR 1ST [facility];  Service: Urology;  Laterality: N/A;       Family History   Problem Relation Age of Onset    Hypertension Mother     Diabetes Mother     Kidney disease Mother     Coronary artery disease Father          of an MI at age 60    Hyperlipidemia Father     Heart attack Father     Hypertension Sister     Diabetes Sister     Heart disease Sister     Heart  attack Sister     Hyperlipidemia Sister     Coronary artery disease Sister     Diabetes Sister     Hypertension Sister     Dementia Sister     No Known Problems Sister     Stomach cancer Sister     Cancer Sister         stomach     Hypertension Brother     Stroke Brother     Lung cancer Brother     Cancer Brother         lung cancer    Diabetes Brother     Peripheral vascular disease Brother     Hypertension Brother     Hyperlipidemia Brother     No Known Problems Brother     Diabetes Brother     Stroke Brother     Hypertension Brother     Hyperlipidemia Brother     HIV Brother     Diabetes Brother     Hypertension Brother     No Known Problems Daughter     Asthma Son     Hypertension Son     Anesthesia problems Neg Hx        Social History     Socioeconomic History    Marital status:    Tobacco Use    Smoking status: Former     Current packs/day: 0.00     Average packs/day: 0.5 packs/day for 26.0 years (13.0 ttl pk-yrs)     Types: Cigarettes     Start date:      Quit date:      Years since quittin.6     Passive exposure: Never    Smokeless tobacco: Never   Substance and Sexual Activity    Alcohol use: Not Currently    Drug use: No    Sexual activity: Yes     Partners: Female     Social Determinants of Health     Financial Resource Strain: Low Risk  (2023)    Overall Financial Resource Strain (CARDIA)     Difficulty of Paying Living Expenses: Not hard at all   Food Insecurity: Unknown (2023)    Hunger Vital Sign     Worried About Running Out of Food in the Last Year: Patient refused     Ran Out of Food in the Last Year: Patient refused   Transportation Needs: Unknown (2023)    PRAPARE - Transportation     Lack of Transportation (Medical): No     Lack of Transportation (Non-Medical): Patient refused   Physical Activity: Inactive (2023)    Exercise Vital Sign     Days of Exercise per Week: 0 days     Minutes of Exercise per Session: 0 min   Stress: Unknown (2023)    [place]  "[employer] - Occupational Stress Questionnaire     Feeling of Stress : Patient refused   Social Connections: Unknown (7/24/2023)    Social Connection and Isolation Panel [NHANES]     Frequency of Communication with Friends and Family: Patient refused     Frequency of Social Gatherings with Friends and Family: Patient refused     Attends Judaism Services: Patient refused     Active Member of Clubs or Organizations: No     Attends Club or Organization Meetings: Never     Marital Status:    Housing Stability: Unknown (7/24/2023)    Housing Stability Vital Sign     Unable to Pay for Housing in the Last Year: No     Number of Places Lived in the Last Year: 1     Unstable Housing in the Last Year: Patient refused       Current Outpatient Medications   Medication Sig Dispense Refill    acetaminophen (TYLENOL) 500 MG tablet Take 2 tablets (1,000 mg total) by mouth every 8 (eight) hours as needed for Pain. 30 tablet 0    ADVANCED GLUC METER TEST STRIP Strp USE TO TEST BLOOD SUGAR 4 TIMES DAILY. 100 strip 0    amiodarone (PACERONE) 200 MG Tab Take one tablet by mouth daily for 1 month 30 tablet 0    atorvastatin (LIPITOR) 80 MG tablet Take 1 tablet (80 mg total) by mouth once daily. 90 tablet 3    BD ARIA 2ND GEN PEN NEEDLE 32 gauge x 5/32" Ndle       blood sugar diagnostic (BLOOD GLUCOSE TEST) Strp Check sugar once daily 100 each 11    blood-glucose meter Misc by Misc.(Non-Drug; Combo Route) route.      blood-glucose meter,continuous (DEXCOM G6 ) Misc 1 Device by Misc.(Non-Drug; Combo Route) route continuous. 1 each 0    blood-glucose sensor (DEXCOM G6 SENSOR) Tia 1 Device by Misc.(Non-Drug; Combo Route) route continuous. 4 each 11    blood-glucose transmitter (DEXCOM G6 TRANSMITTER) Tia 1 Device by Misc.(Non-Drug; Combo Route) route continuous. 1 each 0    clopidogreL (PLAVIX) 75 mg tablet Take 1 tablet (75 mg total) by mouth once daily. 90 tablet 4    dapagliflozin (FARXIGA) 10 mg " tablet Take 1 tablet (10 mg total) by mouth once daily. 90 tablet 3    docusate sodium (COLACE) 100 MG capsule Take 1 capsule (100 mg total) by mouth 2 (two) times daily as needed for Constipation. (Patient taking differently: Take 100 mg by mouth as needed for Constipation.) 30 capsule 0    ergocalciferol (ERGOCALCIFEROL) 50,000 unit Cap Take 1 capsule (50,000 Units total) by mouth every 7 days. 12 capsule 3    insulin (LANTUS SOLOSTAR U-100 INSULIN) glargine 100 units/mL SubQ pen Inject 12 Units into the skin 2 (two) times a day. 21.6 mL 3    lancets (ACCU-CHEK SOFTCLIX LANCETS) Misc 1 lancet by Misc.(Non-Drug; Combo Route) route 2 (two) times daily. 200 each 1    oxyCODONE (ROXICODONE) 5 MG immediate release tablet Take 1 tablet (5 mg total) by mouth every 4 (four) hours as needed for Pain. 42 tablet 0    pantoprazole (PROTONIX) 40 MG tablet TAKE ONE TABLET BY MOUTH ONCE DAILY. 90 tablet 3    semaglutide (OZEMPIC) 0.25 mg or 0.5 mg (2 mg/3 mL) pen injector Inject 0.25 mg into the skin every 7 days. 1 each 11    fluticasone propionate (FLONASE) 50 mcg/actuation nasal spray 2 sprays (100 mcg total) by Each Nostril route once daily. (Patient not taking: Reported on 8/15/2023) 16 g 2    furosemide (LASIX) 20 MG tablet Take 4 tablets (80 mg total) by mouth once daily. for 3 days 12 tablet 0    latanoprost 0.005 % ophthalmic solution       rivaroxaban (XARELTO) 15 mg Tab Take 1 tablet (15 mg total) by mouth daily with dinner or evening meal. (Patient not taking: Reported on 8/15/2023) 30 tablet 11    sildenafil (REVATIO) 20 mg Tab Take 1 tablet (20 mg total) by mouth daily as needed for ED 30 tablet 2    sodium bicarbonate 650 MG tablet Take 2 tablets (1,300 mg total) by mouth 2 (two) times daily. 360 tablet 3    tamsulosin (FLOMAX) 0.4 mg Cap Take by mouth once daily.       No current facility-administered medications for this visit.       Review of patient's allergies indicates:  No Known Allergies      Objective:        Vitals:    08/15/23 0908   BP: 128/74   Pulse: 61       Physical Exam  Constitutional:       Appearance: He is well-developed.   HENT:      Head: Normocephalic and atraumatic.      Right Ear: External ear normal.      Left Ear: External ear normal.      Nose: Nose normal.      Mouth/Throat:      Pharynx: No oropharyngeal exudate.   Eyes:      General: No scleral icterus.        Right eye: No discharge.         Left eye: No discharge.      Conjunctiva/sclera: Conjunctivae normal.      Pupils: Pupils are equal, round, and reactive to light.   Neck:      Thyroid: No thyromegaly.      Vascular: No JVD.      Trachea: No tracheal deviation.   Cardiovascular:      Rate and Rhythm: Normal rate and regular rhythm.      Pulses:           Dorsalis pedis pulses are 1+ on the right side and 1+ on the left side.        Posterior tibial pulses are 1+ on the right side and 1+ on the left side.      Heart sounds: Normal heart sounds. No murmur heard.     No friction rub. No gallop.   Pulmonary:      Effort: Pulmonary effort is normal. No respiratory distress.      Breath sounds: Normal breath sounds. No stridor. No wheezing or rales.   Chest:      Chest wall: No tenderness.   Abdominal:      General: Bowel sounds are normal. There is no distension.      Palpations: Abdomen is soft. There is no mass.      Tenderness: There is no abdominal tenderness. There is no guarding or rebound.      Hernia: No hernia is present.   Musculoskeletal:         General: Tenderness (TTP left shoulder with restricted ROM) present.      Cervical back: Normal range of motion and neck supple.      Right foot: Normal range of motion. No deformity, bunion, Charcot foot, foot drop or prominent metatarsal heads.      Left foot: Normal range of motion. No deformity, bunion, Charcot foot, foot drop or prominent metatarsal heads.      Comments: Neer and rodriguez+ left   Feet:      Right foot:      Protective Sensation: 10 sites tested.  10 sites sensed.       Skin integrity: Skin integrity normal.      Toenail Condition: Right toenails are normal.      Left foot:      Protective Sensation: 10 sites tested.  10 sites sensed.      Skin integrity: Skin integrity normal.      Toenail Condition: Left toenails are normal.   Lymphadenopathy:      Cervical: No cervical adenopathy.   Skin:     General: Skin is warm and dry.      Coloration: Skin is not pale.      Findings: No erythema or rash.   Neurological:      Mental Status: He is alert and oriented to person, place, and time.      Cranial Nerves: No cranial nerve deficit.      Motor: No abnormal muscle tone.      Coordination: Coordination normal.      Deep Tendon Reflexes: Reflexes are normal and symmetric. Reflexes normal.   Psychiatric:         Behavior: Behavior normal.         Thought Content: Thought content normal.         Judgment: Judgment normal.         Assessment:       Problem List Items Addressed This Visit       Dyslipidemia associated with type 2 diabetes mellitus    Type 2 diabetes mellitus with stage 3a chronic kidney disease, without long-term current use of insulin    Stage 3b chronic kidney disease    Prostate cancer    Hypertension associated with diabetes - Primary    Coronary artery disease of native artery of native heart with stable angina pectoris    Chronic left shoulder pain    Relevant Orders    MRI Shoulder Without Contrast Left    Ambulatory referral/consult to Orthopedics    Centrilobular emphysema     Other Visit Diagnoses       Encounter for FIT (fecal immunochemical test) screening        Relevant Orders    Fecal Immunochemical Test (iFOBT)    Rotator cuff impingement syndrome of left shoulder        Relevant Orders    MRI Shoulder Without Contrast Left    Ambulatory referral/consult to Orthopedics            Plan:           Addy was seen today for follow-up.    Diagnoses and all orders for this visit:    Hypertension associated with diabetes    Dyslipidemia associated with type 2  diabetes mellitus    Type 2 diabetes mellitus with stage 3a chronic kidney disease, without long-term current use of insulin    Coronary artery disease of native artery of native heart with stable angina pectoris    Stage 3b chronic kidney disease    Prostate cancer    Encounter for FIT (fecal immunochemical test) screening  -     Fecal Immunochemical Test (iFOBT); Future    Chronic left shoulder pain  -     MRI Shoulder Without Contrast Left; Future  -     Ambulatory referral/consult to Orthopedics; Future    Rotator cuff impingement syndrome of left shoulder  -     MRI Shoulder Without Contrast Left; Future  -     Ambulatory referral/consult to Orthopedics; Future    Centrilobular emphysema    HTN   -at goal  -continue lisinopril to 20 mg/ day    DM II   -controlled   -continue farxiga daily    CKD 3  -follow nephrology  -ER precautions given    HLD   -improving  -continue lipitor    GERD   -stable   -takes OTC PPI     ED'  -controlled    Left shoulder pain  -MRI for rotator cuff impingement  -likely arthritis vs degeneration vs rotator cuff  -refer sports medicine      Spent adequate time in obtaining history and explaining differentials      40 minutes spent during this visit of which greater than 50% devoted to face-face counseling and coordination of care regarding diagnosis and management plan    Rtc 3 m r prn

## 2023-08-17 VITALS
TEMPERATURE: 98 F | DIASTOLIC BLOOD PRESSURE: 70 MMHG | OXYGEN SATURATION: 98 % | RESPIRATION RATE: 18 BRPM | SYSTOLIC BLOOD PRESSURE: 118 MMHG | HEART RATE: 64 BPM

## 2023-08-17 NOTE — PATIENT INSTRUCTIONS
Instructions:  - [facility] to schedule home follow-up visit with patient as needed.  - Continue all medications, treatments and therapies as ordered.   - Follow all instructions, recommendations as discussed.  - Maintain Safety Precautions at all times.  - Attend all medical appointments as scheduled.  - For worsening symptoms: call Primary Care Physician or Nurse Practitioner.  - For emergencies, call 911 or immediately report to the nearest emergency room.  - Limit Risks of environmental exposure to coronavirus/COVID-19 as discussed including: social distancing, hand hygiene, and limiting departures from the home for necessities only.

## 2023-08-21 ENCOUNTER — HOSPITAL ENCOUNTER (OUTPATIENT)
Dept: RADIOLOGY | Facility: HOSPITAL | Age: 68
Discharge: HOME | End: 2023-08-21
Attending: ORTHOPAEDIC SURGERY
Payer: MEDICARE

## 2023-08-21 ENCOUNTER — OFFICE VISIT (OUTPATIENT)
Dept: SPORTS MEDICINE | Facility: CLINIC | Age: 68
End: 2023-08-21
Payer: MEDICARE

## 2023-08-21 ENCOUNTER — PATIENT MESSAGE (OUTPATIENT)
Dept: FAMILY MEDICINE | Facility: CLINIC | Age: 68
End: 2023-08-21
Payer: MEDICARE

## 2023-08-21 ENCOUNTER — HOSPITAL ENCOUNTER (OUTPATIENT)
Dept: RADIOLOGY | Facility: HOSPITAL | Age: 68
Discharge: HOME | End: 2023-08-21
Attending: FAMILY MEDICINE
Payer: MEDICARE

## 2023-08-21 VITALS
HEART RATE: 69 BPM | WEIGHT: 206 LBS | DIASTOLIC BLOOD PRESSURE: 81 MMHG | SYSTOLIC BLOOD PRESSURE: 124 MMHG | BODY MASS INDEX: 27.18 KG/M2

## 2023-08-21 DIAGNOSIS — G89.29 CHRONIC LEFT SHOULDER PAIN: ICD-10-CM

## 2023-08-21 DIAGNOSIS — M25.512 CHRONIC LEFT SHOULDER PAIN: ICD-10-CM

## 2023-08-21 DIAGNOSIS — M75.42 ROTATOR CUFF IMPINGEMENT SYNDROME OF LEFT SHOULDER: ICD-10-CM

## 2023-08-21 DIAGNOSIS — M75.112 PARTIAL NONTRAUMATIC TEAR OF LEFT ROTATOR CUFF: ICD-10-CM

## 2023-08-21 DIAGNOSIS — M75.42 ROTATOR CUFF IMPINGEMENT SYNDROME OF LEFT SHOULDER: Primary | ICD-10-CM

## 2023-08-21 PROCEDURE — 1159F PR MEDICATION LIST DOCUMENTED IN MEDICAL RECORD: ICD-10-PCS | Mod: CPTII,S$GLB,, | Performed by: ORTHOPAEDIC SURGERY

## 2023-08-21 PROCEDURE — 73030 XR SHOULDER COMPLETE 2 OR MORE VIEWS LEFT: ICD-10-PCS | Mod: 26,LT,, | Performed by: RADIOLOGY

## 2023-08-21 PROCEDURE — 4010F ACE/ARB THERAPY RXD/TAKEN: CPT | Mod: CPTII,S$GLB,, | Performed by: ORTHOPAEDIC SURGERY

## 2023-08-21 PROCEDURE — 99204 OFFICE O/P NEW MOD 45 MIN: CPT | Mod: 25,S$GLB,, | Performed by: ORTHOPAEDIC SURGERY

## 2023-08-21 PROCEDURE — 73221 MRI SHOULDER WITHOUT CONTRAST LEFT: ICD-10-PCS | Mod: 26,LT,, | Performed by: RADIOLOGY

## 2023-08-21 PROCEDURE — 20610 LARGE JOINT ASPIRATION/INJECTION: L SUBACROMIAL BURSA: ICD-10-PCS | Mod: LT,S$GLB,, | Performed by: ORTHOPAEDIC SURGERY

## 2023-08-21 PROCEDURE — 99204 PR OFFICE/OUTPT VISIT, NEW, LEVL IV, 45-59 MIN: ICD-10-PCS | Mod: 25,S$GLB,, | Performed by: ORTHOPAEDIC SURGERY

## 2023-08-21 PROCEDURE — 1101F PR PT FALLS ASSESS DOC 0-1 FALLS W/OUT INJ PAST YR: ICD-10-PCS | Mod: CPTII,S$GLB,, | Performed by: ORTHOPAEDIC SURGERY

## 2023-08-21 PROCEDURE — 3079F DIAST BP 80-89 MM HG: CPT | Mod: CPTII,S$GLB,, | Performed by: ORTHOPAEDIC SURGERY

## 2023-08-21 PROCEDURE — 1101F PT FALLS ASSESS-DOCD LE1/YR: CPT | Mod: CPTII,S$GLB,, | Performed by: ORTHOPAEDIC SURGERY

## 2023-08-21 PROCEDURE — 99999 PR PBB SHADOW E&M-EST. PATIENT-LVL IV: CPT | Mod: PBBFAC,,, | Performed by: ORTHOPAEDIC SURGERY

## 2023-08-21 PROCEDURE — 73221 MRI JOINT UPR EXTREM W/O DYE: CPT | Mod: 26,LT,, | Performed by: RADIOLOGY

## 2023-08-21 PROCEDURE — 3008F BODY MASS INDEX DOCD: CPT | Mod: CPTII,S$GLB,, | Performed by: ORTHOPAEDIC SURGERY

## 2023-08-21 PROCEDURE — 73221 MRI JOINT UPR EXTREM W/O DYE: CPT | Mod: TC,LT

## 2023-08-21 PROCEDURE — 1159F MED LIST DOCD IN RCRD: CPT | Mod: CPTII,S$GLB,, | Performed by: ORTHOPAEDIC SURGERY

## 2023-08-21 PROCEDURE — 3066F PR DOCUMENTATION OF TREATMENT FOR NEPHROPATHY: ICD-10-PCS | Mod: CPTII,S$GLB,, | Performed by: ORTHOPAEDIC SURGERY

## 2023-08-21 PROCEDURE — 20610 DRAIN/INJ JOINT/BURSA W/O US: CPT | Mod: LT,S$GLB,, | Performed by: ORTHOPAEDIC SURGERY

## 2023-08-21 PROCEDURE — 3008F PR BODY MASS INDEX (BMI) DOCUMENTED: ICD-10-PCS | Mod: CPTII,S$GLB,, | Performed by: ORTHOPAEDIC SURGERY

## 2023-08-21 PROCEDURE — 3061F PR NEG MICROALBUMINURIA RESULT DOCUMENTED/REVIEW: ICD-10-PCS | Mod: CPTII,S$GLB,, | Performed by: ORTHOPAEDIC SURGERY

## 2023-08-21 PROCEDURE — 73030 X-RAY EXAM OF SHOULDER: CPT | Mod: 26,LT,, | Performed by: RADIOLOGY

## 2023-08-21 PROCEDURE — 3066F NEPHROPATHY DOC TX: CPT | Mod: CPTII,S$GLB,, | Performed by: ORTHOPAEDIC SURGERY

## 2023-08-21 PROCEDURE — 3074F PR MOST RECENT SYSTOLIC BLOOD PRESSURE < 130 MM HG: ICD-10-PCS | Mod: CPTII,S$GLB,, | Performed by: ORTHOPAEDIC SURGERY

## 2023-08-21 PROCEDURE — 4010F PR ACE/ARB THEARPY RXD/TAKEN: ICD-10-PCS | Mod: CPTII,S$GLB,, | Performed by: ORTHOPAEDIC SURGERY

## 2023-08-21 PROCEDURE — 3061F NEG MICROALBUMINURIA REV: CPT | Mod: CPTII,S$GLB,, | Performed by: ORTHOPAEDIC SURGERY

## 2023-08-21 PROCEDURE — 73030 X-RAY EXAM OF SHOULDER: CPT | Mod: TC,LT

## 2023-08-21 PROCEDURE — 1111F PR DISCHARGE MEDS RECONCILED W/ CURRENT OUTPATIENT MED LIST: ICD-10-PCS | Mod: CPTII,S$GLB,, | Performed by: ORTHOPAEDIC SURGERY

## 2023-08-21 PROCEDURE — 3044F PR MOST RECENT HEMOGLOBIN A1C LEVEL <7.0%: ICD-10-PCS | Mod: CPTII,S$GLB,, | Performed by: ORTHOPAEDIC SURGERY

## 2023-08-21 PROCEDURE — 99999 PR PBB SHADOW E&M-EST. PATIENT-LVL IV: ICD-10-PCS | Mod: PBBFAC,,, | Performed by: ORTHOPAEDIC SURGERY

## 2023-08-21 PROCEDURE — 3079F PR MOST RECENT DIASTOLIC BLOOD PRESSURE 80-89 MM HG: ICD-10-PCS | Mod: CPTII,S$GLB,, | Performed by: ORTHOPAEDIC SURGERY

## 2023-08-21 PROCEDURE — 3288F PR FALLS RISK ASSESSMENT DOCUMENTED: ICD-10-PCS | Mod: CPTII,S$GLB,, | Performed by: ORTHOPAEDIC SURGERY

## 2023-08-21 PROCEDURE — 1111F DSCHRG MED/CURRENT MED MERGE: CPT | Mod: CPTII,S$GLB,, | Performed by: ORTHOPAEDIC SURGERY

## 2023-08-21 PROCEDURE — 3044F HG A1C LEVEL LT 7.0%: CPT | Mod: CPTII,S$GLB,, | Performed by: ORTHOPAEDIC SURGERY

## 2023-08-21 PROCEDURE — 3288F FALL RISK ASSESSMENT DOCD: CPT | Mod: CPTII,S$GLB,, | Performed by: ORTHOPAEDIC SURGERY

## 2023-08-21 PROCEDURE — 3074F SYST BP LT 130 MM HG: CPT | Mod: CPTII,S$GLB,, | Performed by: ORTHOPAEDIC SURGERY

## 2023-08-21 RX ORDER — TRIAMCINOLONE ACETONIDE 40 MG/ML
80 INJECTION, SUSPENSION INTRA-ARTICULAR; INTRAMUSCULAR
Status: DISCONTINUED | OUTPATIENT
Start: 2023-08-21 | End: 2023-08-21 | Stop reason: HOSPADM

## 2023-08-21 RX ADMIN — TRIAMCINOLONE ACETONIDE 80 MG: 40 INJECTION, SUSPENSION INTRA-ARTICULAR; INTRAMUSCULAR at 02:08

## 2023-08-21 NOTE — PROGRESS NOTES
CC: left Shoulder pain, referred by PCP Dr. Ramey, retired worked as  for many years    67 y.o. Male  reports that the pain is severe and has been ongoing for several years. Reports he has pain with any attempted overhead motion. Reports he has had some PT for the shoulder but stopped when he had pain while doing some abducting motion at PT a few months ago. Recently underwent CABG 7/2023  with Dr. Uriostegui    He reports that the pain is worse with overhead activity. It also bothers him at night.    Is affecting ADLs.       SANE: 48  VAS 8/10    PAST MEDICAL HISTORY:   Past Medical History:   Diagnosis Date    CKD (chronic kidney disease) stage 3, GFR 30-59 ml/min     Costochondritis     Diabetic nephropathy associated with type 2 diabetes mellitus     Diabetic retinopathy     ED (erectile dysfunction)     GERD (gastroesophageal reflux disease)     Hyperlipidemia     Hypertension     Prostate cancer     Type II or unspecified type diabetes mellitus with renal manifestations, uncontrolled(250.42)      PAST SURGICAL HISTORY:   Past Surgical History:   Procedure Laterality Date    Bone biopsy right femur      CORONARY ANGIOGRAPHY N/A 06/16/2022    Procedure: ANGIOGRAM, CORONARY ARTERY;  Surgeon: Carter Arevalo MD;  Location: [facility] CATH LAB/EP;  Service: Cardiology;  Laterality: N/A;    CREATION OF BYPASS OF CORONARY ARTERY USING GRAFT WITHOUT CARDIOPULMONARY PUMP OXYGENATION N/A 7/21/2023    Procedure: CABG, WITHOUT CARDIOPULMONARY PUMP OXYGENATION;  Surgeon: Jass Uriostegui MD;  Location: [facility] OR [address] [place] [address];  Service: Cardiovascular;  Laterality: N/A;  CABG x1 off pump    FRACTIONAL FLOW RESERVE (FFR), CORONARY  6/27/2023    Procedure: Fractional Flow [place] (FFR), Coronary;  Surgeon: Je Fontanez MD;  Location: [facility] CATH LAB/EP;  Service: Cardiology;;    INSTANTANEOUS WAVE-FREE RATIO (IFR) N/A 6/27/2023    Procedure: Instantaneous Wave-Free Ratio (IFR);  Surgeon: Je Fontanez MD;   Location: [facility] CATH LAB/EP;  Service: Cardiology;  Laterality: N/A;    LEFT HEART CATHETERIZATION Left 2022    Procedure: Left heart cath;  Surgeon: Carter Arevalo MD;  Location: [facility] CATH LAB/EP;  Service: Cardiology;  Laterality: Left;    LEFT HEART CATHETERIZATION Left 2023    Procedure: Left heart cath;  Surgeon: Je Fontanez MD;  Location: [facility] CATH LAB/EP;  Service: Cardiology;  Laterality: Left;    PROSTATE BIOPSY  10/05/2022    RADIOACTIVE SEED IMPLANTATION OF PROSTATE  2023    Procedure: INSERTION, RADIOACTIVE SEED, PROSTATE;  Surgeon: Scott Frias MD;  Location: [facility] OR [address] [place] [address];  Service: Urology;;    TRANSRECTAL ULTRASOUND EXAMINATION N/A 2023    Procedure: ULTRASOUND, RECTAL APPROACH;  Surgeon: Scott Frias MD;  Location: [facility] OR [address] [place] [address];  Service: Urology;  Laterality: N/A;     FAMILY HISTORY:   Family History   Problem Relation Age of Onset    Hypertension Mother     Diabetes Mother     Kidney disease Mother     Coronary artery disease Father          of an MI at age 60    Hyperlipidemia Father     Heart attack Father     Hypertension Sister     Diabetes Sister     Heart disease Sister     Heart attack Sister     Hyperlipidemia Sister     Coronary artery disease Sister     Diabetes Sister     Hypertension Sister     Dementia Sister     No Known Problems Sister     Stomach cancer Sister     Cancer Sister         stomach     Hypertension Brother     Stroke Brother     Lung cancer Brother     Cancer Brother         lung cancer    Diabetes Brother     Peripheral vascular disease Brother     Hypertension Brother     Hyperlipidemia Brother     No Known Problems Brother     Diabetes Brother     Stroke Brother     Hypertension Brother     Hyperlipidemia Brother     HIV Brother     Diabetes Brother     Hypertension Brother     No Known Problems Daughter     Asthma Son     Hypertension Son     Anesthesia problems Neg Hx      SOCIAL HISTORY:   Social History      Socioeconomic History    Marital status:    Tobacco Use    Smoking status: Former     Current packs/day: 0.00     Average packs/day: 0.5 packs/day for 26.0 years (13.0 ttl pk-yrs)     Types: Cigarettes     Start date:      Quit date:      Years since quittin.6     Passive exposure: Never    Smokeless tobacco: Never   Substance and Sexual Activity    Alcohol use: Not Currently    Drug use: No    Sexual activity: Yes     Partners: Female     Social Determinants of Health     Financial Resource Strain: Low Risk  (2023)    Overall Financial Resource Strain (CARDIA)     Difficulty of Paying Living Expenses: Not hard at all   Food Insecurity: Unknown (2023)    Hunger Vital Sign     Worried About Running Out of Food in the Last Year: Patient refused     Ran Out of Food in the Last Year: Patient refused   Transportation Needs: Unknown (2023)    PRAPARE - Transportation     Lack of Transportation (Medical): No     Lack of Transportation (Non-Medical): Patient refused   Physical Activity: Inactive (2023)    Exercise Vital Sign     Days of Exercise per Week: 0 days     Minutes of Exercise per Session: 0 min   Stress: Unknown (2023)    [employer] [place] [employer] - Occupational Stress Questionnaire     Feeling of Stress : Patient refused   Social Connections: Unknown (2023)    Social Connection and Isolation Panel [NHANES]     Frequency of Communication with Friends and Family: Patient refused     Frequency of Social Gatherings with Friends and Family: Patient refused     Attends Cheondoism Services: Patient refused     Active Member of Clubs or Organizations: No     Attends Club or Organization Meetings: Never     Marital Status:    Housing Stability: Unknown (2023)    Housing Stability Vital Sign     Unable to Pay for Housing in the Last Year: No     Number of Places Lived in the Last Year: 1     Unstable Housing in the Last Year: Patient refused  "    MEDICATIONS:   Current Outpatient Medications:     amiodarone (PACERONE) 200 MG Tab, Take one tablet by mouth daily for 1 month, Disp: 30 tablet, Rfl: 0    atorvastatin (LIPITOR) 80 MG tablet, Take 1 tablet (80 mg total) by mouth once daily., Disp: 90 tablet, Rfl: 3    clopidogreL (PLAVIX) 75 mg tablet, Take 1 tablet (75 mg total) by mouth once daily., Disp: 90 tablet, Rfl: 4    dapagliflozin (FARXIGA) 10 mg tablet, Take 1 tablet (10 mg total) by mouth once daily., Disp: 90 tablet, Rfl: 3    ergocalciferol (ERGOCALCIFEROL) 50,000 unit Cap, Take 1 capsule (50,000 Units total) by mouth every 7 days., Disp: 12 capsule, Rfl: 3    pantoprazole (PROTONIX) 40 MG tablet, TAKE ONE TABLET BY MOUTH ONCE DAILY., Disp: 90 tablet, Rfl: 3    rivaroxaban (XARELTO) 15 mg Tab, Take 1 tablet (15 mg total) by mouth daily with dinner or evening meal., Disp: 30 tablet, Rfl: 11    semaglutide (OZEMPIC) 0.25 mg or 0.5 mg (2 mg/3 mL) pen injector, Inject 0.25 mg into the skin every 7 days., Disp: 1 each, Rfl: 11    tamsulosin (FLOMAX) 0.4 mg Cap, Take by mouth once daily., Disp: , Rfl:     acetaminophen (TYLENOL) 500 MG tablet, Take 2 tablets (1,000 mg total) by mouth every 8 (eight) hours as needed for Pain. (Patient not taking: Reported on 8/21/2023), Disp: 30 tablet, Rfl: 0    ADVANCED GLUC METER TEST STRIP Strp, USE TO TEST BLOOD SUGAR 4 TIMES DAILY. (Patient not taking: Reported on 8/21/2023), Disp: 100 strip, Rfl: 0    BD ARIA 2ND GEN PEN NEEDLE 32 gauge x 5/32" Ndle, , Disp: , Rfl:     blood sugar diagnostic (BLOOD GLUCOSE TEST) Strp, Check sugar once daily (Patient not taking: Reported on 8/21/2023), Disp: 100 each, Rfl: 11    blood-glucose meter Misc, by Misc.(Non-Drug; Combo Route) route., Disp: , Rfl:     blood-glucose meter,continuous (DEXCOM G6 ) Misc, 1 Device by Misc.(Non-Drug; Combo Route) route continuous. (Patient not taking: Reported on 8/21/2023), Disp: 1 each, Rfl: 0    blood-glucose sensor (DEXCOM G6 " SENSOR) Tia, 1 Device by Misc.(Non-Drug; Combo Route) route continuous. (Patient not taking: Reported on 8/21/2023), Disp: 4 each, Rfl: 11    blood-glucose transmitter (DEXCOM G6 TRANSMITTER) Tia, 1 Device by Misc.(Non-Drug; Combo Route) route continuous. (Patient not taking: Reported on 8/21/2023), Disp: 1 each, Rfl: 0    docusate sodium (COLACE) 100 MG capsule, Take 1 capsule (100 mg total) by mouth 2 (two) times daily as needed for Constipation. (Patient not taking: Reported on 8/21/2023), Disp: 30 capsule, Rfl: 0    fluticasone propionate (FLONASE) 50 mcg/actuation nasal spray, 2 sprays (100 mcg total) by Each Nostril route once daily. (Patient not taking: Reported on 8/15/2023), Disp: 16 g, Rfl: 2    furosemide (LASIX) 20 MG tablet, Take 4 tablets (80 mg total) by mouth once daily. for 3 days, Disp: 12 tablet, Rfl: 0    insulin (LANTUS SOLOSTAR U-100 INSULIN) glargine 100 units/mL SubQ pen, Inject 12 Units into the skin 2 (two) times a day., Disp: 21.6 mL, Rfl: 3    lancets (ACCU-CHEK SOFTCLIX LANCETS) Misc, 1 lancet by Misc.(Non-Drug; Combo Route) route 2 (two) times daily. (Patient not taking: Reported on 8/21/2023), Disp: 200 each, Rfl: 1    latanoprost 0.005 % ophthalmic solution, , Disp: , Rfl:     oxyCODONE (ROXICODONE) 5 MG immediate release tablet, Take 1 tablet (5 mg total) by mouth every 4 (four) hours as needed for Pain. (Patient not taking: Reported on 8/21/2023), Disp: 42 tablet, Rfl: 0    sildenafil (REVATIO) 20 mg Tab, Take 1 tablet (20 mg total) by mouth daily as needed for ED (Patient not taking: Reported on 8/21/2023), Disp: 30 tablet, Rfl: 2    sodium bicarbonate 650 MG tablet, Take 2 tablets (1,300 mg total) by mouth 2 (two) times daily., Disp: 360 tablet, Rfl: 3  ALLERGIES: Review of patient's allergies indicates:  No Known Allergies  VITAL SIGNS: /81   Pulse 69   Wt 93.5 kg (206 lb 0.3 oz)   BMI 27.18 kg/m²     Review of Systems   Constitution: Negative. Negative for chills,  fever and night sweats.   HENT: Negative for congestion and headaches.    Eyes: Negative for blurred vision, left vision loss and right vision loss.   Cardiovascular: Negative for chest pain and syncope.   Respiratory: Negative for cough and shortness of breath.    Endocrine: Negative for polydipsia, polyphagia and polyuria.   Hematologic/Lymphatic: Negative for bleeding problem. Does not bruise/bleed easily.   Skin: Negative for dry skin, itching and rash.   Musculoskeletal: Negative for falls and muscle weakness.   Gastrointestinal: Negative for abdominal pain and bowel incontinence.   Genitourinary: Negative for bladder incontinence and nocturia.   Neurological: Negative for disturbances in coordination, loss of balance and seizures.   Psychiatric/Behavioral: Negative for depression. The patient does not have insomnia.    Allergic/Immunologic: Negative for hives and persistent infections.       PHYSICAL EXAMINATION:  General:  The patient is alert and oriented x 3.  Mood is pleasant.  Observation of ears, eyes and nose reveal no gross abnormalities.  HEENT: NCAT, sclera nonicteric  Lungs: Respirations are equal and unlabored.  Gait is coordinated. Patient can toe walk and heel walk without difficulty.  Cardiovascular: There are no swelling or varicosities present.   Lymphatic: Negative for adenopathy       left Shoulder / Upper Extremity Exam    OBSERVATION:     Swelling  none  Deformity  none   Discoloration  none   Scapular winging none   Scars   none  Atrophy  none    TENDERNESS / CREPITUS (T/C):          T/C      T/C   Clavicle   -/-  SUPRAspinatus    -/-     AC Jt.    +/-  INFRAspinatus  -/-     SC Jt.    -/-  Deltoid    -/-     G. Tuberosity  -/-  LH BICEP groove  +/-   Acromion:  -/-  Midline Neck   -/-     Scapular Spine -/-  Trapezium   -/-   SMA Scapula  -/-  GH jt. line - post  -/-     Scapulothoracic  -/-         ROM: (* = with pain)  Right shoulder   Left shoulder        AROM (PROM)   AROM  (PROM)   FE    170° (175°)     150° (175°)     ER at 0°    60°  (65°)    50°  (65°)   ER at 90° ABD  90°  (90°)    NA (90°)   IR at 90°  ABD   NA  (40°)     NA  (40°)      IR (spine level)   T10     T11    STRENGTH: (* = with pain) RIGHT SHOULDER  LEFT SHOULDER   SCAPTION at 0   5/5    5-/5    SCAPTION at 30   5/5    4+/5   IR    5/5    5/5   ER    5/5    5/5   BICEPS   5/5    5/5   Deltoid    5/5    5/5     SIGNS:  Painful side       NEER   +   OBRANDONS  +     OLVERA   +   SPEEDS  +     DROP ARM   neg   BELLY PRESS neg   Superior escape none    LIFT-OFF  neg   X-Body ADD    neg    MOVING VALGUS Neg        STABILITY TESTING    RIGHT SHOULDER   LEFT SHOULDER     Translation     Anterior  up face     up face     Posterior  up face    up face    Sulcus   < 10mm    < 10 mm     Signs    Apprehension   neg      neg      Relocation   no change     no change     Jerk test  neg     neg    EXTREMITY NEURO-VASCULAR EXAM    Sensation grossly intact to light touch all dermatomal regions.    DTR 2+ Biceps, Triceps, BR and Negative Ernestos sign   Grossly intact motor function at Elbow, Wrist and Hand   Distal pulses radial and ulnar 2+, brisk cap refill, symmetric.      NECK:  Painless FROM and spinous processes non-tender. Negative Spurlings sign.      OTHER FINDINGS:  +scapular dyskinesia    XRAYS:  Shoulder trauma series left,  were reviewed and interpreted by me. No convincing fracture or dislocation is noted. The osseous structures appear well mineralized and well aligned, possible insufficiency fracture greater tuberosity/cystic changes, AC moderate hypertrophy, degenerative changes      left   1. Shoulder pain bursal sided RTC tear    2.  Possible insufficiency fracture greater tuberosity    MRI Left shoulder reviewed and interpreted personally by me: partial thickness supraspinatus tear without atrophy or retraction. biceps tendinitis, + insufficiency fracture greater tuberosity  +AC moderate hypertrophy,  degenerative changes    ASSESSMENT:    Left Shoulder partial thickness Rotator Cuff Tear,  biceps tendonitis.    Recent CABG 7/2023  Left cubital tunnel syndrome       PLAN:    PROCEDURE NOTE:   After cortisone consent and post-injection information was given, the shoulder was prepped with betadyne and alcohol. The left subacromial space of the shoulder was injected with 2 cc 40 mg kenalog and 4 cc lidocaine and 4 cc .5% marcaine.   Bandaid was applied. Patient was reminded to rest with RICE for 48 hours post injection and to call clinic immediately for any adverse side effects as explained in clinic today.    PT for scapular stabilization: Scapular dyskinesia   Scapular stabilization - [place] protocol, 1-3x/week x 6-8 weeks with HEP    He will call in 2-3 months for follow up visit for reevaluation    All questions were answered, pt will contact us for questions or concerns in the interim.    I made the decision to obtain old records of the patient including previous notes and imaging. New imaging was ordered today of the extremity or extremities evaluated. I independently reviewed and interpreted the radiographs and/or MRIs today as well as prior imaging.

## 2023-08-21 NOTE — TELEPHONE ENCOUNTER
Please let pt know MRI shoulder showed partial thickness tear in one of the tendon and inflammation of joint with arthritis and bursitis - if want to see specialist - let me know

## 2023-08-21 NOTE — PROGRESS NOTES
"Subjective:   @Patient ID:  Addy Redding is a 67 y.o. male who presents for evaluation of CAD, HTN, HLP    HPI:   8/23/2023: F/U. [facility] ostial LAD disease. iFR +ve. CABG  LIMA-LAD. PDA was not bypassed. RCA . He had post op a.fib. He is on amio and xarelto. BB stopped due to concerns about tacy-brendon/ He feels much better since the bypass. Energy level is much better.        6/14/2023: F/U. Stress test with concerning balanced ischemia. Significant shortness of breath. Getting worse. He has chronic sinus bradycardia with good chronotropic response by stress test in 3/2022.     3/9/2023: He saw Dr. Arevalo in the past. He had exertional angina prompted stress test that was abnormal. [facility] 4/7/2022 with  RCA, significant OM disease, non obstructive prox LAD disease by iFR. Attempt to PCI RCA was unsuccessful (run through wire used). He was staged for PCI to OM 6/2022 ( 2.5 x 8 and 2.5x18) was complicated nu no reflow in the main lcx and it was stented, residual disease in ostial Om at bifurcation     He was diagnosed with prostate CA , got radiotherapy seeds. Stable     He still reports significant TRINH and low energy level. No chest pain   He has been compliant with his medications    He has bradycardia that's why  he is not on BB        Prior cardiovascular  Hx  --------------------------------  - EKG 12/2022 sinus bradycardia     - PET stress test 6/1/2023     The myocardial perfusion images are normal without evidence of scar.    Stress myocardial blood flow is severely reduced diffusely throughout the heart consistent with three vessel "balanced" ischemia.  The RCA territory has severely reduced flow capacity consistent with a known  with inadequate collateralization. The anterior and anterolateral wall have moderately reduced flow capacity and are on the border of ischemic thresholds. The remainder of the myocardium has mildly reduced flow capacity.    The whole heart absolute myocardial perfusion values " averaged 0.54 cc/min/g at rest, which is reduced; 0.81 cc/min/g at stress, which is severely reduced; and CFR is 1.50 , which is moderately reduced.    CT attenuation images demonstrate moderate diffuse coronary calcifications in the LAD and LCX territory and mild diffuse aortic calcifications in the descending aorta.    The gated perfusion images showed an ejection fraction of 60% at rest and 62% during stress. A normal ejection fraction is greater than 47%.    The wall motion is normal at rest and during stress.    The LV cavity size is normal at rest and stress.    The ECG portion of the study is negative for ischemia.    There were no arrhythmias during stress.    The patient reported no chest pain during the stress test.    There are no prior studies for comparison.     - Echo 3/23/2023   Normal systolic function.  The estimated ejection fraction is 55%.  Normal left ventricular diastolic function.  Normal right ventricular size with normal right ventricular systolic function.  Normal central venous pressure (3 mmHg).  The estimated PA systolic pressure is 23 mmHg.       Patient Active Problem List    Diagnosis Date Noted    Centrilobular emphysema 08/15/2023    S/P CABG (coronary artery bypass graft) 07/24/2023    Paroxysmal A-fib 07/24/2023     This is not a post operative complication. afib happen during post operative period      Tachycardia-bradycardia syndrome 07/23/2023    Carotid stenosis 07/21/2023    Acute blood loss anemia 07/21/2023    Bilateral carotid artery stenosis 07/05/2023    Pre-op exam 07/05/2023    Pain of left upper extremity 05/17/2023    Weakness 05/17/2023    Stiffness in joint 05/17/2023    Chronic left shoulder pain 05/15/2023    Hyperparathyroidism, unspecified 01/27/2023    Prostate cancer     Stage 3b chronic kidney disease 10/04/2022    Coronary artery disease of native artery of native heart with stable angina pectoris 10/04/2022    Presence of drug-eluting stent in left  circumflex coronary artery 09/01/2022    Preoperative clearance 09/01/2022    Chronic kidney disease (CKD), stage IV (severe) 07/06/2022    Coronary artery disease of native artery with stable angina pectoris 05/26/2022    Aortic atherosclerosis 03/22/2022    Sinus bradycardia 03/22/2022    Neck pain 10/22/2021    Decreased range of motion 10/22/2021    Type 2 diabetes mellitus with stage 3a chronic kidney disease, without long-term current use of insulin 02/22/2016    Left-sided low back pain with sciatica 02/22/2016    ED (erectile dysfunction)     Hypertension associated with diabetes 11/04/2014    Dyslipidemia associated with type 2 diabetes mellitus                     LAST HbA1c  Lab Results   Component Value Date    HGBA1C 6.5 (H) 08/14/2023       Lipid panel  Lab Results   Component Value Date    CHOL 150 08/14/2023    CHOL 120 07/05/2022    CHOL 146 05/27/2022     Lab Results   Component Value Date    HDL 39 (L) 08/14/2023    HDL 43 07/05/2022    HDL 39 (L) 05/27/2022     Lab Results   Component Value Date    LDLCALC 91.8 08/14/2023    LDLCALC 66.2 07/05/2022    LDLCALC 92.6 05/27/2022     Lab Results   Component Value Date    TRIG 96 08/14/2023    TRIG 54 07/05/2022    TRIG 72 05/27/2022     Lab Results   Component Value Date    CHOLHDL 26.0 08/14/2023    CHOLHDL 35.8 07/05/2022    CHOLHDL 26.7 05/27/2022            Review of Systems   Constitutional: Negative for chills and fever.   HENT:  Negative for hearing loss and nosebleeds.    Eyes:  Negative for blurred vision.   Cardiovascular:         As in HPI   Respiratory:  Negative for hemoptysis and shortness of breath.    Hematologic/Lymphatic: Negative for bleeding problem.   Skin:  Negative for itching.   Musculoskeletal:  Negative for falls.   Gastrointestinal:  Negative for abdominal pain and hematochezia.   Genitourinary:  Positive for frequency. Negative for hematuria.        As in HPI    Neurological:  Negative for dizziness and loss of balance.    Psychiatric/Behavioral:  Negative for altered mental status and depression.        Objective:   Physical Exam  Constitutional:       Appearance: He is well-developed.   HENT:      Head: Normocephalic and atraumatic.   Eyes:      Conjunctiva/sclera: Conjunctivae normal.   Neck:      Vascular: No carotid bruit or JVD.   Cardiovascular:      Rate and Rhythm: Normal rate and regular rhythm.      Pulses:           Carotid pulses are 2+ on the right side and 2+ on the left side.       Radial pulses are 2+ on the right side and 2+ on the left side.      Heart sounds: Normal heart sounds. No murmur heard.     No friction rub. No gallop.   Pulmonary:      Effort: Pulmonary effort is normal. No respiratory distress.      Breath sounds: Normal breath sounds. No stridor. No wheezing.   Musculoskeletal:      Cervical back: Neck supple.   Skin:     General: Skin is warm and dry.   Neurological:      Mental Status: He is alert and oriented to person, place, and time.   Psychiatric:         Behavior: Behavior normal.         Assessment:     1. Dyslipidemia associated with type 2 diabetes mellitus    2. Hypertension associated with diabetes    3. Aortic atherosclerosis    4. Coronary artery disease of native artery of native heart with stable angina pectoris    5. Presence of drug-eluting stent in left circumflex coronary artery    6. S/P CABG (coronary artery bypass graft)        Plan:   Post CABG   Doing very well   Will refer to cardiac rehab   Continue Plavix and Xarelot   30 days EM to assess a.fib recurrent  Suppose to stop amio in 3 days.  Add Zetia to Atorvastatin. LDL goal < 70.   BP is well controlled  Continue Farxiga.   Continue Cardura, Norvasc, nitro prn   Chronic sinus bradycardia with good chronotropic response. He was not on BB     3 months f/u        I spent 5-10 minutes asking, assessing, assisting, arranging and advising heart healthy diet improvements. This included low-salt meals, portion control and health  "food alternatives. I also encourage 30 minutes of moderate exercise 3-4x a week.        Pertinent cardiac images and EKG reviewed independently.    Continue with current medical plan and lifestyle changes.  Return sooner for concerns or questions. If symptoms persist go to the ED  I have reviewed all pertinent data including patient's medical history in detail and updated the computerized patient record.     No orders of the defined types were placed in this encounter.      Follow up as scheduled.     He expressed verbal understanding and agreed with the plan    Patient's Medications   New Prescriptions    No medications on file   Previous Medications    ACETAMINOPHEN (TYLENOL) 500 MG TABLET    Take 2 tablets (1,000 mg total) by mouth every 8 (eight) hours as needed for Pain.    ADVANCED GLUC METER TEST STRIP STRP    USE TO TEST BLOOD SUGAR 4 TIMES DAILY.    AMIODARONE (PACERONE) 200 MG TAB    Take one tablet by mouth daily for 1 month    ATORVASTATIN (LIPITOR) 80 MG TABLET    Take 1 tablet (80 mg total) by mouth once daily.    BD ARIA 2ND GEN PEN NEEDLE 32 GAUGE X 5/32" NDLE        BLOOD SUGAR DIAGNOSTIC (BLOOD GLUCOSE TEST) STRP    Check sugar once daily    BLOOD-GLUCOSE METER MISC    by Misc.(Non-Drug; Combo Route) route.    BLOOD-GLUCOSE METER,CONTINUOUS (DEXCOM G6 ) MISC    1 Device by Misc.(Non-Drug; Combo Route) route continuous.    BLOOD-GLUCOSE SENSOR (DEXCOM G6 SENSOR) DEVONTE    1 Device by Misc.(Non-Drug; Combo Route) route continuous.    BLOOD-GLUCOSE TRANSMITTER (DEXCOM G6 TRANSMITTER) DEVONTE    1 Device by Misc.(Non-Drug; Combo Route) route continuous.    CLOPIDOGREL (PLAVIX) 75 MG TABLET    Take 1 tablet (75 mg total) by mouth once daily.    DAPAGLIFLOZIN (FARXIGA) 10 MG TABLET    Take 1 tablet (10 mg total) by mouth once daily.    DOCUSATE SODIUM (COLACE) 100 MG CAPSULE    Take 1 capsule (100 mg total) by mouth 2 (two) times daily as needed for Constipation.    ERGOCALCIFEROL (ERGOCALCIFEROL) " 50,000 UNIT CAP    Take 1 capsule (50,000 Units total) by mouth every 7 days.    FLUTICASONE PROPIONATE (FLONASE) 50 MCG/ACTUATION NASAL SPRAY    2 sprays (100 mcg total) by Each Nostril route once daily.    FUROSEMIDE (LASIX) 20 MG TABLET    Take 4 tablets (80 mg total) by mouth once daily. for 3 days    INSULIN (LANTUS SOLOSTAR U-100 INSULIN) GLARGINE 100 UNITS/ML SUBQ PEN    Inject 12 Units into the skin 2 (two) times a day.    LANCETS (ACCU-CHEK SOFTCLIX LANCETS) MISC    1 lancet by Misc.(Non-Drug; Combo Route) route 2 (two) times daily.    LATANOPROST 0.005 % OPHTHALMIC SOLUTION        OXYCODONE (ROXICODONE) 5 MG IMMEDIATE RELEASE TABLET    Take 1 tablet (5 mg total) by mouth every 4 (four) hours as needed for Pain.    PANTOPRAZOLE (PROTONIX) 40 MG TABLET    TAKE ONE TABLET BY MOUTH ONCE DAILY.    RIVAROXABAN (XARELTO) 15 MG TAB    Take 1 tablet (15 mg total) by mouth daily with dinner or evening meal.    SEMAGLUTIDE (OZEMPIC) 0.25 MG OR 0.5 MG (2 MG/3 ML) PEN INJECTOR    Inject 0.25 mg into the skin every 7 days.    SILDENAFIL (REVATIO) 20 MG TAB    Take 1 tablet (20 mg total) by mouth daily as needed for ED    SODIUM BICARBONATE 650 MG TABLET    Take 2 tablets (1,300 mg total) by mouth 2 (two) times daily.    TAMSULOSIN (FLOMAX) 0.4 MG CAP    Take by mouth once daily.   Modified Medications    No medications on file   Discontinued Medications    No medications on file

## 2023-08-21 NOTE — PROCEDURES
Large Joint Aspiration/Injection: L subacromial bursa    Date/Time: 8/21/2023 2:00 PM    Performed by: Regina Vallejo MD  Authorized by: Regina Vallejo MD    Consent Done?:  Yes (Verbal)  Indications:  Pain  Site marked: the procedure site was marked    Timeout: prior to procedure the correct patient, procedure, and site was verified    Prep: patient was prepped and draped in usual sterile fashion      Details:  Needle Size:  22 G  Approach:  Posterior  Location:  Shoulder  Site:  L subacromial bursa  Medications:  80 mg triamcinolone acetonide 40 mg/mL  Patient tolerance:  Patient tolerated the procedure well with no immediate complications

## 2023-08-23 ENCOUNTER — TELEPHONE (OUTPATIENT)
Dept: CARDIAC REHAB | Facility: CLINIC | Age: 68
End: 2023-08-23
Payer: MEDICARE

## 2023-08-23 ENCOUNTER — OFFICE VISIT (OUTPATIENT)
Dept: CARDIOLOGY | Facility: CLINIC | Age: 68
End: 2023-08-23
Payer: MEDICARE

## 2023-08-23 VITALS
DIASTOLIC BLOOD PRESSURE: 76 MMHG | WEIGHT: 205 LBS | BODY MASS INDEX: 27.17 KG/M2 | SYSTOLIC BLOOD PRESSURE: 132 MMHG | HEART RATE: 66 BPM | HEIGHT: 73 IN | OXYGEN SATURATION: 97 %

## 2023-08-23 DIAGNOSIS — I70.0 AORTIC ATHEROSCLEROSIS: ICD-10-CM

## 2023-08-23 DIAGNOSIS — E11.69 DYSLIPIDEMIA ASSOCIATED WITH TYPE 2 DIABETES MELLITUS: ICD-10-CM

## 2023-08-23 DIAGNOSIS — N18.31 TYPE 2 DIABETES MELLITUS WITH STAGE 3A CHRONIC KIDNEY DISEASE, WITHOUT LONG-TERM CURRENT USE OF INSULIN: ICD-10-CM

## 2023-08-23 DIAGNOSIS — E11.59 HYPERTENSION ASSOCIATED WITH DIABETES: ICD-10-CM

## 2023-08-23 DIAGNOSIS — Z95.1 S/P CABG (CORONARY ARTERY BYPASS GRAFT): Primary | ICD-10-CM

## 2023-08-23 DIAGNOSIS — Z95.5 PRESENCE OF DRUG-ELUTING STENT IN LEFT CIRCUMFLEX CORONARY ARTERY: ICD-10-CM

## 2023-08-23 DIAGNOSIS — I15.2 HYPERTENSION ASSOCIATED WITH DIABETES: ICD-10-CM

## 2023-08-23 DIAGNOSIS — E78.5 DYSLIPIDEMIA ASSOCIATED WITH TYPE 2 DIABETES MELLITUS: ICD-10-CM

## 2023-08-23 DIAGNOSIS — I48.0 PAROXYSMAL A-FIB: ICD-10-CM

## 2023-08-23 DIAGNOSIS — I25.118 CORONARY ARTERY DISEASE OF NATIVE ARTERY OF NATIVE HEART WITH STABLE ANGINA PECTORIS: ICD-10-CM

## 2023-08-23 DIAGNOSIS — E11.22 TYPE 2 DIABETES MELLITUS WITH STAGE 3A CHRONIC KIDNEY DISEASE, WITHOUT LONG-TERM CURRENT USE OF INSULIN: ICD-10-CM

## 2023-08-23 PROCEDURE — 3078F PR MOST RECENT DIASTOLIC BLOOD PRESSURE < 80 MM HG: ICD-10-PCS | Mod: CPTII,S$GLB,, | Performed by: INTERNAL MEDICINE

## 2023-08-23 PROCEDURE — 1126F AMNT PAIN NOTED NONE PRSNT: CPT | Mod: CPTII,S$GLB,, | Performed by: INTERNAL MEDICINE

## 2023-08-23 PROCEDURE — 1126F PR PAIN SEVERITY QUANTIFIED, NO PAIN PRESENT: ICD-10-PCS | Mod: CPTII,S$GLB,, | Performed by: INTERNAL MEDICINE

## 2023-08-23 PROCEDURE — 99214 PR OFFICE/OUTPT VISIT, EST, LEVL IV, 30-39 MIN: ICD-10-PCS | Mod: S$GLB,,, | Performed by: INTERNAL MEDICINE

## 2023-08-23 PROCEDURE — 1160F PR REVIEW ALL MEDS BY PRESCRIBER/CLIN PHARMACIST DOCUMENTED: ICD-10-PCS | Mod: CPTII,S$GLB,, | Performed by: INTERNAL MEDICINE

## 2023-08-23 PROCEDURE — 3066F PR DOCUMENTATION OF TREATMENT FOR NEPHROPATHY: ICD-10-PCS | Mod: CPTII,S$GLB,, | Performed by: INTERNAL MEDICINE

## 2023-08-23 PROCEDURE — 3075F PR MOST RECENT SYSTOLIC BLOOD PRESS GE 130-139MM HG: ICD-10-PCS | Mod: CPTII,S$GLB,, | Performed by: INTERNAL MEDICINE

## 2023-08-23 PROCEDURE — 3061F NEG MICROALBUMINURIA REV: CPT | Mod: CPTII,S$GLB,, | Performed by: INTERNAL MEDICINE

## 2023-08-23 PROCEDURE — 1111F DSCHRG MED/CURRENT MED MERGE: CPT | Mod: CPTII,S$GLB,, | Performed by: INTERNAL MEDICINE

## 2023-08-23 PROCEDURE — 3008F BODY MASS INDEX DOCD: CPT | Mod: CPTII,S$GLB,, | Performed by: INTERNAL MEDICINE

## 2023-08-23 PROCEDURE — 3075F SYST BP GE 130 - 139MM HG: CPT | Mod: CPTII,S$GLB,, | Performed by: INTERNAL MEDICINE

## 2023-08-23 PROCEDURE — 4010F PR ACE/ARB THEARPY RXD/TAKEN: ICD-10-PCS | Mod: CPTII,S$GLB,, | Performed by: INTERNAL MEDICINE

## 2023-08-23 PROCEDURE — 3044F HG A1C LEVEL LT 7.0%: CPT | Mod: CPTII,S$GLB,, | Performed by: INTERNAL MEDICINE

## 2023-08-23 PROCEDURE — 3008F PR BODY MASS INDEX (BMI) DOCUMENTED: ICD-10-PCS | Mod: CPTII,S$GLB,, | Performed by: INTERNAL MEDICINE

## 2023-08-23 PROCEDURE — 3044F PR MOST RECENT HEMOGLOBIN A1C LEVEL <7.0%: ICD-10-PCS | Mod: CPTII,S$GLB,, | Performed by: INTERNAL MEDICINE

## 2023-08-23 PROCEDURE — 3061F PR NEG MICROALBUMINURIA RESULT DOCUMENTED/REVIEW: ICD-10-PCS | Mod: CPTII,S$GLB,, | Performed by: INTERNAL MEDICINE

## 2023-08-23 PROCEDURE — 1159F PR MEDICATION LIST DOCUMENTED IN MEDICAL RECORD: ICD-10-PCS | Mod: CPTII,S$GLB,, | Performed by: INTERNAL MEDICINE

## 2023-08-23 PROCEDURE — 99214 OFFICE O/P EST MOD 30 MIN: CPT | Mod: S$GLB,,, | Performed by: INTERNAL MEDICINE

## 2023-08-23 PROCEDURE — 99999 PR PBB SHADOW E&M-EST. PATIENT-LVL IV: ICD-10-PCS | Mod: PBBFAC,,, | Performed by: INTERNAL MEDICINE

## 2023-08-23 PROCEDURE — 99999 PR PBB SHADOW E&M-EST. PATIENT-LVL IV: CPT | Mod: PBBFAC,,, | Performed by: INTERNAL MEDICINE

## 2023-08-23 PROCEDURE — 4010F ACE/ARB THERAPY RXD/TAKEN: CPT | Mod: CPTII,S$GLB,, | Performed by: INTERNAL MEDICINE

## 2023-08-23 PROCEDURE — 1159F MED LIST DOCD IN RCRD: CPT | Mod: CPTII,S$GLB,, | Performed by: INTERNAL MEDICINE

## 2023-08-23 PROCEDURE — 3066F NEPHROPATHY DOC TX: CPT | Mod: CPTII,S$GLB,, | Performed by: INTERNAL MEDICINE

## 2023-08-23 PROCEDURE — 3078F DIAST BP <80 MM HG: CPT | Mod: CPTII,S$GLB,, | Performed by: INTERNAL MEDICINE

## 2023-08-23 PROCEDURE — 1111F PR DISCHARGE MEDS RECONCILED W/ CURRENT OUTPATIENT MED LIST: ICD-10-PCS | Mod: CPTII,S$GLB,, | Performed by: INTERNAL MEDICINE

## 2023-08-23 PROCEDURE — 1160F RVW MEDS BY RX/DR IN RCRD: CPT | Mod: CPTII,S$GLB,, | Performed by: INTERNAL MEDICINE

## 2023-08-23 RX ORDER — EZETIMIBE 10 MG/1
10 TABLET ORAL DAILY
Qty: 90 TABLET | Refills: 3 | Status: SHIPPED | OUTPATIENT
Start: 2023-08-23 | End: 2024-02-20 | Stop reason: SDUPTHER

## 2023-08-23 NOTE — TELEPHONE ENCOUNTER
Letter regarding Phase II cardiac rehab was sent to patient.  Will contact patient in 2 weeks to see if interested.  Also, information letter sent to [facility].  Collin Barcenas, RN  Cardiac Rehab Nurse

## 2023-08-23 NOTE — LETTER
August 23, 2023    Addy Redding  [address] [address]             [facility]  [address]  [place] [address]  Phone: [phone]                                  [facility]   [address]   [place] [address]  [phone]         [facility]   [address] [place] [address] [phone]         [facility]    [address] [place] [id number] [phone]   Re: Addy Redding  Clinic number: [id number]    Dear Mr. Redding:    You were recently admitted to an [facility] facility for cardiac (heart) problem.  Your physician has referred you to [facility].  Cardiac Rehab Phase 2 is an educational and exercise program, conducted in a outpatient setting, proven to help reduce your risk for recurrent heart events.    Cardiac rehab has two major parts:    1. Exercise training to help you achieve cardiovascular fitness while learning how to exercise safely and improve muscle strength and endurance.  Your exercise prescription will be based on the results of the cardiopulmonary stress test (CPX) which will be done before entering the program and at completion.  2. Education, counseling and training to help you understand your heart condition and find ways to reduce your risk of future heart problems.  The cardiac rehab team will help you learn how to cope with the stress of adjusting to a new lifestyle and to deal with your fears about the future.    Phase 2 is a 36-session program, meeting 3 times a week for 12 weeks.  Each session consists of an hour of exercise and half-hour dedicated to the educational topic of the day.  Class days vary per location.  Please contact your nearest facility for details.    Through cardiac rehab you will learn:  · About your heart condition, medical therapies, and medication  · Risk factors in y our lifestyle contributing to heart  disease  · New strategies to modify your risk factors  · About a healthy diet that can lower your blood cholesterol, control weight, help prevent or control high blood pressure, and diabetes  · How to stop smoking  · How to manage stress    If you are interested in getting started, call the [facility] of your choosing.     Sincerely,     [facility]

## 2023-08-28 ENCOUNTER — CLINICAL SUPPORT (OUTPATIENT)
Dept: REHABILITATION | Facility: HOSPITAL | Age: 68
End: 2023-08-28
Payer: MEDICARE

## 2023-08-28 DIAGNOSIS — M25.512 CHRONIC LEFT SHOULDER PAIN: ICD-10-CM

## 2023-08-28 DIAGNOSIS — M25.512 LEFT ANTERIOR SHOULDER PAIN: ICD-10-CM

## 2023-08-28 DIAGNOSIS — G89.29 CHRONIC LEFT SHOULDER PAIN: ICD-10-CM

## 2023-08-28 PROCEDURE — 97140 MANUAL THERAPY 1/> REGIONS: CPT

## 2023-08-28 PROCEDURE — 97112 NEUROMUSCULAR REEDUCATION: CPT

## 2023-08-28 PROCEDURE — 97110 THERAPEUTIC EXERCISES: CPT

## 2023-08-28 NOTE — PLAN OF CARE
[facility]   Physical Therapy Initial Evaluation      Name: Addy Redding  [facility] Number: [id number]    Therapy Diagnosis:   Encounter Diagnoses   Name Primary?    Chronic left shoulder pain     Left anterior shoulder pain         Physician: Eddie Telelz DO    Physician Orders: PT Eval and Treat L shoulder pain  Medical Diagnosis from Referral: Chronic left shoulder pain [M25.512, G89.29]  Evaluation Date: 8/28/2023  Authorization Period Expiration: 10/23/23  Plan of Care Expiration: 11/28/23  Progress Note Due: 11/28/23  Visit # / Visits authorized: 1/ 1   FOTO: 1/3    Precautions: Standard     Time In: 900  Time Out: 1000  Total Appointment Time (timed & untimed codes): 60 minutes    Subjective     Date of onset: at least 1 year ago    History of current condition - Addy reports: insidious onset of R shoulder pain a little over a year ago. Pt was working as  a the time when he noticed pain but has since retired. Pt was treated for 6 weeks of OT, made some progress but continued to have pain with abduction strengthening exercises. Pt was seen by Dr. Vallejo, received CSI injection 1 week ago which he felt helped his sx. Pt would like to improve ROM and strength of his shoulder so he can tolerate ADL's, physical activity without pain.     Of note, pt had CABG 7/21/23, is waiting on being scheduled for cardiopulmonary PT.     Falls: n/a    Imaging: MRI studies: 8/15/23:    1. Subcentimeter partial-thickness tear of the insertional supraspinatus tendon, likely bursal surface.  2. Moderate acromioclavicular arthrosis.  3. Subacromial subdeltoid bursitis.  4. Thickening of axillary recess, which may be seen with adhesive capsulitis.    Prior Therapy: OT for 6 weeks, some progress but still functionally limited  Social History: lives w/ his wife   Occupation: retired fork   Prior Level of Function: Pt independent with all ADLs, job responsibilities and participating in  regular physical activity   Current Level of Function: Pt limited with ADL's and unable to perform physical activity at PLOF    Pain:  Current 0/10, worst 7/10, best 0/10   Location: right shoulder   Description: Aching, Dull, and Sharp  Aggravating Factors: Standing, Extension, Flexing, Lifting, and Getting out of bed/chair  Easing Factors: pain medication, ice, and rest    Patients goals: improve ROM and strength of his shoulder so he can tolerate ADL's, physical activity without pain     Medical History:   Past Medical History:   Diagnosis Date    CKD (chronic kidney disease) stage 3, GFR 30-59 ml/min     Costochondritis     Diabetic nephropathy associated with type 2 diabetes mellitus     Diabetic retinopathy     ED (erectile dysfunction)     GERD (gastroesophageal reflux disease)     Hyperlipidemia     Hypertension     Prostate cancer     Type II or unspecified type diabetes mellitus with renal manifestations, uncontrolled(250.42)        Surgical History:   Addy Redding  has a past surgical history that includes Bone biopsy right femur; Left heart catheterization (Left, 04/07/2022); Coronary angiography (N/A, 06/16/2022); Prostate biopsy (10/05/2022); Radioactive seed implantation of prostate (1/12/2023); Transrectal ultrasound examination (N/A, 1/12/2023); Left heart catheterization (Left, 6/27/2023); instantaneous wave-free ratio (ifr) (N/A, 6/27/2023); fractional flow reserve (ffr), coronary (6/27/2023); and Creation of bypass of coronary artery using graft without cardiopulmonary pump oxygenation (N/A, 7/21/2023).    Medications:   Addy has a current medication list which includes the following prescription(s): acetaminophen, advanced gluc meter test strip, amiodarone, atorvastatin, bd iliana 2nd gen pen needle, blood sugar diagnostic, blood-glucose meter, dexcom g6 , dexcom g6 sensor, dexcom g6 transmitter, clopidogrel, dapagliflozin propanediol, docusate sodium, ergocalciferol, ezetimibe,  fluticasone propionate, furosemide, insulin, lancets, latanoprost, oxycodone, pantoprazole, rivaroxaban, ozempic, sildenafil, sodium bicarbonate, and tamsulosin.    Allergies:   Review of patient's allergies indicates:  No Known Allergies     Objective      Posture: FHP, FSP    Active Range of Motion(*= pain):   Shoulder Right Left   Flexion 160 160   Abduction 90 70*   ER at 0 80 70   HBB T10 L4*     Passive Range of Motion (*= pain):   Shoulder Right Left   Flexion 160 160   Abduction 90 90   ER at 0 90 75   ER at 90 90 80*   IR 60 45*      Strength (*= pain):  Shoulder Right Left   Flexion 5 4-*   Abduction 5 3+*   ER 4+ 3+*   IR 4+ 3+   Low trap 3+ 3   Mid trap 3+ 3     Special Tests:   Right Left   AC Joint Compression Test - -   Empty Can Test - +   Drop Arm test - -   Subscaputlaris Lift Off - +   Clunk test - -   O'beltran's test - +   Hawkin's Kenndy - +   Neer's Test - +   Speed's test - -   Anterior Apprehension test - -   Relocation test - -   Posterior Apprehension test - -   Sulcus Sign - -   Crossover adduction - +     Scapular Control/Dyskinesis:     Normal / Subtle / Obvious  Comments    Left  Obvious Dec upward rotation with abduction/ flexion vs R   Right  Normal        Joint Mobility: Dec restriction post/ inf GH glide L>R    Palpation: TTP to subacromial space    Sensation: WNL    Flexibility: WNL     Intake Outcome Measure for FOTO Shoulder Survey    Therapist reviewed FOTO scores for Addy Redding on 8/28/2023.   FOTO documents entered into [employer] - see Media section.         Treatment     Total Treatment time (time-based codes) separate from Evaluation: 28 minutes     Addy received the treatments listed below:      manual therapy techniques: Joint mobilizations were applied to the: L shoulder for 12 minutes, including:    Gr I-II oscillations for pain control/ dec gaurding  Gr III post/ inf GH glide  PROM within tolerance     neuromuscular re-education activities to improve: Coordination,  Kinesthetic, Sense, Proprioception, and Posture for 16 minutes. The following activities were included:    SL ER 0# 3x10  Prone ext 0# 3x10  SL flexion 0# 3x10  Pt educated on pathoanatomy of shoulder pain, activity modification, POC, prognosis    Patient Education and Home Exercises     Education provided:   -  pathoanatomy of shoulder pain, activity modification, POC, prognosis    Written Home Exercises Provided: yes. Exercises were reviewed and Addy was able to demonstrate them prior to the end of the session.  Addy demonstrated good  understanding of the education provided. See EMR under Patient Instructions for exercises provided during therapy sessions.    Assessment     Addy is a 67 y.o. male referred to outpatient Physical Therapy with a medical diagnosis of L shoulder pain. Pt presents with decreased strength, decreased ROM,  faulty posture, and increased pain. Due to impairments, pt is unable to perform ADL's, tolerate physical activity and is at greater risk for further injuring shoulder.    Patient prognosis is Good.   Patient will benefit from skilled outpatient Physical Therapy to address the deficits stated above and in the chart below, provide patient /family education, and to maximize patientt's level of independence.     Plan of care discussed with patient: Yes  Patient's spiritual, cultural and educational needs considered and patient is agreeable to the plan of care and goals as stated below:     Anticipated Barriers for therapy: confirmed cuff tear on MRI    Medical Necessity is demonstrated by the following  History  Co-morbidities and personal factors that may impact the plan of care [x] LOW: no personal factors / co-morbidities  [] MODERATE: 1-2 personal factors / co-morbidities  [] HIGH: 3+ personal factors / co-morbidities    Moderate / High Support Documentation:   Co-morbidities affecting plan of care: See medical chart    Personal Factors:   no deficits     Examination  Body  Structures and Functions, activity limitations and participation restrictions that may impact the plan of care [x] LOW: addressing 1-2 elements  [] MODERATE: 3+ elements  [] HIGH: 4+ elements (please support below)    Moderate / High Support Documentation:      Clinical Presentation [x] LOW: stable  [] MODERATE: Evolving  [] HIGH: Unstable     Decision Making/ Complexity Score: low       Goals:    GOALS:     Short Term Goals:  4 weeks  1.Report decreased L shoulder pain pain < / =  1/10  to increase tolerance for physical activity  2. Increase PROM by 10 deg into IR at 90 deg abd   3. Increased strength by 1/3 MMT grade in low/ mid trap to increase tolerance for ADL and work activities.  4. Pt to tolerate HEP to improve ROM and independence with ADL's    Long Term Goals: 12 weeks  1.Report decreased L shoulder pain  < / =  0 /10  to increase tolerance for physical activity  2.Increase AROM to WNL/ symmetrical bilat  3.Increase strength to >/= 4/5 in all planes to increase tolerance for ADL and work activities.  4. Pt goal: Pt will be able to reach for objects on night stand reporting no pain  5. Pt will have score of 10% limitation on FOTO shoulder in order to demonstrate true functional improvement.     Plan     Plan of care Certification: 8/28/2023 to 11/28/23.    Outpatient Physical Therapy 1 times weekly for 12 weeks to include the following interventions: Cervical/Lumbar Traction, Electrical Stimulation TENS/NMES, Gait Training, Manual Therapy, Moist Heat/ Ice, Neuromuscular Re-ed, Patient Education, Self Care, Therapeutic Activities, Therapeutic Exercise, and Dry Needling.     Prieto Paredes, PT

## 2023-08-29 ENCOUNTER — TELEPHONE (OUTPATIENT)
Dept: CARDIOTHORACIC SURGERY | Facility: CLINIC | Age: 68
End: 2023-08-29
Payer: MEDICARE

## 2023-08-30 ENCOUNTER — HOSPITAL ENCOUNTER (OUTPATIENT)
Dept: CARDIOLOGY | Facility: CLINIC | Age: 68
Discharge: HOME | End: 2023-08-30
Payer: MEDICARE

## 2023-08-30 ENCOUNTER — OFFICE VISIT (OUTPATIENT)
Dept: CARDIOTHORACIC SURGERY | Facility: CLINIC | Age: 68
End: 2023-08-30
Payer: MEDICARE

## 2023-08-30 ENCOUNTER — DOCUMENTATION ONLY (OUTPATIENT)
Dept: CARDIOTHORACIC SURGERY | Facility: CLINIC | Age: 68
End: 2023-08-30

## 2023-08-30 VITALS
WEIGHT: 202.63 LBS | BODY MASS INDEX: 26.85 KG/M2 | DIASTOLIC BLOOD PRESSURE: 77 MMHG | HEIGHT: 73 IN | HEART RATE: 90 BPM | SYSTOLIC BLOOD PRESSURE: 141 MMHG | OXYGEN SATURATION: 99 %

## 2023-08-30 DIAGNOSIS — Z95.1 S/P CABG (CORONARY ARTERY BYPASS GRAFT): Primary | ICD-10-CM

## 2023-08-30 DIAGNOSIS — Z95.1 S/P CABG (CORONARY ARTERY BYPASS GRAFT): ICD-10-CM

## 2023-08-30 PROCEDURE — 3288F PR FALLS RISK ASSESSMENT DOCUMENTED: ICD-10-PCS | Mod: CPTII,S$GLB,, | Performed by: THORACIC SURGERY (CARDIOTHORACIC VASCULAR SURGERY)

## 2023-08-30 PROCEDURE — 3078F PR MOST RECENT DIASTOLIC BLOOD PRESSURE < 80 MM HG: ICD-10-PCS | Mod: CPTII,S$GLB,, | Performed by: THORACIC SURGERY (CARDIOTHORACIC VASCULAR SURGERY)

## 2023-08-30 PROCEDURE — 3066F NEPHROPATHY DOC TX: CPT | Mod: CPTII,S$GLB,, | Performed by: THORACIC SURGERY (CARDIOTHORACIC VASCULAR SURGERY)

## 2023-08-30 PROCEDURE — 1126F AMNT PAIN NOTED NONE PRSNT: CPT | Mod: CPTII,S$GLB,, | Performed by: THORACIC SURGERY (CARDIOTHORACIC VASCULAR SURGERY)

## 2023-08-30 PROCEDURE — 93010 ELECTROCARDIOGRAM REPORT: CPT | Mod: S$GLB,,, | Performed by: INTERNAL MEDICINE

## 2023-08-30 PROCEDURE — 3066F PR DOCUMENTATION OF TREATMENT FOR NEPHROPATHY: ICD-10-PCS | Mod: CPTII,S$GLB,, | Performed by: THORACIC SURGERY (CARDIOTHORACIC VASCULAR SURGERY)

## 2023-08-30 PROCEDURE — 1159F PR MEDICATION LIST DOCUMENTED IN MEDICAL RECORD: ICD-10-PCS | Mod: CPTII,S$GLB,, | Performed by: THORACIC SURGERY (CARDIOTHORACIC VASCULAR SURGERY)

## 2023-08-30 PROCEDURE — 3078F DIAST BP <80 MM HG: CPT | Mod: CPTII,S$GLB,, | Performed by: THORACIC SURGERY (CARDIOTHORACIC VASCULAR SURGERY)

## 2023-08-30 PROCEDURE — 3008F PR BODY MASS INDEX (BMI) DOCUMENTED: ICD-10-PCS | Mod: CPTII,S$GLB,, | Performed by: THORACIC SURGERY (CARDIOTHORACIC VASCULAR SURGERY)

## 2023-08-30 PROCEDURE — 93005 ELECTROCARDIOGRAM TRACING: CPT | Mod: S$GLB,,, | Performed by: THORACIC SURGERY (CARDIOTHORACIC VASCULAR SURGERY)

## 2023-08-30 PROCEDURE — 1101F PT FALLS ASSESS-DOCD LE1/YR: CPT | Mod: CPTII,S$GLB,, | Performed by: THORACIC SURGERY (CARDIOTHORACIC VASCULAR SURGERY)

## 2023-08-30 PROCEDURE — 1101F PR PT FALLS ASSESS DOC 0-1 FALLS W/OUT INJ PAST YR: ICD-10-PCS | Mod: CPTII,S$GLB,, | Performed by: THORACIC SURGERY (CARDIOTHORACIC VASCULAR SURGERY)

## 2023-08-30 PROCEDURE — 3077F PR MOST RECENT SYSTOLIC BLOOD PRESSURE >= 140 MM HG: ICD-10-PCS | Mod: CPTII,S$GLB,, | Performed by: THORACIC SURGERY (CARDIOTHORACIC VASCULAR SURGERY)

## 2023-08-30 PROCEDURE — 99999 PR PBB SHADOW E&M-EST. PATIENT-LVL IV: CPT | Mod: PBBFAC,,, | Performed by: THORACIC SURGERY (CARDIOTHORACIC VASCULAR SURGERY)

## 2023-08-30 PROCEDURE — 4010F ACE/ARB THERAPY RXD/TAKEN: CPT | Mod: CPTII,S$GLB,, | Performed by: THORACIC SURGERY (CARDIOTHORACIC VASCULAR SURGERY)

## 2023-08-30 PROCEDURE — 99024 POSTOP FOLLOW-UP VISIT: CPT | Mod: S$GLB,,, | Performed by: THORACIC SURGERY (CARDIOTHORACIC VASCULAR SURGERY)

## 2023-08-30 PROCEDURE — 3061F PR NEG MICROALBUMINURIA RESULT DOCUMENTED/REVIEW: ICD-10-PCS | Mod: CPTII,S$GLB,, | Performed by: THORACIC SURGERY (CARDIOTHORACIC VASCULAR SURGERY)

## 2023-08-30 PROCEDURE — 3061F NEG MICROALBUMINURIA REV: CPT | Mod: CPTII,S$GLB,, | Performed by: THORACIC SURGERY (CARDIOTHORACIC VASCULAR SURGERY)

## 2023-08-30 PROCEDURE — 3077F SYST BP >= 140 MM HG: CPT | Mod: CPTII,S$GLB,, | Performed by: THORACIC SURGERY (CARDIOTHORACIC VASCULAR SURGERY)

## 2023-08-30 PROCEDURE — 1159F MED LIST DOCD IN RCRD: CPT | Mod: CPTII,S$GLB,, | Performed by: THORACIC SURGERY (CARDIOTHORACIC VASCULAR SURGERY)

## 2023-08-30 PROCEDURE — 93010 EKG 12-LEAD: ICD-10-PCS | Mod: S$GLB,,, | Performed by: INTERNAL MEDICINE

## 2023-08-30 PROCEDURE — 99024 PR POST-OP FOLLOW-UP VISIT: ICD-10-PCS | Mod: S$GLB,,, | Performed by: THORACIC SURGERY (CARDIOTHORACIC VASCULAR SURGERY)

## 2023-08-30 PROCEDURE — 3044F HG A1C LEVEL LT 7.0%: CPT | Mod: CPTII,S$GLB,, | Performed by: THORACIC SURGERY (CARDIOTHORACIC VASCULAR SURGERY)

## 2023-08-30 PROCEDURE — 93005 EKG 12-LEAD: ICD-10-PCS | Mod: S$GLB,,, | Performed by: THORACIC SURGERY (CARDIOTHORACIC VASCULAR SURGERY)

## 2023-08-30 PROCEDURE — 1126F PR PAIN SEVERITY QUANTIFIED, NO PAIN PRESENT: ICD-10-PCS | Mod: CPTII,S$GLB,, | Performed by: THORACIC SURGERY (CARDIOTHORACIC VASCULAR SURGERY)

## 2023-08-30 PROCEDURE — 3008F BODY MASS INDEX DOCD: CPT | Mod: CPTII,S$GLB,, | Performed by: THORACIC SURGERY (CARDIOTHORACIC VASCULAR SURGERY)

## 2023-08-30 PROCEDURE — 99999 PR PBB SHADOW E&M-EST. PATIENT-LVL IV: ICD-10-PCS | Mod: PBBFAC,,, | Performed by: THORACIC SURGERY (CARDIOTHORACIC VASCULAR SURGERY)

## 2023-08-30 PROCEDURE — 4010F PR ACE/ARB THEARPY RXD/TAKEN: ICD-10-PCS | Mod: CPTII,S$GLB,, | Performed by: THORACIC SURGERY (CARDIOTHORACIC VASCULAR SURGERY)

## 2023-08-30 PROCEDURE — 3288F FALL RISK ASSESSMENT DOCD: CPT | Mod: CPTII,S$GLB,, | Performed by: THORACIC SURGERY (CARDIOTHORACIC VASCULAR SURGERY)

## 2023-08-30 PROCEDURE — 3044F PR MOST RECENT HEMOGLOBIN A1C LEVEL <7.0%: ICD-10-PCS | Mod: CPTII,S$GLB,, | Performed by: THORACIC SURGERY (CARDIOTHORACIC VASCULAR SURGERY)

## 2023-08-30 NOTE — PROGRESS NOTES
Phase II cardiac rehab orders submitted to [facility] .  Pt instructed to follow-up with his cardiologist, Dr. Fontanez.    Explained to pt he is under a 20lb lifting restriction from 6 weeks to 12 weeks .  After 12 weeks he has no further restrictions.     Pt reminded to continue washing incisions everyday with soap and water.  Pt was provided with a copy of AVS and instructed on medication changes.  Pt verbalized understanding of all instructions.    Julie Haase RN  [facility] Nurse Navigator [phone]

## 2023-08-30 NOTE — PATIENT INSTRUCTIONS
Please make appointments to check in with your PCP and Cardiologist in the next 2 to 6 weeks.    Continue walking during cooler parts of the day or early evening to build up your endurance.     You may drive, if you have power steering.    Continue to clean your wound daily with soap and water.    Your lifting restriction is still in place until you are 12 weeks out from your surgery:     5 pounds first 6 weeks after surgery   20 pounds for weeks 7 - 12 after surgery     COVID Precautions:    Continue to take precautions against COVID. Mask up when around unknown people, wash / sanitize hands frequently. Avoid large groups of people until at least 12 weeks post op.

## 2023-08-30 NOTE — PROGRESS NOTES
Patient seen and examined. Patient is progressively increasing activity. No significant complaints.     Sternum: stable, incision CDI  EKG: Sinus Jose - Stopping amiodarone     Assessment:  Single vessel off pump coronary artery bypass grafting (OPCAB)  Left internal mammary artery to the distal left anterior descending artery     Plan:  Can begin driving   Can begin cardiac rehab 6 weeks after operation   We will refer to cardiology to assume care       No scheduled appointment, RTC prn    I have seen the patient and reviewed the nurse practitioner's note above. I have personally interviewed and examined the patient at bedside and agree with the findings.     Mr. Redding is doing well after the CABGx1 on July 21, 2023.  He is walking daily and feeling stronger.  He can see me in clinic as needed.      Jass Uriostegui MD  Cardiothoracic Surgery  [facility]

## 2023-09-06 ENCOUNTER — TELEPHONE (OUTPATIENT)
Dept: CARDIAC REHAB | Facility: CLINIC | Age: 68
End: 2023-09-06
Payer: MEDICARE

## 2023-09-06 ENCOUNTER — CLINICAL SUPPORT (OUTPATIENT)
Dept: CARDIOLOGY | Facility: HOSPITAL | Age: 68
End: 2023-09-06
Attending: INTERNAL MEDICINE
Payer: MEDICARE

## 2023-09-06 DIAGNOSIS — I48.0 PAROXYSMAL A-FIB: ICD-10-CM

## 2023-09-06 PROCEDURE — 93272 ECG/REVIEW INTERPRET ONLY: CPT | Mod: ,,, | Performed by: INTERNAL MEDICINE

## 2023-09-06 PROCEDURE — 93272 CARDIAC EVENT MONITOR (CUPID ONLY): ICD-10-PCS | Mod: ,,, | Performed by: INTERNAL MEDICINE

## 2023-09-06 PROCEDURE — 93271 ECG/MONITORING AND ANALYSIS: CPT

## 2023-09-06 PROCEDURE — 93270 REMOTE 30 DAY ECG REV/REPORT: CPT

## 2023-09-11 ENCOUNTER — CLINICAL SUPPORT (OUTPATIENT)
Dept: REHABILITATION | Facility: HOSPITAL | Age: 68
End: 2023-09-11
Payer: MEDICARE

## 2023-09-11 DIAGNOSIS — N18.6 TYPE 2 DIABETES MELLITUS WITH END-STAGE RENAL DISEASE: Primary | ICD-10-CM

## 2023-09-11 DIAGNOSIS — I25.10 ATHEROSCLEROSIS OF NATIVE CORONARY ARTERY OF NATIVE HEART WITHOUT ANGINA PECTORIS: ICD-10-CM

## 2023-09-11 DIAGNOSIS — Z95.1 POSTSURGICAL AORTOCORONARY BYPASS STATUS: ICD-10-CM

## 2023-09-11 DIAGNOSIS — E11.22 TYPE 2 DIABETES MELLITUS WITH END-STAGE RENAL DISEASE: Primary | ICD-10-CM

## 2023-09-11 DIAGNOSIS — M25.512 LEFT ANTERIOR SHOULDER PAIN: Primary | ICD-10-CM

## 2023-09-11 PROCEDURE — 97140 MANUAL THERAPY 1/> REGIONS: CPT

## 2023-09-11 PROCEDURE — 97112 NEUROMUSCULAR REEDUCATION: CPT

## 2023-09-11 NOTE — PROGRESS NOTES
[facility]   Physical Therapy Treatment Note      Name: Addy Redding  [facility] Number: [id number]    Therapy Diagnosis: No diagnosis found.  Physician: Eddie Tellez DO    Visit Date: 9/11/2023    Physician Orders: PT Eval and Treat L shoulder pain  Medical Diagnosis from Referral: Chronic left shoulder pain [M25.512, G89.29]  Evaluation Date: 8/28/2023  Authorization Period Expiration: 10/23/23  Plan of Care Expiration: 11/28/23  Progress Note Due: 11/28/23  Visit # / Visits authorized: 1/ 23  FOTO: 1/3    Time In: 855  Time Out: 933 (pt requested to leave early to drive his wife to appt)  Total Billable Time: 25 minutes    Subjective     Pt reports: shoulder feels about the same. HEP okay.  He was compliant with home exercise program.  Response to previous treatment:/Functional change: tolerated eval well, no change in sx    Pain: 2/10  Location: left shoulder      Objective      Active Range of Motion(*= pain):   Shoulder Right Left   Flexion 160 160   Abduction 90 70*   ER at 0 80 70   HBB T10 L4*      L PROM ER at 90:   Pre tx =  80* 6/10 pain  Post tx = 90 0/10 pain    Treatment     Addy received the treatments listed below:      manual therapy techniques: Joint mobilizations were applied to the: L shoulder for 10 minutes, including:     Gr I-II oscillations for pain control/ dec gaurding  Gr III post/ inf GH glide  PROM within tolerance      neuromuscular re-education activities to improve: Balance, Coordination, Kinesthetic, Proprioception, and Posture for 28 minutes. The following activities were included:    SL ER 1# 4x20  GTB IR in neutral 4x15  Rhythmic stab in neutral ER/IR 5 min  Rhythmic stab at progressing scaption angles 5 min     therapeutic activities to improve functional performance for 00  minutes, including:          Patient Education and Home Exercises       Education provided:   - pathoanatomy of shoulder pain, activity modification, POC, prognosis    Written Home  Exercises Provided: yes. Exercises were reviewed and Addy was able to demonstrate them prior to the end of the session.  Addy demonstrated good  understanding of the education provided. See EMR under Patient Instructions for exercises provided during therapy sessions    Assessment     Pt presents with good carryover of ROM from eval. Pt reports sharp pain at end range ER at 90 deg abduction, this improved w/ PT manual centration. Pt tolerates progressed cuff work well. Pt reports no pain at end range ER and was able to make ROM gain. Will continue to progress cuff/ periscap strength as tolerated    Addy Is progressing well towards his goals.   Pt prognosis is Excellent.     Pt will continue to benefit from skilled outpatient physical therapy to address the deficits listed in the problem list box on initial evaluation, provide pt/family education and to maximize pt's level of independence in the home and community environment.     Pt's spiritual, cultural and educational needs considered and pt agreeable to plan of care and goals.     Anticipated barriers to physical therapy: n/a    Goals:     Short Term Goals:  4 weeks  1.Report decreased L shoulder pain pain < / =  1/10  to increase tolerance for physical activity  2. Increase PROM by 10 deg into IR at 90 deg abd   3. Increased strength by 1/3 MMT grade in low/ mid trap to increase tolerance for ADL and work activities.  4. Pt to tolerate HEP to improve ROM and independence with ADL's     Long Term Goals: 12 weeks  1.Report decreased L shoulder pain  < / =  0 /10  to increase tolerance for physical activity  2.Increase AROM to WNL/ symmetrical bilat  3.Increase strength to >/= 4/5 in all planes to increase tolerance for ADL and work activities.  4. Pt goal: Pt will be able to reach for objects on night stand reporting no pain  5. Pt will have score of 10% limitation on FOTO shoulder in order to demonstrate true functional improvement.       Plan     Continue  progressing cuff/ periscap strength    Prieto Paredes, PT

## 2023-09-25 ENCOUNTER — CLINICAL SUPPORT (OUTPATIENT)
Dept: REHABILITATION | Facility: HOSPITAL | Age: 68
End: 2023-09-25
Payer: MEDICARE

## 2023-09-25 ENCOUNTER — LAB VISIT (OUTPATIENT)
Dept: LAB | Facility: HOSPITAL | Age: 68
End: 2023-09-25
Payer: MEDICARE

## 2023-09-25 DIAGNOSIS — N18.4 CHRONIC KIDNEY DISEASE (CKD), STAGE IV (SEVERE): ICD-10-CM

## 2023-09-25 DIAGNOSIS — Z01.818 PRE-OP TESTING: ICD-10-CM

## 2023-09-25 DIAGNOSIS — M25.512 LEFT ANTERIOR SHOULDER PAIN: Primary | ICD-10-CM

## 2023-09-25 DIAGNOSIS — N18.32 STAGE 3B CHRONIC KIDNEY DISEASE: ICD-10-CM

## 2023-09-25 DIAGNOSIS — I25.118 CORONARY ARTERY DISEASE OF NATIVE ARTERY OF NATIVE HEART WITH STABLE ANGINA PECTORIS: ICD-10-CM

## 2023-09-25 LAB
BACTERIA #/AREA URNS HPF: NORMAL /HPF
BILIRUB UR QL STRIP: NEGATIVE
CLARITY UR: CLEAR
COLOR UR: YELLOW
CREAT UR-MCNC: 222.5 MG/DL (ref 23–375)
CREAT UR-MCNC: 222.5 MG/DL (ref 23–375)
GLUCOSE UR QL STRIP: ABNORMAL
HGB UR QL STRIP: ABNORMAL
KETONES UR QL STRIP: NEGATIVE
LEUKOCYTE ESTERASE UR QL STRIP: NEGATIVE
MICROSCOPIC COMMENT: NORMAL
NITRITE UR QL STRIP: NEGATIVE
PH UR STRIP: 6 [PH] (ref 5–8)
PROT UR QL STRIP: ABNORMAL
PROT UR-MCNC: 15 MG/DL (ref 0–15)
PROT UR-MCNC: 15 MG/DL (ref 0–15)
PROT/CREAT UR: 0.07 MG/G{CREAT} (ref 0–0.2)
PROT/CREAT UR: 0.07 MG/G{CREAT} (ref 0–0.2)
RBC #/AREA URNS HPF: 3 /HPF (ref 0–4)
SP GR UR STRIP: 1.03 (ref 1–1.03)
URN SPEC COLLECT METH UR: ABNORMAL
UROBILINOGEN UR STRIP-ACNC: NEGATIVE EU/DL
WBC #/AREA URNS HPF: 1 /HPF (ref 0–5)
YEAST URNS QL MICRO: NORMAL

## 2023-09-25 PROCEDURE — 82570 ASSAY OF URINE CREATININE: CPT | Performed by: NURSE PRACTITIONER

## 2023-09-25 PROCEDURE — 81000 URINALYSIS NONAUTO W/SCOPE: CPT | Performed by: NURSE PRACTITIONER

## 2023-09-25 PROCEDURE — 97112 NEUROMUSCULAR REEDUCATION: CPT

## 2023-09-25 NOTE — PROGRESS NOTES
[facility]   Physical Therapy Treatment Note      Name: Addy Redding  [facility] Number: [id number]    Therapy Diagnosis:   Encounter Diagnosis   Name Primary?    Left anterior shoulder pain Yes     Physician: Eddie Tellez DO    Visit Date: 9/25/2023    Physician Orders: PT Eval and Treat L shoulder pain  Medical Diagnosis from Referral: Chronic left shoulder pain [M25.512, G89.29]  Evaluation Date: 8/28/2023  Authorization Period Expiration: 10/23/23  Plan of Care Expiration: 11/28/23  Progress Note Due: 11/28/23  Visit # / Visits authorized: 2/ 23  FOTO: 1/3    Time In: 910  Time Out: 1000   Total Billable Time: 28 minutes    Subjective     Pt reports: shoulder has been doing better since last visit. Pt reports HEP and ADL's going well  He was compliant with home exercise program.  Response to previous treatment:/Functional change: tolerated eval well, no change in sx    Pain: 0/10  Location: left shoulder      Objective      Active Range of Motion(*= pain):   Shoulder Right Left   Flexion 160 160   Abduction 90 70   ER at 0 80 70   HBB T10 L1*      L PROM ER at 90:   Pre tx =  80* 3/10 pain  Post tx = 90 0/10 pain    Treatment     Addy received the treatments listed below:      manual therapy techniques: Joint mobilizations were applied to the: L shoulder for 3 minutes, including:     Gr I-II oscillations for pain control/ dec gaurding  Gr III post/ inf GH glide  PROM within tolerance      neuromuscular re-education activities to improve: Balance, Coordination, Kinesthetic, Proprioception, and Posture for 47 minutes. The following activities were included:    SL ER 1# 4x20  SL flexion 0# 3x10  OTB I's GTB 3x10  BTB rows 3x10  GTB IR in neutral 4x15  OTB ER in neutral 4x10    NP todya  Rhythmic stab in neutral ER/IR 5 min  Rhythmic stab at progressing scaption angles 5 min     therapeutic activities to improve functional performance for 00  minutes, including:          Patient Education and  Home Exercises       Education provided:   - pathoanatomy of shoulder pain, activity modification, POC, prognosis    Written Home Exercises Provided: yes. Exercises were reviewed and Addy was able to demonstrate them prior to the end of the session.  Addy demonstrated good  understanding of the education provided. See EMR under Patient Instructions for exercises provided during therapy sessions    Assessment     Pt presents with good carryover of ROM from eval. Pt reports sharp pain at end range ER at 90 deg abduction, this improved w/ PT manual centration. Pt tolerates progressed cuff work well. Pt reports no pain at end range ER and was able to make ROM gain. Will continue to progress cuff/ periscap strength as tolerated    Addy Is progressing well towards his goals.   Pt prognosis is Excellent.     Pt will continue to benefit from skilled outpatient physical therapy to address the deficits listed in the problem list box on initial evaluation, provide pt/family education and to maximize pt's level of independence in the home and community environment.     Pt's spiritual, cultural and educational needs considered and pt agreeable to plan of care and goals.     Anticipated barriers to physical therapy: n/a    Goals:     Short Term Goals:  4 weeks  1.Report decreased L shoulder pain pain < / =  1/10  to increase tolerance for physical activity  2. Increase PROM by 10 deg into IR at 90 deg abd   3. Increased strength by 1/3 MMT grade in low/ mid trap to increase tolerance for ADL and work activities.  4. Pt to tolerate HEP to improve ROM and independence with ADL's     Long Term Goals: 12 weeks  1.Report decreased L shoulder pain  < / =  0 /10  to increase tolerance for physical activity  2.Increase AROM to WNL/ symmetrical bilat  3.Increase strength to >/= 4/5 in all planes to increase tolerance for ADL and work activities.  4. Pt goal: Pt will be able to reach for objects on night stand reporting no pain  5. Pt  will have score of 10% limitation on FOTO shoulder in order to demonstrate true functional improvement.       Plan     Continue progressing cuff/ periscap strength    Prieto Paredes, PT

## 2023-10-02 ENCOUNTER — CLINICAL SUPPORT (OUTPATIENT)
Dept: REHABILITATION | Facility: HOSPITAL | Age: 68
End: 2023-10-02
Payer: MEDICARE

## 2023-10-02 DIAGNOSIS — M25.512 LEFT ANTERIOR SHOULDER PAIN: Primary | ICD-10-CM

## 2023-10-02 PROCEDURE — 97112 NEUROMUSCULAR REEDUCATION: CPT

## 2023-10-02 NOTE — PROGRESS NOTES
[facility]   Physical Therapy Treatment Note      Name: Addy Redding  [facility] Number: [id number]    Therapy Diagnosis:   Encounter Diagnosis   Name Primary?    Left anterior shoulder pain Yes     Physician: Eddie Telelz DO    Visit Date: 10/2/2023    Physician Orders: PT Eval and Treat L shoulder pain  Medical Diagnosis from Referral: Chronic left shoulder pain [M25.512, G89.29]  Evaluation Date: 8/28/2023  Authorization Period Expiration: 10/23/23  Plan of Care Expiration: 11/28/23  Progress Note Due: 11/28/23  Visit # / Visits authorized: 3/ 23  FOTO: 1/3    Time In: 910  Time Out: 955  Total Billable Time: 29 minutes    Subjective     Pt reports: shoulder approaching PLOF. Pt reports HEP going well feels ready to initiate transition to independent HEP  He was compliant with home exercise program.  Response to previous treatment:/Functional change: tolerated eval well, no change in sx    Pain: 0/10  Location: left shoulder      Objective      Active Range of Motion(*= pain):   Shoulder Right Left   Flexion 160 160   Abduction 90 70   ER at 0 80 70   HBB T10 L1*      L AROM FIR:   Pre tx =  L1 3/10 pain  Post tx = L1 1/10 pain    Treatment     Addy received the treatments listed below:      manual therapy techniques: Joint mobilizations were applied to the: L shoulder for 3 minutes, including:     Gr I-II oscillations for pain control/ dec gaurding  Gr III post/ inf GH glide  PROM within tolerance      neuromuscular re-education activities to improve: Balance, Coordination, Kinesthetic, Proprioception, and Posture for 42 minutes. The following activities were included:    SL ER 2# 4x20  OTB I's GTB 3x10  BTB rows 3x10  GTB IR in neutral 4x15  OTB ER in neutral 4x10    NP today  SL flexion 0# 3x10  Rhythmic stab in neutral ER/IR 5 min  Rhythmic stab at progressing scaption angles 5 min     therapeutic activities to improve functional performance for 00  minutes, including:      Patient  Education and Home Exercises       Education provided:   - pathoanatomy of shoulder pain, activity modification, POC, prognosis    Written Home Exercises Provided: yes. Exercises were reviewed and Addy was able to demonstrate them prior to the end of the session.  Addy demonstrated good  understanding of the education provided. See EMR under Patient Instructions for exercises provided during therapy sessions    Assessment     Pt with good carryover of ROM. Pt tolerates all HEP exercises well. Pt educated on HEP frequency and form. Will reassess ROM / irritability next visit and progress as tolerated.    Addy Is progressing well towards his goals.   Pt prognosis is Excellent.     Pt will continue to benefit from skilled outpatient physical therapy to address the deficits listed in the problem list box on initial evaluation, provide pt/family education and to maximize pt's level of independence in the home and community environment.     Pt's spiritual, cultural and educational needs considered and pt agreeable to plan of care and goals.     Anticipated barriers to physical therapy: n/a    Goals:     Short Term Goals:  4 weeks  1.Report decreased L shoulder pain pain < / =  1/10  to increase tolerance for physical activity  2. Increase PROM by 10 deg into IR at 90 deg abd   3. Increased strength by 1/3 MMT grade in low/ mid trap to increase tolerance for ADL and work activities.  4. Pt to tolerate HEP to improve ROM and independence with ADL's     Long Term Goals: 12 weeks  1.Report decreased L shoulder pain  < / =  0 /10  to increase tolerance for physical activity  2.Increase AROM to WNL/ symmetrical bilat  3.Increase strength to >/= 4/5 in all planes to increase tolerance for ADL and work activities.  4. Pt goal: Pt will be able to reach for objects on night stand reporting no pain  5. Pt will have score of 10% limitation on FOTO shoulder in order to demonstrate true functional improvement.       Plan      Continue progressing cuff/ periscap strength    Prieto Paredes, PT

## 2023-10-03 ENCOUNTER — HOSPITAL ENCOUNTER (OUTPATIENT)
Dept: CARDIOLOGY | Facility: HOSPITAL | Age: 68
Discharge: HOME | End: 2023-10-03
Attending: INTERNAL MEDICINE
Payer: MEDICARE

## 2023-10-03 VITALS
BODY MASS INDEX: 26.51 KG/M2 | SYSTOLIC BLOOD PRESSURE: 141 MMHG | WEIGHT: 200 LBS | HEIGHT: 73 IN | DIASTOLIC BLOOD PRESSURE: 74 MMHG | HEART RATE: 65 BPM

## 2023-10-03 DIAGNOSIS — Z95.1 POSTSURGICAL AORTOCORONARY BYPASS STATUS: ICD-10-CM

## 2023-10-03 DIAGNOSIS — I25.10 ATHEROSCLEROSIS OF NATIVE CORONARY ARTERY OF NATIVE HEART WITHOUT ANGINA PECTORIS: ICD-10-CM

## 2023-10-03 LAB
CV STRESS BASE HR: 65 BPM
DIASTOLIC BLOOD PRESSURE: 74 MMHG
OHS CV CPX 1 MINUTE RECOVERY HEART RATE: 150 BPM
OHS CV CPX 85 PERCENT MAX PREDICTED HEART RATE MALE: 128
OHS CV CPX ABDOMINAL GIRTH: 40.5 CM
OHS CV CPX ANAEROBIC THRESHOLD DIASTOLIC BLOOD PRESSURE: 73 MMHG
OHS CV CPX ANAEROBIC THRESHOLD HEART RATE: 126
OHS CV CPX ANAEROBIC THRESHOLD RATE PRESSURE PRODUCT: NORMAL
OHS CV CPX ANAEROBIC THRESHOLD SYSTOLIC BLOOD PRESSURE: 156
OHS CV CPX DATA GRADE - AT: 8.9
OHS CV CPX DATA GRADE - PEAK: 13
OHS CV CPX DATA O2 SAT - PEAK: 97
OHS CV CPX DATA O2 SAT - REST: 98
OHS CV CPX DATA SPEED - AT: 2.7
OHS CV CPX DATA SPEED - PEAK: 3.5
OHS CV CPX DATA TIME - AT: 4.47
OHS CV CPX DATA TIME - PEAK: 6.28
OHS CV CPX DATA VE/VCO2 - AT: 48
OHS CV CPX DATA VE/VCO2 - PEAK: 69
OHS CV CPX DATA VE/VO2 - AT: 36
OHS CV CPX DATA VE/VO2 - PEAK: 64
OHS CV CPX DATA VO2 - AT: 14
OHS CV CPX DATA VO2 - PEAK: 18.9
OHS CV CPX DATA VO2 - REST: 3.6
OHS CV CPX ESTIMATED METS: 10
OHS CV CPX FEV1/FVC: 0.76
OHS CV CPX FORCED EXPIRATORY VOLUME: 3.45
OHS CV CPX FORCED VITAL CAPACITY (FVC): 4.56
OHS CV CPX HIGHEST VO: 33.1
OHS CV CPX MAX PREDICTED HEART RATE: 151
OHS CV CPX MAXIMAL VOLUNTARY VENTILATION (MVV) PREDICTED: 138
OHS CV CPX MAXIMAL VOLUNTARY VENTILATION (MVV): 164
OHS CV CPX MAXIUMUM EXERCISE VENTILATION (VE MAX): 111.8
OHS CV CPX PATIENT AGE: 69
OHS CV CPX PATIENT HEIGHT IN: 73
OHS CV CPX PATIENT IS FEMALE AGE 11-19: 0
OHS CV CPX PATIENT IS FEMALE AGE GREATER THAN 19: 0
OHS CV CPX PATIENT IS FEMALE AGE LESS THAN 11: 0
OHS CV CPX PATIENT IS FEMALE: 0
OHS CV CPX PATIENT IS MALE AGE 11-25: 0
OHS CV CPX PATIENT IS MALE AGE GREATER THAN 25: 1
OHS CV CPX PATIENT IS MALE AGE LESS THAN 11: 0
OHS CV CPX PATIENT IS MALE GREATER THAN 18: 1
OHS CV CPX PATIENT IS MALE LESS THAN OR EQUAL TO 18: 0
OHS CV CPX PATIENT IS MALE: 1
OHS CV CPX PATIENT WEIGHT RETURNED IN OZ: 3200
OHS CV CPX PEAK DIASTOLIC BLOOD PRESSURE: 73 MMHG
OHS CV CPX PEAK HEAR RATE: 153 BPM
OHS CV CPX PEAK RATE PRESSURE PRODUCT: NORMAL
OHS CV CPX PEAK SYSTOLIC BLOOD PRESSURE: 159 MMHG
OHS CV CPX PERCENT BODY FAT: 19.8
OHS CV CPX PERCENT MAX PREDICTED HEART RATE ACHIEVED: 101
OHS CV CPX PREDICTED VO2: 33.1 ML/KG/MIN
OHS CV CPX RATE PRESSURE PRODUCT PRESENTING: 9165
OHS CV CPX REST PET CO2: 22
OHS CV CPX VE/VCO2 SLOPE: 46.8
STRESS ECHO POST EXERCISE DUR MIN: 6 MINUTES
STRESS ECHO POST EXERCISE DUR SEC: 17 SECONDS
SYSTOLIC BLOOD PRESSURE: 141 MMHG

## 2023-10-03 PROCEDURE — 94621 CARDIOPULM EXERCISE TESTING: CPT | Mod: 26,,, | Performed by: INTERNAL MEDICINE

## 2023-10-03 PROCEDURE — 94621 CARDIOPULMONARY EXERCISE TESTING (CUPID ONLY): ICD-10-PCS | Mod: 26,,, | Performed by: INTERNAL MEDICINE

## 2023-10-03 PROCEDURE — 94621 CARDIOPULM EXERCISE TESTING: CPT

## 2023-10-04 NOTE — PROGRESS NOTES
HISTORY: S/P CABG (7-21-23), CAD, PAROX A-FIB, HTN, HLP, CKD IV, DM, HX OF STENT, EF=(3-23-23)    ANTHROPOMETRICS:     PRE   Height (in) 73   Weight (lb) 200   BMI 26.4   Abdominal Girth (in) 40.5   % Body Fat 19.8%       LAB RESULTS:    Lab Results   Component Value Date    HGB 13.8 (L) 10/03/2023     Lab Results   Component Value Date    HCT 43.7 10/03/2023     Lab Results   Component Value Date    MPV 10.2 10/03/2023       Lab Results   Component Value Date    CHOL 117 (L) 10/03/2023     Lab Results   Component Value Date    HDL 37 (L) 10/03/2023     Lab Results   Component Value Date    LDLCALC 66.4 10/03/2023     Lab Results   Component Value Date    TRIG 68 10/03/2023     Lab Results   Component Value Date    CHOLHDL 31.6 10/03/2023       Lab Results   Component Value Date    GLUF 85 10/03/2023     Lab Results   Component Value Date    HGBA1C 6.5 (H) 10/03/2023        Lab Results   Component Value Date    HSCRP 6.04 (H) 10/03/2023         IAM SCORES:     PRE   Anxiety 3   Depression 0   Somatic 1   Hostility 3     SF-36 SCORES:     PRE   Physical Function 25   Social Function 9   Mental Health 20   Pain 7   Change in Health 4   Physical Role Limitation 0   Mental Role Limitation 3   Energy/Fatigue 15   Health Perceptions 13   Total Score 96     PHQ-9:      10/12/2023     9:26 AM   PHQ-9 Depression Patient Health Questionnaire   Over the last two weeks how often have you been bothered by little interest or pleasure in doing things 0   Over the last two weeks how often have you been bothered by feeling down, depressed or hopeless 0   Over the last two weeks how often have you been bothered by trouble falling or staying asleep, or sleeping too much 0   Over the last two weeks how often have you been bothered by feeling tired or having little energy 0   Over the last two weeks how often have you been bothered by a poor appetite or overeating 3   Over the last two weeks how often have you been bothered by  feeling bad about yourself - or that you are a failure or have let yourself or your family down 0   Over the last two weeks how often have you been bothered by trouble concentrating on things, such as reading the newspaper or watching television 0   Over the last two weeks how often have you been bothered by moving or speaking so slowly that other people could have noticed. 0   Over the last two weeks how often have you been bothered by thoughts that you would be better off dead, or of hurting yourself 0   If you checked off any problems, how difficult have these problems made it for you to do your work, take care of things at home or get along with other people? Not difficult at all   PHQ-9 Score 3             EDUCATION SCORES:     PRE   Education Score 30

## 2023-10-04 NOTE — PROGRESS NOTES
Session: Orientation   Cardiac Rehab Individual Treatment Plan - Initial Assessment      Patient Name: Addy Redding MRN: [id number]   : 1955   Age: 67 y.o.   Primary Diagnosis: CABG  Date of Event: 23  EF: 55%  Risk Stratification: moderate  Referring Physician: JOANNA   Exercise Assessment:     CPX/TM Date: 10-3-23 Results   RHR 65   Max    Peak VO2 (CPX only) 18.9   Actual METS (CPX only) 5.4   Estimated METS 10.0     Anthropometrics    Height 73 inches   Weight 200 lbs   BMI 26.4   Abdominal Girth 40.5   Body Composition 19.8%     ST Depression noted on Stress Test?:No  Angina with exercise?: No   Fall Risk: No   Assistive Devices:  independent   Currently exercising? Yes: Walking; Frequency: 2 to 3 days per week; Duration doesn't time but thinks about 30 minutes  Mr. Daly stated there were no limitations to exercise.     Exercise Plan:   Goals:  CR Exercise Goals: Attend Cardiac Rehab 3 times/week: In Progress  Home Aerobic Exercise: 2 additional days/week for 30-60 minutes: In Progress  Intensity of 12-15 on the Rate of Perceived Exertion (RPE) scale: In Progress  30% increase in entry estimated METS: 13.0 : In Progress  5 days/week for 30-60 minutes: In Progress    Intervention:   Discussed importance of regular attendance to cardiac rehab class    Exercise Prescription:  THR Range 117-140   Mode: Treadmill  Recumbent Bike  Upright Bike  Nustep  Elliptical   Frequency:  3 days/week   Duration:  30 - 60 minutes   Intensity:  12 - 15 RPE   Resistance Training:  Yes: 5 to 7 lb weights with 10-15 reps based on strength and range of motion assessment     Home Prescription:  Mode Aerobic   Frequency: 2- 3 days/week   Duration: 30-60 minutes   Resistance Training: None        Education:  Orientation to Equipment; verbalizes understanding; Date: 10-12-23  Exercise Recommendations; verbalizes understanding; Date: 10-12-23  Exercise Safety; verbalizes understanding; Date: 10-12-23  Class Preparation:  verbalizes understanding; Date: 10-12-23  Signs and symptoms to report: verbalizes understanding; Date: 10-12-23  Caffeine/Hydration: verbalizes understanding; Date: 10-12-23  Exercise Terminology: verbalizes understanding; Date: 10-12-23  Resistance Training: verbalizes understanding; Date: 10-12-23    Comments:  I encouraged Mr. Daly to continue walking at least 2 non-rehab days per week for at least 30 minutes in addition to attending Phase II cardiac rehab classes 3 days per week.  He stated understanding.     All consent forms were signed, proper attire and shoes were discussed.       Mr. Daly will begin Cardiac Rehab on Wednesday, October 18 at 9:00 am but would like to move to the 7:00 am class when it's available.    The exercise prescription will be adjusted based on tolerance of exercise intensity by patient.    Shawna Duke., CEP

## 2023-10-10 ENCOUNTER — TELEPHONE (OUTPATIENT)
Dept: CARDIAC REHAB | Facility: CLINIC | Age: 68
End: 2023-10-10
Payer: MEDICARE

## 2023-10-12 ENCOUNTER — CLINICAL SUPPORT (OUTPATIENT)
Dept: CARDIAC REHAB | Facility: CLINIC | Age: 68
End: 2023-10-12
Payer: MEDICARE

## 2023-10-12 DIAGNOSIS — I25.10 CORONARY ARTERY DISEASE, UNSPECIFIED VESSEL OR LESION TYPE, UNSPECIFIED WHETHER ANGINA PRESENT, UNSPECIFIED WHETHER NATIVE OR TRANSPLANTED HEART: Primary | ICD-10-CM

## 2023-10-12 DIAGNOSIS — Z95.1 S/P CABG (CORONARY ARTERY BYPASS GRAFT): ICD-10-CM

## 2023-10-12 PROCEDURE — 99999 PR PBB SHADOW E&M-EST. PATIENT-LVL III: CPT | Mod: PBBFAC,,,

## 2023-10-12 PROCEDURE — 99999 PR PBB SHADOW E&M-EST. PATIENT-LVL III: ICD-10-PCS | Mod: PBBFAC,,,

## 2023-10-12 PROCEDURE — 93798 PR CARDIAC REHAB/MONITOR: ICD-10-PCS | Mod: S$GLB,,, | Performed by: INTERNAL MEDICINE

## 2023-10-12 PROCEDURE — 93798 PHYS/QHP OP CAR RHAB W/ECG: CPT | Mod: S$GLB,,, | Performed by: INTERNAL MEDICINE

## 2023-10-12 RX ORDER — OMEPRAZOLE/SODIUM BICARBONATE 20MG-1.1G
1 CAPSULE ORAL
COMMUNITY
End: 2023-10-31

## 2023-10-12 RX ORDER — OMEPRAZOLE AND SODIUM BICARBONATE 40; 1100 MG/1; MG/1
1 CAPSULE ORAL
COMMUNITY
End: 2023-10-31

## 2023-10-12 NOTE — PROGRESS NOTES
Session: Orientation   Cardiac Rehab Individual Treatment Plan - Initial Assessment      Patient Name: Addy Redding MRN: [id number]   : 1955   Age: 67 y.o.   Date of Event: 23    Primary Diagnosis: S/P CABG    EF: 55% (3/23/23)    Physical Assessment:   There were no vitals taken for this visit.    ASSESSMENT:  Heart Sounds: regular rate and rhythm, S1, S2 normal, no murmur, click, rub or gallop  Prosthetic Valve: No  Lung Sounds: normal air entry, lungs clear to auscultation  Capillary Refill: normal  Left Radial Pulse: Normal (+2)  Right Radial Pulse: Normal (+2)  Left Pedal Pulse: Normal (+2)  Right Pedal Pulse: Normal (+2)  Right Edema: none  Left Edema none  Strength: normal  Range of Motion: full range of motion  Existing Limitations:      Site   [] Arthritis, bursitis    [] Amputation, atrophy    [] Other:    []       Diabetic patient's foot examination comments: Normal -  Bilateral  Incisional site: healing well  Special needs: NA    Psychosocial Assessment:   Outcome Survey Tools:    IAM SCORES:   PRE   Anxiety 3   Depression 0   Somatic 1   Hostility 3     SF-36 SCORES:   PRE   Physical Function 25   Social Function 9   Mental Health 20   Pain 7   Change in Health 4   Physical Role Limitation 0   Mental Role Limitation 3   Energy/Fatigue 15   Health Perceptions 13   Total Score 96     PHQ-9:      10/12/2023     9:26 AM   PHQ-9 Depression Patient Health Questionnaire   Over the last two weeks how often have you been bothered by little interest or pleasure in doing things 0   Over the last two weeks how often have you been bothered by feeling down, depressed or hopeless 0   Over the last two weeks how often have you been bothered by trouble falling or staying asleep, or sleeping too much 0   Over the last two weeks how often have you been bothered by feeling tired or having little energy 0   Over the last two weeks how often have you been bothered by a poor appetite or overeating 3   Over the  last two weeks how often have you been bothered by feeling bad about yourself - or that you are a failure or have let yourself or your family down 0   Over the last two weeks how often have you been bothered by trouble concentrating on things, such as reading the newspaper or watching television 0   Over the last two weeks how often have you been bothered by moving or speaking so slowly that other people could have noticed. 0   Over the last two weeks how often have you been bothered by thoughts that you would be better off dead, or of hurting yourself 0   If you checked off any problems, how difficult have these problems made it for you to do your work, take care of things at home or get along with other people? Not difficult at all   PHQ-9 Score 3              Living Arrangements: Lives with spouse  Family Support: children and spouse  Self Reported: Effective Coping Skills  Displays: happiness and calmness  Medication: not applicable    Psychosocial Plan:   Goals:  Improved psychosocial coping strategies  Maintain positive support system  Maintain positive outlook  Improve overall quality of life    Interventions/Recommendations:  Discussed Results of Surveys  Patient to Self Report Emotional Changes at Session Check In  Recommend Physical Activity  Recommend Attending Education Lectures  Notify MD: No  Program Referral: No  Pharmaceutical Intervention/Therapy: No  Other Needs: not applicable  Stage of Readiness to Change: Contemplation    Education:  Risk Factors; verbalizes understanding; Date: 10/12/23    Comments:  Discussed screening tools and results with patient. Pt denies any pressing issues with stress/anxiety/depression/hostility at this time but will discuss with his PCP at next visit for monitoring. Patient is motivated to get started with cardiac rehab to improve his cardiac health. Patient has been instructed to notify staff in the event that circumstances worsen.  Patient verbalizes  understanding.    Other Core Components/Risk Factors Assessment:   RISK FACTORS:  diabetes, hyperlipidemia, hypertension, CKD,     Learning Barriers: None    Education Level:  High School (9-12) or GED    Pre-test Score: 30    Medication Compliance: has been compliant with taking medications    Other Core Components/Risk Factors Plan:   Goals:  Decrease cholesterol level: In Progress  Increase exercise tolerance: In Progress  Increase knowledge of CAD: In Progress  Decrease blood pressure: In Progress  Identify and manage personal areas of stress: In Progress  Control diabetes by adjusting diet and exercise: In Progress  Learn more about healthy eating: In Progress    Interventions/Recommendations:  Recommend regular attendance for Cardiac Rehab: Exercise and Education Lectures  Encourage medication compliance  Individual Education/ Counseling: No  Physician Referral: No    Education:    risk factors, verbalizes understanding; Date: 10/12/23         Education method adapted to patients education level and preferred method of learning.  Method: explanation    Comments:  Patient is motivated to start cardiac rehab and improve his cardiac health. He is currently exercising walking 30 minutes 5-6 days per week.     Other Core Components/Hypertension Assessment:   Resting BP:  Left 142/78 Right 140/82 HR 80 O2 Sat 99%   BP Readings from Last 1 Encounters:   10/03/23 (!) 141/74         BP Diagnosis: Hypertensive  Patient reported symptoms: none    Other Core Components/Hypertension Plan:   Goals:  Blood Pressure <130/80    Interventions/Recommendations:  Med Card Reconciled: Yes  Encourage medication compliance  Encourage sodium reduction  Encourage weight loss  Recommend physical activity  Educate on contributory factors  Reduce stress, anxiety, anger, depression, and/or chronic pain  Encourage home blood pressure monitoring    Education:    Risk Factors; verbalizes understanding; Date: 10/12/23          Comments:  Patient has a blood pressure cuff at home, he takes it daily and keeps a log for his nephrologist.       Does the patient have Heart Failure? No    Other Core Components/Tobacco Cessation Assessment:   Smoking Status: past smoker who quit 1995  Smoking Cessation Barriers:  NA  Stage of Readiness to Change: Maintenance    Other Core Components/Tobacco Cessation Plan:   Goals:  Maintain non-smoking status    Interventions:  Maintains non-smoking status    Education:    Risk Factors; verbalizes understanding; Date: 10/12/23         Comments:  Patient is ready to start cardiac rehab and improve his cardiac health. Discussed Cardiac Rehab program in depth with patient.  Medication list updated per patient & marked as reviewed.  Patient has been instructed to notify staff of any problems while attending rehab (ie: chest pain, shortness of breath, lightheadedness, dizziness).  Patient has been instructed to monitor blood pressure readings outside of rehab & to keep a daily log of the readings.  Patient verbalizes understanding.     Ankit Jenkins RN  Cardiac Rehab Nurse

## 2023-10-12 NOTE — PROGRESS NOTES
Orientation   Cardiac Rehab Individual Treatment Plan - Initial Assessment      Patient Name: Addy Redding MRN: [id number]   : 1955   Age: 67 y.o.   Primary Diagnosis: s/p CABG, CAD, AFIB, HTN, HLD, CKD IV, DM    Nutrition Assessment:     Anthropometrics    Height 73 inches   Weight 200 lbs   BMI 26.4   Abdominal Girth 40.5   Body Composition 19.8         Drug Allergies and Intolerances:  Review of patient's allergies indicates:  No Known Allergies    Food Allergies and Intolerances:  NA    Past Medical History:  Past Medical History:   Diagnosis Date    CKD (chronic kidney disease) stage 3, GFR 30-59 ml/min     Costochondritis     Diabetic nephropathy associated with type 2 diabetes mellitus     Diabetic retinopathy     ED (erectile dysfunction)     GERD (gastroesophageal reflux disease)     Hyperlipidemia     Hypertension     Prostate cancer     Type II or unspecified type diabetes mellitus with renal manifestations, uncontrolled(250.42)        Past Surgical History:  Past Surgical History:   Procedure Laterality Date    Bone biopsy right femur      CORONARY ANGIOGRAPHY N/A 2022    Procedure: ANGIOGRAM, CORONARY ARTERY;  Surgeon: Carter Arevalo MD;  Location: [facility] CATH LAB/EP;  Service: Cardiology;  Laterality: N/A;    CREATION OF BYPASS OF CORONARY ARTERY USING GRAFT WITHOUT CARDIOPULMONARY PUMP OXYGENATION N/A 2023    Procedure: CABG, WITHOUT CARDIOPULMONARY PUMP OXYGENATION;  Surgeon: Jass Uriostegui MD;  Location: [address];  Service: Cardiovascular;  Laterality: N/A;  CABG x1 off pump    FRACTIONAL FLOW RESERVE (FFR), CORONARY  2023    Procedure: Fractional Flow [place] (FFR), Coronary;  Surgeon: Je Fontanez MD;  Location: [facility] CATH LAB/EP;  Service: Cardiology;;    INSTANTANEOUS WAVE-FREE RATIO (IFR) N/A 2023    Procedure: Instantaneous Wave-Free Ratio (IFR);  Surgeon: Je Fontanez MD;  Location: [facility] CATH LAB/EP;  Service: Cardiology;  Laterality: N/A;    LEFT  "HEART CATHETERIZATION Left 04/07/2022    Procedure: Left heart cath;  Surgeon: Carter Arevalo MD;  Location: [facility] CATH LAB/EP;  Service: Cardiology;  Laterality: Left;    LEFT HEART CATHETERIZATION Left 6/27/2023    Procedure: Left heart cath;  Surgeon: Je Fontanez MD;  Location: [facility] CATH LAB/EP;  Service: Cardiology;  Laterality: Left;    PROSTATE BIOPSY  10/05/2022    RADIOACTIVE SEED IMPLANTATION OF PROSTATE  1/12/2023    Procedure: INSERTION, RADIOACTIVE SEED, PROSTATE;  Surgeon: Scott Frias MD;  Location: [facility] OR [address] [place] [address];  Service: Urology;;    TRANSRECTAL ULTRASOUND EXAMINATION N/A 1/12/2023    Procedure: ULTRASOUND, RECTAL APPROACH;  Surgeon: Scott Frias MD;  Location: [facility] OR [address] [place] [address];  Service: Urology;  Laterality: N/A;       Medications:  Current Outpatient Medications   Medication Sig    ADVANCED GLUC METER TEST STRIP Strp USE TO TEST BLOOD SUGAR 4 TIMES DAILY.    atorvastatin (LIPITOR) 80 MG tablet Take 1 tablet (80 mg total) by mouth once daily.    BD ARIA 2ND GEN PEN NEEDLE 32 gauge x 5/32" Ndle     blood sugar diagnostic (BLOOD GLUCOSE TEST) Strp Check sugar once daily    blood-glucose meter Misc by Misc.(Non-Drug; Combo Route) route.    blood-glucose meter,continuous (DEXCOM G6 ) Misc 1 Device by Misc.(Non-Drug; Combo Route) route continuous.    blood-glucose sensor (DEXCOM G6 SENSOR) Tia 1 Device by Misc.(Non-Drug; Combo Route) route continuous.    blood-glucose transmitter (DEXCOM G6 TRANSMITTER) Tia 1 Device by Misc.(Non-Drug; Combo Route) route continuous.    clopidogreL (PLAVIX) 75 mg tablet Take 1 tablet (75 mg total) by mouth once daily.    dapagliflozin (FARXIGA) 10 mg tablet Take 1 tablet (10 mg total) by mouth once daily.    ergocalciferol (ERGOCALCIFEROL) 50,000 unit Cap Take 1 capsule (50,000 Units total) by mouth every 7 days.    ezetimibe (ZETIA) 10 mg tablet Take 1 tablet (10 mg total) by mouth once daily.    fluticasone propionate (FLONASE) 50 " mcg/actuation nasal spray 2 sprays (100 mcg total) by Each Nostril route once daily.    insulin (LANTUS SOLOSTAR U-100 INSULIN) glargine 100 units/mL SubQ pen Inject 12 Units into the skin 2 (two) times a day.    lancets (ACCU-CHEK SOFTCLIX LANCETS) Misc 1 lancet by Misc.(Non-Drug; Combo Route) route 2 (two) times daily.    latanoprost 0.005 % ophthalmic solution     rivaroxaban (XARELTO) 15 mg Tab Take 1 tablet (15 mg total) by mouth daily with dinner or evening meal.    semaglutide (OZEMPIC) 0.25 mg or 0.5 mg (2 mg/3 mL) pen injector Inject 0.25 mg into the skin every 7 days.    sildenafil (REVATIO) 20 mg Tab Take 1 tablet (20 mg total) by mouth daily as needed for ED    sodium bicarbonate 650 MG tablet Take 2 tablets (1,300 mg total) by mouth 2 (two) times daily.    tamsulosin (FLOMAX) 0.4 mg Cap Take by mouth once daily.     No current facility-administered medications for this visit.       Vitamins and Supplements:  ergocalciferol    Labs:  Patient confirms he is taking lipitor 80mg, Zetia 10mg for cholesterol control.    Lab Results   Component Value Date    CHOL 117 (L) 10/03/2023     Lab Results   Component Value Date    HDL 37 (L) 10/03/2023     Lab Results   Component Value Date    LDLCALC 66.4 10/03/2023     Lab Results   Component Value Date    TRIG 68 10/03/2023     Lab Results   Component Value Date    CHOLHDL 31.6 10/03/2023         Lab Results   Component Value Date    GLUF 85 10/03/2023     Lab Results   Component Value Date    HGBA1C 6.5 (H) 10/03/2023       Nutrition/Diet History:  Patient eats 3 meals daily.    Seasons food with pepper/salt substitute.  Patient denies use of a salt shaker at the table on prepared foods.   Dines out 2-3 per week at restaurants.    Chooses fried foods 1 time(s) per week.    Chooses fish 1 time(s) per week.   Beverages:  water and SF lemonade, Cranberry juice  Alcohol: none    24 Hour Recall:  Breakfast: 2 slices turkey gong, honey wheat toast x 2 with avocado,  orange juice  Lunch:turkey breast, mixed vegetables  Dinner:2 links boudain  Other: 4 chocolate chip cookies, whole milk    Difficulty Chewing or Swallowing: No  Current Exercise: See Exercise Physiologist Note  Food Safety/Food Preparation: spouse  Living Arrangements/Family Support: Lives with spouse  Cultural/Spiritual/Personal Preferences: not applicable   Barriers to Education: none identified  Stage of Change Related to Diet Habits: Action    Nutrition Diagnosis:  Food and nutrition related knowledge deficit related to the lack of prior nutrition education as evidenced by diet history and 24 hour recall    Nutrition Plan:   Goals:  LDL-C < 70 (for high risk patients)  Hgb A1c < 7%  BMI < 25 and abdominal girth < 40M/<35 F  2 gram sodium, Mediterranean diet  Rehab weight goal: maintenance  Fish intake (non-fried varieties) to a goal of 2-3 servings per week.   Increase fruit and vegetable intake    Interventions/Recommendations:  Lab results reviewed and discussed  Nutrition Prescription:  Total Energy Estimated Needs: 2491-6262 Kcal/d for weight maintenance  Method for Estimating Needs: 25-28kcal/kg  Total Protein Estimated Needs: 55-91 g/d  Method for Estimating Needs: 0.6-1g/Kg BW (reduced needs r/t dx CKD IV)  Total Fluid Estimated Needs: 1 mL/Kcal  Dietitian Consult: No  Patient to participate in Cardiac Rehab sessions three times a week  Weekly Dietitian Weight Check  Encouraged patient to complete 3 day food diary  Follow Up Plan for Ongoing Self-Management Support    Education:  Mediterranean Diet; verbalizes understanding; Date: 10/12/23  Person taught: patient  Preferred Learning Method: Verbal, Written  Education Needed/Provided: Nutrition counseling and education related to cardiac rehabilitation  Education Method: Weekly nutrition lectures on the Mediterranean diet, cooking, shopping, and dining out  Written Materials Provided: 3 Day Food Record, Introduction to Mediterranean Diet  Strategies  Implemented: Motivational interviewing, Goal setting, Self-Monitoring, and Problem Solving    Comments:   Discussed ways to incorporate healthy snacks, eating on a schedule, and monitoring sodium intake for heart health.  Emphasized importance of more plant based eating pattern to slow CKD progreesion    Diabetes  Is the patient diabetic? Yes   Other Core Components/Diabetes Assessment:   Labs:  Lab Results   Component Value Date    GLUF 85 10/03/2023     Lab Results   Component Value Date    HGBA1C 6.5 (H) 10/03/2023    HGBA1C 6.5 (H) 09/25/2023    HGBA1C 6.5 (H) 08/14/2023      Lab Results   Component Value Date    ESTIMATEDAVG 140 (H) 10/03/2023    ESTIMATEDAVG 140 (H) 09/25/2023    ESTIMATEDAVG 140 (H) 08/14/2023       History of diabetes since 2008/2009  Diabetes medications: lantus 12u BID, Farxiga 10mg, Ozempic 0.25mg weekly  Blood Glucose Checks at Home: Yes: blood sugars ac & HS  Typical morning range:  mg/dl  Endocrinologist or PCP following DM: PCP Dr. Ramey    Other Core Components/Diabetes Plan:   Goals:  Hgb A1c < 7%  Exercise Blood Glucose: 100-300 mg/dl    Interventions:  Reviewed and Discussed Labs  Medication Compliance  Med Card Reconciled  Low Sodium, Mediterranean, ADA Diet  Weight Management  Physical Activity  Increase Knowledge of Contributory Factors  Home Monitoring    Education:  Mediterranean Diet; verbalizes understanding; Date: 10/12/23    Comments:   Patient verbalizes understanding to bring home glucometer and check glucose pre and post each exercise session.  Per cardiac rehab protocols, patient's glucose must be between 90 and 270 mg/dL to exercise.  Patient denies any recent glucose levels less than 60 mg/dL or greater than 300 mg/dL. Patient verbalizes importance of notifying rehab staff if symptoms of hypoglycemia occur while at cardiac rehab.  Abnormal labs will be reported to patient's PCP/Endocrinologist by rehab staff.    Noted updated glucose parameters of 100-300    RD  contact information provided.      Mary Hilario MS, RDN/LDN

## 2023-10-18 ENCOUNTER — CLINICAL SUPPORT (OUTPATIENT)
Dept: CARDIAC REHAB | Facility: CLINIC | Age: 68
End: 2023-10-18
Payer: MEDICARE

## 2023-10-18 DIAGNOSIS — Z95.1 POSTSURGICAL AORTOCORONARY BYPASS STATUS: Primary | ICD-10-CM

## 2023-10-18 DIAGNOSIS — I25.10 CORONARY ARTERY DISEASE, UNSPECIFIED VESSEL OR LESION TYPE, UNSPECIFIED WHETHER ANGINA PRESENT, UNSPECIFIED WHETHER NATIVE OR TRANSPLANTED HEART: ICD-10-CM

## 2023-10-18 PROCEDURE — 93798 PR CARDIAC REHAB/MONITOR: ICD-10-PCS | Mod: S$GLB,,, | Performed by: INTERNAL MEDICINE

## 2023-10-18 PROCEDURE — 93798 PHYS/QHP OP CAR RHAB W/ECG: CPT | Mod: S$GLB,,, | Performed by: INTERNAL MEDICINE

## 2023-10-23 ENCOUNTER — CLINICAL SUPPORT (OUTPATIENT)
Dept: CARDIAC REHAB | Facility: CLINIC | Age: 68
End: 2023-10-23
Payer: MEDICARE

## 2023-10-23 ENCOUNTER — PATIENT MESSAGE (OUTPATIENT)
Dept: CARDIOLOGY | Facility: CLINIC | Age: 68
End: 2023-10-23
Payer: MEDICARE

## 2023-10-23 DIAGNOSIS — I25.10 ATHEROSCLEROSIS OF NATIVE CORONARY ARTERY OF NATIVE HEART, UNSPECIFIED WHETHER ANGINA PRESENT: ICD-10-CM

## 2023-10-23 DIAGNOSIS — Z95.1 POSTSURGICAL AORTOCORONARY BYPASS STATUS: Primary | ICD-10-CM

## 2023-10-23 PROCEDURE — 93798 PR CARDIAC REHAB/MONITOR: ICD-10-PCS | Mod: S$GLB,,, | Performed by: INTERNAL MEDICINE

## 2023-10-23 PROCEDURE — 93798 PHYS/QHP OP CAR RHAB W/ECG: CPT | Mod: S$GLB,,, | Performed by: INTERNAL MEDICINE

## 2023-10-23 NOTE — PROGRESS NOTES
The monitor result is normal. No atrial fibrillation. Continue the same medications and will discuss in detail during next appt.     Sincerely,  Je Fontanez MD.   Interventional Cardiologist  [facility], [place]

## 2023-10-24 ENCOUNTER — LAB VISIT (OUTPATIENT)
Dept: LAB | Facility: HOSPITAL | Age: 68
End: 2023-10-24
Attending: RADIOLOGY
Payer: MEDICARE

## 2023-10-24 DIAGNOSIS — C61 PROSTATE CANCER: ICD-10-CM

## 2023-10-24 LAB — COMPLEXED PSA SERPL-MCNC: 0.38 NG/ML (ref 0–4)

## 2023-10-24 PROCEDURE — 84153 ASSAY OF PSA TOTAL: CPT | Performed by: RADIOLOGY

## 2023-10-24 PROCEDURE — 36415 COLL VENOUS BLD VENIPUNCTURE: CPT | Performed by: RADIOLOGY

## 2023-10-25 ENCOUNTER — CLINICAL SUPPORT (OUTPATIENT)
Dept: CARDIAC REHAB | Facility: CLINIC | Age: 68
End: 2023-10-25
Payer: MEDICARE

## 2023-10-25 DIAGNOSIS — I25.10 ATHEROSCLEROSIS OF NATIVE CORONARY ARTERY OF NATIVE HEART WITHOUT ANGINA PECTORIS: ICD-10-CM

## 2023-10-25 DIAGNOSIS — Z95.1 POSTSURGICAL AORTOCORONARY BYPASS STATUS: Primary | ICD-10-CM

## 2023-10-25 PROCEDURE — 93798 PR CARDIAC REHAB/MONITOR: ICD-10-PCS | Mod: S$GLB,,, | Performed by: INTERNAL MEDICINE

## 2023-10-25 PROCEDURE — 93798 PHYS/QHP OP CAR RHAB W/ECG: CPT | Mod: S$GLB,,, | Performed by: INTERNAL MEDICINE

## 2023-10-27 ENCOUNTER — CLINICAL SUPPORT (OUTPATIENT)
Dept: CARDIAC REHAB | Facility: CLINIC | Age: 68
End: 2023-10-27
Payer: MEDICARE

## 2023-10-27 DIAGNOSIS — I25.10 CORONARY ATHEROSCLEROSIS OF NATIVE CORONARY ARTERY: ICD-10-CM

## 2023-10-27 DIAGNOSIS — Z95.1 POSTSURGICAL AORTOCORONARY BYPASS STATUS: Primary | ICD-10-CM

## 2023-10-27 PROCEDURE — 93798 PHYS/QHP OP CAR RHAB W/ECG: CPT | Mod: S$GLB,,, | Performed by: INTERNAL MEDICINE

## 2023-10-27 PROCEDURE — 93798 PR CARDIAC REHAB/MONITOR: ICD-10-PCS | Mod: S$GLB,,, | Performed by: INTERNAL MEDICINE

## 2023-10-30 ENCOUNTER — CLINICAL SUPPORT (OUTPATIENT)
Dept: CARDIAC REHAB | Facility: CLINIC | Age: 68
End: 2023-10-30
Payer: MEDICARE

## 2023-10-30 DIAGNOSIS — Z95.1 POSTSURGICAL AORTOCORONARY BYPASS STATUS: Primary | ICD-10-CM

## 2023-10-30 DIAGNOSIS — I25.10 CORONARY ARTERY DISEASE, UNSPECIFIED VESSEL OR LESION TYPE, UNSPECIFIED WHETHER ANGINA PRESENT, UNSPECIFIED WHETHER NATIVE OR TRANSPLANTED HEART: ICD-10-CM

## 2023-10-30 PROCEDURE — 93798 PHYS/QHP OP CAR RHAB W/ECG: CPT | Mod: S$GLB,,, | Performed by: INTERNAL MEDICINE

## 2023-10-30 PROCEDURE — 93798 PR CARDIAC REHAB/MONITOR: ICD-10-PCS | Mod: S$GLB,,, | Performed by: INTERNAL MEDICINE

## 2023-10-31 ENCOUNTER — OFFICE VISIT (OUTPATIENT)
Dept: RADIATION ONCOLOGY | Facility: CLINIC | Age: 68
End: 2023-10-31
Payer: MEDICARE

## 2023-10-31 VITALS
RESPIRATION RATE: 16 BRPM | BODY MASS INDEX: 26.97 KG/M2 | DIASTOLIC BLOOD PRESSURE: 70 MMHG | SYSTOLIC BLOOD PRESSURE: 150 MMHG | HEART RATE: 58 BPM | WEIGHT: 203.5 LBS | HEIGHT: 73 IN

## 2023-10-31 DIAGNOSIS — C61 PROSTATE CANCER: Primary | ICD-10-CM

## 2023-10-31 PROCEDURE — 3078F PR MOST RECENT DIASTOLIC BLOOD PRESSURE < 80 MM HG: ICD-10-PCS | Mod: CPTII,S$GLB,, | Performed by: RADIOLOGY

## 2023-10-31 PROCEDURE — 1101F PR PT FALLS ASSESS DOC 0-1 FALLS W/OUT INJ PAST YR: ICD-10-PCS | Mod: CPTII,S$GLB,, | Performed by: RADIOLOGY

## 2023-10-31 PROCEDURE — 99999 PR PBB SHADOW E&M-EST. PATIENT-LVL IV: ICD-10-PCS | Mod: PBBFAC,,, | Performed by: RADIOLOGY

## 2023-10-31 PROCEDURE — 3077F PR MOST RECENT SYSTOLIC BLOOD PRESSURE >= 140 MM HG: ICD-10-PCS | Mod: CPTII,S$GLB,, | Performed by: RADIOLOGY

## 2023-10-31 PROCEDURE — 3008F PR BODY MASS INDEX (BMI) DOCUMENTED: ICD-10-PCS | Mod: CPTII,S$GLB,, | Performed by: RADIOLOGY

## 2023-10-31 PROCEDURE — 1101F PT FALLS ASSESS-DOCD LE1/YR: CPT | Mod: CPTII,S$GLB,, | Performed by: RADIOLOGY

## 2023-10-31 PROCEDURE — 3061F NEG MICROALBUMINURIA REV: CPT | Mod: CPTII,S$GLB,, | Performed by: RADIOLOGY

## 2023-10-31 PROCEDURE — 3044F HG A1C LEVEL LT 7.0%: CPT | Mod: CPTII,S$GLB,, | Performed by: RADIOLOGY

## 2023-10-31 PROCEDURE — 3008F BODY MASS INDEX DOCD: CPT | Mod: CPTII,S$GLB,, | Performed by: RADIOLOGY

## 2023-10-31 PROCEDURE — 99212 OFFICE O/P EST SF 10 MIN: CPT | Mod: S$GLB,,, | Performed by: RADIOLOGY

## 2023-10-31 PROCEDURE — 4010F ACE/ARB THERAPY RXD/TAKEN: CPT | Mod: CPTII,S$GLB,, | Performed by: RADIOLOGY

## 2023-10-31 PROCEDURE — 3078F DIAST BP <80 MM HG: CPT | Mod: CPTII,S$GLB,, | Performed by: RADIOLOGY

## 2023-10-31 PROCEDURE — 3066F PR DOCUMENTATION OF TREATMENT FOR NEPHROPATHY: ICD-10-PCS | Mod: CPTII,S$GLB,, | Performed by: RADIOLOGY

## 2023-10-31 PROCEDURE — 4010F PR ACE/ARB THEARPY RXD/TAKEN: ICD-10-PCS | Mod: CPTII,S$GLB,, | Performed by: RADIOLOGY

## 2023-10-31 PROCEDURE — 1159F MED LIST DOCD IN RCRD: CPT | Mod: CPTII,S$GLB,, | Performed by: RADIOLOGY

## 2023-10-31 PROCEDURE — 99999 PR PBB SHADOW E&M-EST. PATIENT-LVL IV: CPT | Mod: PBBFAC,,, | Performed by: RADIOLOGY

## 2023-10-31 PROCEDURE — 1159F PR MEDICATION LIST DOCUMENTED IN MEDICAL RECORD: ICD-10-PCS | Mod: CPTII,S$GLB,, | Performed by: RADIOLOGY

## 2023-10-31 PROCEDURE — 3061F PR NEG MICROALBUMINURIA RESULT DOCUMENTED/REVIEW: ICD-10-PCS | Mod: CPTII,S$GLB,, | Performed by: RADIOLOGY

## 2023-10-31 PROCEDURE — 3288F PR FALLS RISK ASSESSMENT DOCUMENTED: ICD-10-PCS | Mod: CPTII,S$GLB,, | Performed by: RADIOLOGY

## 2023-10-31 PROCEDURE — 3288F FALL RISK ASSESSMENT DOCD: CPT | Mod: CPTII,S$GLB,, | Performed by: RADIOLOGY

## 2023-10-31 PROCEDURE — 3066F NEPHROPATHY DOC TX: CPT | Mod: CPTII,S$GLB,, | Performed by: RADIOLOGY

## 2023-10-31 PROCEDURE — 3044F PR MOST RECENT HEMOGLOBIN A1C LEVEL <7.0%: ICD-10-PCS | Mod: CPTII,S$GLB,, | Performed by: RADIOLOGY

## 2023-10-31 PROCEDURE — 1126F AMNT PAIN NOTED NONE PRSNT: CPT | Mod: CPTII,S$GLB,, | Performed by: RADIOLOGY

## 2023-10-31 PROCEDURE — 1126F PR PAIN SEVERITY QUANTIFIED, NO PAIN PRESENT: ICD-10-PCS | Mod: CPTII,S$GLB,, | Performed by: RADIOLOGY

## 2023-10-31 PROCEDURE — 1160F RVW MEDS BY RX/DR IN RCRD: CPT | Mod: CPTII,S$GLB,, | Performed by: RADIOLOGY

## 2023-10-31 PROCEDURE — 99212 PR OFFICE/OUTPT VISIT, EST, LEVL II, 10-19 MIN: ICD-10-PCS | Mod: S$GLB,,, | Performed by: RADIOLOGY

## 2023-10-31 PROCEDURE — 3077F SYST BP >= 140 MM HG: CPT | Mod: CPTII,S$GLB,, | Performed by: RADIOLOGY

## 2023-10-31 PROCEDURE — 1160F PR REVIEW ALL MEDS BY PRESCRIBER/CLIN PHARMACIST DOCUMENTED: ICD-10-PCS | Mod: CPTII,S$GLB,, | Performed by: RADIOLOGY

## 2023-10-31 RX ORDER — PANTOPRAZOLE SODIUM 40 MG/1
40 TABLET, DELAYED RELEASE ORAL DAILY
COMMUNITY
End: 2024-02-11

## 2023-10-31 NOTE — PROGRESS NOTES
[facility] - Radiation Oncology    Patient ID: Addy Redding is a 67 y.o. male.    Chief Complaint: Prostate Cancer (6mo f/u;psa)    Mr. 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  Repeat PSA in February of 2022 returned at 9.1 ng/ml. MRI revealed a 1.3 cm T2 hypointense lesion in the apex of the Lt. transition zone, PI-RADS 4 with no extraprostatic extension. Biopsies on 10/5/22 revealed Cahone 7 (3+4) adenocarcinoma involving 75% of 2 of 2 cores from the Lt. apex, 30% of 2 of 2 cores from the Lt. mid gland, and 75% of 3 of 3 cores from the target lesion. The Nasima pattern 4 accounted for 20 - 30% of the tumor. There was perineural invasion. There was Cahone 6 (3+3) adenocarcinoma involving 10% of 1 of 2 cores from the Rt. apex.  On 1/12/22 he underwent transperineal implantation of the prostate gland using Iodine 125 seeds.  He has remained COURTNEY since that time. Today the patient states he feels well, recovering from recent CABG.        Review of Systems   Constitutional:  Negative for activity change, appetite change, chills and fatigue.   Respiratory:  Negative for cough and shortness of breath.    Gastrointestinal:  Negative for abdominal pain, constipation and diarrhea.   Genitourinary:  Negative for bladder incontinence, difficulty urinating, dysuria, flank pain and hematuria.     Physical Exam  Constitutional:       General: He is not in acute distress.     Appearance: Normal appearance.   Pulmonary:      Effort: Pulmonary effort is normal. No respiratory distress.   Neurological:      Mental Status: He is alert and oriented to person, place, and time.   Psychiatric:         Mood and Affect: Mood normal.         Judgment: Judgment normal.          Latest Reference Range & Units 09/13/22 07:20 04/20/23 11:00 10/24/23 10:25   PSA Diagnostic 0.00 - 4.00 ng/mL 8.1 (H) 1.0 0.38   (H): Data is abnormally high       Assessment and Plan     1.  Prostate cancer  Doing well.  No evidence of tumor progression or recurrence.  Plan follow up PSA with phone review in 6 months RTC in 1 year with PSA.

## 2023-11-01 ENCOUNTER — CLINICAL SUPPORT (OUTPATIENT)
Dept: CARDIAC REHAB | Facility: CLINIC | Age: 68
End: 2023-11-01
Payer: MEDICARE

## 2023-11-01 DIAGNOSIS — Z95.1 POSTSURGICAL AORTOCORONARY BYPASS STATUS: Primary | ICD-10-CM

## 2023-11-01 DIAGNOSIS — I25.10 CORONARY ARTERY DISEASE, UNSPECIFIED VESSEL OR LESION TYPE, UNSPECIFIED WHETHER ANGINA PRESENT, UNSPECIFIED WHETHER NATIVE OR TRANSPLANTED HEART: ICD-10-CM

## 2023-11-01 PROCEDURE — 93798 PHYS/QHP OP CAR RHAB W/ECG: CPT | Mod: S$GLB,,, | Performed by: INTERNAL MEDICINE

## 2023-11-01 PROCEDURE — 93798 PR CARDIAC REHAB/MONITOR: ICD-10-PCS | Mod: S$GLB,,, | Performed by: INTERNAL MEDICINE

## 2023-11-01 NOTE — PROGRESS NOTES
Patient arrived for cardiac rehab session. Pt reports to have eaten prior to exercise session.  The patient was on continuous EKG monitoring throughout the session. The patient tolerated exercise session well with no complaints.      HFrEF (heart failure with reduced ejection fraction) Schizoaffective disorder Schizoaffective disorder Type 2 diabetes mellitus without complication, without long-term current use of insulin Type 2 diabetes mellitus without complication, without long-term current use of insulin HFrEF (heart failure with reduced ejection fraction) HFrEF (heart failure with reduced ejection fraction)

## 2023-11-02 ENCOUNTER — DOCUMENTATION ONLY (OUTPATIENT)
Dept: CARDIAC REHAB | Facility: CLINIC | Age: 68
End: 2023-11-02
Payer: MEDICARE

## 2023-11-02 RX ORDER — PEN NEEDLE, DIABETIC 32GX 5/32"
NEEDLE, DISPOSABLE MISCELLANEOUS
Qty: 100 EACH | Refills: 3 | Status: SHIPPED | OUTPATIENT
Start: 2023-11-02

## 2023-11-02 NOTE — PROGRESS NOTES
Addy has completed 7 out of 36 exercise session of Phase II cardiac rehab.  A follow up reassessment will be completed at 12 sessions.     Session: Orientation   Cardiac Rehab Individual Treatment Plan - Initial Assessment      Patient Name: Addy Redding MRN: [id number]   : 1955   Age: 67 y.o.   Primary Diagnosis: CABG  Date of Event: 23  EF: 55%  Risk Stratification: moderate  Referring Physician: JOANNA   Exercise Assessment:     CPX/TM Date: 10-3-23 Results   RHR 65   Max    Peak VO2 (CPX only) 18.9   Actual METS (CPX only) 5.4   Estimated METS 10.0     Anthropometrics    Height 73 inches   Weight 200 lbs   BMI 26.4   Abdominal Girth 40.5   Body Composition 19.8%     ST Depression noted on Stress Test?:No  Angina with exercise?: No   Fall Risk: No   Assistive Devices:  independent   Currently exercising? Yes: Walking; Frequency: 2 to 3 days per week; Duration doesn't time but thinks about 30 minutes  Mr. Daly stated there were no limitations to exercise.     Exercise Plan:   Goals:  CR Exercise Goals: Attend Cardiac Rehab 3 times/week: In Progress  Home Aerobic Exercise: 2 additional days/week for 30-60 minutes: In Progress  Intensity of 12-15 on the Rate of Perceived Exertion (RPE) scale: In Progress  30% increase in entry estimated METS: 13.0 : In Progress  5 days/week for 30-60 minutes: In Progress    Intervention:   Discussed importance of regular attendance to cardiac rehab class    Exercise Prescription:  THR Range 117-140   Mode: Treadmill  Recumbent Bike  Upright Bike  Nustep  Elliptical   Frequency:  3 days/week   Duration:  30 - 60 minutes   Intensity:  12 - 15 RPE   Resistance Training:  Yes: 5 to 7 lb weights with 10-15 reps based on strength and range of motion assessment     Home Prescription:  Mode Aerobic   Frequency: 2- 3 days/week   Duration: 30-60 minutes   Resistance Training: None        Education:  Orientation to Equipment; verbalizes understanding; Date: 10-12-23  Exercise  Recommendations; verbalizes understanding; Date: 10-12-23  Exercise Safety; verbalizes understanding; Date: 10-12-23  Class Preparation: verbalizes understanding; Date: 10-12-23  Signs and symptoms to report: verbalizes understanding; Date: 10-12-23  Caffeine/Hydration: verbalizes understanding; Date: 10-12-23  Exercise Terminology: verbalizes understanding; Date: 10-12-23  Resistance Training: verbalizes understanding; Date: 10-12-23    Comments:  I encouraged Mr. Daly to continue walking at least 2 non-rehab days per week for at least 30 minutes in addition to attending Phase II cardiac rehab classes 3 days per week.  He stated understanding.     All consent forms were signed, proper attire and shoes were discussed.       Mr. Daly will begin Cardiac Rehab on  at 9:00 am but would like to move to the 7:00 am class when it's available.    The exercise prescription will be adjusted based on tolerance of exercise intensity by patient.    Marky Duke, CEP     Orientation   Cardiac Rehab Individual Treatment Plan - Initial Assessment      Patient Name: Addy Redding MRN: [id number]   : 1955   Age: 67 y.o.   Primary Diagnosis: s/p CABG, CAD, AFIB, HTN, HLD, CKD IV, DM    Nutrition Assessment:     Anthropometrics    Height 73 inches   Weight 200 lbs   BMI 26.4   Abdominal Girth 40.5   Body Composition 19.8         Drug Allergies and Intolerances:  Review of patient's allergies indicates:  No Known Allergies    Food Allergies and Intolerances:  NA    Past Medical History:  Past Medical History:   Diagnosis Date    CKD (chronic kidney disease) stage 3, GFR 30-59 ml/min     Costochondritis     Diabetic nephropathy associated with type 2 diabetes mellitus     Diabetic retinopathy     ED (erectile dysfunction)     GERD (gastroesophageal reflux disease)     Hyperlipidemia     Hypertension     Prostate cancer     Type II or unspecified type diabetes mellitus with renal manifestations,  uncontrolled(250.42)        Past Surgical History:  Past Surgical History:   Procedure Laterality Date    Bone biopsy right femur      CORONARY ANGIOGRAPHY N/A 06/16/2022    Procedure: ANGIOGRAM, CORONARY ARTERY;  Surgeon: Carter Arevalo MD;  Location: [facility] CATH LAB/EP;  Service: Cardiology;  Laterality: N/A;    CREATION OF BYPASS OF CORONARY ARTERY USING GRAFT WITHOUT CARDIOPULMONARY PUMP OXYGENATION N/A 7/21/2023    Procedure: CABG, WITHOUT CARDIOPULMONARY PUMP OXYGENATION;  Surgeon: Jass Uriostegui MD;  Location: [facility] OR 2ND [facility];  Service: Cardiovascular;  Laterality: N/A;  CABG x1 off pump    FRACTIONAL FLOW RESERVE (FFR), CORONARY  6/27/2023    Procedure: Fractional Flow [place] (FFR), Coronary;  Surgeon: Je Fontanez MD;  Location: [facility] CATH LAB/EP;  Service: Cardiology;;    INSTANTANEOUS WAVE-FREE RATIO (IFR) N/A 6/27/2023    Procedure: Instantaneous Wave-Free Ratio (IFR);  Surgeon: Je Fontanez MD;  Location: [facility] CATH LAB/EP;  Service: Cardiology;  Laterality: N/A;    LEFT HEART CATHETERIZATION Left 04/07/2022    Procedure: Left heart cath;  Surgeon: Carter Arevalo MD;  Location: [facility] CATH LAB/EP;  Service: Cardiology;  Laterality: Left;    LEFT HEART CATHETERIZATION Left 6/27/2023    Procedure: Left heart cath;  Surgeon: Je Fontanez MD;  Location: [facility] CATH LAB/EP;  Service: Cardiology;  Laterality: Left;    PROSTATE BIOPSY  10/05/2022    RADIOACTIVE SEED IMPLANTATION OF PROSTATE  1/12/2023    Procedure: INSERTION, RADIOACTIVE SEED, PROSTATE;  Surgeon: Scott Frias MD;  Location: [facility] OR [address] [place] [address];  Service: Urology;;    TRANSRECTAL ULTRASOUND EXAMINATION N/A 1/12/2023    Procedure: ULTRASOUND, RECTAL APPROACH;  Surgeon: Sctot Frias MD;  Location: [facility] OR [address] [place] [address];  Service: Urology;  Laterality: N/A;       Medications:  Current Outpatient Medications   Medication Sig    ADVANCED GLUC METER TEST STRIP Strp USE TO TEST BLOOD SUGAR 4 TIMES DAILY.    atorvastatin (LIPITOR) 80  "MG tablet Take 1 tablet (80 mg total) by mouth once daily.    BD ARIA 2ND GEN PEN NEEDLE 32 gauge x 5/32" Ndle     blood sugar diagnostic (BLOOD GLUCOSE TEST) Strp Check sugar once daily    blood-glucose meter Misc by Misc.(Non-Drug; Combo Route) route.    blood-glucose meter,continuous (DEXCOM G6 ) Misc 1 Device by Misc.(Non-Drug; Combo Route) route continuous.    blood-glucose sensor (DEXCOM G6 SENSOR) Tia 1 Device by Misc.(Non-Drug; Combo Route) route continuous.    blood-glucose transmitter (DEXCOM G6 TRANSMITTER) Tia 1 Device by Misc.(Non-Drug; Combo Route) route continuous.    clopidogreL (PLAVIX) 75 mg tablet Take 1 tablet (75 mg total) by mouth once daily.    dapagliflozin (FARXIGA) 10 mg tablet Take 1 tablet (10 mg total) by mouth once daily.    ergocalciferol (ERGOCALCIFEROL) 50,000 unit Cap Take 1 capsule (50,000 Units total) by mouth every 7 days.    ezetimibe (ZETIA) 10 mg tablet Take 1 tablet (10 mg total) by mouth once daily.    insulin (LANTUS SOLOSTAR U-100 INSULIN) glargine 100 units/mL SubQ pen Inject 12 Units into the skin 2 (two) times a day.    lancets (ACCU-CHEK SOFTCLIX LANCETS) Misc 1 lancet by Misc.(Non-Drug; Combo Route) route 2 (two) times daily.    pantoprazole (PROTONIX) 40 MG tablet Take 40 mg by mouth once daily.    semaglutide (OZEMPIC) 0.25 mg or 0.5 mg (2 mg/3 mL) pen injector Inject 0.25 mg into the skin every 7 days.    sildenafil (REVATIO) 20 mg Tab Take 1 tablet (20 mg total) by mouth daily as needed for ED    tamsulosin (FLOMAX) 0.4 mg Cap Take by mouth once daily.     No current facility-administered medications for this visit.       Vitamins and Supplements:  ergocalciferol    Labs:  Patient confirms he is taking lipitor 80mg, Zetia 10mg for cholesterol control.    Lab Results   Component Value Date    CHOL 117 (L) 10/03/2023     Lab Results   Component Value Date    HDL 37 (L) 10/03/2023     Lab Results   Component Value Date    LDLCALC 66.4 10/03/2023     Lab " Results   Component Value Date    TRIG 68 10/03/2023     Lab Results   Component Value Date    CHOLHDL 31.6 10/03/2023         Lab Results   Component Value Date    GLUF 85 10/03/2023     Lab Results   Component Value Date    HGBA1C 6.5 (H) 10/03/2023       Nutrition/Diet History:  Patient eats 3 meals daily.    Seasons food with pepper/salt substitute.  Patient denies use of a salt shaker at the table on prepared foods.   Dines out 2-3 per week at restaurants.    Chooses fried foods 1 time(s) per week.    Chooses fish 1 time(s) per week.   Beverages:  water and SF lemonade, Cranberry juice  Alcohol: none    24 Hour Recall:  Breakfast: 2 slices turkey gong, honey wheat toast x 2 with avocado, orange juice  Lunch:turkey breast, mixed vegetables  Dinner:2 links boudain  Other: 4 chocolate chip cookies, whole milk    Difficulty Chewing or Swallowing: No  Current Exercise: See Exercise Physiologist Note  Food Safety/Food Preparation: spouse  Living Arrangements/Family Support: Lives with spouse  Cultural/Spiritual/Personal Preferences: not applicable   Barriers to Education: none identified  Stage of Change Related to Diet Habits: Action    Nutrition Diagnosis:  Food and nutrition related knowledge deficit related to the lack of prior nutrition education as evidenced by diet history and 24 hour recall    Nutrition Plan:   Goals:  LDL-C < 70 (for high risk patients)  Hgb A1c < 7%  BMI < 25 and abdominal girth < 40M/<35 F  2 gram sodium, Mediterranean diet  Rehab weight goal: maintenance  Fish intake (non-fried varieties) to a goal of 2-3 servings per week.   Increase fruit and vegetable intake    Interventions/Recommendations:  Lab results reviewed and discussed  Nutrition Prescription:  Total Energy Estimated Needs: 6497-9978 Kcal/d for weight maintenance  Method for Estimating Needs: 25-28kcal/kg  Total Protein Estimated Needs: 55-91 g/d  Method for Estimating Needs: 0.6-1g/Kg BW (reduced needs r/t dx CKD IV)  Total  Fluid Estimated Needs: 1 mL/Kcal  Dietitian Consult: No  Patient to participate in Cardiac Rehab sessions three times a week  Weekly Dietitian Weight Check  Encouraged patient to complete 3 day food diary  Follow Up Plan for Ongoing Self-Management Support    Education:  Mediterranean Diet; verbalizes understanding; Date: 10/12/23  Person taught: patient  Preferred Learning Method: Verbal, Written  Education Needed/Provided: Nutrition counseling and education related to cardiac rehabilitation  Education Method: Weekly nutrition lectures on the Mediterranean diet, cooking, shopping, and dining out  Written Materials Provided: 3 Day Food Record, Introduction to Mediterranean Diet  Strategies Implemented: Motivational interviewing, Goal setting, Self-Monitoring, and Problem Solving    Comments:   Discussed ways to incorporate healthy snacks, eating on a schedule, and monitoring sodium intake for heart health.  Emphasized importance of more plant based eating pattern to slow CKD progreesion    Diabetes  Is the patient diabetic? Yes   Other Core Components/Diabetes Assessment:   Labs:  Lab Results   Component Value Date    GLUF 85 10/03/2023     Lab Results   Component Value Date    HGBA1C 6.5 (H) 10/03/2023    HGBA1C 6.5 (H) 09/25/2023    HGBA1C 6.5 (H) 08/14/2023      Lab Results   Component Value Date    ESTIMATEDAVG 140 (H) 10/03/2023    ESTIMATEDAVG 140 (H) 09/25/2023    ESTIMATEDAVG 140 (H) 08/14/2023       History of diabetes since 2008/2009  Diabetes medications: lantus 12u BID, Farxiga 10mg, Ozempic 0.25mg weekly  Blood Glucose Checks at Home: Yes: blood sugars ac & HS  Typical morning range:  mg/dl  Endocrinologist or PCP following DM: PCP Dr. Ramey    Other Core Components/Diabetes Plan:   Goals:  Hgb A1c < 7%  Exercise Blood Glucose: 100-300 mg/dl    Interventions:  Reviewed and Discussed Labs  Medication Compliance  Med Card Reconciled  Low Sodium, Mediterranean, ADA Diet  Weight Management  Physical  Activity  Increase Knowledge of Contributory Factors  Home Monitoring    Education:  Mediterranean Diet; verbalizes understanding; Date: 10/12/23    Comments:   Patient verbalizes understanding to bring home glucometer and check glucose pre and post each exercise session.  Per cardiac rehab protocols, patient's glucose must be between 90 and 270 mg/dL to exercise.  Patient denies any recent glucose levels less than 60 mg/dL or greater than 300 mg/dL. Patient verbalizes importance of notifying rehab staff if symptoms of hypoglycemia occur while at cardiac rehab.  Abnormal labs will be reported to patient's PCP/Endocrinologist by rehab staff.    Noted updated glucose parameters of 100-300    RD contact information provided.      Mary Hilario MS, RDN/LDN     Session: Orientation   Cardiac Rehab Individual Treatment Plan - Initial Assessment      Patient Name: Addy Redding MRN: [id number]   : 1955   Age: 67 y.o.   Date of Event: 23    Primary Diagnosis: S/P CABG    EF: 55% (3/23/23)    Physical Assessment:   There were no vitals taken for this visit.    ASSESSMENT:  Heart Sounds: regular rate and rhythm, S1, S2 normal, no murmur, click, rub or gallop  Prosthetic Valve: No  Lung Sounds: normal air entry, lungs clear to auscultation  Capillary Refill: normal  Left Radial Pulse: Normal (+2)  Right Radial Pulse: Normal (+2)  Left Pedal Pulse: Normal (+2)  Right Pedal Pulse: Normal (+2)  Right Edema: none  Left Edema none  Strength: normal  Range of Motion: full range of motion  Existing Limitations:      Site   [] Arthritis, bursitis    [] Amputation, atrophy    [] Other:    []       Diabetic patient's foot examination comments: Normal -  Bilateral  Incisional site: healing well  Special needs: NA    Psychosocial Assessment:   Outcome Survey Tools:    IAM SCORES:   PRE   Anxiety 3   Depression 0   Somatic 1   Hostility 3     SF-36 SCORES:   PRE   Physical Function 25   Social Function 9   Mental Health 20    Pain 7   Change in Health 4   Physical Role Limitation 0   Mental Role Limitation 3   Energy/Fatigue 15   Health Perceptions 13   Total Score 96     PHQ-9:      10/12/2023     9:26 AM   PHQ-9 Depression Patient Health Questionnaire   Over the last two weeks how often have you been bothered by little interest or pleasure in doing things 0   Over the last two weeks how often have you been bothered by feeling down, depressed or hopeless 0   Over the last two weeks how often have you been bothered by trouble falling or staying asleep, or sleeping too much 0   Over the last two weeks how often have you been bothered by feeling tired or having little energy 0   Over the last two weeks how often have you been bothered by a poor appetite or overeating 3   Over the last two weeks how often have you been bothered by feeling bad about yourself - or that you are a failure or have let yourself or your family down 0   Over the last two weeks how often have you been bothered by trouble concentrating on things, such as reading the newspaper or watching television 0   Over the last two weeks how often have you been bothered by moving or speaking so slowly that other people could have noticed. 0   Over the last two weeks how often have you been bothered by thoughts that you would be better off dead, or of hurting yourself 0   If you checked off any problems, how difficult have these problems made it for you to do your work, take care of things at home or get along with other people? Not difficult at all   PHQ-9 Score 3              Living Arrangements: Lives with spouse  Family Support: children and spouse  Self Reported: Effective Coping Skills  Displays: happiness and calmness  Medication: not applicable    Psychosocial Plan:   Goals:  Improved psychosocial coping strategies  Maintain positive support system  Maintain positive outlook  Improve overall quality of life    Interventions/Recommendations:  Discussed Results of  Surveys  Patient to Self Report Emotional Changes at Session Check In  Recommend Physical Activity  Recommend Attending Education Lectures  Notify MD: No  Program Referral: No  Pharmaceutical Intervention/Therapy: No  Other Needs: not applicable  Stage of Readiness to Change: Contemplation    Education:  Risk Factors; verbalizes understanding; Date: 10/12/23    Comments:  Discussed screening tools and results with patient. Pt denies any pressing issues with stress/anxiety/depression/hostility at this time but will discuss with his PCP at next visit for monitoring. Patient is motivated to get started with cardiac rehab to improve his cardiac health. Patient has been instructed to notify staff in the event that circumstances worsen.  Patient verbalizes understanding.    Other Core Components/Risk Factors Assessment:   RISK FACTORS:  diabetes, hyperlipidemia, hypertension, CKD,     Learning Barriers: None    Education Level:  High School (9-12) or GED    Pre-test Score: 30    Medication Compliance: has been compliant with taking medications    Other Core Components/Risk Factors Plan:   Goals:  Decrease cholesterol level: In Progress  Increase exercise tolerance: In Progress  Increase knowledge of CAD: In Progress  Decrease blood pressure: In Progress  Identify and manage personal areas of stress: In Progress  Control diabetes by adjusting diet and exercise: In Progress  Learn more about healthy eating: In Progress    Interventions/Recommendations:  Recommend regular attendance for Cardiac Rehab: Exercise and Education Lectures  Encourage medication compliance  Individual Education/ Counseling: No  Physician Referral: No    Education:    risk factors, verbalizes understanding; Date: 10/12/23         Education method adapted to patients education level and preferred method of learning.  Method: explanation    Comments:  Patient is motivated to start cardiac rehab and improve his cardiac health. He is currently  exercising walking 30 minutes 5-6 days per week.     Other Core Components/Hypertension Assessment:   Resting BP:  Left 142/78 Right 140/82 HR 80 O2 Sat 99%   BP Readings from Last 1 Encounters:   10/31/23 (!) 150/70         BP Diagnosis: Hypertensive  Patient reported symptoms: none    Other Core Components/Hypertension Plan:   Goals:  Blood Pressure <130/80    Interventions/Recommendations:  Med Card Reconciled: Yes  Encourage medication compliance  Encourage sodium reduction  Encourage weight loss  Recommend physical activity  Educate on contributory factors  Reduce stress, anxiety, anger, depression, and/or chronic pain  Encourage home blood pressure monitoring    Education:    Risk Factors; verbalizes understanding; Date: 10/12/23         Comments:  Patient has a blood pressure cuff at home, he takes it daily and keeps a log for his nephrologist.       Does the patient have Heart Failure? No    Other Core Components/Tobacco Cessation Assessment:   Smoking Status: past smoker who quit 1995  Smoking Cessation Barriers:  NA  Stage of Readiness to Change: Maintenance    Other Core Components/Tobacco Cessation Plan:   Goals:  Maintain non-smoking status    Interventions:  Maintains non-smoking status    Education:    Risk Factors; verbalizes understanding; Date: 10/12/23         Comments:  Patient is ready to start cardiac rehab and improve his cardiac health. Discussed Cardiac Rehab program in depth with patient.  Medication list updated per patient & marked as reviewed.  Patient has been instructed to notify staff of any problems while attending rehab (ie: chest pain, shortness of breath, lightheadedness, dizziness).  Patient has been instructed to monitor blood pressure readings outside of rehab & to keep a daily log of the readings.  Patient verbalizes understanding.     Ankit Jenkins RN  Cardiac Rehab Nurse

## 2023-11-02 NOTE — TELEPHONE ENCOUNTER
Refill Routing Note   Medication(s) are not appropriate for processing by [facility] for the following reason(s):      No active prescription written by provider    [facility] action(s):  Defer Care Due:  None identified            Appointments  past 12m or future 3m with PCP    Date Provider   Last Visit   8/15/2023 Stephan Ramey MD   Next Visit   11/14/2023 Stephan Ramey MD   ED visits in past 90 days: 0        Note composed:3:51 PM 11/02/2023

## 2023-11-02 NOTE — TELEPHONE ENCOUNTER
No care due was identified.  [facility] Embedded Care Due Messages. Reference number: [id number].   11/02/2023 10:45:46 AM CDT

## 2023-11-03 ENCOUNTER — CLINICAL SUPPORT (OUTPATIENT)
Dept: CARDIAC REHAB | Facility: CLINIC | Age: 68
End: 2023-11-03
Payer: MEDICARE

## 2023-11-03 DIAGNOSIS — Z95.1 POSTSURGICAL AORTOCORONARY BYPASS STATUS: Primary | ICD-10-CM

## 2023-11-03 DIAGNOSIS — I25.10 CORONARY ARTERY DISEASE, UNSPECIFIED VESSEL OR LESION TYPE, UNSPECIFIED WHETHER ANGINA PRESENT, UNSPECIFIED WHETHER NATIVE OR TRANSPLANTED HEART: ICD-10-CM

## 2023-11-03 PROCEDURE — 93798 PR CARDIAC REHAB/MONITOR: ICD-10-PCS | Mod: S$GLB,,, | Performed by: INTERNAL MEDICINE

## 2023-11-03 PROCEDURE — 93798 PHYS/QHP OP CAR RHAB W/ECG: CPT | Mod: S$GLB,,, | Performed by: INTERNAL MEDICINE

## 2023-11-06 ENCOUNTER — CLINICAL SUPPORT (OUTPATIENT)
Dept: CARDIAC REHAB | Facility: CLINIC | Age: 68
End: 2023-11-06
Payer: MEDICARE

## 2023-11-06 DIAGNOSIS — I25.10 CORONARY ARTERY DISEASE, UNSPECIFIED VESSEL OR LESION TYPE, UNSPECIFIED WHETHER ANGINA PRESENT, UNSPECIFIED WHETHER NATIVE OR TRANSPLANTED HEART: ICD-10-CM

## 2023-11-06 DIAGNOSIS — Z95.1 POSTSURGICAL AORTOCORONARY BYPASS STATUS: Primary | ICD-10-CM

## 2023-11-06 PROCEDURE — 93798 PR CARDIAC REHAB/MONITOR: ICD-10-PCS | Mod: S$GLB,,, | Performed by: INTERNAL MEDICINE

## 2023-11-06 PROCEDURE — 93798 PHYS/QHP OP CAR RHAB W/ECG: CPT | Mod: S$GLB,,, | Performed by: INTERNAL MEDICINE

## 2023-11-06 NOTE — PROGRESS NOTES
"Subjective:   @Patient ID:  Addy Redding is a 67 y.o. male who presents for evaluation of CAD, HTN, HLP    HPI:   11/2023: F/U.  EM without a.fib.  He feels great.  Progressing very well with the cardiac rehab.  He is still on Xarelto along with Plavix.  He is off beta-blocker due to bradycardia    8/23/2023: F/U. [facility] ostial LAD disease. iFR +ve. CABG  LIMA-LAD. PDA was not bypassed. RCA . He had post op a.fib. He is on amio and xarelto. BB stopped due to concerns about tacy-brendon/ He feels much better since the bypass. Energy level is much better.        6/14/2023: F/U. Stress test with concerning balanced ischemia. Significant shortness of breath. Getting worse. He has chronic sinus bradycardia with good chronotropic response by stress test in 3/2022.     3/9/2023: He saw Dr. Arevalo in the past. He had exertional angina prompted stress test that was abnormal. [facility] 4/7/2022 with  RCA, significant OM disease, non obstructive prox LAD disease by iFR. Attempt to PCI RCA was unsuccessful (run through wire used). He was staged for PCI to OM 6/2022 ( 2.5 x 8 and 2.5x18) was complicated nu no reflow in the main lcx and it was stented, residual disease in ostial Om at bifurcation     He was diagnosed with prostate CA , got radiotherapy seeds. Stable     He still reports significant TRINH and low energy level. No chest pain   He has been compliant with his medications    He has bradycardia that's why  he is not on BB        Prior cardiovascular  Hx  --------------------------------  - EKG 12/2022 sinus bradycardia     - PET stress test 6/1/2023     The myocardial perfusion images are normal without evidence of scar.    Stress myocardial blood flow is severely reduced diffusely throughout the heart consistent with three vessel "balanced" ischemia.  The RCA territory has severely reduced flow capacity consistent with a known  with inadequate collateralization. The anterior and anterolateral wall have moderately reduced " flow capacity and are on the border of ischemic thresholds. The remainder of the myocardium has mildly reduced flow capacity.    The whole heart absolute myocardial perfusion values averaged 0.54 cc/min/g at rest, which is reduced; 0.81 cc/min/g at stress, which is severely reduced; and CFR is 1.50 , which is moderately reduced.    CT attenuation images demonstrate moderate diffuse coronary calcifications in the LAD and LCX territory and mild diffuse aortic calcifications in the descending aorta.    The gated perfusion images showed an ejection fraction of 60% at rest and 62% during stress. A normal ejection fraction is greater than 47%.    The wall motion is normal at rest and during stress.    The LV cavity size is normal at rest and stress.    The ECG portion of the study is negative for ischemia.    There were no arrhythmias during stress.    The patient reported no chest pain during the stress test.    There are no prior studies for comparison.     - Echo 3/23/2023   Normal systolic function.  The estimated ejection fraction is 55%.  Normal left ventricular diastolic function.  Normal right ventricular size with normal right ventricular systolic function.  Normal central venous pressure (3 mmHg).  The estimated PA systolic pressure is 23 mmHg.       Patient Active Problem List    Diagnosis Date Noted    Coronary atherosclerosis of native coronary artery 10/27/2023    Left anterior shoulder pain 08/28/2023    Centrilobular emphysema 08/15/2023    Postsurgical aortocoronary bypass status 07/24/2023    Paroxysmal A-fib 07/24/2023     This is not a post operative complication. afib happen during post operative period      Tachycardia-bradycardia syndrome 07/23/2023    Carotid stenosis 07/21/2023    Acute blood loss anemia 07/21/2023    Bilateral carotid artery stenosis 07/05/2023    Pre-op exam 07/05/2023    Pain of left upper extremity 05/17/2023    Weakness 05/17/2023    Stiffness in joint 05/17/2023    Chronic  left shoulder pain 05/15/2023    Hyperparathyroidism, unspecified 01/27/2023    Prostate cancer     Stage 3b chronic kidney disease 10/04/2022    Atherosclerosis of native coronary artery of native heart 10/04/2022    Presence of drug-eluting stent in left circumflex coronary artery 09/01/2022    Preoperative clearance 09/01/2022    Chronic kidney disease (CKD), stage IV (severe) 07/06/2022    Coronary artery disease of native artery with stable angina pectoris 05/26/2022    Aortic atherosclerosis 03/22/2022    Sinus bradycardia 03/22/2022    Neck pain 10/22/2021    Decreased range of motion 10/22/2021    Type 2 diabetes mellitus with stage 3a chronic kidney disease, without long-term current use of insulin 02/22/2016    Left-sided low back pain with sciatica 02/22/2016    ED (erectile dysfunction)     Hypertension associated with diabetes 11/04/2014    Dyslipidemia associated with type 2 diabetes mellitus                     LAST HbA1c  Lab Results   Component Value Date    HGBA1C 6.5 (H) 10/03/2023       Lipid panel  Lab Results   Component Value Date    CHOL 117 (L) 10/03/2023    CHOL 150 08/14/2023    CHOL 120 07/05/2022     Lab Results   Component Value Date    HDL 37 (L) 10/03/2023    HDL 39 (L) 08/14/2023    HDL 43 07/05/2022     Lab Results   Component Value Date    LDLCALC 66.4 10/03/2023    LDLCALC 91.8 08/14/2023    LDLCALC 66.2 07/05/2022     Lab Results   Component Value Date    TRIG 68 10/03/2023    TRIG 96 08/14/2023    TRIG 54 07/05/2022     Lab Results   Component Value Date    CHOLHDL 31.6 10/03/2023    CHOLHDL 26.0 08/14/2023    CHOLHDL 35.8 07/05/2022            Review of Systems   Constitutional: Negative for chills and fever.   HENT:  Negative for hearing loss and nosebleeds.    Eyes:  Negative for blurred vision.   Cardiovascular:         As in HPI   Respiratory:  Negative for hemoptysis and shortness of breath.    Hematologic/Lymphatic: Negative for bleeding problem.   Skin:  Negative for  itching.   Musculoskeletal:  Negative for falls.   Gastrointestinal:  Negative for abdominal pain and hematochezia.   Genitourinary:  Positive for frequency. Negative for hematuria.        As in HPI    Neurological:  Negative for dizziness and loss of balance.   Psychiatric/Behavioral:  Negative for altered mental status and depression.        Objective:   Physical Exam  Constitutional:       Appearance: He is well-developed.   HENT:      Head: Normocephalic and atraumatic.   Eyes:      Conjunctiva/sclera: Conjunctivae normal.   Neck:      Vascular: No carotid bruit or JVD.   Cardiovascular:      Rate and Rhythm: Normal rate and regular rhythm.      Pulses:           Carotid pulses are 2+ on the right side and 2+ on the left side.       Radial pulses are 2+ on the right side and 2+ on the left side.      Heart sounds: Normal heart sounds. No murmur heard.     No friction rub. No gallop.   Pulmonary:      Effort: Pulmonary effort is normal. No respiratory distress.      Breath sounds: Normal breath sounds. No stridor. No wheezing.   Musculoskeletal:      Cervical back: Neck supple.   Skin:     General: Skin is warm and dry.   Neurological:      Mental Status: He is alert and oriented to person, place, and time.   Psychiatric:         Behavior: Behavior normal.         Assessment:     1. Aortic atherosclerosis    2. Hypertension associated with diabetes    3. Dyslipidemia associated with type 2 diabetes mellitus    4. Coronary artery disease of native artery of native heart with stable angina pectoris    5. Presence of drug-eluting stent in left circumflex coronary artery    6. Atherosclerosis of native coronary artery of native heart with stable angina pectoris    7. Bilateral carotid artery stenosis    8. Paroxysmal A-fib    9. Stage 3b chronic kidney disease    10. Type 2 diabetes mellitus with stage 3a chronic kidney disease, without long-term current use of insulin        Plan:   Post CABG   Doing very well  "  Continue cardiac rehab   Continue Plavix    30 days EM without recurrent atrial fibrillation.  He is off amiodarone.  AFib was postop related to CABG.  I have discussed with him management.  Including holding Xarelto.  Possible small risk for recurrent AFib and CVA.  At this time no complaint evidence for atrial fibrillation hence we will go ahead and stop Xarelto.  He fully understands benefits and risks and agrees with the plan    Continue Zetia/ Atorvastatin. LDL goal < 70.   BP is well controlled  We will not start beta-blockers given bradycardia and borderline blood pressure  Continue Farxiga.     Six-month follow-up       I spent 5-10 minutes asking, assessing, assisting, arranging and advising heart healthy diet improvements. This included low-salt meals, portion control and health food alternatives. I also encourage 30 minutes of moderate exercise 3-4x a week.        Pertinent cardiac images and EKG reviewed independently.    Continue with current medical plan and lifestyle changes.  Return sooner for concerns or questions. If symptoms persist go to the ED  I have reviewed all pertinent data including patient's medical history in detail and updated the computerized patient record.     No orders of the defined types were placed in this encounter.      Follow up as scheduled.     He expressed verbal understanding and agreed with the plan    Patient's Medications   New Prescriptions    No medications on file   Previous Medications    ADVANCED GLUC METER TEST STRIP STRP    USE TO TEST BLOOD SUGAR 4 TIMES DAILY.    ATORVASTATIN (LIPITOR) 80 MG TABLET    Take 1 tablet (80 mg total) by mouth once daily.    BD ARIA 2ND GEN PEN NEEDLE 32 GAUGE X 5/32" NDLE    USE ONE NEEDLE WITH INSULIN PEN TWICE DAILY    BLOOD SUGAR DIAGNOSTIC (BLOOD GLUCOSE TEST) STRP    Check sugar once daily    BLOOD-GLUCOSE METER MISC    by Misc.(Non-Drug; Combo Route) route.    BLOOD-GLUCOSE METER,CONTINUOUS (DEXCOM G6 ) MISC    1 " Device by Misc.(Non-Drug; Combo Route) route continuous.    BLOOD-GLUCOSE SENSOR (DEXCOM G6 SENSOR) DEVONTE    1 Device by Misc.(Non-Drug; Combo Route) route continuous.    BLOOD-GLUCOSE TRANSMITTER (DEXCOM G6 TRANSMITTER) DEVONTE    1 Device by Misc.(Non-Drug; Combo Route) route continuous.    CLOPIDOGREL (PLAVIX) 75 MG TABLET    Take 1 tablet (75 mg total) by mouth once daily.    DAPAGLIFLOZIN (FARXIGA) 10 MG TABLET    Take 1 tablet (10 mg total) by mouth once daily.    ERGOCALCIFEROL (ERGOCALCIFEROL) 50,000 UNIT CAP    Take 1 capsule (50,000 Units total) by mouth every 7 days.    EZETIMIBE (ZETIA) 10 MG TABLET    Take 1 tablet (10 mg total) by mouth once daily.    INSULIN (LANTUS SOLOSTAR U-100 INSULIN) GLARGINE 100 UNITS/ML SUBQ PEN    Inject 12 Units into the skin 2 (two) times a day.    LANCETS (ACCU-CHEK SOFTCLIX LANCETS) MISC    1 lancet by Misc.(Non-Drug; Combo Route) route 2 (two) times daily.    PANTOPRAZOLE (PROTONIX) 40 MG TABLET    Take 40 mg by mouth once daily.    SEMAGLUTIDE (OZEMPIC) 0.25 MG OR 0.5 MG (2 MG/3 ML) PEN INJECTOR    Inject 0.25 mg into the skin every 7 days.    SILDENAFIL (REVATIO) 20 MG TAB    Take 1 tablet (20 mg total) by mouth daily as needed for ED    TAMSULOSIN (FLOMAX) 0.4 MG CAP    Take by mouth once daily.   Modified Medications    No medications on file   Discontinued Medications    No medications on file

## 2023-11-08 ENCOUNTER — OFFICE VISIT (OUTPATIENT)
Dept: CARDIOLOGY | Facility: CLINIC | Age: 68
End: 2023-11-08
Payer: MEDICARE

## 2023-11-08 VITALS
HEIGHT: 73 IN | DIASTOLIC BLOOD PRESSURE: 63 MMHG | OXYGEN SATURATION: 97 % | HEART RATE: 76 BPM | BODY MASS INDEX: 26.82 KG/M2 | SYSTOLIC BLOOD PRESSURE: 93 MMHG | WEIGHT: 202.38 LBS

## 2023-11-08 DIAGNOSIS — I70.0 AORTIC ATHEROSCLEROSIS: Primary | ICD-10-CM

## 2023-11-08 DIAGNOSIS — I15.2 HYPERTENSION ASSOCIATED WITH DIABETES: ICD-10-CM

## 2023-11-08 DIAGNOSIS — N18.31 TYPE 2 DIABETES MELLITUS WITH STAGE 3A CHRONIC KIDNEY DISEASE, WITHOUT LONG-TERM CURRENT USE OF INSULIN: ICD-10-CM

## 2023-11-08 DIAGNOSIS — I25.118 ATHEROSCLEROSIS OF NATIVE CORONARY ARTERY OF NATIVE HEART WITH STABLE ANGINA PECTORIS: ICD-10-CM

## 2023-11-08 DIAGNOSIS — E78.5 DYSLIPIDEMIA ASSOCIATED WITH TYPE 2 DIABETES MELLITUS: ICD-10-CM

## 2023-11-08 DIAGNOSIS — Z95.5 PRESENCE OF DRUG-ELUTING STENT IN LEFT CIRCUMFLEX CORONARY ARTERY: ICD-10-CM

## 2023-11-08 DIAGNOSIS — I65.23 BILATERAL CAROTID ARTERY STENOSIS: ICD-10-CM

## 2023-11-08 DIAGNOSIS — I48.0 PAROXYSMAL A-FIB: ICD-10-CM

## 2023-11-08 DIAGNOSIS — I25.118 CORONARY ARTERY DISEASE OF NATIVE ARTERY OF NATIVE HEART WITH STABLE ANGINA PECTORIS: ICD-10-CM

## 2023-11-08 DIAGNOSIS — N18.32 STAGE 3B CHRONIC KIDNEY DISEASE: ICD-10-CM

## 2023-11-08 DIAGNOSIS — E11.59 HYPERTENSION ASSOCIATED WITH DIABETES: ICD-10-CM

## 2023-11-08 DIAGNOSIS — E11.22 TYPE 2 DIABETES MELLITUS WITH STAGE 3A CHRONIC KIDNEY DISEASE, WITHOUT LONG-TERM CURRENT USE OF INSULIN: ICD-10-CM

## 2023-11-08 DIAGNOSIS — I25.118 CORONARY ARTERY DISEASE OF NATIVE ARTERY WITH STABLE ANGINA PECTORIS, UNSPECIFIED WHETHER NATIVE OR TRANSPLANTED HEART: ICD-10-CM

## 2023-11-08 DIAGNOSIS — E11.69 DYSLIPIDEMIA ASSOCIATED WITH TYPE 2 DIABETES MELLITUS: ICD-10-CM

## 2023-11-08 PROCEDURE — 3078F PR MOST RECENT DIASTOLIC BLOOD PRESSURE < 80 MM HG: ICD-10-PCS | Mod: CPTII,S$GLB,, | Performed by: INTERNAL MEDICINE

## 2023-11-08 PROCEDURE — 3008F BODY MASS INDEX DOCD: CPT | Mod: CPTII,S$GLB,, | Performed by: INTERNAL MEDICINE

## 2023-11-08 PROCEDURE — 3008F PR BODY MASS INDEX (BMI) DOCUMENTED: ICD-10-PCS | Mod: CPTII,S$GLB,, | Performed by: INTERNAL MEDICINE

## 2023-11-08 PROCEDURE — 3061F PR NEG MICROALBUMINURIA RESULT DOCUMENTED/REVIEW: ICD-10-PCS | Mod: CPTII,S$GLB,, | Performed by: INTERNAL MEDICINE

## 2023-11-08 PROCEDURE — 3066F PR DOCUMENTATION OF TREATMENT FOR NEPHROPATHY: ICD-10-PCS | Mod: CPTII,S$GLB,, | Performed by: INTERNAL MEDICINE

## 2023-11-08 PROCEDURE — 1126F AMNT PAIN NOTED NONE PRSNT: CPT | Mod: CPTII,S$GLB,, | Performed by: INTERNAL MEDICINE

## 2023-11-08 PROCEDURE — 3078F DIAST BP <80 MM HG: CPT | Mod: CPTII,S$GLB,, | Performed by: INTERNAL MEDICINE

## 2023-11-08 PROCEDURE — 3061F NEG MICROALBUMINURIA REV: CPT | Mod: CPTII,S$GLB,, | Performed by: INTERNAL MEDICINE

## 2023-11-08 PROCEDURE — 3074F SYST BP LT 130 MM HG: CPT | Mod: CPTII,S$GLB,, | Performed by: INTERNAL MEDICINE

## 2023-11-08 PROCEDURE — 99999 PR PBB SHADOW E&M-EST. PATIENT-LVL III: ICD-10-PCS | Mod: PBBFAC,,, | Performed by: INTERNAL MEDICINE

## 2023-11-08 PROCEDURE — 99999 PR PBB SHADOW E&M-EST. PATIENT-LVL III: CPT | Mod: PBBFAC,,, | Performed by: INTERNAL MEDICINE

## 2023-11-08 PROCEDURE — 3074F PR MOST RECENT SYSTOLIC BLOOD PRESSURE < 130 MM HG: ICD-10-PCS | Mod: CPTII,S$GLB,, | Performed by: INTERNAL MEDICINE

## 2023-11-08 PROCEDURE — 1126F PR PAIN SEVERITY QUANTIFIED, NO PAIN PRESENT: ICD-10-PCS | Mod: CPTII,S$GLB,, | Performed by: INTERNAL MEDICINE

## 2023-11-08 PROCEDURE — 3066F NEPHROPATHY DOC TX: CPT | Mod: CPTII,S$GLB,, | Performed by: INTERNAL MEDICINE

## 2023-11-08 PROCEDURE — 99499 UNLISTED E&M SERVICE: CPT | Mod: S$GLB,,, | Performed by: INTERNAL MEDICINE

## 2023-11-08 PROCEDURE — 3044F PR MOST RECENT HEMOGLOBIN A1C LEVEL <7.0%: ICD-10-PCS | Mod: CPTII,S$GLB,, | Performed by: INTERNAL MEDICINE

## 2023-11-08 PROCEDURE — 3044F HG A1C LEVEL LT 7.0%: CPT | Mod: CPTII,S$GLB,, | Performed by: INTERNAL MEDICINE

## 2023-11-08 PROCEDURE — 4010F ACE/ARB THERAPY RXD/TAKEN: CPT | Mod: CPTII,S$GLB,, | Performed by: INTERNAL MEDICINE

## 2023-11-08 PROCEDURE — 99214 PR OFFICE/OUTPT VISIT, EST, LEVL IV, 30-39 MIN: ICD-10-PCS | Mod: S$GLB,,, | Performed by: INTERNAL MEDICINE

## 2023-11-08 PROCEDURE — 4010F PR ACE/ARB THEARPY RXD/TAKEN: ICD-10-PCS | Mod: CPTII,S$GLB,, | Performed by: INTERNAL MEDICINE

## 2023-11-08 PROCEDURE — 99214 OFFICE O/P EST MOD 30 MIN: CPT | Mod: S$GLB,,, | Performed by: INTERNAL MEDICINE

## 2023-11-08 RX ORDER — CLOPIDOGREL BISULFATE 75 MG/1
75 TABLET ORAL DAILY
Qty: 90 TABLET | Refills: 4 | Status: SHIPPED | OUTPATIENT
Start: 2023-11-08 | End: 2024-02-20 | Stop reason: SDUPTHER

## 2023-11-08 RX ORDER — OLMESARTAN MEDOXOMIL 5 MG/1
5 TABLET ORAL DAILY
Qty: 90 TABLET | Refills: 3 | Status: SHIPPED | OUTPATIENT
Start: 2023-11-08 | End: 2024-02-20 | Stop reason: SDUPTHER

## 2023-11-13 ENCOUNTER — CLINICAL SUPPORT (OUTPATIENT)
Dept: CARDIAC REHAB | Facility: CLINIC | Age: 68
End: 2023-11-13
Payer: MEDICARE

## 2023-11-13 DIAGNOSIS — I25.10 ATHEROSCLEROSIS OF NATIVE CORONARY ARTERY OF NATIVE HEART, UNSPECIFIED WHETHER ANGINA PRESENT: ICD-10-CM

## 2023-11-13 DIAGNOSIS — Z95.1 POSTSURGICAL AORTOCORONARY BYPASS STATUS: Primary | ICD-10-CM

## 2023-11-13 PROCEDURE — 93798 PR CARDIAC REHAB/MONITOR: ICD-10-PCS | Mod: S$GLB,,, | Performed by: INTERNAL MEDICINE

## 2023-11-13 PROCEDURE — 93798 PHYS/QHP OP CAR RHAB W/ECG: CPT | Mod: S$GLB,,, | Performed by: INTERNAL MEDICINE

## 2023-11-13 NOTE — PROGRESS NOTES
Patient arrived for cardiac rehab session. Pt reports to have eaten prior to exercise session.  The patient was on continuous EKG monitoring throughout the session. The patient tolerated exercise session well with no complaints.

## 2023-11-14 ENCOUNTER — OFFICE VISIT (OUTPATIENT)
Dept: FAMILY MEDICINE | Facility: CLINIC | Age: 68
End: 2023-11-14
Attending: FAMILY MEDICINE
Payer: MEDICARE

## 2023-11-14 VITALS
SYSTOLIC BLOOD PRESSURE: 120 MMHG | DIASTOLIC BLOOD PRESSURE: 70 MMHG | TEMPERATURE: 98 F | HEIGHT: 73 IN | OXYGEN SATURATION: 98 % | BODY MASS INDEX: 27.26 KG/M2 | WEIGHT: 205.69 LBS | HEART RATE: 71 BPM

## 2023-11-14 DIAGNOSIS — E11.59 HYPERTENSION ASSOCIATED WITH DIABETES: Primary | ICD-10-CM

## 2023-11-14 DIAGNOSIS — E11.69 DYSLIPIDEMIA ASSOCIATED WITH TYPE 2 DIABETES MELLITUS: ICD-10-CM

## 2023-11-14 DIAGNOSIS — N18.31 TYPE 2 DIABETES MELLITUS WITH STAGE 3A CHRONIC KIDNEY DISEASE, WITHOUT LONG-TERM CURRENT USE OF INSULIN: ICD-10-CM

## 2023-11-14 DIAGNOSIS — I15.2 HYPERTENSION ASSOCIATED WITH DIABETES: Primary | ICD-10-CM

## 2023-11-14 DIAGNOSIS — I25.118 CORONARY ARTERY DISEASE OF NATIVE ARTERY OF NATIVE HEART WITH STABLE ANGINA PECTORIS: ICD-10-CM

## 2023-11-14 DIAGNOSIS — E11.22 TYPE 2 DIABETES MELLITUS WITH STAGE 3A CHRONIC KIDNEY DISEASE, WITHOUT LONG-TERM CURRENT USE OF INSULIN: ICD-10-CM

## 2023-11-14 DIAGNOSIS — E78.5 DYSLIPIDEMIA ASSOCIATED WITH TYPE 2 DIABETES MELLITUS: ICD-10-CM

## 2023-11-14 DIAGNOSIS — R53.83 FATIGUE, UNSPECIFIED TYPE: ICD-10-CM

## 2023-11-14 DIAGNOSIS — N18.32 STAGE 3B CHRONIC KIDNEY DISEASE: ICD-10-CM

## 2023-11-14 PROCEDURE — 99999 PR PBB SHADOW E&M-EST. PATIENT-LVL III: CPT | Mod: PBBFAC,,, | Performed by: FAMILY MEDICINE

## 2023-11-14 PROCEDURE — 1159F PR MEDICATION LIST DOCUMENTED IN MEDICAL RECORD: ICD-10-PCS | Mod: CPTII,S$GLB,, | Performed by: FAMILY MEDICINE

## 2023-11-14 PROCEDURE — 4010F PR ACE/ARB THEARPY RXD/TAKEN: ICD-10-PCS | Mod: CPTII,S$GLB,, | Performed by: FAMILY MEDICINE

## 2023-11-14 PROCEDURE — 99214 PR OFFICE/OUTPT VISIT, EST, LEVL IV, 30-39 MIN: ICD-10-PCS | Mod: S$GLB,,, | Performed by: FAMILY MEDICINE

## 2023-11-14 PROCEDURE — 99214 OFFICE O/P EST MOD 30 MIN: CPT | Mod: S$GLB,,, | Performed by: FAMILY MEDICINE

## 2023-11-14 PROCEDURE — 3044F PR MOST RECENT HEMOGLOBIN A1C LEVEL <7.0%: ICD-10-PCS | Mod: CPTII,S$GLB,, | Performed by: FAMILY MEDICINE

## 2023-11-14 PROCEDURE — 3061F PR NEG MICROALBUMINURIA RESULT DOCUMENTED/REVIEW: ICD-10-PCS | Mod: CPTII,S$GLB,, | Performed by: FAMILY MEDICINE

## 2023-11-14 PROCEDURE — 3008F PR BODY MASS INDEX (BMI) DOCUMENTED: ICD-10-PCS | Mod: CPTII,S$GLB,, | Performed by: FAMILY MEDICINE

## 2023-11-14 PROCEDURE — 3074F SYST BP LT 130 MM HG: CPT | Mod: CPTII,S$GLB,, | Performed by: FAMILY MEDICINE

## 2023-11-14 PROCEDURE — 3008F BODY MASS INDEX DOCD: CPT | Mod: CPTII,S$GLB,, | Performed by: FAMILY MEDICINE

## 2023-11-14 PROCEDURE — 3078F PR MOST RECENT DIASTOLIC BLOOD PRESSURE < 80 MM HG: ICD-10-PCS | Mod: CPTII,S$GLB,, | Performed by: FAMILY MEDICINE

## 2023-11-14 PROCEDURE — 3066F NEPHROPATHY DOC TX: CPT | Mod: CPTII,S$GLB,, | Performed by: FAMILY MEDICINE

## 2023-11-14 PROCEDURE — 3078F DIAST BP <80 MM HG: CPT | Mod: CPTII,S$GLB,, | Performed by: FAMILY MEDICINE

## 2023-11-14 PROCEDURE — 4010F ACE/ARB THERAPY RXD/TAKEN: CPT | Mod: CPTII,S$GLB,, | Performed by: FAMILY MEDICINE

## 2023-11-14 PROCEDURE — 3061F NEG MICROALBUMINURIA REV: CPT | Mod: CPTII,S$GLB,, | Performed by: FAMILY MEDICINE

## 2023-11-14 PROCEDURE — 1160F RVW MEDS BY RX/DR IN RCRD: CPT | Mod: CPTII,S$GLB,, | Performed by: FAMILY MEDICINE

## 2023-11-14 PROCEDURE — 3066F PR DOCUMENTATION OF TREATMENT FOR NEPHROPATHY: ICD-10-PCS | Mod: CPTII,S$GLB,, | Performed by: FAMILY MEDICINE

## 2023-11-14 PROCEDURE — 99999 PR PBB SHADOW E&M-EST. PATIENT-LVL III: ICD-10-PCS | Mod: PBBFAC,,, | Performed by: FAMILY MEDICINE

## 2023-11-14 PROCEDURE — 1160F PR REVIEW ALL MEDS BY PRESCRIBER/CLIN PHARMACIST DOCUMENTED: ICD-10-PCS | Mod: CPTII,S$GLB,, | Performed by: FAMILY MEDICINE

## 2023-11-14 PROCEDURE — 1159F MED LIST DOCD IN RCRD: CPT | Mod: CPTII,S$GLB,, | Performed by: FAMILY MEDICINE

## 2023-11-14 PROCEDURE — 3044F HG A1C LEVEL LT 7.0%: CPT | Mod: CPTII,S$GLB,, | Performed by: FAMILY MEDICINE

## 2023-11-14 PROCEDURE — 3074F PR MOST RECENT SYSTOLIC BLOOD PRESSURE < 130 MM HG: ICD-10-PCS | Mod: CPTII,S$GLB,, | Performed by: FAMILY MEDICINE

## 2023-11-14 NOTE — PROGRESS NOTES
Subjective:       Patient ID: Addy Redding is a 67 y.o. male.    Chief Complaint: Follow-up    67 yr old black male with DM II, HTN, HLD, GERD, CKD III, presents today for his three month follow up. Want T level checked.        DM II - improved -     HGBA1C                   6.5 (H)             10/03/2023                               - complicated by CKD III                   - denies any hypoglycemic symptoms - on metformin - up to date with eye and foot screen - on metformin    HTN - controlled  - on lisinopril 20 mg daily - no side effects    HLD - controlled and improving -    LDLCALC                  66.2                07/05/2022                                 - on statin - compliant - need refill - not fully compliant with diet    GERD - stable - takes prilosec as needed    ED - stable - takes viagra as needed    Health maintenance  -labs due  -Flu and Pneumovax - up to date  -adacel due and done today  -colonoscopy due - wants to wait  -PSA UTD      Follow-up  This is a chronic problem. The current episode started more than 1 year ago. The problem occurs constantly. The problem has been gradually worsening. Associated symptoms include arthralgias and myalgias. Pertinent negatives include no abdominal pain, change in bowel habit, chest pain, congestion, coughing, diaphoresis, fatigue, headaches, joint swelling, numbness, rash, urinary symptoms, vertigo, vomiting or weakness.   Medication Refill  This is a chronic problem. The current episode started more than 1 year ago. The problem occurs constantly. The problem has been gradually improving. Associated symptoms include arthralgias and myalgias. Pertinent negatives include no abdominal pain, change in bowel habit, chest pain, congestion, coughing, diaphoresis, fatigue, headaches, joint swelling, numbness, rash, urinary symptoms, vertigo, vomiting or weakness.   Hypertension  This is a chronic problem. The current episode started more than 1 year ago. The  problem has been gradually worsening since onset. The problem is uncontrolled. Pertinent negatives include no anxiety, chest pain, headaches, malaise/fatigue, orthopnea, palpitations, peripheral edema or sweats. There are no associated agents to hypertension. Risk factors for coronary artery disease include dyslipidemia, diabetes mellitus, male gender and obesity. Past treatments include ACE inhibitors. The current treatment provides no improvement. There are no compliance problems.  There is no history of angina, CAD/MI, CVA, left ventricular hypertrophy, PVD or retinopathy. There is no history of chronic renal disease, coarctation of the aorta, hypercortisolism, pheochromocytoma, renovascular disease or a thyroid problem.   Arm Pain   There was no injury mechanism. The pain is present in the right shoulder and upper right arm. The quality of the pain is described as aching. The pain does not radiate. The pain is at a severity of 5/10. The pain is moderate. The pain has been Constant since the incident. Pertinent negatives include no chest pain or numbness. The symptoms are aggravated by movement, lifting and palpation. He has tried nothing for the symptoms. The treatment provided mild relief.   Shoulder Pain   The pain is present in the left shoulder. This is a chronic problem. The current episode started more than 1 month ago. There has been no history of extremity trauma. The problem occurs constantly. The problem has been unchanged. The quality of the pain is described as aching. The pain is at a severity of 8/10. The pain is severe. Associated symptoms include an inability to bear weight and a limited range of motion. Pertinent negatives include no headaches, itching, joint swelling or numbness. The symptoms are aggravated by activity. He has tried nothing for the symptoms. The treatment provided no relief. His past medical history is significant for diabetes.   Gastroesophageal Reflux  He complains of  heartburn. He reports no abdominal pain, no chest pain, no coughing or no wheezing. This is a new problem. The current episode started 1 to 4 weeks ago. The problem occurs constantly. The problem has been unchanged. The heartburn duration is several minutes. The heartburn is located in the substernum. The symptoms are aggravated by certain foods. Pertinent negatives include no anemia, fatigue or melena. There are no known risk factors. He has tried nothing for the symptoms. The treatment provided mild relief. Past procedures do not include an abdominal ultrasound, esophageal pH monitoring or a UGI. Past invasive treatments do not include gastroplasty or reflux surgery.   Diabetes  Pertinent negatives for hypoglycemia include no confusion, dizziness, headaches, nervousness/anxiousness, speech difficulty or sweats. Pertinent negatives for diabetes include no chest pain, no fatigue, no polydipsia, no polyuria and no weakness. Pertinent negatives for diabetic complications include no CVA, PVD or retinopathy.   Hyperlipidemia  This is a chronic problem. The current episode started more than 1 year ago. The problem is uncontrolled. Recent lipid tests were reviewed and are high. Exacerbating diseases include diabetes. He has no history of chronic renal disease, liver disease or nephrotic syndrome. There are no known factors aggravating his hyperlipidemia. Associated symptoms include myalgias. Pertinent negatives include no chest pain, focal sensory loss, focal weakness or leg pain. Current antihyperlipidemic treatment includes statins. There are no compliance problems.  Risk factors for coronary artery disease include diabetes mellitus, dyslipidemia, male sex and hypertension.     Review of Systems   Constitutional: Negative.  Negative for activity change, diaphoresis, fatigue, malaise/fatigue and unexpected weight change.   HENT: Negative.  Negative for nasal congestion, ear pain, mouth sores, rhinorrhea and voice change.     Eyes: Negative.  Negative for pain, discharge and visual disturbance.   Respiratory: Negative.  Negative for apnea, cough and wheezing.    Cardiovascular: Negative.  Negative for chest pain, palpitations and orthopnea.   Gastrointestinal:  Positive for heartburn. Negative for abdominal distention, abdominal pain, anal bleeding, change in bowel habit, diarrhea, melena and vomiting.   Endocrine: Negative.  Negative for cold intolerance, polydipsia and polyuria.   Genitourinary: Negative.  Negative for decreased urine volume, difficulty urinating, discharge, frequency and scrotal swelling.   Musculoskeletal:  Positive for arthralgias and myalgias. Negative for back pain, joint swelling, leg pain and neck stiffness.   Integumentary:  Negative for color change, itching and rash. Negative.   Allergic/Immunologic: Negative.  Negative for environmental allergies.   Neurological: Negative.  Negative for dizziness, vertigo, focal weakness, speech difficulty, weakness, light-headedness, numbness and headaches.   Hematological: Negative.    Psychiatric/Behavioral: Negative.  Negative for agitation, confusion, dysphoric mood and suicidal ideas. The patient is not nervous/anxious.          PMH/PSH/FH/SH/MED/ALLERGY reviewed    Past Medical History:   Diagnosis Date    CKD (chronic kidney disease) stage 3, GFR 30-59 ml/min     Costochondritis     Diabetic nephropathy associated with type 2 diabetes mellitus     Diabetic retinopathy     ED (erectile dysfunction)     GERD (gastroesophageal reflux disease)     Hyperlipidemia     Hypertension     Prostate cancer     Type II or unspecified type diabetes mellitus with renal manifestations, uncontrolled(250.42)        Past Surgical History:   Procedure Laterality Date    Bone biopsy right femur      CORONARY ANGIOGRAPHY N/A 06/16/2022    Procedure: ANGIOGRAM, CORONARY ARTERY;  Surgeon: Carter Arevalo MD;  Location: [facility] CATH LAB/EP;  Service: Cardiology;  Laterality:  N/A;    CREATION OF BYPASS OF CORONARY ARTERY USING GRAFT WITHOUT CARDIOPULMONARY PUMP OXYGENATION N/A 2023    Procedure: CABG, WITHOUT CARDIOPULMONARY PUMP OXYGENATION;  Surgeon: Jass Uriostegui MD;  Location: [facility] OR 2ND FLR;  Service: Cardiovascular;  Laterality: N/A;  CABG x1 off pump    FRACTIONAL FLOW RESERVE (FFR), CORONARY  2023    Procedure: Fractional Flow [place] (FFR), Coronary;  Surgeon: Je Fontanez MD;  Location: [facility] CATH LAB/EP;  Service: Cardiology;;    INSTANTANEOUS WAVE-FREE RATIO (IFR) N/A 2023    Procedure: Instantaneous Wave-Free Ratio (IFR);  Surgeon: Je Fontanez MD;  Location: [facility] CATH LAB/EP;  Service: Cardiology;  Laterality: N/A;    LEFT HEART CATHETERIZATION Left 2022    Procedure: Left heart cath;  Surgeon: Carter Arevalo MD;  Location: [facility] CATH LAB/EP;  Service: Cardiology;  Laterality: Left;    LEFT HEART CATHETERIZATION Left 2023    Procedure: Left heart cath;  Surgeon: Je Fontanez MD;  Location: [facility] CATH LAB/EP;  Service: Cardiology;  Laterality: Left;    PROSTATE BIOPSY  10/05/2022    RADIOACTIVE SEED IMPLANTATION OF PROSTATE  2023    Procedure: INSERTION, RADIOACTIVE SEED, PROSTATE;  Surgeon: Scott Frias MD;  Location: [facility] OR [address] [place] [address];  Service: Urology;;    TRANSRECTAL ULTRASOUND EXAMINATION N/A 2023    Procedure: ULTRASOUND, RECTAL APPROACH;  Surgeon: Scott Frias MD;  Location: [facility] OR [address] [place] [address];  Service: Urology;  Laterality: N/A;       Family History   Problem Relation Age of Onset    Hypertension Mother     Diabetes Mother     Kidney disease Mother     Coronary artery disease Father          of an MI at age 60    Hyperlipidemia Father     Heart attack Father     Hypertension Sister     Diabetes Sister     Heart disease Sister     Heart attack Sister     Hyperlipidemia Sister     Coronary artery disease Sister     Diabetes Sister     Hypertension Sister     Dementia Sister      No Known Problems Sister     Stomach cancer Sister     Cancer Sister         stomach     Hypertension Brother     Stroke Brother     Lung cancer Brother     Cancer Brother         lung cancer    Diabetes Brother     Peripheral vascular disease Brother     Hypertension Brother     Hyperlipidemia Brother     No Known Problems Brother     Diabetes Brother     Stroke Brother     Hypertension Brother     Hyperlipidemia Brother     HIV Brother     Diabetes Brother     Hypertension Brother     No Known Problems Daughter     Asthma Son     Hypertension Son     Anesthesia problems Neg Hx        Social History     Socioeconomic History    Marital status:    Tobacco Use    Smoking status: Former     Current packs/day: 0.00     Average packs/day: 0.5 packs/day for 26.0 years (13.0 ttl pk-yrs)     Types: Cigarettes     Start date:      Quit date:      Years since quittin.8     Passive exposure: Never    Smokeless tobacco: Never   Substance and Sexual Activity    Alcohol use: Not Currently    Drug use: No    Sexual activity: Yes     Partners: Female     Social Determinants of Health     Financial Resource Strain: Low Risk  (2023)    Overall Financial Resource Strain (CARDIA)     Difficulty of Paying Living Expenses: Not hard at all   Food Insecurity: Unknown (2023)    Hunger Vital Sign     Worried About Running Out of Food in the Last Year: Patient refused     Ran Out of Food in the Last Year: Patient refused   Transportation Needs: Unknown (2023)    PRAPARE - Transportation     Lack of Transportation (Medical): No     Lack of Transportation (Non-Medical): Patient refused   Physical Activity: Inactive (2023)    Exercise Vital Sign     Days of Exercise per Week: 0 days     Minutes of Exercise per Session: 0 min   Stress: Unknown (2023)    [employer] - Occupational Stress Questionnaire     Feeling of Stress : Patient  "refused   Social Connections: Unknown (7/24/2023)    Social Connection and Isolation Panel [NHANES]     Frequency of Communication with Friends and Family: Patient refused     Frequency of Social Gatherings with Friends and Family: Patient refused     Attends Sikh Services: Patient refused     Active Member of Clubs or Organizations: No     Attends Club or Organization Meetings: Never     Marital Status:    Housing Stability: Unknown (7/24/2023)    Housing Stability Vital Sign     Unable to Pay for Housing in the Last Year: No     Number of Places Lived in the Last Year: 1     Unstable Housing in the Last Year: Patient refused       Current Outpatient Medications   Medication Sig Dispense Refill    ADVANCED GLUC METER TEST STRIP Strp USE TO TEST BLOOD SUGAR 4 TIMES DAILY. 100 strip 0    atorvastatin (LIPITOR) 80 MG tablet Take 1 tablet (80 mg total) by mouth once daily. 90 tablet 3    BD ARIA 2ND GEN PEN NEEDLE 32 gauge x 5/32" Ndle USE ONE NEEDLE WITH INSULIN PEN TWICE DAILY 100 each 3    blood sugar diagnostic (BLOOD GLUCOSE TEST) Strp Check sugar once daily 100 each 11    blood-glucose meter Misc by Misc.(Non-Drug; Combo Route) route.      blood-glucose meter,continuous (DEXCOM G6 ) Misc 1 Device by Misc.(Non-Drug; Combo Route) route continuous. 1 each 0    blood-glucose sensor (DEXCOM G6 SENSOR) Tia 1 Device by Misc.(Non-Drug; Combo Route) route continuous. 4 each 11    blood-glucose transmitter (DEXCOM G6 TRANSMITTER) Tia 1 Device by Misc.(Non-Drug; Combo Route) route continuous. 1 each 0    clopidogreL (PLAVIX) 75 mg tablet Take 1 tablet (75 mg total) by mouth once daily. 90 tablet 4    dapagliflozin (FARXIGA) 10 mg tablet Take 1 tablet (10 mg total) by mouth once daily. 90 tablet 3    ergocalciferol (ERGOCALCIFEROL) 50,000 unit Cap Take 1 capsule (50,000 Units total) by mouth every 7 days. 12 capsule 3    ezetimibe (ZETIA) 10 mg tablet Take 1 tablet (10 mg total) by " mouth once daily. 90 tablet 3    insulin (LANTUS SOLOSTAR U-100 INSULIN) glargine 100 units/mL SubQ pen Inject 12 Units into the skin 2 (two) times a day. 21.6 mL 3    lancets (ACCU-CHEK SOFTCLIX LANCETS) Misc 1 lancet by Misc.(Non-Drug; Combo Route) route 2 (two) times daily. 200 each 1    olmesartan (BENICAR) 5 MG Tab Take 1 tablet (5 mg total) by mouth once daily. 90 tablet 3    pantoprazole (PROTONIX) 40 MG tablet Take 40 mg by mouth once daily.      semaglutide (OZEMPIC) 0.25 mg or 0.5 mg (2 mg/3 mL) pen injector Inject 0.25 mg into the skin every 7 days. 1 each 11    sildenafil (REVATIO) 20 mg Tab Take 1 tablet (20 mg total) by mouth daily as needed for ED 30 tablet 2    tamsulosin (FLOMAX) 0.4 mg Cap Take by mouth once daily.       No current facility-administered medications for this visit.       Review of patient's allergies indicates:  No Known Allergies      Objective:       Vitals:    11/14/23 0856   BP: 120/70   Pulse: 71   Temp: 98 °F (36.7 °C)       Physical Exam  Constitutional:       Appearance: He is well-developed.   HENT:      Head: Normocephalic and atraumatic.      Right Ear: External ear normal.      Left Ear: External ear normal.      Nose: Nose normal.      Mouth/Throat:      Pharynx: No oropharyngeal exudate.   Eyes:      General: No scleral icterus.        Right eye: No discharge.         Left eye: No discharge.      Conjunctiva/sclera: Conjunctivae normal.      Pupils: Pupils are equal, round, and reactive to light.   Neck:      Thyroid: No thyromegaly.      Vascular: No JVD.      Trachea: No tracheal deviation.   Cardiovascular:      Rate and Rhythm: Normal rate and regular rhythm.      Pulses:           Dorsalis pedis pulses are 1+ on the right side and 1+ on the left side.        Posterior tibial pulses are 1+ on the right side and 1+ on the left side.      Heart sounds: Normal heart sounds. No murmur heard.     No friction rub. No gallop.   Pulmonary:      Effort: Pulmonary  effort is normal. No respiratory distress.      Breath sounds: Normal breath sounds. No stridor. No wheezing or rales.   Chest:      Chest wall: No tenderness.   Abdominal:      General: Bowel sounds are normal. There is no distension.      Palpations: Abdomen is soft. There is no mass.      Tenderness: There is no abdominal tenderness. There is no guarding or rebound.      Hernia: No hernia is present.   Musculoskeletal:         General: Tenderness (TTP left shoulder with restricted ROM) present.      Cervical back: Normal range of motion and neck supple.      Right foot: Normal range of motion. No deformity, bunion, Charcot foot, foot drop or prominent metatarsal heads.      Left foot: Normal range of motion. No deformity, bunion, Charcot foot, foot drop or prominent metatarsal heads.      Comments: Neer and rodriguez+ left   Feet:      Right foot:      Protective Sensation: 10 sites tested.  10 sites sensed.      Skin integrity: Skin integrity normal.      Toenail Condition: Right toenails are normal.      Left foot:      Protective Sensation: 10 sites tested.  10 sites sensed.      Skin integrity: Skin integrity normal.      Toenail Condition: Left toenails are normal.   Lymphadenopathy:      Cervical: No cervical adenopathy.   Skin:     General: Skin is warm and dry.      Coloration: Skin is not pale.      Findings: No erythema or rash.   Neurological:      Mental Status: He is alert and oriented to person, place, and time.      Cranial Nerves: No cranial nerve deficit.      Motor: No abnormal muscle tone.      Coordination: Coordination normal.      Deep Tendon Reflexes: Reflexes are normal and symmetric. Reflexes normal.   Psychiatric:         Behavior: Behavior normal.         Thought Content: Thought content normal.         Judgment: Judgment normal.       Assessment:       Problem List Items Addressed This Visit       Dyslipidemia associated with type 2 diabetes mellitus    Type 2 diabetes mellitus with  stage 3a chronic kidney disease, without long-term current use of insulin    Stage 3b chronic kidney disease    Hypertension associated with diabetes - Primary    Coronary artery disease of native artery with stable angina pectoris     Other Visit Diagnoses       Fatigue, unspecified type        Relevant Orders    Testosterone            Plan:           Addy was seen today for follow-up.    Diagnoses and all orders for this visit:    Hypertension associated with diabetes    Type 2 diabetes mellitus with stage 3a chronic kidney disease, without long-term current use of insulin    Stage 3b chronic kidney disease    Fatigue, unspecified type  -     Testosterone; Future    Dyslipidemia associated with type 2 diabetes mellitus    Coronary artery disease of native artery of native heart with stable angina pectoris    HTN   -at goal  -continue lisinopril to 20 mg/ day    DM II   -controlled   -continue farxiga daily    CKD 3  -follow nephrology  -ER precautions given    HLD   -improving  -continue lipitor    GERD   -stable   -takes OTC PPI     ED'  -controlled  -T level          Spent adequate time in obtaining history and explaining differentials      30 minutes spent during this visit of which greater than 50% devoted to face-face counseling and coordination of care regarding diagnosis and management plan    Rtc 3 m r prn

## 2023-11-15 ENCOUNTER — CLINICAL SUPPORT (OUTPATIENT)
Dept: CARDIAC REHAB | Facility: CLINIC | Age: 68
End: 2023-11-15
Payer: MEDICARE

## 2023-11-15 DIAGNOSIS — Z95.1 POSTSURGICAL AORTOCORONARY BYPASS STATUS: Primary | ICD-10-CM

## 2023-11-15 DIAGNOSIS — I25.10 ATHEROSCLEROSIS OF NATIVE CORONARY ARTERY OF NATIVE HEART WITHOUT ANGINA PECTORIS: ICD-10-CM

## 2023-11-15 PROCEDURE — 93798 PR CARDIAC REHAB/MONITOR: ICD-10-PCS | Mod: S$GLB,,, | Performed by: INTERNAL MEDICINE

## 2023-11-15 PROCEDURE — 93798 PHYS/QHP OP CAR RHAB W/ECG: CPT | Mod: S$GLB,,, | Performed by: INTERNAL MEDICINE

## 2023-11-17 ENCOUNTER — CLINICAL SUPPORT (OUTPATIENT)
Dept: CARDIAC REHAB | Facility: CLINIC | Age: 68
End: 2023-11-17
Payer: MEDICARE

## 2023-11-17 ENCOUNTER — DOCUMENTATION ONLY (OUTPATIENT)
Dept: CARDIAC REHAB | Facility: CLINIC | Age: 68
End: 2023-11-17

## 2023-11-17 DIAGNOSIS — Z95.1 POSTSURGICAL AORTOCORONARY BYPASS STATUS: Primary | ICD-10-CM

## 2023-11-17 DIAGNOSIS — I25.10 ATHEROSCLEROSIS OF NATIVE CORONARY ARTERY OF NATIVE HEART WITHOUT ANGINA PECTORIS: ICD-10-CM

## 2023-11-17 PROCEDURE — 93798 PR CARDIAC REHAB/MONITOR: ICD-10-PCS | Mod: S$GLB,,, | Performed by: INTERNAL MEDICINE

## 2023-11-17 PROCEDURE — 93798 PHYS/QHP OP CAR RHAB W/ECG: CPT | Mod: S$GLB,,, | Performed by: INTERNAL MEDICINE

## 2023-11-17 NOTE — PROGRESS NOTES
Addy has completed 12 out of 36 exercise session of Phase II cardiac rehab.  A follow up reassessment will be completed at 24 sessions.    12 Session Follow Up   Cardiac Rehab Individual Treatment Plan - Reassessment      Patient Name: Addy Redding MRN: [id number]   : 1955   Age: 67 y.o.   Primary Diagnosis: CABG Date of Event: 23   EF: 55%  Risk Stratification: moderate  Referring Physician: JOANNA   Exercise Assessment:     Angina with exercise?: No   ST Depression with Exercise?: No  Fall Risk: No   Assistive Devices:  independent  Addy stated at orientation there were no limitations to exercise.  Comments on Progression: Addy is progressing well and his workloads will continue to be increased as he tolerates exercise intensity.    Exercise Plan:   Goals:  CR Exercise Goals: Attend Cardiac Rehab 3 times/week: In Progress  Home Aerobic Exercise: 2 additional days/week for 30-60 minutes: In Progress  Intensity of 12-15 on the Rate of Perceived Exertion (RPE) scale: In Progress  30% increase in entry estimated METS: 13.0 : In Progress  5 days/week for 30-60 minutes: In Progress  Demonstrate proper pulse taking technique: In Progress    Comments on Goal Progression:  Patient Consistency: consistent with attendance  Home exercise? Yes: Upright Bike and Walking; Frequency: 1 non-rehab day per week walking and 2 non-rehab days per week of upright bike; Duration 30 minutes of walking and 20 minutes of biking.  Patient reports intensity rate 12-13 on RPE scale  Patient is progressing steadily  Patient is not able to demonstrate proper pulse taking technique but is getting a fitness tracker.      Intervention/Recommendations:   Discussed importance of regular attendance to cardiac rehab class    Exercise Prescription:  THR Range 118-140   Mode: Treadmill  Elliptical   Frequency:  3 days/week   Duration:  30-60 minutes   Intensity:  12-15 RPE   Resistance Training:  Yes: 5 lb weights with 10-15 reps based on  strength and range of motion and adjusted accordingly     Home Prescription:  Mode Aerobic exercise   Frequency: 2- 3 days/week   Duration: 30-60 minutes   Resistance Training: None     Education:  Body Composition; verbalizes understanding; Date: 10-30-23  Diabetes; verbalizes understanding; Date: 11-13-23  Exercise and Rehydration; verbalizes understanding; Date: 11-6-23  What Comes After Phase II?; verbalizes understanding; Date: 10-23-23    Comments:  I reviewed exercise recommendations with Addy.  I encouraged him to continue exercising but to increase all exercise modalities to at least 30 minutes.  I also suggested eliminating resistance training on non-rehab days per week.  We discussed putting his stationary bike by a TV or use an ipad to make the time go faster.  He stated understanding.     The exercise prescription will continue to be adjusted based on tolerance of exercise intensity by patient.    Shawna Duke., CEP

## 2023-11-17 NOTE — PROGRESS NOTES
12 Session Follow Up   Cardiac Rehab Individual Treatment Plan - Reassessment    Patient Name: Addy Redding MRN: [id number]   : 1955   Age: 67 y.o.   Primary Diagnosis: s/p CABG    Nutrition Assessment:     Anthropometrics    Height 73 inches   Weight 204.3 lbs   BMI 27     Patient confirms he is taking lipitor 80mg/Zetia 10mg for cholesterol control.  Difficulty Chewing or Swallowing: No  Current Exercise: See Exercise Physiologist Note  Food Safety/Food Preparation: spouse  Living Arrangements/Family Support: Lives with spouse  Cultural/Spiritual/Personal Preferences: not applicable   Barriers to Education: none identified  Stage of Change Related to Diet Habits: Action  Recent Changes to Diet: Yes -  Food Diary: Given      Nutrition Plan:   Goals:  LDL-C < 70 (for high risk patients)  Hgb A1c < 7%  BMI < 25 and abdominal girth < 40M/<35 F  2 gram sodium, Mediterranean diet: In Progress  Fish intake (non-fried varieties) to a goal of 2-3 servings per week: In Progress  Increase fruit and vegetable intake: In Progress    Comments on Goal Progression:  Pt states he is feeling well, incorporating more produce with meals, eating more legumes and has been adding in catarino seeds for additional fiber/omega 3.    Interventions:  Dietitian Consult: No  Patient to participate in Cardiac Rehab sessions three times a week  Weekly Dietitian Weight Check  Nutrition Recommendations Provided: Verbal, Reviewed  Follow Up Plan for Ongoing Self-Management Support    Education:  Protein; verbalizes understanding; Date: 10/18/23  Sodium; verbalizes understanding; Date: 23  Fiber; verbalizes understanding; Date: 10/25/23  Pre/Probiotics; verbalizes understanding; Date: 11/15/23    Comments:   Discussed ways to incorporate healthy snacks, eating on a schedule, and monitoring sodium intake for heart health.      Diabetes  Is the patient diabetic? Yes   Other Core Components/Diabetes Assessment:   Labs:  Lab Results   Component  Value Date    GLUF 85 10/03/2023     Lab Results   Component Value Date    HGBA1C 6.5 (H) 10/03/2023    HGBA1C 6.5 (H) 09/25/2023    HGBA1C 6.5 (H) 08/14/2023      Lab Results   Component Value Date    ESTIMATEDAVG 140 (H) 10/03/2023    ESTIMATEDAVG 140 (H) 09/25/2023    ESTIMATEDAVG 140 (H) 08/14/2023       History of diabetes since 2008  Diabetes medications: Ozempic 0.25mg injection weekly, Farxiga 10mg, lantus 12u BID  Blood Glucose Checks at Home: Yes: random blood sugars  Typical morning range: 120-130 mg/dl  Endocrinologist or PCP following DM: PCP Dr. Ramey  Other Core Components/Diabetes Plan:   Goals:  Hgb A1c < 7%  Exercise Blood Glucose: 100-300 mg/dl    Interventions:  Medication Compliance  Med Card Reconciled  Low Sodium, Mediterranean, ADA Diet  Physical Activity  Increase Knowledge of Contributory Factors    Education:  Diabetes; verbalizes understanding; Date: 11/13/23    Comments:   Patient verbalizes understanding to bring home glucometer and check glucose pre and post each exercise session.  Per cardiac rehab protocols, patient's glucose must be between 90 and 270 mg/dL to exercise.  Patient denies any recent glucose levels less than 60 mg/dL or greater than 300 mg/dL. Patient verbalizes importance of notifying rehab staff if symptoms of hypoglycemia occur while at cardiac rehab.  Abnormal labs will be reported to patient's PCP/Endocrinologist by rehab staff.    Noted updated glucose parameters of 100-300  Pt has exhibited excellent glucose control while in rehab    Mary Hilario MS, RDN/LDN

## 2023-11-20 ENCOUNTER — CLINICAL SUPPORT (OUTPATIENT)
Dept: CARDIAC REHAB | Facility: CLINIC | Age: 68
End: 2023-11-20
Payer: MEDICARE

## 2023-11-20 DIAGNOSIS — I25.10 CORONARY ARTERY DISEASE, UNSPECIFIED VESSEL OR LESION TYPE, UNSPECIFIED WHETHER ANGINA PRESENT, UNSPECIFIED WHETHER NATIVE OR TRANSPLANTED HEART: ICD-10-CM

## 2023-11-20 DIAGNOSIS — Z95.1 POSTSURGICAL AORTOCORONARY BYPASS STATUS: Primary | ICD-10-CM

## 2023-11-20 PROCEDURE — 93798 PHYS/QHP OP CAR RHAB W/ECG: CPT | Mod: S$GLB,,, | Performed by: INTERNAL MEDICINE

## 2023-11-20 PROCEDURE — 93798 PR CARDIAC REHAB/MONITOR: ICD-10-PCS | Mod: S$GLB,,, | Performed by: INTERNAL MEDICINE

## 2023-11-20 NOTE — PROGRESS NOTES
Session: 12 Session Follow Up   Cardiac Rehab Individual Treatment Plan - Reassessment      Patient Name: Addy Redding MRN: [id number]   : 1955   Age: 67 y.o.   Primary Diagnosis: CABG      Psychosocial Assessment:   Living Arrangements: Lives with spouse  Family Support: children and spouse  Self Reported: decrease Effective Coping Skills  Displays: calmness  Medication: not applicable    Psychosocial Plan:   Goals:  Improved psychosocial coping strategies: In Progress  Maintain positive support system: In Progress  Maintain positive outlook: In Progress  Improve overall quality of life: In Progress    Comments on Goal Progression:  Patient will continue working on achieving goals that were set.  He reports to have good support at home from his wife.  He is very motivated & working on making changes.    Interventions:  Patient to Self Report Emotional Changes at Session Check In  Recommend Physical Activity  Recommend Attending Education Lectures  Notify MD: No  Program Referral: No  Pharmaceutical Intervention/Therapy: No  Other Needs: not applicable  Stage of Readiness to Change: Action    Education:  Stress Management; verbalizes understanding; Date: 2023  Stress; verbalizes understanding; Date: 2023    Comments:  Patient denies having any overwhelming stress or anxiety.  He states that he feels much better since beginning cardiac rehab.  He has been instructed to notify staff in the event that circumstances change.  Patient verbalizes understanding.    Other Core Components/Risk Factors Assessment:   RISK FACTORS:  diabetes, hyperlipidemia, hypertension    Learning Barriers: None    Medication Compliance: has been compliant with taking medications    Other Core Components/Risk Factors Plan:   Goals:  Decrease cholesterol level: In Progress  Increase exercise tolerance: In Progress  Increase knowledge of CAD: In Progress  Decrease blood pressure: In Progress  Identify and manage personal areas of  stress: In Progress  Control diabetes by adjusting diet and exercise: In Progress  Learn more about healthy eating: In Progress    Comments on Goal Progression:  Patient will continue working on achieving goals that were set to decrease risk factors for CAD.  He is attending lectures & exercise sessions on a consistent basis & reports that he is exercising 2 additional days outside of rehab.  He is monitoring his BP at home & reports to be obtaining BP readings similar to those obtained in cardiac rehab.  He is monitoring his blood glucose pre/post exercise    Interventions:  Individual Education/ Counseling: Yes  Physician Referral: No    Education:    fluid overload/CHF, verbalizes understanding; Date: 11/20/2023  hypertension, verbalizes understanding; Date: 11/20/2023  risk factors, verbalizes understanding; Date: 11/20/2023  stress, verbalizes understanding; Date: 11/20/2023         Education method adapted to patients education level and preferred method of learning.  Method: explanation  handouts    Comments:  Encouraged for patient to continue working on achieving goals that were set.  Patient is attending lectures & exercise sessions on a consistent basis & reports that he is learning a lot.  Encouraged to continue monitoring BP at home & keep a daily log of readings.    Other Core Components/Hypertension Assessment:   BP Range: 140-98/86-60  BP at Goal: BP is at goal.  Noted 2 elevations above goal of 130/80 or less.  Patient reported symptoms: none    Other Core Components/Hypertension Plan:   Goals:  Blood Pressure <130/80    Comments on Goal Progression:  Patient will continue to monitor BP readings at home & will notify cardiologist of any elevations.  He has been instructed to take medications 1.5-2 hours prior to exercise session.      Interventions:  Med Card Reconciled: Yes  Encourage medication compliance  Encourage sodium reduction  Encourage weight loss  Recommend physical activity  Educate on  contributory factors  Reduce stress, anxiety, anger, depression, and/or chronic pain  Encourage home blood pressure monitoring  Recommend daily weights    Education:    Risk Factors; verbalizes understanding; Date: 11/20/2023  Stroke; verbalizes understanding; Date: 11/3/2023  Stress; verbalizes understanding; Date: 11/20/2023         Comments:  Patient will continue to follow recommendations to keep BP under good control at or below goal of 130/80.  Patient is monitoring his BP at home.  Encouraged to continue monitoring.  He is watching sodium intake & is working on making necessary changes in diet.  He is exercising 2 additional days per week outside of rehab.    Does the patient have Heart Failure? No      Other Core Components/Tobacco Cessation Assessment:   Smoking Status: past smoker who quit 1995  Smoking Cessation Barriers:  N/A  Stage of Readiness to Change: Maintenance    Other Core Components/Tobacco Cessation Plan:   Goals:  Maintain non-smoking status    Comments on Goal Progression:  Non smoker.    Interventions:  Maintains non-smoking status    Education:    Risk Factors; verbalizes understanding; Date: 11/20/2023         Comments:  Non smoker.    Abigail Alvarez RN  Cardiac Rehab Nurse

## 2023-11-24 ENCOUNTER — DOCUMENTATION ONLY (OUTPATIENT)
Dept: CARDIAC REHAB | Facility: CLINIC | Age: 68
End: 2023-11-24
Payer: MEDICARE

## 2023-11-24 NOTE — PROGRESS NOTES
Addy has completed 13 out of 36 exercise session of Phase II cardiac rehab.  A follow up reassessment will be completed at 24 sessions.    12 Session Follow Up   Cardiac Rehab Individual Treatment Plan - Reassessment      Patient Name: Addy Redding MRN: [id number]   : 1955   Age: 68 y.o.   Primary Diagnosis: CABG Date of Event: 23   EF: 55%  Risk Stratification: moderate  Referring Physician: JOANNA   Exercise Assessment:     Angina with exercise?: No   ST Depression with Exercise?: No  Fall Risk: No   Assistive Devices:  independent  Addy stated at orientation there were no limitations to exercise.  Comments on Progression: Addy is progressing well and his workloads will continue to be increased as he tolerates exercise intensity.    Exercise Plan:   Goals:  CR Exercise Goals: Attend Cardiac Rehab 3 times/week: In Progress  Home Aerobic Exercise: 2 additional days/week for 30-60 minutes: In Progress  Intensity of 12-15 on the Rate of Perceived Exertion (RPE) scale: In Progress  30% increase in entry estimated METS: 13.0 : In Progress  5 days/week for 30-60 minutes: In Progress  Demonstrate proper pulse taking technique: In Progress    Comments on Goal Progression:  Patient Consistency: consistent with attendance  Home exercise? Yes: Upright Bike and Walking; Frequency: 1 non-rehab day per week walking and 2 non-rehab days per week of upright bike; Duration 30 minutes of walking and 20 minutes of biking.  Patient reports intensity rate 12-13 on RPE scale  Patient is progressing steadily  Patient is not able to demonstrate proper pulse taking technique but is getting a fitness tracker.      Intervention/Recommendations:   Discussed importance of regular attendance to cardiac rehab class    Exercise Prescription:  THR Range 118-140   Mode: Treadmill  Elliptical   Frequency:  3 days/week   Duration:  30-60 minutes   Intensity:  12-15 RPE   Resistance Training:  Yes: 5 lb weights with 10-15 reps based on  strength and range of motion and adjusted accordingly     Home Prescription:  Mode Aerobic exercise   Frequency: 2- 3 days/week   Duration: 30-60 minutes   Resistance Training: None     Education:  Body Composition; verbalizes understanding; Date: 10-30-23  Diabetes; verbalizes understanding; Date: 23  Exercise and Rehydration; verbalizes understanding; Date: 23  What Comes After Phase II?; verbalizes understanding; Date: 10-23-23    Comments:  I reviewed exercise recommendations with Addy.  I encouraged him to continue exercising but to increase all exercise modalities to at least 30 minutes.  I also suggested eliminating resistance training on non-rehab days per week.  We discussed putting his stationary bike by a TV or use an ipad to make the time go faster.  He stated understanding.     The exercise prescription will continue to be adjusted based on tolerance of exercise intensity by patient.    Marky Duke, CEP    12 Session Follow Up   Cardiac Rehab Individual Treatment Plan - Reassessment    Patient Name: Addy Redding MRN: [id number]   : 1955   Age: 68 y.o.   Primary Diagnosis: s/p CABG    Nutrition Assessment:     Anthropometrics    Height 73 inches   Weight 204.3 lbs   BMI 27     Patient confirms he is taking lipitor 80mg/Zetia 10mg for cholesterol control.  Difficulty Chewing or Swallowing: No  Current Exercise: See Exercise Physiologist Note  Food Safety/Food Preparation: spouse  Living Arrangements/Family Support: Lives with spouse  Cultural/Spiritual/Personal Preferences: not applicable   Barriers to Education: none identified  Stage of Change Related to Diet Habits: Action  Recent Changes to Diet: Yes -  Food Diary: Given      Nutrition Plan:   Goals:  LDL-C < 70 (for high risk patients)  Hgb A1c < 7%  BMI < 25 and abdominal girth < 40M/<35 F  2 gram sodium, Mediterranean diet: In Progress  Fish intake (non-fried varieties) to a goal of 2-3 servings per week: In  Progress  Increase fruit and vegetable intake: In Progress    Comments on Goal Progression:  Pt states he is feeling well, incorporating more produce with meals, eating more legumes and has been adding in catarino seeds for additional fiber/omega 3.    Interventions:  Dietitian Consult: No  Patient to participate in Cardiac Rehab sessions three times a week  Weekly Dietitian Weight Check  Nutrition Recommendations Provided: Verbal, Reviewed  Follow Up Plan for Ongoing Self-Management Support    Education:  Protein; verbalizes understanding; Date: 10/18/23  Sodium; verbalizes understanding; Date: 11/1/23  Fiber; verbalizes understanding; Date: 10/25/23  Pre/Probiotics; verbalizes understanding; Date: 11/15/23    Comments:   Discussed ways to incorporate healthy snacks, eating on a schedule, and monitoring sodium intake for heart health.      Diabetes  Is the patient diabetic? Yes   Other Core Components/Diabetes Assessment:   Labs:  Lab Results   Component Value Date    GLUF 85 10/03/2023     Lab Results   Component Value Date    HGBA1C 6.5 (H) 10/03/2023    HGBA1C 6.5 (H) 09/25/2023    HGBA1C 6.5 (H) 08/14/2023      Lab Results   Component Value Date    ESTIMATEDAVG 140 (H) 10/03/2023    ESTIMATEDAVG 140 (H) 09/25/2023    ESTIMATEDAVG 140 (H) 08/14/2023       History of diabetes since 2008  Diabetes medications: Ozempic 0.25mg injection weekly, Farxiga 10mg, lantus 12u BID  Blood Glucose Checks at Home: Yes: random blood sugars  Typical morning range: 120-130 mg/dl  Endocrinologist or PCP following DM: PCP Dr. Ramey  Other Core Components/Diabetes Plan:   Goals:  Hgb A1c < 7%  Exercise Blood Glucose: 100-300 mg/dl    Interventions:  Medication Compliance  Med Card Reconciled  Low Sodium, Mediterranean, ADA Diet  Physical Activity  Increase Knowledge of Contributory Factors    Education:  Diabetes; verbalizes understanding; Date: 11/13/23    Comments:   Patient verbalizes understanding to bring home glucometer and check  glucose pre and post each exercise session.  Per cardiac rehab protocols, patient's glucose must be between 90 and 270 mg/dL to exercise.  Patient denies any recent glucose levels less than 60 mg/dL or greater than 300 mg/dL. Patient verbalizes importance of notifying rehab staff if symptoms of hypoglycemia occur while at cardiac rehab.  Abnormal labs will be reported to patient's PCP/Endocrinologist by rehab staff.    Noted updated glucose parameters of 100-300  Pt has exhibited excellent glucose control while in rehab    Mary Hilario MS, RDN/LDN    Session: 12 Session Follow Up   Cardiac Rehab Individual Treatment Plan - Reassessment      Patient Name: Addy Redding MRN: [id number]   : 1955   Age: 68 y.o.   Primary Diagnosis: CABG      Psychosocial Assessment:   Living Arrangements: Lives with spouse  Family Support: children and spouse  Self Reported: decrease Effective Coping Skills  Displays: calmness  Medication: not applicable    Psychosocial Plan:   Goals:  Improved psychosocial coping strategies: In Progress  Maintain positive support system: In Progress  Maintain positive outlook: In Progress  Improve overall quality of life: In Progress    Comments on Goal Progression:  Patient will continue working on achieving goals that were set.  He reports to have good support at home from his wife.  He is very motivated & working on making changes.    Interventions:  Patient to Self Report Emotional Changes at Session Check In  Recommend Physical Activity  Recommend Attending Education Lectures  Notify MD: No  Program Referral: No  Pharmaceutical Intervention/Therapy: No  Other Needs: not applicable  Stage of Readiness to Change: Action    Education:  Stress Management; verbalizes understanding; Date: 2023  Stress; verbalizes understanding; Date: 2023    Comments:  Patient denies having any overwhelming stress or anxiety.  He states that he feels much better since beginning cardiac rehab.  He has  been instructed to notify staff in the event that circumstances change.  Patient verbalizes understanding.    Other Core Components/Risk Factors Assessment:   RISK FACTORS:  diabetes, hyperlipidemia, hypertension    Learning Barriers: None    Medication Compliance: has been compliant with taking medications    Other Core Components/Risk Factors Plan:   Goals:  Decrease cholesterol level: In Progress  Increase exercise tolerance: In Progress  Increase knowledge of CAD: In Progress  Decrease blood pressure: In Progress  Identify and manage personal areas of stress: In Progress  Control diabetes by adjusting diet and exercise: In Progress  Learn more about healthy eating: In Progress    Comments on Goal Progression:  Patient will continue working on achieving goals that were set to decrease risk factors for CAD.  He is attending lectures & exercise sessions on a consistent basis & reports that he is exercising 2 additional days outside of rehab.  He is monitoring his BP at home & reports to be obtaining BP readings similar to those obtained in cardiac rehab.  He is monitoring his blood glucose pre/post exercise    Interventions:  Individual Education/ Counseling: Yes  Physician Referral: No    Education:    fluid overload/CHF, verbalizes understanding; Date: 11/20/2023  hypertension, verbalizes understanding; Date: 11/20/2023  risk factors, verbalizes understanding; Date: 11/20/2023  stress, verbalizes understanding; Date: 11/20/2023         Education method adapted to patients education level and preferred method of learning.  Method: explanation  handouts    Comments:  Encouraged for patient to continue working on achieving goals that were set.  Patient is attending lectures & exercise sessions on a consistent basis & reports that he is learning a lot.  Encouraged to continue monitoring BP at home & keep a daily log of readings.    Other Core Components/Hypertension Assessment:   BP Range: 140-98/86-60  BP at Goal:  BP is at goal.  Noted 2 elevations above goal of 130/80 or less.  Patient reported symptoms: none    Other Core Components/Hypertension Plan:   Goals:  Blood Pressure <130/80    Comments on Goal Progression:  Patient will continue to monitor BP readings at home & will notify cardiologist of any elevations.  He has been instructed to take medications 1.5-2 hours prior to exercise session.      Interventions:  Med Card Reconciled: Yes  Encourage medication compliance  Encourage sodium reduction  Encourage weight loss  Recommend physical activity  Educate on contributory factors  Reduce stress, anxiety, anger, depression, and/or chronic pain  Encourage home blood pressure monitoring  Recommend daily weights    Education:    Risk Factors; verbalizes understanding; Date: 11/20/2023  Stroke; verbalizes understanding; Date: 11/3/2023  Stress; verbalizes understanding; Date: 11/20/2023         Comments:  Patient will continue to follow recommendations to keep BP under good control at or below goal of 130/80.  Patient is monitoring his BP at home.  Encouraged to continue monitoring.  He is watching sodium intake & is working on making necessary changes in diet.  He is exercising 2 additional days per week outside of rehab.    Does the patient have Heart Failure? No      Other Core Components/Tobacco Cessation Assessment:   Smoking Status: past smoker who quit 1995  Smoking Cessation Barriers:  N/A  Stage of Readiness to Change: Maintenance    Other Core Components/Tobacco Cessation Plan:   Goals:  Maintain non-smoking status    Comments on Goal Progression:  Non smoker.    Interventions:  Maintains non-smoking status    Education:    Risk Factors; verbalizes understanding; Date: 11/20/2023         Comments:  Non smoker.    Abigail Alvarez RN  Cardiac Rehab Nurse

## 2023-11-29 ENCOUNTER — CLINICAL SUPPORT (OUTPATIENT)
Dept: CARDIAC REHAB | Facility: CLINIC | Age: 68
End: 2023-11-29
Payer: MEDICARE

## 2023-11-29 DIAGNOSIS — Z95.1 POSTSURGICAL AORTOCORONARY BYPASS STATUS: Primary | ICD-10-CM

## 2023-11-29 DIAGNOSIS — I25.10 CORONARY ARTERY DISEASE, UNSPECIFIED VESSEL OR LESION TYPE, UNSPECIFIED WHETHER ANGINA PRESENT, UNSPECIFIED WHETHER NATIVE OR TRANSPLANTED HEART: ICD-10-CM

## 2023-11-29 PROCEDURE — 93798 PHYS/QHP OP CAR RHAB W/ECG: CPT | Mod: S$GLB,,, | Performed by: INTERNAL MEDICINE

## 2023-11-29 PROCEDURE — 93798 PR CARDIAC REHAB/MONITOR: ICD-10-PCS | Mod: S$GLB,,, | Performed by: INTERNAL MEDICINE

## 2023-12-01 ENCOUNTER — CLINICAL SUPPORT (OUTPATIENT)
Dept: CARDIAC REHAB | Facility: CLINIC | Age: 68
End: 2023-12-01
Payer: MEDICARE

## 2023-12-01 DIAGNOSIS — Z95.1 POSTSURGICAL AORTOCORONARY BYPASS STATUS: Primary | ICD-10-CM

## 2023-12-01 DIAGNOSIS — I25.10 CORONARY ATHEROSCLEROSIS OF NATIVE CORONARY ARTERY: ICD-10-CM

## 2023-12-01 PROCEDURE — 93798 PHYS/QHP OP CAR RHAB W/ECG: CPT | Mod: S$GLB,,, | Performed by: INTERNAL MEDICINE

## 2023-12-01 PROCEDURE — 93798 PR CARDIAC REHAB/MONITOR: ICD-10-PCS | Mod: S$GLB,,, | Performed by: INTERNAL MEDICINE

## 2023-12-04 ENCOUNTER — CLINICAL SUPPORT (OUTPATIENT)
Dept: CARDIAC REHAB | Facility: CLINIC | Age: 68
End: 2023-12-04
Payer: MEDICARE

## 2023-12-04 ENCOUNTER — LAB VISIT (OUTPATIENT)
Dept: LAB | Facility: HOSPITAL | Age: 68
End: 2023-12-04
Payer: MEDICARE

## 2023-12-04 DIAGNOSIS — Z95.1 POSTSURGICAL AORTOCORONARY BYPASS STATUS: Primary | ICD-10-CM

## 2023-12-04 DIAGNOSIS — I25.10 ATHEROSCLEROSIS OF NATIVE CORONARY ARTERY OF NATIVE HEART WITHOUT ANGINA PECTORIS: ICD-10-CM

## 2023-12-04 DIAGNOSIS — C61 PROSTATE CANCER: ICD-10-CM

## 2023-12-04 DIAGNOSIS — R53.83 FATIGUE, UNSPECIFIED TYPE: ICD-10-CM

## 2023-12-04 DIAGNOSIS — N18.30 DIABETES MELLITUS WITH STAGE 3 CHRONIC KIDNEY DISEASE: ICD-10-CM

## 2023-12-04 DIAGNOSIS — E11.22 DIABETES MELLITUS WITH STAGE 3 CHRONIC KIDNEY DISEASE: ICD-10-CM

## 2023-12-04 LAB
ALBUMIN SERPL BCP-MCNC: 3.6 G/DL (ref 3.5–5.2)
ANION GAP SERPL CALC-SCNC: 10 MMOL/L (ref 8–16)
BACTERIA #/AREA URNS HPF: NORMAL /HPF
BASOPHILS # BLD AUTO: 0.04 K/UL (ref 0–0.2)
BASOPHILS NFR BLD: 0.8 % (ref 0–1.9)
BILIRUB UR QL STRIP: NEGATIVE
BUN SERPL-MCNC: 22 MG/DL (ref 8–23)
CALCIUM SERPL-MCNC: 9.8 MG/DL (ref 8.7–10.5)
CHLORIDE SERPL-SCNC: 109 MMOL/L (ref 95–110)
CLARITY UR: CLEAR
CO2 SERPL-SCNC: 21 MMOL/L (ref 23–29)
COLOR UR: YELLOW
COMPLEXED PSA SERPL-MCNC: 0.45 NG/ML (ref 0–4)
CREAT SERPL-MCNC: 1.7 MG/DL (ref 0.5–1.4)
CREAT UR-MCNC: 275.4 MG/DL (ref 23–375)
DIFFERENTIAL METHOD: NORMAL
EOSINOPHIL # BLD AUTO: 0.2 K/UL (ref 0–0.5)
EOSINOPHIL NFR BLD: 3.3 % (ref 0–8)
ERYTHROCYTE [DISTWIDTH] IN BLOOD BY AUTOMATED COUNT: 14.2 % (ref 11.5–14.5)
EST. GFR  (NO RACE VARIABLE): 43 ML/MIN/1.73 M^2
GLUCOSE SERPL-MCNC: 88 MG/DL (ref 70–110)
GLUCOSE UR QL STRIP: ABNORMAL
HCT VFR BLD AUTO: 43.8 % (ref 40–54)
HGB BLD-MCNC: 14.6 G/DL (ref 14–18)
HGB UR QL STRIP: ABNORMAL
IMM GRANULOCYTES # BLD AUTO: 0.02 K/UL (ref 0–0.04)
IMM GRANULOCYTES NFR BLD AUTO: 0.4 % (ref 0–0.5)
KETONES UR QL STRIP: NEGATIVE
LEUKOCYTE ESTERASE UR QL STRIP: NEGATIVE
LYMPHOCYTES # BLD AUTO: 1.5 K/UL (ref 1–4.8)
LYMPHOCYTES NFR BLD: 28.5 % (ref 18–48)
MAGNESIUM SERPL-MCNC: 1.8 MG/DL (ref 1.6–2.6)
MCH RBC QN AUTO: 28.8 PG (ref 27–31)
MCHC RBC AUTO-ENTMCNC: 33.3 G/DL (ref 32–36)
MCV RBC AUTO: 86 FL (ref 82–98)
MICROSCOPIC COMMENT: NORMAL
MONOCYTES # BLD AUTO: 0.6 K/UL (ref 0.3–1)
MONOCYTES NFR BLD: 11.6 % (ref 4–15)
NEUTROPHILS # BLD AUTO: 2.9 K/UL (ref 1.8–7.7)
NEUTROPHILS NFR BLD: 55.4 % (ref 38–73)
NITRITE UR QL STRIP: NEGATIVE
NRBC BLD-RTO: 0 /100 WBC
PH UR STRIP: 6 [PH] (ref 5–8)
PHOSPHATE SERPL-MCNC: 2.9 MG/DL (ref 2.7–4.5)
PLATELET # BLD AUTO: 253 K/UL (ref 150–450)
PMV BLD AUTO: 11 FL (ref 9.2–12.9)
POTASSIUM SERPL-SCNC: 3.7 MMOL/L (ref 3.5–5.1)
PROT UR QL STRIP: ABNORMAL
PROT UR-MCNC: 20 MG/DL (ref 0–15)
PROT/CREAT UR: 0.07 MG/G{CREAT} (ref 0–0.2)
PTH-INTACT SERPL-MCNC: 135 PG/ML (ref 9–77)
RBC # BLD AUTO: 5.07 M/UL (ref 4.6–6.2)
RBC #/AREA URNS HPF: 1 /HPF (ref 0–4)
SODIUM SERPL-SCNC: 140 MMOL/L (ref 136–145)
SP GR UR STRIP: 1.02 (ref 1–1.03)
TESTOST SERPL-MCNC: 589 NG/DL (ref 304–1227)
URATE SERPL-MCNC: 4.2 MG/DL (ref 3.4–7)
URN SPEC COLLECT METH UR: ABNORMAL
UROBILINOGEN UR STRIP-ACNC: NEGATIVE EU/DL
WBC # BLD AUTO: 5.16 K/UL (ref 3.9–12.7)
WBC #/AREA URNS HPF: 0 /HPF (ref 0–5)
YEAST URNS QL MICRO: NORMAL

## 2023-12-04 PROCEDURE — 82570 ASSAY OF URINE CREATININE: CPT | Performed by: INTERNAL MEDICINE

## 2023-12-04 PROCEDURE — 84550 ASSAY OF BLOOD/URIC ACID: CPT | Performed by: INTERNAL MEDICINE

## 2023-12-04 PROCEDURE — 84153 ASSAY OF PSA TOTAL: CPT | Performed by: RADIOLOGY

## 2023-12-04 PROCEDURE — 85025 COMPLETE CBC W/AUTO DIFF WBC: CPT | Performed by: INTERNAL MEDICINE

## 2023-12-04 PROCEDURE — 93798 PHYS/QHP OP CAR RHAB W/ECG: CPT | Mod: S$GLB,,, | Performed by: INTERNAL MEDICINE

## 2023-12-04 PROCEDURE — 83970 ASSAY OF PARATHORMONE: CPT | Performed by: INTERNAL MEDICINE

## 2023-12-04 PROCEDURE — 36415 COLL VENOUS BLD VENIPUNCTURE: CPT | Performed by: FAMILY MEDICINE

## 2023-12-04 PROCEDURE — 81000 URINALYSIS NONAUTO W/SCOPE: CPT | Performed by: INTERNAL MEDICINE

## 2023-12-04 PROCEDURE — 83735 ASSAY OF MAGNESIUM: CPT | Performed by: INTERNAL MEDICINE

## 2023-12-04 PROCEDURE — 80069 RENAL FUNCTION PANEL: CPT | Performed by: INTERNAL MEDICINE

## 2023-12-04 PROCEDURE — 84403 ASSAY OF TOTAL TESTOSTERONE: CPT | Performed by: FAMILY MEDICINE

## 2023-12-04 PROCEDURE — 93798 PR CARDIAC REHAB/MONITOR: ICD-10-PCS | Mod: S$GLB,,, | Performed by: INTERNAL MEDICINE

## 2023-12-06 ENCOUNTER — CLINICAL SUPPORT (OUTPATIENT)
Dept: CARDIAC REHAB | Facility: CLINIC | Age: 68
End: 2023-12-06
Payer: MEDICARE

## 2023-12-06 DIAGNOSIS — I25.10 CORONARY ARTERY DISEASE, UNSPECIFIED VESSEL OR LESION TYPE, UNSPECIFIED WHETHER ANGINA PRESENT, UNSPECIFIED WHETHER NATIVE OR TRANSPLANTED HEART: ICD-10-CM

## 2023-12-06 DIAGNOSIS — Z95.1 POSTSURGICAL AORTOCORONARY BYPASS STATUS: Primary | ICD-10-CM

## 2023-12-06 PROCEDURE — 93798 PR CARDIAC REHAB/MONITOR: ICD-10-PCS | Mod: S$GLB,,, | Performed by: INTERNAL MEDICINE

## 2023-12-06 PROCEDURE — 93798 PHYS/QHP OP CAR RHAB W/ECG: CPT | Mod: S$GLB,,, | Performed by: INTERNAL MEDICINE

## 2023-12-08 ENCOUNTER — CLINICAL SUPPORT (OUTPATIENT)
Dept: CARDIAC REHAB | Facility: CLINIC | Age: 68
End: 2023-12-08
Payer: MEDICARE

## 2023-12-08 DIAGNOSIS — I25.10 ATHEROSCLEROSIS OF NATIVE CORONARY ARTERY OF NATIVE HEART WITHOUT ANGINA PECTORIS: ICD-10-CM

## 2023-12-08 DIAGNOSIS — Z95.1 POSTSURGICAL AORTOCORONARY BYPASS STATUS: Primary | ICD-10-CM

## 2023-12-08 PROCEDURE — 93798 PHYS/QHP OP CAR RHAB W/ECG: CPT | Mod: S$GLB,,, | Performed by: INTERNAL MEDICINE

## 2023-12-08 PROCEDURE — 93798 PR CARDIAC REHAB/MONITOR: ICD-10-PCS | Mod: S$GLB,,, | Performed by: INTERNAL MEDICINE

## 2023-12-11 ENCOUNTER — CLINICAL SUPPORT (OUTPATIENT)
Dept: CARDIAC REHAB | Facility: CLINIC | Age: 68
End: 2023-12-11
Payer: MEDICARE

## 2023-12-11 DIAGNOSIS — Z95.1 POSTSURGICAL AORTOCORONARY BYPASS STATUS: Primary | ICD-10-CM

## 2023-12-11 DIAGNOSIS — I25.10 ATHEROSCLEROSIS OF NATIVE CORONARY ARTERY OF NATIVE HEART, UNSPECIFIED WHETHER ANGINA PRESENT: ICD-10-CM

## 2023-12-11 PROCEDURE — 93798 PR CARDIAC REHAB/MONITOR: ICD-10-PCS | Mod: S$GLB,,, | Performed by: INTERNAL MEDICINE

## 2023-12-11 PROCEDURE — 93798 PHYS/QHP OP CAR RHAB W/ECG: CPT | Mod: S$GLB,,, | Performed by: INTERNAL MEDICINE

## 2023-12-13 ENCOUNTER — CLINICAL SUPPORT (OUTPATIENT)
Dept: CARDIAC REHAB | Facility: CLINIC | Age: 68
End: 2023-12-13
Payer: MEDICARE

## 2023-12-13 DIAGNOSIS — Z95.1 POSTSURGICAL AORTOCORONARY BYPASS STATUS: Primary | ICD-10-CM

## 2023-12-13 DIAGNOSIS — I25.10 CORONARY ARTERY DISEASE, UNSPECIFIED VESSEL OR LESION TYPE, UNSPECIFIED WHETHER ANGINA PRESENT, UNSPECIFIED WHETHER NATIVE OR TRANSPLANTED HEART: ICD-10-CM

## 2023-12-13 PROCEDURE — 93798 PR CARDIAC REHAB/MONITOR: ICD-10-PCS | Mod: S$GLB,,, | Performed by: INTERNAL MEDICINE

## 2023-12-13 PROCEDURE — 93798 PHYS/QHP OP CAR RHAB W/ECG: CPT | Mod: S$GLB,,, | Performed by: INTERNAL MEDICINE

## 2023-12-13 NOTE — PROGRESS NOTES
Patient arrived for cardiac rehab session. Pt reports to have eaten prior to exercise session.  The patient was on continuous EKG monitoring throughout the session. The patient tolerated exercise session well with no complaints.     Yes

## 2023-12-14 ENCOUNTER — DOCUMENTATION ONLY (OUTPATIENT)
Dept: CARDIAC REHAB | Facility: CLINIC | Age: 68
End: 2023-12-14
Payer: MEDICARE

## 2023-12-14 NOTE — PROGRESS NOTES
Addy has completed 21 out of 36 exercise session of Phase II cardiac rehab.  A follow up reassessment will be completed at 24 sessions.    12 Session Follow Up   Cardiac Rehab Individual Treatment Plan - Reassessment      Patient Name: Addy Redding MRN: [id number]   : 1955   Age: 68 y.o.   Primary Diagnosis: CABG Date of Event: 23   EF: 55%  Risk Stratification: moderate  Referring Physician: JOANNA   Exercise Assessment:     Angina with exercise?: No   ST Depression with Exercise?: No  Fall Risk: No   Assistive Devices:  independent  Addy stated at orientation there were no limitations to exercise.  Comments on Progression: Addy is progressing well and his workloads will continue to be increased as he tolerates exercise intensity.    Exercise Plan:   Goals:  CR Exercise Goals: Attend Cardiac Rehab 3 times/week: In Progress  Home Aerobic Exercise: 2 additional days/week for 30-60 minutes: In Progress  Intensity of 12-15 on the Rate of Perceived Exertion (RPE) scale: In Progress  30% increase in entry estimated METS: 13.0 : In Progress  5 days/week for 30-60 minutes: In Progress  Demonstrate proper pulse taking technique: In Progress    Comments on Goal Progression:  Patient Consistency: consistent with attendance  Home exercise? Yes: Upright Bike and Walking; Frequency: 1 non-rehab day per week walking and 2 non-rehab days per week of upright bike; Duration 30 minutes of walking and 20 minutes of biking.  Patient reports intensity rate 12-13 on RPE scale  Patient is progressing steadily  Patient is not able to demonstrate proper pulse taking technique but is getting a fitness tracker.      Intervention/Recommendations:   Discussed importance of regular attendance to cardiac rehab class    Exercise Prescription:  THR Range 118-140   Mode: Treadmill  Elliptical   Frequency:  3 days/week   Duration:  30-60 minutes   Intensity:  12-15 RPE   Resistance Training:  Yes: 5 lb weights with 10-15 reps based on  strength and range of motion and adjusted accordingly     Home Prescription:  Mode Aerobic exercise   Frequency: 2- 3 days/week   Duration: 30-60 minutes   Resistance Training: None     Education:  Body Composition; verbalizes understanding; Date: 10-30-23  Diabetes; verbalizes understanding; Date: 23  Exercise and Rehydration; verbalizes understanding; Date: 23  What Comes After Phase II?; verbalizes understanding; Date: 10-23-23    Comments:  I reviewed exercise recommendations with Addy.  I encouraged him to continue exercising but to increase all exercise modalities to at least 30 minutes.  I also suggested eliminating resistance training on non-rehab days per week.  We discussed putting his stationary bike by a TV or use an ipad to make the time go faster.  He stated understanding.     The exercise prescription will continue to be adjusted based on tolerance of exercise intensity by patient.    Marky Duke, CEP    12 Session Follow Up   Cardiac Rehab Individual Treatment Plan - Reassessment    Patient Name: Addy Redding MRN: [id number]   : 1955   Age: 68 y.o.   Primary Diagnosis: s/p CABG    Nutrition Assessment:     Anthropometrics    Height 73 inches   Weight 204.3 lbs   BMI 27     Patient confirms he is taking lipitor 80mg/Zetia 10mg for cholesterol control.  Difficulty Chewing or Swallowing: No  Current Exercise: See Exercise Physiologist Note  Food Safety/Food Preparation: spouse  Living Arrangements/Family Support: Lives with spouse  Cultural/Spiritual/Personal Preferences: not applicable   Barriers to Education: none identified  Stage of Change Related to Diet Habits: Action  Recent Changes to Diet: Yes -  Food Diary: Given      Nutrition Plan:   Goals:  LDL-C < 70 (for high risk patients)  Hgb A1c < 7%  BMI < 25 and abdominal girth < 40M/<35 F  2 gram sodium, Mediterranean diet: In Progress  Fish intake (non-fried varieties) to a goal of 2-3 servings per week: In  Progress  Increase fruit and vegetable intake: In Progress    Comments on Goal Progression:  Pt states he is feeling well, incorporating more produce with meals, eating more legumes and has been adding in catarino seeds for additional fiber/omega 3.    Interventions:  Dietitian Consult: No  Patient to participate in Cardiac Rehab sessions three times a week  Weekly Dietitian Weight Check  Nutrition Recommendations Provided: Verbal, Reviewed  Follow Up Plan for Ongoing Self-Management Support    Education:  Protein; verbalizes understanding; Date: 10/18/23  Sodium; verbalizes understanding; Date: 11/1/23  Fiber; verbalizes understanding; Date: 10/25/23  Pre/Probiotics; verbalizes understanding; Date: 11/15/23    Comments:   Discussed ways to incorporate healthy snacks, eating on a schedule, and monitoring sodium intake for heart health.      Diabetes  Is the patient diabetic? Yes   Other Core Components/Diabetes Assessment:   Labs:  Lab Results   Component Value Date    GLUF 85 10/03/2023     Lab Results   Component Value Date    HGBA1C 6.5 (H) 10/03/2023    HGBA1C 6.5 (H) 09/25/2023    HGBA1C 6.5 (H) 08/14/2023      Lab Results   Component Value Date    ESTIMATEDAVG 140 (H) 10/03/2023    ESTIMATEDAVG 140 (H) 09/25/2023    ESTIMATEDAVG 140 (H) 08/14/2023       History of diabetes since 2008  Diabetes medications: Ozempic 0.25mg injection weekly, Farxiga 10mg, lantus 12u BID  Blood Glucose Checks at Home: Yes: random blood sugars  Typical morning range: 120-130 mg/dl  Endocrinologist or PCP following DM: PCP Dr. Ramey  Other Core Components/Diabetes Plan:   Goals:  Hgb A1c < 7%  Exercise Blood Glucose: 100-300 mg/dl    Interventions:  Medication Compliance  Med Card Reconciled  Low Sodium, Mediterranean, ADA Diet  Physical Activity  Increase Knowledge of Contributory Factors    Education:  Diabetes; verbalizes understanding; Date: 11/13/23    Comments:   Patient verbalizes understanding to bring home glucometer and check  glucose pre and post each exercise session.  Per cardiac rehab protocols, patient's glucose must be between 90 and 270 mg/dL to exercise.  Patient denies any recent glucose levels less than 60 mg/dL or greater than 300 mg/dL. Patient verbalizes importance of notifying rehab staff if symptoms of hypoglycemia occur while at cardiac rehab.  Abnormal labs will be reported to patient's PCP/Endocrinologist by rehab staff.    Noted updated glucose parameters of 100-300  Pt has exhibited excellent glucose control while in rehab    Mary Hilario MS, RDN/LDN    Session: 12 Session Follow Up   Cardiac Rehab Individual Treatment Plan - Reassessment      Patient Name: Addy Redding MRN: [id number]   : 1955   Age: 68 y.o.   Primary Diagnosis: CABG      Psychosocial Assessment:   Living Arrangements: Lives with spouse  Family Support: children and spouse  Self Reported: decrease Effective Coping Skills  Displays: calmness  Medication: not applicable    Psychosocial Plan:   Goals:  Improved psychosocial coping strategies: In Progress  Maintain positive support system: In Progress  Maintain positive outlook: In Progress  Improve overall quality of life: In Progress    Comments on Goal Progression:  Patient will continue working on achieving goals that were set.  He reports to have good support at home from his wife.  He is very motivated & working on making changes.    Interventions:  Patient to Self Report Emotional Changes at Session Check In  Recommend Physical Activity  Recommend Attending Education Lectures  Notify MD: No  Program Referral: No  Pharmaceutical Intervention/Therapy: No  Other Needs: not applicable  Stage of Readiness to Change: Action    Education:  Stress Management; verbalizes understanding; Date: 2023  Stress; verbalizes understanding; Date: 2023    Comments:  Patient denies having any overwhelming stress or anxiety.  He states that he feels much better since beginning cardiac rehab.  He has  been instructed to notify staff in the event that circumstances change.  Patient verbalizes understanding.    Other Core Components/Risk Factors Assessment:   RISK FACTORS:  diabetes, hyperlipidemia, hypertension    Learning Barriers: None    Medication Compliance: has been compliant with taking medications    Other Core Components/Risk Factors Plan:   Goals:  Decrease cholesterol level: In Progress  Increase exercise tolerance: In Progress  Increase knowledge of CAD: In Progress  Decrease blood pressure: In Progress  Identify and manage personal areas of stress: In Progress  Control diabetes by adjusting diet and exercise: In Progress  Learn more about healthy eating: In Progress    Comments on Goal Progression:  Patient will continue working on achieving goals that were set to decrease risk factors for CAD.  He is attending lectures & exercise sessions on a consistent basis & reports that he is exercising 2 additional days outside of rehab.  He is monitoring his BP at home & reports to be obtaining BP readings similar to those obtained in cardiac rehab.  He is monitoring his blood glucose pre/post exercise    Interventions:  Individual Education/ Counseling: Yes  Physician Referral: No    Education:    fluid overload/CHF, verbalizes understanding; Date: 11/20/2023  hypertension, verbalizes understanding; Date: 11/20/2023  risk factors, verbalizes understanding; Date: 11/20/2023  stress, verbalizes understanding; Date: 11/20/2023         Education method adapted to patients education level and preferred method of learning.  Method: explanation  handouts    Comments:  Encouraged for patient to continue working on achieving goals that were set.  Patient is attending lectures & exercise sessions on a consistent basis & reports that he is learning a lot.  Encouraged to continue monitoring BP at home & keep a daily log of readings.    Other Core Components/Hypertension Assessment:   BP Range: 140-98/86-60  BP at Goal:  BP is at goal.  Noted 2 elevations above goal of 130/80 or less.  Patient reported symptoms: none    Other Core Components/Hypertension Plan:   Goals:  Blood Pressure <130/80    Comments on Goal Progression:  Patient will continue to monitor BP readings at home & will notify cardiologist of any elevations.  He has been instructed to take medications 1.5-2 hours prior to exercise session.      Interventions:  Med Card Reconciled: Yes  Encourage medication compliance  Encourage sodium reduction  Encourage weight loss  Recommend physical activity  Educate on contributory factors  Reduce stress, anxiety, anger, depression, and/or chronic pain  Encourage home blood pressure monitoring  Recommend daily weights    Education:    Risk Factors; verbalizes understanding; Date: 11/20/2023  Stroke; verbalizes understanding; Date: 11/3/2023  Stress; verbalizes understanding; Date: 11/20/2023         Comments:  Patient will continue to follow recommendations to keep BP under good control at or below goal of 130/80.  Patient is monitoring his BP at home.  Encouraged to continue monitoring.  He is watching sodium intake & is working on making necessary changes in diet.  He is exercising 2 additional days per week outside of rehab.    Does the patient have Heart Failure? No      Other Core Components/Tobacco Cessation Assessment:   Smoking Status: past smoker who quit 1995  Smoking Cessation Barriers:  N/A  Stage of Readiness to Change: Maintenance    Other Core Components/Tobacco Cessation Plan:   Goals:  Maintain non-smoking status    Comments on Goal Progression:  Non smoker.    Interventions:  Maintains non-smoking status    Education:    Risk Factors; verbalizes understanding; Date: 11/20/2023         Comments:  Non smoker.    Abigail Alvarez RN  Cardiac Rehab Nurse

## 2023-12-15 ENCOUNTER — CLINICAL SUPPORT (OUTPATIENT)
Dept: CARDIAC REHAB | Facility: CLINIC | Age: 68
End: 2023-12-15
Payer: MEDICARE

## 2023-12-15 DIAGNOSIS — I25.10 ATHEROSCLEROSIS OF NATIVE CORONARY ARTERY OF NATIVE HEART, UNSPECIFIED WHETHER ANGINA PRESENT: ICD-10-CM

## 2023-12-15 DIAGNOSIS — Z95.1 POSTSURGICAL AORTOCORONARY BYPASS STATUS: Primary | ICD-10-CM

## 2023-12-15 PROCEDURE — 93798 PR CARDIAC REHAB/MONITOR: ICD-10-PCS | Mod: S$GLB,,, | Performed by: INTERNAL MEDICINE

## 2023-12-15 PROCEDURE — 93798 PHYS/QHP OP CAR RHAB W/ECG: CPT | Mod: S$GLB,,, | Performed by: INTERNAL MEDICINE

## 2023-12-20 ENCOUNTER — CLINICAL SUPPORT (OUTPATIENT)
Dept: CARDIAC REHAB | Facility: CLINIC | Age: 68
End: 2023-12-20
Payer: MEDICARE

## 2023-12-20 DIAGNOSIS — Z95.1 POSTSURGICAL AORTOCORONARY BYPASS STATUS: Primary | ICD-10-CM

## 2023-12-20 DIAGNOSIS — I25.10 ATHEROSCLEROSIS OF NATIVE CORONARY ARTERY OF NATIVE HEART WITHOUT ANGINA PECTORIS: ICD-10-CM

## 2023-12-20 PROCEDURE — 93798 PHYS/QHP OP CAR RHAB W/ECG: CPT | Mod: S$GLB,,, | Performed by: INTERNAL MEDICINE

## 2023-12-20 PROCEDURE — 93798 PR CARDIAC REHAB/MONITOR: ICD-10-PCS | Mod: S$GLB,,, | Performed by: INTERNAL MEDICINE

## 2023-12-21 DIAGNOSIS — Z95.1 POSTSURGICAL AORTOCORONARY BYPASS STATUS: Primary | ICD-10-CM

## 2023-12-21 DIAGNOSIS — R73.09 IMPAIRED GLUCOSE TOLERANCE TEST: ICD-10-CM

## 2023-12-21 DIAGNOSIS — I25.10 ATHEROSCLEROSIS OF NATIVE CORONARY ARTERY OF NATIVE HEART, UNSPECIFIED WHETHER ANGINA PRESENT: ICD-10-CM

## 2023-12-22 ENCOUNTER — DOCUMENTATION ONLY (OUTPATIENT)
Dept: CARDIAC REHAB | Facility: CLINIC | Age: 68
End: 2023-12-22

## 2023-12-22 ENCOUNTER — CLINICAL SUPPORT (OUTPATIENT)
Dept: CARDIAC REHAB | Facility: CLINIC | Age: 68
End: 2023-12-22
Payer: MEDICARE

## 2023-12-22 DIAGNOSIS — Z95.1 POSTSURGICAL AORTOCORONARY BYPASS STATUS: Primary | ICD-10-CM

## 2023-12-22 DIAGNOSIS — I25.10 ATHEROSCLEROSIS OF NATIVE CORONARY ARTERY OF NATIVE HEART, UNSPECIFIED WHETHER ANGINA PRESENT: ICD-10-CM

## 2023-12-22 PROCEDURE — 93798 PR CARDIAC REHAB/MONITOR: ICD-10-PCS | Mod: S$GLB,,, | Performed by: INTERNAL MEDICINE

## 2023-12-22 PROCEDURE — 93798 PHYS/QHP OP CAR RHAB W/ECG: CPT | Mod: S$GLB,,, | Performed by: INTERNAL MEDICINE

## 2023-12-22 NOTE — PROGRESS NOTES
24 Session Follow Up   Cardiac Rehab Individual Treatment Plan - Reassessment    Patient Name: Addy Redding MRN: [id number]   : 1955   Age: 68 y.o.   Primary Diagnosis: s/p CABG    Nutrition Assessment:     Anthropometrics    Height 73 inches   Weight 208.6 lbs   BMI 27.5     Patient confirms he is taking lipitor 80mg, Zetia 10mg for cholesterol control.  Difficulty Chewing or Swallowing: No  Current Exercise: See Exercise Physiologist Note  Food Safety/Food Preparation: spouse  Living Arrangements/Family Support: Lives with spouse  Cultural/Spiritual/Personal Preferences: not applicable   Barriers to Education: none identified  Stage of Change Related to Diet Habits: Maintenance  Recent Changes to Diet: No  Food Diary: Completed      Nutrition Plan:   Goals:  LDL-C < 70 (for high risk patients)  Hgb A1c < 7%  BMI < 25 and abdominal girth < 40M/<35 F  2 gram sodium, Mediterranean diet: In Progress  Fish intake (non-fried varieties) to a goal of 2-3 servings per week: In Progress  Increase fruit and vegetable intake: In Progress    Comments on Goal Progression:  Pt states he feels well, has gained 8lbs since starting rehab which he is ok with, wife is very mindful of diet and utilizing whole foods    Interventions:  Dietitian Consult: No  Patient to participate in Cardiac Rehab sessions three times a week  Weekly Dietitian Weight Check  Nutrition Recommendations Provided: Verbal and Written, Reviewed  Follow Up Plan for Ongoing Self-Management Support    Education:  Dining Out; verbalizes understanding; Date: 23  Recipe Modication; verbalizes understanding; Date: 23  Vegetarianism; verbalizes understanding; Date: 23  Weight Management; verbalizes understanding; Date: 23    Comments:   Discussed ways to incorporate healthy snacks, eating on a schedule, and monitoring sodium intake for heart health.    Diabetes  Is the patient diabetic? Yes   Other Core Components/Diabetes Assessment:    Labs:  Lab Results   Component Value Date    GLUF 85 10/03/2023     Lab Results   Component Value Date    HGBA1C 6.5 (H) 10/03/2023    HGBA1C 6.5 (H) 09/25/2023    HGBA1C 6.5 (H) 08/14/2023      Lab Results   Component Value Date    ESTIMATEDAVG 140 (H) 10/03/2023    ESTIMATEDAVG 140 (H) 09/25/2023    ESTIMATEDAVG 140 (H) 08/14/2023       History of diabetes since 2008  Diabetes medications: Farxiga 10mg daily, lantus 20u morning, Ozempic 0.25mg injection weekly  Blood Glucose Checks at Home: Yes: random blood sugars  Typical morning range: 130-140 mg/dl  Endocrinologist or PCP following DM: PCP Dr Ramey    Other Core Components/Diabetes Plan:   Goals:  Hgb A1c < 7%  Exercise Blood Glucose: 100-300 mg/dl    Interventions:  Medication Compliance  Med Card Reconciled  Low Sodium, Mediterranean, ADA Diet  Physical Activity  Increase Knowledge of Contributory Factors    Education:  Risk Factors; verbalizes understanding; Date: 12/22/23  Dining Out; verbalizes understanding; Date: 11/29/23  Weight Management; verbalizes understanding; Date: 12/20/23    Comments:   Patient verbalizes understanding to bring home glucometer and check glucose pre and post each exercise session.  Per cardiac rehab protocols, patient's glucose must be between 90 and 270 mg/dL to exercise.  Patient denies any recent glucose levels less than 60 mg/dL or greater than 300 mg/dL. Patient verbalizes importance of notifying rehab staff if symptoms of hypoglycemia occur while at cardiac rehab.  Abnormal labs will be reported to patient's PCP/Endocrinologist by rehab staff.    Noted updated glucose parameters of 100-300, pt's glucose remains within acceptable range    Mary Hilario MS, RDN/LDN

## 2023-12-22 NOTE — PROGRESS NOTES
Addy has completed 24 out of 36 exercise session of Phase II cardiac rehab.  A follow up reassessment will be completed at exit interview.    24 Session Follow Up   Cardiac Rehab Individual Treatment Plan - Reassessment      Patient Name: Addy Redding MRN: [id number]   : 1955   Age: 68 y.o.   Primary Diagnosis: CABG Date of Event: 23   EF: 55%  Risk Stratification: moderate  Referring Physician: JOANNA   Exercise Assessment:     Angina with exercise?: No   ST Depression with Exercise?: No  Fall Risk: No   Assistive Devices:  independent  Addy stated at orientation there were no limitations to exercise.  Comments on Progression: Addy is progressing well and his workloads will continue to be increased as he tolerates exercise intensity.    Exercise Plan:   Goals:  CR Exercise Goals: Attend Cardiac Rehab 3 times/week: In Progress  Home Aerobic Exercise: 2 additional days/week for 30-60 minutes: In Progress  Intensity of 12-15 on the Rate of Perceived Exertion (RPE) scale: In Progress  30% increase in entry estimated METS: 13.0 : In Progress  5 days/week for 30-60 minutes: In Progress  Demonstrate proper pulse taking technique: In Progress    Comments on Goal Progression:  Patient Consistency: consistent with attendance  Home exercise? Yes: Walking; Frequency: 2 non-rehab days per week; Duration 30 minutes  Patient reports intensity rate 11-14 on RPE scale  Patient is progressing steadily  Patient is Able to demonstrate proper pulse taking technique by using a fitness tracker.      Intervention/Recommendations:   Discussed importance of regular attendance to cardiac rehab class    Exercise Prescription:  THR Range 118-140   Mode: Treadmill  Elliptical   Frequency:  3 days/week   Duration:  30-60 minutes   Intensity:  12-15 RPE   Resistance Training:  Yes: 5 to 8 lb weights with 10-15 reps based on strength and range of motion and adjusted accordingly     Home Prescription:  Mode Aerobic exercise   Frequency:  2- 3 days/week   Duration: 30-60 minutes   Resistance Training: None     Education:  Arthritis/Osteporosis; verbalizes understanding; Date: 11-20-23  Exercise Terminology; verbalizes understanding; Date: 11-27-23  Muscular Anatomy; verbalizes understanding; Date: 12-4-23  The Exercise Program; verbalizes understanding; Date: 12-11-23    Comments:  I reviewed exercise recommendations with Addy.  I encouraged him to continue exercising.  He stated understanding.     The exercise prescription will continue to be adjusted based on tolerance of exercise intensity by patient.    Shawna Duke., CEP

## 2023-12-29 ENCOUNTER — CLINICAL SUPPORT (OUTPATIENT)
Dept: CARDIAC REHAB | Facility: CLINIC | Age: 68
End: 2023-12-29
Payer: MEDICARE

## 2023-12-29 DIAGNOSIS — Z95.1 POSTSURGICAL AORTOCORONARY BYPASS STATUS: Primary | ICD-10-CM

## 2023-12-29 DIAGNOSIS — I25.10 ATHEROSCLEROSIS OF NATIVE CORONARY ARTERY OF NATIVE HEART, UNSPECIFIED WHETHER ANGINA PRESENT: ICD-10-CM

## 2023-12-29 PROCEDURE — 93798 PR CARDIAC REHAB/MONITOR: ICD-10-PCS | Mod: S$GLB,,, | Performed by: INTERNAL MEDICINE

## 2023-12-29 PROCEDURE — 93798 PHYS/QHP OP CAR RHAB W/ECG: CPT | Mod: S$GLB,,, | Performed by: INTERNAL MEDICINE

## 2024-01-04 ENCOUNTER — DOCUMENTATION ONLY (OUTPATIENT)
Dept: CARDIAC REHAB | Facility: CLINIC | Age: 69
End: 2024-01-04
Payer: MEDICARE

## 2024-01-04 NOTE — PROGRESS NOTES
Addy has completed 25 out of 36 exercise session of Phase II cardiac rehab.  A follow up reassessment will be completed at exit interview.    24 Session Follow Up   Cardiac Rehab Individual Treatment Plan - Reassessment      Patient Name: Addy Redding MRN: [id number]   : 1955   Age: 68 y.o.   Primary Diagnosis: CABG Date of Event: 23   EF: 55%  Risk Stratification: moderate  Referring Physician: JOANNA   Exercise Assessment:     Angina with exercise?: No   ST Depression with Exercise?: No  Fall Risk: No   Assistive Devices:  independent  Addy stated at orientation there were no limitations to exercise.  Comments on Progression: Addy is progressing well and his workloads will continue to be increased as he tolerates exercise intensity.    Exercise Plan:   Goals:  CR Exercise Goals: Attend Cardiac Rehab 3 times/week: In Progress  Home Aerobic Exercise: 2 additional days/week for 30-60 minutes: In Progress  Intensity of 12-15 on the Rate of Perceived Exertion (RPE) scale: In Progress  30% increase in entry estimated METS: 13.0 : In Progress  5 days/week for 30-60 minutes: In Progress  Demonstrate proper pulse taking technique: In Progress    Comments on Goal Progression:  Patient Consistency: consistent with attendance  Home exercise? Yes: Walking; Frequency: 2 non-rehab days per week; Duration 30 minutes  Patient reports intensity rate 11-14 on RPE scale  Patient is progressing steadily  Patient is Able to demonstrate proper pulse taking technique by using a fitness tracker.      Intervention/Recommendations:   Discussed importance of regular attendance to cardiac rehab class    Exercise Prescription:  THR Range 118-140   Mode: Treadmill  Elliptical   Frequency:  3 days/week   Duration:  30-60 minutes   Intensity:  12-15 RPE   Resistance Training:  Yes: 5 to 8 lb weights with 10-15 reps based on strength and range of motion and adjusted accordingly     Home Prescription:  Mode Aerobic exercise   Frequency:  2- 3 days/week   Duration: 30-60 minutes   Resistance Training: None     Education:  Arthritis/Osteporosis; verbalizes understanding; Date: 23  Exercise Terminology; verbalizes understanding; Date: 23  Muscular Anatomy; verbalizes understanding; Date: 23  The Exercise Program; verbalizes understanding; Date: 23    Comments:  I reviewed exercise recommendations with Addy.  I encouraged him to continue exercising.  He stated understanding.     The exercise prescription will continue to be adjusted based on tolerance of exercise intensity by patient.    Marky Duke, CEP    24 Session Follow Up   Cardiac Rehab Individual Treatment Plan - Reassessment    Patient Name: Addy Redding MRN: [id number]   : 1955   Age: 68 y.o.   Primary Diagnosis: s/p CABG    Nutrition Assessment:     Anthropometrics    Height 73 inches   Weight 208.6 lbs   BMI 27.5     Patient confirms he is taking lipitor 80mg, Zetia 10mg for cholesterol control.  Difficulty Chewing or Swallowing: No  Current Exercise: See Exercise Physiologist Note  Food Safety/Food Preparation: spouse  Living Arrangements/Family Support: Lives with spouse  Cultural/Spiritual/Personal Preferences: not applicable   Barriers to Education: none identified  Stage of Change Related to Diet Habits: Maintenance  Recent Changes to Diet: No  Food Diary: Completed      Nutrition Plan:   Goals:  LDL-C < 70 (for high risk patients)  Hgb A1c < 7%  BMI < 25 and abdominal girth < 40M/<35 F  2 gram sodium, Mediterranean diet: In Progress  Fish intake (non-fried varieties) to a goal of 2-3 servings per week: In Progress  Increase fruit and vegetable intake: In Progress    Comments on Goal Progression:  Pt states he feels well, has gained 8lbs since starting rehab which he is ok with, wife is very mindful of diet and utilizing whole foods    Interventions:  Dietitian Consult: No  Patient to participate in Cardiac Rehab sessions three times a week  Weekly  Dietitian Weight Check  Nutrition Recommendations Provided: Verbal and Written, Reviewed  Follow Up Plan for Ongoing Self-Management Support    Education:  Dining Out; verbalizes understanding; Date: 11/29/23  Recipe Modication; verbalizes understanding; Date: 12/13/23  Vegetarianism; verbalizes understanding; Date: 12/6/23  Weight Management; verbalizes understanding; Date: 12/20/23    Comments:   Discussed ways to incorporate healthy snacks, eating on a schedule, and monitoring sodium intake for heart health.    Diabetes  Is the patient diabetic? Yes   Other Core Components/Diabetes Assessment:   Labs:  Lab Results   Component Value Date    GLUF 85 10/03/2023     Lab Results   Component Value Date    HGBA1C 6.5 (H) 10/03/2023    HGBA1C 6.5 (H) 09/25/2023    HGBA1C 6.5 (H) 08/14/2023      Lab Results   Component Value Date    ESTIMATEDAVG 140 (H) 10/03/2023    ESTIMATEDAVG 140 (H) 09/25/2023    ESTIMATEDAVG 140 (H) 08/14/2023       History of diabetes since 2008  Diabetes medications: Farxiga 10mg daily, lantus 20u morning, Ozempic 0.25mg injection weekly  Blood Glucose Checks at Home: Yes: random blood sugars  Typical morning range: 130-140 mg/dl  Endocrinologist or PCP following DM: PCP Dr Ramey    Other Core Components/Diabetes Plan:   Goals:  Hgb A1c < 7%  Exercise Blood Glucose: 100-300 mg/dl    Interventions:  Medication Compliance  Med Card Reconciled  Low Sodium, Mediterranean, ADA Diet  Physical Activity  Increase Knowledge of Contributory Factors    Education:  Risk Factors; verbalizes understanding; Date: 12/22/23  Dining Out; verbalizes understanding; Date: 11/29/23  Weight Management; verbalizes understanding; Date: 12/20/23    Comments:   Patient verbalizes understanding to bring home glucometer and check glucose pre and post each exercise session.  Per cardiac rehab protocols, patient's glucose must be between 90 and 270 mg/dL to exercise.  Patient denies any recent glucose levels less than 60 mg/dL  or greater than 300 mg/dL. Patient verbalizes importance of notifying rehab staff if symptoms of hypoglycemia occur while at cardiac rehab.  Abnormal labs will be reported to patient's PCP/Endocrinologist by rehab staff.    Noted updated glucose parameters of 100-300, pt's glucose remains within acceptable range    Mary Hilario MS, RDN/LDN    Session: 12 Session Follow Up   Cardiac Rehab Individual Treatment Plan - Reassessment      Patient Name: Addy Redding MRN: [id number]   : 1955   Age: 68 y.o.   Primary Diagnosis: CABG      Psychosocial Assessment:   Living Arrangements: Lives with spouse  Family Support: children and spouse  Self Reported: decrease Effective Coping Skills  Displays: calmness  Medication: not applicable    Psychosocial Plan:   Goals:  Improved psychosocial coping strategies: In Progress  Maintain positive support system: In Progress  Maintain positive outlook: In Progress  Improve overall quality of life: In Progress    Comments on Goal Progression:  Patient will continue working on achieving goals that were set.  He reports to have good support at home from his wife.  He is very motivated & working on making changes.    Interventions:  Patient to Self Report Emotional Changes at Session Check In  Recommend Physical Activity  Recommend Attending Education Lectures  Notify MD: No  Program Referral: No  Pharmaceutical Intervention/Therapy: No  Other Needs: not applicable  Stage of Readiness to Change: Action    Education:  Stress Management; verbalizes understanding; Date: 2023  Stress; verbalizes understanding; Date: 2023    Comments:  Patient denies having any overwhelming stress or anxiety.  He states that he feels much better since beginning cardiac rehab.  He has been instructed to notify staff in the event that circumstances change.  Patient verbalizes understanding.    Other Core Components/Risk Factors Assessment:   RISK FACTORS:  diabetes, hyperlipidemia,  hypertension    Learning Barriers: None    Medication Compliance: has been compliant with taking medications    Other Core Components/Risk Factors Plan:   Goals:  Decrease cholesterol level: In Progress  Increase exercise tolerance: In Progress  Increase knowledge of CAD: In Progress  Decrease blood pressure: In Progress  Identify and manage personal areas of stress: In Progress  Control diabetes by adjusting diet and exercise: In Progress  Learn more about healthy eating: In Progress    Comments on Goal Progression:  Patient will continue working on achieving goals that were set to decrease risk factors for CAD.  He is attending lectures & exercise sessions on a consistent basis & reports that he is exercising 2 additional days outside of rehab.  He is monitoring his BP at home & reports to be obtaining BP readings similar to those obtained in cardiac rehab.  He is monitoring his blood glucose pre/post exercise    Interventions:  Individual Education/ Counseling: Yes  Physician Referral: No    Education:    fluid overload/CHF, verbalizes understanding; Date: 11/20/2023  hypertension, verbalizes understanding; Date: 11/20/2023  risk factors, verbalizes understanding; Date: 11/20/2023  stress, verbalizes understanding; Date: 11/20/2023         Education method adapted to patients education level and preferred method of learning.  Method: explanation  handouts    Comments:  Encouraged for patient to continue working on achieving goals that were set.  Patient is attending lectures & exercise sessions on a consistent basis & reports that he is learning a lot.  Encouraged to continue monitoring BP at home & keep a daily log of readings.    Other Core Components/Hypertension Assessment:   BP Range: 140-98/86-60  BP at Goal: BP is at goal.  Noted 2 elevations above goal of 130/80 or less.  Patient reported symptoms: none    Other Core Components/Hypertension Plan:   Goals:  Blood Pressure <130/80    Comments on Goal  Progression:  Patient will continue to monitor BP readings at home & will notify cardiologist of any elevations.  He has been instructed to take medications 1.5-2 hours prior to exercise session.      Interventions:  Med Card Reconciled: Yes  Encourage medication compliance  Encourage sodium reduction  Encourage weight loss  Recommend physical activity  Educate on contributory factors  Reduce stress, anxiety, anger, depression, and/or chronic pain  Encourage home blood pressure monitoring  Recommend daily weights    Education:    Risk Factors; verbalizes understanding; Date: 11/20/2023  Stroke; verbalizes understanding; Date: 11/3/2023  Stress; verbalizes understanding; Date: 11/20/2023         Comments:  Patient will continue to follow recommendations to keep BP under good control at or below goal of 130/80.  Patient is monitoring his BP at home.  Encouraged to continue monitoring.  He is watching sodium intake & is working on making necessary changes in diet.  He is exercising 2 additional days per week outside of rehab.    Does the patient have Heart Failure? No      Other Core Components/Tobacco Cessation Assessment:   Smoking Status: past smoker who quit 1995  Smoking Cessation Barriers:  N/A  Stage of Readiness to Change: Maintenance    Other Core Components/Tobacco Cessation Plan:   Goals:  Maintain non-smoking status    Comments on Goal Progression:  Non smoker.    Interventions:  Maintains non-smoking status    Education:    Risk Factors; verbalizes understanding; Date: 11/20/2023         Comments:  Non smoker.    Abigail Alvarez RN  Cardiac Rehab Nurse

## 2024-01-05 ENCOUNTER — CLINICAL SUPPORT (OUTPATIENT)
Dept: CARDIAC REHAB | Facility: CLINIC | Age: 69
End: 2024-01-05
Payer: MEDICARE

## 2024-01-05 DIAGNOSIS — I25.10 CORONARY ARTERY DISEASE, UNSPECIFIED VESSEL OR LESION TYPE, UNSPECIFIED WHETHER ANGINA PRESENT, UNSPECIFIED WHETHER NATIVE OR TRANSPLANTED HEART: ICD-10-CM

## 2024-01-05 DIAGNOSIS — Z95.1 POSTSURGICAL AORTOCORONARY BYPASS STATUS: Primary | ICD-10-CM

## 2024-01-05 PROCEDURE — 93798 PHYS/QHP OP CAR RHAB W/ECG: CPT | Mod: S$GLB,,, | Performed by: INTERNAL MEDICINE

## 2024-01-08 ENCOUNTER — CLINICAL SUPPORT (OUTPATIENT)
Dept: CARDIAC REHAB | Facility: CLINIC | Age: 69
End: 2024-01-08
Payer: MEDICARE

## 2024-01-08 DIAGNOSIS — Z95.1 POSTSURGICAL AORTOCORONARY BYPASS STATUS: Primary | ICD-10-CM

## 2024-01-08 DIAGNOSIS — I25.10 ATHEROSCLEROSIS OF NATIVE CORONARY ARTERY OF NATIVE HEART WITHOUT ANGINA PECTORIS: ICD-10-CM

## 2024-01-08 PROCEDURE — 93798 PHYS/QHP OP CAR RHAB W/ECG: CPT | Mod: S$GLB,,, | Performed by: INTERNAL MEDICINE

## 2024-01-08 NOTE — PROGRESS NOTES
Session: 24 Session Follow Up   Cardiac Rehab Individual Treatment Plan - Reassessment      Patient Name: Addy Redding MRN: [id number]   : 1955   Age: 68 y.o.   Primary Diagnosis: S/P CABG (23)      Psychosocial Assessment:   Living Arrangements: Lives with spouse  Family Support: children and spouse  Self Reported:     Effective Coping Skills  Displays: happiness and calmness  Medication: not applicable    Psychosocial Plan:   Goals:  Improved psychosocial coping strategies: In Progress  Maintain positive support system: In Progress  Maintain positive outlook: In Progress  Improve overall quality of life: In Progress    Comments on Goal Progression:  Patient feels he is making progress with managing his stress. Reports he is learning new coping strategies which have helped.     Interventions:  Patient to Self Report Emotional Changes at Session Check In  Recommend Physical Activity  Recommend Attending Education Lectures  Notify MD: No  Program Referral: No  Pharmaceutical Intervention/Therapy: No  Other Needs: not applicable  Stage of Readiness to Change: Action    Education:  Risk Factors; verbalizes understanding; Date: 23    Comments:  Pt denies any issues with anxiety/depression at this time. Reports his stress level has improved with learning new ways to deal with stress.  Patient has been instructed to notify staff in the event that circumstances worsen.  Patient verbalizes understanding.    Other Core Components/Risk Factors Assessment:   RISK FACTORS:  diabetes, hyperlipidemia, hypertension, CKD    Learning Barriers: None    Medication Compliance: has been compliant with taking medications    Other Core Components/Risk Factors Plan:   Goals:  Decrease cholesterol level: In Progress  Increase exercise tolerance: In Progress  Increase knowledge of CAD: In Progress  Decrease blood pressure: In Progress  Identify and manage personal areas of stress: In Progress  Control diabetes by adjusting diet  and exercise: In Progress  Learn more about healthy eating: In Progress    Comments on Goal Progression:  Patient reports making progress toward goals. He feels his energy level and stamina have improved. He is walking twice a week and using stationary bike also. He is working on increasing his fish intake, and fruits/veggies.     Interventions:  Individual Education/ Counseling: Yes  Physician Referral: No    Education:    diabetes, verbalizes understanding; Date: 11/13/23  fluid overload/CHF, verbalizes understanding; Date: 12/15/23  nutrition, verbalizes understanding; Date: 12/6/23  physical activity, verbalizes understanding; Date: 12/11/23  risk factors, verbalizes understanding; Date: 12/22/23  sodium, verbalizes understanding; Date: 11/1/23         Education method adapted to patients education level and preferred method of learning.  Method: explanation  handouts    Comments:  Patient attends regularly and participates in lectures. He is working with risk factor modification to improve his cardiac health.    Other Core Components/Hypertension Assessment:   BP Range: /62-78  BP at Goal: Yes  Patient reported symptoms: none    Other Core Components/Hypertension Plan:   Goals:  Blood Pressure <130/80    Comments on Goal Progression:  BP at goal. He takes readings daily and keeps a log for his providers.     Interventions:  Med Card Reconciled: Yes  Encourage medication compliance  Encourage sodium reduction  Encourage weight loss  Recommend physical activity  Educate on contributory factors  Reduce stress, anxiety, anger, depression, and/or chronic pain  Encourage home blood pressure monitoring    Education:    Coronary Artery Disease; verbalizes understanding; Date: 12/8/23  Congestive Heart Failure; verbalizes understanding; Date: 12/15/23  Risk Factors; verbalizes understanding; Date: 12/22/23  Stroke; verbalizes understanding; Date: 11/3/23         Comments:  Patient monitors his blood pressure  regulary keeping a log of his readings for his provider appointments. He is compliant with his medications.     Does the patient have Heart Failure? No      Other Core Components/Tobacco Cessation Assessment:   Smoking Status: past smoker who quit 1995  Smoking Cessation Barriers:  NA  Stage of Readiness to Change: Maintenance    Other Core Components/Tobacco Cessation Plan:   Goals:  Maintain non-smoking status    Comments on Goal Progression:  Maintains nonsmoker status    Interventions:  Maintains non-smoking status    Education:    Heart and Lung Anatomy; verbalizes understanding; Date: 12/1/23  Coronary Artery Disease; verbalizes understanding; Date: 12/8/23  Risk Factors; verbalizes understanding; Date: 12/22/23  Stroke; verbalizes understanding; Date: 11/3/23         Comments:  Patient is working on lifestyle modification to improve his cardiac health. He is exercising on his off cardiac rehab days and working on his diet.     Ankit Jenkins RN  Cardiac Rehab Nurse

## 2024-01-10 ENCOUNTER — CLINICAL SUPPORT (OUTPATIENT)
Dept: CARDIAC REHAB | Facility: CLINIC | Age: 69
End: 2024-01-10
Payer: MEDICARE

## 2024-01-10 DIAGNOSIS — I25.10 CORONARY ATHEROSCLEROSIS OF NATIVE CORONARY ARTERY: ICD-10-CM

## 2024-01-10 DIAGNOSIS — Z95.1 POSTSURGICAL AORTOCORONARY BYPASS STATUS: Primary | ICD-10-CM

## 2024-01-10 PROCEDURE — 93798 PHYS/QHP OP CAR RHAB W/ECG: CPT | Mod: S$GLB,,, | Performed by: INTERNAL MEDICINE

## 2024-01-12 ENCOUNTER — CLINICAL SUPPORT (OUTPATIENT)
Dept: CARDIAC REHAB | Facility: CLINIC | Age: 69
End: 2024-01-12
Payer: MEDICARE

## 2024-01-12 DIAGNOSIS — Z95.1 POSTSURGICAL AORTOCORONARY BYPASS STATUS: Primary | ICD-10-CM

## 2024-01-12 DIAGNOSIS — I25.10 ATHEROSCLEROSIS OF NATIVE CORONARY ARTERY OF NATIVE HEART, UNSPECIFIED WHETHER ANGINA PRESENT: ICD-10-CM

## 2024-01-12 PROCEDURE — 93798 PHYS/QHP OP CAR RHAB W/ECG: CPT | Mod: S$GLB,,, | Performed by: INTERNAL MEDICINE

## 2024-01-17 ENCOUNTER — CLINICAL SUPPORT (OUTPATIENT)
Dept: CARDIAC REHAB | Facility: CLINIC | Age: 69
End: 2024-01-17
Payer: MEDICARE

## 2024-01-17 DIAGNOSIS — Z95.1 POSTSURGICAL AORTOCORONARY BYPASS STATUS: Primary | ICD-10-CM

## 2024-01-17 DIAGNOSIS — I25.10 ATHEROSCLEROSIS OF NATIVE CORONARY ARTERY OF NATIVE HEART, UNSPECIFIED WHETHER ANGINA PRESENT: ICD-10-CM

## 2024-01-17 PROCEDURE — 93798 PHYS/QHP OP CAR RHAB W/ECG: CPT | Mod: S$GLB,,, | Performed by: INTERNAL MEDICINE

## 2024-01-19 ENCOUNTER — CLINICAL SUPPORT (OUTPATIENT)
Dept: CARDIAC REHAB | Facility: CLINIC | Age: 69
End: 2024-01-19
Payer: MEDICARE

## 2024-01-19 DIAGNOSIS — I25.10 CORONARY ATHEROSCLEROSIS OF NATIVE CORONARY ARTERY: ICD-10-CM

## 2024-01-19 DIAGNOSIS — Z95.1 POSTSURGICAL AORTOCORONARY BYPASS STATUS: Primary | ICD-10-CM

## 2024-01-19 PROCEDURE — 93798 PHYS/QHP OP CAR RHAB W/ECG: CPT | Mod: S$GLB,,, | Performed by: INTERNAL MEDICINE

## 2024-01-22 ENCOUNTER — CLINICAL SUPPORT (OUTPATIENT)
Dept: CARDIAC REHAB | Facility: CLINIC | Age: 69
End: 2024-01-22
Payer: MEDICARE

## 2024-01-22 DIAGNOSIS — Z95.1 POSTSURGICAL AORTOCORONARY BYPASS STATUS: Primary | ICD-10-CM

## 2024-01-22 DIAGNOSIS — I25.10 ATHEROSCLEROSIS OF NATIVE CORONARY ARTERY OF NATIVE HEART WITHOUT ANGINA PECTORIS: ICD-10-CM

## 2024-01-22 PROCEDURE — 93798 PHYS/QHP OP CAR RHAB W/ECG: CPT | Mod: S$GLB,,, | Performed by: INTERNAL MEDICINE

## 2024-01-24 ENCOUNTER — CLINICAL SUPPORT (OUTPATIENT)
Dept: CARDIAC REHAB | Facility: CLINIC | Age: 69
End: 2024-01-24
Payer: MEDICARE

## 2024-01-24 DIAGNOSIS — Z95.1 POSTSURGICAL AORTOCORONARY BYPASS STATUS: Primary | ICD-10-CM

## 2024-01-24 DIAGNOSIS — I25.10 ATHEROSCLEROSIS OF NATIVE CORONARY ARTERY OF NATIVE HEART WITHOUT ANGINA PECTORIS: ICD-10-CM

## 2024-01-24 PROCEDURE — 93798 PHYS/QHP OP CAR RHAB W/ECG: CPT | Mod: S$GLB,,, | Performed by: INTERNAL MEDICINE

## 2024-01-25 ENCOUNTER — HOSPITAL ENCOUNTER (OUTPATIENT)
Dept: CARDIOLOGY | Facility: HOSPITAL | Age: 69
Discharge: HOME | End: 2024-01-25
Attending: INTERNAL MEDICINE
Payer: MEDICARE

## 2024-01-25 ENCOUNTER — DOCUMENTATION ONLY (OUTPATIENT)
Dept: CARDIAC REHAB | Facility: CLINIC | Age: 69
End: 2024-01-25
Payer: MEDICARE

## 2024-01-25 VITALS
DIASTOLIC BLOOD PRESSURE: 60 MMHG | WEIGHT: 212.06 LBS | BODY MASS INDEX: 28.11 KG/M2 | HEIGHT: 73 IN | HEART RATE: 75 BPM | SYSTOLIC BLOOD PRESSURE: 112 MMHG

## 2024-01-25 DIAGNOSIS — I25.10 ATHEROSCLEROSIS OF NATIVE CORONARY ARTERY OF NATIVE HEART, UNSPECIFIED WHETHER ANGINA PRESENT: ICD-10-CM

## 2024-01-25 DIAGNOSIS — Z95.1 POSTSURGICAL AORTOCORONARY BYPASS STATUS: ICD-10-CM

## 2024-01-25 LAB
CV STRESS BASE HR: 75 BPM
DIASTOLIC BLOOD PRESSURE: 60 MMHG
OHS CV CPX 1 MINUTE RECOVERY HEART RATE: 146 BPM
OHS CV CPX 85 PERCENT MAX PREDICTED HEART RATE MALE: 128
OHS CV CPX ABDOMINAL GIRTH: 40 CM
OHS CV CPX ANAEROBIC THRESHOLD DIASTOLIC BLOOD PRESSURE: 58 MMHG
OHS CV CPX ANAEROBIC THRESHOLD HEART RATE: 133
OHS CV CPX ANAEROBIC THRESHOLD RATE PRESSURE PRODUCT: NORMAL
OHS CV CPX ANAEROBIC THRESHOLD SYSTOLIC BLOOD PRESSURE: 159
OHS CV CPX DATA GRADE - AT: 9.9
OHS CV CPX DATA GRADE - PEAK: 15.5
OHS CV CPX DATA O2 SAT - PEAK: 94
OHS CV CPX DATA O2 SAT - REST: 96
OHS CV CPX DATA SPEED - AT: 2.9
OHS CV CPX DATA SPEED - PEAK: 4
OHS CV CPX DATA TIME - AT: 4.82
OHS CV CPX DATA TIME - PEAK: 7.33
OHS CV CPX DATA VE/VCO2 - AT: 50
OHS CV CPX DATA VE/VCO2 - PEAK: 58
OHS CV CPX DATA VE/VO2 - AT: 37
OHS CV CPX DATA VE/VO2 - PEAK: 51
OHS CV CPX DATA VO2 - AT: 14.4
OHS CV CPX DATA VO2 - PEAK: 20.6
OHS CV CPX DATA VO2 - REST: 4.3
OHS CV CPX ESTIMATED METS: 12
OHS CV CPX FEV1/FVC: 0.8
OHS CV CPX FORCED EXPIRATORY VOLUME: 3.63
OHS CV CPX FORCED VITAL CAPACITY (FVC): 4.56
OHS CV CPX HIGHEST VO: 33.1
OHS CV CPX MAX PREDICTED HEART RATE: 150
OHS CV CPX MAXIMAL VOLUNTARY VENTILATION (MVV) PREDICTED: 145.2
OHS CV CPX MAXIMAL VOLUNTARY VENTILATION (MVV): 143
OHS CV CPX MAXIUMUM EXERCISE VENTILATION (VE MAX): 101.7
OHS CV CPX PATIENT AGE: 70
OHS CV CPX PATIENT HEIGHT IN: 73
OHS CV CPX PATIENT IS FEMALE AGE 11-19: 0
OHS CV CPX PATIENT IS FEMALE AGE GREATER THAN 19: 0
OHS CV CPX PATIENT IS FEMALE AGE LESS THAN 11: 0
OHS CV CPX PATIENT IS FEMALE: 0
OHS CV CPX PATIENT IS MALE AGE 11-25: 0
OHS CV CPX PATIENT IS MALE AGE GREATER THAN 25: 1
OHS CV CPX PATIENT IS MALE AGE LESS THAN 11: 0
OHS CV CPX PATIENT IS MALE GREATER THAN 18: 1
OHS CV CPX PATIENT IS MALE LESS THAN OR EQUAL TO 18: 0
OHS CV CPX PATIENT IS MALE: 1
OHS CV CPX PATIENT WEIGHT RETURNED IN OZ: 3393.32
OHS CV CPX PEAK DIASTOLIC BLOOD PRESSURE: 61 MMHG
OHS CV CPX PEAK HEAR RATE: 153 BPM
OHS CV CPX PEAK RATE PRESSURE PRODUCT: NORMAL
OHS CV CPX PEAK SYSTOLIC BLOOD PRESSURE: 163 MMHG
OHS CV CPX PERCENT BODY FAT: 20.8
OHS CV CPX PERCENT MAX PREDICTED HEART RATE ACHIEVED: 102
OHS CV CPX PREDICTED VO2: 33.1 ML/KG/MIN
OHS CV CPX RATE PRESSURE PRODUCT PRESENTING: 8400
OHS CV CPX REST PET CO2: 23
OHS CV CPX VE/VCO2 SLOPE: 45.3
STRESS ECHO POST EXERCISE DUR MIN: 7 MINUTES
STRESS ECHO POST EXERCISE DUR SEC: 20 SECONDS
SYSTOLIC BLOOD PRESSURE: 112 MMHG

## 2024-01-25 PROCEDURE — 94621 CARDIOPULM EXERCISE TESTING: CPT | Mod: 26,,, | Performed by: INTERNAL MEDICINE

## 2024-01-25 PROCEDURE — 94621 CARDIOPULM EXERCISE TESTING: CPT

## 2024-01-25 NOTE — PROGRESS NOTES
Addy has completed 33 out of 36 exercise session of Phase II cardiac rehab.  A follow up reassessment will be completed at exit interview.    24 Session Follow Up   Cardiac Rehab Individual Treatment Plan - Reassessment      Patient Name: Addy Redding MRN: [id number]   : 1955   Age: 68 y.o.   Primary Diagnosis: CABG Date of Event: 23   EF: 55%  Risk Stratification: moderate  Referring Physician: JOANNA   Exercise Assessment:     Angina with exercise?: No   ST Depression with Exercise?: No  Fall Risk: No   Assistive Devices:  independent  Addy stated at orientation there were no limitations to exercise.  Comments on Progression: Addy is progressing well and his workloads will continue to be increased as he tolerates exercise intensity.    Exercise Plan:   Goals:  CR Exercise Goals: Attend Cardiac Rehab 3 times/week: In Progress  Home Aerobic Exercise: 2 additional days/week for 30-60 minutes: In Progress  Intensity of 12-15 on the Rate of Perceived Exertion (RPE) scale: In Progress  30% increase in entry estimated METS: 13.0 : In Progress  5 days/week for 30-60 minutes: In Progress  Demonstrate proper pulse taking technique: In Progress    Comments on Goal Progression:  Patient Consistency: consistent with attendance  Home exercise? Yes: Walking; Frequency: 2 non-rehab days per week; Duration 30 minutes  Patient reports intensity rate 11-14 on RPE scale  Patient is progressing steadily  Patient is Able to demonstrate proper pulse taking technique by using a fitness tracker.      Intervention/Recommendations:   Discussed importance of regular attendance to cardiac rehab class    Exercise Prescription:  THR Range 118-140   Mode: Treadmill  Elliptical   Frequency:  3 days/week   Duration:  30-60 minutes   Intensity:  12-15 RPE   Resistance Training:  Yes: 5 to 8 lb weights with 10-15 reps based on strength and range of motion and adjusted accordingly     Home Prescription:  Mode Aerobic exercise   Frequency:  2- 3 days/week   Duration: 30-60 minutes   Resistance Training: None     Education:  Arthritis/Osteporosis; verbalizes understanding; Date: 23  Exercise Terminology; verbalizes understanding; Date: 23  Muscular Anatomy; verbalizes understanding; Date: 23  The Exercise Program; verbalizes understanding; Date: 23    Comments:  I reviewed exercise recommendations with Addy.  I encouraged him to continue exercising.  He stated understanding.     The exercise prescription will continue to be adjusted based on tolerance of exercise intensity by patient.    Marky Duke, CEP    24 Session Follow Up   Cardiac Rehab Individual Treatment Plan - Reassessment    Patient Name: Addy Redding MRN: [id number]   : 1955   Age: 68 y.o.   Primary Diagnosis: s/p CABG    Nutrition Assessment:     Anthropometrics    Height 73 inches   Weight 208.6 lbs   BMI 27.5     Patient confirms he is taking lipitor 80mg, Zetia 10mg for cholesterol control.  Difficulty Chewing or Swallowing: No  Current Exercise: See Exercise Physiologist Note  Food Safety/Food Preparation: spouse  Living Arrangements/Family Support: Lives with spouse  Cultural/Spiritual/Personal Preferences: not applicable   Barriers to Education: none identified  Stage of Change Related to Diet Habits: Maintenance  Recent Changes to Diet: No  Food Diary: Completed      Nutrition Plan:   Goals:  LDL-C < 70 (for high risk patients)  Hgb A1c < 7%  BMI < 25 and abdominal girth < 40M/<35 F  2 gram sodium, Mediterranean diet: In Progress  Fish intake (non-fried varieties) to a goal of 2-3 servings per week: In Progress  Increase fruit and vegetable intake: In Progress    Comments on Goal Progression:  Pt states he feels well, has gained 8lbs since starting rehab which he is ok with, wife is very mindful of diet and utilizing whole foods    Interventions:  Dietitian Consult: No  Patient to participate in Cardiac Rehab sessions three times a week  Weekly  Dietitian Weight Check  Nutrition Recommendations Provided: Verbal and Written, Reviewed  Follow Up Plan for Ongoing Self-Management Support    Education:  Dining Out; verbalizes understanding; Date: 11/29/23  Recipe Modication; verbalizes understanding; Date: 12/13/23  Vegetarianism; verbalizes understanding; Date: 12/6/23  Weight Management; verbalizes understanding; Date: 12/20/23    Comments:   Discussed ways to incorporate healthy snacks, eating on a schedule, and monitoring sodium intake for heart health.    Diabetes  Is the patient diabetic? Yes   Other Core Components/Diabetes Assessment:   Labs:  Lab Results   Component Value Date    GLUF 85 10/03/2023     Lab Results   Component Value Date    HGBA1C 6.5 (H) 10/03/2023    HGBA1C 6.5 (H) 09/25/2023    HGBA1C 6.5 (H) 08/14/2023      Lab Results   Component Value Date    ESTIMATEDAVG 140 (H) 10/03/2023    ESTIMATEDAVG 140 (H) 09/25/2023    ESTIMATEDAVG 140 (H) 08/14/2023       History of diabetes since 2008  Diabetes medications: Farxiga 10mg daily, lantus 20u morning, Ozempic 0.25mg injection weekly  Blood Glucose Checks at Home: Yes: random blood sugars  Typical morning range: 130-140 mg/dl  Endocrinologist or PCP following DM: PCP Dr Ramey    Other Core Components/Diabetes Plan:   Goals:  Hgb A1c < 7%  Exercise Blood Glucose: 100-300 mg/dl    Interventions:  Medication Compliance  Med Card Reconciled  Low Sodium, Mediterranean, ADA Diet  Physical Activity  Increase Knowledge of Contributory Factors    Education:  Risk Factors; verbalizes understanding; Date: 12/22/23  Dining Out; verbalizes understanding; Date: 11/29/23  Weight Management; verbalizes understanding; Date: 12/20/23    Comments:   Patient verbalizes understanding to bring home glucometer and check glucose pre and post each exercise session.  Per cardiac rehab protocols, patient's glucose must be between 90 and 270 mg/dL to exercise.  Patient denies any recent glucose levels less than 60 mg/dL  or greater than 300 mg/dL. Patient verbalizes importance of notifying rehab staff if symptoms of hypoglycemia occur while at cardiac rehab.  Abnormal labs will be reported to patient's PCP/Endocrinologist by rehab staff.    Noted updated glucose parameters of 100-300, pt's glucose remains within acceptable range    Mary Hilario MS, RDN/LDN    Session: 24 Session Follow Up   Cardiac Rehab Individual Treatment Plan - Reassessment      Patient Name: Addy Redding MRN: [id number]   : 1955   Age: 68 y.o.   Primary Diagnosis: S/P CABG (23)      Psychosocial Assessment:   Living Arrangements: Lives with spouse  Family Support: children and spouse  Self Reported:     Effective Coping Skills  Displays: happiness and calmness  Medication: not applicable    Psychosocial Plan:   Goals:  Improved psychosocial coping strategies: In Progress  Maintain positive support system: In Progress  Maintain positive outlook: In Progress  Improve overall quality of life: In Progress    Comments on Goal Progression:  Patient feels he is making progress with managing his stress. Reports he is learning new coping strategies which have helped.     Interventions:  Patient to Self Report Emotional Changes at Session Check In  Recommend Physical Activity  Recommend Attending Education Lectures  Notify MD: No  Program Referral: No  Pharmaceutical Intervention/Therapy: No  Other Needs: not applicable  Stage of Readiness to Change: Action    Education:  Risk Factors; verbalizes understanding; Date: 23    Comments:  Pt denies any issues with anxiety/depression at this time. Reports his stress level has improved with learning new ways to deal with stress.  Patient has been instructed to notify staff in the event that circumstances worsen.  Patient verbalizes understanding.    Other Core Components/Risk Factors Assessment:   RISK FACTORS:  diabetes, hyperlipidemia, hypertension, CKD    Learning Barriers: None    Medication Compliance: has  been compliant with taking medications    Other Core Components/Risk Factors Plan:   Goals:  Decrease cholesterol level: In Progress  Increase exercise tolerance: In Progress  Increase knowledge of CAD: In Progress  Decrease blood pressure: In Progress  Identify and manage personal areas of stress: In Progress  Control diabetes by adjusting diet and exercise: In Progress  Learn more about healthy eating: In Progress    Comments on Goal Progression:  Patient reports making progress toward goals. He feels his energy level and stamina have improved. He is walking twice a week and using stationary bike also. He is working on increasing his fish intake, and fruits/veggies.     Interventions:  Individual Education/ Counseling: Yes  Physician Referral: No    Education:    diabetes, verbalizes understanding; Date: 11/13/23  fluid overload/CHF, verbalizes understanding; Date: 12/15/23  nutrition, verbalizes understanding; Date: 12/6/23  physical activity, verbalizes understanding; Date: 12/11/23  risk factors, verbalizes understanding; Date: 12/22/23  sodium, verbalizes understanding; Date: 11/1/23         Education method adapted to patients education level and preferred method of learning.  Method: explanation  handouts    Comments:  Patient attends regularly and participates in lectures. He is working with risk factor modification to improve his cardiac health.    Other Core Components/Hypertension Assessment:   BP Range: /62-78  BP at Goal: Yes  Patient reported symptoms: none    Other Core Components/Hypertension Plan:   Goals:  Blood Pressure <130/80    Comments on Goal Progression:  BP at goal. He takes readings daily and keeps a log for his providers.     Interventions:  Med Card Reconciled: Yes  Encourage medication compliance  Encourage sodium reduction  Encourage weight loss  Recommend physical activity  Educate on contributory factors  Reduce stress, anxiety, anger, depression, and/or chronic  pain  Encourage home blood pressure monitoring    Education:    Coronary Artery Disease; verbalizes understanding; Date: 12/8/23  Congestive Heart Failure; verbalizes understanding; Date: 12/15/23  Risk Factors; verbalizes understanding; Date: 12/22/23  Stroke; verbalizes understanding; Date: 11/3/23         Comments:  Patient monitors his blood pressure regulary keeping a log of his readings for his provider appointments. He is compliant with his medications.     Does the patient have Heart Failure? No      Other Core Components/Tobacco Cessation Assessment:   Smoking Status: past smoker who quit 1995  Smoking Cessation Barriers:  NA  Stage of Readiness to Change: Maintenance    Other Core Components/Tobacco Cessation Plan:   Goals:  Maintain non-smoking status    Comments on Goal Progression:  Maintains nonsmoker status    Interventions:  Maintains non-smoking status    Education:    Heart and Lung Anatomy; verbalizes understanding; Date: 12/1/23  Coronary Artery Disease; verbalizes understanding; Date: 12/8/23  Risk Factors; verbalizes understanding; Date: 12/22/23  Stroke; verbalizes understanding; Date: 11/3/23         Comments:  Patient is working on lifestyle modification to improve his cardiac health. He is exercising on his off cardiac rehab days and working on his diet.     Ankit Jenkins RN  Cardiac Rehab Nurse

## 2024-01-26 ENCOUNTER — CLINICAL SUPPORT (OUTPATIENT)
Dept: CARDIAC REHAB | Facility: CLINIC | Age: 69
End: 2024-01-26
Payer: MEDICARE

## 2024-01-26 DIAGNOSIS — Z95.1 POSTSURGICAL AORTOCORONARY BYPASS STATUS: Primary | ICD-10-CM

## 2024-01-26 DIAGNOSIS — I25.10 ATHEROSCLEROSIS OF NATIVE CORONARY ARTERY OF NATIVE HEART WITHOUT ANGINA PECTORIS: ICD-10-CM

## 2024-01-26 PROCEDURE — 93798 PHYS/QHP OP CAR RHAB W/ECG: CPT | Mod: S$GLB,,, | Performed by: INTERNAL MEDICINE

## 2024-01-29 ENCOUNTER — CLINICAL SUPPORT (OUTPATIENT)
Dept: CARDIAC REHAB | Facility: CLINIC | Age: 69
End: 2024-01-29
Payer: MEDICARE

## 2024-01-29 DIAGNOSIS — I25.10 ATHEROSCLEROSIS OF NATIVE CORONARY ARTERY OF NATIVE HEART, UNSPECIFIED WHETHER ANGINA PRESENT: ICD-10-CM

## 2024-01-29 DIAGNOSIS — Z95.1 POSTSURGICAL AORTOCORONARY BYPASS STATUS: Primary | ICD-10-CM

## 2024-01-29 PROCEDURE — 93798 PHYS/QHP OP CAR RHAB W/ECG: CPT | Mod: S$GLB,,, | Performed by: INTERNAL MEDICINE

## 2024-01-30 DIAGNOSIS — Z00.00 ENCOUNTER FOR MEDICARE ANNUAL WELLNESS EXAM: ICD-10-CM

## 2024-02-05 ENCOUNTER — TELEPHONE (OUTPATIENT)
Dept: CARDIAC REHAB | Facility: CLINIC | Age: 69
End: 2024-02-05
Payer: MEDICARE

## 2024-02-05 NOTE — PROGRESS NOTES
Session: Exit   Cardiac Rehab Individual Treatment Plan - Discharge Assessment      Patient Name: Addy Redding MRN: [id number]   : 1955   Age: 68 y.o.   Primary Diagnosis: CABG  Date of Event: 23  EF: 55%  Risk Stratification: moderate  Referring Physician: JOANNA   Exercise Assessment:     CPX/TM: Entry Results Exit Results    Date: 10-3-23 Date: 24   RHR 65 75   Max  153   Peak VO2 (CPX only) 18.9 20.6   Actual METS (CPX only) 5.4 5.89   Estimated METS 10.0 12.0     Anthropometrics Entry Results Exit Results   Height 73 inches 73 inches   Weight 200 lbs 212 lbs   BMI 26.4  28.0   Abdominal Girth 40.5 40.0   Body Composition 19.8% 20.8%     Angina with exercise?: No   ST Depression with Exercise?: No  Currently exercising at home? yes Treadmill, Upright Bike, and Walking; Frequency: 3 days at gym and 2 days at home; Duration at least 30 minutes    Education:  Flexibility/Stretching; verbalizes understanding; Date: 24  Resistance Training II; verbalizes understanding; Date: 24    Rehab Exercise Goals:  Attend Cardiac Rehab 3 times/week: Met  Home Aerobic Exercise: 2 additional days/week for 30-60 minutes: Met  Intensity of 12-15 on the Rate of Perceived Exertion (RPE) scale: Met  30% increase in entry estimated METS: 13.0 : Not Met: 12.0  Demonstrate proper pulse taking technique: Met    Comments: Addy attended rehab classes regularly and had significant improvement in cardiovascular fitness.      Exercise Discharge Plan:     Intervention/Recommendations:       Recommended Home Prescription:  THR Range: 128-141   Mode: Aerobic   Frequency: 5-6 times per week   Duration: 30-60 minutes each day   Other Recs: 3-5 minute warm up and cool down/stretches   Resistance Trainin-3 days per week on non-consecutive days       Comments:    I met with Mr. Daly for an exit interview from the Phase II cardiac rehab program.  The entry and exit stress testing results were discussed as well as  current and future exercise programs.     Patient's plan: Addy is planning to go to the gym 3 days per week to use a treadmill, elliptical, and resistance training equipment.  He does at least 30 minutes of aerobic exercise and includes stretching in his routine.     I reviewed exercise recommendations with Addy and encouraged him to continue with his exercise plans.  We also discussed methods to alternate exercise modalities and workloads when needed.  I asked Addy to call if he has any questions in the future.  He stated understanding.        Shawna Duke., CEP

## 2024-02-05 NOTE — PROGRESS NOTES
HISTORY: S/P CABG (7-21-23), CAD, HTN, HLP, CKD, HX OF STENT, DM, EF=55%(3-23-23)    ANTHROPOMETRICS:     PRE POST   Abdominal girth (in) 40.5 40.0   Height (in) 73 73   Weight (lbs) 200 212   BMI 26.4 28.0   % Body Fat 19.8% 20.8%     EXERCISE RESULTS:     PRE POST   Peak VO2 (CPX only) 18.9 20.6   Actual METS (CPX only) 5.4 5.89   Estimated METS 10.0 12.0       HOME PRESCRIPTION: Congratulations, [place].  You completed Cardiac Rehab.  Aerobic exercise frequency is recommended 5 to 6 times per week with a duration of 30 to 60 minutes.  Intensity should keep you in your target heart range of 128 to 141 beats per minute.  Include a 3 to 5 minute warm up as well as cool down with stretches.  Resistance training is recommended 2 to 3 days per week on non-consecutive days.  Good luck to you.  Keep up the hard work, and it has been a pleasure working with you.      LAB RESULTS:    Lab Results   Component Value Date    HGB 13.6 (L) 01/25/2024    HGB 14.6 12/04/2023    HGB 13.8 (L) 10/03/2023     Lab Results   Component Value Date    HCT 40.8 01/25/2024    HCT 43.8 12/04/2023    HCT 43.7 10/03/2023     Lab Results   Component Value Date    MPV 10.7 01/25/2024    MPV 11.0 12/04/2023    MPV 10.2 10/03/2023       Lab Results   Component Value Date    CHOL 110 (L) 01/25/2024    CHOL 117 (L) 10/03/2023    CHOL 150 08/14/2023     Lab Results   Component Value Date    HDL 40 01/25/2024    HDL 37 (L) 10/03/2023    HDL 39 (L) 08/14/2023     Lab Results   Component Value Date    LDLCALC 57.6 (L) 01/25/2024    LDLCALC 66.4 10/03/2023    LDLCALC 91.8 08/14/2023     Lab Results   Component Value Date    TRIG 62 01/25/2024    TRIG 68 10/03/2023    TRIG 96 08/14/2023     Lab Results   Component Value Date    CHOLHDL 36.4 01/25/2024    CHOLHDL 31.6 10/03/2023    CHOLHDL 26.0 08/14/2023       Lab Results   Component Value Date    GLUF 132 (H) 01/25/2024    GLUF 85 10/03/2023     Lab Results   Component Value Date    HGBA1C 6.5 (H)  01/25/2024    HGBA1C 6.5 (H) 10/03/2023    HGBA1C 6.5 (H) 09/25/2023        Lab Results   Component Value Date    HSCRP 6.77 (H) 01/25/2024    HSCRP 6.04 (H) 10/03/2023         IAM SCORES:     PRE POST   Anxiety 3 0   Depression 0 0   Somatic 1 0   Hostility 3 1     SF-36 SCORES:     PRE POST   Physical Function 25 26   Social Function 9 11   Mental Health 20 26   Pain 7 7   Change in Health 4 5   Physical Role Limitation 0 4   Mental Role Limitation 3 3   Energy/Fatigue 15 17   Health Perceptions 13 15   Total Score 96 114     PHQ-9:        8/15/2023     9:13 AM 10/12/2023     9:26 AM 2/6/2024     9:22 AM   PHQ-9 Depression Patient Health Questionnaire   Over the last two weeks how often have you been bothered by little interest or pleasure in doing things 0 0 0   Over the last two weeks how often have you been bothered by feeling down, depressed or hopeless 0 0 0   Over the last two weeks how often have you been bothered by trouble falling or staying asleep, or sleeping too much  0 0   Over the last two weeks how often have you been bothered by feeling tired or having little energy  0 0   Over the last two weeks how often have you been bothered by a poor appetite or overeating  3 0   Over the last two weeks how often have you been bothered by feeling bad about yourself - or that you are a failure or have let yourself or your family down  0 0   Over the last two weeks how often have you been bothered by trouble concentrating on things, such as reading the newspaper or watching television  0 0   Over the last two weeks how often have you been bothered by moving or speaking so slowly that other people could have noticed.  0 0   Over the last two weeks how often have you been bothered by thoughts that you would be better off dead, or of hurting yourself  0 0   If you checked off any problems, how difficult have these problems made it for you to do your work, take care of things at home or get along with other  people?  Not difficult at all Not difficult at all   PHQ-9 Score  3 0                  EDUCATION SCORES:     PRE POST   Education Score 30 100

## 2024-02-06 ENCOUNTER — TELEPHONE (OUTPATIENT)
Dept: FAMILY MEDICINE | Facility: CLINIC | Age: 69
End: 2024-02-06
Payer: MEDICARE

## 2024-02-06 ENCOUNTER — CLINICAL SUPPORT (OUTPATIENT)
Dept: CARDIAC REHAB | Facility: CLINIC | Age: 69
End: 2024-02-06
Payer: MEDICARE

## 2024-02-06 DIAGNOSIS — Z95.1 POSTSURGICAL AORTOCORONARY BYPASS STATUS: Primary | ICD-10-CM

## 2024-02-06 DIAGNOSIS — I25.10 CORONARY ARTERY DISEASE, UNSPECIFIED VESSEL OR LESION TYPE, UNSPECIFIED WHETHER ANGINA PRESENT, UNSPECIFIED WHETHER NATIVE OR TRANSPLANTED HEART: ICD-10-CM

## 2024-02-06 PROCEDURE — 99999 PR PBB SHADOW E&M-EST. PATIENT-LVL III: CPT | Mod: PBBFAC,,,

## 2024-02-06 PROCEDURE — 93798 PHYS/QHP OP CAR RHAB W/ECG: CPT | Mod: S$GLB,,, | Performed by: INTERNAL MEDICINE

## 2024-02-06 NOTE — PROGRESS NOTES
Exit   Cardiac Rehab Individual Treatment Plan - Discharge Assessment      Patient Name: Addy Redding MRN: [id number]   : 1955   Age: 68 y.o.   Primary Diagnosis: s/p CABG    Nutrition Assessment:     Anthropometrics Pre Post   Height 73 inches 73 inches   Weight 200 lbs 212 lbs   BMI 26.4 28   Abdominal Girth 40.5 40   Body Composition 19.8 20.8       Labs:  Patient confirms he is taking lipitor 80mg, Zetia 10mg for cholesterol control.    Lab Results   Component Value Date    CHOL 110 (L) 2024    CHOL 117 (L) 10/03/2023    CHOL 150 2023     Lab Results   Component Value Date    HDL 40 2024    HDL 37 (L) 10/03/2023    HDL 39 (L) 2023     Lab Results   Component Value Date    LDLCALC 57.6 (L) 2024    LDLCALC 66.4 10/03/2023    LDLCALC 91.8 2023     Lab Results   Component Value Date    TRIG 62 2024    TRIG 68 10/03/2023    TRIG 96 2023     Lab Results   Component Value Date    CHOLHDL 36.4 2024    CHOLHDL 31.6 10/03/2023    CHOLHDL 26.0 2023       Lab Results   Component Value Date    GLUF 132 (H) 2024     Lab Results   Component Value Date    HGBA1C 6.5 (H) 2024       Nutrition/Diet History:  Patient eats 3 meals daily.    Seasons food with Mrs Dash/garlic/onion powder.  Patient denies use of a salt shaker at the table on prepared foods.   Dines out 1 per week.  Chooses fried foods 1-2 time(s) per month.    Chooses fish 3-4 time(s) per week.   Beverages:  water and sugar-free beverages  Alcohol: none  Current Exercise: See Exercise Physiologist Note  Food Safety/Food Preparation: spouse  Living Arrangements/Family Support: Lives with spouse, Lives with family  Stage of Change Related to Diet Habits: Maintenance  Recent Changes to Diet: No  Food Diary: Completed      Nutrition Plan:   Goals:  2 gram sodium, Mediterranean diet: Met  Increase fish intake (non-fried varieties) to a goal of 2-3 servings per week: Met  Increase fruit and vegetable  "intake: Met    Comments of Goal Progression:  Pt states he "feels much better" since completing rehab and learned a great deal about food choices/diet and is more confident about selecting the right foods for snacks-he has a good support system at home with wife who is very conscious to prepare heart healthy meals    Interventions/Recommendations:  Reviewed Anthropometric Progress  Reviewed Pre and Post Labs  Dietitian Consult: No  Nutrition Recommendations Provided: Verbal and Written, Reviewed  Discussed follow up plan for ongoing self-management support    Education:  Cholesterol and Fat; verbalizes understanding; Date: 1/24/24  Food Additives; verbalizes understanding; Date: 1/10/24    Comments:   Discussed ways to continue to incorporate healthy snacks, eating on a schedule, and monitoring sodium intake for heart health.    Diabetes  Is the patient diabetic? Yes   Other Core Components/Diabetes Assessment:   Labs:  Lab Results   Component Value Date    GLUF 132 (H) 01/25/2024    GLUF 85 10/03/2023     Lab Results   Component Value Date    HGBA1C 6.5 (H) 01/25/2024    HGBA1C 6.5 (H) 10/03/2023    HGBA1C 6.5 (H) 09/25/2023      Lab Results   Component Value Date    ESTIMATEDAVG 140 (H) 01/25/2024    ESTIMATEDAVG 140 (H) 10/03/2023    ESTIMATEDAVG 140 (H) 09/25/2023       History of diabetes since 2008  Diabetes medications: lantus 20u, Ozempic 0.25mg injection weekly, Farxiga 10mg  Blood Glucose Checks at Home: Yes: random blood sugars  Typical morning range: 110-120 mg/dl  Endocrinologist or PCP following DM: PCP Dr. Ramey    Other Core Components/Diabetes Plan:   Goals:  Hgb A1c < 7%  Exercise Blood Glucose: 100-300mg/dl    Interventions:  Reviewed and Discussed Labs  Medication Compliance  Med Card Reconciled  Low Sodium, Mediterranean, ADA Diet  Physical Activity  Increase Knowledge of Contributory Factors    Education:  Stress; verbalizes understanding; Date: 1/5/24  Medications I; verbalizes understanding; " Date: 1/12/24  Medications II; verbalizes understanding; Date: 1/19/24    Comments:       A1c remain stable at 6.5, pt very vigilant about food choices and has continuous monitor and checks throughout the day    Mary Hilario MS, RDN/LDN

## 2024-02-06 NOTE — PATIENT INSTRUCTIONS
HISTORY: S/P CABG (7-21-23), CAD, HTN, HLP, CKD, HX OF STENT, DM, EF=55%(3-23-23)    ANTHROPOMETRICS:     PRE POST   Abdominal girth (in) 40.5 40.0   Height (in) 73 73   Weight (lbs) 200 212   BMI 26.4 28.0   % Body Fat 19.8% 20.8%     EXERCISE RESULTS:     PRE POST   Peak VO2 (CPX only) 18.9 20.6   Actual METS (CPX only) 5.4 5.89   Estimated METS 10.0 12.0       HOME PRESCRIPTION: Congratulations, [facility].  You completed Cardiac Rehab.  Aerobic exercise frequency is recommended 5 to 6 times per week with a duration of 30 to 60 minutes.  Intensity should keep you in your target heart range of 128 to 141 beats per minute.  Include a 3 to 5 minute warm up as well as cool down with stretches.  Resistance training is recommended 2 to 3 days per week on non-consecutive days.  Good luck to you.  Keep up the hard work, and it has been a pleasure working with you.      LAB RESULTS:    Lab Results   Component Value Date    HGB 13.6 (L) 01/25/2024    HGB 14.6 12/04/2023    HGB 13.8 (L) 10/03/2023     Lab Results   Component Value Date    HCT 40.8 01/25/2024    HCT 43.8 12/04/2023    HCT 43.7 10/03/2023     Lab Results   Component Value Date    MPV 10.7 01/25/2024    MPV 11.0 12/04/2023    MPV 10.2 10/03/2023       Lab Results   Component Value Date    CHOL 110 (L) 01/25/2024    CHOL 117 (L) 10/03/2023    CHOL 150 08/14/2023     Lab Results   Component Value Date    HDL 40 01/25/2024    HDL 37 (L) 10/03/2023    HDL 39 (L) 08/14/2023     Lab Results   Component Value Date    LDLCALC 57.6 (L) 01/25/2024    LDLCALC 66.4 10/03/2023    LDLCALC 91.8 08/14/2023     Lab Results   Component Value Date    TRIG 62 01/25/2024    TRIG 68 10/03/2023    TRIG 96 08/14/2023     Lab Results   Component Value Date    CHOLHDL 36.4 01/25/2024    CHOLHDL 31.6 10/03/2023    CHOLHDL 26.0 08/14/2023       Lab Results   Component Value Date    GLUF 132 (H) 01/25/2024    GLUF 85 10/03/2023     Lab Results   Component Value Date    HGBA1C 6.5 (H)  01/25/2024    HGBA1C 6.5 (H) 10/03/2023    HGBA1C 6.5 (H) 09/25/2023        Lab Results   Component Value Date    HSCRP 6.77 (H) 01/25/2024    HSCRP 6.04 (H) 10/03/2023         IAM SCORES:     PRE POST   Anxiety 3 0   Depression 0 0   Somatic 1 0   Hostility 3 1     SF-36 SCORES:     PRE POST   Physical Function 25 26   Social Function 9 11   Mental Health 20 26   Pain 7 7   Change in Health 4 5   Physical Role Limitation 0 4   Mental Role Limitation 3 3   Energy/Fatigue 15 17   Health Perceptions 13 15   Total Score 96 114     PHQ-9:        8/15/2023     9:13 AM 10/12/2023     9:26 AM 2/6/2024     9:22 AM   PHQ-9 Depression Patient Health Questionnaire   Over the last two weeks how often have you been bothered by little interest or pleasure in doing things 0 0 0   Over the last two weeks how often have you been bothered by feeling down, depressed or hopeless 0 0 0   Over the last two weeks how often have you been bothered by trouble falling or staying asleep, or sleeping too much  0 0   Over the last two weeks how often have you been bothered by feeling tired or having little energy  0 0   Over the last two weeks how often have you been bothered by a poor appetite or overeating  3 0   Over the last two weeks how often have you been bothered by feeling bad about yourself - or that you are a failure or have let yourself or your family down  0 0   Over the last two weeks how often have you been bothered by trouble concentrating on things, such as reading the newspaper or watching television  0 0   Over the last two weeks how often have you been bothered by moving or speaking so slowly that other people could have noticed.  0 0   Over the last two weeks how often have you been bothered by thoughts that you would be better off dead, or of hurting yourself  0 0   If you checked off any problems, how difficult have these problems made it for you to do your work, take care of things at home or get along with other  people?  Not difficult at all Not difficult at all   PHQ-9 Score  3 0                  EDUCATION SCORES:     PRE POST   Education Score 30 100

## 2024-02-06 NOTE — PROGRESS NOTES
Session: Exit   Cardiac Rehab Individual Treatment Plan - Discharge Assessment      Patient Name: Addy Redding MRN: [id number]   : 1955   Age: 68 y.o.   Diagnosis: CABG    Psychosocial Assessment:   Outcome Survey Tools:    IAM SCORES:   PRE POST   Anxiety 3 0   Depression 0 0   Somatic 1 0   Hostility 3 1     SF-36 SCORES:   PRE POST   Physical Function 25 26   Social Function 9 11   Mental Health 20 26   Pain 7 7   Change in Health 4 5   Physical Role Limitation 0 4   Mental Role Limitation 3 3   Energy/Fatigue 15 17   Health Perceptions 13 15   Total Score 96 114     PHQ 9:      8/15/2023     9:13 AM 10/12/2023     9:26 AM 2024     9:22 AM   PHQ-9 Depression Patient Health Questionnaire   Over the last two weeks how often have you been bothered by little interest or pleasure in doing things 0 0 0   Over the last two weeks how often have you been bothered by feeling down, depressed or hopeless 0 0 0   Over the last two weeks how often have you been bothered by trouble falling or staying asleep, or sleeping too much  0 0   Over the last two weeks how often have you been bothered by feeling tired or having little energy  0 0   Over the last two weeks how often have you been bothered by a poor appetite or overeating  3 0   Over the last two weeks how often have you been bothered by feeling bad about yourself - or that you are a failure or have let yourself or your family down  0 0   Over the last two weeks how often have you been bothered by trouble concentrating on things, such as reading the newspaper or watching television  0 0   Over the last two weeks how often have you been bothered by moving or speaking so slowly that other people could have noticed.  0 0   Over the last two weeks how often have you been bothered by thoughts that you would be better off dead, or of hurting yourself  0 0   If you checked off any problems, how difficult have these problems made it for you to do your work, take care of  things at home or get along with other people?  Not difficult at all Not difficult at all   PHQ-9 Score  3 0            Family Support: spouse  Self Reported: Effective Coping Skills  Displays: happiness, smiles often, and calmness    Psychosocial Plan:   Goals:  Improved psychosocial coping strategies: Met  Maintain positive support system: Met  Maintain positive outlook: Met  Improve overall quality of life: Met    Comments on Goal Progression:  Discussed results of SF36/Carlos/PHQ with patient.  Noted improvements in all areas.  Patient denies having any overwhelming stress or anxiety.  He reports to be feeling much better physically & emotionally since attending cardiac rehab.    Interventions/Recommendations:  Discussed Results of Surveys: Yes  Notify MD: No  Program Referral: No  Pharmaceutical Intervention/Therapy: No  Physical Activity: Yes  Continue Patient Education: Yes  Other Needs: not applicable  Stage of Readiness to Change: Maintenance    Education:  Stress Management; verbalizes understanding; Date: 1/5/2024  Stress; verbalizes understanding; Date: 1/5/2024 & 2/6/2024    Comments:  Patient reports to have good support from his wife & family.  Patient is exercising 5-6 days per week at [employer].  He states that he & his wife exercise together.  He is very motivated & plans to continue with this healthy lifestyle that he has developed.  He has been instructed to seek attention via PCP/Psychiatry in the event that circumstances change. Patient verbalizes understanding.     Other Core Components/Risk Factors Assessment:   RISK FACTORS:  diabetes, hyperlipidemia, hypertension    Learning Barriers: None    Education Level:  High School (9-12) or GED    Pre-Test Score Post-Test Score   30 100     Medication Compliance: has been compliant with taking medications    Other Core Components/Risk Factors Plan:   Goals:  Decrease cholesterol level: Met  Increase exercise tolerance: Met  Increase knowledge  of CAD: Met  Decrease blood pressure: Met  Identify and manage personal areas of stress: Met  Control diabetes by adjusting diet and exercise: Met  Learn more about healthy eating: Met    Interventions/Recommendations:  Individual Education/Counseling: Yes  Physician Referral: No    Education:    blood pressure meds, verbalizes understanding; Date: 1/12/2024  cholesterol meds, verbalizes understanding; Date: 1/19/2024  diabetes, verbalizes understanding; Date: 11/13/2023  fluid overload/CHF, verbalizes understanding; Date: 12/15/2023  hypertension, verbalizes understanding; Date: 12/29/2023  risk factors, verbalizes understanding; Date: 12/22/2023  stress, verbalizes understanding; Date: 1/5/2024         Comments:  Patient has met all goals that were set.  Noted improvements in cholesterol LDL 57, HDL 40.  Educational scores from 30% to 100% indicative that he did increase his knowledge of CAD.  His BP is under good control with readings at or below goal of 130/80.  Patient denies having any overwhelming stress or anxiety.  HGA1C stayed the same at 6.5.      Other Core Components/Hypertension Assessment:   BP Range: 120-104/76-60  Patient reported symptoms: none    Other Core Components/Hypertension Plan:   Goals:  Blood Pressure <130/80: Met    Interventions/Recommendations:  Med Card Reconciled: Yes  Encourage medication compliance  Encourage sodium reduction  Encourage weight loss  Recommend physical activity  Educate on contributory factors  Reduce stress, anxiety, anger, depression, and/or chronic pain  Encourage home blood pressure monitoring  Recommend daily weights    Education:    Hypertension; verbalizes understanding; Date: 12/29/2023  Coronary Artery Disease; verbalizes understanding; Date: 12/8/2023  Congestive Heart Failure; verbalizes understanding; Date: 12/15/2023  Risk Factors; verbalizes understanding; Date: 12/22/2023  Medication I; verbalizes understanding; Date: 1/12/2024  Medication II;  verbalizes understanding; Date: 1/19/2024  Stroke; verbalizes understanding; Date: 11/3/2023  Stress; verbalizes understanding; Date: 1/5/2024         Comments:  See monthly report in Epic for blood pressure trends.  Patient is monitoring BP at home & keeping a log.  He is currently not weighing himself, but states that he will start.  Information given to patient for [facility]: Declined  Patient has joined [employer] with his wife.    Does the patient have Heart Failure? No    Other Core Components/Tobacco Cessation Assessment:   Smoking Status: lifetime non-smoker  Smoking Cessation Barriers:  N/A  Stage of Readiness to Change: Maintenance    Other Core Components/Tobacco Cessation Plan:   Goals:  Maintain non-smoking status: Met    Interventions/Recommendations:  Maintains non-smoking status    Education:    Risk Factors; verbalizes understanding; Date: 12/22/2023         Comments:  Non smoker.    Patient has been instructed to follow up with Cardiologist for any further cardiac issues.     Abigail Alvarez RN  Cardiac Rehab Nurse

## 2024-02-10 NOTE — TELEPHONE ENCOUNTER
No care due was identified.  [facility] Embedded Care Due Messages. Reference number: [id number].   2/10/2024 6:51:14 AM CST

## 2024-02-11 RX ORDER — PANTOPRAZOLE SODIUM 40 MG/1
40 TABLET, DELAYED RELEASE ORAL
Qty: 90 TABLET | Refills: 3 | Status: SHIPPED | OUTPATIENT
Start: 2024-02-11

## 2024-02-11 NOTE — TELEPHONE ENCOUNTER
Refill Decision Note   Addy Redding  is requesting a refill authorization.  Brief Assessment and Rationale for Refill:  Approve     Medication Therapy Plan:        Comments:     Note composed:2:51 AM 02/11/2024

## 2024-02-20 ENCOUNTER — OFFICE VISIT (OUTPATIENT)
Dept: FAMILY MEDICINE | Facility: CLINIC | Age: 69
End: 2024-02-20
Attending: FAMILY MEDICINE
Payer: MEDICARE

## 2024-02-20 VITALS
SYSTOLIC BLOOD PRESSURE: 118 MMHG | HEART RATE: 59 BPM | WEIGHT: 211.19 LBS | OXYGEN SATURATION: 99 % | BODY MASS INDEX: 27.99 KG/M2 | DIASTOLIC BLOOD PRESSURE: 76 MMHG | TEMPERATURE: 97 F | HEIGHT: 73 IN

## 2024-02-20 DIAGNOSIS — E11.69 DYSLIPIDEMIA ASSOCIATED WITH TYPE 2 DIABETES MELLITUS: ICD-10-CM

## 2024-02-20 DIAGNOSIS — N18.31 TYPE 2 DIABETES MELLITUS WITH STAGE 3A CHRONIC KIDNEY DISEASE, WITHOUT LONG-TERM CURRENT USE OF INSULIN: ICD-10-CM

## 2024-02-20 DIAGNOSIS — I25.118 CORONARY ARTERY DISEASE OF NATIVE ARTERY OF NATIVE HEART WITH STABLE ANGINA PECTORIS: ICD-10-CM

## 2024-02-20 DIAGNOSIS — N25.81 SECONDARY HYPERPARATHYROIDISM OF RENAL ORIGIN: ICD-10-CM

## 2024-02-20 DIAGNOSIS — I70.0 AORTIC ATHEROSCLEROSIS: ICD-10-CM

## 2024-02-20 DIAGNOSIS — N52.9 ERECTILE DYSFUNCTION, UNSPECIFIED ERECTILE DYSFUNCTION TYPE: ICD-10-CM

## 2024-02-20 DIAGNOSIS — I15.2 HYPERTENSION ASSOCIATED WITH DIABETES: Primary | ICD-10-CM

## 2024-02-20 DIAGNOSIS — E55.9 VITAMIN D DEFICIENCY: ICD-10-CM

## 2024-02-20 DIAGNOSIS — E11.22 TYPE 2 DIABETES MELLITUS WITH STAGE 3A CHRONIC KIDNEY DISEASE, WITHOUT LONG-TERM CURRENT USE OF INSULIN: ICD-10-CM

## 2024-02-20 DIAGNOSIS — E78.5 DYSLIPIDEMIA ASSOCIATED WITH TYPE 2 DIABETES MELLITUS: ICD-10-CM

## 2024-02-20 DIAGNOSIS — E11.59 HYPERTENSION ASSOCIATED WITH DIABETES: Primary | ICD-10-CM

## 2024-02-20 DIAGNOSIS — J43.2 CENTRILOBULAR EMPHYSEMA: ICD-10-CM

## 2024-02-20 DIAGNOSIS — N18.32 STAGE 3B CHRONIC KIDNEY DISEASE: ICD-10-CM

## 2024-02-20 DIAGNOSIS — I25.118 CORONARY ARTERY DISEASE OF NATIVE ARTERY WITH STABLE ANGINA PECTORIS, UNSPECIFIED WHETHER NATIVE OR TRANSPLANTED HEART: ICD-10-CM

## 2024-02-20 DIAGNOSIS — I48.0 PAROXYSMAL A-FIB: ICD-10-CM

## 2024-02-20 DIAGNOSIS — C61 PROSTATE CANCER: ICD-10-CM

## 2024-02-20 PROBLEM — N18.4 CHRONIC KIDNEY DISEASE (CKD), STAGE IV (SEVERE): Status: RESOLVED | Noted: 2022-07-06 | Resolved: 2024-02-20

## 2024-02-20 PROCEDURE — 99214 OFFICE O/P EST MOD 30 MIN: CPT | Mod: S$GLB,,, | Performed by: FAMILY MEDICINE

## 2024-02-20 PROCEDURE — 99999 PR PBB SHADOW E&M-EST. PATIENT-LVL IV: CPT | Mod: PBBFAC,,, | Performed by: FAMILY MEDICINE

## 2024-02-20 RX ORDER — CLOPIDOGREL BISULFATE 75 MG/1
75 TABLET ORAL DAILY
Qty: 90 TABLET | Refills: 4 | Status: SHIPPED | OUTPATIENT
Start: 2024-02-20

## 2024-02-20 RX ORDER — SILDENAFIL CITRATE 20 MG/1
20 TABLET ORAL DAILY PRN
Qty: 90 TABLET | Refills: 10 | Status: ACTIVE | OUTPATIENT
Start: 2024-02-20 | End: 2024-06-11

## 2024-02-20 RX ORDER — OLMESARTAN MEDOXOMIL 5 MG/1
5 TABLET ORAL DAILY
Qty: 90 TABLET | Refills: 3 | Status: SHIPPED | OUTPATIENT
Start: 2024-02-20 | End: 2024-03-05 | Stop reason: SDUPTHER

## 2024-02-20 RX ORDER — SEMAGLUTIDE 0.68 MG/ML
0.5 INJECTION, SOLUTION SUBCUTANEOUS
Qty: 1 EACH | Refills: 11 | Status: SHIPPED | OUTPATIENT
Start: 2024-02-20 | End: 2024-06-11

## 2024-02-20 RX ORDER — ERGOCALCIFEROL 1.25 MG/1
50000 CAPSULE ORAL
Qty: 4 CAPSULE | Refills: 3 | Status: SHIPPED | OUTPATIENT
Start: 2024-02-20 | End: 2024-03-05 | Stop reason: SDUPTHER

## 2024-02-20 RX ORDER — EZETIMIBE 10 MG/1
10 TABLET ORAL DAILY
Qty: 90 TABLET | Refills: 3 | Status: SHIPPED | OUTPATIENT
Start: 2024-02-20 | End: 2025-02-19

## 2024-02-20 RX ORDER — ATORVASTATIN CALCIUM 80 MG/1
80 TABLET, FILM COATED ORAL DAILY
Qty: 90 TABLET | Refills: 3 | Status: SHIPPED | OUTPATIENT
Start: 2024-02-20 | End: 2024-05-10

## 2024-02-20 RX ORDER — DAPAGLIFLOZIN 10 MG/1
10 TABLET, FILM COATED ORAL DAILY
Qty: 90 TABLET | Refills: 3 | Status: SHIPPED | OUTPATIENT
Start: 2024-02-20

## 2024-02-20 NOTE — PROGRESS NOTES
Subjective:       Patient ID: Addy Redding is a 68 y.o. male.    Chief Complaint: Follow-up    68 yr old black male with DM II, HTN, HLD, GERD, CKD III, prostate cancer in remission. presents today for his three month follow up. No complaints today.        DM II - improved -   HGBA1C                   6.5 (H)             01/25/2024                                      - complicated by CKD III                   - denies any hypoglycemic symptoms - on metformin - up to date with eye and foot screen - on metformin    HTN - controlled  - on lisinopril 20 mg daily - no side effects    HLD - controlled and improving -    LDLCALC                  57.6 (L)            01/25/2024                                        - on statin - compliant - need refill - not fully compliant with diet    GERD - stable - takes prilosec as needed    ED - stable - takes viagra as needed    Health maintenance  -labs due  -Flu and Pneumovax - up to date  -adacel due and done today  -colonoscopy due - wants to wait  -PSA UTD      Follow-up  This is a chronic problem. The current episode started more than 1 year ago. The problem occurs constantly. The problem has been gradually worsening. Associated symptoms include arthralgias and myalgias. Pertinent negatives include no abdominal pain, change in bowel habit, chest pain, congestion, coughing, diaphoresis, fatigue, headaches, joint swelling, numbness, rash, urinary symptoms, vertigo, vomiting or weakness.   Medication Refill  This is a chronic problem. The current episode started more than 1 year ago. The problem occurs constantly. The problem has been gradually improving. Associated symptoms include arthralgias and myalgias. Pertinent negatives include no abdominal pain, change in bowel habit, chest pain, congestion, coughing, diaphoresis, fatigue, headaches, joint swelling, numbness, rash, urinary symptoms, vertigo, vomiting or weakness.   Hypertension  This is a chronic problem. The current  episode started more than 1 year ago. The problem has been gradually worsening since onset. The problem is uncontrolled. Pertinent negatives include no anxiety, chest pain, headaches, malaise/fatigue, orthopnea, palpitations, peripheral edema or sweats. There are no associated agents to hypertension. Risk factors for coronary artery disease include dyslipidemia, diabetes mellitus, male gender and obesity. Past treatments include ACE inhibitors. The current treatment provides no improvement. There are no compliance problems.  There is no history of angina, CAD/MI, CVA, left ventricular hypertrophy, PVD or retinopathy. There is no history of chronic renal disease, coarctation of the aorta, hypercortisolism, pheochromocytoma, renovascular disease or a thyroid problem.   Arm Pain   There was no injury mechanism. The pain is present in the right shoulder and upper right arm. The quality of the pain is described as aching. The pain does not radiate. The pain is at a severity of 5/10. The pain is moderate. The pain has been Constant since the incident. Pertinent negatives include no chest pain or numbness. The symptoms are aggravated by movement, lifting and palpation. He has tried nothing for the symptoms. The treatment provided mild relief.   Shoulder Pain   The pain is present in the left shoulder. This is a chronic problem. The current episode started more than 1 month ago. There has been no history of extremity trauma. The problem occurs constantly. The problem has been unchanged. The quality of the pain is described as aching. The pain is at a severity of 8/10. The pain is severe. Associated symptoms include an inability to bear weight and a limited range of motion. Pertinent negatives include no headaches, itching, joint swelling or numbness. The symptoms are aggravated by activity. He has tried nothing for the symptoms. The treatment provided no relief. His past medical history is significant for diabetes.    Gastroesophageal Reflux  He complains of heartburn. He reports no abdominal pain, no chest pain, no coughing or no wheezing. This is a new problem. The current episode started 1 to 4 weeks ago. The problem occurs constantly. The problem has been unchanged. The heartburn duration is several minutes. The heartburn is located in the substernum. The symptoms are aggravated by certain foods. Pertinent negatives include no anemia, fatigue or melena. There are no known risk factors. He has tried nothing for the symptoms. The treatment provided mild relief. Past procedures do not include an abdominal ultrasound, esophageal pH monitoring or a UGI. Past invasive treatments do not include gastroplasty or reflux surgery.   Diabetes  Pertinent negatives for hypoglycemia include no confusion, dizziness, headaches, nervousness/anxiousness, speech difficulty or sweats. Pertinent negatives for diabetes include no chest pain, no fatigue, no polydipsia, no polyuria and no weakness. Pertinent negatives for diabetic complications include no CVA, PVD or retinopathy.   Hyperlipidemia  This is a chronic problem. The current episode started more than 1 year ago. The problem is uncontrolled. Recent lipid tests were reviewed and are high. Exacerbating diseases include diabetes. He has no history of chronic renal disease, liver disease or nephrotic syndrome. There are no known factors aggravating his hyperlipidemia. Associated symptoms include myalgias. Pertinent negatives include no chest pain, focal sensory loss, focal weakness or leg pain. Current antihyperlipidemic treatment includes statins. There are no compliance problems.  Risk factors for coronary artery disease include diabetes mellitus, dyslipidemia, male sex and hypertension.     Review of Systems   Constitutional: Negative.  Negative for activity change, diaphoresis, fatigue, malaise/fatigue and unexpected weight change.   HENT: Negative.  Negative for nasal congestion, ear  pain, mouth sores, rhinorrhea and voice change.    Eyes: Negative.  Negative for pain, discharge and visual disturbance.   Respiratory: Negative.  Negative for apnea, cough and wheezing.    Cardiovascular: Negative.  Negative for chest pain, palpitations and orthopnea.   Gastrointestinal:  Positive for heartburn. Negative for abdominal distention, abdominal pain, anal bleeding, change in bowel habit, diarrhea, melena and vomiting.   Endocrine: Negative.  Negative for cold intolerance, polydipsia and polyuria.   Genitourinary: Negative.  Negative for decreased urine volume, difficulty urinating, discharge, frequency and scrotal swelling.   Musculoskeletal:  Positive for arthralgias and myalgias. Negative for back pain, joint swelling, leg pain and neck stiffness.   Integumentary:  Negative for color change, itching and rash. Negative.   Allergic/Immunologic: Negative.  Negative for environmental allergies.   Neurological: Negative.  Negative for dizziness, vertigo, focal weakness, speech difficulty, weakness, light-headedness, numbness and headaches.   Hematological: Negative.    Psychiatric/Behavioral: Negative.  Negative for agitation, confusion, dysphoric mood and suicidal ideas. The patient is not nervous/anxious.          PMH/PSH/FH/SH/MED/ALLERGY reviewed    Past Medical History:   Diagnosis Date    CKD (chronic kidney disease) stage 3, GFR 30-59 ml/min     Costochondritis     Diabetic nephropathy associated with type 2 diabetes mellitus     Diabetic retinopathy     ED (erectile dysfunction)     GERD (gastroesophageal reflux disease)     Hyperlipidemia     Hypertension     Prostate cancer     Type II or unspecified type diabetes mellitus with renal manifestations, uncontrolled(250.42)        Past Surgical History:   Procedure Laterality Date    Bone biopsy right femur      CORONARY ANGIOGRAPHY N/A 06/16/2022    Procedure: ANGIOGRAM, CORONARY ARTERY;  Surgeon: Carter Arevalo MD;  Location: [facility] CATH LAB/EP;   Service: Cardiology;  Laterality: N/A;    CREATION OF BYPASS OF CORONARY ARTERY USING GRAFT WITHOUT CARDIOPULMONARY PUMP OXYGENATION N/A 2023    Procedure: CABG, WITHOUT CARDIOPULMONARY PUMP OXYGENATION;  Surgeon: Jass Uriostegui MD;  Location: [facility] OR [address] [place] [address];  Service: Cardiovascular;  Laterality: N/A;  CABG x1 off pump    FRACTIONAL FLOW RESERVE (FFR), CORONARY  2023    Procedure: Fractional Flow [place] (FFR), Coronary;  Surgeon: Je Fontanez MD;  Location: [facility] CATH LAB/EP;  Service: Cardiology;;    INSTANTANEOUS WAVE-FREE RATIO (IFR) N/A 2023    Procedure: Instantaneous Wave-Free Ratio (IFR);  Surgeon: Je Fontanez MD;  Location: [facility] CATH LAB/EP;  Service: Cardiology;  Laterality: N/A;    LEFT HEART CATHETERIZATION Left 2022    Procedure: Left heart cath;  Surgeon: Carter Arevalo MD;  Location: [facility] CATH LAB/EP;  Service: Cardiology;  Laterality: Left;    LEFT HEART CATHETERIZATION Left 2023    Procedure: Left heart cath;  Surgeon: Je Fontanez MD;  Location: [facility] CATH LAB/EP;  Service: Cardiology;  Laterality: Left;    PROSTATE BIOPSY  10/05/2022    RADIOACTIVE SEED IMPLANTATION OF PROSTATE  2023    Procedure: INSERTION, RADIOACTIVE SEED, PROSTATE;  Surgeon: Scott Frias MD;  [facility];  Service: Urology;;    TRANSRECTAL ULTRASOUND EXAMINATION N/A 2023    Procedure: ULTRASOUND, RECTAL APPROACH;  Surgeon: Scott Frias MD;  Location: [facility] OR [address] [place] [address];  Service: Urology;  Laterality: N/A;       Family History   Problem Relation Age of Onset    Hypertension Mother     Diabetes Mother     Kidney disease Mother     Coronary artery disease Father          of an MI at age 60    Hyperlipidemia Father     Heart attack Father     Hypertension Sister     Diabetes Sister     Heart disease Sister     Heart attack Sister     Hyperlipidemia Sister     Coronary artery disease Sister     Diabetes Sister     Hypertension Sister     Dementia  Sister     No Known Problems Sister     Stomach cancer Sister     Cancer Sister         stomach     Hypertension Brother     Stroke Brother     Lung cancer Brother     Cancer Brother         lung cancer    Diabetes Brother     Peripheral vascular disease Brother     Hypertension Brother     Hyperlipidemia Brother     No Known Problems Brother     Diabetes Brother     Stroke Brother     Hypertension Brother     Hyperlipidemia Brother     HIV Brother     Diabetes Brother     Hypertension Brother     No Known Problems Daughter     Asthma Son     Hypertension Son     Anesthesia problems Neg Hx        Social History     Socioeconomic History    Marital status:    Tobacco Use    Smoking status: Former     Current packs/day: 0.00     Average packs/day: 0.5 packs/day for 26.0 years (13.0 ttl pk-yrs)     Types: Cigarettes     Start date:      Quit date:      Years since quittin.1     Passive exposure: Never    Smokeless tobacco: Never   Substance and Sexual Activity    Alcohol use: Not Currently    Drug use: No    Sexual activity: Yes     Partners: Female     Social Determinants of Health     Financial Resource Strain: Low Risk  (2023)    Overall Financial Resource Strain (CARDIA)     Difficulty of Paying Living Expenses: Not hard at all   Food Insecurity: Patient Declined (2023)    Hunger Vital Sign     Worried About Running Out of Food in the Last Year: Patient declined     Ran Out of Food in the Last Year: Patient declined   Transportation Needs: Unknown (2023)    PRAPARE - Transportation     Lack of Transportation (Medical): No     Lack of Transportation (Non-Medical): Patient declined   Physical Activity: Inactive (2023)    Exercise Vital Sign     Days of Exercise per Week: 0 days     Minutes of Exercise per Session: 0 min   Stress: Patient Declined (2023)    [place] [employer] - Occupational Stress Questionnaire     Feeling of Stress : Patient declined  "  Social Connections: Unknown (7/24/2023)    Social Connection and Isolation Panel [NHANES]     Frequency of Communication with Friends and Family: Patient declined     Frequency of Social Gatherings with Friends and Family: Patient declined     Attends Jehovah's witness Services: Patient declined     Active Member of Clubs or Organizations: No     Attends Club or Organization Meetings: Never     Marital Status:    Housing Stability: Unknown (7/24/2023)    Housing Stability Vital Sign     Unable to Pay for Housing in the Last Year: No     Unstable Housing in the Last Year: Patient refused       Current Outpatient Medications   Medication Sig Dispense Refill    ADVANCED GLUC METER TEST STRIP Strp USE TO TEST BLOOD SUGAR 4 TIMES DAILY. 100 strip 0    BD ARIA 2ND GEN PEN NEEDLE 32 gauge x 5/32" Ndle USE ONE NEEDLE WITH INSULIN PEN TWICE DAILY 100 each 3    blood sugar diagnostic (BLOOD GLUCOSE TEST) Strp Check sugar once daily 100 each 11    blood-glucose meter Misc by Misc.(Non-Drug; Combo Route) route.      blood-glucose meter,continuous (DEXCOM G6 ) Misc 1 Device by Misc.(Non-Drug; Combo Route) route continuous. 1 each 0    blood-glucose sensor (DEXCOM G6 SENSOR) Tia 1 Device by Misc.(Non-Drug; Combo Route) route continuous. 4 each 11    blood-glucose transmitter (DEXCOM G6 TRANSMITTER) Tia 1 Device by Misc.(Non-Drug; Combo Route) route continuous. 1 each 0    insulin (LANTUS SOLOSTAR U-100 INSULIN) glargine 100 units/mL SubQ pen Inject 20 Units into the skin every evening. 21.6 mL 3    lancets (ACCU-CHEK SOFTCLIX LANCETS) Misc 1 lancet by Misc.(Non-Drug; Combo Route) route 2 (two) times daily. 200 each 1    pantoprazole (PROTONIX) 40 MG tablet TAKE ONE TABLET BY MOUTH ONCE DAILY. 90 tablet 3    tamsulosin (FLOMAX) 0.4 mg Cap TAKE ONE CAPSULE BY MOUTH ONCE DAILY. 90 capsule 3    atorvastatin (LIPITOR) 80 MG tablet Take 1 tablet (80 mg total) by mouth once daily. 90 tablet 3    clopidogreL (PLAVIX) 75 mg " tablet Take 1 tablet (75 mg total) by mouth once daily. 90 tablet 4    dapagliflozin propanediol (FARXIGA) 10 mg tablet Take 1 tablet (10 mg total) by mouth once daily. 90 tablet 3    ergocalciferol (ERGOCALCIFEROL) 50,000 unit Cap Take 1 capsule (50,000 Units total) by mouth every 30 days. 4 capsule 3    ezetimibe (ZETIA) 10 mg tablet Take 1 tablet (10 mg total) by mouth once daily. 90 tablet 3    olmesartan (BENICAR) 5 MG Tab Take 1 tablet (5 mg total) by mouth once daily. 90 tablet 3    semaglutide (OZEMPIC) 0.25 mg or 0.5 mg (2 mg/3 mL) pen injector Inject 0.5 mg into the skin every 7 days. 1 each 11    sildenafil (REVATIO) 20 mg Tab Take 1 tablet (20 mg total) by mouth daily as needed (ED). 90 tablet 10     No current facility-administered medications for this visit.       Review of patient's allergies indicates:  No Known Allergies      Objective:       Vitals:    02/20/24 0951   BP: 118/76   Pulse: (!) 59   Temp: 97.4 °F (36.3 °C)       Physical Exam  Constitutional:       Appearance: He is well-developed.   HENT:      Head: Normocephalic and atraumatic.      Right Ear: External ear normal.      Left Ear: External ear normal.      Nose: Nose normal.      Mouth/Throat:      Pharynx: No oropharyngeal exudate.   Eyes:      General: No scleral icterus.        Right eye: No discharge.         Left eye: No discharge.      Conjunctiva/sclera: Conjunctivae normal.      Pupils: Pupils are equal, round, and reactive to light.   Neck:      Thyroid: No thyromegaly.      Vascular: No JVD.      Trachea: No tracheal deviation.   Cardiovascular:      Rate and Rhythm: Normal rate and regular rhythm.      Pulses:           Dorsalis pedis pulses are 1+ on the right side and 1+ on the left side.        Posterior tibial pulses are 1+ on the right side and 1+ on the left side.      Heart sounds: Normal heart sounds. No murmur heard.     No friction rub. No gallop.   Pulmonary:      Effort: Pulmonary effort is normal. No  respiratory distress.      Breath sounds: Normal breath sounds. No stridor. No wheezing or rales.   Chest:      Chest wall: No tenderness.   Abdominal:      General: Bowel sounds are normal. There is no distension.      Palpations: Abdomen is soft. There is no mass.      Tenderness: There is no abdominal tenderness. There is no guarding or rebound.      Hernia: No hernia is present.   Musculoskeletal:         General: Tenderness (TTP left shoulder with restricted ROM) present.      Cervical back: Normal range of motion and neck supple.      Right foot: Normal range of motion. No deformity, bunion, Charcot foot, foot drop or prominent metatarsal heads.      Left foot: Normal range of motion. No deformity, bunion, Charcot foot, foot drop or prominent metatarsal heads.      Comments: Neer and rodriguez+ left   Feet:      Right foot:      Protective Sensation: 10 sites tested.  10 sites sensed.      Skin integrity: Skin integrity normal.      Toenail Condition: Right toenails are normal.      Left foot:      Protective Sensation: 10 sites tested.  10 sites sensed.      Skin integrity: Skin integrity normal.      Toenail Condition: Left toenails are normal.   Lymphadenopathy:      Cervical: No cervical adenopathy.   Skin:     General: Skin is warm and dry.      Coloration: Skin is not pale.      Findings: No erythema or rash.   Neurological:      Mental Status: He is alert and oriented to person, place, and time.      Cranial Nerves: No cranial nerve deficit.      Motor: No abnormal muscle tone.      Coordination: Coordination normal.      Deep Tendon Reflexes: Reflexes are normal and symmetric. Reflexes normal.   Psychiatric:         Behavior: Behavior normal.         Thought Content: Thought content normal.         Judgment: Judgment normal.         Assessment:       Problem List Items Addressed This Visit       Dyslipidemia associated with type 2 diabetes mellitus    Relevant Medications    semaglutide (OZEMPIC) 0.25 mg  or 0.5 mg (2 mg/3 mL) pen injector    ezetimibe (ZETIA) 10 mg tablet    atorvastatin (LIPITOR) 80 MG tablet    Type 2 diabetes mellitus with stage 3a chronic kidney disease, without long-term current use of insulin    Relevant Medications    semaglutide (OZEMPIC) 0.25 mg or 0.5 mg (2 mg/3 mL) pen injector    dapagliflozin propanediol (FARXIGA) 10 mg tablet    Other Relevant Orders    CBC Auto Differential    Comprehensive Metabolic Panel    Hemoglobin A1C    Stage 3b chronic kidney disease    Prostate cancer    Paroxysmal A-fib    Hypertension associated with diabetes - Primary    Relevant Medications    semaglutide (OZEMPIC) 0.25 mg or 0.5 mg (2 mg/3 mL) pen injector    olmesartan (BENICAR) 5 MG Tab    Other Relevant Orders    CBC Auto Differential    Comprehensive Metabolic Panel    Hemoglobin A1C    ED (erectile dysfunction)    Relevant Medications    sildenafil (REVATIO) 20 mg Tab    Coronary artery disease of native artery with stable angina pectoris    Relevant Medications    clopidogreL (PLAVIX) 75 mg tablet    Centrilobular emphysema    Aortic atherosclerosis     Other Visit Diagnoses       Vitamin D deficiency        Relevant Medications    ergocalciferol (ERGOCALCIFEROL) 50,000 unit Cap    Secondary hyperparathyroidism of renal origin                Plan:           Addy was seen today for follow-up.    Diagnoses and all orders for this visit:    Hypertension associated with diabetes  -     olmesartan (BENICAR) 5 MG Tab; Take 1 tablet (5 mg total) by mouth once daily.  -     CBC Auto Differential; Future  -     Comprehensive Metabolic Panel; Future  -     Hemoglobin A1C; Future    Type 2 diabetes mellitus with stage 3a chronic kidney disease, without long-term current use of insulin  -     semaglutide (OZEMPIC) 0.25 mg or 0.5 mg (2 mg/3 mL) pen injector; Inject 0.5 mg into the skin every 7 days.  -     dapagliflozin propanediol (FARXIGA) 10 mg tablet; Take 1 tablet (10 mg total) by mouth once daily.  -      CBC Auto Differential; Future  -     Comprehensive Metabolic Panel; Future  -     Hemoglobin A1C; Future    Stage 3b chronic kidney disease    Dyslipidemia associated with type 2 diabetes mellitus  -     ezetimibe (ZETIA) 10 mg tablet; Take 1 tablet (10 mg total) by mouth once daily.  -     atorvastatin (LIPITOR) 80 MG tablet; Take 1 tablet (80 mg total) by mouth once daily.    Coronary artery disease of native artery of native heart with stable angina pectoris    Prostate cancer    Erectile dysfunction, unspecified erectile dysfunction type  -     sildenafil (REVATIO) 20 mg Tab; Take 1 tablet (20 mg total) by mouth daily as needed (ED).    Coronary artery disease of native artery with stable angina pectoris, unspecified whether native or transplanted heart  -     clopidogreL (PLAVIX) 75 mg tablet; Take 1 tablet (75 mg total) by mouth once daily.    Vitamin D deficiency  -     ergocalciferol (ERGOCALCIFEROL) 50,000 unit Cap; Take 1 capsule (50,000 Units total) by mouth every 30 days.    Centrilobular emphysema    Paroxysmal A-fib    Secondary hyperparathyroidism of renal origin    Aortic atherosclerosis    HTN   -at goal  -continue lisinopril to 20 mg/ day    DM II   -controlled   -continue farxiga daily    CKD 3  -follow nephrology  -ER precautions given    HLD   -improving  -continue lipitor    GERD   -stable   -takes OTC PPI     ED'  -controlled            Spent adequate time in obtaining history and explaining differentials      30 minutes spent during this visit of which greater than 50% devoted to face-face counseling and coordination of care regarding diagnosis and management plan    Rtc 3 m r prn

## 2024-03-04 ENCOUNTER — LAB VISIT (OUTPATIENT)
Dept: LAB | Facility: HOSPITAL | Age: 69
End: 2024-03-04
Attending: INTERNAL MEDICINE
Payer: MEDICARE

## 2024-03-04 DIAGNOSIS — E11.59 HYPERTENSION ASSOCIATED WITH DIABETES: ICD-10-CM

## 2024-03-04 DIAGNOSIS — N18.31 TYPE 2 DIABETES MELLITUS WITH STAGE 3A CHRONIC KIDNEY DISEASE, WITHOUT LONG-TERM CURRENT USE OF INSULIN: ICD-10-CM

## 2024-03-04 DIAGNOSIS — N18.4 ANEMIA IN STAGE 4 CHRONIC KIDNEY DISEASE: ICD-10-CM

## 2024-03-04 DIAGNOSIS — C61 PROSTATE CANCER: ICD-10-CM

## 2024-03-04 DIAGNOSIS — I15.2 HYPERTENSION ASSOCIATED WITH DIABETES: ICD-10-CM

## 2024-03-04 DIAGNOSIS — E11.22 TYPE 2 DIABETES MELLITUS WITH STAGE 3A CHRONIC KIDNEY DISEASE, WITHOUT LONG-TERM CURRENT USE OF INSULIN: ICD-10-CM

## 2024-03-04 DIAGNOSIS — D63.1 ANEMIA IN STAGE 4 CHRONIC KIDNEY DISEASE: ICD-10-CM

## 2024-03-04 DIAGNOSIS — E55.9 VITAMIN D DEFICIENCY: ICD-10-CM

## 2024-03-04 LAB
25(OH)D3+25(OH)D2 SERPL-MCNC: 28 NG/ML (ref 30–96)
ALBUMIN SERPL BCP-MCNC: 3.7 G/DL (ref 3.5–5.2)
ALBUMIN SERPL BCP-MCNC: 3.7 G/DL (ref 3.5–5.2)
ALP SERPL-CCNC: 56 U/L (ref 55–135)
ALT SERPL W/O P-5'-P-CCNC: 14 U/L (ref 10–44)
ANION GAP SERPL CALC-SCNC: 9 MMOL/L (ref 8–16)
ANION GAP SERPL CALC-SCNC: 9 MMOL/L (ref 8–16)
AST SERPL-CCNC: 17 U/L (ref 10–40)
BACTERIA #/AREA URNS HPF: NORMAL /HPF
BASOPHILS # BLD AUTO: 0.04 K/UL (ref 0–0.2)
BASOPHILS NFR BLD: 0.7 % (ref 0–1.9)
BILIRUB SERPL-MCNC: 0.3 MG/DL (ref 0.1–1)
BILIRUB UR QL STRIP: NEGATIVE
BUN SERPL-MCNC: 30 MG/DL (ref 8–23)
BUN SERPL-MCNC: 30 MG/DL (ref 8–23)
CALCIUM SERPL-MCNC: 9.1 MG/DL (ref 8.7–10.5)
CALCIUM SERPL-MCNC: 9.1 MG/DL (ref 8.7–10.5)
CHLORIDE SERPL-SCNC: 109 MMOL/L (ref 95–110)
CHLORIDE SERPL-SCNC: 109 MMOL/L (ref 95–110)
CLARITY UR: CLEAR
CO2 SERPL-SCNC: 22 MMOL/L (ref 23–29)
CO2 SERPL-SCNC: 22 MMOL/L (ref 23–29)
COLOR UR: YELLOW
COMPLEXED PSA SERPL-MCNC: 0.47 NG/ML (ref 0–4)
CREAT SERPL-MCNC: 2 MG/DL (ref 0.5–1.4)
CREAT SERPL-MCNC: 2 MG/DL (ref 0.5–1.4)
CREAT UR-MCNC: 142.2 MG/DL (ref 23–375)
DIFFERENTIAL METHOD BLD: NORMAL
EOSINOPHIL # BLD AUTO: 0.2 K/UL (ref 0–0.5)
EOSINOPHIL NFR BLD: 3.5 % (ref 0–8)
ERYTHROCYTE [DISTWIDTH] IN BLOOD BY AUTOMATED COUNT: 14.2 % (ref 11.5–14.5)
EST. GFR  (NO RACE VARIABLE): 35 ML/MIN/1.73 M^2
EST. GFR  (NO RACE VARIABLE): 35 ML/MIN/1.73 M^2
ESTIMATED AVG GLUCOSE: 134 MG/DL (ref 68–131)
FERRITIN SERPL-MCNC: 78 NG/ML (ref 20–300)
GLUCOSE SERPL-MCNC: 130 MG/DL (ref 70–110)
GLUCOSE SERPL-MCNC: 130 MG/DL (ref 70–110)
GLUCOSE UR QL STRIP: ABNORMAL
HBA1C MFR BLD: 6.3 % (ref 4–5.6)
HCT VFR BLD AUTO: 45.1 % (ref 40–54)
HGB BLD-MCNC: 14.9 G/DL (ref 14–18)
HGB UR QL STRIP: NEGATIVE
IMM GRANULOCYTES # BLD AUTO: 0.01 K/UL (ref 0–0.04)
IMM GRANULOCYTES NFR BLD AUTO: 0.2 % (ref 0–0.5)
IRON SERPL-MCNC: 56 UG/DL (ref 45–160)
KETONES UR QL STRIP: NEGATIVE
LEUKOCYTE ESTERASE UR QL STRIP: NEGATIVE
LYMPHOCYTES # BLD AUTO: 1.6 K/UL (ref 1–4.8)
LYMPHOCYTES NFR BLD: 29.7 % (ref 18–48)
MAGNESIUM SERPL-MCNC: 1.9 MG/DL (ref 1.6–2.6)
MCH RBC QN AUTO: 28.5 PG (ref 27–31)
MCHC RBC AUTO-ENTMCNC: 33 G/DL (ref 32–36)
MCV RBC AUTO: 86 FL (ref 82–98)
MICROSCOPIC COMMENT: NORMAL
MONOCYTES # BLD AUTO: 0.6 K/UL (ref 0.3–1)
MONOCYTES NFR BLD: 11 % (ref 4–15)
NEUTROPHILS # BLD AUTO: 3 K/UL (ref 1.8–7.7)
NEUTROPHILS NFR BLD: 54.9 % (ref 38–73)
NITRITE UR QL STRIP: NEGATIVE
NRBC BLD-RTO: 0 /100 WBC
PH UR STRIP: 6 [PH] (ref 5–8)
PHOSPHATE SERPL-MCNC: 2.4 MG/DL (ref 2.7–4.5)
PLATELET # BLD AUTO: 237 K/UL (ref 150–450)
PMV BLD AUTO: 10.7 FL (ref 9.2–12.9)
POTASSIUM SERPL-SCNC: 4.5 MMOL/L (ref 3.5–5.1)
POTASSIUM SERPL-SCNC: 4.5 MMOL/L (ref 3.5–5.1)
PROT SERPL-MCNC: 7.1 G/DL (ref 6–8.4)
PROT UR QL STRIP: NEGATIVE
PROT UR-MCNC: 10 MG/DL (ref 0–15)
PROT/CREAT UR: 0.07 MG/G{CREAT} (ref 0–0.2)
PTH-INTACT SERPL-MCNC: 189.4 PG/ML (ref 9–77)
RBC # BLD AUTO: 5.23 M/UL (ref 4.6–6.2)
RBC #/AREA URNS HPF: 2 /HPF (ref 0–4)
SATURATED IRON: 20 % (ref 20–50)
SODIUM SERPL-SCNC: 140 MMOL/L (ref 136–145)
SODIUM SERPL-SCNC: 140 MMOL/L (ref 136–145)
SP GR UR STRIP: 1.02 (ref 1–1.03)
TOTAL IRON BINDING CAPACITY: 283 UG/DL (ref 250–450)
TRANSFERRIN SERPL-MCNC: 191 MG/DL (ref 200–375)
URATE SERPL-MCNC: 5.4 MG/DL (ref 3.4–7)
URN SPEC COLLECT METH UR: ABNORMAL
UROBILINOGEN UR STRIP-ACNC: NEGATIVE EU/DL
WBC # BLD AUTO: 5.38 K/UL (ref 3.9–12.7)
WBC #/AREA URNS HPF: 0 /HPF (ref 0–5)
YEAST URNS QL MICRO: NORMAL

## 2024-03-04 PROCEDURE — 80053 COMPREHEN METABOLIC PANEL: CPT | Performed by: FAMILY MEDICINE

## 2024-03-04 PROCEDURE — 83735 ASSAY OF MAGNESIUM: CPT | Performed by: INTERNAL MEDICINE

## 2024-03-04 PROCEDURE — 83036 HEMOGLOBIN GLYCOSYLATED A1C: CPT | Performed by: INTERNAL MEDICINE

## 2024-03-04 PROCEDURE — 80069 RENAL FUNCTION PANEL: CPT | Performed by: INTERNAL MEDICINE

## 2024-03-04 PROCEDURE — 84153 ASSAY OF PSA TOTAL: CPT | Performed by: RADIOLOGY

## 2024-03-04 PROCEDURE — 81000 URINALYSIS NONAUTO W/SCOPE: CPT | Performed by: INTERNAL MEDICINE

## 2024-03-04 PROCEDURE — 83540 ASSAY OF IRON: CPT | Performed by: INTERNAL MEDICINE

## 2024-03-04 PROCEDURE — 84156 ASSAY OF PROTEIN URINE: CPT | Performed by: INTERNAL MEDICINE

## 2024-03-04 PROCEDURE — 82043 UR ALBUMIN QUANTITATIVE: CPT | Performed by: INTERNAL MEDICINE

## 2024-03-04 PROCEDURE — 85025 COMPLETE CBC W/AUTO DIFF WBC: CPT | Performed by: INTERNAL MEDICINE

## 2024-03-04 PROCEDURE — 83970 ASSAY OF PARATHORMONE: CPT | Performed by: INTERNAL MEDICINE

## 2024-03-04 PROCEDURE — 84550 ASSAY OF BLOOD/URIC ACID: CPT | Performed by: INTERNAL MEDICINE

## 2024-03-04 PROCEDURE — 36415 COLL VENOUS BLD VENIPUNCTURE: CPT | Performed by: RADIOLOGY

## 2024-03-04 PROCEDURE — 82728 ASSAY OF FERRITIN: CPT | Performed by: INTERNAL MEDICINE

## 2024-03-04 PROCEDURE — 82306 VITAMIN D 25 HYDROXY: CPT | Performed by: INTERNAL MEDICINE

## 2024-03-05 LAB
ALBUMIN/CREAT UR: 3.5 UG/MG (ref 0–30)
CREAT UR-MCNC: 142.2 MG/DL (ref 23–375)
MICROALBUMIN UR DL<=1MG/L-MCNC: 5 UG/ML

## 2024-03-08 ENCOUNTER — TELEPHONE (OUTPATIENT)
Dept: RADIATION ONCOLOGY | Facility: CLINIC | Age: 69
End: 2024-03-08
Payer: MEDICARE

## 2024-05-10 DIAGNOSIS — E78.5 DYSLIPIDEMIA ASSOCIATED WITH TYPE 2 DIABETES MELLITUS: ICD-10-CM

## 2024-05-10 DIAGNOSIS — E11.69 DYSLIPIDEMIA ASSOCIATED WITH TYPE 2 DIABETES MELLITUS: ICD-10-CM

## 2024-05-10 RX ORDER — ATORVASTATIN CALCIUM 80 MG/1
80 TABLET, FILM COATED ORAL DAILY
Qty: 90 TABLET | Refills: 2 | Status: SHIPPED | OUTPATIENT
Start: 2024-05-10

## 2024-05-10 RX ORDER — ATORVASTATIN CALCIUM 80 MG/1
80 TABLET, FILM COATED ORAL
Qty: 90 TABLET | Refills: 3 | Status: SHIPPED | OUTPATIENT
Start: 2024-05-10 | End: 2024-05-10 | Stop reason: CLARIF

## 2024-05-10 NOTE — TELEPHONE ENCOUNTER
No care due was identified.  Health [facility] Embedded Care Due Messages. Reference number: [id number].   5/10/2024 12:09:44 AM CDT

## 2024-05-10 NOTE — TELEPHONE ENCOUNTER
Refill Decision Note   Addy Redding  is requesting a refill authorization.  Brief Assessment and Rationale for Refill:  Approve     Medication Therapy Plan:         Comments:     Note composed:7:27 AM 05/10/2024

## 2024-06-10 ENCOUNTER — LAB VISIT (OUTPATIENT)
Dept: LAB | Facility: HOSPITAL | Age: 69
End: 2024-06-10
Attending: INTERNAL MEDICINE
Payer: MEDICARE

## 2024-06-10 DIAGNOSIS — E11.22 TYPE 2 DIABETES MELLITUS WITH STAGE 3A CHRONIC KIDNEY DISEASE, WITHOUT LONG-TERM CURRENT USE OF INSULIN: ICD-10-CM

## 2024-06-10 DIAGNOSIS — N18.32 ANEMIA IN STAGE 3B CHRONIC KIDNEY DISEASE: ICD-10-CM

## 2024-06-10 DIAGNOSIS — D63.1 ANEMIA IN STAGE 3B CHRONIC KIDNEY DISEASE: ICD-10-CM

## 2024-06-10 DIAGNOSIS — N18.31 TYPE 2 DIABETES MELLITUS WITH STAGE 3A CHRONIC KIDNEY DISEASE, WITHOUT LONG-TERM CURRENT USE OF INSULIN: ICD-10-CM

## 2024-06-10 DIAGNOSIS — N18.32 STAGE 3B CHRONIC KIDNEY DISEASE: ICD-10-CM

## 2024-06-10 DIAGNOSIS — N25.81 SECONDARY HYPERPARATHYROIDISM OF RENAL ORIGIN: ICD-10-CM

## 2024-06-10 LAB
ALBUMIN SERPL BCP-MCNC: 3.7 G/DL (ref 3.5–5.2)
ALBUMIN/CREAT UR: 8.7 UG/MG (ref 0–30)
ANION GAP SERPL CALC-SCNC: 11 MMOL/L (ref 8–16)
BACTERIA #/AREA URNS HPF: NORMAL /HPF
BASOPHILS # BLD AUTO: 0.03 K/UL (ref 0–0.2)
BASOPHILS NFR BLD: 0.5 % (ref 0–1.9)
BILIRUB UR QL STRIP: NEGATIVE
BUN SERPL-MCNC: 27 MG/DL (ref 8–23)
CALCIUM SERPL-MCNC: 9.2 MG/DL (ref 8.7–10.5)
CHLORIDE SERPL-SCNC: 107 MMOL/L (ref 95–110)
CLARITY UR: CLEAR
CO2 SERPL-SCNC: 19 MMOL/L (ref 23–29)
COLOR UR: YELLOW
CREAT SERPL-MCNC: 2.3 MG/DL (ref 0.5–1.4)
CREAT UR-MCNC: 298.5 MG/DL (ref 23–375)
CREAT UR-MCNC: 298.5 MG/DL (ref 23–375)
DIFFERENTIAL METHOD BLD: NORMAL
EOSINOPHIL # BLD AUTO: 0.2 K/UL (ref 0–0.5)
EOSINOPHIL NFR BLD: 3.7 % (ref 0–8)
ERYTHROCYTE [DISTWIDTH] IN BLOOD BY AUTOMATED COUNT: 14.3 % (ref 11.5–14.5)
EST. GFR  (NO RACE VARIABLE): 30 ML/MIN/1.73 M^2
ESTIMATED AVG GLUCOSE: 154 MG/DL (ref 68–131)
FERRITIN SERPL-MCNC: 133 NG/ML (ref 20–300)
GLUCOSE SERPL-MCNC: 157 MG/DL (ref 70–110)
GLUCOSE UR QL STRIP: ABNORMAL
HBA1C MFR BLD: 7 % (ref 4–5.6)
HCT VFR BLD AUTO: 43.3 % (ref 40–54)
HGB BLD-MCNC: 14.9 G/DL (ref 14–18)
HGB UR QL STRIP: ABNORMAL
HYALINE CASTS #/AREA URNS LPF: 1 /LPF
IMM GRANULOCYTES # BLD AUTO: 0.01 K/UL (ref 0–0.04)
IMM GRANULOCYTES NFR BLD AUTO: 0.2 % (ref 0–0.5)
IRON SERPL-MCNC: 73 UG/DL (ref 45–160)
KETONES UR QL STRIP: NEGATIVE
LEUKOCYTE ESTERASE UR QL STRIP: NEGATIVE
LYMPHOCYTES # BLD AUTO: 1.4 K/UL (ref 1–4.8)
LYMPHOCYTES NFR BLD: 25.3 % (ref 18–48)
MAGNESIUM SERPL-MCNC: 2 MG/DL (ref 1.6–2.6)
MCH RBC QN AUTO: 29.2 PG (ref 27–31)
MCHC RBC AUTO-ENTMCNC: 34.4 G/DL (ref 32–36)
MCV RBC AUTO: 85 FL (ref 82–98)
MICROALBUMIN UR DL<=1MG/L-MCNC: 26 UG/ML
MICROSCOPIC COMMENT: NORMAL
MONOCYTES # BLD AUTO: 0.6 K/UL (ref 0.3–1)
MONOCYTES NFR BLD: 11.3 % (ref 4–15)
NEUTROPHILS # BLD AUTO: 3.3 K/UL (ref 1.8–7.7)
NEUTROPHILS NFR BLD: 59 % (ref 38–73)
NITRITE UR QL STRIP: NEGATIVE
NRBC BLD-RTO: 0 /100 WBC
PH UR STRIP: 6 [PH] (ref 5–8)
PHOSPHATE SERPL-MCNC: 3 MG/DL (ref 2.7–4.5)
PLATELET # BLD AUTO: 244 K/UL (ref 150–450)
PMV BLD AUTO: 10.1 FL (ref 9.2–12.9)
POTASSIUM SERPL-SCNC: 4.4 MMOL/L (ref 3.5–5.1)
PROT UR QL STRIP: ABNORMAL
PROT UR-MCNC: 15 MG/DL (ref 0–15)
PROT/CREAT UR: 0.05 MG/G{CREAT} (ref 0–0.2)
PTH-INTACT SERPL-MCNC: 243.3 PG/ML (ref 9–77)
RBC # BLD AUTO: 5.1 M/UL (ref 4.6–6.2)
RBC #/AREA URNS HPF: 2 /HPF (ref 0–4)
SATURATED IRON: 27 % (ref 20–50)
SODIUM SERPL-SCNC: 137 MMOL/L (ref 136–145)
SP GR UR STRIP: 1.02 (ref 1–1.03)
TOTAL IRON BINDING CAPACITY: 272 UG/DL (ref 250–450)
TRANSFERRIN SERPL-MCNC: 184 MG/DL (ref 200–375)
URATE SERPL-MCNC: 6.4 MG/DL (ref 3.4–7)
URN SPEC COLLECT METH UR: ABNORMAL
UROBILINOGEN UR STRIP-ACNC: NEGATIVE EU/DL
WBC # BLD AUTO: 5.66 K/UL (ref 3.9–12.7)
WBC #/AREA URNS HPF: 1 /HPF (ref 0–5)
YEAST URNS QL MICRO: NORMAL

## 2024-06-10 PROCEDURE — 83036 HEMOGLOBIN GLYCOSYLATED A1C: CPT | Performed by: INTERNAL MEDICINE

## 2024-06-10 PROCEDURE — 83540 ASSAY OF IRON: CPT | Performed by: INTERNAL MEDICINE

## 2024-06-10 PROCEDURE — 82570 ASSAY OF URINE CREATININE: CPT | Performed by: INTERNAL MEDICINE

## 2024-06-10 PROCEDURE — 84550 ASSAY OF BLOOD/URIC ACID: CPT | Performed by: INTERNAL MEDICINE

## 2024-06-10 PROCEDURE — 82043 UR ALBUMIN QUANTITATIVE: CPT | Performed by: INTERNAL MEDICINE

## 2024-06-10 PROCEDURE — 36415 COLL VENOUS BLD VENIPUNCTURE: CPT | Performed by: INTERNAL MEDICINE

## 2024-06-10 PROCEDURE — 81000 URINALYSIS NONAUTO W/SCOPE: CPT | Performed by: INTERNAL MEDICINE

## 2024-06-10 PROCEDURE — 83970 ASSAY OF PARATHORMONE: CPT | Performed by: INTERNAL MEDICINE

## 2024-06-10 PROCEDURE — 82728 ASSAY OF FERRITIN: CPT | Performed by: INTERNAL MEDICINE

## 2024-06-10 PROCEDURE — 83735 ASSAY OF MAGNESIUM: CPT | Performed by: INTERNAL MEDICINE

## 2024-06-10 PROCEDURE — 85025 COMPLETE CBC W/AUTO DIFF WBC: CPT | Performed by: INTERNAL MEDICINE

## 2024-06-10 PROCEDURE — 80069 RENAL FUNCTION PANEL: CPT | Performed by: INTERNAL MEDICINE

## 2024-06-17 ENCOUNTER — TELEPHONE (OUTPATIENT)
Dept: CARDIOLOGY | Facility: CLINIC | Age: 69
End: 2024-06-17
Payer: MEDICARE

## 2024-06-17 NOTE — TELEPHONE ENCOUNTER
CRISPIN Fitch took care of this message already.        ----- Message from Cassy Kan sent at 6/17/2024  2:55 PM CDT -----  Regarding: Dalila  Type:  Needs Medical Advice    Who Called: Pt  Best Call Back Number:  [phone]  Additional Information: Pt called to confirm appt for tomorrow. I did not see an appointment and he stated he would confirm when he gets here in the morning and hung up. I see you tried to reach him and offer him an appointment. I wanted to inform you the he did call

## 2024-06-17 NOTE — TELEPHONE ENCOUNTER
----- Message from Darlene Lozada sent at 6/17/2024  2:38 PM CDT -----  Type:  Needs Medical Advice    Who Called:  Pt   Would the patient rather a call back or a response via [facility]? Callback   Best Call Back Number:  [phone]  Additional Information:  pt is requesting a callback

## 2024-06-18 ENCOUNTER — PATIENT OUTREACH (OUTPATIENT)
Dept: ADMINISTRATIVE | Facility: HOSPITAL | Age: 69
End: 2024-06-18
Payer: MEDICARE

## 2024-06-18 NOTE — PROGRESS NOTES
Population Health Chart Review & Patient Outreach Details      Additional [facility] Health Notes:    PHN attestation for CRS           Updates Requested / Reviewed:      Updated Care Coordination Note, Care Everywhere, and Immunizations Reconciliation Completed or Queried: [place]         Health Maintenance Topics Overdue:      [facility] Score: 1     Eye Exam    RSV Vaccine                  Health Maintenance Topic(s) Outreach Outcomes & Actions Taken:

## 2024-06-24 ENCOUNTER — OFFICE VISIT (OUTPATIENT)
Dept: CARDIOLOGY | Facility: CLINIC | Age: 69
End: 2024-06-24
Payer: MEDICARE

## 2024-06-24 VITALS
OXYGEN SATURATION: 96 % | DIASTOLIC BLOOD PRESSURE: 69 MMHG | HEART RATE: 61 BPM | SYSTOLIC BLOOD PRESSURE: 110 MMHG | BODY MASS INDEX: 27.43 KG/M2 | HEIGHT: 73 IN | WEIGHT: 207 LBS

## 2024-06-24 DIAGNOSIS — Z95.1 POSTSURGICAL AORTOCORONARY BYPASS STATUS: ICD-10-CM

## 2024-06-24 DIAGNOSIS — E11.22 TYPE 2 DIABETES MELLITUS WITH STAGE 3A CHRONIC KIDNEY DISEASE, WITHOUT LONG-TERM CURRENT USE OF INSULIN: ICD-10-CM

## 2024-06-24 DIAGNOSIS — I70.0 AORTIC ATHEROSCLEROSIS: Primary | ICD-10-CM

## 2024-06-24 DIAGNOSIS — E78.5 DYSLIPIDEMIA ASSOCIATED WITH TYPE 2 DIABETES MELLITUS: ICD-10-CM

## 2024-06-24 DIAGNOSIS — I15.2 HYPERTENSION ASSOCIATED WITH DIABETES: ICD-10-CM

## 2024-06-24 DIAGNOSIS — I25.118 CORONARY ARTERY DISEASE OF NATIVE ARTERY OF NATIVE HEART WITH STABLE ANGINA PECTORIS: ICD-10-CM

## 2024-06-24 DIAGNOSIS — I48.0 PAROXYSMAL A-FIB: ICD-10-CM

## 2024-06-24 DIAGNOSIS — N18.32 STAGE 3B CHRONIC KIDNEY DISEASE: ICD-10-CM

## 2024-06-24 DIAGNOSIS — I65.23 BILATERAL CAROTID ARTERY STENOSIS: ICD-10-CM

## 2024-06-24 DIAGNOSIS — R00.1 SINUS BRADYCARDIA: ICD-10-CM

## 2024-06-24 DIAGNOSIS — I25.118 ATHEROSCLEROSIS OF NATIVE CORONARY ARTERY OF NATIVE HEART WITH STABLE ANGINA PECTORIS: ICD-10-CM

## 2024-06-24 DIAGNOSIS — N18.31 TYPE 2 DIABETES MELLITUS WITH STAGE 3A CHRONIC KIDNEY DISEASE, WITHOUT LONG-TERM CURRENT USE OF INSULIN: ICD-10-CM

## 2024-06-24 DIAGNOSIS — Z95.5 PRESENCE OF DRUG-ELUTING STENT IN LEFT CIRCUMFLEX CORONARY ARTERY: ICD-10-CM

## 2024-06-24 DIAGNOSIS — E11.59 HYPERTENSION ASSOCIATED WITH DIABETES: ICD-10-CM

## 2024-06-24 DIAGNOSIS — E11.69 DYSLIPIDEMIA ASSOCIATED WITH TYPE 2 DIABETES MELLITUS: ICD-10-CM

## 2024-06-24 PROCEDURE — 3074F SYST BP LT 130 MM HG: CPT | Mod: CPTII,S$GLB,, | Performed by: INTERNAL MEDICINE

## 2024-06-24 PROCEDURE — 99999 PR PBB SHADOW E&M-EST. PATIENT-LVL IV: CPT | Mod: PBBFAC,,, | Performed by: INTERNAL MEDICINE

## 2024-06-24 PROCEDURE — 3078F DIAST BP <80 MM HG: CPT | Mod: CPTII,S$GLB,, | Performed by: INTERNAL MEDICINE

## 2024-06-24 PROCEDURE — 1159F MED LIST DOCD IN RCRD: CPT | Mod: CPTII,S$GLB,, | Performed by: INTERNAL MEDICINE

## 2024-06-24 PROCEDURE — 3066F NEPHROPATHY DOC TX: CPT | Mod: CPTII,S$GLB,, | Performed by: INTERNAL MEDICINE

## 2024-06-24 PROCEDURE — 99214 OFFICE O/P EST MOD 30 MIN: CPT | Mod: S$GLB,,, | Performed by: INTERNAL MEDICINE

## 2024-06-24 PROCEDURE — 3288F FALL RISK ASSESSMENT DOCD: CPT | Mod: CPTII,S$GLB,, | Performed by: INTERNAL MEDICINE

## 2024-06-24 PROCEDURE — 3008F BODY MASS INDEX DOCD: CPT | Mod: CPTII,S$GLB,, | Performed by: INTERNAL MEDICINE

## 2024-06-24 PROCEDURE — 3061F NEG MICROALBUMINURIA REV: CPT | Mod: CPTII,S$GLB,, | Performed by: INTERNAL MEDICINE

## 2024-06-24 PROCEDURE — 4010F ACE/ARB THERAPY RXD/TAKEN: CPT | Mod: CPTII,S$GLB,, | Performed by: INTERNAL MEDICINE

## 2024-06-24 PROCEDURE — 1101F PT FALLS ASSESS-DOCD LE1/YR: CPT | Mod: CPTII,S$GLB,, | Performed by: INTERNAL MEDICINE

## 2024-06-24 PROCEDURE — 3051F HG A1C>EQUAL 7.0%<8.0%: CPT | Mod: CPTII,S$GLB,, | Performed by: INTERNAL MEDICINE

## 2024-06-24 PROCEDURE — 1126F AMNT PAIN NOTED NONE PRSNT: CPT | Mod: CPTII,S$GLB,, | Performed by: INTERNAL MEDICINE

## 2024-06-24 NOTE — PROGRESS NOTES
Subjective:   @Patient ID:  Addy Redding is a 68 y.o. male who presents for evaluation of CAD, HTN, HLP    HPI:   June 24, 2024:  Follow up.  Since last visit he has been doing okay.  One time when he was cutting the grass he gets tired.  No significant short of breath or chest pain.  He is off BB due to bradycardia.  BP is okay toward the lower side.  Compliant with meds      11/2023: F/U.  EM without a.fib.  He feels great.  Progressing very well with the cardiac rehab.  He is still on Xarelto along with Plavix.  He is off beta-blocker due to bradycardia    8/23/2023: F/U. [facility] ostial LAD disease. iFR +ve. CABG  LIMA-LAD. PDA was not bypassed. RCA . He had post op a.fib. He is on amio and xarelto. BB stopped due to concerns about tacy-brendon/ He feels much better since the bypass. Energy level is much better.        6/14/2023: F/U. Stress test with concerning balanced ischemia. Significant shortness of breath. Getting worse. He has chronic sinus bradycardia with good chronotropic response by stress test in 3/2022.     3/9/2023: He saw Dr. Arevalo in the past. He had exertional angina prompted stress test that was abnormal. [facility] 4/7/2022 with  RCA, significant OM disease, non obstructive prox LAD disease by iFR. Attempt to PCI RCA was unsuccessful (run through wire used). He was staged for PCI to OM 6/2022 ( 2.5 x 8 and 2.5x18) was complicated nu no reflow in the main lcx and it was stented, residual disease in ostial Om at bifurcation     He was diagnosed with prostate CA , got radiotherapy seeds. Stable     He still reports significant TRINH and low energy level. No chest pain   He has been compliant with his medications    He has bradycardia that's why  he is not on BB        Prior cardiovascular  Hx  --------------------------------  - EKG 12/2022 sinus bradycardia     - PET stress test 6/1/2023     The myocardial perfusion images are normal without evidence of scar.    Stress myocardial blood flow is severely  "reduced diffusely throughout the heart consistent with three vessel "balanced" ischemia.  The RCA territory has severely reduced flow capacity consistent with a known  with inadequate collateralization. The anterior and anterolateral wall have moderately reduced flow capacity and are on the border of ischemic thresholds. The remainder of the myocardium has mildly reduced flow capacity.    The whole heart absolute myocardial perfusion values averaged 0.54 cc/min/g at rest, which is reduced; 0.81 cc/min/g at stress, which is severely reduced; and CFR is 1.50 , which is moderately reduced.    CT attenuation images demonstrate moderate diffuse coronary calcifications in the LAD and LCX territory and mild diffuse aortic calcifications in the descending aorta.    The gated perfusion images showed an ejection fraction of 60% at rest and 62% during stress. A normal ejection fraction is greater than 47%.    The wall motion is normal at rest and during stress.    The LV cavity size is normal at rest and stress.    The ECG portion of the study is negative for ischemia.    There were no arrhythmias during stress.    The patient reported no chest pain during the stress test.    There are no prior studies for comparison.     - Echo 3/23/2023   Normal systolic function.  The estimated ejection fraction is 55%.  Normal left ventricular diastolic function.  Normal right ventricular size with normal right ventricular systolic function.  Normal central venous pressure (3 mmHg).  The estimated PA systolic pressure is 23 mmHg.       Patient Active Problem List    Diagnosis Date Noted    Coronary atherosclerosis of native coronary artery 10/27/2023    Left anterior shoulder pain 08/28/2023    Centrilobular emphysema 08/15/2023    Postsurgical aortocoronary bypass status 07/24/2023    Paroxysmal A-fib 07/24/2023     This is not a post operative complication. afib happen during post operative period      Tachycardia-bradycardia syndrome " 2023    Carotid stenosis 2023    Acute blood loss anemia 2023    Bilateral carotid artery stenosis 2023    Pre-op exam 2023    Pain of left upper extremity 2023    Weakness 2023    Stiffness in joint 2023    Chronic left shoulder pain 05/15/2023    Hyperparathyroidism, unspecified 2023    Prostate cancer     Stage 3b chronic kidney disease 10/04/2022    Atherosclerosis of native coronary artery of native heart 10/04/2022    Presence of drug-eluting stent in left circumflex coronary artery 2022    Preoperative clearance 2022    Coronary artery disease of native artery with stable angina pectoris 2022    Aortic atherosclerosis 2022    Sinus bradycardia 2022    Neck pain 10/22/2021    Decreased range of motion 10/22/2021    Type 2 diabetes mellitus with stage 3a chronic kidney disease, without long-term current use of insulin 2016    Left-sided low back pain with sciatica 2016    ED (erectile dysfunction)     Hypertension associated with diabetes 2014    Dyslipidemia associated with type 2 diabetes mellitus            Right Arm BP - Sittin/64  Left Arm BP - Sittin/69        LAST HbA1c  Lab Results   Component Value Date    HGBA1C 7.0 (H) 06/10/2024       Lipid panel  Lab Results   Component Value Date    CHOL 110 (L) 2024    CHOL 117 (L) 10/03/2023    CHOL 150 2023     Lab Results   Component Value Date    HDL 40 2024    HDL 37 (L) 10/03/2023    HDL 39 (L) 2023     Lab Results   Component Value Date    LDLCALC 57.6 (L) 2024    LDLCALC 66.4 10/03/2023    LDLCALC 91.8 2023     Lab Results   Component Value Date    TRIG 62 2024    TRIG 68 10/03/2023    TRIG 96 2023     Lab Results   Component Value Date    CHOLHDL 36.4 2024    CHOLHDL 31.6 10/03/2023    CHOLHDL 26.0 2023            Review of Systems   Constitutional: Negative for chills and fever.    HENT:  Negative for hearing loss and nosebleeds.    Eyes:  Negative for blurred vision.   Cardiovascular:         As in HPI   Respiratory:  Negative for hemoptysis and shortness of breath.    Hematologic/Lymphatic: Negative for bleeding problem.   Skin:  Negative for itching.   Musculoskeletal:  Negative for falls.   Gastrointestinal:  Negative for abdominal pain and hematochezia.   Genitourinary:  Positive for frequency. Negative for hematuria.        As in HPI    Neurological:  Negative for dizziness and loss of balance.   Psychiatric/Behavioral:  Negative for altered mental status and depression.        Objective:   Physical Exam  Constitutional:       Appearance: He is well-developed.   HENT:      Head: Normocephalic and atraumatic.   Eyes:      Conjunctiva/sclera: Conjunctivae normal.   Neck:      Vascular: No carotid bruit or JVD.   Cardiovascular:      Rate and Rhythm: Normal rate and regular rhythm.      Pulses:           Carotid pulses are 2+ on the right side and 2+ on the left side.       Radial pulses are 2+ on the right side and 2+ on the left side.      Heart sounds: Normal heart sounds. No murmur heard.     No friction rub. No gallop.   Pulmonary:      Effort: Pulmonary effort is normal. No respiratory distress.      Breath sounds: Normal breath sounds. No stridor. No wheezing.   Musculoskeletal:      Cervical back: Neck supple.   Skin:     General: Skin is warm and dry.   Neurological:      Mental Status: He is alert and oriented to person, place, and time.   Psychiatric:         Behavior: Behavior normal.         Assessment:     1. Aortic atherosclerosis    2. Atherosclerosis of native coronary artery of native heart with stable angina pectoris    3. Bilateral carotid artery stenosis    4. Coronary artery disease of native artery of native heart with stable angina pectoris    5. Dyslipidemia associated with type 2 diabetes mellitus    6. Hypertension associated with diabetes    7. Paroxysmal A-fib     8. Postsurgical aortocoronary bypass status    9. Presence of drug-eluting stent in left circumflex coronary artery    10. Type 2 diabetes mellitus with stage 3a chronic kidney disease, without long-term current use of insulin    11. Stage 3b chronic kidney disease    12. Sinus bradycardia          Plan:   Post CABG   LDL at goal.  Continue current medical therapy with the atorvastatin and ezetimibe  He was taken off Xarelto. 30 days EM without recurrent atrial fibrillation.  AFib was postop related to CABG.    He fully understands benefits and risks and agrees with the plan    BP is well controlled  We will not start beta-blockers given bradycardia and borderline blood pressure  Continue Farxiga.     Six-month follow-up       I spent 5-10 minutes asking, assessing, assisting, arranging and advising heart healthy diet improvements. This included low-salt meals, portion control and health food alternatives. I also encourage 30 minutes of moderate exercise 3-4x a week.        Pertinent cardiac images and EKG reviewed independently.    Continue with current medical plan and lifestyle changes.  Return sooner for concerns or questions. If symptoms persist go to the ED  I have reviewed all pertinent data including patient's medical history in detail and updated the computerized patient record.     No orders of the defined types were placed in this encounter.      Follow up as scheduled.     He expressed verbal understanding and agreed with the plan    Patient's Medications   New Prescriptions    No medications on file   Previous Medications    ADVANCED GLUC METER TEST STRIP STRP    USE TO TEST BLOOD SUGAR 4 TIMES DAILY.    ATORVASTATIN (LIPITOR) 80 MG TABLET    Take 1 tablet (80 mg total) by mouth once daily.    BLOOD SUGAR DIAGNOSTIC (BLOOD GLUCOSE TEST) STRP    Check sugar once daily    BLOOD-GLUCOSE METER MISC    by Misc.(Non-Drug; Combo Route) route.    BLOOD-GLUCOSE METER,CONTINUOUS (DEXCOM G6 ) MISC    1  "Device by Misc.(Non-Drug; Combo Route) route continuous.    BLOOD-GLUCOSE SENSOR (DEXCOM G6 SENSOR) DEVONTE    1 Device by Misc.(Non-Drug; Combo Route) route continuous.    BLOOD-GLUCOSE TRANSMITTER (DEXCOM G6 TRANSMITTER) DEVONTE    1 Device by Misc.(Non-Drug; Combo Route) route continuous.    CLOPIDOGREL (PLAVIX) 75 MG TABLET    Take 1 tablet (75 mg total) by mouth once daily.    DAPAGLIFLOZIN PROPANEDIOL (FARXIGA) 10 MG TABLET    Take 1 tablet (10 mg total) by mouth once daily.    ERGOCALCIFEROL (ERGOCALCIFEROL) 50,000 UNIT CAP    Take 1 capsule (50,000 Units total) by mouth every 7 days. Once per week for 8 weeks then once a month    EZETIMIBE (ZETIA) 10 MG TABLET    Take 1 tablet (10 mg total) by mouth once daily.    FUROSEMIDE (LASIX) 40 MG TABLET    Take 1 tablet (40 mg total) by mouth daily as needed (swelling).    INSULIN GLARGINE U-100, LANTUS, (LANTUS SOLOSTAR U-100 INSULIN) 100 UNIT/ML (3 ML) INPN PEN    Inject 20 Units into the skin every evening.    OLMESARTAN (BENICAR) 5 MG TAB    Take 1 tablet (5 mg total) by mouth once daily.    PANTOPRAZOLE (PROTONIX) 40 MG TABLET    TAKE ONE TABLET BY MOUTH ONCE DAILY.    PEN NEEDLE, DIABETIC (BD ARIA 2ND GEN PEN NEEDLE) 32 GAUGE X 5/32" NDLE    USE ONE NEEDLE WITH INSULIN PEN TWICE DAILY    SEMAGLUTIDE (OZEMPIC) 1 MG/DOSE (4 MG/3 ML)    Inject 1 mg into the skin every 7 days.    TAMSULOSIN (FLOMAX) 0.4 MG CAP    TAKE ONE CAPSULE BY MOUTH ONCE DAILY.   Modified Medications    No medications on file   Discontinued Medications    No medications on file             "

## 2024-07-09 ENCOUNTER — PATIENT MESSAGE (OUTPATIENT)
Dept: OTOLARYNGOLOGY | Facility: CLINIC | Age: 69
End: 2024-07-09
Payer: MEDICARE

## 2024-07-22 ENCOUNTER — OFFICE VISIT (OUTPATIENT)
Dept: FAMILY MEDICINE | Facility: CLINIC | Age: 69
End: 2024-07-22
Attending: FAMILY MEDICINE
Payer: MEDICARE

## 2024-07-22 VITALS
HEART RATE: 70 BPM | DIASTOLIC BLOOD PRESSURE: 70 MMHG | WEIGHT: 205 LBS | HEIGHT: 73 IN | OXYGEN SATURATION: 98 % | TEMPERATURE: 98 F | SYSTOLIC BLOOD PRESSURE: 120 MMHG | BODY MASS INDEX: 27.17 KG/M2

## 2024-07-22 DIAGNOSIS — I48.0 PAROXYSMAL A-FIB: ICD-10-CM

## 2024-07-22 DIAGNOSIS — N52.9 ERECTILE DYSFUNCTION, UNSPECIFIED ERECTILE DYSFUNCTION TYPE: ICD-10-CM

## 2024-07-22 DIAGNOSIS — N18.31 TYPE 2 DIABETES MELLITUS WITH STAGE 3A CHRONIC KIDNEY DISEASE, WITHOUT LONG-TERM CURRENT USE OF INSULIN: ICD-10-CM

## 2024-07-22 DIAGNOSIS — E55.9 VITAMIN D DEFICIENCY: ICD-10-CM

## 2024-07-22 DIAGNOSIS — I25.118 CORONARY ARTERY DISEASE OF NATIVE ARTERY OF NATIVE HEART WITH STABLE ANGINA PECTORIS: ICD-10-CM

## 2024-07-22 DIAGNOSIS — E11.22 TYPE 2 DIABETES MELLITUS WITH STAGE 3A CHRONIC KIDNEY DISEASE, WITHOUT LONG-TERM CURRENT USE OF INSULIN: ICD-10-CM

## 2024-07-22 DIAGNOSIS — C61 PROSTATE CANCER: ICD-10-CM

## 2024-07-22 DIAGNOSIS — E11.69 DYSLIPIDEMIA ASSOCIATED WITH TYPE 2 DIABETES MELLITUS: Primary | ICD-10-CM

## 2024-07-22 DIAGNOSIS — I25.118 CORONARY ARTERY DISEASE OF NATIVE ARTERY WITH STABLE ANGINA PECTORIS, UNSPECIFIED WHETHER NATIVE OR TRANSPLANTED HEART: ICD-10-CM

## 2024-07-22 DIAGNOSIS — E78.5 DYSLIPIDEMIA ASSOCIATED WITH TYPE 2 DIABETES MELLITUS: Primary | ICD-10-CM

## 2024-07-22 DIAGNOSIS — Z12.11 ENCOUNTER FOR FIT (FECAL IMMUNOCHEMICAL TEST) SCREENING: ICD-10-CM

## 2024-07-22 DIAGNOSIS — I15.2 HYPERTENSION ASSOCIATED WITH DIABETES: ICD-10-CM

## 2024-07-22 DIAGNOSIS — J43.2 CENTRILOBULAR EMPHYSEMA: ICD-10-CM

## 2024-07-22 DIAGNOSIS — E11.59 HYPERTENSION ASSOCIATED WITH DIABETES: ICD-10-CM

## 2024-07-22 DIAGNOSIS — N18.32 STAGE 3B CHRONIC KIDNEY DISEASE: ICD-10-CM

## 2024-07-22 PROCEDURE — 3051F HG A1C>EQUAL 7.0%<8.0%: CPT | Mod: CPTII,S$GLB,, | Performed by: FAMILY MEDICINE

## 2024-07-22 PROCEDURE — 1160F RVW MEDS BY RX/DR IN RCRD: CPT | Mod: CPTII,S$GLB,, | Performed by: FAMILY MEDICINE

## 2024-07-22 PROCEDURE — 99214 OFFICE O/P EST MOD 30 MIN: CPT | Mod: S$GLB,,, | Performed by: FAMILY MEDICINE

## 2024-07-22 PROCEDURE — 3061F NEG MICROALBUMINURIA REV: CPT | Mod: CPTII,S$GLB,, | Performed by: FAMILY MEDICINE

## 2024-07-22 PROCEDURE — 3066F NEPHROPATHY DOC TX: CPT | Mod: CPTII,S$GLB,, | Performed by: FAMILY MEDICINE

## 2024-07-22 PROCEDURE — 1159F MED LIST DOCD IN RCRD: CPT | Mod: CPTII,S$GLB,, | Performed by: FAMILY MEDICINE

## 2024-07-22 PROCEDURE — 99999 PR PBB SHADOW E&M-EST. PATIENT-LVL I: CPT | Mod: PBBFAC,,, | Performed by: FAMILY MEDICINE

## 2024-07-22 PROCEDURE — 4010F ACE/ARB THERAPY RXD/TAKEN: CPT | Mod: CPTII,S$GLB,, | Performed by: FAMILY MEDICINE

## 2024-07-22 RX ORDER — INSULIN GLARGINE 100 [IU]/ML
20 INJECTION, SOLUTION SUBCUTANEOUS 2 TIMES DAILY
Qty: 36 ML | Refills: 3 | Status: SHIPPED | OUTPATIENT
Start: 2024-07-22 | End: 2025-07-22

## 2024-07-22 NOTE — PROGRESS NOTES
Subjective:       Patient ID: Addy Redding is a 68 y.o. male.    Chief Complaint: Follow-up    68 yr old black male with DM II, HTN, HLD, GERD, CKD III, prostate cancer in remission. presents today for his three month follow up. No complaints today.        DM II - improved -    HGBA1C                   7.0 (H)             06/10/2024                                            - complicated by CKD III /IV                  - denies any hypoglycemic symptoms - on lantus, Farxiga - unable to afford Ozempic  - up to date with eye and foot screen    HTN - controlled  - on lisinopril 20 mg daily - no side effects    HLD - controlled and improving -    LDLCALC                  57.6 (L)            01/25/2024                                        - on statin - compliant - need refill - not fully compliant with diet    GERD - stable - takes prilosec as needed    ED - stable - takes viagra as needed    Health maintenance  -labs due  -Flu and Pneumovax - up to date  -adacel due and done today  -colonoscopy due - wants to wait  -PSA UTD      Follow-up  This is a chronic problem. The current episode started more than 1 year ago. The problem occurs constantly. The problem has been gradually worsening. Associated symptoms include arthralgias and myalgias. Pertinent negatives include no abdominal pain, change in bowel habit, chest pain, congestion, coughing, diaphoresis, fatigue, headaches, joint swelling, numbness, rash, urinary symptoms, vertigo, vomiting or weakness.   Medication Refill  This is a chronic problem. The current episode started more than 1 year ago. The problem occurs constantly. The problem has been gradually improving. Associated symptoms include arthralgias and myalgias. Pertinent negatives include no abdominal pain, change in bowel habit, chest pain, congestion, coughing, diaphoresis, fatigue, headaches, joint swelling, numbness, rash, urinary symptoms, vertigo, vomiting or weakness.   Hypertension  This is a  chronic problem. The current episode started more than 1 year ago. The problem has been gradually worsening since onset. The problem is uncontrolled. Pertinent negatives include no anxiety, chest pain, headaches, malaise/fatigue, orthopnea, palpitations, peripheral edema or sweats. There are no associated agents to hypertension. Risk factors for coronary artery disease include dyslipidemia, diabetes mellitus, male gender and obesity. Past treatments include ACE inhibitors. The current treatment provides no improvement. There are no compliance problems.  There is no history of angina, CAD/MI, CVA, left ventricular hypertrophy, PVD or retinopathy. There is no history of chronic renal disease, coarctation of the aorta, hypercortisolism, pheochromocytoma, renovascular disease or a thyroid problem.   Arm Pain   There was no injury mechanism. The pain is present in the right shoulder and upper right arm. The quality of the pain is described as aching. The pain does not radiate. The pain is at a severity of 5/10. The pain is moderate. The pain has been Constant since the incident. Pertinent negatives include no chest pain or numbness. The symptoms are aggravated by movement, lifting and palpation. He has tried nothing for the symptoms. The treatment provided mild relief.   Shoulder Pain   The pain is present in the left shoulder. This is a chronic problem. The current episode started more than 1 month ago. There has been no history of extremity trauma. The problem occurs constantly. The problem has been unchanged. The quality of the pain is described as aching. The pain is at a severity of 8/10. The pain is severe. Associated symptoms include an inability to bear weight and a limited range of motion. Pertinent negatives include no headaches, itching, joint swelling or numbness. The symptoms are aggravated by activity. He has tried nothing for the symptoms. The treatment provided no relief. His past medical history is  significant for diabetes.   Gastroesophageal Reflux  He complains of heartburn. He reports no abdominal pain, no chest pain, no coughing or no wheezing. This is a new problem. The current episode started 1 to 4 weeks ago. The problem occurs constantly. The problem has been unchanged. The heartburn duration is several minutes. The heartburn is located in the substernum. The symptoms are aggravated by certain foods. Pertinent negatives include no anemia, fatigue or melena. There are no known risk factors. He has tried nothing for the symptoms. The treatment provided mild relief. Past procedures do not include an abdominal ultrasound, esophageal pH monitoring or a UGI. Past invasive treatments do not include gastroplasty or reflux surgery.   Diabetes  Pertinent negatives for hypoglycemia include no confusion, dizziness, headaches, nervousness/anxiousness, speech difficulty or sweats. Pertinent negatives for diabetes include no chest pain, no fatigue, no polydipsia, no polyuria and no weakness. Pertinent negatives for diabetic complications include no CVA, PVD or retinopathy.   Hyperlipidemia  This is a chronic problem. The current episode started more than 1 year ago. The problem is uncontrolled. Recent lipid tests were reviewed and are high. Exacerbating diseases include diabetes. He has no history of chronic renal disease, liver disease or nephrotic syndrome. There are no known factors aggravating his hyperlipidemia. Associated symptoms include myalgias. Pertinent negatives include no chest pain, focal sensory loss, focal weakness or leg pain. Current antihyperlipidemic treatment includes statins. There are no compliance problems.  Risk factors for coronary artery disease include diabetes mellitus, dyslipidemia, male sex and hypertension.     Review of Systems   Constitutional: Negative.  Negative for activity change, diaphoresis, fatigue, malaise/fatigue and unexpected weight change.   HENT: Negative.  Negative for  nasal congestion, ear pain, mouth sores, rhinorrhea and voice change.    Eyes: Negative.  Negative for pain, discharge and visual disturbance.   Respiratory: Negative.  Negative for apnea, cough and wheezing.    Cardiovascular: Negative.  Negative for chest pain, palpitations and orthopnea.   Gastrointestinal:  Positive for heartburn. Negative for abdominal distention, abdominal pain, anal bleeding, change in bowel habit, diarrhea, melena and vomiting.   Endocrine: Negative.  Negative for cold intolerance, polydipsia and polyuria.   Genitourinary: Negative.  Negative for decreased urine volume, difficulty urinating, discharge, frequency and scrotal swelling.   Musculoskeletal:  Positive for arthralgias and myalgias. Negative for back pain, joint swelling, leg pain and neck stiffness.   Integumentary:  Negative for color change, itching and rash. Negative.   Allergic/Immunologic: Negative.  Negative for environmental allergies.   Neurological: Negative.  Negative for dizziness, vertigo, focal weakness, speech difficulty, weakness, light-headedness, numbness and headaches.   Hematological: Negative.    Psychiatric/Behavioral: Negative.  Negative for agitation, confusion, dysphoric mood and suicidal ideas. The patient is not nervous/anxious.          PMH/PSH/FH/SH/MED/ALLERGY reviewed    Past Medical History:   Diagnosis Date    CKD (chronic kidney disease) stage 3, GFR 30-59 ml/min     Costochondritis     Diabetic nephropathy associated with type 2 diabetes mellitus     Diabetic retinopathy     ED (erectile dysfunction)     GERD (gastroesophageal reflux disease)     Hyperlipidemia     Hypertension     Prostate cancer     Type II or unspecified type diabetes mellitus with renal manifestations, uncontrolled(250.42)        Past Surgical History:   Procedure Laterality Date    Bone biopsy right femur      CORONARY ANGIOGRAPHY N/A 06/16/2022    Procedure: ANGIOGRAM, CORONARY ARTERY;  Surgeon: Carter Arevalo MD;   Location: [facility] CATH LAB/EP;  Service: Cardiology;  Laterality: N/A;    CREATION OF BYPASS OF CORONARY ARTERY USING GRAFT WITHOUT CARDIOPULMONARY PUMP OXYGENATION N/A 2023    Procedure: CABG, WITHOUT CARDIOPULMONARY PUMP OXYGENATION;  Surgeon: Jass Uriostegui MD;  Location: [facility] OR 2ND FLR;  Service: Cardiovascular;  Laterality: N/A;  CABG x1 off pump    FRACTIONAL FLOW RESERVE (FFR), CORONARY  2023    Procedure: Fractional Flow [place] (FFR), Coronary;  Surgeon: Je Fontanez MD;  Location: [facility] CATH LAB/EP;  Service: Cardiology;;    INSTANTANEOUS WAVE-FREE RATIO (IFR) N/A 2023    Procedure: Instantaneous Wave-Free Ratio (IFR);  Surgeon: Je Fontanez MD;  Location: [facility] CATH LAB/EP;  Service: Cardiology;  Laterality: N/A;    LEFT HEART CATHETERIZATION Left 2022    Procedure: Left heart cath;  Surgeon: Carter Arevalo MD;  Location: [facility] CATH LAB/EP;  Service: Cardiology;  Laterality: Left;    LEFT HEART CATHETERIZATION Left 2023    Procedure: Left heart cath;  Surgeon: Je Fontanez MD;  Location: [facility] CATH LAB/EP;  Service: Cardiology;  Laterality: Left;    PROSTATE BIOPSY  10/05/2022    RADIOACTIVE SEED IMPLANTATION OF PROSTATE  2023    Procedure: INSERTION, RADIOACTIVE SEED, PROSTATE;  Surgeon: Scott Frias MD;  [facility];  Service: Urology;;    TRANSRECTAL ULTRASOUND EXAMINATION N/A 2023    Procedure: ULTRASOUND, RECTAL APPROACH;  Surgeon: Scott Frias MD;  Location: [facility] OR [address] [place] [address];  Service: Urology;  Laterality: N/A;       Family History   Problem Relation Name Age of Onset    Hypertension Mother      Diabetes Mother      Kidney disease Mother      Coronary artery disease Father           of an MI at age 60    Hyperlipidemia Father      Heart attack Father      Hypertension Sister Laney     Diabetes Sister Laney     Heart disease Sister Laney     Heart attack Sister Laney     Hyperlipidemia Sister Laney     Coronary artery  disease Sister Laney     Diabetes Sister Flushing     Hypertension Sister Flushing     Dementia Sister Lewis     No Known Problems Sister Gene     Stomach cancer Sister Bony     Cancer Sister Bony         stomach     Hypertension Brother Charlie     Stroke Brother Charlie     Lung cancer Brother Charlie     Cancer Brother Charlie         lung cancer    Diabetes Brother Danielson     Peripheral vascular disease Brother Danielson     Hypertension Brother Danielson     Hyperlipidemia Brother Danielson     No Known Problems Brother González     Diabetes Brother Pantera     Stroke Brother Pantera     Hypertension Brother Pantera     Hyperlipidemia Brother Pantera     HIV Brother Devin     Diabetes Brother Ramirez     Hypertension Brother Ramirez     No Known Problems Daughter x1     Asthma Son x1     Hypertension Son x1     Anesthesia problems Neg Hx         Social History     Socioeconomic History    Marital status:    Tobacco Use    Smoking status: Former     Current packs/day: 0.00     Average packs/day: 0.5 packs/day for 26.0 years (13.0 ttl pk-yrs)     Types: Cigarettes     Start date:      Quit date:      Years since quittin.5     Passive exposure: Never    Smokeless tobacco: Never   Substance and Sexual Activity    Alcohol use: Not Currently    Drug use: No    Sexual activity: Yes     Partners: Female     Social Determinants of Health     Financial Resource Strain: Low Risk  (2023)    Overall Financial Resource Strain (CARDIA)     Difficulty of Paying Living Expenses: Not hard at all   Food Insecurity: Patient Declined (2023)    Hunger Vital Sign     Worried About Running Out of Food in the Last Year: Patient declined     Ran Out of Food in the Last Year: Patient declined   Transportation Needs: Unknown (2023)    PRAPARE - Transportation     Lack of Transportation (Medical): No     Lack of Transportation (Non-Medical): Patient declined   Physical Activity: Inactive (2023)    Exercise Vital  Sign     Days of Exercise per Week: 0 days     Minutes of Exercise per Session: 0 min   Stress: Patient Declined (7/24/2023)    [employer] - Occupational Stress Questionnaire     Feeling of Stress : Patient declined   Housing Stability: Unknown (7/24/2023)    Housing Stability Vital Sign     Unable to Pay for Housing in the Last Year: No     Unstable Housing in the Last Year: Patient refused       Current Outpatient Medications   Medication Sig Dispense Refill    ADVANCED GLUC METER TEST STRIP Strp USE TO TEST BLOOD SUGAR 4 TIMES DAILY. 100 strip 0    atorvastatin (LIPITOR) 80 MG tablet Take 1 tablet (80 mg total) by mouth once daily. 90 tablet 2    blood sugar diagnostic (BLOOD GLUCOSE TEST) Strp Check sugar once daily 100 each 11    blood-glucose meter Misc by Misc.(Non-Drug; Combo Route) route.      blood-glucose meter,continuous (DEXCOM G6 ) Misc 1 Device by Misc.(Non-Drug; Combo Route) route continuous. 1 each 0    blood-glucose sensor (DEXCOM G6 SENSOR) Tia 1 Device by Misc.(Non-Drug; Combo Route) route continuous. 3 each 11    blood-glucose transmitter (DEXCOM G6 TRANSMITTER) Tia 1 Device by Misc.(Non-Drug; Combo Route) route continuous. 1 each 0    clopidogreL (PLAVIX) 75 mg tablet Take 1 tablet (75 mg total) by mouth once daily. 90 tablet 4    dapagliflozin propanediol (FARXIGA) 10 mg tablet Take 1 tablet (10 mg total) by mouth once daily. 90 tablet 3    ergocalciferol (ERGOCALCIFEROL) 50,000 unit Cap Take 1 capsule (50,000 Units total) by mouth every 7 days. Once per week for 8 weeks then once a month 4 capsule 3    ezetimibe (ZETIA) 10 mg tablet Take 1 tablet (10 mg total) by mouth once daily. 90 tablet 3    furosemide (LASIX) 40 MG tablet Take 1 tablet (40 mg total) by mouth daily as needed (swelling). 30 tablet 11    methocarbamoL (ROBAXIN) 500 MG Tab Take 1 to 2 tablets by mouth at bedtime as needed 60 tablet 0    olmesartan (BENICAR) 5 MG Tab Take 1 tablet (5 mg  "total) by mouth once daily. 90 tablet 3    pantoprazole (PROTONIX) 40 MG tablet TAKE ONE TABLET BY MOUTH ONCE DAILY. 90 tablet 3    pen needle, diabetic (BD ARIA 2ND GEN PEN NEEDLE) 32 gauge x 5/32" Ndle USE ONE NEEDLE WITH INSULIN PEN TWICE DAILY 100 each 3    semaglutide (OZEMPIC) 1 mg/dose (4 mg/3 mL) Inject 1 mg into the skin every 7 days. 3 mL 11    tamsulosin (FLOMAX) 0.4 mg Cap TAKE ONE CAPSULE BY MOUTH ONCE DAILY. 90 capsule 3    insulin glargine U-100, Lantus, (LANTUS SOLOSTAR U-100 INSULIN) 100 unit/mL (3 mL) InPn pen Inject 20 Units into the skin 2 (two) times a day. 36 mL 3     No current facility-administered medications for this visit.       Review of patient's allergies indicates:  No Known Allergies      Objective:       Vitals:    07/22/24 1101   BP: 120/70   Pulse: 70   Temp: 98 °F (36.7 °C)         Physical Exam  Constitutional:       Appearance: He is well-developed.   HENT:      Head: Normocephalic and atraumatic.      Right Ear: External ear normal.      Left Ear: External ear normal.      Nose: Nose normal.      Mouth/Throat:      Pharynx: No oropharyngeal exudate.   Eyes:      General: No scleral icterus.        Right eye: No discharge.         Left eye: No discharge.      Conjunctiva/sclera: Conjunctivae normal.      Pupils: Pupils are equal, round, and reactive to light.   Neck:      Thyroid: No thyromegaly.      Vascular: No JVD.      Trachea: No tracheal deviation.   Cardiovascular:      Rate and Rhythm: Normal rate and regular rhythm.      Pulses:           Dorsalis pedis pulses are 1+ on the right side and 1+ on the left side.        Posterior tibial pulses are 1+ on the right side and 1+ on the left side.      Heart sounds: Normal heart sounds. No murmur heard.     No friction rub. No gallop.   Pulmonary:      Effort: Pulmonary effort is normal. No respiratory distress.      Breath sounds: Normal breath sounds. No stridor. No wheezing or rales.   Chest:      Chest wall: No tenderness. "   Abdominal:      General: Bowel sounds are normal. There is no distension.      Palpations: Abdomen is soft. There is no mass.      Tenderness: There is no abdominal tenderness. There is no guarding or rebound.      Hernia: No hernia is present.   Musculoskeletal:         General: Tenderness (TTP left shoulder with restricted ROM) present.      Cervical back: Normal range of motion and neck supple.      Right foot: Normal range of motion. No deformity, bunion, Charcot foot, foot drop or prominent metatarsal heads.      Left foot: Normal range of motion. No deformity, bunion, Charcot foot, foot drop or prominent metatarsal heads.      Comments: Neer and rodriguez+ left   Feet:      Right foot:      Protective Sensation: 10 sites tested.  10 sites sensed.      Skin integrity: Skin integrity normal.      Toenail Condition: Right toenails are normal.      Left foot:      Protective Sensation: 10 sites tested.  10 sites sensed.      Skin integrity: Skin integrity normal.      Toenail Condition: Left toenails are normal.   Lymphadenopathy:      Cervical: No cervical adenopathy.   Skin:     General: Skin is warm and dry.      Coloration: Skin is not pale.      Findings: No erythema or rash.   Neurological:      Mental Status: He is alert and oriented to person, place, and time.      Cranial Nerves: No cranial nerve deficit.      Motor: No abnormal muscle tone.      Coordination: Coordination normal.      Deep Tendon Reflexes: Reflexes are normal and symmetric. Reflexes normal.   Psychiatric:         Behavior: Behavior normal.         Thought Content: Thought content normal.         Judgment: Judgment normal.         Assessment:       Problem List Items Addressed This Visit       Dyslipidemia associated with type 2 diabetes mellitus - Primary    Relevant Medications    insulin glargine U-100, Lantus, (LANTUS SOLOSTAR U-100 INSULIN) 100 unit/mL (3 mL) In pen    Type 2 diabetes mellitus with stage 3a chronic kidney disease,  without long-term current use of insulin    Relevant Medications    insulin glargine U-100, Lantus, (LANTUS SOLOSTAR U-100 INSULIN) 100 unit/mL (3 mL) InPn pen    Stage 3b chronic kidney disease    Prostate cancer    Paroxysmal A-fib    Hypertension associated with diabetes    Relevant Medications    insulin glargine U-100, Lantus, (LANTUS SOLOSTAR U-100 INSULIN) 100 unit/mL (3 mL) InPn pen    ED (erectile dysfunction)    Coronary artery disease of native artery with stable angina pectoris    Centrilobular emphysema     Other Visit Diagnoses       Vitamin D deficiency        Encounter for FIT (fecal immunochemical test) screening        Relevant Orders    Fecal Immunochemical Test (iFOBT)            Plan:           Addy was seen today for follow-up.    Diagnoses and all orders for this visit:    Dyslipidemia associated with type 2 diabetes mellitus    Type 2 diabetes mellitus with stage 3a chronic kidney disease, without long-term current use of insulin  -     insulin glargine U-100, Lantus, (LANTUS SOLOSTAR U-100 INSULIN) 100 unit/mL (3 mL) InPn pen; Inject 20 Units into the skin 2 (two) times a day.    Prostate cancer    Stage 3b chronic kidney disease    Coronary artery disease of native artery with stable angina pectoris, unspecified whether native or transplanted heart    Hypertension associated with diabetes    Vitamin D deficiency    Erectile dysfunction, unspecified erectile dysfunction type    Centrilobular emphysema    Paroxysmal A-fib    Coronary artery disease of native artery of native heart with stable angina pectoris    Encounter for FIT (fecal immunochemical test) screening  -     Fecal Immunochemical Test (iFOBT); Future    HTN   -at goal  -continue lisinopril to 20 mg/ day    DM II   -controlled   -continue farxiga daily and increase lantus 20 BID    CKD 3  -follow nephrology  -ER precautions given    HLD   -improving  -continue lipitor    GERD   -stable   -takes OTC PPI      ED'  -controlled            Spent adequate time in obtaining history and explaining differentials      30 minutes spent during this visit of which greater than 50% devoted to face-face counseling and coordination of care regarding diagnosis and management plan    Rtc 6 m r prn

## 2024-07-26 RX ORDER — SILDENAFIL CITRATE 20 MG/1
TABLET ORAL
Qty: 90 TABLET | Refills: 10 | Status: CANCELLED | OUTPATIENT
Start: 2024-07-26

## 2024-08-26 NOTE — TELEPHONE ENCOUNTER
Spoke to pt and stated he has a sore throat, nasal congestion and cough. Pt was scheduled for an appt on tomorrow.    [FreeTextEntry1] : 45 year old male presents for initial visit for painful lesion on the bottom of  left hallux. States has been bothering him for several weeks worsening over time. Also admits painful ingrowns to bilateral hallux and dry skin to bottom of his feet. Worst complaint is pain to bottom of left hallux. Denies any trauma to area. Pain 5/10 only when ambulating. Recent purchase of new hokas shoes have helped. Most of the pain is when he is walking barefoot. States hes started to notice these hard lesions more frequently on his body. Denies any additional complaints.

## 2024-09-16 ENCOUNTER — LAB VISIT (OUTPATIENT)
Dept: LAB | Facility: HOSPITAL | Age: 69
End: 2024-09-16
Attending: INTERNAL MEDICINE
Payer: MEDICARE

## 2024-09-16 DIAGNOSIS — E11.22 TYPE 2 DIABETES MELLITUS WITH STAGE 3A CHRONIC KIDNEY DISEASE, WITHOUT LONG-TERM CURRENT USE OF INSULIN: ICD-10-CM

## 2024-09-16 DIAGNOSIS — N18.31 TYPE 2 DIABETES MELLITUS WITH STAGE 3A CHRONIC KIDNEY DISEASE, WITHOUT LONG-TERM CURRENT USE OF INSULIN: ICD-10-CM

## 2024-09-16 LAB
ALBUMIN/CREAT UR: 3.6 UG/MG (ref 0–30)
BACTERIA #/AREA URNS HPF: NORMAL /HPF
BILIRUB UR QL STRIP: NEGATIVE
CLARITY UR: CLEAR
COLOR UR: YELLOW
CREAT UR-MCNC: 221.7 MG/DL (ref 23–375)
CREAT UR-MCNC: 221.7 MG/DL (ref 23–375)
GLUCOSE UR QL STRIP: ABNORMAL
HGB UR QL STRIP: ABNORMAL
KETONES UR QL STRIP: NEGATIVE
LEUKOCYTE ESTERASE UR QL STRIP: NEGATIVE
MICROALBUMIN UR DL<=1MG/L-MCNC: 8 UG/ML
MICROSCOPIC COMMENT: NORMAL
NITRITE UR QL STRIP: NEGATIVE
PH UR STRIP: 6 [PH] (ref 5–8)
PROT UR QL STRIP: ABNORMAL
PROT UR-MCNC: 12 MG/DL (ref 0–15)
PROT/CREAT UR: 0.05 MG/G{CREAT} (ref 0–0.2)
RBC #/AREA URNS HPF: 1 /HPF (ref 0–4)
SP GR UR STRIP: 1.02 (ref 1–1.03)
URN SPEC COLLECT METH UR: ABNORMAL
UROBILINOGEN UR STRIP-ACNC: NEGATIVE EU/DL
WBC #/AREA URNS HPF: 0 /HPF (ref 0–5)
YEAST URNS QL MICRO: NORMAL

## 2024-09-16 PROCEDURE — 82043 UR ALBUMIN QUANTITATIVE: CPT | Performed by: INTERNAL MEDICINE

## 2024-09-16 PROCEDURE — 81000 URINALYSIS NONAUTO W/SCOPE: CPT | Performed by: INTERNAL MEDICINE

## 2024-09-16 PROCEDURE — 84156 ASSAY OF PROTEIN URINE: CPT | Performed by: INTERNAL MEDICINE

## 2024-09-23 RX ORDER — PEN NEEDLE, DIABETIC 30 GX3/16"
NEEDLE, DISPOSABLE MISCELLANEOUS
Qty: 200 EACH | Refills: 3 | Status: SHIPPED | OUTPATIENT
Start: 2024-09-23

## 2024-09-23 NOTE — TELEPHONE ENCOUNTER
Refill Decision Note   Addy Redding  is requesting a refill authorization.  Brief Assessment and Rationale for Refill:  Approve     Medication Therapy Plan:        Comments:     Note composed:3:32 PM 09/23/2024

## 2024-09-23 NOTE — TELEPHONE ENCOUNTER
No care due was identified.  Health [facility] Embedded Care Due Messages. Reference number: [id number].   9/23/2024 8:10:36 AM CDT   22-Sep-2017

## 2024-11-01 ENCOUNTER — TELEPHONE (OUTPATIENT)
Dept: FAMILY MEDICINE | Facility: CLINIC | Age: 69
End: 2024-11-01
Payer: MEDICARE

## 2024-12-02 ENCOUNTER — LAB VISIT (OUTPATIENT)
Dept: LAB | Facility: HOSPITAL | Age: 69
End: 2024-12-02
Attending: INTERNAL MEDICINE
Payer: MEDICARE

## 2024-12-02 DIAGNOSIS — N18.31 TYPE 2 DIABETES MELLITUS WITH STAGE 3A CHRONIC KIDNEY DISEASE, WITHOUT LONG-TERM CURRENT USE OF INSULIN: ICD-10-CM

## 2024-12-02 DIAGNOSIS — D63.1 ANEMIA IN STAGE 3B CHRONIC KIDNEY DISEASE: ICD-10-CM

## 2024-12-02 DIAGNOSIS — N25.81 SECONDARY HYPERPARATHYROIDISM OF RENAL ORIGIN: ICD-10-CM

## 2024-12-02 DIAGNOSIS — N18.32 ANEMIA IN STAGE 3B CHRONIC KIDNEY DISEASE: ICD-10-CM

## 2024-12-02 DIAGNOSIS — E11.22 TYPE 2 DIABETES MELLITUS WITH STAGE 3A CHRONIC KIDNEY DISEASE, WITHOUT LONG-TERM CURRENT USE OF INSULIN: ICD-10-CM

## 2024-12-02 LAB
ALBUMIN SERPL BCP-MCNC: 3.8 G/DL (ref 3.5–5.2)
ANION GAP SERPL CALC-SCNC: 9 MMOL/L (ref 8–16)
BASOPHILS # BLD AUTO: 0.02 K/UL (ref 0–0.2)
BASOPHILS NFR BLD: 0.4 % (ref 0–1.9)
BUN SERPL-MCNC: 23 MG/DL (ref 8–23)
CALCIUM SERPL-MCNC: 9.2 MG/DL (ref 8.7–10.5)
CHLORIDE SERPL-SCNC: 109 MMOL/L (ref 95–110)
CO2 SERPL-SCNC: 22 MMOL/L (ref 23–29)
CREAT SERPL-MCNC: 1.9 MG/DL (ref 0.5–1.4)
DIFFERENTIAL METHOD BLD: NORMAL
EOSINOPHIL # BLD AUTO: 0.2 K/UL (ref 0–0.5)
EOSINOPHIL NFR BLD: 2.8 % (ref 0–8)
ERYTHROCYTE [DISTWIDTH] IN BLOOD BY AUTOMATED COUNT: 14.3 % (ref 11.5–14.5)
EST. GFR  (NO RACE VARIABLE): 37 ML/MIN/1.73 M^2
ESTIMATED AVG GLUCOSE: 148 MG/DL (ref 68–131)
FERRITIN SERPL-MCNC: 120 NG/ML (ref 20–300)
GLUCOSE SERPL-MCNC: 147 MG/DL (ref 70–110)
HBA1C MFR BLD: 6.8 % (ref 4–5.6)
HCT VFR BLD AUTO: 45.3 % (ref 40–54)
HGB BLD-MCNC: 15.2 G/DL (ref 14–18)
IMM GRANULOCYTES # BLD AUTO: 0.01 K/UL (ref 0–0.04)
IMM GRANULOCYTES NFR BLD AUTO: 0.2 % (ref 0–0.5)
IRON SERPL-MCNC: 65 UG/DL (ref 45–160)
LYMPHOCYTES # BLD AUTO: 1.8 K/UL (ref 1–4.8)
LYMPHOCYTES NFR BLD: 31.3 % (ref 18–48)
MCH RBC QN AUTO: 28.8 PG (ref 27–31)
MCHC RBC AUTO-ENTMCNC: 33.6 G/DL (ref 32–36)
MCV RBC AUTO: 86 FL (ref 82–98)
MONOCYTES # BLD AUTO: 0.6 K/UL (ref 0.3–1)
MONOCYTES NFR BLD: 10.5 % (ref 4–15)
NEUTROPHILS # BLD AUTO: 3.1 K/UL (ref 1.8–7.7)
NEUTROPHILS NFR BLD: 54.8 % (ref 38–73)
NRBC BLD-RTO: 0 /100 WBC
PHOSPHATE SERPL-MCNC: 3.2 MG/DL (ref 2.7–4.5)
PLATELET # BLD AUTO: 257 K/UL (ref 150–450)
PMV BLD AUTO: 10.3 FL (ref 9.2–12.9)
POTASSIUM SERPL-SCNC: 4 MMOL/L (ref 3.5–5.1)
PTH-INTACT SERPL-MCNC: 157.3 PG/ML (ref 9–77)
RBC # BLD AUTO: 5.27 M/UL (ref 4.6–6.2)
SATURATED IRON: 25 % (ref 20–50)
SODIUM SERPL-SCNC: 140 MMOL/L (ref 136–145)
TOTAL IRON BINDING CAPACITY: 263 UG/DL (ref 250–450)
TRANSFERRIN SERPL-MCNC: 178 MG/DL (ref 200–375)
URATE SERPL-MCNC: 5.2 MG/DL (ref 3.4–7)
WBC # BLD AUTO: 5.63 K/UL (ref 3.9–12.7)

## 2024-12-02 PROCEDURE — 83036 HEMOGLOBIN GLYCOSYLATED A1C: CPT | Performed by: INTERNAL MEDICINE

## 2024-12-02 PROCEDURE — 82728 ASSAY OF FERRITIN: CPT | Performed by: INTERNAL MEDICINE

## 2024-12-02 PROCEDURE — 84466 ASSAY OF TRANSFERRIN: CPT | Performed by: INTERNAL MEDICINE

## 2024-12-02 PROCEDURE — 83970 ASSAY OF PARATHORMONE: CPT | Performed by: INTERNAL MEDICINE

## 2024-12-02 PROCEDURE — 84550 ASSAY OF BLOOD/URIC ACID: CPT | Performed by: INTERNAL MEDICINE

## 2024-12-02 PROCEDURE — 85025 COMPLETE CBC W/AUTO DIFF WBC: CPT | Performed by: INTERNAL MEDICINE

## 2024-12-02 PROCEDURE — 36415 COLL VENOUS BLD VENIPUNCTURE: CPT | Performed by: INTERNAL MEDICINE

## 2024-12-02 PROCEDURE — 80069 RENAL FUNCTION PANEL: CPT | Performed by: INTERNAL MEDICINE

## 2024-12-10 ENCOUNTER — OFFICE VISIT (OUTPATIENT)
Dept: FAMILY MEDICINE | Facility: CLINIC | Age: 69
End: 2024-12-10
Attending: FAMILY MEDICINE
Payer: MEDICARE

## 2024-12-10 VITALS
OXYGEN SATURATION: 96 % | BODY MASS INDEX: 28.28 KG/M2 | DIASTOLIC BLOOD PRESSURE: 62 MMHG | WEIGHT: 213.38 LBS | HEART RATE: 78 BPM | TEMPERATURE: 98 F | HEIGHT: 73 IN | SYSTOLIC BLOOD PRESSURE: 124 MMHG

## 2024-12-10 DIAGNOSIS — E11.22 TYPE 2 DIABETES MELLITUS WITH STAGE 3A CHRONIC KIDNEY DISEASE, WITHOUT LONG-TERM CURRENT USE OF INSULIN: Primary | ICD-10-CM

## 2024-12-10 DIAGNOSIS — E11.59 HYPERTENSION ASSOCIATED WITH DIABETES: ICD-10-CM

## 2024-12-10 DIAGNOSIS — J02.9 SORE THROAT: ICD-10-CM

## 2024-12-10 DIAGNOSIS — K21.9 GASTROESOPHAGEAL REFLUX DISEASE, UNSPECIFIED WHETHER ESOPHAGITIS PRESENT: ICD-10-CM

## 2024-12-10 DIAGNOSIS — C61 PROSTATE CANCER: ICD-10-CM

## 2024-12-10 DIAGNOSIS — E11.69 DYSLIPIDEMIA ASSOCIATED WITH TYPE 2 DIABETES MELLITUS: ICD-10-CM

## 2024-12-10 DIAGNOSIS — N18.31 TYPE 2 DIABETES MELLITUS WITH STAGE 3A CHRONIC KIDNEY DISEASE, WITHOUT LONG-TERM CURRENT USE OF INSULIN: Primary | ICD-10-CM

## 2024-12-10 DIAGNOSIS — N18.32 STAGE 3B CHRONIC KIDNEY DISEASE: ICD-10-CM

## 2024-12-10 DIAGNOSIS — E55.9 VITAMIN D DEFICIENCY: ICD-10-CM

## 2024-12-10 DIAGNOSIS — I25.118 CORONARY ARTERY DISEASE OF NATIVE ARTERY WITH STABLE ANGINA PECTORIS, UNSPECIFIED WHETHER NATIVE OR TRANSPLANTED HEART: ICD-10-CM

## 2024-12-10 DIAGNOSIS — E78.5 DYSLIPIDEMIA ASSOCIATED WITH TYPE 2 DIABETES MELLITUS: ICD-10-CM

## 2024-12-10 DIAGNOSIS — I15.2 HYPERTENSION ASSOCIATED WITH DIABETES: ICD-10-CM

## 2024-12-10 DIAGNOSIS — N52.9 ERECTILE DYSFUNCTION, UNSPECIFIED ERECTILE DYSFUNCTION TYPE: ICD-10-CM

## 2024-12-10 PROCEDURE — 3066F NEPHROPATHY DOC TX: CPT | Mod: CPTII,S$GLB,, | Performed by: FAMILY MEDICINE

## 2024-12-10 PROCEDURE — 3078F DIAST BP <80 MM HG: CPT | Mod: CPTII,S$GLB,, | Performed by: FAMILY MEDICINE

## 2024-12-10 PROCEDURE — 99999 PR PBB SHADOW E&M-EST. PATIENT-LVL IV: CPT | Mod: PBBFAC,,, | Performed by: FAMILY MEDICINE

## 2024-12-10 PROCEDURE — 3074F SYST BP LT 130 MM HG: CPT | Mod: CPTII,S$GLB,, | Performed by: FAMILY MEDICINE

## 2024-12-10 PROCEDURE — 4010F ACE/ARB THERAPY RXD/TAKEN: CPT | Mod: CPTII,S$GLB,, | Performed by: FAMILY MEDICINE

## 2024-12-10 PROCEDURE — 1160F RVW MEDS BY RX/DR IN RCRD: CPT | Mod: CPTII,S$GLB,, | Performed by: FAMILY MEDICINE

## 2024-12-10 PROCEDURE — 3288F FALL RISK ASSESSMENT DOCD: CPT | Mod: CPTII,S$GLB,, | Performed by: FAMILY MEDICINE

## 2024-12-10 PROCEDURE — 3044F HG A1C LEVEL LT 7.0%: CPT | Mod: CPTII,S$GLB,, | Performed by: FAMILY MEDICINE

## 2024-12-10 PROCEDURE — 3008F BODY MASS INDEX DOCD: CPT | Mod: CPTII,S$GLB,, | Performed by: FAMILY MEDICINE

## 2024-12-10 PROCEDURE — 99215 OFFICE O/P EST HI 40 MIN: CPT | Mod: S$GLB,,, | Performed by: FAMILY MEDICINE

## 2024-12-10 PROCEDURE — 3061F NEG MICROALBUMINURIA REV: CPT | Mod: CPTII,S$GLB,, | Performed by: FAMILY MEDICINE

## 2024-12-10 PROCEDURE — 1101F PT FALLS ASSESS-DOCD LE1/YR: CPT | Mod: CPTII,S$GLB,, | Performed by: FAMILY MEDICINE

## 2024-12-10 PROCEDURE — 1159F MED LIST DOCD IN RCRD: CPT | Mod: CPTII,S$GLB,, | Performed by: FAMILY MEDICINE

## 2024-12-10 RX ORDER — ATORVASTATIN CALCIUM 80 MG/1
80 TABLET, FILM COATED ORAL DAILY
Qty: 90 TABLET | Refills: 2 | Status: SHIPPED | OUTPATIENT
Start: 2024-12-10

## 2024-12-10 RX ORDER — SILDENAFIL 100 MG/1
100 TABLET, FILM COATED ORAL DAILY PRN
Qty: 30 TABLET | Refills: 10 | Status: SHIPPED | OUTPATIENT
Start: 2024-12-10

## 2024-12-10 RX ORDER — DAPAGLIFLOZIN 10 MG/1
10 TABLET, FILM COATED ORAL DAILY
Qty: 90 TABLET | Refills: 3 | Status: SHIPPED | OUTPATIENT
Start: 2024-12-10

## 2024-12-10 RX ORDER — ERGOCALCIFEROL 1.25 MG/1
50000 CAPSULE ORAL
Qty: 4 CAPSULE | Refills: 3 | Status: SHIPPED | OUTPATIENT
Start: 2024-12-10

## 2024-12-10 RX ORDER — OLMESARTAN MEDOXOMIL 5 MG/1
5 TABLET ORAL DAILY
Qty: 90 TABLET | Refills: 3 | Status: SHIPPED | OUTPATIENT
Start: 2024-12-10 | End: 2025-12-10

## 2024-12-10 RX ORDER — TAMSULOSIN HYDROCHLORIDE 0.4 MG/1
1 CAPSULE ORAL
Qty: 90 CAPSULE | Refills: 3 | Status: SHIPPED | OUTPATIENT
Start: 2024-12-10

## 2024-12-10 RX ORDER — INSULIN GLARGINE 100 [IU]/ML
20 INJECTION, SOLUTION SUBCUTANEOUS 2 TIMES DAILY
Qty: 30 ML | Refills: 3 | Status: SHIPPED | OUTPATIENT
Start: 2024-12-10 | End: 2025-12-10

## 2024-12-10 RX ORDER — CLOPIDOGREL BISULFATE 75 MG/1
75 TABLET ORAL DAILY
Qty: 90 TABLET | Refills: 4 | Status: SHIPPED | OUTPATIENT
Start: 2024-12-10

## 2024-12-10 RX ORDER — EZETIMIBE 10 MG/1
10 TABLET ORAL DAILY
Qty: 90 TABLET | Refills: 3 | Status: SHIPPED | OUTPATIENT
Start: 2024-12-10 | End: 2025-12-10

## 2024-12-10 RX ORDER — PANTOPRAZOLE SODIUM 40 MG/1
40 TABLET, DELAYED RELEASE ORAL
Qty: 90 TABLET | Refills: 3 | Status: SHIPPED | OUTPATIENT
Start: 2024-12-10

## 2024-12-10 NOTE — PROGRESS NOTES
Subjective:       Patient ID: Addy Redding is a 69 y.o. male.    Chief Complaint: Follow-up    68 yr old black male with DM II, HTN, HLD, GERD, CKD III, prostate cancer in remission. presents today for his three month follow up. No complaints today.        DM II - improved -HGBA1C                   6.8 (H)             12/02/2024                                         - complicated by CKD III /IV                  - denies any hypoglycemic symptoms - on lantus, Farxiga - unable to afford Ozempic  - up to date with eye and foot screen    HTN - controlled  - on lisinopril 20 mg daily - no side effects    HLD - controlled and improving -    LDLCALC                  57.6 (L)            01/25/2024                                        - on statin - compliant - need refill - not fully compliant with diet    GERD - stable - takes prilosec as needed    ED - stable - takes viagra as needed    Health maintenance  -labs due  -Flu and Pneumovax - up to date  -adacel due and done today  -colonoscopy due - wants to wait  -PSA UTD      Follow-up  This is a chronic problem. The current episode started more than 1 year ago. The problem occurs constantly. The problem has been gradually worsening. Associated symptoms include arthralgias and myalgias. Pertinent negatives include no abdominal pain, change in bowel habit, chest pain, congestion, coughing, diaphoresis, fatigue, headaches, joint swelling, numbness, rash, urinary symptoms, vertigo, vomiting or weakness.   Medication Refill  This is a chronic problem. The current episode started more than 1 year ago. The problem occurs constantly. The problem has been gradually improving. Associated symptoms include arthralgias and myalgias. Pertinent negatives include no abdominal pain, change in bowel habit, chest pain, congestion, coughing, diaphoresis, fatigue, headaches, joint swelling, numbness, rash, urinary symptoms, vertigo, vomiting or weakness.   Hypertension  This is a chronic  problem. The current episode started more than 1 year ago. The problem has been gradually worsening since onset. The problem is uncontrolled. Pertinent negatives include no anxiety, chest pain, headaches, malaise/fatigue, orthopnea, palpitations, peripheral edema or sweats. There are no associated agents to hypertension. Risk factors for coronary artery disease include dyslipidemia, diabetes mellitus, male gender and obesity. Past treatments include ACE inhibitors. The current treatment provides no improvement. There are no compliance problems.  There is no history of angina, CAD/MI, CVA, left ventricular hypertrophy, PVD or retinopathy. There is no history of chronic renal disease, coarctation of the aorta, hypercortisolism, pheochromocytoma, renovascular disease or a thyroid problem.   Arm Pain   There was no injury mechanism. The pain is present in the right shoulder and upper right arm. The quality of the pain is described as aching. The pain does not radiate. The pain is at a severity of 5/10. The pain is moderate. The pain has been Constant since the incident. Pertinent negatives include no chest pain or numbness. The symptoms are aggravated by movement, lifting and palpation. He has tried nothing for the symptoms. The treatment provided mild relief.   Shoulder Pain   The pain is present in the left shoulder. This is a chronic problem. The current episode started more than 1 month ago. There has been no history of extremity trauma. The problem occurs constantly. The problem has been unchanged. The quality of the pain is described as aching. The pain is at a severity of 8/10. The pain is severe. Associated symptoms include an inability to bear weight and a limited range of motion. Pertinent negatives include no headaches, itching, joint swelling or numbness. The symptoms are aggravated by activity. He has tried nothing for the symptoms. The treatment provided no relief. His past medical history is significant  for diabetes.   Gastroesophageal Reflux  He complains of heartburn. He reports no abdominal pain, no chest pain, no coughing or no wheezing. This is a new problem. The current episode started 1 to 4 weeks ago. The problem occurs constantly. The problem has been unchanged. The heartburn duration is several minutes. The heartburn is located in the substernum. The symptoms are aggravated by certain foods. Pertinent negatives include no anemia, fatigue or melena. There are no known risk factors. He has tried nothing for the symptoms. The treatment provided mild relief. Past procedures do not include an abdominal ultrasound, esophageal pH monitoring or a UGI. Past invasive treatments do not include gastroplasty or reflux surgery.   Diabetes  Pertinent negatives for hypoglycemia include no confusion, dizziness, headaches, nervousness/anxiousness, speech difficulty or sweats. Pertinent negatives for diabetes include no chest pain, no fatigue, no polydipsia, no polyuria and no weakness. Pertinent negatives for diabetic complications include no CVA, PVD or retinopathy.   Hyperlipidemia  This is a chronic problem. The current episode started more than 1 year ago. The problem is uncontrolled. Recent lipid tests were reviewed and are high. Exacerbating diseases include diabetes. He has no history of chronic renal disease, liver disease or nephrotic syndrome. There are no known factors aggravating his hyperlipidemia. Associated symptoms include myalgias. Pertinent negatives include no chest pain, focal sensory loss, focal weakness or leg pain. Current antihyperlipidemic treatment includes statins. There are no compliance problems.  Risk factors for coronary artery disease include diabetes mellitus, dyslipidemia, male sex and hypertension.     Review of Systems   Constitutional: Negative.  Negative for activity change, diaphoresis, fatigue, malaise/fatigue and unexpected weight change.   HENT: Negative.  Negative for nasal  congestion, ear pain, mouth sores, rhinorrhea and voice change.    Eyes: Negative.  Negative for pain, discharge and visual disturbance.   Respiratory: Negative.  Negative for apnea, cough and wheezing.    Cardiovascular: Negative.  Negative for chest pain, palpitations and orthopnea.   Gastrointestinal:  Positive for heartburn. Negative for abdominal distention, abdominal pain, anal bleeding, change in bowel habit, diarrhea, melena and vomiting.   Endocrine: Negative.  Negative for cold intolerance, polydipsia and polyuria.   Genitourinary: Negative.  Negative for decreased urine volume, difficulty urinating, discharge, frequency and scrotal swelling.   Musculoskeletal:  Positive for arthralgias and myalgias. Negative for back pain, joint swelling, leg pain and neck stiffness.   Integumentary:  Negative for color change, itching and rash. Negative.   Allergic/Immunologic: Negative.  Negative for environmental allergies.   Neurological: Negative.  Negative for dizziness, vertigo, focal weakness, speech difficulty, weakness, light-headedness, numbness and headaches.   Hematological: Negative.    Psychiatric/Behavioral: Negative.  Negative for agitation, confusion, dysphoric mood and suicidal ideas. The patient is not nervous/anxious.          PMH/PSH/FH/SH/MED/ALLERGY reviewed    Past Medical History:   Diagnosis Date    CKD (chronic kidney disease) stage 3, GFR 30-59 ml/min     Costochondritis     Diabetic nephropathy associated with type 2 diabetes mellitus     Diabetic retinopathy     ED (erectile dysfunction)     GERD (gastroesophageal reflux disease)     Hyperlipidemia     Hypertension     Prostate cancer     Type II or unspecified type diabetes mellitus with renal manifestations, uncontrolled(250.42)        Past Surgical History:   Procedure Laterality Date    Bone biopsy right femur      CORONARY ANGIOGRAPHY N/A 06/16/2022    Procedure: ANGIOGRAM, CORONARY ARTERY;  Surgeon: Carter Arevalo MD;  Location: [facility]  CATH LAB/EP;  Service: Cardiology;  Laterality: N/A;    CREATION OF BYPASS OF CORONARY ARTERY USING GRAFT WITHOUT CARDIOPULMONARY PUMP OXYGENATION N/A 2023    Procedure: CABG, WITHOUT CARDIOPULMONARY PUMP OXYGENATION;  Surgeon: Jass Uriostegui MD;  Location: [facility] OR [facility];  Service: Cardiovascular;  Laterality: N/A;  CABG x1 off pump    FRACTIONAL FLOW RESERVE (FFR), CORONARY  2023    Procedure: Fractional Flow [place] (FFR), Coronary;  Surgeon: Je Fontanez MD;  Location: [facility] CATH LAB/EP;  Service: Cardiology;;    INSTANTANEOUS WAVE-FREE RATIO (IFR) N/A 2023    Procedure: Instantaneous Wave-Free Ratio (IFR);  Surgeon: Je Fontanez MD;  Location: [facility] CATH LAB/EP;  Service: Cardiology;  Laterality: N/A;    LEFT HEART CATHETERIZATION Left 2022    Procedure: Left heart cath;  Surgeon: Carter Arevalo MD;  Location: [facility] CATH LAB/EP;  Service: Cardiology;  Laterality: Left;    LEFT HEART CATHETERIZATION Left 2023    Procedure: Left heart cath;  Surgeon: Je Fontanez MD;  Location: [facility] CATH LAB/EP;  Service: Cardiology;  Laterality: Left;    PROSTATE BIOPSY  10/05/2022    RADIOACTIVE SEED IMPLANTATION OF PROSTATE  2023    Procedure: INSERTION, RADIOACTIVE SEED, PROSTATE;  Surgeon: Scott Frias MD;  Location: [facility] OR [address] [place] [address];  Service: Urology;;    TRANSRECTAL ULTRASOUND EXAMINATION N/A 2023    Procedure: ULTRASOUND, RECTAL APPROACH;  Surgeon: Scott Frias MD;  Location: [facility] OR [address] [place] [address];  Service: Urology;  Laterality: N/A;       Family History   Problem Relation Name Age of Onset    Hypertension Mother      Diabetes Mother      Kidney disease Mother      Coronary artery disease Father           of an MI at age 60    Hyperlipidemia Father      Heart attack Father      Hypertension Sister Laney     Diabetes Sister Laney     Heart disease Sister Laney     Heart attack Sister Laney     Hyperlipidemia Sister Laney     Coronary artery disease Sister  Lanye     Diabetes Sister Lewis     Hypertension Sister Lewis     Dementia Sister [place]     No Known Problems Sister Gene     Stomach cancer Sister Bony     Cancer Sister Bony         stomach     Hypertension Brother Charlie     Stroke Brother Charlie     Lung cancer Brother Charlie     Cancer Brother Charlie         lung cancer    Diabetes Brother Danielson     Peripheral vascular disease Brother Danielson     Hypertension Brother Danielson     Hyperlipidemia Brother Danielson     No Known Problems Brother González     Diabetes Brother Pantera     Stroke Brother Pantera     Hypertension Brother Pantera     Hyperlipidemia Brother Pantera     HIV Brother Devin     Diabetes Brother Ramirez     Hypertension Brother Ramirez     No Known Problems Daughter x1     Asthma Son x1     Hypertension Son x1     Anesthesia problems Neg Hx         Social History     Socioeconomic History    Marital status:    Tobacco Use    Smoking status: Former     Current packs/day: 0.00     Average packs/day: 0.5 packs/day for 26.0 years (13.0 ttl pk-yrs)     Types: Cigarettes     Start date:      Quit date:      Years since quittin.9     Passive exposure: Never    Smokeless tobacco: Never   Substance and Sexual Activity    Alcohol use: Not Currently    Drug use: No    Sexual activity: Yes     Partners: Female     Social Drivers of Health     Financial Resource Strain: Low Risk  (2023)    Overall Financial Resource Strain (CARDIA)     Difficulty of Paying Living Expenses: Not hard at all   Food Insecurity: Patient Declined (2023)    Hunger Vital Sign     Worried About Running Out of Food in the Last Year: Patient declined     Ran Out of Food in the Last Year: Patient declined   Transportation Needs: Unknown (2023)    PRAPARE - Transportation     Lack of Transportation (Medical): No     Lack of Transportation (Non-Medical): Patient declined   Physical Activity: Inactive (2023)    Exercise Vital Sign     Days of  "Exercise per Week: 0 days     Minutes of Exercise per Session: 0 min   Stress: Patient Declined (7/24/2023)    [employer] - Occupational Stress Questionnaire     Feeling of Stress : Patient declined   Housing Stability: Unknown (7/24/2023)    Housing Stability Vital Sign     Unable to Pay for Housing in the Last Year: No     Unstable Housing in the Last Year: Patient refused       Current Outpatient Medications   Medication Sig Dispense Refill    ADVANCED GLUC METER TEST STRIP Strp USE TO TEST BLOOD SUGAR 4 TIMES DAILY. 100 strip 0    blood sugar diagnostic (BLOOD GLUCOSE TEST) Strp Check sugar once daily 100 each 11    blood-glucose meter Misc by Misc.(Non-Drug; Combo Route) route.      blood-glucose sensor (DEXCOM G6 SENSOR) Tia 1 Device by Misc.(Non-Drug; Combo Route) route continuous. 3 each 11    blood-glucose transmitter (DEXCOM G6 TRANSMITTER) Tia 1 Device by Misc.(Non-Drug; Combo Route) route continuous. 1 each 0    furosemide (LASIX) 40 MG tablet Take 1 tablet (40 mg total) by mouth daily as needed (swelling). 30 tablet 11    pen needle, diabetic (BD ULTRA-FINE ARIA PEN NEEDLE) 32 gauge x 5/32" Ndle USE ONE NEEDLE WITH INSULIN PEN TWICE DAILY 200 each 3    psyllium husk 6 gram/6 gram Powd Take 6 g by mouth once daily. 450 g 11    semaglutide (OZEMPIC) 1 mg/dose (4 mg/3 mL) Inject 1 mg into the skin every 7 days. 3 mL 11    sodium bicarbonate 650 MG tablet Take 1 tablet (650 mg total) by mouth 3 (three) times daily. 270 tablet 3    atorvastatin (LIPITOR) 80 MG tablet Take 1 tablet (80 mg total) by mouth once daily. 90 tablet 2    blood-glucose meter,continuous (DEXCOM G6 ) Misc 1 Device by Misc.(Non-Drug; Combo Route) route continuous. 1 each 0    clopidogreL (PLAVIX) 75 mg tablet Take 1 tablet (75 mg total) by mouth once daily. 90 tablet 4    dapagliflozin propanediol (FARXIGA) 10 mg tablet Take 1 tablet (10 mg total) by mouth once daily. 90 tablet 3    ergocalciferol " (VITAMIN D2) 50,000 unit Cap Take 1 capsule (50,000 Units total) by mouth every 7 days. Once per week for 8 weeks then once a month 4 capsule 3    ezetimibe (ZETIA) 10 mg tablet Take 1 tablet (10 mg total) by mouth once daily. 90 tablet 3    insulin glargine U-100, Lantus, (LANTUS SOLOSTAR U-100 INSULIN) 100 unit/mL (3 mL) InPn pen Inject 20 Units into the skin 2 (two) times a day. 30 mL 3    olmesartan (BENICAR) 5 MG Tab Take 1 tablet (5 mg total) by mouth once daily. 90 tablet 3    pantoprazole (PROTONIX) 40 MG tablet TAKE ONE TABLET BY MOUTH ONCE DAILY. 90 tablet 3    sildenafiL (VIAGRA) 100 MG tablet Take 1 tablet (100 mg total) by mouth daily as needed for Erectile Dysfunction. 30 tablet 10    tamsulosin (FLOMAX) 0.4 mg Cap TAKE ONE CAPSULE BY MOUTH ONCE DAILY. 90 capsule 3     No current facility-administered medications for this visit.       Review of patient's allergies indicates:  No Known Allergies      Objective:       Vitals:    12/10/24 0809   BP: 124/62   Pulse: 78   Temp: 97.8 °F (36.6 °C)         Physical Exam  Constitutional:       Appearance: He is well-developed.   HENT:      Head: Normocephalic and atraumatic.      Right Ear: External ear normal.      Left Ear: External ear normal.      Nose: Nose normal.      Mouth/Throat:      Pharynx: No oropharyngeal exudate.   Eyes:      General: No scleral icterus.        Right eye: No discharge.         Left eye: No discharge.      Conjunctiva/sclera: Conjunctivae normal.      Pupils: Pupils are equal, round, and reactive to light.   Neck:      Thyroid: No thyromegaly.      Vascular: No JVD.      Trachea: No tracheal deviation.   Cardiovascular:      Rate and Rhythm: Normal rate and regular rhythm.      Pulses:           Dorsalis pedis pulses are 1+ on the right side and 1+ on the left side.        Posterior tibial pulses are 1+ on the right side and 1+ on the left side.      Heart sounds: Normal heart sounds. No murmur heard.     No friction rub. No  gallop.   Pulmonary:      Effort: Pulmonary effort is normal. No respiratory distress.      Breath sounds: Normal breath sounds. No stridor. No wheezing or rales.   Chest:      Chest wall: No tenderness.   Abdominal:      General: Bowel sounds are normal. There is no distension.      Palpations: Abdomen is soft. There is no mass.      Tenderness: There is no abdominal tenderness. There is no guarding or rebound.      Hernia: No hernia is present.   Musculoskeletal:         General: Tenderness (TTP left shoulder with restricted ROM) present.      Cervical back: Normal range of motion and neck supple.      Right foot: Normal range of motion. No deformity, bunion, Charcot foot, foot drop or prominent metatarsal heads.      Left foot: Normal range of motion. No deformity, bunion, Charcot foot, foot drop or prominent metatarsal heads.      Comments: Neer and rodriguez+ left   Feet:      Right foot:      Protective Sensation: 10 sites tested.  10 sites sensed.      Skin integrity: Skin integrity normal.      Toenail Condition: Right toenails are normal.      Left foot:      Protective Sensation: 10 sites tested.  10 sites sensed.      Skin integrity: Skin integrity normal.      Toenail Condition: Left toenails are normal.   Lymphadenopathy:      Cervical: No cervical adenopathy.   Skin:     General: Skin is warm and dry.      Coloration: Skin is not pale.      Findings: No erythema or rash.   Neurological:      Mental Status: He is alert and oriented to person, place, and time.      Cranial Nerves: No cranial nerve deficit.      Motor: No abnormal muscle tone.      Coordination: Coordination normal.      Deep Tendon Reflexes: Reflexes are normal and symmetric. Reflexes normal.   Psychiatric:         Behavior: Behavior normal.         Thought Content: Thought content normal.         Judgment: Judgment normal.         Assessment:       Problem List Items Addressed This Visit       Dyslipidemia associated with type 2 diabetes  mellitus    Relevant Medications    atorvastatin (LIPITOR) 80 MG tablet    ezetimibe (ZETIA) 10 mg tablet    insulin glargine U-100, Lantus, (LANTUS SOLOSTAR U-100 INSULIN) 100 unit/mL (3 mL) InPn pen    Type 2 diabetes mellitus with stage 3a chronic kidney disease, without long-term current use of insulin - Primary    Relevant Medications    dapagliflozin propanediol (FARXIGA) 10 mg tablet    insulin glargine U-100, Lantus, (LANTUS SOLOSTAR U-100 INSULIN) 100 unit/mL (3 mL) InPn pen    Stage 3b chronic kidney disease    Prostate cancer    Relevant Medications    tamsulosin (FLOMAX) 0.4 mg Cap    Hypertension associated with diabetes    Relevant Medications    insulin glargine U-100, Lantus, (LANTUS SOLOSTAR U-100 INSULIN) 100 unit/mL (3 mL) InPn pen    olmesartan (BENICAR) 5 MG Tab    ED (erectile dysfunction)    Relevant Medications    sildenafiL (VIAGRA) 100 MG tablet    Coronary artery disease of native artery with stable angina pectoris    Relevant Medications    clopidogreL (PLAVIX) 75 mg tablet     Other Visit Diagnoses       Sore throat        Relevant Orders    POCT Rapid Strep A    Vitamin D deficiency        Relevant Medications    ergocalciferol (VITAMIN D2) 50,000 unit Cap    Gastroesophageal reflux disease, unspecified whether esophagitis present        Relevant Medications    pantoprazole (PROTONIX) 40 MG tablet            Plan:           Addy was seen today for follow-up.    Diagnoses and all orders for this visit:    Type 2 diabetes mellitus with stage 3a chronic kidney disease, without long-term current use of insulin  -     dapagliflozin propanediol (FARXIGA) 10 mg tablet; Take 1 tablet (10 mg total) by mouth once daily.  -     insulin glargine U-100, Lantus, (LANTUS SOLOSTAR U-100 INSULIN) 100 unit/mL (3 mL) InPn pen; Inject 20 Units into the skin 2 (two) times a day.    Dyslipidemia associated with type 2 diabetes mellitus  -     atorvastatin (LIPITOR) 80 MG tablet; Take 1 tablet (80 mg total)  by mouth once daily.  -     ezetimibe (ZETIA) 10 mg tablet; Take 1 tablet (10 mg total) by mouth once daily.    Prostate cancer  -     tamsulosin (FLOMAX) 0.4 mg Cap; TAKE ONE CAPSULE BY MOUTH ONCE DAILY.    Stage 3b chronic kidney disease    Hypertension associated with diabetes  -     olmesartan (BENICAR) 5 MG Tab; Take 1 tablet (5 mg total) by mouth once daily.    Coronary artery disease of native artery with stable angina pectoris, unspecified whether native or transplanted heart  -     clopidogreL (PLAVIX) 75 mg tablet; Take 1 tablet (75 mg total) by mouth once daily.    Sore throat  -     POCT Rapid Strep A    Vitamin D deficiency  -     ergocalciferol (VITAMIN D2) 50,000 unit Cap; Take 1 capsule (50,000 Units total) by mouth every 7 days. Once per week for 8 weeks then once a month    Gastroesophageal reflux disease, unspecified whether esophagitis present  -     pantoprazole (PROTONIX) 40 MG tablet; TAKE ONE TABLET BY MOUTH ONCE DAILY.    Erectile dysfunction, unspecified erectile dysfunction type  -     sildenafiL (VIAGRA) 100 MG tablet; Take 1 tablet (100 mg total) by mouth daily as needed for Erectile Dysfunction.    HTN   -at goal  -continue lisinopril to 20 mg/ day    DM II   -controlled   -continue farxiga daily and increase lantus 20 BID    CKD 3  -follow nephrology  -ER precautions given    HLD   -improving  -continue lipitor    GERD   -stable   -takes OTC PPI     ED'  -controlled            Spent adequate time in obtaining history and explaining differentials      40 minutes spent during this visit of which greater than 50% devoted to face-face counseling and coordination of care regarding diagnosis and management plan    Rtc 6 m r prn

## 2024-12-15 NOTE — Clinical Note
Subjective   Corrie Harrison is a 84 year old female who presents for Cough (Started 9 day ago, chest congestion, some fevers, no other symptoms, suffers from bronchitis often )    HPI  The patient presents for evaluation of a persistent cough. She is accompanied by her daughter. A  was present during the visit.    She has been experiencing a persistent cough with phlegm production for the past 9 days. She had a single episode of fever, which was not quantified as it was not measured with a thermometer, but she was noted to be unusually warm. She was administered Tylenol, which appeared to alleviate the symptoms. She has been experiencing fatigue, characterized by increased sleepiness and a tendency to remain in a supine position. She reports minimal pain. Her daughter reports that she has been feeling very congested and has not been expectorating any phlegm. The cough and phlegm production have been disrupting her sleep at night. She has not been taking any medication for the cough. She also reports feeling congested. She has not been taking any medications at home. Her blood pressure was elevated during the visit.    MEDICATIONS  Current: Tylenol    Review of Systems   Constitutional:  Positive for chills and fatigue.   HENT:  Positive for congestion, postnasal drip, rhinorrhea and sore throat.    Respiratory:  Positive for cough and wheezing.          Objective   Vitals:    12/15/24 1107   BP: (!) 144/77   Pulse: 88   Resp: 19   Temp: 97.8 °F (36.6 °C)   SpO2: 96%     Physical Exam  Vitals and nursing note reviewed.   Constitutional:       Appearance: Normal appearance.   HENT:      Head: Normocephalic.      Right Ear: Tympanic membrane, ear canal and external ear normal.      Left Ear: Tympanic membrane, ear canal and external ear normal.      Nose: Congestion and rhinorrhea present.      Mouth/Throat:      Mouth: Mucous membranes are moist.      Pharynx: Oropharynx is clear. Posterior  "Chief Complaint   Patient presents with     Well Child       Initial /52  Pulse 113  Temp 99.4  F (37.4  C) (Tympanic)  Ht 3' 2\" (0.965 m)  Wt 31 lb 9.6 oz (14.3 kg)  BMI 15.39 kg/m2 Estimated body mass index is 15.39 kg/(m^2) as calculated from the following:    Height as of this encounter: 3' 2\" (0.965 m).    Weight as of this encounter: 31 lb 9.6 oz (14.3 kg).  Medication Reconciliation: complete    " The site was marked. The right groin and right radial was prepped. The site was prepped with ChloraPrep. The site was clipped. The patient was draped. The patient was positioned supine. oropharyngeal erythema present.      Neck: Normal range of motion and neck supple.   Eyes:      Extraocular Movements: Extraocular movements intact.      Conjunctiva/sclera: Conjunctivae normal.      Pupils: Pupils are equal, round, and reactive to light.   Cardiovascular:      Rate and Rhythm: Normal rate and regular rhythm.      Pulses: Normal pulses.      Heart sounds: Normal heart sounds.   Pulmonary:      Effort: Pulmonary effort is normal.      Breath sounds: Wheezing and rhonchi present.   Abdominal:      General: Abdomen is flat. Bowel sounds are normal.      Palpations: Abdomen is soft.   Musculoskeletal:         General: Normal range of motion.   Skin:     General: Skin is warm and dry.      Capillary Refill: Capillary refill takes less than 2 seconds.   Neurological:      General: No focal deficit present.      Mental Status: She is alert and oriented to person, place, and time.         Throat appears normal. There is a lot of postnasal drip and some mucus in the back of her throat.  Chest is very congested.    Vital Signs  Blood pressure was elevated.    Imaging  Chest x-ray shows pneumonia on both sides of the right and left upper lobes, with the left side being worse than the right.  Discussed imaging results with patient    Assessment & Plan   1. Pneumonia.  The chest x-ray revealed bilateral pneumonia, with the left side being more severe than the right. Given her age and slight confusion, there is a concern for a potential underlying issue. The possibility of a viral infection was considered, but the primary concern is to rule out pneumonia. A chest x-ray will be ordered. Due to her age and slight confusion, she will be referred to the hospital for further evaluation and treatment. Intravenous antibiotics may be necessary, and blood work will be conducted to rule out a severe infection. A work note will be provided for her daughter, who is currently caring for her.    1. Cough, unspecified type  -      POCT SARS-COV-2 ANTIGEN/FLU ANTIGEN PANEL  -     XR CHEST PA AND LATERAL 2 VIEWS; Future

## 2025-01-24 ENCOUNTER — TELEPHONE (OUTPATIENT)
Dept: CARDIOLOGY | Facility: CLINIC | Age: 70
End: 2025-01-24
Payer: MEDICARE

## 2025-01-24 ENCOUNTER — HOSPITAL ENCOUNTER (EMERGENCY)
Facility: HOSPITAL | Age: 70
Discharge: HOME | End: 2025-01-25
Attending: EMERGENCY MEDICINE
Payer: MEDICARE

## 2025-01-24 VITALS
WEIGHT: 215 LBS | HEART RATE: 47 BPM | BODY MASS INDEX: 28.49 KG/M2 | RESPIRATION RATE: 18 BRPM | DIASTOLIC BLOOD PRESSURE: 72 MMHG | TEMPERATURE: 98 F | OXYGEN SATURATION: 95 % | SYSTOLIC BLOOD PRESSURE: 157 MMHG | HEIGHT: 73 IN

## 2025-01-24 DIAGNOSIS — R07.9 CHEST PAIN: ICD-10-CM

## 2025-01-24 DIAGNOSIS — R10.13 EPIGASTRIC ABDOMINAL PAIN: Primary | ICD-10-CM

## 2025-01-24 LAB
ALBUMIN SERPL BCP-MCNC: 3.8 G/DL (ref 3.5–5.2)
ALP SERPL-CCNC: 60 U/L (ref 40–150)
ALT SERPL W/O P-5'-P-CCNC: 28 U/L (ref 10–44)
ANION GAP SERPL CALC-SCNC: 10 MMOL/L (ref 8–16)
AST SERPL-CCNC: 21 U/L (ref 10–40)
BASOPHILS # BLD AUTO: 0.05 K/UL (ref 0–0.2)
BASOPHILS NFR BLD: 0.8 % (ref 0–1.9)
BILIRUB SERPL-MCNC: 0.3 MG/DL (ref 0.1–1)
BUN SERPL-MCNC: 25 MG/DL (ref 8–23)
CALCIUM SERPL-MCNC: 9 MG/DL (ref 8.7–10.5)
CHLORIDE SERPL-SCNC: 110 MMOL/L (ref 95–110)
CO2 SERPL-SCNC: 19 MMOL/L (ref 23–29)
CREAT SERPL-MCNC: 1.8 MG/DL (ref 0.5–1.4)
DIFFERENTIAL METHOD BLD: NORMAL
EOSINOPHIL # BLD AUTO: 0.2 K/UL (ref 0–0.5)
EOSINOPHIL NFR BLD: 3.5 % (ref 0–8)
ERYTHROCYTE [DISTWIDTH] IN BLOOD BY AUTOMATED COUNT: 14 % (ref 11.5–14.5)
EST. GFR  (NO RACE VARIABLE): 40 ML/MIN/1.73 M^2
GLUCOSE SERPL-MCNC: 130 MG/DL (ref 70–110)
HCT VFR BLD AUTO: 41.6 % (ref 40–54)
HGB BLD-MCNC: 14.2 G/DL (ref 14–18)
IMM GRANULOCYTES # BLD AUTO: 0.02 K/UL (ref 0–0.04)
IMM GRANULOCYTES NFR BLD AUTO: 0.3 % (ref 0–0.5)
LIPASE SERPL-CCNC: 51 U/L (ref 4–60)
LYMPHOCYTES # BLD AUTO: 1.6 K/UL (ref 1–4.8)
LYMPHOCYTES NFR BLD: 24.8 % (ref 18–48)
MAGNESIUM SERPL-MCNC: 2 MG/DL (ref 1.6–2.6)
MCH RBC QN AUTO: 29.7 PG (ref 27–31)
MCHC RBC AUTO-ENTMCNC: 34.1 G/DL (ref 32–36)
MCV RBC AUTO: 87 FL (ref 82–98)
MONOCYTES # BLD AUTO: 0.8 K/UL (ref 0.3–1)
MONOCYTES NFR BLD: 12.9 % (ref 4–15)
NEUTROPHILS # BLD AUTO: 3.8 K/UL (ref 1.8–7.7)
NEUTROPHILS NFR BLD: 57.7 % (ref 38–73)
NRBC BLD-RTO: 0 /100 WBC
PLATELET # BLD AUTO: 232 K/UL (ref 150–450)
PMV BLD AUTO: 10.8 FL (ref 9.2–12.9)
POTASSIUM SERPL-SCNC: 3.8 MMOL/L (ref 3.5–5.1)
PROT SERPL-MCNC: 7.3 G/DL (ref 6–8.4)
RBC # BLD AUTO: 4.78 M/UL (ref 4.6–6.2)
SODIUM SERPL-SCNC: 139 MMOL/L (ref 136–145)
TROPONIN I SERPL DL<=0.01 NG/ML-MCNC: <0.006 NG/ML (ref 0–0.03)
WBC # BLD AUTO: 6.52 K/UL (ref 3.9–12.7)

## 2025-01-24 PROCEDURE — 25000003 PHARM REV CODE 250: Performed by: EMERGENCY MEDICINE

## 2025-01-24 PROCEDURE — 93010 ELECTROCARDIOGRAM REPORT: CPT | Mod: ,,, | Performed by: STUDENT IN AN ORGANIZED HEALTH CARE EDUCATION/TRAINING PROGRAM

## 2025-01-24 PROCEDURE — 99285 EMERGENCY DEPT VISIT HI MDM: CPT | Mod: 25

## 2025-01-24 PROCEDURE — 85025 COMPLETE CBC W/AUTO DIFF WBC: CPT | Performed by: EMERGENCY MEDICINE

## 2025-01-24 PROCEDURE — 83690 ASSAY OF LIPASE: CPT | Performed by: EMERGENCY MEDICINE

## 2025-01-24 PROCEDURE — 83735 ASSAY OF MAGNESIUM: CPT | Performed by: EMERGENCY MEDICINE

## 2025-01-24 PROCEDURE — 93005 ELECTROCARDIOGRAM TRACING: CPT

## 2025-01-24 PROCEDURE — 80053 COMPREHEN METABOLIC PANEL: CPT | Performed by: EMERGENCY MEDICINE

## 2025-01-24 PROCEDURE — 84484 ASSAY OF TROPONIN QUANT: CPT | Performed by: EMERGENCY MEDICINE

## 2025-01-24 RX ORDER — ASPIRIN 325 MG
325 TABLET ORAL
Status: COMPLETED | OUTPATIENT
Start: 2025-01-24 | End: 2025-01-24

## 2025-01-24 RX ORDER — ACETAMINOPHEN 500 MG
1000 TABLET ORAL
Status: COMPLETED | OUTPATIENT
Start: 2025-01-24 | End: 2025-01-24

## 2025-01-24 RX ADMIN — ACETAMINOPHEN 1000 MG: 500 TABLET ORAL at 10:01

## 2025-01-24 RX ADMIN — ASPIRIN 325 MG ORAL TABLET 325 MG: 325 PILL ORAL at 10:01

## 2025-01-24 NOTE — TELEPHONE ENCOUNTER
I reached out to patient today, No answer, I left pt V/Message. If you are having Chest Pain Currently ,  You will need to get evaluated in the Emergency Room.  Please don't hesitant you must go to to ER.    NW

## 2025-01-24 NOTE — TELEPHONE ENCOUNTER
----- Message from [employer] sent at 1/22/2025 12:01 PM CST -----  Type: General Call Back         Name of Caller:pt  Symptoms:chest pain  Would the patient rather a call back or a response via [facility]? call  Best Call Back Number:[phone]  Additional Information: Pt states he experience chest pain when bent over in mid chest area. Please call pt with advice. Pt did not want to speak with nurse on call.

## 2025-01-25 NOTE — DISCHARGE INSTRUCTIONS

## 2025-01-25 NOTE — ED PROVIDER NOTES
Encounter Date: 1/24/2025       History     Chief Complaint   Patient presents with    Abdominal Pain     Epigastric pain x2 days. Denies n/v or radiation of pain. Pain is exacerbated by bending over. Occasional breathlessness. Denies abnormal abdominal distention.      70 yo M w PMHx including CAD s/p CABG, DM II, HTN, HLD, GERD, CKD III, prostate cancer in remission presents for evaluation of epigastric abdominal pain.  Patient says that for the past few days he has had intermittent episodes of epigastric abdominal pain that are exacerbated by movement.  He says that he has occasionally had similar pain in the past throughout the past year that improves with a sip of cold water, but it has never persisted as much as it has over the past few days. He tried following up outpatient with cardiology this week but could not do so due to inclement weather, and came to the ED after he received a notice from the cardiology office today that he should go to the ED instead.  He denies associated fever, chills, shortness of breath, nausea, vomiting.  Says the pain is not exacerbated by eating. Takes Plavix daily.     The history is provided by the patient and the spouse.     Review of patient's allergies indicates:  No Known Allergies  Past Medical History:   Diagnosis Date    CKD (chronic kidney disease) stage 3, GFR 30-59 ml/min     Costochondritis     Diabetic nephropathy associated with type 2 diabetes mellitus     Diabetic retinopathy     ED (erectile dysfunction)     GERD (gastroesophageal reflux disease)     Hyperlipidemia     Hypertension     Prostate cancer     Type II or unspecified type diabetes mellitus with renal manifestations, uncontrolled(250.42)      Past Surgical History:   Procedure Laterality Date    Bone biopsy right femur      CORONARY ANGIOGRAPHY N/A 06/16/2022    Procedure: ANGIOGRAM, CORONARY ARTERY;  Surgeon: Carter Arevalo MD;  Location: [facility] CATH LAB/EP;  Service: Cardiology;  Laterality: N/A;     CREATION OF BYPASS OF CORONARY ARTERY USING GRAFT WITHOUT CARDIOPULMONARY PUMP OXYGENATION N/A 2023    Procedure: CABG, WITHOUT CARDIOPULMONARY PUMP OXYGENATION;  Surgeon: Jass Uriostegui MD;  Location: [facility] OR 2ND FLR;  Service: Cardiovascular;  Laterality: N/A;  CABG x1 off pump    FRACTIONAL FLOW RESERVE (FFR), CORONARY  2023    Procedure: Fractional Flow [place] (FFR), Coronary;  Surgeon: Je Fontanez MD;  Location: [facility] CATH LAB/EP;  Service: Cardiology;;    INSTANTANEOUS WAVE-FREE RATIO (IFR) N/A 2023    Procedure: Instantaneous Wave-Free Ratio (IFR);  Surgeon: Je Fontanez MD;  Location: [facility] CATH LAB/EP;  Service: Cardiology;  Laterality: N/A;    LEFT HEART CATHETERIZATION Left 2022    Procedure: Left heart cath;  Surgeon: Carter Arevalo MD;  Location: [facility] CATH LAB/EP;  Service: Cardiology;  Laterality: Left;    LEFT HEART CATHETERIZATION Left 2023    Procedure: Left heart cath;  Surgeon: Je Fontanez MD;  Location: [facility] CATH LAB/EP;  Service: Cardiology;  Laterality: Left;    PROSTATE BIOPSY  10/05/2022    RADIOACTIVE SEED IMPLANTATION OF PROSTATE  2023    Procedure: INSERTION, RADIOACTIVE SEED, PROSTATE;  Surgeon: Scott Frias MD;  Location: [facility] OR 1ST FLR;  Service: Urology;;    TRANSRECTAL ULTRASOUND EXAMINATION N/A 2023    Procedure: ULTRASOUND, RECTAL APPROACH;  Surgeon: Scott Frias MD;  [facility];  Service: Urology;  Laterality: N/A;     Family History   Problem Relation Name Age of Onset    Hypertension Mother      Diabetes Mother      Kidney disease Mother      Coronary artery disease Father           of an MI at age 60    Hyperlipidemia Father      Heart attack Father      Hypertension Sister Laney     Diabetes Sister Laney     Heart disease Sister Alney     Heart attack Sister Laney     Hyperlipidemia Sister Laney     Coronary artery disease Sister Laney     Diabetes Sister Lewis     Hypertension Sister  Lewis     Dementia Sister Lewis     No Known Problems Sister Gene     Stomach cancer Sister Bony     Cancer Sister Bony         stomach     Hypertension Brother Charlie     Stroke Brother Charlie     Lung cancer Brother Charlie     Cancer Brother Charlie         lung cancer    Diabetes Brother Danielson     Peripheral vascular disease Brother Danielson     Hypertension Brother Danielson     Hyperlipidemia Brother Danielson     No Known Problems Brother González     Diabetes Brother Pantera     Stroke Brother Pantera     Hypertension Brother Pantera     Hyperlipidemia Brother Pantera     HIV Brother Devin     Diabetes Brother Ramirez     Hypertension Brother Ramirez     No Known Problems Daughter x1     Asthma Son x1     Hypertension Son x1     Anesthesia problems Neg Hx       Social History     Tobacco Use    Smoking status: Former     Current packs/day: 0.00     Average packs/day: 0.5 packs/day for 26.0 years (13.0 ttl pk-yrs)     Types: Cigarettes     Start date:      Quit date:      Years since quittin.0     Passive exposure: Never    Smokeless tobacco: Never   Substance Use Topics    Alcohol use: Not Currently    Drug use: No     Review of Systems    Physical Exam     Initial Vitals [25 1842]   BP Pulse Resp Temp SpO2   (!) 187/86 66 20 98 °F (36.7 °C) 97 %      MAP       --         Physical Exam    Constitutional: He appears well-developed and well-nourished. He is not diaphoretic. No distress.   HENT:   Head: Normocephalic and atraumatic.   Eyes: EOM are normal.   Neck: Neck supple.   Normal range of motion.  Cardiovascular:  Normal rate.           Pulmonary/Chest: Breath sounds normal. No respiratory distress. He has no wheezes.   Healed midsternal surgical scars   Abdominal: Abdomen is soft. He exhibits no distension. There is abdominal tenderness (moderate epigastric).   Musculoskeletal:         General: Normal range of motion.      Cervical back: Normal range of motion and neck supple.      Neurological: He is alert and oriented to person, place, and time.   Skin: Skin is warm and dry.   Psychiatric: He has a normal mood and affect.         ED Course   Procedures  Labs Reviewed   COMPREHENSIVE METABOLIC PANEL - Abnormal       Result Value    Sodium 139      Potassium 3.8      Chloride 110      CO2 19 (*)     Glucose 130 (*)     BUN 25 (*)     Creatinine 1.8 (*)     Calcium 9.0      Total Protein 7.3      Albumin 3.8      Total Bilirubin 0.3      Alkaline Phosphatase 60      AST 21      ALT 28      eGFR 40 (*)     Anion Gap 10     CBC W/ AUTO DIFFERENTIAL    WBC 6.52      RBC 4.78      Hemoglobin 14.2      Hematocrit 41.6      MCV 87      MCH 29.7      MCHC 34.1      RDW 14.0      Platelets 232      MPV 10.8      Immature Granulocytes 0.3      Gran # (ANC) 3.8      Immature Grans (Abs) 0.02      Lymph # 1.6      Mono # 0.8      Eos # 0.2      Baso # 0.05      nRBC 0      Gran % 57.7      Lymph % 24.8      Mono % 12.9      Eosinophil % 3.5      Basophil % 0.8      Differential Method Automated     TROPONIN I    Troponin I <0.006     MAGNESIUM    Magnesium 2.0     LIPASE    Lipase 51       EKG Readings: (Independently Interpreted)   Initial Reading: No STEMI. Previous EKG: Compared with most recent EKG Rhythm: Sinus Bradycardia. Heart Rate: 56. Ectopy: No Ectopy. Axis: Left Axis Deviation. Clinical Impression: Sinus Bradycardia       Imaging Results              X-Ray Chest AP Portable (Final result)  Result time 01/24/25 22:36:15      Final result by Callum Anderson DO (01/24/25 22:36:15)                   Impression:      No acute abnormality.      Electronically signed by: Callum Anderson  Date:    01/24/2025  Time:    22:36               Narrative:    EXAMINATION:  XR CHEST AP PORTABLE    CLINICAL HISTORY:  Chest pain, unspecified    TECHNIQUE:  Single frontal view of the chest was performed.    COMPARISON:  07/25/2023.    FINDINGS:  The lungs are well expanded and clear. No focal opacities are  seen. The pleural spaces are clear. The cardiac silhouette is unremarkable. The visualized osseous structures are unremarkable.  There are median sternotomy wires.                                       Medications   aspirin tablet 325 mg (325 mg Oral Given 1/24/25 2229)   acetaminophen tablet 1,000 mg (1,000 mg Oral Given 1/24/25 2229)     Medical Decision Making  69-year-old male with past medical history as above presents with complaint of epigastric abdominal pain. No evidence of volume overload or shock on exam. EKG without acute ischemic changes. Low suspicion for acute PE, pneumothorax, thoracic aortic dissection, cardiac effusion / tamponade. CXR without acute disease including on my independent interpretation. Troponin wnl. No electrolyte abnormalities. HEART moderate risk due to age and risk factors.  Extensive shared decision-making conversation with patient and wife regarding obs admission due to HEART score versus discharge.  They report that they had been trying to see his cardiologist, Dr. Fontanez this week but could not do so due to the inclement weather.  They received a notification today from cardiology office that they should go to the ER instead which is what prompted this ER visit. Pt does not want to be discharged but will f/u up closely outpatient with cardiology and his PCP. Realize sxs may also be due to gastric reflux or ulcers and discussed treatment for this too. Pt discharged with strict return precautions and outpatient f/u.      Amount and/or Complexity of Data Reviewed  Independent Historian: spouse     Details: At bedside throughout ED stay   External Data Reviewed: notes.     Details: Seen 12/10/24 by PCP for f/u of chronic medical conditions   Labs: ordered. Decision-making details documented in ED Course.  Radiology: ordered and independent interpretation performed.  ECG/medicine tests: ordered and independent interpretation performed.    Risk  OTC drugs.  Prescription drug  management.  Decision regarding hospitalization.               ED Course as of 01/25/25 0007 Fri Jan 24, 2025 2248 CXR Impression:   No acute abnormality.   [AT]   2317 WBC: 6.52  Normal  [AT]   2317 Hemoglobin: 14.2  Normal  [AT]   2328 Troponin I: <0.006  Normal  [AT]   2328 Lipase: 51  Normal  [AT]   2328 Creatinine(!): 1.8  Baseline CKD [AT]      ED Course User Index  [AT] Alicia Del Rio MD                             Clinical Impression:  Final diagnoses:  [R07.9] Chest pain  [R10.13] Epigastric abdominal pain (Primary)          ED Disposition Condition    Discharge Stable          ED Prescriptions    None       Follow-up Information       Follow up With Specialties Details Why Contact Info    Stephan Ramey MD Internal Medicine Schedule an appointment as soon as possible for a visit in 2 days  [address]  [address]  [facility] [id number] [phone]      Je Fontanez MD Cardiology, Interventional Cardiology Schedule an appointment as soon as possible for a visit in 2 days  [address]  [address]  [facility] [id number] [phone]      [facility] - Emergency Dept Emergency Medicine  As needed, If symptoms worsen [address] [address] [phone]             Alicia Del Rio MD  01/25/25 0008

## 2025-01-27 ENCOUNTER — OFFICE VISIT (OUTPATIENT)
Dept: FAMILY MEDICINE | Facility: CLINIC | Age: 70
End: 2025-01-27
Attending: FAMILY MEDICINE
Payer: MEDICARE

## 2025-01-27 VITALS
HEART RATE: 81 BPM | BODY MASS INDEX: 29.27 KG/M2 | OXYGEN SATURATION: 97 % | DIASTOLIC BLOOD PRESSURE: 68 MMHG | HEIGHT: 73 IN | WEIGHT: 220.88 LBS | TEMPERATURE: 98 F | SYSTOLIC BLOOD PRESSURE: 122 MMHG

## 2025-01-27 DIAGNOSIS — I15.2 HYPERTENSION ASSOCIATED WITH DIABETES: ICD-10-CM

## 2025-01-27 DIAGNOSIS — Z12.11 ENCOUNTER FOR FIT (FECAL IMMUNOCHEMICAL TEST) SCREENING: ICD-10-CM

## 2025-01-27 DIAGNOSIS — N18.31 TYPE 2 DIABETES MELLITUS WITH STAGE 3A CHRONIC KIDNEY DISEASE, WITHOUT LONG-TERM CURRENT USE OF INSULIN: Primary | ICD-10-CM

## 2025-01-27 DIAGNOSIS — E11.22 TYPE 2 DIABETES MELLITUS WITH STAGE 3A CHRONIC KIDNEY DISEASE, WITHOUT LONG-TERM CURRENT USE OF INSULIN: Primary | ICD-10-CM

## 2025-01-27 DIAGNOSIS — N18.32 STAGE 3B CHRONIC KIDNEY DISEASE: ICD-10-CM

## 2025-01-27 DIAGNOSIS — E78.5 DYSLIPIDEMIA ASSOCIATED WITH TYPE 2 DIABETES MELLITUS: ICD-10-CM

## 2025-01-27 DIAGNOSIS — E11.69 DYSLIPIDEMIA ASSOCIATED WITH TYPE 2 DIABETES MELLITUS: ICD-10-CM

## 2025-01-27 DIAGNOSIS — C61 PROSTATE CANCER: ICD-10-CM

## 2025-01-27 DIAGNOSIS — R07.9 CHEST PAIN, UNSPECIFIED TYPE: ICD-10-CM

## 2025-01-27 DIAGNOSIS — I25.118 CORONARY ARTERY DISEASE OF NATIVE ARTERY WITH STABLE ANGINA PECTORIS, UNSPECIFIED WHETHER NATIVE OR TRANSPLANTED HEART: ICD-10-CM

## 2025-01-27 DIAGNOSIS — E11.59 HYPERTENSION ASSOCIATED WITH DIABETES: ICD-10-CM

## 2025-01-27 LAB
OHS QRS DURATION: 96 MS
OHS QTC CALCULATION: 418 MS

## 2025-01-27 PROCEDURE — 99999 PR PBB SHADOW E&M-EST. PATIENT-LVL V: CPT | Mod: PBBFAC,,, | Performed by: FAMILY MEDICINE

## 2025-01-27 PROCEDURE — 3288F FALL RISK ASSESSMENT DOCD: CPT | Mod: CPTII,S$GLB,, | Performed by: FAMILY MEDICINE

## 2025-01-27 PROCEDURE — 99215 OFFICE O/P EST HI 40 MIN: CPT | Mod: S$GLB,,, | Performed by: FAMILY MEDICINE

## 2025-01-27 PROCEDURE — 3074F SYST BP LT 130 MM HG: CPT | Mod: CPTII,S$GLB,, | Performed by: FAMILY MEDICINE

## 2025-01-27 PROCEDURE — 1159F MED LIST DOCD IN RCRD: CPT | Mod: CPTII,S$GLB,, | Performed by: FAMILY MEDICINE

## 2025-01-27 PROCEDURE — 1101F PT FALLS ASSESS-DOCD LE1/YR: CPT | Mod: CPTII,S$GLB,, | Performed by: FAMILY MEDICINE

## 2025-01-27 PROCEDURE — 3008F BODY MASS INDEX DOCD: CPT | Mod: CPTII,S$GLB,, | Performed by: FAMILY MEDICINE

## 2025-01-27 PROCEDURE — 3078F DIAST BP <80 MM HG: CPT | Mod: CPTII,S$GLB,, | Performed by: FAMILY MEDICINE

## 2025-01-27 PROCEDURE — 1160F RVW MEDS BY RX/DR IN RCRD: CPT | Mod: CPTII,S$GLB,, | Performed by: FAMILY MEDICINE

## 2025-01-27 RX ORDER — METHOCARBAMOL 500 MG/1
500 TABLET, FILM COATED ORAL 3 TIMES DAILY
Qty: 30 TABLET | Refills: 0 | Status: SHIPPED | OUTPATIENT
Start: 2025-01-27 | End: 2025-02-06

## 2025-01-27 NOTE — PROGRESS NOTES
Subjective:       Patient ID: Addy Redding is a 69 y.o. male.    Chief Complaint: Follow-up (Wasin ED Friday night and told to f/u with PCP )    68 yr old black male with DM II, HTN, HLD, GERD, CKD III, prostate cancer in remission. presents today for his ER follow up. He went for chest pain and SOB and found to have inferior infarct but normal troponin. H ewas asked to follow PCP. ASA not helping. Not seen cardiology yet. Only tylenol helps some.        DM II - improved -HGBA1C                   6.8 (H)             12/02/2024                                         - complicated by CKD III                - denies any hypoglycemic symptoms - on lantus, Farxiga - unable to afford Ozempic  - up to date with eye and foot screen    HTN - controlled  - on lisinopril 20 mg daily - no side effects    HLD - controlled and improving -    LDLCALC                  57.6 (L)            01/25/2024                                        - on statin - compliant - need refill - not fully compliant with diet    GERD - stable - takes prilosec as needed    ED - stable - takes viagra as needed    Health maintenance  -labs due  -Flu and Pneumovax - up to date  -adacel due and done today  -colonoscopy due - wants to wait  -PSA UTD      Follow-up  This is a chronic problem. The current episode started more than 1 year ago. The problem occurs constantly. The problem has been gradually worsening. Associated symptoms include arthralgias and myalgias. Pertinent negatives include no abdominal pain, change in bowel habit, chest pain, congestion, coughing, diaphoresis, fatigue, headaches, joint swelling, numbness, rash, urinary symptoms, vertigo, vomiting or weakness.   Medication Refill  This is a chronic problem. The current episode started more than 1 year ago. The problem occurs constantly. The problem has been gradually improving. Associated symptoms include arthralgias and myalgias. Pertinent negatives include no abdominal pain, change in  bowel habit, chest pain, congestion, coughing, diaphoresis, fatigue, headaches, joint swelling, numbness, rash, urinary symptoms, vertigo, vomiting or weakness.   Hypertension  This is a chronic problem. The current episode started more than 1 year ago. The problem has been gradually worsening since onset. The problem is uncontrolled. Pertinent negatives include no anxiety, chest pain, headaches, malaise/fatigue, orthopnea, palpitations, peripheral edema or sweats. There are no associated agents to hypertension. Risk factors for coronary artery disease include dyslipidemia, diabetes mellitus, male gender and obesity. Past treatments include ACE inhibitors. The current treatment provides no improvement. There are no compliance problems.  There is no history of angina, CAD/MI, CVA, left ventricular hypertrophy, PVD or retinopathy. There is no history of chronic renal disease, coarctation of the aorta, hypercortisolism, pheochromocytoma, renovascular disease or a thyroid problem.   Arm Pain   There was no injury mechanism. The pain is present in the right shoulder and upper right arm. The quality of the pain is described as aching. The pain does not radiate. The pain is at a severity of 5/10. The pain is moderate. The pain has been Constant since the incident. Pertinent negatives include no chest pain or numbness. The symptoms are aggravated by movement, lifting and palpation. He has tried nothing for the symptoms. The treatment provided mild relief.   Shoulder Pain   The pain is present in the left shoulder. This is a chronic problem. The current episode started more than 1 month ago. There has been no history of extremity trauma. The problem occurs constantly. The problem has been unchanged. The quality of the pain is described as aching. The pain is at a severity of 8/10. The pain is severe. Associated symptoms include an inability to bear weight and a limited range of motion. Pertinent negatives include no  headaches, itching, joint swelling or numbness. The symptoms are aggravated by activity. He has tried nothing for the symptoms. The treatment provided no relief. His past medical history is significant for diabetes.   Gastroesophageal Reflux  He complains of heartburn. He reports no abdominal pain, no chest pain, no coughing or no wheezing. This is a new problem. The current episode started 1 to 4 weeks ago. The problem occurs constantly. The problem has been unchanged. The heartburn duration is several minutes. The heartburn is located in the substernum. The symptoms are aggravated by certain foods. Pertinent negatives include no anemia, fatigue or melena. There are no known risk factors. He has tried nothing for the symptoms. The treatment provided mild relief. Past procedures do not include an abdominal ultrasound, esophageal pH monitoring or a UGI. Past invasive treatments do not include gastroplasty or reflux surgery.   Diabetes  Pertinent negatives for hypoglycemia include no confusion, dizziness, headaches, nervousness/anxiousness, speech difficulty or sweats. Pertinent negatives for diabetes include no chest pain, no fatigue, no polydipsia, no polyuria and no weakness. Pertinent negatives for diabetic complications include no CVA, PVD or retinopathy.   Hyperlipidemia  This is a chronic problem. The current episode started more than 1 year ago. The problem is uncontrolled. Recent lipid tests were reviewed and are high. Exacerbating diseases include diabetes. He has no history of chronic renal disease, liver disease or nephrotic syndrome. There are no known factors aggravating his hyperlipidemia. Associated symptoms include myalgias. Pertinent negatives include no chest pain, focal sensory loss, focal weakness or leg pain. Current antihyperlipidemic treatment includes statins. There are no compliance problems.  Risk factors for coronary artery disease include diabetes mellitus, dyslipidemia, male sex and  hypertension.     Review of Systems   Constitutional: Negative.  Negative for activity change, diaphoresis, fatigue, malaise/fatigue and unexpected weight change.   HENT: Negative.  Negative for nasal congestion, ear pain, mouth sores, rhinorrhea and voice change.    Eyes: Negative.  Negative for pain, discharge and visual disturbance.   Respiratory: Negative.  Negative for apnea, cough and wheezing.    Cardiovascular: Negative.  Negative for chest pain, palpitations and orthopnea.   Gastrointestinal:  Positive for heartburn. Negative for abdominal distention, abdominal pain, anal bleeding, change in bowel habit, diarrhea, melena and vomiting.   Endocrine: Negative.  Negative for cold intolerance, polydipsia and polyuria.   Genitourinary: Negative.  Negative for decreased urine volume, difficulty urinating, discharge, frequency and scrotal swelling.   Musculoskeletal:  Positive for arthralgias and myalgias. Negative for back pain, joint swelling, leg pain and neck stiffness.   Integumentary:  Negative for color change, itching and rash. Negative.   Allergic/Immunologic: Negative.  Negative for environmental allergies.   Neurological: Negative.  Negative for dizziness, vertigo, focal weakness, speech difficulty, weakness, light-headedness, numbness and headaches.   Hematological: Negative.    Psychiatric/Behavioral: Negative.  Negative for agitation, confusion, dysphoric mood and suicidal ideas. The patient is not nervous/anxious.          PMH/PSH/FH/SH/MED/ALLERGY reviewed    Past Medical History:   Diagnosis Date    CKD (chronic kidney disease) stage 3, GFR 30-59 ml/min     Costochondritis     Diabetic nephropathy associated with type 2 diabetes mellitus     Diabetic retinopathy     ED (erectile dysfunction)     GERD (gastroesophageal reflux disease)     Hyperlipidemia     Hypertension     Prostate cancer     Type II or unspecified type diabetes mellitus with renal manifestations, uncontrolled(250.42)        Past  Surgical History:   Procedure Laterality Date    Bone biopsy right femur      CORONARY ANGIOGRAPHY N/A 2022    Procedure: ANGIOGRAM, CORONARY ARTERY;  Surgeon: Carter Arevalo MD;  Location: [facility] CATH LAB/EP;  Service: Cardiology;  Laterality: N/A;    CREATION OF BYPASS OF CORONARY ARTERY USING GRAFT WITHOUT CARDIOPULMONARY PUMP OXYGENATION N/A 2023    Procedure: CABG, WITHOUT CARDIOPULMONARY PUMP OXYGENATION;  Surgeon: Jass Uriostegui MD;  Location: [facility] OR 2ND FLR;  Service: Cardiovascular;  Laterality: N/A;  CABG x1 off pump    FRACTIONAL FLOW RESERVE (FFR), CORONARY  2023    Procedure: Fractional Flow [place] (FFR), Coronary;  Surgeon: Je Fontanez MD;  Location: [facility] CATH LAB/EP;  Service: Cardiology;;    INSTANTANEOUS WAVE-FREE RATIO (IFR) N/A 2023    Procedure: Instantaneous Wave-Free Ratio (IFR);  Surgeon: Je Fontanez MD;  Location: [facility] CATH LAB/EP;  Service: Cardiology;  Laterality: N/A;    LEFT HEART CATHETERIZATION Left 2022    Procedure: Left heart cath;  Surgeon: Carter Arevalo MD;  Location: [facility] CATH LAB/EP;  Service: Cardiology;  Laterality: Left;    LEFT HEART CATHETERIZATION Left 2023    Procedure: Left heart cath;  Surgeon: Je Fontanez MD;  Location: [facility] CATH LAB/EP;  Service: Cardiology;  Laterality: Left;    PROSTATE BIOPSY  10/05/2022    RADIOACTIVE SEED IMPLANTATION OF PROSTATE  2023    Procedure: INSERTION, RADIOACTIVE SEED, PROSTATE;  Surgeon: Scott Frias MD;  Location: [facility] OR 1ST FLR;  Service: Urology;;    TRANSRECTAL ULTRASOUND EXAMINATION N/A 2023    Procedure: ULTRASOUND, RECTAL APPROACH;  Surgeon: Scott Frias MD;  Location: [facility] OR 1ST FLR;  Service: Urology;  Laterality: N/A;       Family History   Problem Relation Name Age of Onset    Hypertension Mother      Diabetes Mother      Kidney disease Mother      Coronary artery disease Father           of an MI at age 60    Hyperlipidemia Father       Heart attack Father      Hypertension Sister Laney     Diabetes Sister Laney     Heart disease Sister Laney     Heart attack Sister Laney     Hyperlipidemia Sister Laney     Coronary artery disease Sister Laney     Diabetes Sister Lewis     Hypertension Sister Troy     Dementia Sister Lewis     No Known Problems Sister Gene     Stomach cancer Sister Bony     Cancer Sister Bony         stomach     Hypertension Brother Charlie     Stroke Brother Charlie     Lung cancer Brother Charlie     Cancer Brother Charlie         lung cancer    Diabetes Brother Danielson     Peripheral vascular disease Brother Danielson     Hypertension Brother Danielson     Hyperlipidemia Brother Danielson     No Known Problems Brother González     Diabetes Brother Pantera     Stroke Brother Pantera     Hypertension Brother Pantera     Hyperlipidemia Brother Pantera     HIV Brother Devin     Diabetes Brother Ramirez     Hypertension Brother Ramirez     No Known Problems Daughter x1     Asthma Son x1     Hypertension Son x1     Anesthesia problems Neg Hx         Social History     Socioeconomic History    Marital status:    Tobacco Use    Smoking status: Former     Current packs/day: 0.00     Average packs/day: 0.5 packs/day for 26.0 years (13.0 ttl pk-yrs)     Types: Cigarettes     Start date:      Quit date:      Years since quittin.0     Passive exposure: Never    Smokeless tobacco: Never   Substance and Sexual Activity    Alcohol use: Not Currently    Drug use: No    Sexual activity: Yes     Partners: Female     Social Drivers of Health     Financial Resource Strain: Low Risk  (2023)    Overall Financial Resource Strain (CARDIA)     Difficulty of Paying Living Expenses: Not hard at all   Food Insecurity: Patient Declined (2023)    Hunger Vital Sign     Worried About Running Out of Food in the Last Year: Patient declined     Ran Out of Food in the Last Year: Patient declined   Transportation Needs: Unknown (2023)     PRAPARE - Transportation     Lack of Transportation (Medical): No     Lack of Transportation (Non-Medical): Patient declined   Physical Activity: Inactive (7/24/2023)    Exercise Vital Sign     Days of Exercise per Week: 0 days     Minutes of Exercise per Session: 0 min   Stress: Patient Declined (7/24/2023)    [employer] [place] [employer] - Occupational Stress Questionnaire     Feeling of Stress : Patient declined   Housing Stability: Unknown (7/24/2023)    Housing Stability Vital Sign     Unable to Pay for Housing in the Last Year: No     Unstable Housing in the Last Year: Patient refused       Current Outpatient Medications   Medication Sig Dispense Refill    ADVANCED GLUC METER TEST STRIP Strp USE TO TEST BLOOD SUGAR 4 TIMES DAILY. 100 strip 0    atorvastatin (LIPITOR) 80 MG tablet Take 1 tablet (80 mg total) by mouth once daily. 90 tablet 2    blood sugar diagnostic (BLOOD GLUCOSE TEST) Strp Check sugar once daily 100 each 11    blood-glucose meter Misc by Misc.(Non-Drug; Combo Route) route.      blood-glucose sensor (DEXCOM G6 SENSOR) Tia 1 Device by Misc.(Non-Drug; Combo Route) route continuous. 3 each 11    blood-glucose transmitter (DEXCOM G6 TRANSMITTER) Tia 1 Device by Misc.(Non-Drug; Combo Route) route continuous. 1 each 0    clopidogreL (PLAVIX) 75 mg tablet Take 1 tablet (75 mg total) by mouth once daily. 90 tablet 4    dapagliflozin propanediol (FARXIGA) 10 mg tablet Take 1 tablet (10 mg total) by mouth once daily. 90 tablet 3    ergocalciferol (VITAMIN D2) 50,000 unit Cap Take 1 capsule (50,000 Units total) by mouth every 7 days. Once per week for 8 weeks then once a month 4 capsule 3    ezetimibe (ZETIA) 10 mg tablet Take 1 tablet (10 mg total) by mouth once daily. 90 tablet 3    furosemide (LASIX) 40 MG tablet Take 1 tablet (40 mg total) by mouth daily as needed (swelling). 30 tablet 11    insulin glargine U-100, Lantus, (LANTUS SOLOSTAR U-100 INSULIN) 100 unit/mL (3 mL) InPn pen  "Inject 20 Units into the skin 2 (two) times a day. 30 mL 3    olmesartan (BENICAR) 5 MG Tab Take 1 tablet (5 mg total) by mouth once daily. 90 tablet 3    pantoprazole (PROTONIX) 40 MG tablet TAKE ONE TABLET BY MOUTH ONCE DAILY. 90 tablet 3    pen needle, diabetic (BD ULTRA-FINE ARIA PEN NEEDLE) 32 gauge x 5/32" Ndle USE ONE NEEDLE WITH INSULIN PEN TWICE DAILY 200 each 3    psyllium husk 6 gram/6 gram Powd Take 6 g by mouth once daily. 450 g 11    semaglutide (OZEMPIC) 1 mg/dose (4 mg/3 mL) Inject 1 mg into the skin every 7 days. 3 mL 11    sildenafiL (VIAGRA) 100 MG tablet Take 1 tablet (100 mg total) by mouth daily as needed for Erectile Dysfunction. 30 tablet 10    sodium bicarbonate 650 MG tablet Take 1 tablet (650 mg total) by mouth 3 (three) times daily. 270 tablet 3    tamsulosin (FLOMAX) 0.4 mg Cap TAKE ONE CAPSULE BY MOUTH ONCE DAILY. 90 capsule 3    blood-glucose meter,continuous (DEXCOM G6 ) Misc 1 Device by Misc.(Non-Drug; Combo Route) route continuous. 1 each 0    methocarbamoL (ROBAXIN) 500 MG Tab Take 1 tablet (500 mg total) by mouth 3 (three) times daily. for 10 days 30 tablet 0     No current facility-administered medications for this visit.       Review of patient's allergies indicates:  No Known Allergies      Objective:       Vitals:    01/27/25 0834   BP: 122/68   Pulse: 81   Temp: 98 °F (36.7 °C)         Physical Exam  Constitutional:       Appearance: He is well-developed.   HENT:      Head: Normocephalic and atraumatic.      Right Ear: External ear normal.      Left Ear: External ear normal.      Nose: Nose normal.      Mouth/Throat:      Pharynx: No oropharyngeal exudate.   Eyes:      General: No scleral icterus.        Right eye: No discharge.         Left eye: No discharge.      Conjunctiva/sclera: Conjunctivae normal.      Pupils: Pupils are equal, round, and reactive to light.   Neck:      Thyroid: No thyromegaly.      Vascular: No JVD.      Trachea: No tracheal deviation. "   Cardiovascular:      Rate and Rhythm: Normal rate and regular rhythm.      Pulses:           Dorsalis pedis pulses are 1+ on the right side and 1+ on the left side.        Posterior tibial pulses are 1+ on the right side and 1+ on the left side.      Heart sounds: Normal heart sounds. No murmur heard.     No friction rub. No gallop.   Pulmonary:      Effort: Pulmonary effort is normal. No respiratory distress.      Breath sounds: Normal breath sounds. No stridor. No wheezing or rales.   Chest:      Chest wall: No tenderness.   Abdominal:      General: Bowel sounds are normal. There is no distension.      Palpations: Abdomen is soft. There is no mass.      Tenderness: There is no abdominal tenderness. There is no guarding or rebound.      Hernia: No hernia is present.   Musculoskeletal:         General: Tenderness (TTP left shoulder with restricted ROM) present.      Cervical back: Normal range of motion and neck supple.      Right foot: Normal range of motion. No deformity, bunion, Charcot foot, foot drop or prominent metatarsal heads.      Left foot: Normal range of motion. No deformity, bunion, Charcot foot, foot drop or prominent metatarsal heads.      Comments: Neer and rodriguez+ left   Feet:      Right foot:      Protective Sensation: 10 sites tested.  10 sites sensed.      Skin integrity: Skin integrity normal.      Toenail Condition: Right toenails are normal.      Left foot:      Protective Sensation: 10 sites tested.  10 sites sensed.      Skin integrity: Skin integrity normal.      Toenail Condition: Left toenails are normal.   Lymphadenopathy:      Cervical: No cervical adenopathy.   Skin:     General: Skin is warm and dry.      Coloration: Skin is not pale.      Findings: No erythema or rash.   Neurological:      Mental Status: He is alert and oriented to person, place, and time.      Cranial Nerves: No cranial nerve deficit.      Motor: No abnormal muscle tone.      Coordination: Coordination normal.       Deep Tendon Reflexes: Reflexes are normal and symmetric. Reflexes normal.   Psychiatric:         Behavior: Behavior normal.         Thought Content: Thought content normal.         Judgment: Judgment normal.         Assessment:       Problem List Items Addressed This Visit       Dyslipidemia associated with type 2 diabetes mellitus    Relevant Orders    Comprehensive Metabolic Panel    Lipid Panel    Type 2 diabetes mellitus with stage 3a chronic kidney disease, without long-term current use of insulin - Primary    Relevant Orders    Comprehensive Metabolic Panel    Lipid Panel    Stage 3b chronic kidney disease    Prostate cancer    Hypertension associated with diabetes    Coronary artery disease of native artery with stable angina pectoris    Relevant Orders    Ambulatory referral/consult to Cardiology     Other Visit Diagnoses       Chest pain, unspecified type        Relevant Medications    methocarbamoL (ROBAXIN) 500 MG Tab    Other Relevant Orders    Ambulatory referral/consult to Cardiology    Encounter for FIT (fecal immunochemical test) screening        Relevant Orders    Fecal Immunochemical Test (iFOBT)            Plan:           Addy was seen today for follow-up.    Diagnoses and all orders for this visit:    Type 2 diabetes mellitus with stage 3a chronic kidney disease, without long-term current use of insulin  -     Comprehensive Metabolic Panel; Future  -     Lipid Panel; Future    Dyslipidemia associated with type 2 diabetes mellitus  -     Comprehensive Metabolic Panel; Future  -     Lipid Panel; Future    Prostate cancer    Stage 3b chronic kidney disease    Hypertension associated with diabetes    Coronary artery disease of native artery with stable angina pectoris, unspecified whether native or transplanted heart  -     Ambulatory referral/consult to Cardiology; Future    Chest pain, unspecified type  -     Ambulatory referral/consult to Cardiology; Future  -     methocarbamoL (ROBAXIN) 500  MG Tab; Take 1 tablet (500 mg total) by mouth 3 (three) times daily. for 10 days    Encounter for FIT (fecal immunochemical test) screening  -     Fecal Immunochemical Test (iFOBT); Future      Chest pain/SOB  -abnormal EKG with h/o CAD  -refer cardiology for further mx  -muscle relaxcer prn    HTN   -at goal  -continue lisinopril to 20 mg/ day    DM II   -controlled   -continue farxiga daily and increase lantus 20 BID    CKD 3  -follow nephrology  -ER precautions given    HLD   -improving  -continue lipitor    GERD   -stable   -takes OTC PPI     ED'  -controlled            Spent adequate time in obtaining history and explaining differentials      40 minutes spent during this visit of which greater than 50% devoted to face-face counseling and coordination of care regarding diagnosis and management plan    Follow up in about 5 months (around 6/27/2025), or if symptoms worsen or fail to improve.

## 2025-01-29 ENCOUNTER — LAB VISIT (OUTPATIENT)
Dept: LAB | Facility: HOSPITAL | Age: 70
End: 2025-01-29
Attending: FAMILY MEDICINE
Payer: MEDICARE

## 2025-01-29 ENCOUNTER — OFFICE VISIT (OUTPATIENT)
Dept: CARDIOLOGY | Facility: CLINIC | Age: 70
End: 2025-01-29
Attending: FAMILY MEDICINE
Payer: MEDICARE

## 2025-01-29 VITALS
HEART RATE: 60 BPM | HEIGHT: 73 IN | SYSTOLIC BLOOD PRESSURE: 137 MMHG | BODY MASS INDEX: 28.89 KG/M2 | DIASTOLIC BLOOD PRESSURE: 75 MMHG | WEIGHT: 218 LBS | OXYGEN SATURATION: 99 %

## 2025-01-29 DIAGNOSIS — E11.22 TYPE 2 DIABETES MELLITUS WITH STAGE 3A CHRONIC KIDNEY DISEASE, WITHOUT LONG-TERM CURRENT USE OF INSULIN: ICD-10-CM

## 2025-01-29 DIAGNOSIS — N18.31 TYPE 2 DIABETES MELLITUS WITH STAGE 3A CHRONIC KIDNEY DISEASE, WITHOUT LONG-TERM CURRENT USE OF INSULIN: ICD-10-CM

## 2025-01-29 DIAGNOSIS — I49.5 TACHYCARDIA-BRADYCARDIA SYNDROME: Primary | ICD-10-CM

## 2025-01-29 DIAGNOSIS — I48.0 PAROXYSMAL A-FIB: ICD-10-CM

## 2025-01-29 DIAGNOSIS — I15.2 HYPERTENSION ASSOCIATED WITH DIABETES: ICD-10-CM

## 2025-01-29 DIAGNOSIS — R07.9 CHEST PAIN, UNSPECIFIED TYPE: ICD-10-CM

## 2025-01-29 DIAGNOSIS — I25.118 CORONARY ARTERY DISEASE OF NATIVE ARTERY WITH STABLE ANGINA PECTORIS, UNSPECIFIED WHETHER NATIVE OR TRANSPLANTED HEART: ICD-10-CM

## 2025-01-29 DIAGNOSIS — I25.118 CORONARY ARTERY DISEASE OF NATIVE ARTERY OF NATIVE HEART WITH STABLE ANGINA PECTORIS: ICD-10-CM

## 2025-01-29 DIAGNOSIS — N18.32 STAGE 3B CHRONIC KIDNEY DISEASE: ICD-10-CM

## 2025-01-29 DIAGNOSIS — E11.59 HYPERTENSION ASSOCIATED WITH DIABETES: ICD-10-CM

## 2025-01-29 DIAGNOSIS — I65.29 STENOSIS OF CAROTID ARTERY, UNSPECIFIED LATERALITY: ICD-10-CM

## 2025-01-29 DIAGNOSIS — E11.69 DYSLIPIDEMIA ASSOCIATED WITH TYPE 2 DIABETES MELLITUS: ICD-10-CM

## 2025-01-29 DIAGNOSIS — I25.118 ATHEROSCLEROSIS OF NATIVE CORONARY ARTERY OF NATIVE HEART WITH STABLE ANGINA PECTORIS: ICD-10-CM

## 2025-01-29 DIAGNOSIS — I70.0 AORTIC ATHEROSCLEROSIS: ICD-10-CM

## 2025-01-29 DIAGNOSIS — E78.5 DYSLIPIDEMIA ASSOCIATED WITH TYPE 2 DIABETES MELLITUS: ICD-10-CM

## 2025-01-29 LAB
ALBUMIN SERPL BCP-MCNC: 3.8 G/DL (ref 3.5–5.2)
ALP SERPL-CCNC: 70 U/L (ref 40–150)
ALT SERPL W/O P-5'-P-CCNC: 32 U/L (ref 10–44)
ANION GAP SERPL CALC-SCNC: 13 MMOL/L (ref 8–16)
AST SERPL-CCNC: 27 U/L (ref 10–40)
BILIRUB SERPL-MCNC: 0.4 MG/DL (ref 0.1–1)
BUN SERPL-MCNC: 26 MG/DL (ref 8–23)
CALCIUM SERPL-MCNC: 9 MG/DL (ref 8.7–10.5)
CHLORIDE SERPL-SCNC: 107 MMOL/L (ref 95–110)
CHOLEST SERPL-MCNC: 116 MG/DL (ref 120–199)
CHOLEST/HDLC SERPL: 3 {RATIO} (ref 2–5)
CO2 SERPL-SCNC: 22 MMOL/L (ref 23–29)
CREAT SERPL-MCNC: 2 MG/DL (ref 0.5–1.4)
EST. GFR  (NO RACE VARIABLE): 35 ML/MIN/1.73 M^2
GLUCOSE SERPL-MCNC: 156 MG/DL (ref 70–110)
HDLC SERPL-MCNC: 39 MG/DL (ref 40–75)
HDLC SERPL: 33.6 % (ref 20–50)
LDLC SERPL CALC-MCNC: 58 MG/DL (ref 63–159)
NONHDLC SERPL-MCNC: 77 MG/DL
POTASSIUM SERPL-SCNC: 4 MMOL/L (ref 3.5–5.1)
PROT SERPL-MCNC: 7.4 G/DL (ref 6–8.4)
SODIUM SERPL-SCNC: 142 MMOL/L (ref 136–145)
TRIGL SERPL-MCNC: 95 MG/DL (ref 30–150)

## 2025-01-29 PROCEDURE — 36415 COLL VENOUS BLD VENIPUNCTURE: CPT | Performed by: FAMILY MEDICINE

## 2025-01-29 PROCEDURE — 3075F SYST BP GE 130 - 139MM HG: CPT | Mod: CPTII,S$GLB,, | Performed by: INTERNAL MEDICINE

## 2025-01-29 PROCEDURE — 99999 PR PBB SHADOW E&M-EST. PATIENT-LVL IV: CPT | Mod: PBBFAC,,, | Performed by: INTERNAL MEDICINE

## 2025-01-29 PROCEDURE — 80053 COMPREHEN METABOLIC PANEL: CPT | Performed by: FAMILY MEDICINE

## 2025-01-29 PROCEDURE — 99214 OFFICE O/P EST MOD 30 MIN: CPT | Mod: S$GLB,,, | Performed by: INTERNAL MEDICINE

## 2025-01-29 PROCEDURE — 1159F MED LIST DOCD IN RCRD: CPT | Mod: CPTII,S$GLB,, | Performed by: INTERNAL MEDICINE

## 2025-01-29 PROCEDURE — 80061 LIPID PANEL: CPT | Performed by: FAMILY MEDICINE

## 2025-01-29 PROCEDURE — 3078F DIAST BP <80 MM HG: CPT | Mod: CPTII,S$GLB,, | Performed by: INTERNAL MEDICINE

## 2025-01-29 PROCEDURE — 3288F FALL RISK ASSESSMENT DOCD: CPT | Mod: CPTII,S$GLB,, | Performed by: INTERNAL MEDICINE

## 2025-01-29 PROCEDURE — 1101F PT FALLS ASSESS-DOCD LE1/YR: CPT | Mod: CPTII,S$GLB,, | Performed by: INTERNAL MEDICINE

## 2025-01-29 PROCEDURE — 3008F BODY MASS INDEX DOCD: CPT | Mod: CPTII,S$GLB,, | Performed by: INTERNAL MEDICINE

## 2025-01-29 RX ORDER — NITROGLYCERIN 0.4 MG/1
0.4 TABLET SUBLINGUAL EVERY 5 MIN PRN
Qty: 25 TABLET | Refills: 1 | Status: SHIPPED | OUTPATIENT
Start: 2025-01-29 | End: 2026-01-29

## 2025-01-29 NOTE — PROGRESS NOTES
Cardiology Clinic Visit    History of Present Illness:       Addy Redding is a pleasant 69 y.o. male with medical issues noted below in assessment/plan    01/28/2024  Patient is being followed by  for routine care.  Referred by Dr. Ramey for evaluation of epigastric pain patient was experiencing which prompted the ED visit, as well as an abnormal EKG..  Patient states for 2-3 weeks he had epigastric sharp pain associated with movement/twisting as well as palpation without radiation, lasting only for 1-3 seconds at a time.  Thinks he may have strained something in the gym possibly while helping his brother move a hot water heater.  He denies any radiations of the pain up to his chest area/neck/arms.  Denies any shortness of breath.  Of note, I asked him what kind of symptoms he had prior to previous stents and CABG, he stated he was having significant dyspnea on exertion she has not been having as of recent.  He is compliant with all his medications.  Exercises regularly.  The discomfort he experienced seemed to resolve with Tylenol and conservative measures.    History obtained by patient interview and supplemented by nursing documentation and chart review.   Objective   PMHx:  has a past medical history of CKD (chronic kidney disease) stage 3, GFR 30-59 ml/min, Costochondritis, Diabetic nephropathy associated with type 2 diabetes mellitus, Diabetic retinopathy, ED (erectile dysfunction), GERD (gastroesophageal reflux disease), Hyperlipidemia, Hypertension, Prostate cancer, and Type II or unspecified type diabetes mellitus with renal manifestations, uncontrolled(250.42).   SurgHx:  has a past surgical history that includes Bone biopsy right femur; Left heart catheterization (Left, 04/07/2022); Coronary angiography (N/A, 06/16/2022); Prostate biopsy (10/05/2022); Radioactive seed implantation of prostate (1/12/2023); Transrectal ultrasound examination (N/A, 1/12/2023); Left heart catheterization (Left,  6/27/2023); instantaneous wave-free ratio (ifr) (N/A, 6/27/2023); fractional flow reserve (ffr), coronary (6/27/2023); and Creation of bypass of coronary artery using graft without cardiopulmonary pump oxygenation (N/A, 7/21/2023).   FamHx: family history includes Asthma in his son; Cancer in his brother and sister; Coronary artery disease in his father and sister; Dementia in his sister; Diabetes in his brother, brother, brother, mother, sister, and sister; HIV in his brother; Heart attack in his father and sister; Heart disease in his sister; Hyperlipidemia in his brother, brother, father, and sister; Hypertension in his brother, brother, brother, brother, mother, sister, sister, and son; Kidney disease in his mother; Lung cancer in his brother; No Known Problems in his brother, daughter, and sister; Peripheral vascular disease in his brother; Stomach cancer in his sister; Stroke in his brother and brother.   SocialHx:  reports that he quit smoking about 30 years ago. His smoking use included cigarettes. He started smoking about 56 years ago. He has a 13 pack-year smoking history. He has never been exposed to tobacco smoke. He has never used smokeless tobacco. He reports that he does not currently use alcohol. He reports that he does not use drugs.  Home Meds:  Current Outpatient Medications   Medication Instructions    ADVANCED GLUC METER TEST STRIP Strp USE TO TEST BLOOD SUGAR 4 TIMES DAILY.    atorvastatin (LIPITOR) 80 mg, Oral, Daily    blood sugar diagnostic (BLOOD GLUCOSE TEST) Strp Check sugar once daily    blood-glucose meter Misc by Misc.(Non-Drug; Combo Route) route.    blood-glucose meter,continuous (DEXCOM G6 ) Misc 1 Device, Misc.(Non-Drug; Combo Route), Continuous    blood-glucose sensor (DEXCOM G6 SENSOR) Tia 1 Device, Misc.(Non-Drug; Combo Route), Continuous    blood-glucose transmitter (DEXCOM G6 TRANSMITTER) Tia 1 Device, Misc.(Non-Drug; Combo Route), Continuous    clopidogreL (PLAVIX)  "75 mg, Oral, Daily    ezetimibe (ZETIA) 10 mg, Oral, Daily    FARXIGA 10 mg, Oral, Daily    furosemide (LASIX) 40 mg, Oral, Daily PRN    LANTUS SOLOSTAR U-100 INSULIN 20 Units, Subcutaneous, 2 times daily    methocarbamoL (ROBAXIN) 500 mg, Oral, 3 times daily    olmesartan (BENICAR) 5 mg, Oral, Daily    OZEMPIC 1 mg, Subcutaneous, Every 7 days    pantoprazole (PROTONIX) 40 MG tablet TAKE ONE TABLET BY MOUTH ONCE DAILY.    pen needle, diabetic (BD ULTRA-FINE ARIA PEN NEEDLE) 32 gauge x 5/32" Ndle USE ONE NEEDLE WITH INSULIN PEN TWICE DAILY    psyllium husk 6 g, Oral, Daily    sildenafiL (VIAGRA) 100 mg, Oral, Daily PRN    sodium bicarbonate 650 mg, Oral, 3 times daily    tamsulosin (FLOMAX) 0.4 mg Cap TAKE ONE CAPSULE BY MOUTH ONCE DAILY.    VITAMIN D2 50,000 Units, Oral, Every 7 days, Once per week for 8 weeks then once a month     Objective/Exam:   There were no vitals taken for this visit.   Wt Readings from Last 4 Encounters:   01/27/25 100.2 kg (220 lb 14.4 oz)   01/24/25 97.5 kg (215 lb)   12/10/24 96.8 kg (213 lb 6.5 oz)   12/03/24 97.1 kg (214 lb)      Constitutional: NAD, Atraumatic, Conversant   HEENT: MMM, Sclera anicteric, No JVD   Cardiovascular: RRR, no murmurs noted, no chest tenderness to palpation, 2+ radial pulses b/l  Pulmonary: normal respiratory effort, CTAB, no crackles, wheezes  Abdominal: S/NT/ND  Musculoskeletal: No lower extremity edema noted b/l  Neurological: No gross neurological deficits  Skin: W/D/I  Psych: Appropriate affect, normal mood  Labs/Imaging/Procedures   Personally reviewed  Lab Results   Component Value Date     01/24/2025    K 3.8 01/24/2025     01/24/2025    CO2 19 (L) 01/24/2025    BUN 25 (H) 01/24/2025    CREATININE 1.8 (H) 01/24/2025    ANIONGAP 10 01/24/2025     Lab Results   Component Value Date    HGBA1C 6.8 (H) 12/02/2024     Lab Results   Component Value Date    BNP 22 10/09/2012    BNP 15 08/31/2010      Lab Results   Component Value Date    WBC 6.52 " "01/24/2025    HGB 14.2 01/24/2025    HCT 41.6 01/24/2025    HCT 31 (L) 07/22/2023     01/24/2025    GRAN 3.8 01/24/2025    GRAN 57.7 01/24/2025     Lab Results   Component Value Date    CHOL 110 (L) 01/25/2024    HDL 40 01/25/2024    LDLCALC 57.6 (L) 01/25/2024    LDLCALC 66.4 10/03/2023    LDLCALC 91.8 08/14/2023    TRIG 62 01/25/2024     Lab Results   Component Value Date    TSH 2.865 12/08/2022     No results found for: "APOLIPOPROTE"  No results found for: "LIPOA"       TTE:  Results for orders placed during the hospital encounter of 03/23/23    Echo    Interpretation Summary  · Normal systolic function.  · The estimated ejection fraction is 55%.  · Normal left ventricular diastolic function.  · Normal right ventricular size with normal right ventricular systolic function.  · Normal central venous pressure (3 mmHg).  · The estimated PA systolic pressure is 23 mmHg.    Stress Testing:   Results for orders placed during the hospital encounter of 01/25/24    CPX Study    Interpretation Summary    Moderate functional impairment associated with a normal breathing reserve, normal oxygen stauration, poor effort, and a borderline reduced AT. These findings are indicative of functional impairment secondary possibly to circulatory insufficiency.    The patient's exercise capacity was normal - 7:20 on the high ramp protocol, up from 6:17 on the prior CPX test.    The ECG portion of this study is negative for myocardial ischemia.    There were no arrhythmias during stress.    The test was stopped because the patient experienced fatigue.    There was no ST segment deviation noted during stress.     Coronary Angiogram:  Results for orders placed during the hospital encounter of 06/27/23    Cardiac catheterization    Conclusion  Cath Results:  Access: Rt Radial  LM:  SCARLET III  flow mild disease  LAD: SCARLET- flow ostial LAD 80%. FFR 0.71  LCx:  SCARLET- flow  patent LCX/OM stents  RCA: SCARLET- flow   LVgram: LVEDP " 7  Intervention: iFR LAD 0.71    Closure device: Vasc band    Patient tolerated procedure well, no complications    Assessment:  Multivessel CAD  inluding ostial LAD  2.    Type II DM  Plan:  - Given MVCAD including ostial LAD and type II DM. Will refer for Bypass evaluation  - Continue medical therapy  - Risk factors modification    The procedure log was documented by Documenter: Sakina Espinoza RN and verified by Je Fontanez MD.    Date: 6/29/2023  Time: 6:08 PM      Personal:      Time spent on this visit included personally:  -Reviewing Medical records & lab results  -Independently reviewing and interpreting (if not documented by myself) EKGs, echocardiograms, catherizations   -Obtaining a history, performing a relevant exam, counseling/educating the patient/family  -Documenting clinical information in the EMR including ordering of tests  -Care coordination and communicating with other health care providers       Problem List:     1. Tachycardia-bradycardia syndrome    2. Paroxysmal A-fib    3. Hypertension associated with diabetes    4. Atherosclerosis of native coronary artery of native heart with stable angina pectoris    5. Coronary artery disease of native artery of native heart with stable angina pectoris    6. Stenosis of carotid artery, unspecified laterality    7. Aortic atherosclerosis    8. Stage 3b chronic kidney disease      Assessment/Plan:   Addy Redding is a 69 y.o. male with HTN, HLD, GERD, CKD, CAD status post multiple PCIs with recent CABG ( 1v lima to LAD 2023, unrevasc RCA) C/B AFib (on Xarelto) referred for evaluation of epigastric pain as per above.  Pain likely transient MSK due to lifting/workout.  ECG NSR, small non-MI Q-waves in inferior leads.  Patient has known un revascularized  of the RCA.   Will slowly resume exercise. Has f/u with Dr. Fontanez. Has NG in event of further CP. Avoid use w/ ED meds.     Thank you for the opportunity to care for this patient. Please don't  hesitate to reach out with any questions/concerns.   Speech recognition software used for parts of this note. Please excuse grammar, out of context phrases, and/or syntax issues.        Cindy Fried MD  Interventional Cardiology  [facility]

## 2025-01-30 ENCOUNTER — TELEPHONE (OUTPATIENT)
Dept: FAMILY MEDICINE | Facility: CLINIC | Age: 70
End: 2025-01-30
Payer: MEDICARE

## 2025-01-30 NOTE — TELEPHONE ENCOUNTER
----- Message from Cassy sent at 1/25/2025  8:17 AM CST -----  Type: Hospital Follow up Request    Name of Caller:Addy Jeter Call Back Number: [phone]  Discharge date: 01/24

## 2025-02-04 ENCOUNTER — LAB VISIT (OUTPATIENT)
Dept: LAB | Facility: HOSPITAL | Age: 70
End: 2025-02-04
Attending: FAMILY MEDICINE
Payer: MEDICARE

## 2025-02-04 DIAGNOSIS — Z12.11 ENCOUNTER FOR FIT (FECAL IMMUNOCHEMICAL TEST) SCREENING: ICD-10-CM

## 2025-02-04 LAB — HEMOCCULT STL QL IA: NEGATIVE

## 2025-02-04 PROCEDURE — 82274 ASSAY TEST FOR BLOOD FECAL: CPT | Performed by: FAMILY MEDICINE

## 2025-03-10 ENCOUNTER — LAB VISIT (OUTPATIENT)
Dept: LAB | Facility: HOSPITAL | Age: 70
End: 2025-03-10
Attending: INTERNAL MEDICINE
Payer: MEDICARE

## 2025-03-10 DIAGNOSIS — E11.22 TYPE 2 DIABETES MELLITUS WITH STAGE 3A CHRONIC KIDNEY DISEASE, WITHOUT LONG-TERM CURRENT USE OF INSULIN: ICD-10-CM

## 2025-03-10 DIAGNOSIS — N18.31 TYPE 2 DIABETES MELLITUS WITH STAGE 3A CHRONIC KIDNEY DISEASE, WITHOUT LONG-TERM CURRENT USE OF INSULIN: ICD-10-CM

## 2025-03-10 LAB
ALBUMIN/CREAT UR: 4 UG/MG (ref 0–30)
BACTERIA #/AREA URNS HPF: NORMAL /HPF
BILIRUB UR QL STRIP: NEGATIVE
CLARITY UR: CLEAR
COLOR UR: YELLOW
CREAT UR-MCNC: 150.3 MG/DL (ref 23–375)
CREAT UR-MCNC: 150.3 MG/DL (ref 23–375)
GLUCOSE UR QL STRIP: ABNORMAL
HGB UR QL STRIP: NEGATIVE
KETONES UR QL STRIP: NEGATIVE
LEUKOCYTE ESTERASE UR QL STRIP: NEGATIVE
MICROALBUMIN UR DL<=1MG/L-MCNC: 6 UG/ML
MICROSCOPIC COMMENT: NORMAL
NITRITE UR QL STRIP: NEGATIVE
PH UR STRIP: 6 [PH] (ref 5–8)
PROT UR QL STRIP: NEGATIVE
PROT UR-MCNC: 11 MG/DL (ref 0–15)
PROT/CREAT UR: 0.07 MG/G{CREAT} (ref 0–0.2)
SP GR UR STRIP: 1.02 (ref 1–1.03)
URN SPEC COLLECT METH UR: ABNORMAL
UROBILINOGEN UR STRIP-ACNC: NEGATIVE EU/DL
YEAST URNS QL MICRO: NORMAL

## 2025-03-10 PROCEDURE — 82570 ASSAY OF URINE CREATININE: CPT | Performed by: INTERNAL MEDICINE

## 2025-03-10 PROCEDURE — 81000 URINALYSIS NONAUTO W/SCOPE: CPT | Performed by: INTERNAL MEDICINE

## 2025-03-10 PROCEDURE — 82043 UR ALBUMIN QUANTITATIVE: CPT | Performed by: INTERNAL MEDICINE

## 2025-03-12 ENCOUNTER — PATIENT OUTREACH (OUTPATIENT)
Dept: ADMINISTRATIVE | Facility: HOSPITAL | Age: 70
End: 2025-03-12
Payer: MEDICARE

## 2025-03-12 NOTE — LETTER
3/13/2025    AUTHORIZATION FOR RELEASE OF   CONFIDENTIAL INFORMATION    Dr Hillman,    We are seeing Addy Redding, date of birth 1955, in the clinic at [facility]. Stephan Ramey MD is the patient's PCP. Addy Redding has an outstanding lab/procedure at the time we reviewed his chart. In order to help keep his health information updated, he has authorized us to request the following medical record(s):     EYE EXAM DONE ON 23 AND MOST RECENT EXAM IF COMPLETED         Please fax records to [facility], Baig, Mirza K., MD,  at [phone] or email to [email or web address].                 Addy Redding  MRN: [id number]  : 1955  Age: 68 y.o.  Sex: male         Patient/Legal Guardian Signature  This signature was collected at 2024    Addy Redding     Self  _______________________________   Printed Name/Relationship to Patient      Consent for Examination and Treatment: I hereby authorize the providers and employees of [facility] ([facility]) to provide medical treatment/services which includes, but is not limited to, performing and administering tests and diagnostic procedures that are deemed necessary, including, but not limited to, imaging examinations, blood tests and other laboratory procedures as may be required by the hospital, clinic, or may be ordered by my physician(s) or persons working under the general and/or special instructions of my physician(s).      I understand and agree that this consent covers all authorized persons, including but not limited to physicians, residents, nurse practitioners, physicians' assistants, specialists, consultants, student nurses, and independently contracted physicians, who are called upon by the physician in charge, to carry out the diagnostic procedures and medical or surgical treatment.     I hereby authorize [facility] to retain or dispose of any specimens or tissue, should there be such remaining from any test or procedure.     I hereby  authorize and give consent for [facility] providers and employees to take photographs, images or videotapes of such diagnostic, surgical or treatment procedures of Patient as may be required by [facility] or as may be ordered by a physician. I further acknowledge and agree that [facility] may use cameras or other devices for patient monitoring.     I am aware that the practice of medicine is not an exact science, and I acknowledge that no guarantees have been made to me as to the outcome of any tests, procedures or treatment.     Authorization for Release of Information: I understand that my insurance company and/or their agents may need information necessary to make determinations about payment/reimbursement. I hereby provide authorization to release to all insurance companies, their successors, assignees, other parties with whom they may have contracted, or others acting on their behalf, that are involved with payment for any hospital and/or clinic charges incurred by the patient, any information that they request and deem necessary for payment/reimbursement, and/or quality review.  I further authorize the release of my health information to physicians or other health care practitioners on staff who are involved in my health care now and in the future, and to other health care providers, entities, or institutions for the purpose of my continued care and treatment, including referrals.     REGISTRATION AUTHORIZATION  Form No. 68344 (Rev. 3/25/2024)    Page 1 of 3                Patient Name: Addy Redding  : 1955  Patient Phone #: [phone]

## 2025-03-13 ENCOUNTER — PATIENT OUTREACH (OUTPATIENT)
Dept: ADMINISTRATIVE | Facility: HOSPITAL | Age: 70
End: 2025-03-13
Payer: MEDICARE

## 2025-03-15 ENCOUNTER — HOSPITAL ENCOUNTER (EMERGENCY)
Facility: HOSPITAL | Age: 70
Discharge: HOME | End: 2025-03-15
Attending: EMERGENCY MEDICINE
Payer: MEDICARE

## 2025-03-15 VITALS
SYSTOLIC BLOOD PRESSURE: 144 MMHG | WEIGHT: 222 LBS | HEART RATE: 81 BPM | HEIGHT: 73 IN | TEMPERATURE: 99 F | RESPIRATION RATE: 20 BRPM | OXYGEN SATURATION: 97 % | DIASTOLIC BLOOD PRESSURE: 78 MMHG | BODY MASS INDEX: 29.42 KG/M2

## 2025-03-15 DIAGNOSIS — R05.9 COUGH: ICD-10-CM

## 2025-03-15 DIAGNOSIS — B34.9 ACUTE VIRAL SYNDROME: Primary | ICD-10-CM

## 2025-03-15 LAB
CTP QC/QA: YES
CTP QC/QA: YES
GROUP A STREP, MOLECULAR: NEGATIVE
POC MOLECULAR INFLUENZA A AGN: NEGATIVE
POC MOLECULAR INFLUENZA B AGN: NEGATIVE
RSV AG SPEC QL IA: NEGATIVE
SARS-COV-2 RDRP RESP QL NAA+PROBE: NEGATIVE
SPECIMEN SOURCE: NORMAL

## 2025-03-15 PROCEDURE — 87635 SARS-COV-2 COVID-19 AMP PRB: CPT | Performed by: PHYSICIAN ASSISTANT

## 2025-03-15 PROCEDURE — 87502 INFLUENZA DNA AMP PROBE: CPT

## 2025-03-15 PROCEDURE — 87634 RSV DNA/RNA AMP PROBE: CPT | Performed by: PHYSICIAN ASSISTANT

## 2025-03-15 PROCEDURE — 99283 EMERGENCY DEPT VISIT LOW MDM: CPT | Mod: 25

## 2025-03-15 PROCEDURE — 87651 STREP A DNA AMP PROBE: CPT | Performed by: PHYSICIAN ASSISTANT

## 2025-03-15 NOTE — ED NOTES
Patient reports sore throat, dry cough, chills, and diarrhea that started on Tuesday. Patient seen at  on Thursday but reports symptoms worsening.

## 2025-03-15 NOTE — ED PROVIDER NOTES
Encounter Date: 3/15/2025       History     Chief Complaint   Patient presents with    Cough     Cough, scratchy throat, headache, chills, body aches that started on Monday or Tuesday. Diarrhea started yesterday. Went to urgent care on Thursday and received prescriptions, taking as prescribed. States feels like symptoms worsening.     69-year-old male presents to ED with concern of 4-5 day onset cough, sore throat, intermittent headaches, chills and diarrhea.  He does report visiting his grandson who is admitted at [facility] for viral like symptoms a few days prior to onset of symptoms.  Denies any associated chest pain or shortness breath.  No abdominal pain, nausea or vomiting.  He did have few episodes of loose stool yesterday.  No urinary complaints.  Tolerating p.o..  Seen at outside urgent care 2 days ago and prescribed supportive medications.  No other acute complaints at this time    The history is provided by the patient.     Review of patient's allergies indicates:  No Known Allergies  Past Medical History:   Diagnosis Date    CKD (chronic kidney disease) stage 3, GFR 30-59 ml/min     Costochondritis     Diabetic nephropathy associated with type 2 diabetes mellitus     Diabetic retinopathy     ED (erectile dysfunction)     GERD (gastroesophageal reflux disease)     Hyperlipidemia     Hypertension     Prostate cancer     Type II or unspecified type diabetes mellitus with renal manifestations, uncontrolled(250.42)      Past Surgical History:   Procedure Laterality Date    Bone biopsy right femur      CORONARY ANGIOGRAPHY N/A 06/16/2022    Procedure: ANGIOGRAM, CORONARY ARTERY;  Surgeon: Carter Arevalo MD;  Location: [facility] CATH LAB/EP;  Service: Cardiology;  Laterality: N/A;    CREATION OF BYPASS OF CORONARY ARTERY USING GRAFT WITHOUT CARDIOPULMONARY PUMP OXYGENATION N/A 7/21/2023    Procedure: CABG, WITHOUT CARDIOPULMONARY PUMP OXYGENATION;  Surgeon: Jass Uriostegui MD;  Location: [address]  [facility];  Service: Cardiovascular;  Laterality: N/A;  CABG x1 off pump    FRACTIONAL FLOW RESERVE (FFR), CORONARY  2023    Procedure: Fractional Flow [place] (FFR), Coronary;  Surgeon: Je Fontanez MD;  Location: [facility] CATH LAB/EP;  Service: Cardiology;;    INSTANTANEOUS WAVE-FREE RATIO (IFR) N/A 2023    Procedure: Instantaneous Wave-Free Ratio (IFR);  Surgeon: Je Fontanez MD;  Location: [facility] CATH LAB/EP;  Service: Cardiology;  Laterality: N/A;    LEFT HEART CATHETERIZATION Left 2022    Procedure: Left heart cath;  Surgeon: Carter Arevalo MD;  Location: [facility] CATH LAB/EP;  Service: Cardiology;  Laterality: Left;    LEFT HEART CATHETERIZATION Left 2023    Procedure: Left heart cath;  Surgeon: Je Fontanez MD;  Location: [facility] CATH LAB/EP;  Service: Cardiology;  Laterality: Left;    PROSTATE BIOPSY  10/05/2022    RADIOACTIVE SEED IMPLANTATION OF PROSTATE  2023    Procedure: INSERTION, RADIOACTIVE SEED, PROSTATE;  Surgeon: Scott Frias MD;  [facility];  Service: Urology;;    TRANSRECTAL ULTRASOUND EXAMINATION N/A 2023    Procedure: ULTRASOUND, RECTAL APPROACH;  Surgeon: Scott Frias MD;  [facility];  Service: Urology;  Laterality: N/A;     Family History   Problem Relation Name Age of Onset    Hypertension Mother      Diabetes Mother      Kidney disease Mother      Coronary artery disease Father           of an MI at age 60    Hyperlipidemia Father      Heart attack Father      Hypertension Sister Laney     Diabetes Sister Laney     Heart disease Sister Laney     Heart attack Sister Laney     Hyperlipidemia Sister Laney     Coronary artery disease Sister Laney     Diabetes Sister [place]     Hypertension Sister Lewis     Dementia Sister Lewis     No Known Problems Sister Gene     Stomach cancer Sister Bony     Cancer Sister Bony         stomach     Hypertension Brother Charlie     Stroke Brother Charlie     Lung cancer Brother  Charlie     Cancer Brother Charlie         lung cancer    Diabetes Brother Jagjit     Peripheral vascular disease Brother Danielson     Hypertension Brother Danielson     Hyperlipidemia Brother Jagjit     No Known Problems Brother González     Diabetes Brother [place]     Stroke Brother Pantera     Hypertension Brother Pantera     Hyperlipidemia Brother Pantera     HIV Brother Devin     Diabetes Brother Ramirez     Hypertension Brother Ramirez     No Known Problems Daughter x1     Asthma Son x1     Hypertension Son x1     Anesthesia problems Neg Hx       Social History[1]  Review of Systems   Constitutional:  Positive for chills. Negative for appetite change and fever.   HENT:  Positive for congestion and sore throat. Negative for ear pain.    Respiratory:  Positive for cough. Negative for shortness of breath.    Cardiovascular:  Negative for chest pain.   Gastrointestinal:  Positive for diarrhea. Negative for abdominal pain, nausea and vomiting.   Genitourinary:  Negative for dysuria, flank pain and frequency.   Musculoskeletal:  Negative for neck pain and neck stiffness.   Neurological:  Positive for headaches.       Physical Exam     Initial Vitals [03/15/25 1436]   BP Pulse Resp Temp SpO2   (!) 144/78 81 20 99.3 °F (37.4 °C) 97 %      MAP       --         Physical Exam    Vitals reviewed.  Constitutional: He appears well-developed and well-nourished. He is cooperative. He does not have a sickly appearance. He does not appear ill. No distress.   HENT:   Head: Normocephalic and atraumatic.   Oropharyngeal erythema but with no significant swelling.  No exudates.  Midline uvula.  No stridor or drooling.  No trismus.   Eyes: EOM are normal.   Neck:   Normal range of motion.  Cardiovascular:  Normal rate, regular rhythm and normal heart sounds.           Pulmonary/Chest: Effort normal and breath sounds normal.   Musculoskeletal:      Cervical back: Normal range of motion. No rigidity.     Neurological: He is alert and  oriented to person, place, and time. GCS eye subscore is 4. GCS verbal subscore is 5. GCS motor subscore is 6.   Psychiatric: He has a normal mood and affect. His speech is normal and behavior is normal.         ED Course   Procedures  Labs Reviewed   GROUP A STREP, MOLECULAR       Result Value    Group A Strep, Molecular Negative     RSV ANTIGEN DETECTION    RSV Source Nasopharyngeal Swab      RSV Ag by Molecular Method Negative     SARS-COV-2 RDRP GENE    POC Rapid COVID Negative       Acceptable Yes     POCT INFLUENZA A/B MOLECULAR    POC Molecular Influenza A Ag Negative      POC Molecular Influenza B Ag Negative       Acceptable Yes            Imaging Results              X-Ray Chest PA And Lateral (Final result)  Result time 03/15/25 15:54:53      Final result by Nicanor Rogers MD (03/15/25 15:54:53)                   Impression:      No acute cardiopulmonary process.      Electronically signed by: Nicanor Rogers MD  Date:    03/15/2025  Time:    15:54               Narrative:    EXAMINATION:  XR CHEST PA AND LATERAL    CLINICAL HISTORY:  Cough, unspecified    TECHNIQUE:  PA and lateral views of the chest were performed.    COMPARISON:  Prior radiographs    FINDINGS:  Cardiac silhouette and mediastinal contours are unchanged.  Lungs are clear.  Osseous structures are intact.                                       Medications - No data to display  Medical Decision Making  Patient presents with concern of 4-5 day onset URI like symptoms.  Afebrile arrival with vitals grossly unremarkable.  Patient overall well-appearing on exam and in no apparent distress    DDx:  Including but not limited to COVID, influenza, strep, viral, URI, allergy/irritant, RSV, pneumonia    Amount and/or Complexity of Data Reviewed  Labs: ordered. Decision-making details documented in ED Course.  Radiology: ordered. Decision-making details documented in ED Course.               ED Course as of  03/15/25 1658   Sat Mar 15, 2025   1533 POCT Influenza A/B Molecular  Negative [KS]   1533 POCT COVID-19 Rapid Screening  Negative [KS]   1612 Group A Strep, Molecular  Negative [KS]   1612 RSV Antigen Detection Nasopharyngeal Swab  Negative [KS]   1612 X-Ray Chest PA And Lateral  No acute cardiopulmonary findings [KS]   1649 Patient was reassessed and continues to rest comfortably.  Although patient does have lengthy past medical history, he is overall very well-appearing with grossly unremarkable physical exam, stable vitals and no complaints of chest pain, shortness breath, or abdominal pain.  I did offer additional ED evaluation including labs/cardiac eval, but patient is deferring at this time.  With careful consideration, I do have high suspicion patient's symptoms are viral.  Will plan to continue with supportive care for symptoms.  He will continue his home cough medication, decongestants and Tylenol.  I did encouraged hydration, rest and very close PCP follow up with very high ED return precautions.  Patient states understanding and agrees with plan [KS]      ED Course User Index  [KS] Kiran Marie PA-C                           Clinical Impression:  Final diagnoses:  [R05.9] Cough  [B34.9] Acute viral syndrome (Primary)          ED Disposition Condition    Discharge Stable          ED Prescriptions    None       Follow-up Information       Follow up With Specialties Details Why Contact Info    Stephan Ramey MD Internal Medicine   [address] [facility]  [address]  [facility] [address] [phone]                 [1]   Social History  Tobacco Use    Smoking status: Former     Current packs/day: 0.00     Average packs/day: 0.5 packs/day for 26.0 years (13.0 ttl pk-yrs)     Types: Cigarettes     Start date:      Quit date:      Years since quittin.2     Passive exposure: Never    Smokeless tobacco: Never   Substance Use Topics    Alcohol use: Not Currently    Drug use: No        Kiran Marie  LEILANI  03/15/25 [id number]

## 2025-03-15 NOTE — DISCHARGE INSTRUCTIONS

## 2025-03-16 ENCOUNTER — PATIENT OUTREACH (OUTPATIENT)
Facility: OTHER | Age: 70
End: 2025-03-16
Payer: MEDICARE

## 2025-03-17 ENCOUNTER — PATIENT OUTREACH (OUTPATIENT)
Dept: ADMINISTRATIVE | Facility: HOSPITAL | Age: 70
End: 2025-03-17
Payer: MEDICARE

## 2025-03-17 LAB
LEFT EYE DM RETINOPATHY: NEGATIVE
RIGHT EYE DM RETINOPATHY: NEGATIVE

## 2025-03-18 ENCOUNTER — TELEPHONE (OUTPATIENT)
Dept: CARDIOLOGY | Facility: CLINIC | Age: 70
End: 2025-03-18
Payer: MEDICARE

## 2025-03-18 NOTE — TELEPHONE ENCOUNTER
----- Message from Luis Daniel sent at 3/18/2025  9:15 AM CDT -----  Contact: patient  Type:  Patient CallWho Called:Patient Does the patient know what this is regarding?: Requesting a call back from a missed call ;please advise Would the patient rather a call back or a response via [facility]?call Best Call Back Number: [phone] Additional Information:

## 2025-03-18 NOTE — TELEPHONE ENCOUNTER
Called patient no answer. Left voicemail message.   Advise patient to go to the ER. sob if symptoms persist or worsen go to the ER.   Patient is establishing care with Dr. Fontanez, Dr. Fried noted patient will continue care with Dr. Fontanez.   Rescheduled appointment 03/19/25 to 04/01/25 at 8:40 am with Dr. Fontanez.     Georgina KHAN

## 2025-03-18 NOTE — TELEPHONE ENCOUNTER
Returned pt call, I Left V/Message .no one here was trying to reach you, Cardiology team was simply returning your call back.      Please call us back if we can assist you with anything else.      Nw

## 2025-03-18 NOTE — TELEPHONE ENCOUNTER
Reached back out to  today and left him V/Message.  My apologies to  /Vahid, cardiology team here were trying to get a hold of you to switch your appointment from   3-  to  Your original Cardiologist that you've been seeing   4-1-2025  8:40 am.  Please call to confirm new appointment .    Reached out to  and spoke to her ,giving her new Appointment with . then suddenly I hear patient in the back ground, so I speak to him regarding appointments  And he's ok to being switch back with  .  Patient appreciates the call, and asked me to mail Appointment slip.      Thank you,  Nw

## 2025-03-19 ENCOUNTER — PATIENT OUTREACH (OUTPATIENT)
Dept: ADMINISTRATIVE | Facility: HOSPITAL | Age: 70
End: 2025-03-19
Payer: MEDICARE

## 2025-03-20 DIAGNOSIS — I25.10 CORONARY ARTERY DISEASE, UNSPECIFIED VESSEL OR LESION TYPE, UNSPECIFIED WHETHER ANGINA PRESENT, UNSPECIFIED WHETHER NATIVE OR TRANSPLANTED HEART: ICD-10-CM

## 2025-03-20 DIAGNOSIS — I10 HYPERTENSION, UNSPECIFIED TYPE: Primary | ICD-10-CM

## 2025-03-22 NOTE — TELEPHONE ENCOUNTER
----- Message from Bethanie Vilchis RN sent at 9/2/2022  2:07 PM CDT -----  Regarding: Schedule Uronav please  I LVM now per below with my DNFCB to schedule prostate biopsy on a Wed afternoon late Sep or early Oct.  Pt can also send message in Portal with preferred date.  I will review all biopsy instructions at time of scheduling.  Thank you.    ----- Message -----  From: Scott Frias MD  Sent: 9/2/2022   8:19 AM CDT  To: Bethanie Vilchis RN, MD Bethanie Abarca,   Please set this patient up for uronav prostate biopsy.  Orders placed.  Will f/u with dr. Kent after for results review.  Thank you.    MM  ----- Message -----  From: Janice Kent MD  Sent: 9/1/2022  10:51 AM CDT  To: Scott Frias MD    Hi Rodriguez,  Do you think you would be able to do an MRI guided bx on this pt? pirads 4, 1.3cm left apex. I biopsied him before in 2018 but it was neg. PSA went up to 9.1 in Feb of this year. He wanted to hold to get through his cardiology procedure and now his cardiologist Dr. Arevalo thinks it is an appropriate time to consider bx bc he cleared him to hold aspirin and plavix for 5 days before.   Thank you as always!  Janice        
----- Message from Bethanie Vilchis RN sent at 9/2/2022  2:07 PM CDT -----  Regarding: Schedule Uronav please  I LVM now per below with my DNFCB to schedule prostate biopsy on a Wed afternoon late Sep or early Oct.  Pt can also send message in Portal with preferred date.  I will review all biopsy instructions at time of scheduling.  Thank you.    ----- Message -----  From: Scott Frias MD  Sent: 9/2/2022   8:19 AM CDT  To: Bethanie Vilchis RN, MD Bethanie Abarca,   Please set this patient up for uronav prostate biopsy.  Orders placed.  Will f/u with dr. Kent after for results review.  Thank you.    MM  ----- Message -----  From: Janice Kent MD  Sent: 9/1/2022  10:51 AM CDT  To: Scott Frias MD    Hi Rodriguez,  Do you think you would be able to do an MRI guided bx on this pt? pirads 4, 1.3cm left apex. I biopsied him before in 2018 but it was neg. PSA went up to 9.1 in Feb of this year. He wanted to hold to get through his cardiology procedure and now his cardiologist Dr. Arevalo thinks it is an appropriate time to consider bx bc he cleared him to hold aspirin and plavix for 5 days before.   Thank you as always!  Janice        
1840-I spoke to pt and scheduled Uronav prostate biopsy for Wed 10/5/22 at 1345.  All biopsy prep and instructions given to pt and pt repeated back instructions.  Pt will hold plavix, clopidogrel, and asa 81 mg for 5 days per Dr. Noyola (cardiology).      ----- Message from Bethanie Vilchis RN sent at 9/2/2022  2:07 PM CDT -----  Regarding: Schedule Uronav please  I LVM now per below with my DNFCB to schedule prostate biopsy on a Wed afternoon late Sep or early Oct.  Pt can also send message in Portal with preferred date.  I will review all biopsy instructions at time of scheduling.  Thank you.    ----- Message -----  From: Scott Frias MD  Sent: 9/2/2022   8:19 AM CDT  To: Bethanie Vilchis RN, MD Bethanie Abarca,   Please set this patient up for uronav prostate biopsy.  Orders placed.  Will f/u with dr. Kent after for results review.  Thank you.    MM  ----- Message -----  From: Janice Kent MD  Sent: 9/1/2022  10:51 AM CDT  To: MD Mannie Bell,  Do you think you would be able to do an MRI guided bx on this pt? pirads 4, 1.3cm left apex. I biopsied him before in 2018 but it was neg. PSA went up to 9.1 in Feb of this year. He wanted to hold to get through his cardiology procedure and now his cardiologist Dr. Arevalo thinks it is an appropriate time to consider bx bc he cleared him to hold aspirin and plavix for 5 days before.   Thank you as always!  Janice        
Frias/Uronav/pirads 4, 1.3cm left apex   Made On: 9/6/2022 6:37 PM    All biospy instruction reviewed on phone with pt repeating back instructions.  Pt VU.    ----- Message from Bethanie Vilchis RN sent at 9/2/2022  2:07 PM CDT -----  Regarding: Schedule Uronav please  I LVM now per below with my DNFCB to schedule prostate biopsy on a Wed afternoon late Sep or early Oct.  Pt can also send message in Portal with preferred date.  I will review all biopsy instructions at time of scheduling.  Thank you.    ----- Message -----  From: Scott Frias MD  Sent: 9/2/2022   8:19 AM CDT  To: Bethanie Vilchis RN, MD Bethanie Abarca,   Please set this patient up for uronav prostate biopsy.  Orders placed.  Will f/u with dr. Kent after for results review.  Thank you.    MM  ----- Message -----  From: Janice Kent MD  Sent: 9/1/2022  10:51 AM CDT  To: Scott Frias MD    Hi Rodriguez,  Do you think you would be able to do an MRI guided bx on this pt? pirads 4, 1.3cm left apex. I biopsied him before in 2018 but it was neg. PSA went up to 9.1 in Feb of this year. He wanted to hold to get through his cardiology procedure and now his cardiologist Dr. Areavlo thinks it is an appropriate time to consider bx bc he cleared him to hold aspirin and plavix for 5 days before.   Thank you as always!  Janice        
FAMILY HISTORY:  Father  Still living? No  Family history of heart attack, Age at diagnosis: Age Unknown    Mother  Still living? No  Family history of CHF (congestive heart failure), Age at diagnosis: Age Unknown    Sibling  Still living? No  Family history of heart attack, Age at diagnosis: Age Unknown

## 2025-03-26 ENCOUNTER — TELEPHONE (OUTPATIENT)
Dept: CARDIOLOGY | Facility: CLINIC | Age: 70
End: 2025-03-26
Payer: MEDICARE

## 2025-03-26 NOTE — TELEPHONE ENCOUNTER
Unsuccessful to reach by telephone. Message left already to inform of Cardiology appt. On 4//1/2025 @8210

## 2025-03-31 NOTE — PROGRESS NOTES
Subjective:   @Patient ID:  Addy Redding is a 69 y.o. male who presents for evaluation of CAD, HTN, HLP    HPI:   April 2025:  Follow up.  He reports significant shortness of breath and low energy level that has been getting worse over the last few months.  It reminded him by his cardiac symptoms.  He is compliant with his medications.  BP and lipids well-controlled    June 24, 2024:  Follow up.  Since last visit he has been doing okay.  One time when he was cutting the grass he gets tired.  No significant short of breath or chest pain.  He is off BB due to bradycardia.  BP is okay toward the lower side.  Compliant with meds    11/2023: F/U.  EM without a.fib.  He feels great.  Progressing very well with the cardiac rehab.  He is still on Xarelto along with Plavix.  He is off beta-blocker due to bradycardia    8/23/2023: F/U. [facility] ostial LAD disease. iFR +ve. CABG  LIMA-LAD. PDA was not bypassed. RCA . He had post op a.fib. He is on amio and xarelto. BB stopped due to concerns about tacy-brendon/ He feels much better since the bypass. Energy level is much better.        6/14/2023: F/U. Stress test with concerning balanced ischemia. Significant shortness of breath. Getting worse. He has chronic sinus bradycardia with good chronotropic response by stress test in 3/2022.     3/9/2023: He saw Dr. Arevalo in the past. He had exertional angina prompted stress test that was abnormal. [facility] 4/7/2022 with  RCA, significant OM disease, non obstructive prox LAD disease by iFR. Attempt to PCI RCA was unsuccessful (run through wire used). He was staged for PCI to OM 6/2022 ( 2.5 x 8 and 2.5x18) was complicated nu no reflow in the main lcx and it was stented, residual disease in ostial Om at bifurcation     He was diagnosed with prostate CA , got radiotherapy seeds. Stable     He still reports significant TRINH and low energy level. No chest pain   He has been compliant with his medications    He has bradycardia that's why  he is  "not on BB        Prior cardiovascular  Hx  --------------------------------  - EKG 12/2022 sinus bradycardia     - PET stress test 6/1/2023     The myocardial perfusion images are normal without evidence of scar.    Stress myocardial blood flow is severely reduced diffusely throughout the heart consistent with three vessel "balanced" ischemia.  The RCA territory has severely reduced flow capacity consistent with a known  with inadequate collateralization. The anterior and anterolateral wall have moderately reduced flow capacity and are on the border of ischemic thresholds. The remainder of the myocardium has mildly reduced flow capacity.    The whole heart absolute myocardial perfusion values averaged 0.54 cc/min/g at rest, which is reduced; 0.81 cc/min/g at stress, which is severely reduced; and CFR is 1.50 , which is moderately reduced.    CT attenuation images demonstrate moderate diffuse coronary calcifications in the LAD and LCX territory and mild diffuse aortic calcifications in the descending aorta.    The gated perfusion images showed an ejection fraction of 60% at rest and 62% during stress. A normal ejection fraction is greater than 47%.    The wall motion is normal at rest and during stress.    The LV cavity size is normal at rest and stress.    The ECG portion of the study is negative for ischemia.    There were no arrhythmias during stress.    The patient reported no chest pain during the stress test.    There are no prior studies for comparison.     - Echo 3/23/2023   Normal systolic function.  The estimated ejection fraction is 55%.  Normal left ventricular diastolic function.  Normal right ventricular size with normal right ventricular systolic function.  Normal central venous pressure (3 mmHg).  The estimated PA systolic pressure is 23 mmHg.       Patient Active Problem List    Diagnosis Date Noted    Coronary atherosclerosis of native coronary artery 10/27/2023    Left anterior shoulder pain " 2023    Centrilobular emphysema 08/15/2023    Postsurgical aortocoronary bypass status 2023    Paroxysmal A-fib 2023     This is not a post operative complication. afib happen during post operative period      Tachycardia-bradycardia syndrome 2023    Carotid stenosis 2023    Acute blood loss anemia 2023    Bilateral carotid artery stenosis 2023    Pre-op exam 2023    Pain of left upper extremity 2023    Weakness 2023    Stiffness in joint 2023    Chronic left shoulder pain 05/15/2023    Hyperparathyroidism, unspecified 2023    Prostate cancer     Stage 3b chronic kidney disease 10/04/2022    Atherosclerosis of native coronary artery of native heart 10/04/2022    Presence of drug-eluting stent in left circumflex coronary artery 2022    Preoperative clearance 2022    Coronary artery disease of native artery with stable angina pectoris 2022    Aortic atherosclerosis 2022    Sinus bradycardia 2022    Neck pain 10/22/2021    Decreased range of motion 10/22/2021    Type 2 diabetes mellitus with stage 3a chronic kidney disease, without long-term current use of insulin 2016    Left-sided low back pain with sciatica 2016    ED (erectile dysfunction)     Hypertension associated with diabetes 2014    Dyslipidemia associated with type 2 diabetes mellitus            Right Arm BP - Sittin/71  Left Arm BP - Sittin/68        LAST HbA1c  Lab Results   Component Value Date    HGBA1C 8.6 (H) 03/10/2025       Lipid panel  Lab Results   Component Value Date    CHOL 116 (L) 2025    CHOL 110 (L) 2024    CHOL 117 (L) 10/03/2023     Lab Results   Component Value Date    HDL 39 (L) 2025    HDL 40 2024    HDL 37 (L) 10/03/2023     Lab Results   Component Value Date    LDLCALC 58.0 (L) 2025    LDLCALC 57.6 (L) 2024    LDLCALC 66.4 10/03/2023     Lab Results   Component Value  Date    TRIG 95 01/29/2025    TRIG 62 01/25/2024    TRIG 68 10/03/2023     Lab Results   Component Value Date    CHOLHDL 33.6 01/29/2025    CHOLHDL 36.4 01/25/2024    CHOLHDL 31.6 10/03/2023            Review of Systems   Constitutional: Negative for chills and fever.   HENT:  Negative for hearing loss and nosebleeds.    Eyes:  Negative for blurred vision.   Cardiovascular:         As in HPI   Respiratory:  Negative for hemoptysis and shortness of breath.    Hematologic/Lymphatic: Negative for bleeding problem.   Skin:  Negative for itching.   Musculoskeletal:  Negative for falls.   Gastrointestinal:  Negative for abdominal pain and hematochezia.   Genitourinary:  Positive for frequency. Negative for hematuria.        As in HPI    Neurological:  Negative for dizziness and loss of balance.   Psychiatric/Behavioral:  Negative for altered mental status and depression.        Objective:   Physical Exam  Constitutional:       Appearance: He is well-developed.   HENT:      Head: Normocephalic and atraumatic.   Eyes:      Conjunctiva/sclera: Conjunctivae normal.   Neck:      Vascular: No carotid bruit or JVD.   Cardiovascular:      Rate and Rhythm: Normal rate and regular rhythm.      Pulses:           Carotid pulses are 2+ on the right side and 2+ on the left side.       Radial pulses are 2+ on the right side and 2+ on the left side.      Heart sounds: Normal heart sounds. No murmur heard.     No friction rub. No gallop.   Pulmonary:      Effort: Pulmonary effort is normal. No respiratory distress.      Breath sounds: Normal breath sounds. No stridor. No wheezing.   Musculoskeletal:      Cervical back: Neck supple.   Skin:     General: Skin is warm and dry.   Neurological:      Mental Status: He is alert and oriented to person, place, and time.   Psychiatric:         Behavior: Behavior normal.         Assessment:     1. Atherosclerosis of native coronary artery of native heart with stable angina pectoris    2.  Dyslipidemia associated with type 2 diabetes mellitus    3. Coronary artery disease of native artery of native heart with stable angina pectoris    4. Aortic atherosclerosis    5. Postsurgical aortocoronary bypass status    6. Paroxysmal A-fib    7. Presence of drug-eluting stent in left circumflex coronary artery    8. Refractory angina            Plan:   1. Coronary artery disease   Post CABG and PCI  LDL at goal.  Continue current medical therapy with the atorvastatin and ezetimibe  Now with a recurrent symptoms concerning for worsening angina equivalent.  Has a residual RCA  and OM disease.  We will proceed with PET stress test for ischemic evaluation  Continue the same medications  Not on beta-blockers due to significant bradycardia previously  BP is borderline to add more antianginal therapy  We will not add Ranexa given his renal function      2. Postop Afib/CABG    He was taken off Xarelto. 30 days EM without recurrent atrial fibrillation.  AFib was postop related to CABG.    He fully understands benefits and risks and agrees with the plan    3. Hypertension  BP is well controlled  We will not start beta-blockers given bradycardia and borderline blood pressure  Continue Farxiga.       4. Hyperlipidemia  Lipids well-controlled  Continue current regimen    5. Chronic kidney disease  Follow up with Nephrology team  Stable renal function      -In today's visit, at least 4 established conditions that pose a risk to life or bodily function have been addressed and the conditions are severe.    -In today's visit, monitoring for drug toxicity was accomplished.    I spent 5-10 minutes asking, assessing, assisting, arranging and advising heart healthy diet improvements. This included low-salt meals, portion control and health food alternatives. I also encourage 30 minutes of moderate exercise 3-4x a week.        Pertinent cardiac images and EKG reviewed independently.    Continue with current medical plan and  lifestyle changes.  Return sooner for concerns or questions. If symptoms persist go to the ED  I have reviewed all pertinent data including patient's medical history in detail and updated the computerized patient record.     Orders Placed This Encounter   Procedures    Cardiac PET Scan Stress     Standing Status:   Future     Expiration Date:   4/1/2026     Which medicaton for the stress procedure?:   Regadenoson     Release to patient:   Immediate       Follow up as scheduled.     He expressed verbal understanding and agreed with the plan    Patient's Medications   New Prescriptions    No medications on file   Previous Medications    ADVANCED GLUC METER TEST STRIP STRP    USE TO TEST BLOOD SUGAR 4 TIMES DAILY.    ATORVASTATIN (LIPITOR) 80 MG TABLET    Take 1 tablet (80 mg total) by mouth once daily.    AZELASTINE (ASTELIN) 137 MCG (0.1 %) NASAL SPRAY    Take 2 sprays (intranasal) 2 times per day    BENZONATATE (TESSALON) 100 MG CAPSULE    Take 1 capsule (oral) 3 times per day (AS NEEDED FOR- Cough)    BLOOD SUGAR DIAGNOSTIC (BLOOD GLUCOSE TEST) STRP    Check sugar once daily    BLOOD-GLUCOSE METER MISC    by Misc.(Non-Drug; Combo Route) route.    CETIRIZINE (ZYRTEC) 10 MG TABLET    Take 1 tablet (oral) daily    CLOPIDOGREL (PLAVIX) 75 MG TABLET    Take 1 tablet (75 mg total) by mouth once daily.    DAPAGLIFLOZIN PROPANEDIOL (FARXIGA) 10 MG TABLET    Take 1 tablet (10 mg total) by mouth once daily.    ERGOCALCIFEROL (VITAMIN D2) 50,000 UNIT CAP    Take 1 capsule (50,000 Units total) by mouth every 7 days. Once per week for 8 weeks then once a month    EZETIMIBE (ZETIA) 10 MG TABLET    Take 1 tablet (10 mg total) by mouth once daily.    FLUTICASONE PROPIONATE (FLONASE ALLERGY RELIEF) 50 MCG/ACTUATION NASAL SPRAY    Take 2 sprays (intranasal) daily    FUROSEMIDE (LASIX) 40 MG TABLET    Take 1 tablet (40 mg total) by mouth daily as needed (swelling).    INSULIN GLARGINE U-100, LANTUS, (LANTUS SOLOSTAR U-100 INSULIN)  "100 UNIT/ML (3 ML) INPN PEN    Inject 20 Units into the skin 2 (two) times a day.    NITROGLYCERIN (NITROSTAT) 0.4 MG SL TABLET    Place 1 tablet (0.4 mg total) under the tongue every 5 (five) minutes as needed for Chest pain.    OLMESARTAN (BENICAR) 20 MG TABLET    Take 1 tablet (20 mg total) by mouth once daily.    PANTOPRAZOLE (PROTONIX) 40 MG TABLET    TAKE ONE TABLET BY MOUTH ONCE DAILY.    PEN NEEDLE, DIABETIC (BD ULTRA-FINE ARIA PEN NEEDLE) 32 GAUGE X 5/32" NDLE    USE ONE NEEDLE WITH INSULIN PEN TWICE DAILY    PSYLLIUM HUSK 6 GRAM/6 GRAM POWD    Take 6 g by mouth once daily.    PYRILAMINE-PHENYLEPHRINE-DM (POLYTUSSIN DM,PYRILAMINE,) 12.5-5-7.5 MG/5 ML LIQD    Take 10 mL (oral) every 4 to 6 hours (AS NEEDED FOR - Cough)    SEMAGLUTIDE (OZEMPIC) 1 MG/DOSE (4 MG/3 ML)    Inject 1 mg into the skin every 7 days.    SILDENAFIL (VIAGRA) 100 MG TABLET    Take 1 tablet (100 mg total) by mouth daily as needed for Erectile Dysfunction.    SODIUM BICARBONATE 650 MG TABLET    Take 1 tablet (650 mg total) by mouth 3 (three) times daily.    TAMSULOSIN (FLOMAX) 0.4 MG CAP    TAKE ONE CAPSULE BY MOUTH ONCE DAILY.   Modified Medications    No medications on file   Discontinued Medications    No medications on file               "

## 2025-04-01 ENCOUNTER — OFFICE VISIT (OUTPATIENT)
Dept: CARDIOLOGY | Facility: CLINIC | Age: 70
End: 2025-04-01
Payer: MEDICARE

## 2025-04-01 VITALS
HEIGHT: 73 IN | OXYGEN SATURATION: 98 % | BODY MASS INDEX: 27.7 KG/M2 | WEIGHT: 209 LBS | DIASTOLIC BLOOD PRESSURE: 68 MMHG | SYSTOLIC BLOOD PRESSURE: 101 MMHG | HEART RATE: 86 BPM

## 2025-04-01 DIAGNOSIS — I25.118 ATHEROSCLEROSIS OF NATIVE CORONARY ARTERY OF NATIVE HEART WITH STABLE ANGINA PECTORIS: Primary | ICD-10-CM

## 2025-04-01 DIAGNOSIS — E11.69 DYSLIPIDEMIA ASSOCIATED WITH TYPE 2 DIABETES MELLITUS: ICD-10-CM

## 2025-04-01 DIAGNOSIS — Z95.1 POSTSURGICAL AORTOCORONARY BYPASS STATUS: ICD-10-CM

## 2025-04-01 DIAGNOSIS — E78.5 DYSLIPIDEMIA ASSOCIATED WITH TYPE 2 DIABETES MELLITUS: ICD-10-CM

## 2025-04-01 DIAGNOSIS — I20.2 REFRACTORY ANGINA: ICD-10-CM

## 2025-04-01 DIAGNOSIS — I70.0 AORTIC ATHEROSCLEROSIS: ICD-10-CM

## 2025-04-01 DIAGNOSIS — I48.0 PAROXYSMAL A-FIB: ICD-10-CM

## 2025-04-01 DIAGNOSIS — I25.118 CORONARY ARTERY DISEASE OF NATIVE ARTERY OF NATIVE HEART WITH STABLE ANGINA PECTORIS: ICD-10-CM

## 2025-04-01 DIAGNOSIS — Z95.5 PRESENCE OF DRUG-ELUTING STENT IN LEFT CIRCUMFLEX CORONARY ARTERY: ICD-10-CM

## 2025-04-01 PROCEDURE — 99999 PR PBB SHADOW E&M-EST. PATIENT-LVL IV: CPT | Mod: PBBFAC,,, | Performed by: INTERNAL MEDICINE

## 2025-04-16 ENCOUNTER — HOSPITAL ENCOUNTER (OUTPATIENT)
Dept: CARDIOLOGY | Facility: HOSPITAL | Age: 70
Discharge: HOME | End: 2025-04-16
Attending: INTERNAL MEDICINE
Payer: MEDICARE

## 2025-04-16 VITALS
HEIGHT: 73 IN | HEART RATE: 64 BPM | WEIGHT: 209 LBS | SYSTOLIC BLOOD PRESSURE: 101 MMHG | DIASTOLIC BLOOD PRESSURE: 50 MMHG | BODY MASS INDEX: 27.7 KG/M2

## 2025-04-16 DIAGNOSIS — I20.2 REFRACTORY ANGINA: ICD-10-CM

## 2025-04-16 DIAGNOSIS — Z95.5 PRESENCE OF DRUG-ELUTING STENT IN LEFT CIRCUMFLEX CORONARY ARTERY: ICD-10-CM

## 2025-04-16 DIAGNOSIS — I25.118 ATHEROSCLEROSIS OF NATIVE CORONARY ARTERY OF NATIVE HEART WITH STABLE ANGINA PECTORIS: ICD-10-CM

## 2025-04-16 LAB
CFR FLOW - ANTERIOR: 2.84
CFR FLOW - INFERIOR: 2.65
CFR FLOW - LATERAL: 2.82
CFR FLOW - MAX: 4.2
CFR FLOW - MIN: 1.91
CFR FLOW - SEPTAL: 3.43
CFR FLOW - WHOLE HEART: 2.94
CFR FLOW- DEFECT 1: 2.1
CV STRESS BASE HR: 63 BPM
DIASTOLIC BLOOD PRESSURE: 79 MMHG
EJECTION FRACTION- HIGH: 59 %
END DIASTOLIC INDEX-HIGH: 155 ML/M2
END DIASTOLIC INDEX-LOW: 91 ML/M2
END SYSTOLIC INDEX-HIGH: 78 ML/M2
END SYSTOLIC INDEX-LOW: 40 ML/M2
NUC REST DIASTOLIC VOLUME INDEX: 80
NUC REST EJECTION FRACTION: 59
NUC REST SYSTOLIC VOLUME INDEX: 33
NUC STRESS DIASTOLIC VOLUME INDEX: 83
NUC STRESS EJECTION FRACTION: 61 %
NUC STRESS SYSTOLIC VOLUME INDEX: 32
OHS CV CPX 1 MINUTE RECOVERY HEART RATE: 81 BPM
OHS CV CPX 85 PERCENT MAX PREDICTED HEART RATE MALE: 127
OHS CV CPX MAX PREDICTED HEART RATE: 149
OHS CV CPX PATIENT IS FEMALE: 0
OHS CV CPX PATIENT IS MALE: 1
OHS CV CPX PEAK DIASTOLIC BLOOD PRESSURE: 43 MMHG
OHS CV CPX PEAK HEAR RATE: 60 BPM
OHS CV CPX PEAK RATE PRESSURE PRODUCT: 7080
OHS CV CPX PEAK SYSTOLIC BLOOD PRESSURE: 118 MMHG
OHS CV CPX PERCENT MAX PREDICTED HEART RATE ACHIEVED: 40
OHS CV CPX RATE PRESSURE PRODUCT PRESENTING: 9135
OHS CV INITIAL DOSE: 28.7 MCG/KG/MIN
OHS CV MODERATELY REDUCED FLOW CAPACITY: 0 %
OHS CV NO ISCHEMIA MILDLY REDUCED FLOW CAPACTY: 10 %
OHS CV NO ISCHEMIA MINIMALLY REDUCED FLOW CAPACITY: 54 %
OHS CV NORMAL FLOW CAPACITY COMPARABLE TO HEALTHY YOUNG VOLUNTEERS: 36 %
OHS CV PEAK DOSE: 28.8 MCG/KG/MIN
OHS CV PET ID: 8604
OHS CV SEVERELY REDUCED FLOW CAPACITY: 0 %
OHS CV TOTAL EXAM DLP: 345.92 MGY-CM
PERFUSION DEFECT 1 SIZE IN %: 2 %
REST FLOW - ANTERIOR: 0.46 CC/MIN/G
REST FLOW - INFERIOR: 0.42 CC/MIN/G
REST FLOW - LATERAL: 0.48 CC/MIN/G
REST FLOW - MAX: 0.55 CC/MIN/G
REST FLOW - MIN: 0.32 CC/MIN/G
REST FLOW - SEPTAL: 0.39 CC/MIN/G
REST FLOW - WHOLE HEART: 0.44 CC/MIN/G
REST FLOW- DEFECT 1: 0.35 CC/MIN/G
RETIRED EF AND QEF - SEE NOTES: 47 %
STRESS FLOW - ANTERIOR: 1.28 CC/MIN/G
STRESS FLOW - INFERIOR: 1.12 CC/MIN/G
STRESS FLOW - LATERAL: 1.34 CC/MIN/G
STRESS FLOW - MAX: 1.71 CC/MIN/G
STRESS FLOW - MIN: 0.66 CC/MIN/G
STRESS FLOW - SEPTAL: 1.33 CC/MIN/G
STRESS FLOW - WHOLE HEART: 1.27 CC/MIN/G
STRESS FLOW- DEFECT 1: 0.74 CC/MIN/G
SYSTOLIC BLOOD PRESSURE: 145 MMHG

## 2025-04-16 PROCEDURE — 78431 MYOCRD IMG PET RST&STRS CT: CPT | Mod: 26,,, | Performed by: INTERNAL MEDICINE

## 2025-04-16 PROCEDURE — A9555 RB82 RUBIDIUM: HCPCS | Performed by: INTERNAL MEDICINE

## 2025-04-16 PROCEDURE — 63600175 PHARM REV CODE 636 W HCPCS: Performed by: INTERNAL MEDICINE

## 2025-04-16 PROCEDURE — 93016 CV STRESS TEST SUPVJ ONLY: CPT | Mod: ,,, | Performed by: INTERNAL MEDICINE

## 2025-04-16 PROCEDURE — 78434 AQMBF PET REST & RX STRESS: CPT | Mod: 26,,, | Performed by: INTERNAL MEDICINE

## 2025-04-16 PROCEDURE — 93018 CV STRESS TEST I&R ONLY: CPT | Mod: ,,, | Performed by: INTERNAL MEDICINE

## 2025-04-16 PROCEDURE — 78431 MYOCRD IMG PET RST&STRS CT: CPT

## 2025-04-16 RX ORDER — AMINOPHYLLINE 25 MG/ML
75 INJECTION, SOLUTION INTRAVENOUS
Status: COMPLETED | OUTPATIENT
Start: 2025-04-16 | End: 2025-04-16

## 2025-04-16 RX ORDER — REGADENOSON 0.08 MG/ML
0.4 INJECTION, SOLUTION INTRAVENOUS
Status: COMPLETED | OUTPATIENT
Start: 2025-04-16 | End: 2025-04-16

## 2025-04-16 RX ADMIN — AMINOPHYLLINE 75 MG: 25 INJECTION, SOLUTION INTRAVENOUS at 07:04

## 2025-04-16 RX ADMIN — RUBIDIUM CHLORIDE RB-82 28.7 MILLICURIE: 150 INJECTION, SOLUTION INTRAVENOUS at 07:04

## 2025-04-16 RX ADMIN — REGADENOSON 0.4 MG: 0.08 INJECTION, SOLUTION INTRAVENOUS at 07:04

## 2025-04-16 RX ADMIN — RUBIDIUM CHLORIDE RB-82 28.8 MILLICURIE: 150 INJECTION, SOLUTION INTRAVENOUS at 07:04

## 2025-04-17 ENCOUNTER — TELEPHONE (OUTPATIENT)
Dept: CARDIOLOGY | Facility: CLINIC | Age: 70
End: 2025-04-17
Payer: MEDICARE

## 2025-04-17 ENCOUNTER — RESULTS FOLLOW-UP (OUTPATIENT)
Dept: CARDIOLOGY | Facility: CLINIC | Age: 70
End: 2025-04-17

## 2025-04-17 NOTE — PROGRESS NOTES
Overall the stress test result suggest just a small area of the heart muscle is not getting enough blood flow.  Continue the same medications and we will discuss the results in detail during next appointment    Sincerely,  Je Fontanez MD.   Interventional Cardiologist  [facility], [place]

## 2025-04-17 NOTE — TELEPHONE ENCOUNTER
----- Message from Je Fontanez MD sent at 4/17/2025  1:55 PM CDT -----  Overall the stress test result suggest just a small area of the heart muscle is not getting enough blood flow.  Continue the same medications and we will discuss the results in detail during next   appointment    Sincerely,  Je Fontanez MD.   Interventional Cardiologist  [facility], [place]   ----- Message -----  From: Say Robles III, MD  Sent: 4/16/2025   2:32 PM CDT  To: Je Fontanez MD

## 2025-04-21 ENCOUNTER — HOSPITAL ENCOUNTER (OUTPATIENT)
Dept: CARDIOLOGY | Facility: HOSPITAL | Age: 70
Discharge: HOME | End: 2025-04-21
Attending: INTERNAL MEDICINE
Payer: MEDICARE

## 2025-04-21 VITALS — HEART RATE: 53 BPM | HEIGHT: 73 IN | WEIGHT: 209 LBS | BODY MASS INDEX: 27.7 KG/M2

## 2025-04-21 DIAGNOSIS — I10 HYPERTENSION, UNSPECIFIED TYPE: ICD-10-CM

## 2025-04-21 DIAGNOSIS — I25.10 CORONARY ARTERY DISEASE, UNSPECIFIED VESSEL OR LESION TYPE, UNSPECIFIED WHETHER ANGINA PRESENT, UNSPECIFIED WHETHER NATIVE OR TRANSPLANTED HEART: ICD-10-CM

## 2025-04-21 PROCEDURE — 93306 TTE W/DOPPLER COMPLETE: CPT

## 2025-04-21 PROCEDURE — 93306 TTE W/DOPPLER COMPLETE: CPT | Mod: 26,,, | Performed by: INTERNAL MEDICINE

## 2025-04-22 ENCOUNTER — RESULTS FOLLOW-UP (OUTPATIENT)
Dept: CARDIOLOGY | Facility: CLINIC | Age: 70
End: 2025-04-22

## 2025-04-22 LAB
APICAL FOUR CHAMBER EJECTION FRACTION: 46 %
APICAL TWO CHAMBER EJECTION FRACTION: 42 %
ASCENDING AORTA: 2.93 CM
AV INDEX (PROSTH): 0.82
AV MEAN GRADIENT: 3 MMHG
AV PEAK GRADIENT: 6 MMHG
AV VALVE AREA BY VELOCITY RATIO: 2.9 CM²
AV VALVE AREA: 2.8 CM²
AV VELOCITY RATIO: 0.83
BSA FOR ECHO PROCEDURE: 2.21 M2
CV ECHO LV RWT: 0.45 CM
DOP CALC AO PEAK VEL: 1.2 M/S
DOP CALC AO VTI: 26.5 CM
DOP CALC LVOT AREA: 3.5 CM2
DOP CALC LVOT DIAMETER: 2.1 CM
DOP CALC LVOT PEAK VEL: 1 M/S
DOP CALC LVOT STROKE VOLUME: 74.8 CM3
DOP CALCLVOT PEAK VEL VTI: 21.6 CM
E WAVE DECELERATION TIME: 233 MSEC
E/A RATIO: 1.05
E/E' RATIO: 7 M/S
ECHO LV POSTERIOR WALL: 0.9 CM (ref 0.6–1.1)
FRACTIONAL SHORTENING: 25 % (ref 28–44)
INTERVENTRICULAR SEPTUM: 0.7 CM (ref 0.6–1.1)
IVRT: 120 MSEC
LEFT ATRIUM AREA SYSTOLIC (APICAL 2 CHAMBER): 21.95 CM2
LEFT ATRIUM AREA SYSTOLIC (APICAL 4 CHAMBER): 17.69 CM2
LEFT ATRIUM VOLUME INDEX MOD: 26 ML/M2
LEFT ATRIUM VOLUME MOD: 58 ML
LEFT INTERNAL DIMENSION IN SYSTOLE: 3 CM (ref 2.1–4)
LEFT VENTRICLE DIASTOLIC VOLUME INDEX: 31.96 ML/M2
LEFT VENTRICLE DIASTOLIC VOLUME: 70 ML
LEFT VENTRICLE END DIASTOLIC VOLUME APICAL 2 CHAMBER: 75.54 ML
LEFT VENTRICLE END DIASTOLIC VOLUME APICAL 4 CHAMBER: 60.3 ML
LEFT VENTRICLE END SYSTOLIC VOLUME APICAL 2 CHAMBER: 66.38 ML
LEFT VENTRICLE END SYSTOLIC VOLUME APICAL 4 CHAMBER: 46.33 ML
LEFT VENTRICLE MASS INDEX: 42.7 G/M2
LEFT VENTRICLE SYSTOLIC VOLUME INDEX: 15.5 ML/M2
LEFT VENTRICLE SYSTOLIC VOLUME: 34 ML
LEFT VENTRICULAR INTERNAL DIMENSION IN DIASTOLE: 4 CM (ref 3.5–6)
LEFT VENTRICULAR MASS: 93.5 G
LV LATERAL E/E' RATIO: 5.8 M/S
LV SEPTAL E/E' RATIO: 9.7 M/S
LVED V (TEICH): 70.3 ML
LVES V (TEICH): 34.06 ML
LVOT MG: 2.06 MMHG
LVOT MV: 0.68 CM/S
MV PEAK A VEL: 0.55 M/S
MV PEAK E VEL: 0.58 M/S
MV STENOSIS PRESSURE HALF TIME: 67.66 MS
MV VALVE AREA P 1/2 METHOD: 3.25 CM2
OHS CV RV/LV RATIO: 0.8 CM
OHS LV EJECTION FRACTION SIMPSONS BIPLANE MOD: 44 %
PISA TR MAX VEL: 2.2 M/S
RA PRESSURE ESTIMATED: 3 MMHG
RA VOL SYS: 40.85 ML
RIGHT ATRIAL AREA: 15.7 CM2
RIGHT ATRIUM VOLUME AREA LENGTH APICAL 4 CHAMBER: 39.58 ML
RIGHT VENTRICLE DIASTOLIC BASEL DIMENSION: 3.2 CM
RV TB RVSP: 5 MMHG
RV TISSUE DOPPLER FREE WALL SYSTOLIC VELOCITY 1 (APICAL 4 CHAMBER VIEW): 6.23 CM/S
SINUS: 3.24 CM
STJ: 2.73 CM
TDI LATERAL: 0.1 M/S
TDI SEPTAL: 0.06 M/S
TDI: 0.08 M/S
TR MAX PG: 19 MMHG
TRICUSPID ANNULAR PLANE SYSTOLIC EXCURSION: 1.33 CM
TV REST PULMONARY ARTERY PRESSURE: 22 MMHG
Z-SCORE OF LEFT VENTRICULAR DIMENSION IN END DIASTOLE: -6.15
Z-SCORE OF LEFT VENTRICULAR DIMENSION IN END SYSTOLE: -3.2

## 2025-04-22 NOTE — PROGRESS NOTES
I called and left voicemail.  Echo showed drop in the heart pumping function.  Follow up as scheduled and we will discuss possible options during the visit.    Sincerely,  Je Fontanez MD.   Interventional Cardiologist  [facility], [place]

## 2025-04-23 ENCOUNTER — TELEPHONE (OUTPATIENT)
Dept: CARDIOLOGY | Facility: CLINIC | Age: 70
End: 2025-04-23
Payer: MEDICARE

## 2025-04-23 NOTE — TELEPHONE ENCOUNTER
Reached out to patient with echo results.  No answer.  Left message regarding results and to be sure to keep upcoming appointment with Dr Fontanez to discuss results.        ----- Message from Je Fontanez MD sent at 4/22/2025  5:07 PM CDT -----  I called and left voicemail.  Echo showed drop in the heart pumping function.  Follow up as scheduled and we will discuss possible options during the visit.    Sincerely,  Je Fontanez MD.   Interventional Cardiologist  [facility], [place]   ----- Message -----  From: Brandon Gil MD  Sent: 4/22/2025  10:20 AM CDT  To: Je Fontanez MD

## 2025-04-24 ENCOUNTER — LAB VISIT (OUTPATIENT)
Dept: LAB | Facility: HOSPITAL | Age: 70
End: 2025-04-24
Attending: INTERNAL MEDICINE
Payer: MEDICARE

## 2025-04-24 DIAGNOSIS — E11.22 TYPE 2 DIABETES MELLITUS WITH STAGE 3A CHRONIC KIDNEY DISEASE, WITHOUT LONG-TERM CURRENT USE OF INSULIN: ICD-10-CM

## 2025-04-24 DIAGNOSIS — N18.31 TYPE 2 DIABETES MELLITUS WITH STAGE 3A CHRONIC KIDNEY DISEASE, WITHOUT LONG-TERM CURRENT USE OF INSULIN: ICD-10-CM

## 2025-04-24 DIAGNOSIS — N25.81 SECONDARY HYPERPARATHYROIDISM OF RENAL ORIGIN: ICD-10-CM

## 2025-04-24 LAB
ABSOLUTE EOSINOPHIL (OHS): 0.2 K/UL
ABSOLUTE MONOCYTE (OHS): 0.59 K/UL (ref 0.3–1)
ABSOLUTE NEUTROPHIL COUNT (OHS): 3.49 K/UL (ref 1.8–7.7)
ALBUMIN SERPL BCP-MCNC: 3.6 G/DL (ref 3.5–5.2)
ANION GAP (OHS): 9 MMOL/L (ref 8–16)
BASOPHILS # BLD AUTO: 0.03 K/UL
BASOPHILS NFR BLD AUTO: 0.5 %
BUN SERPL-MCNC: 19 MG/DL (ref 8–23)
CALCIUM SERPL-MCNC: 9 MG/DL (ref 8.7–10.5)
CHLORIDE SERPL-SCNC: 110 MMOL/L (ref 95–110)
CO2 SERPL-SCNC: 20 MMOL/L (ref 23–29)
CREAT SERPL-MCNC: 1.8 MG/DL (ref 0.5–1.4)
EAG (OHS): 177 MG/DL (ref 68–131)
ERYTHROCYTE [DISTWIDTH] IN BLOOD BY AUTOMATED COUNT: 14.1 % (ref 11.5–14.5)
GFR SERPLBLD CREATININE-BSD FMLA CKD-EPI: 40 ML/MIN/1.73/M2
GLUCOSE SERPL-MCNC: 141 MG/DL (ref 70–110)
HBA1C MFR BLD: 7.8 % (ref 4–5.6)
HBV SURFACE AB SER-ACNC: <3 MIU/ML
HBV SURFACE AB SERPL IA-ACNC: NORMAL M[IU]/ML
HBV SURFACE AG SERPL QL IA: NORMAL
HCT VFR BLD AUTO: 44.4 % (ref 40–54)
HGB BLD-MCNC: 15 GM/DL (ref 14–18)
IMM GRANULOCYTES # BLD AUTO: 0.02 K/UL (ref 0–0.04)
IMM GRANULOCYTES NFR BLD AUTO: 0.3 % (ref 0–0.5)
LYMPHOCYTES # BLD AUTO: 1.57 K/UL (ref 1–4.8)
MAGNESIUM SERPL-MCNC: 1.9 MG/DL (ref 1.6–2.6)
MCH RBC QN AUTO: 29 PG (ref 27–31)
MCHC RBC AUTO-ENTMCNC: 33.8 G/DL (ref 32–36)
MCV RBC AUTO: 86 FL (ref 82–98)
NUCLEATED RBC (/100WBC) (OHS): 0 /100 WBC
PHOSPHATE SERPL-MCNC: 2.8 MG/DL (ref 2.7–4.5)
PLATELET # BLD AUTO: 247 K/UL (ref 150–450)
PMV BLD AUTO: 10.4 FL (ref 9.2–12.9)
POTASSIUM SERPL-SCNC: 4 MMOL/L (ref 3.5–5.1)
PTH-INTACT SERPL-MCNC: 180 PG/ML (ref 9–77)
RBC # BLD AUTO: 5.18 M/UL (ref 4.6–6.2)
RELATIVE EOSINOPHIL (OHS): 3.4 %
RELATIVE LYMPHOCYTE (OHS): 26.6 % (ref 18–48)
RELATIVE MONOCYTE (OHS): 10 % (ref 4–15)
RELATIVE NEUTROPHIL (OHS): 59.2 % (ref 38–73)
SODIUM SERPL-SCNC: 139 MMOL/L (ref 136–145)
URATE SERPL-MCNC: 4.9 MG/DL (ref 3.4–7)
WBC # BLD AUTO: 5.9 K/UL (ref 3.9–12.7)

## 2025-04-24 PROCEDURE — 86706 HEP B SURFACE ANTIBODY: CPT

## 2025-04-24 PROCEDURE — 36415 COLL VENOUS BLD VENIPUNCTURE: CPT

## 2025-04-24 PROCEDURE — 87340 HEPATITIS B SURFACE AG IA: CPT

## 2025-04-24 PROCEDURE — 83036 HEMOGLOBIN GLYCOSYLATED A1C: CPT

## 2025-04-24 PROCEDURE — 84550 ASSAY OF BLOOD/URIC ACID: CPT

## 2025-04-24 PROCEDURE — 84100 ASSAY OF PHOSPHORUS: CPT

## 2025-04-24 PROCEDURE — 85025 COMPLETE CBC W/AUTO DIFF WBC: CPT

## 2025-04-24 PROCEDURE — 83735 ASSAY OF MAGNESIUM: CPT

## 2025-04-24 PROCEDURE — 83970 ASSAY OF PARATHORMONE: CPT

## 2025-04-28 DIAGNOSIS — Z00.00 ENCOUNTER FOR MEDICARE ANNUAL WELLNESS EXAM: ICD-10-CM

## 2025-04-29 ENCOUNTER — OFFICE VISIT (OUTPATIENT)
Dept: CARDIOLOGY | Facility: CLINIC | Age: 70
End: 2025-04-29
Payer: MEDICARE

## 2025-04-29 VITALS
DIASTOLIC BLOOD PRESSURE: 57 MMHG | HEART RATE: 66 BPM | WEIGHT: 207 LBS | SYSTOLIC BLOOD PRESSURE: 113 MMHG | OXYGEN SATURATION: 97 % | HEIGHT: 73 IN | BODY MASS INDEX: 27.43 KG/M2

## 2025-04-29 DIAGNOSIS — Z95.5 PRESENCE OF DRUG-ELUTING STENT IN LEFT CIRCUMFLEX CORONARY ARTERY: ICD-10-CM

## 2025-04-29 DIAGNOSIS — E11.69 DYSLIPIDEMIA ASSOCIATED WITH TYPE 2 DIABETES MELLITUS: ICD-10-CM

## 2025-04-29 DIAGNOSIS — Z95.1 POSTSURGICAL AORTOCORONARY BYPASS STATUS: ICD-10-CM

## 2025-04-29 DIAGNOSIS — E78.5 DYSLIPIDEMIA ASSOCIATED WITH TYPE 2 DIABETES MELLITUS: ICD-10-CM

## 2025-04-29 DIAGNOSIS — I25.118 ATHEROSCLEROSIS OF NATIVE CORONARY ARTERY OF NATIVE HEART WITH STABLE ANGINA PECTORIS: ICD-10-CM

## 2025-04-29 DIAGNOSIS — I70.0 AORTIC ATHEROSCLEROSIS: ICD-10-CM

## 2025-04-29 DIAGNOSIS — I25.118 CORONARY ARTERY DISEASE OF NATIVE ARTERY OF NATIVE HEART WITH STABLE ANGINA PECTORIS: ICD-10-CM

## 2025-04-29 DIAGNOSIS — I65.29 STENOSIS OF CAROTID ARTERY, UNSPECIFIED LATERALITY: ICD-10-CM

## 2025-04-29 DIAGNOSIS — I50.20 HEART FAILURE WITH MILDLY REDUCED EJECTION FRACTION (HFMREF, 41-49%): Primary | ICD-10-CM

## 2025-04-29 PROCEDURE — 3061F NEG MICROALBUMINURIA REV: CPT | Mod: CPTII,S$GLB,, | Performed by: INTERNAL MEDICINE

## 2025-04-29 PROCEDURE — 4010F ACE/ARB THERAPY RXD/TAKEN: CPT | Mod: CPTII,S$GLB,, | Performed by: INTERNAL MEDICINE

## 2025-04-29 PROCEDURE — 3078F DIAST BP <80 MM HG: CPT | Mod: CPTII,S$GLB,, | Performed by: INTERNAL MEDICINE

## 2025-04-29 PROCEDURE — 3051F HG A1C>EQUAL 7.0%<8.0%: CPT | Mod: CPTII,S$GLB,, | Performed by: INTERNAL MEDICINE

## 2025-04-29 PROCEDURE — 1159F MED LIST DOCD IN RCRD: CPT | Mod: CPTII,S$GLB,, | Performed by: INTERNAL MEDICINE

## 2025-04-29 PROCEDURE — 99215 OFFICE O/P EST HI 40 MIN: CPT | Mod: S$GLB,,, | Performed by: INTERNAL MEDICINE

## 2025-04-29 PROCEDURE — 1101F PT FALLS ASSESS-DOCD LE1/YR: CPT | Mod: CPTII,S$GLB,, | Performed by: INTERNAL MEDICINE

## 2025-04-29 PROCEDURE — 3066F NEPHROPATHY DOC TX: CPT | Mod: CPTII,S$GLB,, | Performed by: INTERNAL MEDICINE

## 2025-04-29 PROCEDURE — 99999 PR PBB SHADOW E&M-EST. PATIENT-LVL IV: CPT | Mod: PBBFAC,,, | Performed by: INTERNAL MEDICINE

## 2025-04-29 PROCEDURE — 3288F FALL RISK ASSESSMENT DOCD: CPT | Mod: CPTII,S$GLB,, | Performed by: INTERNAL MEDICINE

## 2025-04-29 PROCEDURE — 3008F BODY MASS INDEX DOCD: CPT | Mod: CPTII,S$GLB,, | Performed by: INTERNAL MEDICINE

## 2025-04-29 PROCEDURE — 3074F SYST BP LT 130 MM HG: CPT | Mod: CPTII,S$GLB,, | Performed by: INTERNAL MEDICINE

## 2025-04-29 NOTE — PROGRESS NOTES
Subjective:   @Patient ID:  Addy Redding is a 69 y.o. male who presents for evaluation of CAD, HTN, HLP    HPI:   April 29, 2024:  F/U.  Echo with mild reduced EF.  PET stress test which showed small ischemia in the RCA territory 2%.  In fact he feels his energy is back to normal and he is able to do things more now.  No chest pain or significant shortness of breath.     April 1st 2025:  Follow up.  He reports significant shortness of breath and low energy level that has been getting worse over the last few months.  It reminded him by his cardiac symptoms.  He is compliant with his medications.  BP and lipids well-controlled    June 24, 2024:  Follow up.  Since last visit he has been doing okay.  One time when he was cutting the grass he gets tired.  No significant short of breath or chest pain.  He is off BB due to bradycardia.  BP is okay toward the lower side.  Compliant with meds    11/2023: F/U.  EM without a.fib.  He feels great.  Progressing very well with the cardiac rehab.  He is still on Xarelto along with Plavix.  He is off beta-blocker due to bradycardia    8/23/2023: F/U. [facility] ostial LAD disease. iFR +ve. CABG  LIMA-LAD. PDA was not bypassed. RCA . He had post op a.fib. He is on amio and xarelto. BB stopped due to concerns about tacy-brendon/ He feels much better since the bypass. Energy level is much better.        6/14/2023: F/U. Stress test with concerning balanced ischemia. Significant shortness of breath. Getting worse. He has chronic sinus bradycardia with good chronotropic response by stress test in 3/2022.     3/9/2023: He saw Dr. Arevalo in the past. He had exertional angina prompted stress test that was abnormal. [facility] 4/7/2022 with  RCA, significant OM disease, non obstructive prox LAD disease by iFR. Attempt to PCI RCA was unsuccessful (run through wire used). He was staged for PCI to OM 6/2022 ( 2.5 x 8 and 2.5x18) was complicated nu no reflow in the main lcx and it was stented, residual  "disease in ostial Om at bifurcation     He was diagnosed with prostate CA , got radiotherapy seeds. Stable     He still reports significant TRINH and low energy level. No chest pain   He has been compliant with his medications    He has bradycardia that's why  he is not on BB        Prior cardiovascular  Hx  --------------------------------  - EKG 12/2022 sinus bradycardia     - PET stress test 6/1/2023     The myocardial perfusion images are normal without evidence of scar.    Stress myocardial blood flow is severely reduced diffusely throughout the heart consistent with three vessel "balanced" ischemia.  The RCA territory has severely reduced flow capacity consistent with a known  with inadequate collateralization. The anterior and anterolateral wall have moderately reduced flow capacity and are on the border of ischemic thresholds. The remainder of the myocardium has mildly reduced flow capacity.    The whole heart absolute myocardial perfusion values averaged 0.54 cc/min/g at rest, which is reduced; 0.81 cc/min/g at stress, which is severely reduced; and CFR is 1.50 , which is moderately reduced.    CT attenuation images demonstrate moderate diffuse coronary calcifications in the LAD and LCX territory and mild diffuse aortic calcifications in the descending aorta.    The gated perfusion images showed an ejection fraction of 60% at rest and 62% during stress. A normal ejection fraction is greater than 47%.    The wall motion is normal at rest and during stress.    The LV cavity size is normal at rest and stress.    The ECG portion of the study is negative for ischemia.    There were no arrhythmias during stress.    The patient reported no chest pain during the stress test.    There are no prior studies for comparison.     - Echo 3/23/2023   Normal systolic function.  The estimated ejection fraction is 55%.  Normal left ventricular diastolic function.  Normal right ventricular size with normal right ventricular " systolic function.  Normal central venous pressure (3 mmHg).  The estimated PA systolic pressure is 23 mmHg.       Echo 04/21/2025    Left Ventricle: The left ventricle is normal in size. Regional wall motion abnormalities present. See diagram for wall motion findings. There is mildly reduced systolic function with a visually estimated ejection fraction of 40 - 45%. Quantitated ejection fraction is 44%. Grade I diastolic dysfunction.    Right Ventricle: The right ventricle is normal in size. Systolic function is mildly reduced.    Left Atrium: There appears to be lipomatous hypertrophy of the interatrial septum.    Mitral Valve: There is trace regurgitation.    Pulmonary Artery: The estimated pulmonary artery systolic pressure is 22 mmHg.    IVC/SVC: Normal venous pressure at 3 mmHg.    Pericardium: There is no pericardial effusion.     PET stress 04/16/2025    Left Ventricle: The left ventricle is normal in size. Regional wall motion abnormalities present. See diagram for wall motion findings. There is mildly reduced systolic function with a visually estimated ejection fraction of 40 - 45%. Quantitated ejection fraction is 44%. Grade I diastolic dysfunction.    Right Ventricle: The right ventricle is normal in size. Systolic function is mildly reduced.    Left Atrium: There appears to be lipomatous hypertrophy of the interatrial septum.    Mitral Valve: There is trace regurgitation.    Pulmonary Artery: The estimated pulmonary artery systolic pressure is 22 mmHg.    IVC/SVC: Normal venous pressure at 3 mmHg.    Pericardium: There is no pericardial effusion.         Patient Active Problem List    Diagnosis Date Noted    Coronary atherosclerosis of native coronary artery 10/27/2023    Left anterior shoulder pain 08/28/2023    Centrilobular emphysema 08/15/2023    Postsurgical aortocoronary bypass status 07/24/2023    Paroxysmal A-fib 07/24/2023     This is not a post operative complication. afib happen during post  operative period      Tachycardia-bradycardia syndrome 2023    Carotid stenosis 2023    Acute blood loss anemia 2023    Bilateral carotid artery stenosis 2023    Pre-op exam 2023    Pain of left upper extremity 2023    Weakness 2023    Stiffness in joint 2023    Chronic left shoulder pain 05/15/2023    Hyperparathyroidism, unspecified 2023    Prostate cancer     Stage 3b chronic kidney disease 10/04/2022    Atherosclerosis of native coronary artery of native heart 10/04/2022    Presence of drug-eluting stent in left circumflex coronary artery 2022    Preoperative clearance 2022    Coronary artery disease of native artery with stable angina pectoris 2022    Aortic atherosclerosis 2022    Sinus bradycardia 2022    Neck pain 10/22/2021    Decreased range of motion 10/22/2021    Type 2 diabetes mellitus with stage 3a chronic kidney disease, without long-term current use of insulin 2016    Left-sided low back pain with sciatica 2016    ED (erectile dysfunction)     Hypertension associated with diabetes 2014    Dyslipidemia associated with type 2 diabetes mellitus            Right Arm BP - Sittin/57  Left Arm BP - Sittin/74        LAST HbA1c  Lab Results   Component Value Date    HGBA1C 7.8 (H) 2025       Lipid panel  Lab Results   Component Value Date    CHOL 116 (L) 2025    CHOL 110 (L) 2024    CHOL 117 (L) 10/03/2023     Lab Results   Component Value Date    HDL 39 (L) 2025    HDL 40 2024    HDL 37 (L) 10/03/2023     Lab Results   Component Value Date    LDLCALC 58.0 (L) 2025    LDLCALC 57.6 (L) 2024    LDLCALC 66.4 10/03/2023     Lab Results   Component Value Date    TRIG 95 2025    TRIG 62 2024    TRIG 68 10/03/2023     Lab Results   Component Value Date    CHOLHDL 33.6 2025    CHOLHDL 36.4 2024    CHOLHDL 31.6 10/03/2023            Review  of Systems   Constitutional: Negative for chills and fever.   HENT:  Negative for hearing loss and nosebleeds.    Eyes:  Negative for blurred vision.   Cardiovascular:         As in HPI   Respiratory:  Negative for hemoptysis and shortness of breath.    Hematologic/Lymphatic: Negative for bleeding problem.   Skin:  Negative for itching.   Musculoskeletal:  Negative for falls.   Gastrointestinal:  Negative for abdominal pain and hematochezia.   Genitourinary:  Positive for frequency. Negative for hematuria.        As in HPI    Neurological:  Negative for dizziness and loss of balance.   Psychiatric/Behavioral:  Negative for altered mental status and depression.        Objective:   Physical Exam  Constitutional:       Appearance: He is well-developed.   HENT:      Head: Normocephalic and atraumatic.   Eyes:      Conjunctiva/sclera: Conjunctivae normal.   Neck:      Vascular: No carotid bruit or JVD.   Cardiovascular:      Rate and Rhythm: Normal rate and regular rhythm.      Pulses:           Carotid pulses are 2+ on the right side and 2+ on the left side.       Radial pulses are 2+ on the right side and 2+ on the left side.      Heart sounds: Normal heart sounds. No murmur heard.     No friction rub. No gallop.   Pulmonary:      Effort: Pulmonary effort is normal. No respiratory distress.      Breath sounds: Normal breath sounds. No stridor. No wheezing.   Musculoskeletal:      Cervical back: Neck supple.   Skin:     General: Skin is warm and dry.   Neurological:      Mental Status: He is alert and oriented to person, place, and time.   Psychiatric:         Behavior: Behavior normal.         Assessment:     1. Heart failure with mildly reduced ejection fraction (HFmrEF, 41-49%)    2. Atherosclerosis of native coronary artery of native heart with stable angina pectoris    3. Aortic atherosclerosis    4. Stenosis of carotid artery, unspecified laterality    5. Coronary artery disease of native artery of native heart  with stable angina pectoris    6. Dyslipidemia associated with type 2 diabetes mellitus    7. Postsurgical aortocoronary bypass status    8. Presence of drug-eluting stent in left circumflex coronary artery              Plan:   1. Coronary artery disease   Post CABG and PCI  LDL at goal.  Continue current medical therapy with the atorvastatin and ezetimibe  PET stress test with small ischemia burden in the RCA   Given his symptoms improved.  We will defer any intervention  Continue medical therapy  Not on beta-blockers due to significant bradycardia previously  We will not add Ranexa given his renal function      2. Postop Afib/CABG    He was taken off Xarelto. 30 days EM without recurrent atrial fibrillation.  AFib was postop related to CABG.    He fully understands benefits and risks and agrees with the plan    3. Hypertension  BP is well controlled  We will not start beta-blockers given bradycardia and borderline blood pressure  Continue Farxiga.       4. Hyperlipidemia  Lipids well-controlled  Continue current regimen    5. Heart failure my reduced EF  I will switch olmesartan to Entresto  Continue Farxiga  Not on beta-blockers due to bradycardia  Repeat BMP in 4 weeks and follow up after     5. Chronic kidney disease  Follow up with Nephrology team  Stable renal function      -In today's visit, at least 4 established conditions that pose a risk to life or bodily function have been addressed and the conditions are severe.    -In today's visit, monitoring for drug toxicity was accomplished.    I spent 5-10 minutes asking, assessing, assisting, arranging and advising heart healthy diet improvements. This included low-salt meals, portion control and health food alternatives. I also encourage 30 minutes of moderate exercise 3-4x a week.        Pertinent cardiac images and EKG reviewed independently.    Continue with current medical plan and lifestyle changes.  Return sooner for concerns or questions. If symptoms  persist go to the ED  I have reviewed all pertinent data including patient's medical history in detail and updated the computerized patient record.     Orders Placed This Encounter   Procedures    Comprehensive Metabolic Panel     Standing Status:   Future     Expected Date:   5/29/2025     Expiration Date:   4/29/2026       Follow up as scheduled.     He expressed verbal understanding and agreed with the plan    Patient's Medications   New Prescriptions    SACUBITRIL-VALSARTAN (ENTRESTO) 24-26 MG PER TABLET    Take 1 tablet by mouth 2 (two) times daily.   Previous Medications    ADVANCED GLUC METER TEST STRIP STRP    USE TO TEST BLOOD SUGAR 4 TIMES DAILY.    ATORVASTATIN (LIPITOR) 80 MG TABLET    Take 1 tablet (80 mg total) by mouth once daily.    AZELASTINE (ASTELIN) 137 MCG (0.1 %) NASAL SPRAY    Take 2 sprays (intranasal) 2 times per day    BENZONATATE (TESSALON) 100 MG CAPSULE    Take 1 capsule (oral) 3 times per day (AS NEEDED FOR- Cough)    BLOOD SUGAR DIAGNOSTIC (BLOOD GLUCOSE TEST) STRP    Check sugar once daily    BLOOD-GLUCOSE METER MISC    by Misc.(Non-Drug; Combo Route) route.    CLOPIDOGREL (PLAVIX) 75 MG TABLET    Take 1 tablet (75 mg total) by mouth once daily.    DAPAGLIFLOZIN PROPANEDIOL (FARXIGA) 10 MG TABLET    Take 1 tablet (10 mg total) by mouth once daily.    ERGOCALCIFEROL (VITAMIN D2) 50,000 UNIT CAP    Take 1 capsule (50,000 Units total) by mouth every 7 days. Once per week for 8 weeks then once a month    EZETIMIBE (ZETIA) 10 MG TABLET    Take 1 tablet (10 mg total) by mouth once daily.    FLUTICASONE PROPIONATE (FLONASE ALLERGY RELIEF) 50 MCG/ACTUATION NASAL SPRAY    Take 2 sprays (intranasal) daily    FUROSEMIDE (LASIX) 40 MG TABLET    Take 1 tablet (40 mg total) by mouth daily as needed (swelling).    INSULIN GLARGINE U-100, LANTUS, (LANTUS SOLOSTAR U-100 INSULIN) 100 UNIT/ML (3 ML) INPN PEN    Inject 20 Units into the skin every evening.    NITROGLYCERIN (NITROSTAT) 0.4 MG SL TABLET     "Place 1 tablet (0.4 mg total) under the tongue every 5 (five) minutes as needed for Chest pain.    PANTOPRAZOLE (PROTONIX) 40 MG TABLET    TAKE ONE TABLET BY MOUTH ONCE DAILY.    PEN NEEDLE, DIABETIC (BD ULTRA-FINE ARIA PEN NEEDLE) 32 GAUGE X 5/32" NDLE    USE ONE NEEDLE WITH INSULIN PEN TWICE DAILY    PSYLLIUM HUSK 6 GRAM/6 GRAM POWD    Take 6 g by mouth once daily.    PYRILAMINE-PHENYLEPHRINE-DM (POLYTUSSIN DM,PYRILAMINE,) 12.5-5-7.5 MG/5 ML LIQD    Take 10 mL (oral) every 4 to 6 hours (AS NEEDED FOR - Cough)    SEMAGLUTIDE (OZEMPIC) 2 MG/DOSE (8 MG/3 ML) PNIJ    Inject 2 mg into the skin every 7 days.    SILDENAFIL (VIAGRA) 100 MG TABLET    Take 1 tablet (100 mg total) by mouth daily as needed for Erectile Dysfunction.    SODIUM BICARBONATE 650 MG TABLET    Take 2 tablets (1,300 mg total) by mouth 3 (three) times daily.    TAMSULOSIN (FLOMAX) 0.4 MG CAP    TAKE ONE CAPSULE BY MOUTH ONCE DAILY.   Modified Medications    No medications on file   Discontinued Medications    OLMESARTAN (BENICAR) 20 MG TABLET    Take 1 tablet (20 mg total) by mouth once daily.                 "

## 2025-05-06 DIAGNOSIS — E55.9 VITAMIN D DEFICIENCY: ICD-10-CM

## 2025-05-06 NOTE — TELEPHONE ENCOUNTER
No care due was identified.  [facility] Embedded Care Due Messages. Reference number: [id number].   5/06/2025 11:03:01 AM CDT

## 2025-05-07 RX ORDER — ERGOCALCIFEROL 1.25 MG/1
50000 CAPSULE ORAL
Qty: 4 CAPSULE | Refills: 3 | Status: SHIPPED | OUTPATIENT
Start: 2025-05-07

## 2025-05-16 ENCOUNTER — TELEPHONE (OUTPATIENT)
Dept: FAMILY MEDICINE | Facility: CLINIC | Age: 70
End: 2025-05-16
Payer: MEDICARE

## 2025-05-16 NOTE — TELEPHONE ENCOUNTER
Called patient, had to leave message on voice mail for pt to call back        is booking out on 6/2  needs to reschedule his appt

## 2025-05-28 NOTE — PROGRESS NOTES
Subjective:   @Patient ID:  Addy Redding is a 69 y.o. male who presents for evaluation of CAD, HTN, HLP    HPI:   05/29/2025:  F/U.  He is doing well.  Tolerating Entresto okay.  Occasional orthostatic dizziness but no syncope or presyncope.  No significant CP, or TRINH      April 29, 2024:  F/U.  Echo with mild reduced EF.  PET stress test which showed small ischemia in the RCA territory 2%.  In fact he feels his energy is back to normal and he is able to do things more now.  No chest pain or significant shortness of breath.     April 1st 2025:  Follow up.  He reports significant shortness of breath and low energy level that has been getting worse over the last few months.  It reminded him by his cardiac symptoms.  He is compliant with his medications.  BP and lipids well-controlled    June 24, 2024:  Follow up.  Since last visit he has been doing okay.  One time when he was cutting the grass he gets tired.  No significant short of breath or chest pain.  He is off BB due to bradycardia.  BP is okay toward the lower side.  Compliant with meds    11/2023: F/U.  EM without a.fib.  He feels great.  Progressing very well with the cardiac rehab.  He is still on Xarelto along with Plavix.  He is off beta-blocker due to bradycardia    8/23/2023: F/U. [facility] ostial LAD disease. iFR +ve. CABG  LIMA-LAD. PDA was not bypassed. RCA . He had post op a.fib. He is on amio and xarelto. BB stopped due to concerns about tacy-brendon/ He feels much better since the bypass. Energy level is much better.        6/14/2023: F/U. Stress test with concerning balanced ischemia. Significant shortness of breath. Getting worse. He has chronic sinus bradycardia with good chronotropic response by stress test in 3/2022.     3/9/2023: He saw Dr. Arevalo in the past. He had exertional angina prompted stress test that was abnormal. [facility] 4/7/2022 with  RCA, significant OM disease, non obstructive prox LAD disease by iFR. Attempt to PCI RCA was  "unsuccessful (run through wire used). He was staged for PCI to OM 6/2022 ( 2.5 x 8 and 2.5x18) was complicated nu no reflow in the main lcx and it was stented, residual disease in ostial Om at bifurcation     He was diagnosed with prostate CA , got radiotherapy seeds. Stable     He still reports significant TRINH and low energy level. No chest pain   He has been compliant with his medications    He has bradycardia that's why  he is not on BB        Prior cardiovascular  Hx  --------------------------------  - EKG 12/2022 sinus bradycardia     - PET stress test 6/1/2023     The myocardial perfusion images are normal without evidence of scar.    Stress myocardial blood flow is severely reduced diffusely throughout the heart consistent with three vessel "balanced" ischemia.  The RCA territory has severely reduced flow capacity consistent with a known  with inadequate collateralization. The anterior and anterolateral wall have moderately reduced flow capacity and are on the border of ischemic thresholds. The remainder of the myocardium has mildly reduced flow capacity.    The whole heart absolute myocardial perfusion values averaged 0.54 cc/min/g at rest, which is reduced; 0.81 cc/min/g at stress, which is severely reduced; and CFR is 1.50 , which is moderately reduced.    CT attenuation images demonstrate moderate diffuse coronary calcifications in the LAD and LCX territory and mild diffuse aortic calcifications in the descending aorta.    The gated perfusion images showed an ejection fraction of 60% at rest and 62% during stress. A normal ejection fraction is greater than 47%.    The wall motion is normal at rest and during stress.    The LV cavity size is normal at rest and stress.    The ECG portion of the study is negative for ischemia.    There were no arrhythmias during stress.    The patient reported no chest pain during the stress test.    There are no prior studies for comparison.     - Echo 3/23/2023 "   Normal systolic function.  The estimated ejection fraction is 55%.  Normal left ventricular diastolic function.  Normal right ventricular size with normal right ventricular systolic function.  Normal central venous pressure (3 mmHg).  The estimated PA systolic pressure is 23 mmHg.       Echo 04/21/2025    Left Ventricle: The left ventricle is normal in size. Regional wall motion abnormalities present. See diagram for wall motion findings. There is mildly reduced systolic function with a visually estimated ejection fraction of 40 - 45%. Quantitated ejection fraction is 44%. Grade I diastolic dysfunction.    Right Ventricle: The right ventricle is normal in size. Systolic function is mildly reduced.    Left Atrium: There appears to be lipomatous hypertrophy of the interatrial septum.    Mitral Valve: There is trace regurgitation.    Pulmonary Artery: The estimated pulmonary artery systolic pressure is 22 mmHg.    IVC/SVC: Normal venous pressure at 3 mmHg.    Pericardium: There is no pericardial effusion.     PET stress 04/16/2025    Left Ventricle: The left ventricle is normal in size. Regional wall motion abnormalities present. See diagram for wall motion findings. There is mildly reduced systolic function with a visually estimated ejection fraction of 40 - 45%. Quantitated ejection fraction is 44%. Grade I diastolic dysfunction.    Right Ventricle: The right ventricle is normal in size. Systolic function is mildly reduced.    Left Atrium: There appears to be lipomatous hypertrophy of the interatrial septum.    Mitral Valve: There is trace regurgitation.    Pulmonary Artery: The estimated pulmonary artery systolic pressure is 22 mmHg.    IVC/SVC: Normal venous pressure at 3 mmHg.    Pericardium: There is no pericardial effusion.         Patient Active Problem List    Diagnosis Date Noted    Coronary atherosclerosis of native coronary artery 10/27/2023    Left anterior shoulder pain 08/28/2023    Centrilobular  emphysema 08/15/2023    Postsurgical aortocoronary bypass status 07/24/2023    Paroxysmal A-fib 07/24/2023     This is not a post operative complication. afib happen during post operative period      Tachycardia-bradycardia syndrome 07/23/2023    Carotid stenosis 07/21/2023    Acute blood loss anemia 07/21/2023    Bilateral carotid artery stenosis 07/05/2023    Pre-op exam 07/05/2023    Pain of left upper extremity 05/17/2023    Weakness 05/17/2023    Stiffness in joint 05/17/2023    Chronic left shoulder pain 05/15/2023    Hyperparathyroidism, unspecified 01/27/2023    Prostate cancer     Stage 3b chronic kidney disease 10/04/2022    Atherosclerosis of native coronary artery of native heart 10/04/2022    Presence of drug-eluting stent in left circumflex coronary artery 09/01/2022    Preoperative clearance 09/01/2022    Coronary artery disease of native artery with stable angina pectoris 05/26/2022    Aortic atherosclerosis 03/22/2022    Sinus bradycardia 03/22/2022    Neck pain 10/22/2021    Decreased range of motion 10/22/2021    Type 2 diabetes mellitus with stage 3a chronic kidney disease, without long-term current use of insulin 02/22/2016    Left-sided low back pain with sciatica 02/22/2016    ED (erectile dysfunction)     Hypertension associated with diabetes 11/04/2014    Dyslipidemia associated with type 2 diabetes mellitus                     LAST HbA1c  Lab Results   Component Value Date    HGBA1C 7.8 (H) 04/24/2025       Lipid panel  Lab Results   Component Value Date    CHOL 116 (L) 01/29/2025    CHOL 110 (L) 01/25/2024    CHOL 117 (L) 10/03/2023     Lab Results   Component Value Date    HDL 39 (L) 01/29/2025    HDL 40 01/25/2024    HDL 37 (L) 10/03/2023     Lab Results   Component Value Date    LDLCALC 58.0 (L) 01/29/2025    LDLCALC 57.6 (L) 01/25/2024    LDLCALC 66.4 10/03/2023     Lab Results   Component Value Date    TRIG 95 01/29/2025    TRIG 62 01/25/2024    TRIG 68 10/03/2023     Lab Results    Component Value Date    CHOLHDL 33.6 01/29/2025    CHOLHDL 36.4 01/25/2024    CHOLHDL 31.6 10/03/2023            Review of Systems   Constitutional: Negative for chills and fever.   HENT:  Negative for hearing loss and nosebleeds.    Eyes:  Negative for blurred vision.   Cardiovascular:         As in HPI   Respiratory:  Negative for hemoptysis and shortness of breath.    Hematologic/Lymphatic: Negative for bleeding problem.   Skin:  Negative for itching.   Musculoskeletal:  Negative for falls.   Gastrointestinal:  Negative for abdominal pain and hematochezia.   Genitourinary:  Positive for frequency. Negative for hematuria.        As in HPI    Neurological:  Negative for dizziness and loss of balance.   Psychiatric/Behavioral:  Negative for altered mental status and depression.        Objective:   Physical Exam  Constitutional:       Appearance: He is well-developed.   HENT:      Head: Normocephalic and atraumatic.   Eyes:      Conjunctiva/sclera: Conjunctivae normal.   Neck:      Vascular: No carotid bruit or JVD.   Cardiovascular:      Rate and Rhythm: Normal rate and regular rhythm.      Pulses:           Carotid pulses are 2+ on the right side and 2+ on the left side.       Radial pulses are 2+ on the right side and 2+ on the left side.      Heart sounds: Normal heart sounds. No murmur heard.     No friction rub. No gallop.   Pulmonary:      Effort: Pulmonary effort is normal. No respiratory distress.      Breath sounds: Normal breath sounds. No stridor. No wheezing.   Musculoskeletal:      Cervical back: Neck supple.   Skin:     General: Skin is warm and dry.   Neurological:      Mental Status: He is alert and oriented to person, place, and time.   Psychiatric:         Behavior: Behavior normal.         Assessment:     No diagnosis found.            Plan:   1. Coronary artery disease   Post CABG and PCI  LDL at goal.  Continue current medical therapy with the atorvastatin and ezetimibe  PET stress test with  small ischemia burden in the RCA   Given his symptoms improved.  We will defer any intervention  Continue medical therapy  Not on beta-blockers due to significant bradycardia previously  We will not add Ranexa given his renal function      2. Postop Afib/CABG    He was taken off Xarelto. 30 days EM without recurrent atrial fibrillation.  AFib was postop related to CABG.    He fully understands benefits and risks and agrees with the plan    3. Hypertension  BP is well controlled  We will not start beta-blockers given bradycardia and borderline blood pressure  Continue Farxiga.   Continue Entresto    4. Hyperlipidemia  Lipids well-controlled  Continue current regimen    5. Heart failure mild reduced EF  Continue Entresto  Continue Farxiga  Not on beta-blockers due to bradycardia    5. Chronic kidney disease  Follow up with Nephrology team  Stable renal function      Visit today included increased complexity associated with the care of the episodic problem coronary artery disease, CHF, hypertension, hyperlipidemia addressed and managing the longitudinal care of the patient due to the serious and/or complex managed problems    -In today's visit, monitoring for drug toxicity was accomplished.    I spent 5-10 minutes asking, assessing, assisting, arranging and advising heart healthy diet improvements. This included low-salt meals, portion control and health food alternatives. I also encourage 30 minutes of moderate exercise 3-4x a week.        Pertinent cardiac images and EKG reviewed independently.    Continue with current medical plan and lifestyle changes.  Return sooner for concerns or questions. If symptoms persist go to the ED  I have reviewed all pertinent data including patient's medical history in detail and updated the computerized patient record.     No orders of the defined types were placed in this encounter.      Follow up as scheduled.     He expressed verbal understanding and agreed with the  "plan    Patient's Medications   New Prescriptions    No medications on file   Previous Medications    ADVANCED GLUC METER TEST STRIP STRP    USE TO TEST BLOOD SUGAR 4 TIMES DAILY.    ATORVASTATIN (LIPITOR) 80 MG TABLET    Take 1 tablet (80 mg total) by mouth once daily.    AZELASTINE (ASTELIN) 137 MCG (0.1 %) NASAL SPRAY    Take 2 sprays (intranasal) 2 times per day    BENZONATATE (TESSALON) 100 MG CAPSULE    Take 1 capsule (oral) 3 times per day (AS NEEDED FOR- Cough)    BLOOD SUGAR DIAGNOSTIC (BLOOD GLUCOSE TEST) STRP    Check sugar once daily    BLOOD-GLUCOSE METER MISC    by Misc.(Non-Drug; Combo Route) route.    CLOPIDOGREL (PLAVIX) 75 MG TABLET    Take 1 tablet (75 mg total) by mouth once daily.    DAPAGLIFLOZIN PROPANEDIOL (FARXIGA) 10 MG TABLET    Take 1 tablet (10 mg total) by mouth once daily.    ERGOCALCIFEROL (VITAMIN D2) 50,000 UNIT CAP    Take 1 capsule (50,000 Units total) by mouth every 7 days. Once per week for 8 weeks then once a month    EZETIMIBE (ZETIA) 10 MG TABLET    Take 1 tablet (10 mg total) by mouth once daily.    FLUTICASONE PROPIONATE (FLONASE ALLERGY RELIEF) 50 MCG/ACTUATION NASAL SPRAY    Take 2 sprays (intranasal) daily    FUROSEMIDE (LASIX) 40 MG TABLET    Take 1 tablet (40 mg total) by mouth daily as needed (swelling).    INSULIN GLARGINE U-100, LANTUS, (LANTUS SOLOSTAR U-100 INSULIN) 100 UNIT/ML (3 ML) INPN PEN    Inject 20 Units into the skin every evening.    NITROGLYCERIN (NITROSTAT) 0.4 MG SL TABLET    Place 1 tablet (0.4 mg total) under the tongue every 5 (five) minutes as needed for Chest pain.    PANTOPRAZOLE (PROTONIX) 40 MG TABLET    TAKE ONE TABLET BY MOUTH ONCE DAILY.    PEN NEEDLE, DIABETIC (BD ULTRA-FINE ARIA PEN NEEDLE) 32 GAUGE X 5/32" NDLE    USE ONE NEEDLE WITH INSULIN PEN TWICE DAILY    PSYLLIUM HUSK 6 GRAM/6 GRAM POWD    Take 6 g by mouth once daily.    PYRILAMINE-PHENYLEPHRINE-DM (POLYTUSSIN DM,PYRILAMINE,) 12.5-5-7.5 MG/5 ML LIQD    Take 10 mL (oral) every 4 to " 6 hours (AS NEEDED FOR - Cough)    SACUBITRIL-VALSARTAN (ENTRESTO) 24-26 MG PER TABLET    Take 1 tablet by mouth 2 (two) times daily.    SEMAGLUTIDE (OZEMPIC) 2 MG/DOSE (8 MG/3 ML) PNIJ    Inject 2 mg into the skin every 7 days.    SILDENAFIL (VIAGRA) 100 MG TABLET    Take 1 tablet (100 mg total) by mouth daily as needed for Erectile Dysfunction.    SODIUM BICARBONATE 650 MG TABLET    Take 2 tablets (1,300 mg total) by mouth 3 (three) times daily.    TAMSULOSIN (FLOMAX) 0.4 MG CAP    TAKE 1 CAPSULE BY MOUTH ONCE DAILY.   Modified Medications    No medications on file   Discontinued Medications    No medications on file

## 2025-05-29 ENCOUNTER — OFFICE VISIT (OUTPATIENT)
Dept: CARDIOLOGY | Facility: CLINIC | Age: 70
End: 2025-05-29
Payer: MEDICARE

## 2025-05-29 VITALS
DIASTOLIC BLOOD PRESSURE: 71 MMHG | WEIGHT: 202 LBS | OXYGEN SATURATION: 97 % | BODY MASS INDEX: 26.77 KG/M2 | HEART RATE: 66 BPM | HEIGHT: 73 IN | SYSTOLIC BLOOD PRESSURE: 110 MMHG

## 2025-05-29 DIAGNOSIS — I50.22 HEART FAILURE WITH MILDLY REDUCED EJECTION FRACTION: Chronic | ICD-10-CM

## 2025-05-29 DIAGNOSIS — E11.69 DYSLIPIDEMIA ASSOCIATED WITH TYPE 2 DIABETES MELLITUS: ICD-10-CM

## 2025-05-29 DIAGNOSIS — E11.59 HYPERTENSION ASSOCIATED WITH DIABETES: ICD-10-CM

## 2025-05-29 DIAGNOSIS — I25.118 CORONARY ARTERY DISEASE OF NATIVE ARTERY OF NATIVE HEART WITH STABLE ANGINA PECTORIS: Primary | ICD-10-CM

## 2025-05-29 DIAGNOSIS — I15.2 HYPERTENSION ASSOCIATED WITH DIABETES: ICD-10-CM

## 2025-05-29 DIAGNOSIS — Z95.5 PRESENCE OF DRUG-ELUTING STENT IN LEFT CIRCUMFLEX CORONARY ARTERY: ICD-10-CM

## 2025-05-29 DIAGNOSIS — E78.5 DYSLIPIDEMIA ASSOCIATED WITH TYPE 2 DIABETES MELLITUS: ICD-10-CM

## 2025-05-29 PROCEDURE — 99214 OFFICE O/P EST MOD 30 MIN: CPT | Mod: S$GLB,,, | Performed by: INTERNAL MEDICINE

## 2025-05-29 PROCEDURE — 99999 PR PBB SHADOW E&M-EST. PATIENT-LVL IV: CPT | Mod: PBBFAC,,, | Performed by: INTERNAL MEDICINE

## 2025-05-29 PROCEDURE — 3078F DIAST BP <80 MM HG: CPT | Mod: CPTII,S$GLB,, | Performed by: INTERNAL MEDICINE

## 2025-05-29 PROCEDURE — 3061F NEG MICROALBUMINURIA REV: CPT | Mod: CPTII,S$GLB,, | Performed by: INTERNAL MEDICINE

## 2025-05-29 PROCEDURE — 3066F NEPHROPATHY DOC TX: CPT | Mod: CPTII,S$GLB,, | Performed by: INTERNAL MEDICINE

## 2025-05-29 PROCEDURE — 1159F MED LIST DOCD IN RCRD: CPT | Mod: CPTII,S$GLB,, | Performed by: INTERNAL MEDICINE

## 2025-05-29 PROCEDURE — 1101F PT FALLS ASSESS-DOCD LE1/YR: CPT | Mod: CPTII,S$GLB,, | Performed by: INTERNAL MEDICINE

## 2025-05-29 PROCEDURE — 3074F SYST BP LT 130 MM HG: CPT | Mod: CPTII,S$GLB,, | Performed by: INTERNAL MEDICINE

## 2025-05-29 PROCEDURE — 3008F BODY MASS INDEX DOCD: CPT | Mod: CPTII,S$GLB,, | Performed by: INTERNAL MEDICINE

## 2025-05-29 PROCEDURE — 3051F HG A1C>EQUAL 7.0%<8.0%: CPT | Mod: CPTII,S$GLB,, | Performed by: INTERNAL MEDICINE

## 2025-05-29 PROCEDURE — G2211 COMPLEX E/M VISIT ADD ON: HCPCS | Mod: S$GLB,,, | Performed by: INTERNAL MEDICINE

## 2025-05-29 PROCEDURE — 4010F ACE/ARB THERAPY RXD/TAKEN: CPT | Mod: CPTII,S$GLB,, | Performed by: INTERNAL MEDICINE

## 2025-05-29 PROCEDURE — 3288F FALL RISK ASSESSMENT DOCD: CPT | Mod: CPTII,S$GLB,, | Performed by: INTERNAL MEDICINE

## 2025-06-04 ENCOUNTER — OFFICE VISIT (OUTPATIENT)
Dept: FAMILY MEDICINE | Facility: CLINIC | Age: 70
End: 2025-06-04
Attending: FAMILY MEDICINE
Payer: MEDICARE

## 2025-06-04 VITALS
HEART RATE: 57 BPM | WEIGHT: 203.5 LBS | OXYGEN SATURATION: 97 % | BODY MASS INDEX: 26.85 KG/M2 | SYSTOLIC BLOOD PRESSURE: 90 MMHG | DIASTOLIC BLOOD PRESSURE: 56 MMHG

## 2025-06-04 DIAGNOSIS — E11.69 DYSLIPIDEMIA ASSOCIATED WITH TYPE 2 DIABETES MELLITUS: ICD-10-CM

## 2025-06-04 DIAGNOSIS — N52.9 ERECTILE DYSFUNCTION, UNSPECIFIED ERECTILE DYSFUNCTION TYPE: ICD-10-CM

## 2025-06-04 DIAGNOSIS — Z12.5 ENCOUNTER FOR SCREENING FOR MALIGNANT NEOPLASM OF PROSTATE: ICD-10-CM

## 2025-06-04 DIAGNOSIS — E78.5 DYSLIPIDEMIA ASSOCIATED WITH TYPE 2 DIABETES MELLITUS: ICD-10-CM

## 2025-06-04 DIAGNOSIS — I25.118 CORONARY ARTERY DISEASE OF NATIVE ARTERY OF NATIVE HEART WITH STABLE ANGINA PECTORIS: ICD-10-CM

## 2025-06-04 DIAGNOSIS — E11.22 TYPE 2 DIABETES MELLITUS WITH STAGE 3A CHRONIC KIDNEY DISEASE, WITHOUT LONG-TERM CURRENT USE OF INSULIN: ICD-10-CM

## 2025-06-04 DIAGNOSIS — E11.59 HYPERTENSION ASSOCIATED WITH DIABETES: Primary | ICD-10-CM

## 2025-06-04 DIAGNOSIS — N18.32 STAGE 3B CHRONIC KIDNEY DISEASE: ICD-10-CM

## 2025-06-04 DIAGNOSIS — N18.31 TYPE 2 DIABETES MELLITUS WITH STAGE 3A CHRONIC KIDNEY DISEASE, WITHOUT LONG-TERM CURRENT USE OF INSULIN: ICD-10-CM

## 2025-06-04 DIAGNOSIS — J43.2 CENTRILOBULAR EMPHYSEMA: ICD-10-CM

## 2025-06-04 DIAGNOSIS — I48.0 PAROXYSMAL A-FIB: ICD-10-CM

## 2025-06-04 DIAGNOSIS — I50.22 HEART FAILURE WITH MILDLY REDUCED EJECTION FRACTION: Chronic | ICD-10-CM

## 2025-06-04 DIAGNOSIS — I15.2 HYPERTENSION ASSOCIATED WITH DIABETES: Primary | ICD-10-CM

## 2025-06-04 PROCEDURE — 1160F RVW MEDS BY RX/DR IN RCRD: CPT | Mod: CPTII,S$GLB,, | Performed by: FAMILY MEDICINE

## 2025-06-04 PROCEDURE — 1101F PT FALLS ASSESS-DOCD LE1/YR: CPT | Mod: CPTII,S$GLB,, | Performed by: FAMILY MEDICINE

## 2025-06-04 PROCEDURE — 3074F SYST BP LT 130 MM HG: CPT | Mod: CPTII,S$GLB,, | Performed by: FAMILY MEDICINE

## 2025-06-04 PROCEDURE — 3051F HG A1C>EQUAL 7.0%<8.0%: CPT | Mod: CPTII,S$GLB,, | Performed by: FAMILY MEDICINE

## 2025-06-04 PROCEDURE — 99214 OFFICE O/P EST MOD 30 MIN: CPT | Mod: S$GLB,,, | Performed by: FAMILY MEDICINE

## 2025-06-04 PROCEDURE — 4010F ACE/ARB THERAPY RXD/TAKEN: CPT | Mod: CPTII,S$GLB,, | Performed by: FAMILY MEDICINE

## 2025-06-04 PROCEDURE — 3078F DIAST BP <80 MM HG: CPT | Mod: CPTII,S$GLB,, | Performed by: FAMILY MEDICINE

## 2025-06-04 PROCEDURE — 3008F BODY MASS INDEX DOCD: CPT | Mod: CPTII,S$GLB,, | Performed by: FAMILY MEDICINE

## 2025-06-04 PROCEDURE — 1125F AMNT PAIN NOTED PAIN PRSNT: CPT | Mod: CPTII,S$GLB,, | Performed by: FAMILY MEDICINE

## 2025-06-04 PROCEDURE — 3061F NEG MICROALBUMINURIA REV: CPT | Mod: CPTII,S$GLB,, | Performed by: FAMILY MEDICINE

## 2025-06-04 PROCEDURE — 1159F MED LIST DOCD IN RCRD: CPT | Mod: CPTII,S$GLB,, | Performed by: FAMILY MEDICINE

## 2025-06-04 PROCEDURE — 3288F FALL RISK ASSESSMENT DOCD: CPT | Mod: CPTII,S$GLB,, | Performed by: FAMILY MEDICINE

## 2025-06-04 PROCEDURE — 3066F NEPHROPATHY DOC TX: CPT | Mod: CPTII,S$GLB,, | Performed by: FAMILY MEDICINE

## 2025-06-04 PROCEDURE — 99999 PR PBB SHADOW E&M-EST. PATIENT-LVL IV: CPT | Mod: PBBFAC,,, | Performed by: FAMILY MEDICINE

## 2025-06-04 RX ORDER — SILDENAFIL 50 MG/1
50 TABLET, FILM COATED ORAL DAILY PRN
Qty: 20 TABLET | Refills: 2 | Status: SHIPPED | OUTPATIENT
Start: 2025-06-04

## 2025-07-08 LAB
LEFT EYE DM RETINOPATHY: NEGATIVE
RIGHT EYE DM RETINOPATHY: NEGATIVE

## 2025-07-18 ENCOUNTER — LAB VISIT (OUTPATIENT)
Dept: LAB | Facility: HOSPITAL | Age: 70
End: 2025-07-18
Attending: INTERNAL MEDICINE
Payer: MEDICARE

## 2025-07-18 DIAGNOSIS — E11.22 TYPE 2 DIABETES MELLITUS WITH STAGE 3A CHRONIC KIDNEY DISEASE, WITHOUT LONG-TERM CURRENT USE OF INSULIN: ICD-10-CM

## 2025-07-18 DIAGNOSIS — N18.32 ANEMIA IN STAGE 3B CHRONIC KIDNEY DISEASE: ICD-10-CM

## 2025-07-18 DIAGNOSIS — Z12.5 ENCOUNTER FOR SCREENING FOR MALIGNANT NEOPLASM OF PROSTATE: ICD-10-CM

## 2025-07-18 DIAGNOSIS — D63.1 ANEMIA IN STAGE 3B CHRONIC KIDNEY DISEASE: ICD-10-CM

## 2025-07-18 DIAGNOSIS — N18.31 TYPE 2 DIABETES MELLITUS WITH STAGE 3A CHRONIC KIDNEY DISEASE, WITHOUT LONG-TERM CURRENT USE OF INSULIN: ICD-10-CM

## 2025-07-18 DIAGNOSIS — N25.81 SECONDARY HYPERPARATHYROIDISM OF RENAL ORIGIN: ICD-10-CM

## 2025-07-18 LAB
ABSOLUTE EOSINOPHIL (OHS): 0.21 K/UL
ABSOLUTE MONOCYTE (OHS): 0.55 K/UL (ref 0.3–1)
ABSOLUTE NEUTROPHIL COUNT (OHS): 2.81 K/UL (ref 1.8–7.7)
ALBUMIN SERPL BCP-MCNC: 3.5 G/DL (ref 3.5–5.2)
ALBUMIN/CREAT UR: 3.1 UG/MG
ANION GAP (OHS): 5 MMOL/L (ref 8–16)
BACTERIA #/AREA URNS AUTO: NORMAL /HPF
BASOPHILS # BLD AUTO: 0.04 K/UL
BASOPHILS NFR BLD AUTO: 0.7 %
BILIRUB UR QL STRIP.AUTO: NEGATIVE
BUN SERPL-MCNC: 19 MG/DL (ref 8–23)
CALCIUM SERPL-MCNC: 8.8 MG/DL (ref 8.7–10.5)
CHLORIDE SERPL-SCNC: 108 MMOL/L (ref 95–110)
CLARITY UR: CLEAR
CO2 SERPL-SCNC: 26 MMOL/L (ref 23–29)
COLOR UR AUTO: YELLOW
CREAT SERPL-MCNC: 1.7 MG/DL (ref 0.5–1.4)
CREAT UR-MCNC: 255.7 MG/DL (ref 23–375)
CREAT UR-MCNC: 259 MG/DL (ref 23–375)
EAG (OHS): 140 MG/DL (ref 68–131)
ERYTHROCYTE [DISTWIDTH] IN BLOOD BY AUTOMATED COUNT: 14.1 % (ref 11.5–14.5)
FERRITIN SERPL-MCNC: 205.9 NG/ML (ref 20–300)
GFR SERPLBLD CREATININE-BSD FMLA CKD-EPI: 43 ML/MIN/1.73/M2
GLUCOSE SERPL-MCNC: 109 MG/DL (ref 70–110)
GLUCOSE UR QL STRIP: ABNORMAL
HBA1C MFR BLD: 6.5 % (ref 4–5.6)
HCT VFR BLD AUTO: 42.2 % (ref 40–54)
HGB BLD-MCNC: 14.5 GM/DL (ref 14–18)
HGB UR QL STRIP: ABNORMAL
IMM GRANULOCYTES # BLD AUTO: 0 K/UL (ref 0–0.04)
IMM GRANULOCYTES NFR BLD AUTO: 0 % (ref 0–0.5)
IRON SATN MFR SERPL: 27 % (ref 20–50)
IRON SERPL-MCNC: 64 UG/DL (ref 45–160)
KETONES UR QL STRIP: NEGATIVE
LEUKOCYTE ESTERASE UR QL STRIP: NEGATIVE
LYMPHOCYTES # BLD AUTO: 1.74 K/UL (ref 1–4.8)
MCH RBC QN AUTO: 29.5 PG (ref 27–31)
MCHC RBC AUTO-ENTMCNC: 34.4 G/DL (ref 32–36)
MCV RBC AUTO: 86 FL (ref 82–98)
MICROALBUMIN UR-MCNC: 8 UG/ML (ref ?–5000)
MICROSCOPIC COMMENT: NORMAL
NITRITE UR QL STRIP: NEGATIVE
NUCLEATED RBC (/100WBC) (OHS): 0 /100 WBC
PH UR STRIP: 6 [PH]
PHOSPHATE SERPL-MCNC: 3 MG/DL (ref 2.7–4.5)
PLATELET # BLD AUTO: 264 K/UL (ref 150–450)
PMV BLD AUTO: 10.1 FL (ref 9.2–12.9)
POTASSIUM SERPL-SCNC: 4.3 MMOL/L (ref 3.5–5.1)
PROT UR QL STRIP: NEGATIVE
PROT UR-MCNC: 13 MG/DL
PROT/CREAT UR: 0.05 MG/G{CREAT}
PSA SERPL-MCNC: 0.21 NG/ML
PTH-INTACT SERPL-MCNC: 192.9 PG/ML (ref 9–77)
RBC # BLD AUTO: 4.91 M/UL (ref 4.6–6.2)
RELATIVE EOSINOPHIL (OHS): 3.9 %
RELATIVE LYMPHOCYTE (OHS): 32.5 % (ref 18–48)
RELATIVE MONOCYTE (OHS): 10.3 % (ref 4–15)
RELATIVE NEUTROPHIL (OHS): 52.6 % (ref 38–73)
SODIUM SERPL-SCNC: 139 MMOL/L (ref 136–145)
SP GR UR STRIP: >=1.03
TIBC SERPL-MCNC: 237 UG/DL (ref 250–450)
TRANSFERRIN SERPL-MCNC: 160 MG/DL (ref 200–375)
URATE SERPL-MCNC: 4.6 MG/DL (ref 3.4–7)
UROBILINOGEN UR STRIP-ACNC: NEGATIVE EU/DL
WBC # BLD AUTO: 5.35 K/UL (ref 3.9–12.7)
YEAST UR QL AUTO: NORMAL /HPF

## 2025-07-18 PROCEDURE — 82728 ASSAY OF FERRITIN: CPT

## 2025-07-18 PROCEDURE — 36415 COLL VENOUS BLD VENIPUNCTURE: CPT

## 2025-07-18 PROCEDURE — 82043 UR ALBUMIN QUANTITATIVE: CPT

## 2025-07-18 PROCEDURE — 82570 ASSAY OF URINE CREATININE: CPT

## 2025-07-18 PROCEDURE — 81003 URINALYSIS AUTO W/O SCOPE: CPT

## 2025-07-18 PROCEDURE — 83970 ASSAY OF PARATHORMONE: CPT

## 2025-07-18 PROCEDURE — 84153 ASSAY OF PSA TOTAL: CPT

## 2025-07-18 PROCEDURE — 83036 HEMOGLOBIN GLYCOSYLATED A1C: CPT

## 2025-07-18 PROCEDURE — 84466 ASSAY OF TRANSFERRIN: CPT

## 2025-07-18 PROCEDURE — 84550 ASSAY OF BLOOD/URIC ACID: CPT

## 2025-07-18 PROCEDURE — 80069 RENAL FUNCTION PANEL: CPT

## 2025-07-18 PROCEDURE — 85025 COMPLETE CBC W/AUTO DIFF WBC: CPT

## 2025-08-27 ENCOUNTER — OFFICE VISIT (OUTPATIENT)
Dept: FAMILY MEDICINE | Facility: CLINIC | Age: 70
End: 2025-08-27
Attending: FAMILY MEDICINE
Payer: MEDICARE

## 2025-08-27 VITALS
WEIGHT: 189.81 LBS | BODY MASS INDEX: 25.16 KG/M2 | HEIGHT: 73 IN | OXYGEN SATURATION: 98 % | DIASTOLIC BLOOD PRESSURE: 62 MMHG | SYSTOLIC BLOOD PRESSURE: 102 MMHG | HEART RATE: 64 BPM

## 2025-08-27 DIAGNOSIS — E11.59 HYPERTENSION ASSOCIATED WITH DIABETES: Primary | ICD-10-CM

## 2025-08-27 DIAGNOSIS — E11.22 TYPE 2 DIABETES MELLITUS WITH STAGE 3A CHRONIC KIDNEY DISEASE, WITHOUT LONG-TERM CURRENT USE OF INSULIN: ICD-10-CM

## 2025-08-27 DIAGNOSIS — N18.32 STAGE 3B CHRONIC KIDNEY DISEASE: ICD-10-CM

## 2025-08-27 DIAGNOSIS — I15.2 HYPERTENSION ASSOCIATED WITH DIABETES: Primary | ICD-10-CM

## 2025-08-27 DIAGNOSIS — C61 PROSTATE CANCER: ICD-10-CM

## 2025-08-27 DIAGNOSIS — N18.31 TYPE 2 DIABETES MELLITUS WITH STAGE 3A CHRONIC KIDNEY DISEASE, WITHOUT LONG-TERM CURRENT USE OF INSULIN: ICD-10-CM

## 2025-08-27 DIAGNOSIS — I25.118 CORONARY ARTERY DISEASE OF NATIVE ARTERY WITH STABLE ANGINA PECTORIS, UNSPECIFIED WHETHER NATIVE OR TRANSPLANTED HEART: ICD-10-CM

## 2025-08-27 DIAGNOSIS — E55.9 VITAMIN D DEFICIENCY: ICD-10-CM

## 2025-08-27 DIAGNOSIS — E11.69 DYSLIPIDEMIA ASSOCIATED WITH TYPE 2 DIABETES MELLITUS: ICD-10-CM

## 2025-08-27 DIAGNOSIS — K21.9 GASTROESOPHAGEAL REFLUX DISEASE, UNSPECIFIED WHETHER ESOPHAGITIS PRESENT: ICD-10-CM

## 2025-08-27 DIAGNOSIS — E78.5 DYSLIPIDEMIA ASSOCIATED WITH TYPE 2 DIABETES MELLITUS: ICD-10-CM

## 2025-08-27 DIAGNOSIS — I50.22 HEART FAILURE WITH MILDLY REDUCED EJECTION FRACTION: ICD-10-CM

## 2025-08-27 PROCEDURE — 3078F DIAST BP <80 MM HG: CPT | Mod: CPTII,S$GLB,, | Performed by: FAMILY MEDICINE

## 2025-08-27 PROCEDURE — 99999 PR PBB SHADOW E&M-EST. PATIENT-LVL IV: CPT | Mod: PBBFAC,,, | Performed by: FAMILY MEDICINE

## 2025-08-27 PROCEDURE — 3066F NEPHROPATHY DOC TX: CPT | Mod: CPTII,S$GLB,, | Performed by: FAMILY MEDICINE

## 2025-08-27 PROCEDURE — 1159F MED LIST DOCD IN RCRD: CPT | Mod: CPTII,S$GLB,, | Performed by: FAMILY MEDICINE

## 2025-08-27 PROCEDURE — 1126F AMNT PAIN NOTED NONE PRSNT: CPT | Mod: CPTII,S$GLB,, | Performed by: FAMILY MEDICINE

## 2025-08-27 PROCEDURE — 1160F RVW MEDS BY RX/DR IN RCRD: CPT | Mod: CPTII,S$GLB,, | Performed by: FAMILY MEDICINE

## 2025-08-27 PROCEDURE — 3288F FALL RISK ASSESSMENT DOCD: CPT | Mod: CPTII,S$GLB,, | Performed by: FAMILY MEDICINE

## 2025-08-27 PROCEDURE — 3074F SYST BP LT 130 MM HG: CPT | Mod: CPTII,S$GLB,, | Performed by: FAMILY MEDICINE

## 2025-08-27 PROCEDURE — 99215 OFFICE O/P EST HI 40 MIN: CPT | Mod: S$GLB,,, | Performed by: FAMILY MEDICINE

## 2025-08-27 PROCEDURE — 1101F PT FALLS ASSESS-DOCD LE1/YR: CPT | Mod: CPTII,S$GLB,, | Performed by: FAMILY MEDICINE

## 2025-08-27 PROCEDURE — 3044F HG A1C LEVEL LT 7.0%: CPT | Mod: CPTII,S$GLB,, | Performed by: FAMILY MEDICINE

## 2025-08-27 PROCEDURE — 3061F NEG MICROALBUMINURIA REV: CPT | Mod: CPTII,S$GLB,, | Performed by: FAMILY MEDICINE

## 2025-08-27 PROCEDURE — 3008F BODY MASS INDEX DOCD: CPT | Mod: CPTII,S$GLB,, | Performed by: FAMILY MEDICINE

## 2025-08-27 PROCEDURE — 4010F ACE/ARB THERAPY RXD/TAKEN: CPT | Mod: CPTII,S$GLB,, | Performed by: FAMILY MEDICINE

## 2025-08-27 RX ORDER — ERGOCALCIFEROL 1.25 MG/1
50000 CAPSULE ORAL
Qty: 4 CAPSULE | Refills: 3 | Status: SHIPPED | OUTPATIENT
Start: 2025-08-27

## 2025-08-27 RX ORDER — PANTOPRAZOLE SODIUM 40 MG/1
40 TABLET, DELAYED RELEASE ORAL
Qty: 90 TABLET | Refills: 3 | Status: SHIPPED | OUTPATIENT
Start: 2025-08-27

## 2025-08-27 RX ORDER — EZETIMIBE 10 MG/1
10 TABLET ORAL DAILY
Qty: 90 TABLET | Refills: 3 | Status: SHIPPED | OUTPATIENT
Start: 2025-08-27 | End: 2026-08-27

## 2025-08-27 RX ORDER — CLOPIDOGREL BISULFATE 75 MG/1
75 TABLET ORAL DAILY
Qty: 90 TABLET | Refills: 4 | Status: SHIPPED | OUTPATIENT
Start: 2025-08-27

## 2025-08-27 RX ORDER — TAMSULOSIN HYDROCHLORIDE 0.4 MG/1
1 CAPSULE ORAL
Qty: 90 CAPSULE | Refills: 3 | Status: SHIPPED | OUTPATIENT
Start: 2025-08-27

## 2025-08-27 RX ORDER — INSULIN GLARGINE 100 [IU]/ML
15 INJECTION, SOLUTION SUBCUTANEOUS NIGHTLY
Qty: 15 ML | Refills: 3 | Status: SHIPPED | OUTPATIENT
Start: 2025-08-27 | End: 2026-08-27

## 2025-08-27 RX ORDER — DAPAGLIFLOZIN 10 MG/1
10 TABLET, FILM COATED ORAL DAILY
Qty: 90 TABLET | Refills: 3 | Status: SHIPPED | OUTPATIENT
Start: 2025-08-27

## 2025-08-27 RX ORDER — ATORVASTATIN CALCIUM 80 MG/1
80 TABLET, FILM COATED ORAL DAILY
Qty: 90 TABLET | Refills: 5 | Status: SHIPPED | OUTPATIENT
Start: 2025-08-27

## 2025-09-02 ENCOUNTER — PATIENT OUTREACH (OUTPATIENT)
Dept: ADMINISTRATIVE | Facility: HOSPITAL | Age: 70
End: 2025-09-02
Payer: MEDICARE

## (undated) DEVICE — TUBING HPCIL ROT M/F ADPT 48IN

## (undated) DEVICE — CATH URETH FOLEY 2W 16FR 5ML

## (undated) DEVICE — HEMOSTAT VASC BAND REG 24CM

## (undated) DEVICE — GUIDE LAUNCHER 6FR EBU 3.5

## (undated) DEVICE — KIT SAHARA DRAPE DRAW/LIFT

## (undated) DEVICE — COVER PROBE US 5.5X58L NON LTX

## (undated) DEVICE — PAD CURAD NONADH 3X4IN

## (undated) DEVICE — KIT GLIDESHEATH SLEND 6FR 10CM

## (undated) DEVICE — BLADE SAW STERN .076MM 6.1MM L

## (undated) DEVICE — KIT LEFT HEART MANIFOLD CUSTOM

## (undated) DEVICE — SUT PROLENE 7-0 BV-1 30

## (undated) DEVICE — TRANSDUCER TRU WAVE

## (undated) DEVICE — GUIDE WIRE BMW 014 X190

## (undated) DEVICE — CATH EMERGE MR 8 X 2.50

## (undated) DEVICE — GUIDE LAUNCHER 6FR JR 4.0

## (undated) DEVICE — DRAPE SLUSH WARMER WITH DISC

## (undated) DEVICE — RETRACTOR OCTOBASE INSERT HOLD

## (undated) DEVICE — SYS VIRTUOSAPH PLUS EVM

## (undated) DEVICE — GOWN POLY REINF BRTH SLV XL

## (undated) DEVICE — COVER TRANSDUCER LATEX N/STERI

## (undated) DEVICE — CONNECTOR Y 3/8X3/8X3/8

## (undated) DEVICE — CATH EMERGE MR 30 X 2.00

## (undated) DEVICE — CATH EAGLE EYE ST .014X20X150

## (undated) DEVICE — SYR 50CC LL

## (undated) DEVICE — BLADE 4IN EDGE INSULATED

## (undated) DEVICE — SUT 2/0 30IN SILK BLK BRAI

## (undated) DEVICE — PAD K-THERMIA 24IN X 60IN

## (undated) DEVICE — CATH IMPULSE 5FR PIGTAIL 125CM

## (undated) DEVICE — CATH EMERGE MR 20 X 3.50

## (undated) DEVICE — PAD DEFIB CADENCE ADULT R2

## (undated) DEVICE — APPLIER CLIP LIAGCLIP 9.375IN

## (undated) DEVICE — CATH EMERGE MR 15 X 2.25

## (undated) DEVICE — TRAY CYSTO BASIN OMC

## (undated) DEVICE — GUIDEWIRE OMNI J TIP 185CM

## (undated) DEVICE — PLEDGET SUT SOFT 3/8X3/16X1/16

## (undated) DEVICE — WIRE INTRAMYOCARDIAL TEMP

## (undated) DEVICE — COVERS PROBE NR-48 STERILE

## (undated) DEVICE — COVER PROXIMA MAYO STAND

## (undated) DEVICE — BLADE SAW STERNAL 5/BX

## (undated) DEVICE — GUIDE LAUNCHER 6FR JL 4.0

## (undated) DEVICE — ADAPTER HOSE 10FT 8MM

## (undated) DEVICE — BLOWER MISTER

## (undated) DEVICE — CONTRAST VISIPAQUE 150ML

## (undated) DEVICE — UNDERGLOVES BIOGEL PI SIZE 7.5

## (undated) DEVICE — TUBING HF INSUFFLATION W/ FLTR

## (undated) DEVICE — NDL 18GA X1 1/2 REG BEVEL

## (undated) DEVICE — Device

## (undated) DEVICE — TOURNIQUET TOURNIKWIK 2 TB 6IN

## (undated) DEVICE — BLLN ENDOCAVITY

## (undated) DEVICE — PLUG CATHETER STERILE FOLEY

## (undated) DEVICE — TIP YANKAUERS BULB NO VENT

## (undated) DEVICE — APPLIER LIGACLIP SM 9.38IN

## (undated) DEVICE — CATH GUIDE LINER  V3 6F

## (undated) DEVICE — SUT 4-0 12-18IN SILK BLACK

## (undated) DEVICE — SUT PROLENE 4-0 RB-1 BL MO

## (undated) DEVICE — DRAPE ANGIO BRACH 38X44IN

## (undated) DEVICE — SYR ONLY LUER LOCK 20CC

## (undated) DEVICE — GUIDEWIRE WHOLEY STD STR 260CM

## (undated) DEVICE — DRAIN CHEST DRY SUCTION

## (undated) DEVICE — SUT 6/0 4-24IN PROLENE

## (undated) DEVICE — KIT URINARY CATH URINE METER

## (undated) DEVICE — GUIDEWIRE EMERALD .035IN 260CM

## (undated) DEVICE — TAPE SURG MEDIPORE 6X72IN

## (undated) DEVICE — SUT PROLENE 4-0 SH BLU 36IN

## (undated) DEVICE — TRAY HEART OMC

## (undated) DEVICE — INFLATOR ENCORE 26 BLLN INFL

## (undated) DEVICE — SEE MEDLINE ITEM 157187

## (undated) DEVICE — NDL BOX COUNTER

## (undated) DEVICE — SUT 6 18IN STEEL MONO CCS

## (undated) DEVICE — CATH EMERGE MR 30 X 3.00

## (undated) DEVICE — GUIDEWIRE RUNTHROUGH EF 180CM

## (undated) DEVICE — SUT 2/0 30IN ETHIBOND

## (undated) DEVICE — CATH EMERGE MR 20 X 2.50

## (undated) DEVICE — DRESSING ADH ISLAND 3.6 X 14

## (undated) DEVICE — CLIP SPRING 6MM

## (undated) DEVICE — CANNULA VESSEL FREE FLOW

## (undated) DEVICE — GOWN X-LG STERILE BACK

## (undated) DEVICE — SUT CTD VICRYL 0 UND CR/CTX

## (undated) DEVICE — DRESSING TRANS 4X4 TEGADERM

## (undated) DEVICE — SYR MED RAD 150ML

## (undated) DEVICE — SUT MONOCRYL 4-0 PS-1 UND

## (undated) DEVICE — SUT SILK 2-0 SH 18IN BLACK

## (undated) DEVICE — BANDAGE ELAS SOFTWRAP ST 4X5YD

## (undated) DEVICE — TOWEL OR DISP STRL BLUE 4/PK

## (undated) DEVICE — GUIDEWIRE WHISPER ES .014X190

## (undated) DEVICE — DRAPE CVMAX SPLIT ANES SCRN

## (undated) DEVICE — ELECTRODE REM PLYHSV RETURN 9

## (undated) DEVICE — CATH JACKY RADIAL 5FR 100CM

## (undated) DEVICE — BANDAGE ELAS SOFTWRAP ST 6X5YD

## (undated) DEVICE — SPONGE COTTON TRAY 4X4IN

## (undated) DEVICE — CONTRAST VISIPAQUE 50ML

## (undated) DEVICE — CATH OPTITORQUE TIGER 5F 100CM

## (undated) DEVICE — SYR 10CC LUER LOCK

## (undated) DEVICE — PAD RADIOLUCENT STAT ADULT

## (undated) DEVICE — HEMOSTAT VASC BAND LONG 27CM

## (undated) DEVICE — DRESSING AQUACEL SACRAL 9 X 9

## (undated) DEVICE — MARKER SKIN STND TIP BLUE BARR

## (undated) DEVICE — INSERTS STEALTH FIBRA SIZE 5

## (undated) DEVICE — KIT PROBE COVER WITH GEL

## (undated) DEVICE — SET DECANTER MEDICHOICE

## (undated) DEVICE — KIT INTRODUCER W/GUIDEWIRE

## (undated) DEVICE — CATH TELESCOPE GUIDE EXT 6FR

## (undated) DEVICE — DRAIN CHANNEL ROUND 19FR

## (undated) DEVICE — SUT SILK BLK BR. 2 2-60

## (undated) DEVICE — DEFIBRILLATOR STAT PADZ II

## (undated) DEVICE — SYR 50ML CATH TIP

## (undated) DEVICE — SPONGE GAUZE 16PLY 4X4

## (undated) DEVICE — VISE RADIFOCUS MULTI TORQUE

## (undated) DEVICE — ADHESIVE DERMABOND ADVANCED

## (undated) DEVICE — GLOVE BIOGEL PI MICRO SZ 7.5

## (undated) DEVICE — VALVE CONTROL COPILOT

## (undated) DEVICE — PUNCH AORTIC 4.8MM

## (undated) DEVICE — BLADE ELECTRO EDGE INSULATED

## (undated) DEVICE — SET PROSTATE STABILIZATION 18G

## (undated) DEVICE — PACK CYSTO